# Patient Record
Sex: FEMALE | Race: BLACK OR AFRICAN AMERICAN | NOT HISPANIC OR LATINO | URBAN - METROPOLITAN AREA
[De-identification: names, ages, dates, MRNs, and addresses within clinical notes are randomized per-mention and may not be internally consistent; named-entity substitution may affect disease eponyms.]

---

## 2018-07-01 ENCOUNTER — OUTPATIENT (OUTPATIENT)
Dept: OUTPATIENT SERVICES | Facility: HOSPITAL | Age: 73
LOS: 1 days | End: 2018-07-01
Payer: MEDICARE

## 2018-07-12 DIAGNOSIS — Z71.89 OTHER SPECIFIED COUNSELING: ICD-10-CM

## 2018-12-01 PROCEDURE — G9005: CPT

## 2019-05-29 ENCOUNTER — RESULT REVIEW (OUTPATIENT)
Age: 74
End: 2019-05-29

## 2019-05-29 ENCOUNTER — INPATIENT (INPATIENT)
Facility: HOSPITAL | Age: 74
LOS: 2 days | Discharge: NURSING, REHAB OR LONG-TERM CARE | DRG: 470 | End: 2019-06-01
Attending: INTERNAL MEDICINE | Admitting: INTERNAL MEDICINE
Payer: MEDICARE

## 2019-05-29 ENCOUNTER — TRANSCRIPTION ENCOUNTER (OUTPATIENT)
Age: 74
End: 2019-05-29

## 2019-05-29 VITALS
HEART RATE: 115 BPM | DIASTOLIC BLOOD PRESSURE: 109 MMHG | HEIGHT: 63 IN | WEIGHT: 138.01 LBS | SYSTOLIC BLOOD PRESSURE: 247 MMHG | TEMPERATURE: 99 F | OXYGEN SATURATION: 100 % | RESPIRATION RATE: 18 BRPM

## 2019-05-29 DIAGNOSIS — S72.002A FRACTURE OF UNSPECIFIED PART OF NECK OF LEFT FEMUR, INITIAL ENCOUNTER FOR CLOSED FRACTURE: ICD-10-CM

## 2019-05-29 LAB
ALBUMIN SERPL ELPH-MCNC: 3.5 G/DL — SIGNIFICANT CHANGE UP (ref 3.5–5)
ALP SERPL-CCNC: 74 U/L — SIGNIFICANT CHANGE UP (ref 40–120)
ALT FLD-CCNC: 18 U/L DA — SIGNIFICANT CHANGE UP (ref 10–60)
ANION GAP SERPL CALC-SCNC: 6 MMOL/L — SIGNIFICANT CHANGE UP (ref 5–17)
APTT BLD: 35.8 SEC — SIGNIFICANT CHANGE UP (ref 27.5–36.3)
AST SERPL-CCNC: 34 U/L — SIGNIFICANT CHANGE UP (ref 10–40)
BASOPHILS # BLD AUTO: 0.02 K/UL — SIGNIFICANT CHANGE UP (ref 0–0.2)
BASOPHILS NFR BLD AUTO: 0.2 % — SIGNIFICANT CHANGE UP (ref 0–2)
BILIRUB SERPL-MCNC: 1.1 MG/DL — SIGNIFICANT CHANGE UP (ref 0.2–1.2)
BUN SERPL-MCNC: 10 MG/DL — SIGNIFICANT CHANGE UP (ref 7–18)
CALCIUM SERPL-MCNC: 9 MG/DL — SIGNIFICANT CHANGE UP (ref 8.4–10.5)
CHLORIDE SERPL-SCNC: 104 MMOL/L — SIGNIFICANT CHANGE UP (ref 96–108)
CO2 SERPL-SCNC: 26 MMOL/L — SIGNIFICANT CHANGE UP (ref 22–31)
CREAT SERPL-MCNC: 0.76 MG/DL — SIGNIFICANT CHANGE UP (ref 0.5–1.3)
EOSINOPHIL # BLD AUTO: 0 K/UL — SIGNIFICANT CHANGE UP (ref 0–0.5)
EOSINOPHIL NFR BLD AUTO: 0 % — SIGNIFICANT CHANGE UP (ref 0–6)
GLUCOSE SERPL-MCNC: 263 MG/DL — HIGH (ref 70–99)
HCT VFR BLD CALC: 40.9 % — SIGNIFICANT CHANGE UP (ref 34.5–45)
HGB BLD-MCNC: 14 G/DL — SIGNIFICANT CHANGE UP (ref 11.5–15.5)
IMM GRANULOCYTES NFR BLD AUTO: 0.4 % — SIGNIFICANT CHANGE UP (ref 0–1.5)
INR BLD: 1.08 RATIO — SIGNIFICANT CHANGE UP (ref 0.88–1.16)
LYMPHOCYTES # BLD AUTO: 0.75 K/UL — LOW (ref 1–3.3)
LYMPHOCYTES # BLD AUTO: 5.8 % — LOW (ref 13–44)
MCHC RBC-ENTMCNC: 30.7 PG — SIGNIFICANT CHANGE UP (ref 27–34)
MCHC RBC-ENTMCNC: 34.2 GM/DL — SIGNIFICANT CHANGE UP (ref 32–36)
MCV RBC AUTO: 89.7 FL — SIGNIFICANT CHANGE UP (ref 80–100)
MONOCYTES # BLD AUTO: 0.5 K/UL — SIGNIFICANT CHANGE UP (ref 0–0.9)
MONOCYTES NFR BLD AUTO: 3.8 % — SIGNIFICANT CHANGE UP (ref 2–14)
NEUTROPHILS # BLD AUTO: 11.68 K/UL — HIGH (ref 1.8–7.4)
NEUTROPHILS NFR BLD AUTO: 89.8 % — HIGH (ref 43–77)
NRBC # BLD: 0 /100 WBCS — SIGNIFICANT CHANGE UP (ref 0–0)
PLATELET # BLD AUTO: 232 K/UL — SIGNIFICANT CHANGE UP (ref 150–400)
POTASSIUM SERPL-MCNC: 4.2 MMOL/L — SIGNIFICANT CHANGE UP (ref 3.5–5.3)
POTASSIUM SERPL-MCNC: 5.7 MMOL/L — HIGH (ref 3.5–5.3)
POTASSIUM SERPL-SCNC: 4.2 MMOL/L — SIGNIFICANT CHANGE UP (ref 3.5–5.3)
POTASSIUM SERPL-SCNC: 5.7 MMOL/L — HIGH (ref 3.5–5.3)
PROT SERPL-MCNC: 8 G/DL — SIGNIFICANT CHANGE UP (ref 6–8.3)
PROTHROM AB SERPL-ACNC: 12 SEC — SIGNIFICANT CHANGE UP (ref 10–12.9)
RBC # BLD: 4.56 M/UL — SIGNIFICANT CHANGE UP (ref 3.8–5.2)
RBC # FLD: 11.9 % — SIGNIFICANT CHANGE UP (ref 10.3–14.5)
SODIUM SERPL-SCNC: 136 MMOL/L — SIGNIFICANT CHANGE UP (ref 135–145)
WBC # BLD: 13 K/UL — HIGH (ref 3.8–10.5)
WBC # FLD AUTO: 13 K/UL — HIGH (ref 3.8–10.5)

## 2019-05-29 PROCEDURE — 72192 CT PELVIS W/O DYE: CPT | Mod: 26

## 2019-05-29 PROCEDURE — 71045 X-RAY EXAM CHEST 1 VIEW: CPT | Mod: 26

## 2019-05-29 PROCEDURE — 73502 X-RAY EXAM HIP UNI 2-3 VIEWS: CPT | Mod: 26,LT

## 2019-05-29 PROCEDURE — 99285 EMERGENCY DEPT VISIT HI MDM: CPT | Mod: 25

## 2019-05-29 PROCEDURE — 93010 ELECTROCARDIOGRAM REPORT: CPT

## 2019-05-29 RX ORDER — MORPHINE SULFATE 50 MG/1
2 CAPSULE, EXTENDED RELEASE ORAL ONCE
Refills: 0 | Status: DISCONTINUED | OUTPATIENT
Start: 2019-05-29 | End: 2019-05-29

## 2019-05-29 RX ORDER — LABETALOL HCL 100 MG
10 TABLET ORAL ONCE
Refills: 0 | Status: COMPLETED | OUTPATIENT
Start: 2019-05-29 | End: 2019-05-29

## 2019-05-29 RX ORDER — ACETAMINOPHEN 500 MG
1000 TABLET ORAL ONCE
Refills: 0 | Status: COMPLETED | OUTPATIENT
Start: 2019-05-29 | End: 2019-05-29

## 2019-05-29 RX ADMIN — Medication 400 MILLIGRAM(S): at 21:11

## 2019-05-29 RX ADMIN — MORPHINE SULFATE 2 MILLIGRAM(S): 50 CAPSULE, EXTENDED RELEASE ORAL at 21:16

## 2019-05-29 RX ADMIN — MORPHINE SULFATE 2 MILLIGRAM(S): 50 CAPSULE, EXTENDED RELEASE ORAL at 21:11

## 2019-05-29 RX ADMIN — Medication 1000 MILLIGRAM(S): at 21:16

## 2019-05-29 RX ADMIN — Medication 10 MILLIGRAM(S): at 23:21

## 2019-05-29 NOTE — ED PROVIDER NOTE - CARE PLAN
Principal Discharge DX:	Closed fracture of left hip, initial encounter  Secondary Diagnosis:	Hypertension, unspecified type

## 2019-05-29 NOTE — ED PROVIDER NOTE - CLINICAL SUMMARY MEDICAL DECISION MAKING FREE TEXT BOX
72 y/o presents to the ED s/p fall today. Will order pain control, hip/pelvis X-ray, basic labs and anticipate admission for L hip fracture.

## 2019-05-29 NOTE — ED PROVIDER NOTE - PMH
DM (diabetes mellitus)    HLD (hyperlipidemia)    HTN (hypertension)    PAD (peripheral artery disease)    Paranoid schizophrenia

## 2019-05-29 NOTE — ED ADULT NURSE NOTE - EENT WDL
AUTHORIZATION FOR SURGICAL OPERATION OR OTHER PROCEDURE    1. I hereby authorize Annelise Martinez Dr, and [facility] staff assigned to my case to perform the following operation and/or procedure at the [place] site:    Colposcopy    2.  My physician has explained the nature and purpose of the operation or other procedure, possible alternative methods of treatment, the risks involved, and the possibility of complication to me.  I acknowledge that no guarantee has been made as to the result that may be obtained.  3.  I recognize that, during the course of this operation, or other procedure, unforseen conditions may necessitate additional or different procedure than those listed above.  I, therefore, further authorize and request that the above named physician, his/her physician assistants or designees perform such procedures as are, in his/her professional opinion, necessary and desirable.  4.  Any tissue or organs removed in the operation or other procedure may be disposed of by and at the discretion of the [facility] and [facility].  5.  I understand that in the event of a medical emergency, I will be transported by local paramedics to [facility] or other hospital emergency department.  6.  I certify that I have read and fully understand the above consent to operation and/or other procedure.    7.  I acknowledge that my physician has explained sedation/analgesia administration to me including the risks and benefits.  I consent to the administration of sedation/analgesia as may be necessary or desirable in the judgement of my physician.    Witness signature: ___________________________________________________ Date:  ______/______/_____                    Time:  ________ A.M.  P.M.       Patient Name:  ______________________________________________________  (please print)      Patient signature:   ___________________________________________________             Relationship to Patient:           []  Parent    Responsible person                          []  Spouse  In case of minor or                    [] Other  _____________   Incompetent name:  __________________________________________________                               (please print)      _____________      Responsible person  In case of minor or  Incompetent signature:  _______________________________________________    Statement of Physician  My signature below affirms that prior to the time of the procedure, I have explained to the patient and/or his/her guardian, the risks and benefits involved in the proposed treatment and any reasonable alternative to the proposed treatment.  I have also explained the risks and benefits involved in the refusal of the proposed treatment and have answered the patient's questions.                        Date:  ______/______/_______  Provider                      Signature:  __________________________________________________________       Time:  ___________ A.M    P.M.      Eyes with no visual disturbances.  Ears clean and dry and no hearing difficulties. Nose with pink mucosa and no drainage.  Mouth mucous membranes moist and pink.  No tenderness or swelling to throat or neck.

## 2019-05-29 NOTE — ED PROVIDER NOTE - OBJECTIVE STATEMENT
74 y/o M with a significant PMHx of HTN, HLD, PAD, DM and paranoid schizophrenia presents to the ED s/p fall today at 3:00 PM in bathroom. Patient reports pain to L hip. Patient states she resides at [facility]; notes PMD is Dr. Llanes. Denies head injury, back injury, abdominal pain, injury to right leg or any other complaints. 74 y/o M with a significant PMHx of HTN, HLD, PAD, DM and paranoid schizophrenia presents to the ED s/p fall today at 3:00 PM in bathroom. Patient reports pain to L hip. Patient states she resides at [facility]; notes PMD is Dr. Llanes. Denies head injury, back injury, abdominal pain, injury to right leg or any other complaints. NKDA.

## 2019-05-29 NOTE — ED PROVIDER NOTE - PROGRESS NOTE DETAILS
Will order CT bony pelvis to determine extent of L hip fracture. Informed Dr. Llanes for admission.  He requested orthopedist Dr. Rico to be consulted non-emergently and he was aware that he is not on-call.  Dr. Rico was paged however no response.  Informed REILLY Sanchez.

## 2019-05-30 DIAGNOSIS — Z29.9 ENCOUNTER FOR PROPHYLACTIC MEASURES, UNSPECIFIED: ICD-10-CM

## 2019-05-30 DIAGNOSIS — I16.0 HYPERTENSIVE URGENCY: ICD-10-CM

## 2019-05-30 DIAGNOSIS — E11.9 TYPE 2 DIABETES MELLITUS WITHOUT COMPLICATIONS: ICD-10-CM

## 2019-05-30 DIAGNOSIS — F20.0 PARANOID SCHIZOPHRENIA: ICD-10-CM

## 2019-05-30 DIAGNOSIS — E78.5 HYPERLIPIDEMIA, UNSPECIFIED: ICD-10-CM

## 2019-05-30 DIAGNOSIS — S72.002A FRACTURE OF UNSPECIFIED PART OF NECK OF LEFT FEMUR, INITIAL ENCOUNTER FOR CLOSED FRACTURE: ICD-10-CM

## 2019-05-30 LAB
ABO RH CONFIRMATION: SIGNIFICANT CHANGE UP
ALBUMIN SERPL ELPH-MCNC: 3.5 G/DL — SIGNIFICANT CHANGE UP (ref 3.5–5)
ALLERGY+IMMUNOLOGY DIAG STUDY NOTE: SIGNIFICANT CHANGE UP
ALP SERPL-CCNC: 71 U/L — SIGNIFICANT CHANGE UP (ref 40–120)
ALT FLD-CCNC: 13 U/L DA — SIGNIFICANT CHANGE UP (ref 10–60)
ANION GAP SERPL CALC-SCNC: 6 MMOL/L — SIGNIFICANT CHANGE UP (ref 5–17)
AST SERPL-CCNC: 10 U/L — SIGNIFICANT CHANGE UP (ref 10–40)
BASOPHILS # BLD AUTO: 0.03 K/UL — SIGNIFICANT CHANGE UP (ref 0–0.2)
BASOPHILS NFR BLD AUTO: 0.2 % — SIGNIFICANT CHANGE UP (ref 0–2)
BILIRUB SERPL-MCNC: 1.1 MG/DL — SIGNIFICANT CHANGE UP (ref 0.2–1.2)
BUN SERPL-MCNC: 11 MG/DL — SIGNIFICANT CHANGE UP (ref 7–18)
CALCIUM SERPL-MCNC: 9 MG/DL — SIGNIFICANT CHANGE UP (ref 8.4–10.5)
CHLORIDE SERPL-SCNC: 104 MMOL/L — SIGNIFICANT CHANGE UP (ref 96–108)
CHOLEST SERPL-MCNC: 217 MG/DL — HIGH (ref 10–199)
CO2 SERPL-SCNC: 30 MMOL/L — SIGNIFICANT CHANGE UP (ref 22–31)
CREAT SERPL-MCNC: 0.8 MG/DL — SIGNIFICANT CHANGE UP (ref 0.5–1.3)
EOSINOPHIL # BLD AUTO: 0.03 K/UL — SIGNIFICANT CHANGE UP (ref 0–0.5)
EOSINOPHIL NFR BLD AUTO: 0.2 % — SIGNIFICANT CHANGE UP (ref 0–6)
FOLATE SERPL-MCNC: 16.5 NG/ML — SIGNIFICANT CHANGE UP
GLUCOSE BLDC GLUCOMTR-MCNC: 161 MG/DL — HIGH (ref 70–99)
GLUCOSE BLDC GLUCOMTR-MCNC: 205 MG/DL — HIGH (ref 70–99)
GLUCOSE BLDC GLUCOMTR-MCNC: 235 MG/DL — HIGH (ref 70–99)
GLUCOSE BLDC GLUCOMTR-MCNC: 321 MG/DL — HIGH (ref 70–99)
GLUCOSE SERPL-MCNC: 206 MG/DL — HIGH (ref 70–99)
HBA1C BLD-MCNC: 9.6 % — HIGH (ref 4–5.6)
HCT VFR BLD CALC: 40.3 % — SIGNIFICANT CHANGE UP (ref 34.5–45)
HDLC SERPL-MCNC: 64 MG/DL — SIGNIFICANT CHANGE UP
HGB BLD-MCNC: 13.5 G/DL — SIGNIFICANT CHANGE UP (ref 11.5–15.5)
IMM GRANULOCYTES NFR BLD AUTO: 0.4 % — SIGNIFICANT CHANGE UP (ref 0–1.5)
LIPID PNL WITH DIRECT LDL SERPL: 140 MG/DL — SIGNIFICANT CHANGE UP
LYMPHOCYTES # BLD AUTO: 1.64 K/UL — SIGNIFICANT CHANGE UP (ref 1–3.3)
LYMPHOCYTES # BLD AUTO: 13.5 % — SIGNIFICANT CHANGE UP (ref 13–44)
MAGNESIUM SERPL-MCNC: 2.1 MG/DL — SIGNIFICANT CHANGE UP (ref 1.6–2.6)
MCHC RBC-ENTMCNC: 30.4 PG — SIGNIFICANT CHANGE UP (ref 27–34)
MCHC RBC-ENTMCNC: 33.5 GM/DL — SIGNIFICANT CHANGE UP (ref 32–36)
MCV RBC AUTO: 90.8 FL — SIGNIFICANT CHANGE UP (ref 80–100)
MONOCYTES # BLD AUTO: 0.96 K/UL — HIGH (ref 0–0.9)
MONOCYTES NFR BLD AUTO: 7.9 % — SIGNIFICANT CHANGE UP (ref 2–14)
NEUTROPHILS # BLD AUTO: 9.4 K/UL — HIGH (ref 1.8–7.4)
NEUTROPHILS NFR BLD AUTO: 77.8 % — HIGH (ref 43–77)
NRBC # BLD: 0 /100 WBCS — SIGNIFICANT CHANGE UP (ref 0–0)
PHOSPHATE SERPL-MCNC: 4.1 MG/DL — SIGNIFICANT CHANGE UP (ref 2.5–4.5)
PLATELET # BLD AUTO: 246 K/UL — SIGNIFICANT CHANGE UP (ref 150–400)
POTASSIUM SERPL-MCNC: 4.7 MMOL/L — SIGNIFICANT CHANGE UP (ref 3.5–5.3)
POTASSIUM SERPL-SCNC: 4.7 MMOL/L — SIGNIFICANT CHANGE UP (ref 3.5–5.3)
PROT SERPL-MCNC: 7.6 G/DL — SIGNIFICANT CHANGE UP (ref 6–8.3)
RBC # BLD: 4.44 M/UL — SIGNIFICANT CHANGE UP (ref 3.8–5.2)
RBC # FLD: 11.9 % — SIGNIFICANT CHANGE UP (ref 10.3–14.5)
SODIUM SERPL-SCNC: 140 MMOL/L — SIGNIFICANT CHANGE UP (ref 135–145)
TOTAL CHOLESTEROL/HDL RATIO MEASUREMENT: 3.4 RATIO — SIGNIFICANT CHANGE UP (ref 3.3–7.1)
TRIGL SERPL-MCNC: 65 MG/DL — SIGNIFICANT CHANGE UP (ref 10–149)
TSH SERPL-MCNC: 1.41 UU/ML — SIGNIFICANT CHANGE UP (ref 0.34–4.82)
VIT B12 SERPL-MCNC: 743 PG/ML — SIGNIFICANT CHANGE UP (ref 232–1245)
WBC # BLD: 12.11 K/UL — HIGH (ref 3.8–10.5)
WBC # FLD AUTO: 12.11 K/UL — HIGH (ref 3.8–10.5)

## 2019-05-30 PROCEDURE — 93306 TTE W/DOPPLER COMPLETE: CPT | Mod: 26

## 2019-05-30 PROCEDURE — 27236 TREAT THIGH FRACTURE: CPT | Mod: AS,LT

## 2019-05-30 PROCEDURE — 71045 X-RAY EXAM CHEST 1 VIEW: CPT | Mod: 26

## 2019-05-30 RX ORDER — ACETAMINOPHEN 500 MG
650 TABLET ORAL EVERY 6 HOURS
Refills: 0 | Status: DISCONTINUED | OUTPATIENT
Start: 2019-05-30 | End: 2019-05-30

## 2019-05-30 RX ORDER — INSULIN GLARGINE 100 [IU]/ML
10 INJECTION, SOLUTION SUBCUTANEOUS AT BEDTIME
Refills: 0 | Status: CANCELLED | OUTPATIENT
Start: 2019-05-31 | End: 2019-05-30

## 2019-05-30 RX ORDER — INSULIN GLARGINE 100 [IU]/ML
10 INJECTION, SOLUTION SUBCUTANEOUS AT BEDTIME
Refills: 0 | Status: DISCONTINUED | OUTPATIENT
Start: 2019-05-30 | End: 2019-05-30

## 2019-05-30 RX ORDER — HYDROCHLOROTHIAZIDE 25 MG
25 TABLET ORAL DAILY
Refills: 0 | Status: DISCONTINUED | OUTPATIENT
Start: 2019-05-30 | End: 2019-05-30

## 2019-05-30 RX ORDER — MORPHINE SULFATE 50 MG/1
1 CAPSULE, EXTENDED RELEASE ORAL EVERY 6 HOURS
Refills: 0 | Status: DISCONTINUED | OUTPATIENT
Start: 2019-05-30 | End: 2019-05-30

## 2019-05-30 RX ORDER — SIMVASTATIN 20 MG/1
40 TABLET, FILM COATED ORAL AT BEDTIME
Refills: 0 | Status: DISCONTINUED | OUTPATIENT
Start: 2019-05-30 | End: 2019-05-30

## 2019-05-30 RX ORDER — INSULIN LISPRO 100/ML
VIAL (ML) SUBCUTANEOUS
Refills: 0 | Status: DISCONTINUED | OUTPATIENT
Start: 2019-05-30 | End: 2019-05-30

## 2019-05-30 RX ORDER — POVIDONE-IODINE 5 %
1 AEROSOL (ML) TOPICAL ONCE
Refills: 0 | Status: DISCONTINUED | OUTPATIENT
Start: 2019-05-30 | End: 2019-05-30

## 2019-05-30 RX ORDER — LOSARTAN POTASSIUM 100 MG/1
100 TABLET, FILM COATED ORAL DAILY
Refills: 0 | Status: DISCONTINUED | OUTPATIENT
Start: 2019-05-30 | End: 2019-05-30

## 2019-05-30 RX ORDER — CHLORHEXIDINE GLUCONATE 213 G/1000ML
1 SOLUTION TOPICAL ONCE
Refills: 0 | Status: COMPLETED | OUTPATIENT
Start: 2019-05-30 | End: 2019-05-30

## 2019-05-30 RX ORDER — INSULIN GLARGINE 100 [IU]/ML
5 INJECTION, SOLUTION SUBCUTANEOUS ONCE
Refills: 0 | Status: COMPLETED | OUTPATIENT
Start: 2019-05-30 | End: 2019-05-30

## 2019-05-30 RX ORDER — METOPROLOL TARTRATE 50 MG
100 TABLET ORAL
Refills: 0 | Status: DISCONTINUED | OUTPATIENT
Start: 2019-05-30 | End: 2019-05-30

## 2019-05-30 RX ORDER — DOXAZOSIN MESYLATE 4 MG
4 TABLET ORAL AT BEDTIME
Refills: 0 | Status: DISCONTINUED | OUTPATIENT
Start: 2019-05-30 | End: 2019-05-30

## 2019-05-30 RX ORDER — AMLODIPINE BESYLATE 2.5 MG/1
10 TABLET ORAL DAILY
Refills: 0 | Status: DISCONTINUED | OUTPATIENT
Start: 2019-05-30 | End: 2019-05-30

## 2019-05-30 RX ORDER — PERPHENAZINE 8 MG/1
16 TABLET, FILM COATED ORAL EVERY 12 HOURS
Refills: 0 | Status: DISCONTINUED | OUTPATIENT
Start: 2019-05-30 | End: 2019-05-30

## 2019-05-30 RX ORDER — CHLORHEXIDINE GLUCONATE 213 G/1000ML
1 SOLUTION TOPICAL DAILY
Refills: 0 | Status: DISCONTINUED | OUTPATIENT
Start: 2019-05-30 | End: 2019-05-30

## 2019-05-30 RX ORDER — ASPIRIN/CALCIUM CARB/MAGNESIUM 324 MG
81 TABLET ORAL DAILY
Refills: 0 | Status: DISCONTINUED | OUTPATIENT
Start: 2019-05-30 | End: 2019-05-30

## 2019-05-30 RX ORDER — HEPARIN SODIUM 5000 [USP'U]/ML
5000 INJECTION INTRAVENOUS; SUBCUTANEOUS EVERY 8 HOURS
Refills: 0 | Status: DISCONTINUED | OUTPATIENT
Start: 2019-05-30 | End: 2019-05-30

## 2019-05-30 RX ADMIN — Medication 2: at 22:11

## 2019-05-30 RX ADMIN — MORPHINE SULFATE 1 MILLIGRAM(S): 50 CAPSULE, EXTENDED RELEASE ORAL at 20:20

## 2019-05-30 RX ADMIN — MORPHINE SULFATE 1 MILLIGRAM(S): 50 CAPSULE, EXTENDED RELEASE ORAL at 18:15

## 2019-05-30 RX ADMIN — Medication 2: at 10:14

## 2019-05-30 RX ADMIN — AMLODIPINE BESYLATE 10 MILLIGRAM(S): 2.5 TABLET ORAL at 06:11

## 2019-05-30 RX ADMIN — Medication 1: at 18:09

## 2019-05-30 RX ADMIN — HEPARIN SODIUM 5000 UNIT(S): 5000 INJECTION INTRAVENOUS; SUBCUTANEOUS at 06:12

## 2019-05-30 RX ADMIN — Medication 25 MILLIGRAM(S): at 06:12

## 2019-05-30 RX ADMIN — CHLORHEXIDINE GLUCONATE 1 APPLICATION(S): 213 SOLUTION TOPICAL at 22:11

## 2019-05-30 RX ADMIN — Medication 100 MILLIGRAM(S): at 01:08

## 2019-05-30 RX ADMIN — Medication 5: at 12:39

## 2019-05-30 RX ADMIN — Medication 650 MILLIGRAM(S): at 01:45

## 2019-05-30 RX ADMIN — Medication 650 MILLIGRAM(S): at 01:07

## 2019-05-30 RX ADMIN — LOSARTAN POTASSIUM 100 MILLIGRAM(S): 100 TABLET, FILM COATED ORAL at 06:11

## 2019-05-30 RX ADMIN — Medication 4 MILLIGRAM(S): at 22:11

## 2019-05-30 RX ADMIN — PERPHENAZINE 16 MILLIGRAM(S): 8 TABLET, FILM COATED ORAL at 06:11

## 2019-05-30 RX ADMIN — SIMVASTATIN 40 MILLIGRAM(S): 20 TABLET, FILM COATED ORAL at 22:12

## 2019-05-30 RX ADMIN — CHLORHEXIDINE GLUCONATE 1 APPLICATION(S): 213 SOLUTION TOPICAL at 10:13

## 2019-05-30 NOTE — CONSULT NOTE ADULT - ATTENDING COMMENTS
72 yo female left femoral neck fracture.  Plan for hemiarthroplasty  Pt is alert and oriented, complaining left hip pain.  I discussed the benefits and risks of the surgery  She said she understand it and wants to have surgery. She said she wants to walk again.    Dr. Miner, Orthopaedic surgeon

## 2019-05-30 NOTE — H&P ADULT - ASSESSMENT
73 Female with PMH of HTN, Diabetes, Osteoarthritis, Osteoporosis, Peripheral arterial disease, Diabetic neuropathy, HLD, Schizophrenia came to hospital after an episode of fall. Admitted to tele for Hypertensive urgency and Left hip fracture.

## 2019-05-30 NOTE — CHART NOTE - NSCHARTNOTEFT_GEN_A_CORE
PATIENT WAS SEEN AND EXAMINED  - PATIENT IS MODERATE SURGICAL RISK FOR ORIF OF LEFT HIP FRACTURE  - TTE AND CARDIAC EVAL   - D/W STAFF   - DR. CASILLAS

## 2019-05-30 NOTE — H&P ADULT - NSICDXPASTMEDICALHX_GEN_ALL_CORE_FT
PAST MEDICAL HISTORY:  DM (diabetes mellitus)     HLD (hyperlipidemia)     HTN (hypertension)     PAD (peripheral artery disease)     Paranoid schizophrenia

## 2019-05-30 NOTE — H&P ADULT - HISTORY OF PRESENT ILLNESS
73 Female with PMH of HTN, Diabetes, Osteoarthritis, Osteoporosis, Peripheral arterial disease, Diabetic neuropathy, HLD, Schizophrenia came to hospital after an episode of fall. Pt was in her toliet when she tried to close the door while sitting on the commode leading to fall and hit her left side of hip. She was not able to ambulate afterwards for which nursing home sent her to hospital for evaluation. Pt complains of mild pain upon movement but denies any other focal weakness or problems.     SH: From [facility]. Denies smoking and alcohol.    ED Course: Blood pressure was 247/109. HR was 115 and Labs showed Leukocytosis of 13. CT pelvis showed acute left femoral comminuted neck fracture.  Pt was given labetalol and morphine. EKG showed NSR @ 92 bpm.

## 2019-05-30 NOTE — CONSULT NOTE ADULT - SUBJECTIVE AND OBJECTIVE BOX
TITO ORLANDO  MRN-[id number]    ORTHOPEDIC CONSULT    Diagnosis:  Left Hip Femoral Neck Fracture, closed, initial encounter    73yFemaleHPI:  73 Female with PMH of HTN, Diabetes, Osteoarthritis, Osteoporosis, Peripheral arterial disease, Diabetic neuropathy, HLD, Schizophrenia came to hospital after an episode of fall. Pt was in her toilet when she tried to close the door while sitting on the commode leading to fall and hit her left side of hip. She was not able to ambulate afterwards for which nursing home sent her to hospital for evaluation. Pt complains of mild pain upon movement but denies any other focal weakness or problems.   SH: From [place]. Denies smoking and alcohol.    Ortho called to evaluate left hip fracture s/p fall at assisted living facility who sustained a left hip femoral neck fracture.  Left hip fracture with 2/10 pain at rest. Pain is non-radiating and pain increases with ROM. Community ambulator with no assisted device.  Recalls incident in entirety with no LOC.  Pt denies Chest pain, SOB, dyspnea, paresthesias, N/V/D, abdominal pain, syncope, or pain anywhere else.     Vital Signs Last 24 Hrs  T(C): 36.8 (30 May 2019 07:33), Max: 37 (29 May 2019 19:25)  T(F): 98.3 (30 May 2019 07:33), Max: 98.6 (29 May 2019 19:25)  HR: 70 (30 May 2019 07:33) (67 - 115)  BP: 165/65 (30 May 2019 07:33) (155/57 - 247/109)  BP(mean): --  RR: 17 (30 May 2019 07:33) (17 - 18)  SpO2: 100% (30 May 2019 07:33) (97% - 100%)  I&O's Detail      Physical Exam:    General: AAOx3, NAD, resting comfortably in bed.    Left Hip:   Skin is pink and warm. Tender at the fracture site. Decreased ROM of the affected hip due to pain. SILT. Positive logroll test.  Lower extremity:  No calf tenderness, calves are soft. 2+pulses. NVI. 5/5 Strength of EHL/TA/gastrocnemius B/L.  Good capillary refill. Warm, well-perfused.                           13.5   12.11 )-----------( 246      ( 30 May 2019 06:01 )             40.3     05-30    140  |  104  |  11  ----------------------------<  206<H>  4.7   |  30  |  0.80    Ca    9.0      30 May 2019 06:01  Phos  4.1     05-30  Mg     2.1     05-30    TPro  7.6  /  Alb  3.5  /  TBili  1.1  /  DBili  x   /  AST  10  /  ALT  13  /  AlkPhos  71  05-30    PT/INR - ( 29 May 2019 21:05 )   PT: 12.0 sec;   INR: 1.08 ratio         PTT - ( 29 May 2019 21:05 )  PTT:35.8 sec    < from: CT Pelvis Bony Only No Cont (05.29.19 @ 22:41) >    EXAM:  CT PELVIS BONY ONLY                            PROCEDURE DATE:  05/29/2019          INTERPRETATION:  CT of the pelvis     CLINICAL INFORMATION: Left hip fracture  TECHNIQUE: Axial CT images were obtained of the pelvis with coronal and   sagittal reconstructions. No contrast was administered.     FINDINGS:    There is a left femoral neck fracture with superior displacement and   external rotation of the distal fracture fragment. Mild bilateral hip   joint space narrowing. Mild degenerative changes of the sacroiliac joints   and pubic symphysis. Mild enlargement of the left adductor musculature,   likely representing edema/hemorrhage. Uterine leiomyoma. Vascular   calcifications. Subcutaneous tissues are intact.    IMPRESSION:    Left femoral neck fracture.                        [facility] RADIOLOGIST PRELIMINARY REPORT    EXAM:    CT Pelvis Without Contrast, Skeletal     EXAM DATE/TIME:    5/29/2019 10:34 PM     CLINICAL HISTORY:    73 years old, female; Hip pain; Left hip     TECHNIQUE:    Imaging protocol: Axial computed tomography images of the pelvis without   intravenous contrast. Exam focused on the skeletal structures. Coronal   and   sagittal reformatted images were created and reviewed.     COMPARISON:    CR XR HIP WITH PELVIS 2 OR 3 VIEWS LEFT 5/29/2019 8:21 PM     FINDINGS:    Stomach and bowel: Colonic diverticulosis without evidence of gross   inflammation.    Bladder: Significant distention of the bladder.    Reproductive: Large uterine fibroid.    Bones/joints: Degenerative changes of the visualized lumbosacral spine.   Acute   comminuted left femoral neck fracture. No dislocation.    Soft tissues: Unremarkable.     IMPRESSION:   Acute comminuted left femoral neck fracture. No dislocation.                MARLON ROCA M.D., ATTENDING RADIOLOGIST  This document has been electronically signed. May 30 2019  8:57AM                < end of copied text >      Impression:  73yFemale with Left Hip Femoral Neck Fracture    Plan:  -  Pain management  -  Dvt prophylaxis with Venodynes  -  Keep NPO after midnight tonight  -  Surgeon:  Dr. Miner  -  Procedure:  Left hip hemiarthroplasty  -  For Surgery on 5/30/2019  -  Medical Optimization  -  Consent: Pending  -  Case d/w  (orthosurgeon)  -  Fracture care with bedrest now, NWB of the affected extremity  -  CHG wipes ordered  -  Betadine swabs to be performed by myself TITO ORLANDO  MRN-[id number]    ORTHOPEDIC CONSULT    Diagnosis:  Left Hip Femoral Neck Fracture, closed, initial encounter    73yFemaleHPI:  73 Female with PMH of HTN, Diabetes, Osteoarthritis, Osteoporosis, Peripheral arterial disease, Diabetic neuropathy, HLD, Schizophrenia came to hospital after an episode of fall. Pt was in her toilet when she tried to close the door while sitting on the commode leading to fall and hit her left side of hip. She was not able to ambulate afterwards for which nursing home sent her to hospital for evaluation. Pt complains of mild pain upon movement but denies any other focal weakness or problems.   SH: From [place]. Denies smoking and alcohol.    Ortho called to evaluate left hip fracture s/p fall at assisted living facility who sustained a left hip femoral neck fracture.  Left hip fracture with 2/10 pain at rest. Pain is non-radiating and pain increases with ROM. Community ambulator with no assisted device.  Recalls incident in entirety with no LOC.  Pt denies Chest pain, SOB, dyspnea, paresthesias, N/V/D, abdominal pain, syncope, or pain anywhere else.     Vital Signs Last 24 Hrs  T(C): 36.8 (30 May 2019 07:33), Max: 37 (29 May 2019 19:25)  T(F): 98.3 (30 May 2019 07:33), Max: 98.6 (29 May 2019 19:25)  HR: 70 (30 May 2019 07:33) (67 - 115)  BP: 165/65 (30 May 2019 07:33) (155/57 - 247/109)  BP(mean): --  RR: 17 (30 May 2019 07:33) (17 - 18)  SpO2: 100% (30 May 2019 07:33) (97% - 100%)  I&O's Detail      Physical Exam:    General: AAOx3, NAD, resting comfortably in bed.    Left Hip:   Skin is pink and warm. Tender at the fracture site. Decreased ROM of the affected hip due to pain. SILT. Positive logroll test.  Lower extremity:  No calf tenderness, calves are soft. 2+pulses. NVI. 5/5 Strength of EHL/TA/gastrocnemius B/L.  Good capillary refill. Warm, well-perfused.                           13.5   12.11 )-----------( 246      ( 30 May 2019 06:01 )             40.3     05-30    140  |  104  |  11  ----------------------------<  206<H>  4.7   |  30  |  0.80    Ca    9.0      30 May 2019 06:01  Phos  4.1     05-30  Mg     2.1     05-30    TPro  7.6  /  Alb  3.5  /  TBili  1.1  /  DBili  x   /  AST  10  /  ALT  13  /  AlkPhos  71  05-30    PT/INR - ( 29 May 2019 21:05 )   PT: 12.0 sec;   INR: 1.08 ratio         PTT - ( 29 May 2019 21:05 )  PTT:35.8 sec    < from: CT Pelvis Bony Only No Cont (05.29.19 @ 22:41) >    EXAM:  CT PELVIS BONY ONLY                            PROCEDURE DATE:  05/29/2019          INTERPRETATION:  CT of the pelvis     CLINICAL INFORMATION: Left hip fracture  TECHNIQUE: Axial CT images were obtained of the pelvis with coronal and   sagittal reconstructions. No contrast was administered.     FINDINGS:    There is a left femoral neck fracture with superior displacement and   external rotation of the distal fracture fragment. Mild bilateral hip   joint space narrowing. Mild degenerative changes of the sacroiliac joints   and pubic symphysis. Mild enlargement of the left adductor musculature,   likely representing edema/hemorrhage. Uterine leiomyoma. Vascular   calcifications. Subcutaneous tissues are intact.    IMPRESSION:    Left femoral neck fracture.                        [facility] RADIOLOGIST PRELIMINARY REPORT    EXAM:    CT Pelvis Without Contrast, Skeletal     EXAM DATE/TIME:    5/29/2019 10:34 PM     CLINICAL HISTORY:    73 years old, female; Hip pain; Left hip     TECHNIQUE:    Imaging protocol: Axial computed tomography images of the pelvis without   intravenous contrast. Exam focused on the skeletal structures. Coronal   and   sagittal reformatted images were created and reviewed.     COMPARISON:    CR XR HIP WITH PELVIS 2 OR 3 VIEWS LEFT 5/29/2019 8:21 PM     FINDINGS:    Stomach and bowel: Colonic diverticulosis without evidence of gross   inflammation.    Bladder: Significant distention of the bladder.    Reproductive: Large uterine fibroid.    Bones/joints: Degenerative changes of the visualized lumbosacral spine.   Acute   comminuted left femoral neck fracture. No dislocation.    Soft tissues: Unremarkable.     IMPRESSION:   Acute comminuted left femoral neck fracture. No dislocation.                MARLON ROCA M.D., ATTENDING RADIOLOGIST  This document has been electronically signed. May 30 2019  8:57AM                < end of copied text >      Impression:  73yFemale with Left Hip Femoral Neck Fracture    Plan:  -  Pain management  -  Dvt prophylaxis with Venodynes. hold heparin sq today  -  Keep NPO after midnight tonight  -  Surgeon:  Dr. Miner  -  Procedure:  Left hip hemiarthroplasty  -  For Surgery on 5/30/2019  -  Medical Optimization  -  Consent: Pending  -  Case d/w  (orthosurgeon)  -  Fracture care with bedrest now, NWB of the affected extremity  -  CHG wipes ordered, nurse is aware to perform task  -  Betadine swabs to be performed by myself TITO ORLANDO  MRN-[id number]    ORTHOPEDIC CONSULT    Diagnosis:  Left Hip Femoral Neck Fracture, closed, initial encounter    73yFemaleHPI:  73 Female with PMH of HTN, Diabetes, Osteoarthritis, Osteoporosis, Peripheral arterial disease, Diabetic neuropathy, HLD, Schizophrenia came to hospital after an episode of fall. Pt was in her toilet when she tried to close the door while sitting on the commode leading to fall and hit her left side of hip. She was not able to ambulate afterwards for which nursing home sent her to hospital for evaluation. Pt complains of mild pain upon movement but denies any other focal weakness or problems.   SH: From [place]. Denies smoking and alcohol.    Ortho called to evaluate left hip fracture s/p fall at assisted living facility who sustained a left hip femoral neck fracture.  Left hip fracture with 2/10 pain at rest. Pain is non-radiating and pain increases with ROM. Community ambulator with no assisted device.  Recalls incident in entirety with no LOC.  Pt denies Chest pain, SOB, dyspnea, paresthesias, N/V/D, abdominal pain, syncope, or pain anywhere else.     Vital Signs Last 24 Hrs  T(C): 36.8 (30 May 2019 07:33), Max: 37 (29 May 2019 19:25)  T(F): 98.3 (30 May 2019 07:33), Max: 98.6 (29 May 2019 19:25)  HR: 70 (30 May 2019 07:33) (67 - 115)  BP: 165/65 (30 May 2019 07:33) (155/57 - 247/109)  BP(mean): --  RR: 17 (30 May 2019 07:33) (17 - 18)  SpO2: 100% (30 May 2019 07:33) (97% - 100%)  I&O's Detail      Physical Exam:    General: AAOx3, NAD, resting comfortably in bed.    Left Hip:   Skin is pink and warm. Tender at the fracture site. Decreased ROM of the affected hip due to pain. SILT. Positive logroll test.  Lower extremity:  No calf tenderness, calves are soft. 2+pulses. NVI. 5/5 Strength of EHL/TA/gastrocnemius B/L.  Good capillary refill. Warm, well-perfused.                           13.5   12.11 )-----------( 246      ( 30 May 2019 06:01 )             40.3     05-30    140  |  104  |  11  ----------------------------<  206<H>  4.7   |  30  |  0.80    Ca    9.0      30 May 2019 06:01  Phos  4.1     05-30  Mg     2.1     05-30    TPro  7.6  /  Alb  3.5  /  TBili  1.1  /  DBili  x   /  AST  10  /  ALT  13  /  AlkPhos  71  05-30    PT/INR - ( 29 May 2019 21:05 )   PT: 12.0 sec;   INR: 1.08 ratio         PTT - ( 29 May 2019 21:05 )  PTT:35.8 sec    < from: CT Pelvis Bony Only No Cont (05.29.19 @ 22:41) >    EXAM:  CT PELVIS BONY ONLY                            PROCEDURE DATE:  05/29/2019          INTERPRETATION:  CT of the pelvis     CLINICAL INFORMATION: Left hip fracture  TECHNIQUE: Axial CT images were obtained of the pelvis with coronal and   sagittal reconstructions. No contrast was administered.     FINDINGS:    There is a left femoral neck fracture with superior displacement and   external rotation of the distal fracture fragment. Mild bilateral hip   joint space narrowing. Mild degenerative changes of the sacroiliac joints   and pubic symphysis. Mild enlargement of the left adductor musculature,   likely representing edema/hemorrhage. Uterine leiomyoma. Vascular   calcifications. Subcutaneous tissues are intact.    IMPRESSION:    Left femoral neck fracture.                        [facility] RADIOLOGIST PRELIMINARY REPORT    EXAM:    CT Pelvis Without Contrast, Skeletal     EXAM DATE/TIME:    5/29/2019 10:34 PM     CLINICAL HISTORY:    73 years old, female; Hip pain; Left hip     TECHNIQUE:    Imaging protocol: Axial computed tomography images of the pelvis without   intravenous contrast. Exam focused on the skeletal structures. Coronal   and   sagittal reformatted images were created and reviewed.     COMPARISON:    CR XR HIP WITH PELVIS 2 OR 3 VIEWS LEFT 5/29/2019 8:21 PM     FINDINGS:    Stomach and bowel: Colonic diverticulosis without evidence of gross   inflammation.    Bladder: Significant distention of the bladder.    Reproductive: Large uterine fibroid.    Bones/joints: Degenerative changes of the visualized lumbosacral spine.   Acute   comminuted left femoral neck fracture. No dislocation.    Soft tissues: Unremarkable.     IMPRESSION:   Acute comminuted left femoral neck fracture. No dislocation.                MARLON ROCA M.D., ATTENDING RADIOLOGIST  This document has been electronically signed. May 30 2019  8:57AM                < end of copied text >      Impression:  73yFemale with Left Hip Femoral Neck Fracture    Plan:  -  Pain management  -  Dvt prophylaxis with Venodynes. hold heparin sq today  -  Keep NPO after midnight tonight  -  Surgeon:  Dr. Miner  -  Procedure:  Left hip hemiarthroplasty  -  For Surgery on 5/30/2019  -  Medical Optimization  -  Consent: Pending  -  Case d/w  (orthosurgeon)  -  Fracture care with bedrest now, NWB of the affected extremity  -  CHG wipes ordered, nurse is aware to perform task  -  Betadine swabs to be performed by myself TITO OR[place]O  MRN-[id number]    ORTHOPEDIC CONSULT    Diagnosis:  Left Hip Femoral Neck Fracture, closed, initial encounter    73yFemaleHPI:  73 Female with PMH of HTN, Diabetes, Osteoarthritis, Osteoporosis, Peripheral arterial disease, Diabetic neuropathy, HLD, Schizophrenia came to hospital after an episode of fall. Pt was in her toilet when she tried to close the door while sitting on the commode leading to fall and hit her left side of hip. She was not able to ambulate afterwards for which nursing home sent her to hospital for evaluation. Pt complains of mild pain upon movement but denies any other focal weakness or problems.   SH: From [place]. Denies smoking and alcohol.    Ortho called to evaluate left hip fracture s/p fall at assisted living facility who sustained a left hip femoral neck fracture.  Left hip fracture with 2/10 pain at rest. Pain is non-radiating and pain increases with ROM. Community ambulator with no assisted device.  Recalls incident in entirety with no LOC.  Pt denies Chest pain, SOB, dyspnea, paresthesias, N/V/D, abdominal pain, syncope, or pain anywhere else.     Vital Signs Last 24 Hrs  T(C): 36.8 (30 May 2019 07:33), Max: 37 (29 May 2019 19:25)  T(F): 98.3 (30 May 2019 07:33), Max: 98.6 (29 May 2019 19:25)  HR: 70 (30 May 2019 07:33) (67 - 115)  BP: 165/65 (30 May 2019 07:33) (155/57 - 247/109)  BP(mean): --  RR: 17 (30 May 2019 07:33) (17 - 18)  SpO2: 100% (30 May 2019 07:33) (97% - 100%)  I&O's Detail      Physical Exam:    General: AAOx3, NAD, resting comfortably in bed.    Left Hip:   Skin is pink and warm. Tender at the fracture site. Decreased ROM of the affected hip due to pain. SILT. Positive logroll test.  Lower extremity:  No calf tenderness, calves are soft. 2+pulses. NVI. 5/5 Strength of EHL/TA/gastrocnemius B/L.  Good capillary refill. Warm, well-perfused.                           13.5   12.11 )-----------( 246      ( 30 May 2019 06:01 )             40.3     05-30    140  |  104  |  11  ----------------------------<  206<H>  4.7   |  30  |  0.80    Ca    9.0      30 May 2019 06:01  Phos  4.1     05-30  Mg     2.1     05-30    TPro  7.6  /  Alb  3.5  /  TBili  1.1  /  DBili  x   /  AST  10  /  ALT  13  /  AlkPhos  71  05-30    PT/INR - ( 29 May 2019 21:05 )   PT: 12.0 sec;   INR: 1.08 ratio         PTT - ( 29 May 2019 21:05 )  PTT:35.8 sec    < from: CT Pelvis Bony Only No Cont (05.29.19 @ 22:41) >    EXAM:  CT PELVIS BONY ONLY                            PROCEDURE DATE:  05/29/2019          INTERPRETATION:  CT of the pelvis     CLINICAL INFORMATION: Left hip fracture  TECHNIQUE: Axial CT images were obtained of the pelvis with coronal and   sagittal reconstructions. No contrast was administered.     FINDINGS:    There is a left femoral neck fracture with superior displacement and   external rotation of the distal fracture fragment. Mild bilateral hip   joint space narrowing. Mild degenerative changes of the sacroiliac joints   and pubic symphysis. Mild enlargement of the left adductor musculature,   likely representing edema/hemorrhage. Uterine leiomyoma. Vascular   calcifications. Subcutaneous tissues are intact.    IMPRESSION:    Left femoral neck fracture.                        [facility] RADIOLOGIST PRELIMINARY REPORT    EXAM:    CT Pelvis Without Contrast, Skeletal     EXAM DATE/TIME:    5/29/2019 10:34 PM     CLINICAL HISTORY:    73 years old, female; Hip pain; Left hip     TECHNIQUE:    Imaging protocol: Axial computed tomography images of the pelvis without   intravenous contrast. Exam focused on the skeletal structures. Coronal   and   sagittal reformatted images were created and reviewed.     COMPARISON:    CR XR HIP WITH PELVIS 2 OR 3 VIEWS LEFT 5/29/2019 8:21 PM     FINDINGS:    Stomach and bowel: Colonic diverticulosis without evidence of gross   inflammation.    Bladder: Significant distention of the bladder.    Reproductive: Large uterine fibroid.    Bones/joints: Degenerative changes of the visualized lumbosacral spine.   Acute   comminuted left femoral neck fracture. No dislocation.    Soft tissues: Unremarkable.     IMPRESSION:   Acute comminuted left femoral neck fracture. No dislocation.                MARLON ROCA M.D., ATTENDING RADIOLOGIST  This document has been electronically signed. May 30 2019  8:57AM                < end of copied text >      Impression:  73yFemale with Left Hip Femoral Neck Fracture    Plan:  -  Pain management  -  Dvt prophylaxis with Venodynes. hold heparin sq today  -  Keep NPO after midnight tonight  -  Surgeon:  Dr. Miner  -  Procedure:  Left hip hemiarthroplasty  -  For Surgery on 5/30/2019  -  Medical Optimization  -  Consent: Pending  -  Case d/w  (orthosurgeon)  -  Fracture care with bedrest now, NWB of the affected extremity  -  CHG wipes ordered, nurse is aware to perform task  -  Betadine swabs to be performed by myself--> (update) DONE

## 2019-05-30 NOTE — H&P ADULT - NSHPPHYSICALEXAM_GEN_ALL_CORE
Vital Signs Last 24 Hrs  T(C): 37 (30 May 2019 00:06), Max: 37 (29 May 2019 19:25)  T(F): 98.6 (30 May 2019 00:06), Max: 98.6 (29 May 2019 19:25)  HR: 98 (30 May 2019 00:06) (98 - 115)  BP: 176/70 (30 May 2019 00:06) (176/70 - 247/109)  RR: 18 (30 May 2019 00:06) (18 - 18)  SpO2: 100% (30 May 2019 00:06) (100% - 100%)  .  GENERAL: Well developed, Elderly  female, NAD  HEENT:  Normocephalic/Atraumatic, reactive light reflex, moist mucous membranes  NECK: Supple, no JVD  RESP: Symmetric movement of the chest, clear to auscultation bilaterally  CVS: S1 and S2 audible, no murmur, rubs or gallops noted  GI: Normal active bowel sounds present, abdomen soft, non tender, non distended  EXTREMITIES:  No edema, no clubbing, cyanosis  MSK: Lt hip limited range of motion due to pain   PSYCH: Normal mood, normal affect observed    NEURO: Alert and oriented x 3

## 2019-05-30 NOTE — CONSULT NOTE ADULT - SUBJECTIVE AND OBJECTIVE BOX
HISTORY OF PRESENT ILLNESS: HPI:  73 Female with PMH of HTN, Diabetes, Osteoarthritis, Osteoporosis, Peripheral arterial disease, Diabetic neuropathy, HLD, Schizophrenia came to hospital after an episode of fall. Pt was in her toliet when she tried to close the door while sitting on the commode leading to fall and hit her left side of hip. She was not able to ambulate afterwards for which nursing home sent her to hospital for evaluation. Pt complains of mild pain upon movement but denies any other focal weakness or problems.     SH: From [facility]. Denies smoking and alcohol.    ED Course: Blood pressure was 247/109. HR was 115 and Labs showed Leukocytosis of 13. CT pelvis showed acute left femoral comminuted neck fracture.  Pt was given labetalol and morphine. EKG showed NSR @ 92 bpm. (30 May 2019 00:29)      PAST MEDICAL & SURGICAL HISTORY:  DM (diabetes mellitus)  Paranoid schizophrenia  HLD (hyperlipidemia)  PAD (peripheral artery disease)  HTN (hypertension)  No significant past surgical history          MEDICATIONS:  MEDICATIONS  (STANDING):  amLODIPine   Tablet 10 milliGRAM(s) Oral daily  aspirin enteric coated 81 milliGRAM(s) Oral daily  chlorhexidine 2% Cloths 1 Application(s) Topical daily  doxazosin 4 milliGRAM(s) Oral at bedtime  heparin  Injectable 5000 Unit(s) SubCutaneous every 8 hours  hydrochlorothiazide 25 milliGRAM(s) Oral daily  insulin glargine Injectable (LANTUS) 10 Unit(s) SubCutaneous at bedtime  insulin lispro (HumaLOG) corrective regimen sliding scale   SubCutaneous Before meals and at bedtime  losartan 100 milliGRAM(s) Oral daily  metoprolol tartrate 100 milliGRAM(s) Oral two times a day  perphenazine 16 milliGRAM(s) Oral every 12 hours  povidone iodine 10% Solution 1 Application(s) Topical once  simvastatin 40 milliGRAM(s) Oral at bedtime      Allergies    No Known Allergies    Intolerances        FAMILY HISTORY:    Non-contributary for premature coronary disease or sudden cardiac death    SOCIAL HISTORY:    [ X] Non-smoker  [ ] Smoker  [ ] Alcohol      REVIEW OF SYSTEMS:  [ ]chest pain  [  ]shortness of breath  [  ]palpitations  [  ]syncope  [ ]near syncope [ ]upper extremity weakness   [ ] lower extremity weakness  [  ]diplopia  [  ]altered mental status   [  ]fevers  [ ]chills [ ]nausea  [ ]vomitting  [  ]dysphagia    [ ]abdominal pain  [ ]melena  [ ]BRBPR    [  ]epistaxis  [  ]rash    [ ]lower extremity edema        [x ] All others negative	  [ ] Unable to obtain      LABS:	 	    CARDIAC MARKERS:                              13.5   12.11 )-----------( 246      ( 30 May 2019 06:01 )             40.3     Hb Trend: 13.5<--, 14.0<--    05-30    140  |  104  |  11  ----------------------------<  206<H>  4.7   |  30  |  0.80    Ca    9.0      30 May 2019 06:01  Phos  4.1     05-30  Mg     2.1     05-30    TPro  7.6  /  Alb  3.5  /  TBili  1.1  /  DBili  x   /  AST  10  /  ALT  13  /  AlkPhos  71  05-30    Creatinine Trend: 0.80<--, 0.76<--    Coags:  PT/INR - ( 29 May 2019 21:05 )   PT: 12.0 sec;   INR: 1.08 ratio         PTT - ( 29 May 2019 21:05 )  PTT:35.8 sec      HgA1c: Hemoglobin A1C, Whole Blood: 9.6 % (05-30 @ 09:51)    TSH: Thyroid Stimulating Hormone, Serum: 1.41 uU/mL (05-30 @ 06:01)    PHYSICAL EXAM:  T(C): 36.7 (05-30-19 @ 10:45), Max: 37 (05-29-19 @ 19:25)  HR: 73 (05-30-19 @ 10:45) (67 - 115)  BP: 149/56 (05-30-19 @ 10:45) (149/56 - 247/109)  RR: 18 (05-30-19 @ 10:45) (17 - 18)  SpO2: 100% (05-30-19 @ 10:45) (97% - 100%)  Wt(kg): --  I&O's Summary      Gen: Appears well in NAD  HEENT:  (-)icterus (-)pallor  CV: N S1 S2 1/6 ANGELIC (+)2 Pulses B/l  Resp:  Clear to ausculatation B/L, normal effort  GI: (+) BS Soft, NT, ND  Lymph:  (-)Edema, (-)obvious lymphadenopathy  Skin: Warm to touch, Normal turgor  Psych: Appropriate mood and affect      ECG:  	Sinus 92 BPM, nonspecific T wave abnormality    RADIOLOGY:         CXR:   No infiltrate    ASSESSMENT/PLAN: 	73y Female PMH of HTN, Diabetes, Osteoarthritis, Osteoporosis, Peripheral arterial disease, Diabetic neuropathy, HLD, Schizophrenia came to hospital after an episode of fall and hip fx.    - f/u 2 echo  - Cont BB  - If echo with normal LV function abd no signifcant valve disease =, she can be treated as low cardiac risk.  Can proceed to OR if SBP remains below 180 mmHg    I once again thank you for allowing me to participate in the care of your patient.  If you have any questions or concerns please do not hesitate to contact me.    Jm Cortes MD, [facility]  BEEPER [phone]

## 2019-05-30 NOTE — H&P ADULT - PROBLEM SELECTOR PLAN 1
Mechanical fall leading to acute left femoral comminuted neck fracture  Pain control with morphine as needed   Primary team to call Dr Gabriel in AM Mechanical fall leading to acute left femoral comminuted neck fracture  Pain control with morphine as needed   Consulted Dr Gabriel Mechanical fall leading to acute left femoral comminuted neck fracture  Pain control with morphine as needed   Consulted Dr Cortes for pre-op clearance   Consulted Dr Gabriel (Ortho)

## 2019-05-30 NOTE — H&P ADULT - PROBLEM SELECTOR PLAN 2
Uncontrolled BP of 245/109  No focal weakness or headache  No need of Ct head  Goal BP drop to 180/90 in 24 hours  Pt takes Hyzaar, Metoprolol, Norvasc at home Uncontrolled BP of 245/109  No focal weakness, chest pain or headache  EKG: Normal sinus @ 92 bpm  No need of Ct head  Goal BP drop to 180/90 in 24 hours  Pt takes Hyzaar, Metoprolol, Norvasc at home  Tele monitoring until BP normalizes

## 2019-05-30 NOTE — CHART NOTE - NSCHARTNOTEFT_GEN_A_CORE
PATIENT IS SEEN AND EXAMINED  - PATIENT HAS CAPACITY OF DECISION MAKING AND HAS CAPACITY TO GIVE CONSENT FOR LEFT HIP ORIF  - PATIENT HAS WELL COMPENSATED SCHIZOPHRENIA AND STABLE FROM MANY YEARS. I HAD BEEN SEEING FOR MANY YEARS AND HAD BEEN STABLE ALL ALONG  - NO NEED FOR PSYCHIATRIC CONSULT TO CONFIRM CAPACITY OF DECISION MAKING  - DR. CASILLAS

## 2019-05-31 DIAGNOSIS — M25.552 PAIN IN LEFT HIP: ICD-10-CM

## 2019-05-31 DIAGNOSIS — G89.18 OTHER ACUTE POSTPROCEDURAL PAIN: ICD-10-CM

## 2019-05-31 LAB
ANION GAP SERPL CALC-SCNC: 9 MMOL/L — SIGNIFICANT CHANGE UP (ref 5–17)
BUN SERPL-MCNC: 12 MG/DL — SIGNIFICANT CHANGE UP (ref 7–18)
CALCIUM SERPL-MCNC: 9.2 MG/DL — SIGNIFICANT CHANGE UP (ref 8.4–10.5)
CHLORIDE SERPL-SCNC: 101 MMOL/L — SIGNIFICANT CHANGE UP (ref 96–108)
CO2 SERPL-SCNC: 27 MMOL/L — SIGNIFICANT CHANGE UP (ref 22–31)
CREAT SERPL-MCNC: 0.88 MG/DL — SIGNIFICANT CHANGE UP (ref 0.5–1.3)
GLUCOSE BLDC GLUCOMTR-MCNC: 137 MG/DL — HIGH (ref 70–99)
GLUCOSE BLDC GLUCOMTR-MCNC: 201 MG/DL — HIGH (ref 70–99)
GLUCOSE BLDC GLUCOMTR-MCNC: 235 MG/DL — HIGH (ref 70–99)
GLUCOSE BLDC GLUCOMTR-MCNC: 238 MG/DL — HIGH (ref 70–99)
GLUCOSE BLDC GLUCOMTR-MCNC: 273 MG/DL — HIGH (ref 70–99)
GLUCOSE SERPL-MCNC: 223 MG/DL — HIGH (ref 70–99)
HCT VFR BLD CALC: 41.3 % — SIGNIFICANT CHANGE UP (ref 34.5–45)
HGB BLD-MCNC: 13.6 G/DL — SIGNIFICANT CHANGE UP (ref 11.5–15.5)
MCHC RBC-ENTMCNC: 30.4 PG — SIGNIFICANT CHANGE UP (ref 27–34)
MCHC RBC-ENTMCNC: 32.9 GM/DL — SIGNIFICANT CHANGE UP (ref 32–36)
MCV RBC AUTO: 92.2 FL — SIGNIFICANT CHANGE UP (ref 80–100)
NRBC # BLD: 0 /100 WBCS — SIGNIFICANT CHANGE UP (ref 0–0)
PLATELET # BLD AUTO: 205 K/UL — SIGNIFICANT CHANGE UP (ref 150–400)
POTASSIUM SERPL-MCNC: 3.8 MMOL/L — SIGNIFICANT CHANGE UP (ref 3.5–5.3)
POTASSIUM SERPL-SCNC: 3.8 MMOL/L — SIGNIFICANT CHANGE UP (ref 3.5–5.3)
RBC # BLD: 4.48 M/UL — SIGNIFICANT CHANGE UP (ref 3.8–5.2)
RBC # FLD: 12 % — SIGNIFICANT CHANGE UP (ref 10.3–14.5)
SODIUM SERPL-SCNC: 137 MMOL/L — SIGNIFICANT CHANGE UP (ref 135–145)
WBC # BLD: 13.43 K/UL — HIGH (ref 3.8–10.5)
WBC # FLD AUTO: 13.43 K/UL — HIGH (ref 3.8–10.5)

## 2019-05-31 PROCEDURE — 73501 X-RAY EXAM HIP UNI 1 VIEW: CPT | Mod: 26,LT

## 2019-05-31 RX ORDER — HYDROCHLOROTHIAZIDE 25 MG
25 TABLET ORAL DAILY
Refills: 0 | Status: DISCONTINUED | OUTPATIENT
Start: 2019-05-31 | End: 2019-06-01

## 2019-05-31 RX ORDER — GLUCAGON INJECTION, SOLUTION 0.5 MG/.1ML
1 INJECTION, SOLUTION SUBCUTANEOUS ONCE
Refills: 0 | Status: DISCONTINUED | OUTPATIENT
Start: 2019-05-31 | End: 2019-06-01

## 2019-05-31 RX ORDER — ACETAMINOPHEN 500 MG
325 TABLET ORAL EVERY 4 HOURS
Refills: 0 | Status: DISCONTINUED | OUTPATIENT
Start: 2019-05-31 | End: 2019-06-01

## 2019-05-31 RX ORDER — SENNA PLUS 8.6 MG/1
2 TABLET ORAL AT BEDTIME
Refills: 0 | Status: DISCONTINUED | OUTPATIENT
Start: 2019-05-31 | End: 2019-06-01

## 2019-05-31 RX ORDER — SIMVASTATIN 20 MG/1
40 TABLET, FILM COATED ORAL AT BEDTIME
Refills: 0 | Status: DISCONTINUED | OUTPATIENT
Start: 2019-05-31 | End: 2019-06-01

## 2019-05-31 RX ORDER — INSULIN GLARGINE 100 [IU]/ML
10 INJECTION, SOLUTION SUBCUTANEOUS AT BEDTIME
Refills: 0 | Status: DISCONTINUED | OUTPATIENT
Start: 2019-05-31 | End: 2019-06-01

## 2019-05-31 RX ORDER — INSULIN LISPRO 100/ML
VIAL (ML) SUBCUTANEOUS
Refills: 0 | Status: DISCONTINUED | OUTPATIENT
Start: 2019-05-31 | End: 2019-06-01

## 2019-05-31 RX ORDER — SODIUM CHLORIDE 9 MG/ML
1000 INJECTION INTRAMUSCULAR; INTRAVENOUS; SUBCUTANEOUS
Refills: 0 | Status: DISCONTINUED | OUTPATIENT
Start: 2019-05-31 | End: 2019-06-01

## 2019-05-31 RX ORDER — DEXTROSE 50 % IN WATER 50 %
25 SYRINGE (ML) INTRAVENOUS ONCE
Refills: 0 | Status: DISCONTINUED | OUTPATIENT
Start: 2019-05-31 | End: 2019-06-01

## 2019-05-31 RX ORDER — AMLODIPINE BESYLATE 2.5 MG/1
10 TABLET ORAL DAILY
Refills: 0 | Status: DISCONTINUED | OUTPATIENT
Start: 2019-05-31 | End: 2019-06-01

## 2019-05-31 RX ORDER — FENTANYL CITRATE 50 UG/ML
25 INJECTION INTRAVENOUS
Refills: 0 | Status: DISCONTINUED | OUTPATIENT
Start: 2019-05-31 | End: 2019-05-31

## 2019-05-31 RX ORDER — LOSARTAN POTASSIUM 100 MG/1
100 TABLET, FILM COATED ORAL DAILY
Refills: 0 | Status: DISCONTINUED | OUTPATIENT
Start: 2019-05-31 | End: 2019-06-01

## 2019-05-31 RX ORDER — ONDANSETRON 8 MG/1
4 TABLET, FILM COATED ORAL EVERY 6 HOURS
Refills: 0 | Status: DISCONTINUED | OUTPATIENT
Start: 2019-05-31 | End: 2019-06-01

## 2019-05-31 RX ORDER — FERROUS SULFATE 325(65) MG
325 TABLET ORAL
Refills: 0 | Status: DISCONTINUED | OUTPATIENT
Start: 2019-05-31 | End: 2019-06-01

## 2019-05-31 RX ORDER — FOLIC ACID 0.8 MG
1 TABLET ORAL DAILY
Refills: 0 | Status: DISCONTINUED | OUTPATIENT
Start: 2019-05-31 | End: 2019-06-01

## 2019-05-31 RX ORDER — ASPIRIN/CALCIUM CARB/MAGNESIUM 324 MG
81 TABLET ORAL DAILY
Refills: 0 | Status: DISCONTINUED | OUTPATIENT
Start: 2019-05-31 | End: 2019-06-01

## 2019-05-31 RX ORDER — METOPROLOL TARTRATE 50 MG
100 TABLET ORAL
Refills: 0 | Status: DISCONTINUED | OUTPATIENT
Start: 2019-05-31 | End: 2019-06-01

## 2019-05-31 RX ORDER — ENOXAPARIN SODIUM 100 MG/ML
30 INJECTION SUBCUTANEOUS EVERY 12 HOURS
Refills: 0 | Status: DISCONTINUED | OUTPATIENT
Start: 2019-05-31 | End: 2019-06-01

## 2019-05-31 RX ORDER — OXYCODONE HYDROCHLORIDE 5 MG/1
5 TABLET ORAL EVERY 4 HOURS
Refills: 0 | Status: DISCONTINUED | OUTPATIENT
Start: 2019-05-31 | End: 2019-06-01

## 2019-05-31 RX ORDER — DEXTROSE 50 % IN WATER 50 %
15 SYRINGE (ML) INTRAVENOUS ONCE
Refills: 0 | Status: DISCONTINUED | OUTPATIENT
Start: 2019-05-31 | End: 2019-06-01

## 2019-05-31 RX ORDER — SODIUM CHLORIDE 9 MG/ML
1000 INJECTION, SOLUTION INTRAVENOUS
Refills: 0 | Status: DISCONTINUED | OUTPATIENT
Start: 2019-05-31 | End: 2019-06-01

## 2019-05-31 RX ORDER — PERPHENAZINE 8 MG/1
16 TABLET, FILM COATED ORAL AT BEDTIME
Refills: 0 | Status: DISCONTINUED | OUTPATIENT
Start: 2019-05-31 | End: 2019-06-01

## 2019-05-31 RX ORDER — ACETAMINOPHEN 500 MG
1000 TABLET ORAL ONCE
Refills: 0 | Status: DISCONTINUED | OUTPATIENT
Start: 2019-05-31 | End: 2019-05-31

## 2019-05-31 RX ORDER — DOCUSATE SODIUM 100 MG
100 CAPSULE ORAL THREE TIMES A DAY
Refills: 0 | Status: DISCONTINUED | OUTPATIENT
Start: 2019-05-31 | End: 2019-06-01

## 2019-05-31 RX ORDER — DEXTROSE 50 % IN WATER 50 %
12.5 SYRINGE (ML) INTRAVENOUS ONCE
Refills: 0 | Status: DISCONTINUED | OUTPATIENT
Start: 2019-05-31 | End: 2019-06-01

## 2019-05-31 RX ORDER — DOXAZOSIN MESYLATE 4 MG
4 TABLET ORAL AT BEDTIME
Refills: 0 | Status: DISCONTINUED | OUTPATIENT
Start: 2019-05-31 | End: 2019-06-01

## 2019-05-31 RX ORDER — KETOROLAC TROMETHAMINE 30 MG/ML
15 SYRINGE (ML) INJECTION EVERY 4 HOURS
Refills: 0 | Status: DISCONTINUED | OUTPATIENT
Start: 2019-05-31 | End: 2019-05-31

## 2019-05-31 RX ORDER — HYDROMORPHONE HYDROCHLORIDE 2 MG/ML
0.5 INJECTION INTRAMUSCULAR; INTRAVENOUS; SUBCUTANEOUS EVERY 4 HOURS
Refills: 0 | Status: DISCONTINUED | OUTPATIENT
Start: 2019-05-31 | End: 2019-05-31

## 2019-05-31 RX ORDER — SODIUM CHLORIDE 9 MG/ML
1000 INJECTION, SOLUTION INTRAVENOUS
Refills: 0 | Status: DISCONTINUED | OUTPATIENT
Start: 2019-05-31 | End: 2019-05-31

## 2019-05-31 RX ORDER — ASCORBIC ACID 60 MG
500 TABLET,CHEWABLE ORAL
Refills: 0 | Status: DISCONTINUED | OUTPATIENT
Start: 2019-05-31 | End: 2019-06-01

## 2019-05-31 RX ORDER — TRAMADOL HYDROCHLORIDE 50 MG/1
50 TABLET ORAL EVERY 8 HOURS
Refills: 0 | Status: DISCONTINUED | OUTPATIENT
Start: 2019-05-31 | End: 2019-06-01

## 2019-05-31 RX ORDER — POLYETHYLENE GLYCOL 3350 17 G/17G
17 POWDER, FOR SOLUTION ORAL DAILY
Refills: 0 | Status: DISCONTINUED | OUTPATIENT
Start: 2019-05-31 | End: 2019-06-01

## 2019-05-31 RX ORDER — CEFAZOLIN SODIUM 1 G
2000 VIAL (EA) INJECTION EVERY 8 HOURS
Refills: 0 | Status: COMPLETED | OUTPATIENT
Start: 2019-05-31 | End: 2019-05-31

## 2019-05-31 RX ADMIN — Medication 6: at 08:14

## 2019-05-31 RX ADMIN — Medication 100 MILLIGRAM(S): at 17:06

## 2019-05-31 RX ADMIN — TRAMADOL HYDROCHLORIDE 50 MILLIGRAM(S): 50 TABLET ORAL at 14:19

## 2019-05-31 RX ADMIN — SIMVASTATIN 40 MILLIGRAM(S): 20 TABLET, FILM COATED ORAL at 21:15

## 2019-05-31 RX ADMIN — TRAMADOL HYDROCHLORIDE 50 MILLIGRAM(S): 50 TABLET ORAL at 22:00

## 2019-05-31 RX ADMIN — Medication 100 MILLIGRAM(S): at 14:23

## 2019-05-31 RX ADMIN — Medication 100 MILLIGRAM(S): at 15:51

## 2019-05-31 RX ADMIN — INSULIN GLARGINE 10 UNIT(S): 100 INJECTION, SOLUTION SUBCUTANEOUS at 21:16

## 2019-05-31 RX ADMIN — Medication 4: at 11:32

## 2019-05-31 RX ADMIN — Medication 100 MILLIGRAM(S): at 05:37

## 2019-05-31 RX ADMIN — Medication 4: at 17:06

## 2019-05-31 RX ADMIN — ENOXAPARIN SODIUM 30 MILLIGRAM(S): 100 INJECTION SUBCUTANEOUS at 14:23

## 2019-05-31 RX ADMIN — Medication 15 MILLIGRAM(S): at 08:00

## 2019-05-31 RX ADMIN — Medication 1 MILLIGRAM(S): at 11:32

## 2019-05-31 RX ADMIN — Medication 325 MILLIGRAM(S): at 17:06

## 2019-05-31 RX ADMIN — POLYETHYLENE GLYCOL 3350 17 GRAM(S): 17 POWDER, FOR SOLUTION ORAL at 11:32

## 2019-05-31 RX ADMIN — PERPHENAZINE 16 MILLIGRAM(S): 8 TABLET, FILM COATED ORAL at 21:16

## 2019-05-31 RX ADMIN — ONDANSETRON 4 MILLIGRAM(S): 8 TABLET, FILM COATED ORAL at 07:42

## 2019-05-31 RX ADMIN — Medication 15 MILLIGRAM(S): at 07:43

## 2019-05-31 RX ADMIN — Medication 100 MILLIGRAM(S): at 21:15

## 2019-05-31 RX ADMIN — AMLODIPINE BESYLATE 10 MILLIGRAM(S): 2.5 TABLET ORAL at 05:37

## 2019-05-31 RX ADMIN — Medication 325 MILLIGRAM(S): at 11:32

## 2019-05-31 RX ADMIN — Medication 81 MILLIGRAM(S): at 11:31

## 2019-05-31 RX ADMIN — Medication 325 MILLIGRAM(S): at 14:19

## 2019-05-31 RX ADMIN — Medication 325 MILLIGRAM(S): at 08:13

## 2019-05-31 RX ADMIN — TRAMADOL HYDROCHLORIDE 50 MILLIGRAM(S): 50 TABLET ORAL at 05:37

## 2019-05-31 RX ADMIN — LOSARTAN POTASSIUM 100 MILLIGRAM(S): 100 TABLET, FILM COATED ORAL at 05:37

## 2019-05-31 RX ADMIN — Medication 25 MILLIGRAM(S): at 05:37

## 2019-05-31 RX ADMIN — Medication 500 MILLIGRAM(S): at 17:06

## 2019-05-31 RX ADMIN — Medication 100 MILLIGRAM(S): at 06:48

## 2019-05-31 RX ADMIN — TRAMADOL HYDROCHLORIDE 50 MILLIGRAM(S): 50 TABLET ORAL at 06:09

## 2019-05-31 RX ADMIN — Medication 4 MILLIGRAM(S): at 21:15

## 2019-05-31 RX ADMIN — Medication 500 MILLIGRAM(S): at 05:37

## 2019-05-31 RX ADMIN — TRAMADOL HYDROCHLORIDE 50 MILLIGRAM(S): 50 TABLET ORAL at 21:19

## 2019-05-31 NOTE — PROGRESS NOTE ADULT - PROBLEM SELECTOR PLAN 1
- dc hydromorphone  - will change toradol to 15mg ivp q 6 hours prn  - tramadol is atc - can change to prn  - stool softeners   - oxy po prn

## 2019-05-31 NOTE — PROVIDER CONTACT NOTE (OTHER) - ACTION/TREATMENT ORDERED:
cooling measures provided, given tylenol 650mg, incentive spirometer encouraged and PO fluids encouraged. temp 99.2 at this time, will continue to monitor

## 2019-05-31 NOTE — PHYSICAL THERAPY INITIAL EVALUATION ADULT - IMPAIRMENTS FOUND, PT EVAL
joint integrity and mobility/muscle strength/ROM/gross motor/aerobic capacity/endurance/gait, locomotion, and balance

## 2019-05-31 NOTE — BRIEF OPERATIVE NOTE - VENOUS THROMBOEMBOLISM PROPHYLAXIS THERAPY
Spoke with patient about 30 day program. Pt is paranoid and delusional about facebook videos and media and thinks something is going on to delfate his ego. Pt rambling, pressured speech. Pt is disorganized. He complains of racing thoughts and states that he has taken Depakote in the past. He stated that Depakote made him too sleepy when he took it before, but now he would like to feel sleepy and have his thoughts slowed. He is not interested in pursuing an outpatient program or rehab at this time, but he is paranoid and delusional.     He would like to go home after discharge. He is conflicted about whether or not he wants IOP or any type of program. He expressed paranoia about his  and his parent's intentions.    He stated he would like some tennis shoes.    Returned call from pt's mother, Mandy Burns, who asked how she could mail him shoes. Her number is [phone]. I left message and awaiting call back. I also left number to RN station.    SCD

## 2019-05-31 NOTE — PHYSICAL THERAPY INITIAL EVALUATION ADULT - GENERAL OBSERVATIONS, REHAB EVAL
pt ao pleasant highly motivated with PT mobilization, s/p L hip hemiarthroplasty, THR precautions, tolerating assisted bedmobility rw transfers and bedside amb assessment 1 feet LLE WBAT

## 2019-06-01 ENCOUNTER — TRANSCRIPTION ENCOUNTER (OUTPATIENT)
Age: 74
End: 2019-06-01

## 2019-06-01 VITALS
OXYGEN SATURATION: 100 % | RESPIRATION RATE: 17 BRPM | TEMPERATURE: 99 F | SYSTOLIC BLOOD PRESSURE: 134 MMHG | DIASTOLIC BLOOD PRESSURE: 65 MMHG | HEART RATE: 89 BPM

## 2019-06-01 LAB
ANION GAP SERPL CALC-SCNC: 7 MMOL/L — SIGNIFICANT CHANGE UP (ref 5–17)
BUN SERPL-MCNC: 13 MG/DL — SIGNIFICANT CHANGE UP (ref 7–18)
CALCIUM SERPL-MCNC: 8.4 MG/DL — SIGNIFICANT CHANGE UP (ref 8.4–10.5)
CHLORIDE SERPL-SCNC: 104 MMOL/L — SIGNIFICANT CHANGE UP (ref 96–108)
CO2 SERPL-SCNC: 26 MMOL/L — SIGNIFICANT CHANGE UP (ref 22–31)
CREAT SERPL-MCNC: 0.63 MG/DL — SIGNIFICANT CHANGE UP (ref 0.5–1.3)
GLUCOSE BLDC GLUCOMTR-MCNC: 137 MG/DL — HIGH (ref 70–99)
GLUCOSE BLDC GLUCOMTR-MCNC: 178 MG/DL — HIGH (ref 70–99)
GLUCOSE SERPL-MCNC: 120 MG/DL — HIGH (ref 70–99)
HCT VFR BLD CALC: 32.5 % — LOW (ref 34.5–45)
HGB BLD-MCNC: 10.8 G/DL — LOW (ref 11.5–15.5)
MCHC RBC-ENTMCNC: 30.7 PG — SIGNIFICANT CHANGE UP (ref 27–34)
MCHC RBC-ENTMCNC: 33.2 GM/DL — SIGNIFICANT CHANGE UP (ref 32–36)
MCV RBC AUTO: 92.3 FL — SIGNIFICANT CHANGE UP (ref 80–100)
NRBC # BLD: 0 /100 WBCS — SIGNIFICANT CHANGE UP (ref 0–0)
PLATELET # BLD AUTO: 201 K/UL — SIGNIFICANT CHANGE UP (ref 150–400)
POTASSIUM SERPL-MCNC: 3.9 MMOL/L — SIGNIFICANT CHANGE UP (ref 3.5–5.3)
POTASSIUM SERPL-SCNC: 3.9 MMOL/L — SIGNIFICANT CHANGE UP (ref 3.5–5.3)
RBC # BLD: 3.52 M/UL — LOW (ref 3.8–5.2)
RBC # FLD: 11.9 % — SIGNIFICANT CHANGE UP (ref 10.3–14.5)
SODIUM SERPL-SCNC: 137 MMOL/L — SIGNIFICANT CHANGE UP (ref 135–145)
WBC # BLD: 11.81 K/UL — HIGH (ref 3.8–10.5)
WBC # FLD AUTO: 11.81 K/UL — HIGH (ref 3.8–10.5)

## 2019-06-01 PROCEDURE — 93005 ELECTROCARDIOGRAM TRACING: CPT

## 2019-06-01 PROCEDURE — 84443 ASSAY THYROID STIM HORMONE: CPT

## 2019-06-01 PROCEDURE — 83735 ASSAY OF MAGNESIUM: CPT

## 2019-06-01 PROCEDURE — 96375 TX/PRO/DX INJ NEW DRUG ADDON: CPT

## 2019-06-01 PROCEDURE — 99285 EMERGENCY DEPT VISIT HI MDM: CPT | Mod: 25

## 2019-06-01 PROCEDURE — 83036 HEMOGLOBIN GLYCOSYLATED A1C: CPT

## 2019-06-01 PROCEDURE — 85610 PROTHROMBIN TIME: CPT

## 2019-06-01 PROCEDURE — 71045 X-RAY EXAM CHEST 1 VIEW: CPT

## 2019-06-01 PROCEDURE — 80061 LIPID PANEL: CPT

## 2019-06-01 PROCEDURE — 84100 ASSAY OF PHOSPHORUS: CPT

## 2019-06-01 PROCEDURE — 82746 ASSAY OF FOLIC ACID SERUM: CPT

## 2019-06-01 PROCEDURE — 80053 COMPREHEN METABOLIC PANEL: CPT

## 2019-06-01 PROCEDURE — 80048 BASIC METABOLIC PNL TOTAL CA: CPT

## 2019-06-01 PROCEDURE — 86923 COMPATIBILITY TEST ELECTRIC: CPT

## 2019-06-01 PROCEDURE — 97116 GAIT TRAINING THERAPY: CPT

## 2019-06-01 PROCEDURE — 36415 COLL VENOUS BLD VENIPUNCTURE: CPT

## 2019-06-01 PROCEDURE — 84132 ASSAY OF SERUM POTASSIUM: CPT

## 2019-06-01 PROCEDURE — 97161 PT EVAL LOW COMPLEX 20 MIN: CPT

## 2019-06-01 PROCEDURE — 96374 THER/PROPH/DIAG INJ IV PUSH: CPT

## 2019-06-01 PROCEDURE — 85730 THROMBOPLASTIN TIME PARTIAL: CPT

## 2019-06-01 PROCEDURE — 97530 THERAPEUTIC ACTIVITIES: CPT

## 2019-06-01 PROCEDURE — 85027 COMPLETE CBC AUTOMATED: CPT

## 2019-06-01 PROCEDURE — 72192 CT PELVIS W/O DYE: CPT

## 2019-06-01 PROCEDURE — C1776: CPT

## 2019-06-01 PROCEDURE — 86901 BLOOD TYPING SEROLOGIC RH(D): CPT

## 2019-06-01 PROCEDURE — 86900 BLOOD TYPING SEROLOGIC ABO: CPT

## 2019-06-01 PROCEDURE — 93306 TTE W/DOPPLER COMPLETE: CPT

## 2019-06-01 PROCEDURE — 86850 RBC ANTIBODY SCREEN: CPT

## 2019-06-01 PROCEDURE — 73501 X-RAY EXAM HIP UNI 1 VIEW: CPT

## 2019-06-01 PROCEDURE — 73502 X-RAY EXAM HIP UNI 2-3 VIEWS: CPT

## 2019-06-01 PROCEDURE — 82962 GLUCOSE BLOOD TEST: CPT

## 2019-06-01 PROCEDURE — 82607 VITAMIN B-12: CPT

## 2019-06-01 RX ORDER — FOLIC ACID 0.8 MG
1 TABLET ORAL
Qty: 0 | Refills: 0 | DISCHARGE
Start: 2019-06-01

## 2019-06-01 RX ORDER — LACTULOSE 10 G/15ML
30 SOLUTION ORAL
Refills: 0 | Status: DISCONTINUED | OUTPATIENT
Start: 2019-06-01 | End: 2019-06-01

## 2019-06-01 RX ORDER — OXYCODONE AND ACETAMINOPHEN 5; 325 MG/1; MG/1
2 TABLET ORAL EVERY 6 HOURS
Refills: 0 | Status: DISCONTINUED | OUTPATIENT
Start: 2019-06-01 | End: 2019-06-01

## 2019-06-01 RX ORDER — FERROUS SULFATE 325(65) MG
1 TABLET ORAL
Qty: 90 | Refills: 0
Start: 2019-06-01 | End: 2019-06-30

## 2019-06-01 RX ORDER — MAGNESIUM HYDROXIDE 400 MG/1
30 TABLET, CHEWABLE ORAL DAILY
Refills: 0 | Status: DISCONTINUED | OUTPATIENT
Start: 2019-06-01 | End: 2019-06-01

## 2019-06-01 RX ORDER — AMLODIPINE BESYLATE 2.5 MG/1
1 TABLET ORAL
Qty: 0 | Refills: 0 | DISCHARGE
Start: 2019-06-01

## 2019-06-01 RX ORDER — MAGNESIUM HYDROXIDE 400 MG/1
30 TABLET, CHEWABLE ORAL
Qty: 0 | Refills: 0 | DISCHARGE
Start: 2019-06-01

## 2019-06-01 RX ORDER — AMLODIPINE BESYLATE 2.5 MG/1
1 TABLET ORAL
Qty: 0 | Refills: 0 | DISCHARGE

## 2019-06-01 RX ORDER — LACTULOSE 10 G/15ML
45 SOLUTION ORAL
Qty: 0 | Refills: 0 | DISCHARGE
Start: 2019-06-01

## 2019-06-01 RX ORDER — ASCORBIC ACID 60 MG
1 TABLET,CHEWABLE ORAL
Qty: 0 | Refills: 0 | DISCHARGE
Start: 2019-06-01

## 2019-06-01 RX ORDER — LOSARTAN POTASSIUM 100 MG/1
1 TABLET, FILM COATED ORAL
Qty: 0 | Refills: 0 | DISCHARGE
Start: 2019-06-01

## 2019-06-01 RX ADMIN — ENOXAPARIN SODIUM 30 MILLIGRAM(S): 100 INJECTION SUBCUTANEOUS at 13:07

## 2019-06-01 RX ADMIN — Medication 100 MILLIGRAM(S): at 05:40

## 2019-06-01 RX ADMIN — TRAMADOL HYDROCHLORIDE 50 MILLIGRAM(S): 50 TABLET ORAL at 05:37

## 2019-06-01 RX ADMIN — Medication 500 MILLIGRAM(S): at 05:37

## 2019-06-01 RX ADMIN — MAGNESIUM HYDROXIDE 30 MILLILITER(S): 400 TABLET, CHEWABLE ORAL at 14:44

## 2019-06-01 RX ADMIN — Medication 100 MILLIGRAM(S): at 13:07

## 2019-06-01 RX ADMIN — ENOXAPARIN SODIUM 30 MILLIGRAM(S): 100 INJECTION SUBCUTANEOUS at 01:25

## 2019-06-01 RX ADMIN — POLYETHYLENE GLYCOL 3350 17 GRAM(S): 17 POWDER, FOR SOLUTION ORAL at 11:49

## 2019-06-01 RX ADMIN — AMLODIPINE BESYLATE 10 MILLIGRAM(S): 2.5 TABLET ORAL at 05:37

## 2019-06-01 RX ADMIN — Medication 100 MILLIGRAM(S): at 05:37

## 2019-06-01 RX ADMIN — Medication 2: at 11:50

## 2019-06-01 RX ADMIN — Medication 25 MILLIGRAM(S): at 05:37

## 2019-06-01 RX ADMIN — Medication 325 MILLIGRAM(S): at 07:51

## 2019-06-01 RX ADMIN — Medication 325 MILLIGRAM(S): at 11:49

## 2019-06-01 RX ADMIN — TRAMADOL HYDROCHLORIDE 50 MILLIGRAM(S): 50 TABLET ORAL at 14:41

## 2019-06-01 RX ADMIN — OXYCODONE AND ACETAMINOPHEN 2 TABLET(S): 5; 325 TABLET ORAL at 14:44

## 2019-06-01 RX ADMIN — TRAMADOL HYDROCHLORIDE 50 MILLIGRAM(S): 50 TABLET ORAL at 06:27

## 2019-06-01 RX ADMIN — Medication 1 MILLIGRAM(S): at 11:49

## 2019-06-01 RX ADMIN — Medication 81 MILLIGRAM(S): at 11:49

## 2019-06-01 RX ADMIN — TRAMADOL HYDROCHLORIDE 50 MILLIGRAM(S): 50 TABLET ORAL at 13:07

## 2019-06-01 RX ADMIN — LOSARTAN POTASSIUM 100 MILLIGRAM(S): 100 TABLET, FILM COATED ORAL at 05:37

## 2019-06-01 NOTE — PROGRESS NOTE ADULT - REASON FOR ADMISSION
Fall and Fracture

## 2019-06-01 NOTE — PROGRESS NOTE ADULT - SUBJECTIVE AND OBJECTIVE BOX
Patient is a 73y old  Female who presents with a chief complaint of Fall and Fracture (31 May 2019 11:54)    PATIENT IS SEEN AND EXAMINED IN SURGICAL FLOOR.    ALLERGIES:  No Known Allergies    VITALS:    Vital Signs Last 24 Hrs  T(C): 37.7 (31 May 2019 15:53), Max: 38.9 (31 May 2019 14:18)  T(F): 99.8 (31 May 2019 15:53), Max: 102 (31 May 2019 14:18)  HR: 102 (31 May 2019 15:53) (69 - 111)  BP: 133/47 (31 May 2019 15:53) (114/57 - 152/58)  BP(mean): 76 (31 May 2019 02:38) (68 - 80)  RR: 17 (31 May 2019 15:53) (15 - 21)  SpO2: 97% (31 May 2019 15:53) (95% - 100%)    LABS:    CBC Full  -  ( 31 May 2019 01:39 )  WBC Count : 13.43 K/uL  RBC Count : 4.48 M/uL  Hemoglobin : 13.6 g/dL  Hematocrit : 41.3 %  Platelet Count - Automated : 205 K/uL  Mean Cell Volume : 92.2 fl  Mean Cell Hemoglobin : 30.4 pg  Mean Cell Hemoglobin Concentration : 32.9 gm/dL  Auto Neutrophil # : x  Auto Lymphocyte # : x  Auto Monocyte # : x  Auto Eosinophil # : x  Auto Basophil # : x  Auto Neutrophil % : x  Auto Lymphocyte % : x  Auto Monocyte % : x  Auto Eosinophil % : x  Auto Basophil % : x    PT/INR - ( 29 May 2019 21:05 )   PT: 12.0 sec;   INR: 1.08 ratio         PTT - ( 29 May 2019 21:05 )  PTT:35.8 sec  05-31    137  |  101  |  12  ----------------------------<  223<H>  3.8   |  27  |  0.88    Ca    9.2      31 May 2019 01:39  Phos  4.1     05-30  Mg     2.1     05-30    TPro  7.6  /  Alb  3.5  /  TBili  1.1  /  DBili  x   /  AST  10  /  ALT  13  /  AlkPhos  71  05-30    CAPILLARY BLOOD GLUCOSE    POCT Blood Glucose.: 235 mg/dL (31 May 2019 16:32)  POCT Blood Glucose.: 238 mg/dL (31 May 2019 11:08)  POCT Blood Glucose.: 273 mg/dL (31 May 2019 08:02)  POCT Blood Glucose.: 201 mg/dL (31 May 2019 01:18)  POCT Blood Glucose.: 235 mg/dL (30 May 2019 21:56)    LIVER FUNCTIONS - ( 30 May 2019 06:01 )  Alb: 3.5 g/dL / Pro: 7.6 g/dL / ALK PHOS: 71 U/L / ALT: 13 U/L DA / AST: 10 U/L / GGT: x           Creatinine Trend: 0.88<--, 0.80<--, 0.76<--  I&O's Summary    30 May 2019 07:01  -  31 May 2019 07:00  --------------------------------------------------------  IN: 1000 mL / OUT: 450 mL / NET: 550 mL    MEDICATIONS:    MEDICATIONS  (STANDING):  amLODIPine   Tablet 10 milliGRAM(s) Oral daily  ascorbic acid 500 milliGRAM(s) Oral two times a day  aspirin enteric coated 81 milliGRAM(s) Oral daily  dextrose 5%. 1000 milliLiter(s) (50 mL/Hr) IV Continuous <Continuous>  dextrose 50% Injectable 12.5 Gram(s) IV Push once  dextrose 50% Injectable 25 Gram(s) IV Push once  dextrose 50% Injectable 25 Gram(s) IV Push once  docusate sodium 100 milliGRAM(s) Oral three times a day  doxazosin 4 milliGRAM(s) Oral at bedtime  enoxaparin Injectable 30 milliGRAM(s) SubCutaneous every 12 hours  ferrous    sulfate 325 milliGRAM(s) Oral three times a day with meals  folic acid 1 milliGRAM(s) Oral daily  hydrochlorothiazide 25 milliGRAM(s) Oral daily  insulin glargine Injectable (LANTUS) 10 Unit(s) SubCutaneous at bedtime  insulin lispro (HumaLOG) corrective regimen sliding scale   SubCutaneous three times a day before meals  losartan 100 milliGRAM(s) Oral daily  metoprolol tartrate 100 milliGRAM(s) Oral two times a day  perphenazine 16 milliGRAM(s) Oral at bedtime  polyethylene glycol 3350 17 Gram(s) Oral daily  simvastatin 40 milliGRAM(s) Oral at bedtime  sodium chloride 0.9%. 1000 milliLiter(s) (105 mL/Hr) IV Continuous <Continuous>  traMADol 50 milliGRAM(s) Oral every 8 hours      MEDICATIONS  (PRN):  acetaminophen   Tablet .. 325 milliGRAM(s) Oral every 4 hours PRN Temp greater or equal to 38C (100.4F)  aluminum hydroxide/magnesium hydroxide/simethicone Suspension 30 milliLiter(s) Oral four times a day PRN Indigestion  dextrose 40% Gel 15 Gram(s) Oral once PRN Blood Glucose LESS THAN 70 milliGRAM(s)/deciliter  glucagon  Injectable 1 milliGRAM(s) IntraMuscular once PRN Glucose LESS THAN 70 milligrams/deciliter  ondansetron Injectable 4 milliGRAM(s) IV Push every 6 hours PRN Nausea and/or Vomiting  oxyCODONE    IR 5 milliGRAM(s) Oral every 4 hours PRN Mild Pain (1 - 3)  senna 2 Tablet(s) Oral at bedtime PRN Constipation      REVIEW OF SYSTEMS:                           ALL ROS DONE [ X   ]    CONSTITUTIONAL:  LETHARGIC [   ], FEVER [   ], UNRESPONSIVE [   ]  CVS:  CP  [   ], SOB, [   ], PALPITATIONS [   ], DIZZYNESS [   ]  RS: COUGH [   ], SPUTUM [   ]  GI: ABDOMINAL PAIN [   ], NAUSEA [   ], VOMITINGS [   ], DIARRHEA [   ], CONSTIPATION [   ]  :  DYSURIA [   ], NOCTURIA [   ], INCREASED FREQUENCY [   ], DRIBLING [   ],  SKELETAL: PAINFUL JOINTS [   ], SWOLLEN JOINTS [   ], NECK ACHE [   ], LOW BACK ACHE [   ],  SKIN : ULCERS [   ], RASH [   ], ITCHING [   ]  CNS: HEAD ACHE [   ], DOUBLE VISION [   ], BLURRED VISION [   ], AMS / CONFUSION [   ], SEIZURES [   ], WEAKNESS [   ],TINGLING / NUMBNESS [   ]    PHYSICAL EXAMINATION:  GENERAL APPEARANCE: NO DISTRESS  HEENT:  NO PALLOR, NO  JVD,  NO   NODES, NECK SUPPLE  CVS: S1 +, S2 +,   RS: AEEB,  OCCASIONAL  RALES +,   NO RONCHI  ABD: SOFT, NT, NO, BS +  EXT: MILD PE +, LEFT HIP SURGICAL WOUND, ROM PAINFUL AR LEFT HIP  SKIN: WARM,   SKELETAL:  ROM ACCEPTABLE  CNS:  AAO X 3   ,NO   DEFICITS    RADIOLOGY :    < from: CT Pelvis Bony Only No Cont (05.29.19 @ 22:41) >    IMPRESSION:    Left femoral neck fracture.    < end of copied text >    < from: Xray Hip 1 View Intraoperative, Left (05.31.19 @ 00:58) >  FINDINGS:  Total left HIP replacement noted in grossanatomic alignment with post   operative changes.     IMPRESSION:  Post operative changes.     < end of copied text >    ASSESSMENT :     Closed fracture of neck of left femur  DM (diabetes mellitus)  Paranoid schizophrenia  HLD (hyperlipidemia)  PAD (peripheral artery disease)  HTN (hypertension)      PLAN:  HPI:  73 Female with PMH of HTN, Diabetes, Osteoarthritis, Osteoporosis, Peripheral arterial disease, Diabetic neuropathy, HLD, Schizophrenia came to hospital after an episode of fall. Pt was in her toliet when she tried to close the door while sitting on the commode leading to fall and hit her left side of hip. She was not able to ambulate afterwards for which nursing home sent her to hospital for evaluation. Pt complains of mild pain upon movement but denies any other focal weakness or problems.     SH: From [place]. Denies smoking and alcohol.    ED Course: Blood pressure was 247/109. HR was 115 and Labs showed Leukocytosis of 13. CT pelvis showed acute left femoral comminuted neck fracture.  Pt was given labetalol and morphine. EKG showed NSR @ 92 bpm. (30 May 2019 00:29)    - S/P MECHANICAL FALL, LEFT FEMORAL NECK FRACTURE WITH SUPERIOR DISPLACEMENT - S/P LEFT CARLOS ARTHROPLASTY. CONTINUE PAIN Rx AND DVT PROPHYLAXIS  - DC PLAN TO [facility] IN AM. D/W   - HTN, DM, SCHIZOPHRENIA  - GI PROPHYLAXIS  - DR. CASILLAS
NP Note discussed with  Primary Attending    CC;  left hip pain    Patient is a 73y old  Female who presents with a chief complaint of Fall and Fracture (31 May 2019 11:51).  Pt s/p fall and sustained a hip fracture.  Pt underwent left hip hemiarthroplasty last night. Pt was oob with PT but only did a few steps.  No nausea or vomiting.  + left hip pain.  Pain worsens with movement.        INTERVAL HPI/OVERNIGHT EVENTS: no new complaints    MEDICATIONS  (STANDING):  amLODIPine   Tablet 10 milliGRAM(s) Oral daily  ascorbic acid 500 milliGRAM(s) Oral two times a day  aspirin enteric coated 81 milliGRAM(s) Oral daily  ceFAZolin   IVPB 2000 milliGRAM(s) IV Intermittent every 8 hours  dextrose 5%. 1000 milliLiter(s) (50 mL/Hr) IV Continuous <Continuous>  dextrose 50% Injectable 12.5 Gram(s) IV Push once  dextrose 50% Injectable 25 Gram(s) IV Push once  dextrose 50% Injectable 25 Gram(s) IV Push once  docusate sodium 100 milliGRAM(s) Oral three times a day  doxazosin 4 milliGRAM(s) Oral at bedtime  enoxaparin Injectable 30 milliGRAM(s) SubCutaneous every 12 hours  ferrous    sulfate 325 milliGRAM(s) Oral three times a day with meals  folic acid 1 milliGRAM(s) Oral daily  hydrochlorothiazide 25 milliGRAM(s) Oral daily  insulin glargine Injectable (LANTUS) 10 Unit(s) SubCutaneous at bedtime  insulin lispro (HumaLOG) corrective regimen sliding scale   SubCutaneous three times a day before meals  losartan 100 milliGRAM(s) Oral daily  metoprolol tartrate 100 milliGRAM(s) Oral two times a day  perphenazine 16 milliGRAM(s) Oral at bedtime  polyethylene glycol 3350 17 Gram(s) Oral daily  simvastatin 40 milliGRAM(s) Oral at bedtime  sodium chloride 0.9%. 1000 milliLiter(s) (105 mL/Hr) IV Continuous <Continuous>  traMADol 50 milliGRAM(s) Oral every 8 hours    MEDICATIONS  (PRN):  aluminum hydroxide/magnesium hydroxide/simethicone Suspension 30 milliLiter(s) Oral four times a day PRN Indigestion  dextrose 40% Gel 15 Gram(s) Oral once PRN Blood Glucose LESS THAN 70 milliGRAM(s)/deciliter  glucagon  Injectable 1 milliGRAM(s) IntraMuscular once PRN Glucose LESS THAN 70 milligrams/deciliter  ondansetron Injectable 4 milliGRAM(s) IV Push every 6 hours PRN Nausea and/or Vomiting  oxyCODONE    IR 5 milliGRAM(s) Oral every 4 hours PRN Mild Pain (1 - 3)  senna 2 Tablet(s) Oral at bedtime PRN Constipation      __________________________________________________  REVIEW OF SYSTEMS:    CONSTITUTIONAL: No fever,   RESPIRATORY: No cough; No shortness of breath  CARDIOVASCULAR: No chest pain, no palpitations  GASTROINTESTINAL: No pain. No nausea or vomiting; No diarrhea   NEUROLOGICAL: No headache or numbness, no tremors  MUSCULOSKELETAL: + left hip pain  GENITOURINARY: no dysuria, no frequency, no hesitancy  PSYCHIATRY: no depression , no anxiety  ALL OTHER  ROS negative        Vital Signs Last 24 Hrs  T(C): 36.7 (31 May 2019 10:14), Max: 37.3 (30 May 2019 15:49)  T(F): 98 (31 May 2019 10:14), Max: 99.2 (30 May 2019 15:49)  HR: 69 (31 May 2019 11:27) (69 - 96)  BP: 129/48 (31 May 2019 11:27) (114/57 - 150/57)  BP(mean): 76 (31 May 2019 02:38) (68 - 80)  RR: 16 (31 May 2019 10:14) (15 - 21)  SpO2: 98% (31 May 2019 10:14) (95% - 100%)    ________________________________________________  PHYSICAL EXAM:  GENERAL: NAD  CHEST/LUNG: Clear to auscultation bilaterally with good air entry   HEART: S1 S2  regular; no murmurs, gallops or rubs  ABDOMEN: Soft, Nontender, Nondistended; Bowel sounds present  EXTREMITIES: no cyanosis; no edema; no calf tenderness  SKIN: warm and dry; no rash  NERVOUS SYSTEM:  Awake and alert; Oriented  to place, person and time ; no new deficits  MUSCULOSKELETAL: + left hip tenderness, + decreased rom   _________________________________________________  LABS:                        13.6   13.43 )-----------( 205      ( 31 May 2019 01:39 )             41.3     05-31    137  |  101  |  12  ----------------------------<  223<H>  3.8   |  27  |  0.88    Ca    9.2      31 May 2019 01:39  Phos  4.1     05-30  Mg     2.1     05-30    TPro  7.6  /  Alb  3.5  /  TBili  1.1  /  DBili  x   /  AST  10  /  ALT  13  /  AlkPhos  71  05-30    PT/INR - ( 29 May 2019 21:05 )   PT: 12.0 sec;   INR: 1.08 ratio         PTT - ( 29 May 2019 21:05 )  PTT:35.8 sec    CAPILLARY BLOOD GLUCOSE      POCT Blood Glucose.: 238 mg/dL (31 May 2019 11:08)  POCT Blood Glucose.: 273 mg/dL (31 May 2019 08:02)  POCT Blood Glucose.: 201 mg/dL (31 May 2019 01:18)  POCT Blood Glucose.: 235 mg/dL (30 May 2019 21:56)  POCT Blood Glucose.: 161 mg/dL (30 May 2019 17:02)        RADIOLOGY & ADDITIONAL TESTS:  < from: CT Pelvis Bony Only No Cont (05.29.19 @ 22:41) >  EXAM:  CT PELVIS BONY ONLY                            PROCEDURE DATE:  05/29/2019          INTERPRETATION:  CT of the pelvis     CLINICAL INFORMATION: Left hip fracture  TECHNIQUE: Axial CT images were obtained of the pelvis with coronal and   sagittal reconstructions. No contrast was administered.     FINDINGS:    There is a left femoral neck fracture with superior displacement and   external rotation of the distal fracture fragment. Mild bilateral hip   joint space narrowing. Mild degenerative changes of the sacroiliac joints   and pubic symphysis. Mild enlargement of the left adductor musculature,   likely representing edema/hemorrhage. Uterine leiomyoma. Vascular   calcifications. Subcutaneous tissues are intact.    IMPRESSION:    Left femoral neck fracture.    < end of copied text >      Imaging Personally Reviewed:  YES/NO    Consultant(s) Notes Reviewed:   YES/ No    Care Discussed with Consultants :     Plan of care was discussed with patient and /or primary care giver; all questions and concerns were addressed and care was aligned with patient's wishes.
Ortho Note POD# 1  73yFemale    Diagnosis:  S/p L Hip Arpit POD# 1    Patient is seen and evaluated at bedside; offers no acute complaints. Pain is mild; well controlled.  Awaiting PT for ambulation.    Vital Signs Last 24 Hrs  T(C): 36.7 (31 May 2019 06:09), Max: 37.3 (30 May 2019 15:49)  T(F): 98.1 (31 May 2019 06:09), Max: 99.2 (30 May 2019 15:49)  HR: 88 (31 May 2019 06:09) (73 - 96)  BP: 128/45 (31 May 2019 06:09) (114/57 - 150/57)  BP(mean): 76 (31 May 2019 02:38) (68 - 80)  RR: 16 (31 May 2019 06:09) (15 - 21)  SpO2: 99% (31 May 2019 06:09) (95% - 100%)  I&O's Detail    30 May 2019 07:01  -  31 May 2019 07:00  --------------------------------------------------------  IN:    Lactated Ringers IV Bolus: 1000 mL  Total IN: 1000 mL    OUT:    Estimated Blood Loss: 100 mL    Voided: 350 mL  Total OUT: 450 mL    Total NET: 550 mL          Physical Exam:    General: AAOx3, in NAD, resting comfortably in bed.    L hip:  In nl. alignment. Dressing is C/D/I.  Calves are soft, non-tender. 2+pulses. NVI.                          13.6   13.43 )-----------( 205      ( 31 May 2019 01:39 )             41.3     05-31    137  |  101  |  12  ----------------------------<  223<H>  3.8   |  27  |  0.88    Ca    9.2      31 May 2019 01:39  Phos  4.1     05-30  Mg     2.1     05-30    TPro  7.6  /  Alb  3.5  /  TBili  1.1  /  DBili  x   /  AST  10  /  ALT  13  /  AlkPhos  71  05-30      Impression:  73yFemale S/p L Hip Arpit POD# 1  Plan:  -  Continue pain management  -  DVT prophylaxis with Lovenox  -  Daily Physical Therapy:  WBAT on LLEwith walker  - *Total hip precautions (bedside commode, trapeze bar, high chair, abduction pillow between legs when in bed)  -  Discharge planning: Home Vs. Rehab pending Physical therapy eval.  -  Continue Antibiotics x 24hrs post-op  -  Encouraged use of incentive spirometer  -  Case d/w 
Ortho Note POD# 2  73yFemale    Diagnosis:  S/p Left Hip Arpit POD# 2    Patient is seen and evaluated at bedside; offers no acute complaints. Pain is mild; well controlled.  Has been OOB with PT; ambulation with walker    Vital Signs Last 24 Hrs  T(C): 37 (01 Jun 2019 06:06), Max: 38.9 (31 May 2019 14:18)  T(F): 98.6 (01 Jun 2019 06:06), Max: 102 (31 May 2019 14:18)  HR: 89 (01 Jun 2019 09:20) (89 - 111)  BP: 160/64 (01 Jun 2019 09:20) (130/54 - 160/64)  BP(mean): --  RR: 16 (01 Jun 2019 06:06) (16 - 18)  SpO2: 97% (01 Jun 2019 09:20) (96% - 99%)    Physical Exam:    General: AAOx3, in NAD, resting in bed.    Left hip:  In nl. alignment. Wound C/D/I; healing well.  Staples intact. Calves are soft, non-tender. 2+pulses. NVI.                          10.8   11.81 )-----------( 201      ( 01 Jun 2019 06:23 )             32.5     06-01    137  |  104  |  13  ----------------------------<  120<H>  3.9   |  26  |  0.63    Ca    8.4      01 Jun 2019 06:23        Impression:  73yFemale S/p Left Hip Arpit POD# 2  Plan:  -  Continue pain management  -  DVT prophylaxis with Lovenox  -  Daily Physical Therapy:  WBAT on LLE with walker  - *Total hip precautions (bedside commode, trapeze bar, high chair, abduction pillow between legs when in bed)  -  Discharge planning for tomorrow-rehab  -  Dressing changed  -  Encouraged use of incentive spirometer  -  Case d/w Dr. Miner
Patient denies chest pain or shortness of breath.   Review of systems otherwise (-)  	  MEDICATIONS:  MEDICATIONS  (STANDING):  amLODIPine   Tablet 10 milliGRAM(s) Oral daily  ascorbic acid 500 milliGRAM(s) Oral two times a day  aspirin enteric coated 81 milliGRAM(s) Oral daily  ceFAZolin   IVPB 2000 milliGRAM(s) IV Intermittent every 8 hours  dextrose 5%. 1000 milliLiter(s) (50 mL/Hr) IV Continuous <Continuous>  dextrose 50% Injectable 12.5 Gram(s) IV Push once  dextrose 50% Injectable 25 Gram(s) IV Push once  dextrose 50% Injectable 25 Gram(s) IV Push once  docusate sodium 100 milliGRAM(s) Oral three times a day  doxazosin 4 milliGRAM(s) Oral at bedtime  enoxaparin Injectable 30 milliGRAM(s) SubCutaneous every 12 hours  ferrous    sulfate 325 milliGRAM(s) Oral three times a day with meals  folic acid 1 milliGRAM(s) Oral daily  hydrochlorothiazide 25 milliGRAM(s) Oral daily  insulin glargine Injectable (LANTUS) 10 Unit(s) SubCutaneous at bedtime  insulin lispro (HumaLOG) corrective regimen sliding scale   SubCutaneous three times a day before meals  losartan 100 milliGRAM(s) Oral daily  metoprolol tartrate 100 milliGRAM(s) Oral two times a day  perphenazine 16 milliGRAM(s) Oral at bedtime  polyethylene glycol 3350 17 Gram(s) Oral daily  simvastatin 40 milliGRAM(s) Oral at bedtime  sodium chloride 0.9%. 1000 milliLiter(s) (105 mL/Hr) IV Continuous <Continuous>  traMADol 50 milliGRAM(s) Oral every 8 hours      LABS:	 	    CARDIAC MARKERS:                                13.6   13.43 )-----------( 205      ( 31 May 2019 01:39 )             41.3     Hemoglobin: 13.6 g/dL (05-31 @ 01:39)  Hemoglobin: 13.5 g/dL (05-30 @ 06:01)  Hemoglobin: 14.0 g/dL (05-29 @ 21:05)      05-31    137  |  101  |  12  ----------------------------<  223<H>  3.8   |  27  |  0.88    Ca    9.2      31 May 2019 01:39  Phos  4.1     05-30  Mg     2.1     05-30    TPro  7.6  /  Alb  3.5  /  TBili  1.1  /  DBili  x   /  AST  10  /  ALT  13  /  AlkPhos  71  05-30    Creatinine Trend: 0.88<--, 0.80<--, 0.76<--    PHYSICAL EXAM:  T(C): 36.7 (05-31-19 @ 10:14), Max: 37.3 (05-30-19 @ 15:49)  HR: 69 (05-31-19 @ 11:27) (69 - 96)  BP: 129/48 (05-31-19 @ 11:27) (114/57 - 150/57)  RR: 16 (05-31-19 @ 10:14) (15 - 21)  SpO2: 98% (05-31-19 @ 10:14) (95% - 100%)  Wt(kg): --  I&O's Summary    30 May 2019 07:01  -  31 May 2019 07:00  --------------------------------------------------------  IN: 1000 mL / OUT: 450 mL / NET: 550 mL          Gen: Appears well in NAD  HEENT:  (-)icterus (-)pallor  CV: N S1 S2 1/6 ANGELIC (+)2 Pulses B/l  Resp:  Clear to ausculatation B/L, normal effort  GI: (+) BS Soft, NT, ND  Lymph:  (-)Edema, (-)obvious lymphadenopathy  Skin: Warm to touch, Normal turgor  Psych: Appropriate mood and affect    ASSESSMENT/PLAN: 	73y Female PMH of HTN, Diabetes, Osteoarthritis, Osteoporosis, Peripheral arterial disease, Diabetic neuropathy, HLD, Schizophrenia came to hospital after an episode of fall and hip fx s/p Repair echo with hyperdynamic LV fx    - tolerated procedure  - no clinical CHF  - HD stable   - Supportive care per primary team    Jm Cortes MD, [facility]  [facility] [phone]
pt seen and examined, no complaints, ROS - .   	  acetaminophen   Tablet .. 325 milliGRAM(s) Oral every 4 hours PRN  aluminum hydroxide/magnesium hydroxide/simethicone Suspension 30 milliLiter(s) Oral four times a day PRN  amLODIPine   Tablet 10 milliGRAM(s) Oral daily  ascorbic acid 500 milliGRAM(s) Oral two times a day  aspirin enteric coated 81 milliGRAM(s) Oral daily  dextrose 40% Gel 15 Gram(s) Oral once PRN  dextrose 5%. 1000 milliLiter(s) IV Continuous <Continuous>  dextrose 50% Injectable 12.5 Gram(s) IV Push once  dextrose 50% Injectable 25 Gram(s) IV Push once  dextrose 50% Injectable 25 Gram(s) IV Push once  docusate sodium 100 milliGRAM(s) Oral three times a day  doxazosin 4 milliGRAM(s) Oral at bedtime  enoxaparin Injectable 30 milliGRAM(s) SubCutaneous every 12 hours  ferrous    sulfate 325 milliGRAM(s) Oral three times a day with meals  folic acid 1 milliGRAM(s) Oral daily  glucagon  Injectable 1 milliGRAM(s) IntraMuscular once PRN  hydrochlorothiazide 25 milliGRAM(s) Oral daily  insulin glargine Injectable (LANTUS) 10 Unit(s) SubCutaneous at bedtime  insulin lispro (HumaLOG) corrective regimen sliding scale   SubCutaneous three times a day before meals  losartan 100 milliGRAM(s) Oral daily  metoprolol tartrate 100 milliGRAM(s) Oral two times a day  ondansetron Injectable 4 milliGRAM(s) IV Push every 6 hours PRN  oxyCODONE    IR 5 milliGRAM(s) Oral every 4 hours PRN  perphenazine 16 milliGRAM(s) Oral at bedtime  polyethylene glycol 3350 17 Gram(s) Oral daily  senna 2 Tablet(s) Oral at bedtime PRN  simvastatin 40 milliGRAM(s) Oral at bedtime  sodium chloride 0.9%. 1000 milliLiter(s) IV Continuous <Continuous>  traMADol 50 milliGRAM(s) Oral every 8 hours                            10.8   11.81 )-----------( 201      ( 01 Jun 2019 06:23 )             32.5       Hemoglobin: 10.8 g/dL (06-01 @ 06:23)  Hemoglobin: 13.6 g/dL (05-31 @ 01:39)  Hemoglobin: 13.5 g/dL (05-30 @ 06:01)  Hemoglobin: 14.0 g/dL (05-29 @ 21:05)      06-01    137  |  104  |  13  ----------------------------<  120<H>  3.9   |  26  |  0.63    Ca    8.4      01 Jun 2019 06:23      Creatinine Trend: 0.63<--, 0.88<--, 0.80<--, 0.76<--    COAGS:           T(C): 37 (06-01-19 @ 06:06), Max: 38.9 (05-31-19 @ 14:18)  HR: 98 (06-01-19 @ 06:06) (69 - 111)  BP: 130/54 (06-01-19 @ 06:06) (129/48 - 152/58)  RR: 16 (06-01-19 @ 06:06) (16 - 18)  SpO2: 99% (06-01-19 @ 06:06) (96% - 99%)  Wt(kg): --    I&O's Summary    Gen: Appears well in NAD  HEENT:  (-)icterus (-)pallor  CV: N S1 S2 1/6 ANGELIC (+)2 Pulses B/l  Resp:  Clear to ausculatation B/L, normal effort  GI: (+) BS Soft, NT, ND  Lymph:  (-)Edema, (-)obvious lymphadenopathy  Skin: Warm to touch, Normal turgor  Psych: Appropriate mood and affect    ECHO :< from: Transthoracic Echocardiogram (05.30.19 @ 08:33) >  CONCLUSIONS:  1. Mild posterior mitral annular calcification. Trace  mitral regurgitation.  2. Normal trileaflet aortic valve. No aortic stenosis. No  aortic valve regurgitation seen.  3. Normal aortic root.  4. Normal left atrium.  5. Normal left ventricular internal dimensions and wall  thicknesses.  6. Hyperdynamic left ventricular systolic function (EF  >70%).  7. Grade I diastolic dysfunction (Impaired relaxation).  8. Normal right atrium.  9. Right ventricle not well visualized.Normal RV systolic  function (TAPSE 2.0 cm).  10. Normal tricuspid valve. Trace tricuspid regurgitation.  11. No pericardial effusion.    *** No previous Echo exam.  ------------------------------------------------------------------------  Confirmed on  5/30/2019 - 16:46:27 by Ryan Novoa MD  ------------------------------------------------------------------------    < end of copied text >    ASSESSMENT/PLAN: 	73y Female PMH of HTN, Diabetes, Osteoarthritis, Osteoporosis, Peripheral arterial disease, Diabetic neuropathy, HLD, Schizophrenia came to hospital after an episode of fall and hip fx s/p Repair echo with hyperdynamic LV fx    - physical therapy follow up   -  GI / DVT prophylaxis,  keep K>4, mag >2.0   - tolerated procedure  - no clinical CHF  - bp stable on norvasc ,ACE   - cont BB, hr stable ,   - echo w/ hyperdynamic LV / DD I  - Supportive care per primary team  D/W Dr Condon

## 2019-06-01 NOTE — DISCHARGE NOTE PROVIDER - NSDCCPCAREPLAN_GEN_ALL_CORE_FT
PRINCIPAL DISCHARGE DIAGNOSIS  Diagnosis: Closed fracture of left hip, initial encounter  Assessment and Plan of Treatment: You presented with fall with left femoral neck fracture, you had left lc arthroplasty. You are recommended to take medication as advised, participate in physical therapy and followup with PCP and Ortho in OP setting.      SECONDARY DISCHARGE DIAGNOSES  Diagnosis: DM (diabetes mellitus)  Assessment and Plan of Treatment: You are recommended to take medications as advised and follow up with PCP    Diagnosis: Hypertension, unspecified type  Assessment and Plan of Treatment: You are recommended to take medications as advised and follow up with PCP

## 2019-06-01 NOTE — DISCHARGE NOTE PROVIDER - HOSPITAL COURSE
73 Female with PMH of HTN, Diabetes, Osteoarthritis, Osteoporosis, Peripheral arterial disease, Diabetic neuropathy, HLD, Schizophrenia came to hospital after an episode of fall. Pt was in her toliet when she tried to close the door while sitting on the commode leading to fall and hit her left side of hip. She was not able to ambulate afterwards for which nursing home sent her to hospital for evaluation. Pt complains of mild pain upon movement but denies any other focal weakness or problems.         SH: From [place]. Denies smoking and alcohol.        ED Course: Blood pressure was 247/109. HR was 115 and Labs showed Leukocytosis of 13. CT pelvis showed acute left femoral comminuted neck fracture.  Pt was given labetalol and morphine. EKG showed NSR @ 92 bpm. (30 May 2019 00:29)        - S/P MECHANICAL FALL, LEFT FEMORAL NECK FRACTURE WITH SUPERIOR DISPLACEMENT - S/P LEFT CARLOS ARTHROPLASTY. CONTINUE PAIN Rx . being discharged to [facility].         Case is discussed with the attending and Discharged is perpared by the intern on call on request of the primary team

## 2019-06-01 NOTE — PROGRESS NOTE ADULT - ATTENDING COMMENTS
Note reviewed and agree with the plan.  I did see pt yesterday afternoon around 2:00 pm. She was doing well.  May d/c to rehab.  f/u in office 2-3 weeks.

## 2019-06-01 NOTE — DISCHARGE NOTE NURSING/CASE MANAGEMENT/SOCIAL WORK - NSDCDPATPORTLINK_GEN_ALL_CORE
You can access the [facility] Patient Portal, offered by [facility], by registering with the following website: [email or web address]

## 2019-09-10 ENCOUNTER — INPATIENT (INPATIENT)
Facility: HOSPITAL | Age: 74
LOS: 5 days | Discharge: NURSING, REHAB OR LONG-TERM CARE | DRG: 623 | End: 2019-09-16
Attending: INTERNAL MEDICINE | Admitting: INTERNAL MEDICINE
Payer: MEDICARE

## 2019-09-10 VITALS
OXYGEN SATURATION: 100 % | DIASTOLIC BLOOD PRESSURE: 68 MMHG | HEART RATE: 70 BPM | WEIGHT: 134.04 LBS | SYSTOLIC BLOOD PRESSURE: 110 MMHG | TEMPERATURE: 99 F | RESPIRATION RATE: 16 BRPM

## 2019-09-10 DIAGNOSIS — F20.0 PARANOID SCHIZOPHRENIA: ICD-10-CM

## 2019-09-10 DIAGNOSIS — E11.9 TYPE 2 DIABETES MELLITUS WITHOUT COMPLICATIONS: ICD-10-CM

## 2019-09-10 DIAGNOSIS — I10 ESSENTIAL (PRIMARY) HYPERTENSION: ICD-10-CM

## 2019-09-10 DIAGNOSIS — M86.9 OSTEOMYELITIS, UNSPECIFIED: ICD-10-CM

## 2019-09-10 DIAGNOSIS — E11.621 TYPE 2 DIABETES MELLITUS WITH FOOT ULCER: ICD-10-CM

## 2019-09-10 DIAGNOSIS — Z29.9 ENCOUNTER FOR PROPHYLACTIC MEASURES, UNSPECIFIED: ICD-10-CM

## 2019-09-10 DIAGNOSIS — I73.9 PERIPHERAL VASCULAR DISEASE, UNSPECIFIED: ICD-10-CM

## 2019-09-10 PROBLEM — E78.5 HYPERLIPIDEMIA, UNSPECIFIED: Chronic | Status: ACTIVE | Noted: 2019-05-29

## 2019-09-10 LAB
ANION GAP SERPL CALC-SCNC: 6 MMOL/L — SIGNIFICANT CHANGE UP (ref 5–17)
APPEARANCE UR: CLEAR — SIGNIFICANT CHANGE UP
APTT BLD: 39.7 SEC — HIGH (ref 27.5–36.3)
BASOPHILS # BLD AUTO: 0.04 K/UL — SIGNIFICANT CHANGE UP (ref 0–0.2)
BASOPHILS NFR BLD AUTO: 0.4 % — SIGNIFICANT CHANGE UP (ref 0–2)
BILIRUB UR-MCNC: NEGATIVE — SIGNIFICANT CHANGE UP
BLD GP AB SCN SERPL QL: SIGNIFICANT CHANGE UP
BUN SERPL-MCNC: 9 MG/DL — SIGNIFICANT CHANGE UP (ref 7–18)
CALCIUM SERPL-MCNC: 9.9 MG/DL — SIGNIFICANT CHANGE UP (ref 8.4–10.5)
CHLORIDE SERPL-SCNC: 104 MMOL/L — SIGNIFICANT CHANGE UP (ref 96–108)
CO2 SERPL-SCNC: 28 MMOL/L — SIGNIFICANT CHANGE UP (ref 22–31)
COLOR SPEC: YELLOW — SIGNIFICANT CHANGE UP
CREAT SERPL-MCNC: 0.63 MG/DL — SIGNIFICANT CHANGE UP (ref 0.5–1.3)
CRP SERPL-MCNC: 0.96 MG/DL — HIGH (ref 0–0.4)
DIFF PNL FLD: NEGATIVE — SIGNIFICANT CHANGE UP
EOSINOPHIL # BLD AUTO: 0.07 K/UL — SIGNIFICANT CHANGE UP (ref 0–0.5)
EOSINOPHIL NFR BLD AUTO: 0.7 % — SIGNIFICANT CHANGE UP (ref 0–6)
ERYTHROCYTE [SEDIMENTATION RATE] IN BLOOD: 74 MM/HR — HIGH (ref 0–20)
GLUCOSE BLDC GLUCOMTR-MCNC: 129 MG/DL — HIGH (ref 70–99)
GLUCOSE BLDC GLUCOMTR-MCNC: 136 MG/DL — HIGH (ref 70–99)
GLUCOSE SERPL-MCNC: 144 MG/DL — HIGH (ref 70–99)
GLUCOSE UR QL: 250
HCT VFR BLD CALC: 35.2 % — SIGNIFICANT CHANGE UP (ref 34.5–45)
HGB BLD-MCNC: 11.4 G/DL — LOW (ref 11.5–15.5)
IMM GRANULOCYTES NFR BLD AUTO: 0.7 % — SIGNIFICANT CHANGE UP (ref 0–1.5)
INR BLD: 1.11 RATIO — SIGNIFICANT CHANGE UP (ref 0.88–1.16)
KETONES UR-MCNC: NEGATIVE — SIGNIFICANT CHANGE UP
LACTATE SERPL-SCNC: 1.1 MMOL/L — SIGNIFICANT CHANGE UP (ref 0.7–2)
LACTATE SERPL-SCNC: 2.5 MMOL/L — HIGH (ref 0.7–2)
LEUKOCYTE ESTERASE UR-ACNC: NEGATIVE — SIGNIFICANT CHANGE UP
LYMPHOCYTES # BLD AUTO: 2.09 K/UL — SIGNIFICANT CHANGE UP (ref 1–3.3)
LYMPHOCYTES # BLD AUTO: 21.4 % — SIGNIFICANT CHANGE UP (ref 13–44)
MCHC RBC-ENTMCNC: 30.1 PG — SIGNIFICANT CHANGE UP (ref 27–34)
MCHC RBC-ENTMCNC: 32.4 GM/DL — SIGNIFICANT CHANGE UP (ref 32–36)
MCV RBC AUTO: 92.9 FL — SIGNIFICANT CHANGE UP (ref 80–100)
MONOCYTES # BLD AUTO: 0.72 K/UL — SIGNIFICANT CHANGE UP (ref 0–0.9)
MONOCYTES NFR BLD AUTO: 7.4 % — SIGNIFICANT CHANGE UP (ref 2–14)
NEUTROPHILS # BLD AUTO: 6.76 K/UL — SIGNIFICANT CHANGE UP (ref 1.8–7.4)
NEUTROPHILS NFR BLD AUTO: 69.4 % — SIGNIFICANT CHANGE UP (ref 43–77)
NITRITE UR-MCNC: NEGATIVE — SIGNIFICANT CHANGE UP
NRBC # BLD: 0 /100 WBCS — SIGNIFICANT CHANGE UP (ref 0–0)
PH UR: 7 — SIGNIFICANT CHANGE UP (ref 5–8)
PLATELET # BLD AUTO: 353 K/UL — SIGNIFICANT CHANGE UP (ref 150–400)
POTASSIUM SERPL-MCNC: 4.3 MMOL/L — SIGNIFICANT CHANGE UP (ref 3.5–5.3)
POTASSIUM SERPL-SCNC: 4.3 MMOL/L — SIGNIFICANT CHANGE UP (ref 3.5–5.3)
PROT UR-MCNC: NEGATIVE — SIGNIFICANT CHANGE UP
PROTHROM AB SERPL-ACNC: 12.4 SEC — SIGNIFICANT CHANGE UP (ref 10–12.9)
RBC # BLD: 3.79 M/UL — LOW (ref 3.8–5.2)
RBC # FLD: 13.1 % — SIGNIFICANT CHANGE UP (ref 10.3–14.5)
SODIUM SERPL-SCNC: 138 MMOL/L — SIGNIFICANT CHANGE UP (ref 135–145)
SP GR SPEC: 1.01 — SIGNIFICANT CHANGE UP (ref 1.01–1.02)
UROBILINOGEN FLD QL: NEGATIVE — SIGNIFICANT CHANGE UP
WBC # BLD: 9.75 K/UL — SIGNIFICANT CHANGE UP (ref 3.8–10.5)
WBC # FLD AUTO: 9.75 K/UL — SIGNIFICANT CHANGE UP (ref 3.8–10.5)

## 2019-09-10 PROCEDURE — 73630 X-RAY EXAM OF FOOT: CPT | Mod: 26,RT,76

## 2019-09-10 PROCEDURE — 93923 UPR/LXTR ART STDY 3+ LVLS: CPT | Mod: 26

## 2019-09-10 PROCEDURE — 99285 EMERGENCY DEPT VISIT HI MDM: CPT

## 2019-09-10 RX ORDER — FERROUS SULFATE 325(65) MG
325 TABLET ORAL DAILY
Refills: 0 | Status: DISCONTINUED | OUTPATIENT
Start: 2019-09-10 | End: 2019-09-16

## 2019-09-10 RX ORDER — AMPICILLIN SODIUM AND SULBACTAM SODIUM 250; 125 MG/ML; MG/ML
3 INJECTION, POWDER, FOR SUSPENSION INTRAMUSCULAR; INTRAVENOUS ONCE
Refills: 0 | Status: COMPLETED | OUTPATIENT
Start: 2019-09-10 | End: 2019-09-10

## 2019-09-10 RX ORDER — INSULIN GLARGINE 100 [IU]/ML
10 INJECTION, SOLUTION SUBCUTANEOUS AT BEDTIME
Refills: 0 | Status: DISCONTINUED | OUTPATIENT
Start: 2019-09-10 | End: 2019-09-16

## 2019-09-10 RX ORDER — INSULIN LISPRO 100/ML
VIAL (ML) SUBCUTANEOUS
Refills: 0 | Status: DISCONTINUED | OUTPATIENT
Start: 2019-09-10 | End: 2019-09-16

## 2019-09-10 RX ORDER — AMPICILLIN SODIUM AND SULBACTAM SODIUM 250; 125 MG/ML; MG/ML
3 INJECTION, POWDER, FOR SUSPENSION INTRAMUSCULAR; INTRAVENOUS EVERY 6 HOURS
Refills: 0 | Status: DISCONTINUED | OUTPATIENT
Start: 2019-09-10 | End: 2019-09-12

## 2019-09-10 RX ORDER — DOXAZOSIN MESYLATE 4 MG
4 TABLET ORAL AT BEDTIME
Refills: 0 | Status: DISCONTINUED | OUTPATIENT
Start: 2019-09-10 | End: 2019-09-16

## 2019-09-10 RX ORDER — PERPHENAZINE 8 MG/1
16 TABLET, FILM COATED ORAL EVERY 12 HOURS
Refills: 0 | Status: DISCONTINUED | OUTPATIENT
Start: 2019-09-10 | End: 2019-09-16

## 2019-09-10 RX ORDER — SIMVASTATIN 20 MG/1
40 TABLET, FILM COATED ORAL AT BEDTIME
Refills: 0 | Status: DISCONTINUED | OUTPATIENT
Start: 2019-09-10 | End: 2019-09-16

## 2019-09-10 RX ORDER — EZETIMIBE 10 MG/1
1 TABLET ORAL
Qty: 0 | Refills: 0 | DISCHARGE

## 2019-09-10 RX ORDER — ASPIRIN/CALCIUM CARB/MAGNESIUM 324 MG
81 TABLET ORAL DAILY
Refills: 0 | Status: DISCONTINUED | OUTPATIENT
Start: 2019-09-10 | End: 2019-09-16

## 2019-09-10 RX ORDER — SODIUM CHLORIDE 9 MG/ML
1000 INJECTION INTRAMUSCULAR; INTRAVENOUS; SUBCUTANEOUS ONCE
Refills: 0 | Status: COMPLETED | OUTPATIENT
Start: 2019-09-10 | End: 2019-09-10

## 2019-09-10 RX ORDER — ASCORBIC ACID 60 MG
500 TABLET,CHEWABLE ORAL DAILY
Refills: 0 | Status: DISCONTINUED | OUTPATIENT
Start: 2019-09-10 | End: 2019-09-16

## 2019-09-10 RX ORDER — ENOXAPARIN SODIUM 100 MG/ML
40 INJECTION SUBCUTANEOUS DAILY
Refills: 0 | Status: DISCONTINUED | OUTPATIENT
Start: 2019-09-10 | End: 2019-09-16

## 2019-09-10 RX ORDER — METOPROLOL TARTRATE 50 MG
100 TABLET ORAL
Refills: 0 | Status: DISCONTINUED | OUTPATIENT
Start: 2019-09-10 | End: 2019-09-16

## 2019-09-10 RX ORDER — ACETAMINOPHEN 500 MG
650 TABLET ORAL EVERY 6 HOURS
Refills: 0 | Status: DISCONTINUED | OUTPATIENT
Start: 2019-09-10 | End: 2019-09-16

## 2019-09-10 RX ORDER — FOLIC ACID 0.8 MG
1 TABLET ORAL DAILY
Refills: 0 | Status: DISCONTINUED | OUTPATIENT
Start: 2019-09-10 | End: 2019-09-16

## 2019-09-10 RX ORDER — DEXTROSE 50 % IN WATER 50 %
25 SYRINGE (ML) INTRAVENOUS ONCE
Refills: 0 | Status: DISCONTINUED | OUTPATIENT
Start: 2019-09-10 | End: 2019-09-16

## 2019-09-10 RX ADMIN — Medication 4 MILLIGRAM(S): at 22:35

## 2019-09-10 RX ADMIN — INSULIN GLARGINE 10 UNIT(S): 100 INJECTION, SOLUTION SUBCUTANEOUS at 22:35

## 2019-09-10 RX ADMIN — PERPHENAZINE 16 MILLIGRAM(S): 8 TABLET, FILM COATED ORAL at 22:35

## 2019-09-10 RX ADMIN — AMPICILLIN SODIUM AND SULBACTAM SODIUM 200 GRAM(S): 250; 125 INJECTION, POWDER, FOR SUSPENSION INTRAMUSCULAR; INTRAVENOUS at 14:31

## 2019-09-10 RX ADMIN — SODIUM CHLORIDE 1000 MILLILITER(S): 9 INJECTION INTRAMUSCULAR; INTRAVENOUS; SUBCUTANEOUS at 11:49

## 2019-09-10 RX ADMIN — SIMVASTATIN 40 MILLIGRAM(S): 20 TABLET, FILM COATED ORAL at 22:35

## 2019-09-10 RX ADMIN — Medication 100 MILLIGRAM(S): at 20:39

## 2019-09-10 RX ADMIN — AMPICILLIN SODIUM AND SULBACTAM SODIUM 200 GRAM(S): 250; 125 INJECTION, POWDER, FOR SUSPENSION INTRAMUSCULAR; INTRAVENOUS at 20:41

## 2019-09-10 NOTE — ED PROVIDER NOTE - NS ED ROS FT
ROS: GENERAL: no fevers, no chills EYE: no visual changes ENT: no epistaxis,  no throat pain CHEST: no pain with breathing,  no hemoptysis CARDIAC: no chest pain, no upper back pain GI: no abdominal pain, no hematemesis, no bright red blood per rectum : no dysuria, no hematuria MSK: no arm pain, no leg pain, no back pain SKIN: no rash NEURO: no headache, no neck pain HEME: no easy bruising, no easy bleeding ROS: GENERAL: no fevers, no chills EYE: no visual changes ENT: no epistaxis,  no throat pain CHEST: no pain with breathing,  no hemoptysis CARDIAC: no chest pain, no upper back pain GI: no abdominal pain, no hematemesis, no bright red blood per rectum : no dysuria, no hematuria MSK: no arm pain, no leg pain, no back pain SKIN: + b/l heel ulcers NEURO: no headache, no neck pain HEME: no easy bruising, no easy bleeding

## 2019-09-10 NOTE — H&P ADULT - NSHPPHYSICALEXAM_GEN_ALL_CORE
GENERAL: NAD  EYES: EOMI, PERRLA,   NECK: Supple, No JVD  CHEST/LUNG: Clear to auscultation b/l  No rales, rhonchi, wheezing   HEART: Regular rate and rhythm; No murmurs, +ve S1 S2   ABDOMEN: Soft, Nontender, Nondistended; Bowel sounds present  NERVOUS SYSTEM:  Alert & Oriented X3, normal sensations and normal strength     EXTREMITIES:   No clubbing, cyanosis, or edema GENERAL: NAD  EYES: EOMI, PERRLA,   NECK: Supple, No JVD  CHEST/LUNG: Clear to auscultation b/l  No rales, rhonchi, wheezing   HEART: Regular rate and rhythm; No murmurs, +ve S1 S2   ABDOMEN: Soft, Nontender, Nondistended; Bowel sounds present  NERVOUS SYSTEM:  Alert & Oriented X3, normal sensations and normal strength     EXTREMITIES:   No clubbing, cyanosis, or edema. 2x 2 cm necrotic ulcer at the R heel base with pus and foul smell , and 1.5 x 1.5 cm ulcer on the lateral aspect of the L heel , dry, non foul smelling

## 2019-09-10 NOTE — ED ADULT NURSE NOTE - ED STAT RN HANDOFF DETAILS
Patient admitted to medicine no bed assigned as yet report given to JUAREZ calvo. Patient transported via stretcher by PCA stable in no acute distress safety maintained.

## 2019-09-10 NOTE — H&P ADULT - PROBLEM SELECTOR PLAN 3
Pt takes metformin , glipizide, canagliflozin ,linagliptin  at home.  - will hold off the oral medications while pt is in the hospital   - c/w lantus 10 U and HSS   - Monitor BS and titrate insulin   - f/u HbA1c

## 2019-09-10 NOTE — ED PROVIDER NOTE - OBJECTIVE STATEMENT
75 yo F a PMH of HTN, HLD, schizoprenia, PVD, insulin dependent diabetes presents for evaluation of b/l diabetic foot ulcer. Was sent in by PMD, Dr. Rosalio Llanes for evaluation, IV abx, and PICC line placement. Patient with a negative duplex scan of the LE arteries. XR of left calcaneous remarkable for possible osteomyelitis. XR of right calcaneous remarkable for osteomyelitis in the dorsal calcaneous. Patient stent 75 yo F a PMH of HTN, HLD, schizoprenia, PVD, insulin dependent diabetes presents for evaluation of b/l diabetic foot ulcer. Was sent in by PMD, Dr. Rosalio Llanes for evaluation, IV abx, and PICC line placement. Patient with recent negative duplex scan of the LE arteries. XR of left calcaneous remarkable for possible osteomyelitis. XR of right calcaneous remarkable for osteomyelitis in the dorsal calcaneous. Patient reports that she has had sores on her feet for a last few months. Has been ambulatory. Denies fevers, n/v/d, CP, SOB, abd pain, dysuria, hematuria, diarrhea.

## 2019-09-10 NOTE — H&P ADULT - NSHPREVIEWOFSYSTEMS_GEN_ALL_CORE
REVIEW OF SYSTEMS:  CONSTITUTIONAL: No weakness, fevers or chills  EYES/ENT: No visual changes;  No vertigo or throat pain   RESPIRATORY: No cough, wheezing, hemoptysis; No shortness of breath  CARDIOVASCULAR: No chest pain or palpitations  GASTROINTESTINAL: No abdominal  pain. No nausea, vomiting. No diarrhea or constipation. No melena or hematochezia.  GENITOURINARY: No dysuria, frequency or hematuria  NEUROLOGICAL: No numbness or weakness  SKIN: No itching, rashes

## 2019-09-10 NOTE — H&P ADULT - PROBLEM SELECTOR PLAN 6
IMPROVE VTE Individual Risk Assessment  RISK                                                                Points  [  ] Previous VTE                                                  3  [  ] Thrombophilia                                               2  [  ] Lower limb paralysis                                      2  [  ] Current Cancer                                              2         (within 6 months)  [  ] Immobilization > 24 hrs                                1  [  ] ICU/CCU stay > 24 hours                              1  [  ] Age > 60                                                      1  IMPROVE VTE Score ___2______  DVT ppx : lovenox   GI ppx : no need

## 2019-09-10 NOTE — ED ADULT NURSE NOTE - CHPI ED NUR SYMPTOMS NEG
no tingling/no decreased eating/drinking/no chills/no vomiting/no weakness/no dizziness/no fever/no nausea

## 2019-09-10 NOTE — H&P ADULT - PROBLEM SELECTOR PLAN 1
p/w non healing b/l heel ulcers   - afebrile , no white count , lactate 2.5   - s/p 1 L NS  n unasyn IV x1 in ED   - xray : no evidence of OM   - will start on unasyn 3 g q6   - pain control with tylenol   - Pod consult f/u   - Will do ABIs   - Vascular consult Dr Camacho   - ID Dr Wellington p/w non healing b/l heel ulcers   - afebrile , no white count , lactate 2.5   - s/p 1 L NS  n unasyn IV x1 in ED   - xray inpt  : no evidence of OM   - will start on unasyn 3 g q6   - pain control with tylenol   - Pod consult : necrotic tissue removed   - Will do ABIs   - need MRI to r/o OM   - Vascular consult Dr Camacho   - ID Dr Wellington p/w non healing b/l heel ulcers   - afebrile , no white count , lactate 2.5   - s/p 1 L NS  n unasyn IV x1 in ED   - xray inpt  : no evidence of OM   - will start on unasyn 3 g q6   - pain control with tylenol   - Pod consult : necrotic tissue removed   - Will do ABIs   - need MRI to r/o OM   - consult vascular Dr Camacho after ABIs are done  - ID Dr Wellington

## 2019-09-10 NOTE — ED ADULT NURSE NOTE - OBJECTIVE STATEMENT
Patient BIBA from facility for PICC line place for osteomyelitis . Patient has B/L heel pressure ulcers

## 2019-09-10 NOTE — CONSULT NOTE ADULT - SUBJECTIVE AND OBJECTIVE BOX
Chief Complaint : Patient is a 74y old  Female who presents with a chief complaint of diabetic foot ulcer (10 Sep 2019 14:32)    HPI : HPI:  73 Female from [facility] ,walks with walker with PMH of HTN, Diabetes, Osteoarthritis, Osteoporosis, Peripheral arterial disease, Diabetic neuropathy, HLD, Schizophrenia presented to Ed with worsening non healing heel ulcers x 1 month. Pt was sent in by PMD Dr Llanes for suspected Osteomyelitis . Pt states she doesnt remember when the ulcers originate but they are getting worse from last few months. Pt denies any fever,chills , pain , cough , SOB,CP ,abd pain or any other symptoms. Pt states she is compliant with her diabetic medications and nurse at the facility give her insulin when her sugars go beyond 200.   Outpatient records showed  negative duplex scan of the LE arteries. XRay of left calcaneous remarkable for possible osteomyelitis. XRay  of right calcaneous remarkable for osteomyelitis in the dorsal calcaneous.  ED course : afebrile, no WBC count , lactate 2.5 s/p 1 L NS bolus , Xray feet : didnt showed any evidence of OM . ESR elevated 74     S: Pt seen bedside in ED. Pt states Rukhsana romo sent her to ED because her heels look worse. Pt states she walks with a walker but is often in bed. Pt denies pain to her heels. Pt denies F/N/V/C/SOB.       SH : Denies Smoking, alcohol or drug use (10 Sep 2019 14:32)      Patient admits to  (-) Fevers, (-) Chills, (-) Nausea, (-) Vomiting, (-) Shortness of Breath      PMH: DM (diabetes mellitus)  Paranoid schizophrenia  HLD (hyperlipidemia)  PAD (peripheral artery disease)  HTN (hypertension)    PSH:No significant past surgical history      Allergies:No Known Allergies      Labs:                          11.4   9.75  )-----------( 353      ( 10 Sep 2019 09:50 )             35.2     WBC Trend  9.75 Date (09-10 @ 09:50)      Chem  09-10    138  |  104  |  9   ----------------------------<  144<H>  4.3   |  28  |  0.63    Ca    9.9      10 Sep 2019 09:50            T(F): 99 (09-10-19 @ 15:57), Max: 99 (09-10-19 @ 15:57)  HR: 77 (09-10-19 @ 15:57) (70 - 77)  BP: 119/71 (09-10-19 @ 15:57) (110/68 - 119/71)  RR: 18 (09-10-19 @ 15:57) (16 - 18)  SpO2: 100% (09-10-19 @ 15:57) (100% - 100%)  Wt(kg): --    O:   General: Pleasant  female NAD & AOX3.    Integument:  Skin warm, dry and supple bilateral.    Ulceration Right plantar heel, + hyperkeratotic border, wound base fibrotic with necrotic cap, boggy and probes to bone wound size (4.8 cm X 5.6cm X 2.4cm) - edema, - mac-wound erythema, - purulence, +tunneling, + probe to bone. Left lateral heel ulceration has fibrogranular base with nectrotic eschar, 1.3cmx1.0cmx0.2cm, -purulence, -tunneling/tracking, -flatulence, -mac-wound erythema   Vascular: Dorsalis Pedis and Posterior Tibial pulses 2/4.  Capillary re-fill time less then 3 seconds digits 1-5 bilateral.    Neuro: Protective sensation diminished to the level of the digits bilateral.    A: Right plantar heel ulceration   left lateral heel ulceration       P:   Chart reviewed and Patient evaluated  Discussed diagnosis and treatment with patient  Wound cleaned with chlorhexadine   Excisional debridement with 15 blade of necrotic tissue base down to subcutaneous tissue Right and Left foot ulceration  Wound flushed with normal saline   Obtained wound culture to be sent to Pathology  Applied betadine with dry sterile dressing  X-rays reviewed however xrays from [facility] were read positive OM changes   Recommend MRI to evaluate for OM  Continue with IV antibiotics As Per ID  NWB to right foot, Full WB to left foot   Offloading to bilateral Heels, please dispense offloading boots when moved to floor   Podiatry will follow while in house.  Discussed with Attending Cirlincione

## 2019-09-10 NOTE — H&P ADULT - PROBLEM SELECTOR PLAN 2
Pt takes amlodipine 10 mg , HCTZ- Losartan 100-25 mg, lopressor 100 BID at home   - BP wnl . will restart Lopressor but will hold HCTZ- losartan and amlodipine for now   - Monitor BP and titrate meds accordingly

## 2019-09-10 NOTE — H&P ADULT - HISTORY OF PRESENT ILLNESS
73 Female with PMH of HTN, Diabetes, Osteoarthritis, Osteoporosis, Peripheral arterial disease, Diabetic neuropathy, HLD, Schizophrenia presented to Ed with worsening non healing heel ulcers x 1 month. Pt was sent in by PMD Dr Llanes for suspected Osteomyelitis . Pt states she doesnt remember when the ulcers originate but they are getting worse from last few months. Pt denies any fever,chills , pain , cough , SOB,CP ,abd pain or any other symptoms. Pt states she is compliant with her diabetic medications and nurse at the facility give her insulin when her sugars go beyond 200.     ED course : afebrile, no WBC count , lactate 2.5 s/p 1 L NS bolus , Xray feet : didnt showed any evidence of OM . ESR elevated 74     SH : Denies Smoking, alcohol or drug use 73 Female from [facility] ,walks with walker with PMH of HTN, Diabetes, Osteoarthritis, Osteoporosis, Peripheral arterial disease, Diabetic neuropathy, HLD, Schizophrenia presented to Ed with worsening non healing heel ulcers x 1 month. Pt was sent in by PMD Dr Llanes for suspected Osteomyelitis . Pt states she doesnt remember when the ulcers originate but they are getting worse from last few months. Pt denies any fever,chills , pain , cough , SOB,CP ,abd pain or any other symptoms. Pt states she is compliant with her diabetic medications and nurse at the facility give her insulin when her sugars go beyond 200.   Outpatient records showed  negative duplex scan of the LE arteries. XRay of left calcaneous remarkable for possible osteomyelitis. XRay  of right calcaneous remarkable for osteomyelitis in the dorsal calcaneous.  ED course : afebrile, no WBC count , lactate 2.5 s/p 1 L NS bolus , Xray feet : didnt showed any evidence of OM . ESR elevated 74     SH : Denies Smoking, alcohol or drug use

## 2019-09-10 NOTE — H&P ADULT - ASSESSMENT
73 Female with PMH of HTN, Diabetes, Osteoarthritis, Osteoporosis, Peripheral arterial disease, Diabetic neuropathy, HLD, Schizophrenia presented to Ed with worsening non healing heel ulcers x 1 month.    Vitals stable     Labs : lactate 2.5     Pt will be admitted to med for diabetic feet ulcer

## 2019-09-10 NOTE — ED PROVIDER NOTE - CLINICAL SUMMARY MEDICAL DECISION MAKING FREE TEXT BOX
73 yo F sent in by PMD, Dr. Llanes for b/l diabetic foot ulcer and tx for possible osteo. Outpatient XR remarkable for possible osteo. Inflammatory markers elevated here. XR here did not note osteo. Discussed case with podiatry who will evaluate patient. Dr. Llanes will accept pt for admission.

## 2019-09-11 DIAGNOSIS — M86.9 OSTEOMYELITIS, UNSPECIFIED: ICD-10-CM

## 2019-09-11 LAB
24R-OH-CALCIDIOL SERPL-MCNC: 10.2 NG/ML — LOW (ref 30–80)
ANION GAP SERPL CALC-SCNC: 5 MMOL/L — SIGNIFICANT CHANGE UP (ref 5–17)
BASOPHILS # BLD AUTO: 0.04 K/UL — SIGNIFICANT CHANGE UP (ref 0–0.2)
BASOPHILS NFR BLD AUTO: 0.4 % — SIGNIFICANT CHANGE UP (ref 0–2)
BUN SERPL-MCNC: 9 MG/DL — SIGNIFICANT CHANGE UP (ref 7–18)
CALCIUM SERPL-MCNC: 8.9 MG/DL — SIGNIFICANT CHANGE UP (ref 8.4–10.5)
CHLORIDE SERPL-SCNC: 105 MMOL/L — SIGNIFICANT CHANGE UP (ref 96–108)
CHOLEST SERPL-MCNC: 157 MG/DL — SIGNIFICANT CHANGE UP (ref 10–199)
CO2 SERPL-SCNC: 27 MMOL/L — SIGNIFICANT CHANGE UP (ref 22–31)
CREAT SERPL-MCNC: 0.43 MG/DL — LOW (ref 0.5–1.3)
EOSINOPHIL # BLD AUTO: 0.19 K/UL — SIGNIFICANT CHANGE UP (ref 0–0.5)
EOSINOPHIL NFR BLD AUTO: 2.1 % — SIGNIFICANT CHANGE UP (ref 0–6)
GLUCOSE BLDC GLUCOMTR-MCNC: 160 MG/DL — HIGH (ref 70–99)
GLUCOSE BLDC GLUCOMTR-MCNC: 182 MG/DL — HIGH (ref 70–99)
GLUCOSE BLDC GLUCOMTR-MCNC: 196 MG/DL — HIGH (ref 70–99)
GLUCOSE BLDC GLUCOMTR-MCNC: 220 MG/DL — HIGH (ref 70–99)
GLUCOSE SERPL-MCNC: 141 MG/DL — HIGH (ref 70–99)
HBA1C BLD-MCNC: 7.6 % — HIGH (ref 4–5.6)
HCT VFR BLD CALC: 28.6 % — LOW (ref 34.5–45)
HDLC SERPL-MCNC: 37 MG/DL — LOW
HGB BLD-MCNC: 9.3 G/DL — LOW (ref 11.5–15.5)
IMM GRANULOCYTES NFR BLD AUTO: 0.6 % — SIGNIFICANT CHANGE UP (ref 0–1.5)
LIPID PNL WITH DIRECT LDL SERPL: 104 MG/DL — SIGNIFICANT CHANGE UP
LYMPHOCYTES # BLD AUTO: 1.51 K/UL — SIGNIFICANT CHANGE UP (ref 1–3.3)
LYMPHOCYTES # BLD AUTO: 16.8 % — SIGNIFICANT CHANGE UP (ref 13–44)
MAGNESIUM SERPL-MCNC: 2.3 MG/DL — SIGNIFICANT CHANGE UP (ref 1.6–2.6)
MCHC RBC-ENTMCNC: 29.9 PG — SIGNIFICANT CHANGE UP (ref 27–34)
MCHC RBC-ENTMCNC: 32.5 GM/DL — SIGNIFICANT CHANGE UP (ref 32–36)
MCV RBC AUTO: 92 FL — SIGNIFICANT CHANGE UP (ref 80–100)
MONOCYTES # BLD AUTO: 0.67 K/UL — SIGNIFICANT CHANGE UP (ref 0–0.9)
MONOCYTES NFR BLD AUTO: 7.4 % — SIGNIFICANT CHANGE UP (ref 2–14)
NEUTROPHILS # BLD AUTO: 6.54 K/UL — SIGNIFICANT CHANGE UP (ref 1.8–7.4)
NEUTROPHILS NFR BLD AUTO: 72.7 % — SIGNIFICANT CHANGE UP (ref 43–77)
NRBC # BLD: 0 /100 WBCS — SIGNIFICANT CHANGE UP (ref 0–0)
PHOSPHATE SERPL-MCNC: 3.6 MG/DL — SIGNIFICANT CHANGE UP (ref 2.5–4.5)
PLATELET # BLD AUTO: 308 K/UL — SIGNIFICANT CHANGE UP (ref 150–400)
POTASSIUM SERPL-MCNC: 3.8 MMOL/L — SIGNIFICANT CHANGE UP (ref 3.5–5.3)
POTASSIUM SERPL-SCNC: 3.8 MMOL/L — SIGNIFICANT CHANGE UP (ref 3.5–5.3)
RBC # BLD: 3.11 M/UL — LOW (ref 3.8–5.2)
RBC # FLD: 13 % — SIGNIFICANT CHANGE UP (ref 10.3–14.5)
SODIUM SERPL-SCNC: 137 MMOL/L — SIGNIFICANT CHANGE UP (ref 135–145)
TOTAL CHOLESTEROL/HDL RATIO MEASUREMENT: 4.2 RATIO — SIGNIFICANT CHANGE UP (ref 3.3–7.1)
TRIGL SERPL-MCNC: 82 MG/DL — SIGNIFICANT CHANGE UP (ref 10–149)
TSH SERPL-MCNC: 1.18 UU/ML — SIGNIFICANT CHANGE UP (ref 0.34–4.82)
VIT B12 SERPL-MCNC: 476 PG/ML — SIGNIFICANT CHANGE UP (ref 232–1245)
WBC # BLD: 9 K/UL — SIGNIFICANT CHANGE UP (ref 3.8–10.5)
WBC # FLD AUTO: 9 K/UL — SIGNIFICANT CHANGE UP (ref 3.8–10.5)

## 2019-09-11 PROCEDURE — 73718 MRI LOWER EXTREMITY W/O DYE: CPT | Mod: 26,RT,76

## 2019-09-11 RX ADMIN — AMPICILLIN SODIUM AND SULBACTAM SODIUM 200 GRAM(S): 250; 125 INJECTION, POWDER, FOR SUSPENSION INTRAMUSCULAR; INTRAVENOUS at 12:04

## 2019-09-11 RX ADMIN — PERPHENAZINE 16 MILLIGRAM(S): 8 TABLET, FILM COATED ORAL at 17:34

## 2019-09-11 RX ADMIN — ENOXAPARIN SODIUM 40 MILLIGRAM(S): 100 INJECTION SUBCUTANEOUS at 11:59

## 2019-09-11 RX ADMIN — Medication 1 MILLIGRAM(S): at 11:59

## 2019-09-11 RX ADMIN — Medication 1: at 08:43

## 2019-09-11 RX ADMIN — SIMVASTATIN 40 MILLIGRAM(S): 20 TABLET, FILM COATED ORAL at 22:10

## 2019-09-11 RX ADMIN — Medication 1: at 17:33

## 2019-09-11 RX ADMIN — AMPICILLIN SODIUM AND SULBACTAM SODIUM 200 GRAM(S): 250; 125 INJECTION, POWDER, FOR SUSPENSION INTRAMUSCULAR; INTRAVENOUS at 00:00

## 2019-09-11 RX ADMIN — AMPICILLIN SODIUM AND SULBACTAM SODIUM 200 GRAM(S): 250; 125 INJECTION, POWDER, FOR SUSPENSION INTRAMUSCULAR; INTRAVENOUS at 06:17

## 2019-09-11 RX ADMIN — AMPICILLIN SODIUM AND SULBACTAM SODIUM 200 GRAM(S): 250; 125 INJECTION, POWDER, FOR SUSPENSION INTRAMUSCULAR; INTRAVENOUS at 18:08

## 2019-09-11 RX ADMIN — Medication 1: at 11:55

## 2019-09-11 RX ADMIN — Medication 2: at 22:11

## 2019-09-11 RX ADMIN — INSULIN GLARGINE 10 UNIT(S): 100 INJECTION, SOLUTION SUBCUTANEOUS at 22:10

## 2019-09-11 RX ADMIN — Medication 500 MILLIGRAM(S): at 11:59

## 2019-09-11 RX ADMIN — Medication 325 MILLIGRAM(S): at 11:59

## 2019-09-11 RX ADMIN — PERPHENAZINE 16 MILLIGRAM(S): 8 TABLET, FILM COATED ORAL at 06:17

## 2019-09-11 RX ADMIN — Medication 81 MILLIGRAM(S): at 11:59

## 2019-09-11 RX ADMIN — Medication 100 MILLIGRAM(S): at 17:34

## 2019-09-11 RX ADMIN — Medication 4 MILLIGRAM(S): at 22:10

## 2019-09-11 NOTE — PHYSICAL THERAPY INITIAL EVALUATION ADULT - ACTIVE RANGE OF MOTION EXAMINATION, REHAB EVAL
bilateral upper extremity Active ROM was WFL (within functional limits)/Both L.E.-WFL, except Both Ankle-which are reduced due to bandage

## 2019-09-11 NOTE — PHYSICAL THERAPY INITIAL EVALUATION ADULT - CRITERIA FOR SKILLED THERAPEUTIC INTERVENTIONS
impairments found/rehab potential/anticipated discharge recommendation/predicted duration of therapy intervention/therapy frequency

## 2019-09-11 NOTE — PROGRESS NOTE ADULT - SUBJECTIVE AND OBJECTIVE BOX
NP Note discussed with Primary Attending    72 y/o female from [facility], walks with a walker with PMH of HTN, Diabetes, Osteoarthritis, Osteoporosis, Peripheral arterial disease, Diabetic neuropathy, HLD, Schizophrenia presented to Ed with worsening non healing bilateral heel ulcers x 1 month. Pt was sent in by PMD Dr Llanes for suspected Osteomyelitis. Outpatient records showed negative duplex scan of the LE arteries. XRay of left calcaneous remarkable for possible osteomyelitis. XRay of right calcaneous remarkable for osteomyelitis in the dorsal calcaneous. Patient is s/p b/l feet MRI consistent with OM b/l calcaneous. Patient is on Unasyn, ID, Dr. Wellington on board.        INTERVAL HPI/OVERNIGHT EVENTS: Seen and examined at the bedside, oob to chair, awake, alert, calm, cooperative, in no acute distress.    MEDICATIONS  (STANDING):  ampicillin/sulbactam  IVPB 3 Gram(s) IV Intermittent every 6 hours  ascorbic acid 500 milliGRAM(s) Oral daily  aspirin enteric coated 81 milliGRAM(s) Oral daily  dextrose 50% Injectable 25 Gram(s) IV Push once  doxazosin 4 milliGRAM(s) Oral at bedtime  enoxaparin Injectable 40 milliGRAM(s) SubCutaneous daily  ferrous    sulfate 325 milliGRAM(s) Oral daily  folic acid 1 milliGRAM(s) Oral daily  insulin glargine Injectable (LANTUS) 10 Unit(s) SubCutaneous at bedtime  insulin lispro (HumaLOG) corrective regimen sliding scale   SubCutaneous Before meals and at bedtime  metoprolol tartrate 100 milliGRAM(s) Oral two times a day  perphenazine 16 milliGRAM(s) Oral every 12 hours  simvastatin 40 milliGRAM(s) Oral at bedtime    MEDICATIONS  (PRN):  acetaminophen   Tablet .. 650 milliGRAM(s) Oral every 6 hours PRN Mild Pain (1 - 3)      __________________________________________________  REVIEW OF SYSTEMS:    CONSTITUTIONAL: No fever, no chills   EYES: no acute visual disturbances  NECK: No pain or stiffness  RESPIRATORY: No cough; No shortness of breath  CARDIOVASCULAR: No chest pain, no palpitations  GASTROINTESTINAL: No pain. No nausea or vomiting; No diarrhea   NEUROLOGICAL: No headache or numbness, no tremors  MUSCULOSKELETAL: No joint pain, no muscle pain  GENITOURINARY: no dysuria, no frequency, no hesitancy  PSYCHIATRY: paranoid schizophrenia        Vital Signs Last 24 Hrs  T(C): 37.1 (11 Sep 2019 16:12), Max: 37.1 (11 Sep 2019 16:12)  T(F): 98.8 (11 Sep 2019 16:12), Max: 98.8 (11 Sep 2019 16:12)  HR: 66 (11 Sep 2019 16:12) (63 - 79)  BP: 134/42 (11 Sep 2019 16:12) (101/48 - 134/42)  BP(mean): --  RR: 17 (11 Sep 2019 16:12) (17 - 18)  SpO2: 98% (11 Sep 2019 16:12) (98% - 100%)    ________________________________________________  PHYSICAL EXAM:  GENERAL: NAD  HEENT: Normocephalic;  conjunctivae and sclerae clear; moist mucous membranes  NECK: supple, no jvd  CHEST/LUNG: Clear to auscultation bilaterally with good air entry   HEART: S1 S2  regular; no murmurs, gallops or rubs  ABDOMEN: Soft, Nontender, Nondistended; Bowel sounds present  EXTREMITIES: no cyanosis; no edema; no calf tenderness, right plantar heel ulcer, left lateral heel ulcer  SKIN: warm and dry; no rash  NERVOUS SYSTEM: Awake and alert; oriented to place, person and time; no new deficits    _________________________________________________  LABS:                        9.3    9.00  )-----------( 308      ( 11 Sep 2019 06:28 )             28.6     -11    137  |  105  |  9   ----------------------------<  141<H>  3.8   |  27  |  0.43<L>    Ca    8.9      11 Sep 2019 06:28  Phos  3.6     09-11  Mg     2.3     09-11      PT/INR - ( 10 Sep 2019 09:50 )   PT: 12.4 sec;   INR: 1.11 ratio         PTT - ( 10 Sep 2019 09:50 )  PTT:39.7 sec  Urinalysis Basic - ( 10 Sep 2019 22:05 )    Color: Yellow / Appearance: Clear / S.010 / pH: x  Gluc: x / Ketone: Negative  / Bili: Negative / Urobili: Negative   Blood: x / Protein: Negative / Nitrite: Negative   Leuk Esterase: Negative / RBC: x / WBC x   Sq Epi: x / Non Sq Epi: x / Bacteria: x      CAPILLARY BLOOD GLUCOSE      POCT Blood Glucose.: 182 mg/dL (11 Sep 2019 17:07)  POCT Blood Glucose.: 196 mg/dL (11 Sep 2019 11:30)  POCT Blood Glucose.: 160 mg/dL (11 Sep 2019 08:19)  POCT Blood Glucose.: 129 mg/dL (10 Sep 2019 22:28)  POCT Blood Glucose.: 136 mg/dL (10 Sep 2019 18:18)        RADIOLOGY & ADDITIONAL TESTS:    EXAM:  MR FOOT LT                          PROCEDURE DATE:  2019      INTERPRETATION:    LEFT FOOT MRI    CLINICAL INFORMATION: Foot wound. Diabetes. Eval for osteomyelitis.  TECHNIQUE: Multiplanar, multisequence MRI was obtained of the left foot.    FINDINGS:    Soft tissue ulcerative defect is visualized within the plantar lateral   aspect of the heel. There is an ulcerative tract extending to the   posterior lateral aspect of the plantar calcaneus. There is associated   decreasedT1 marrow signal with associated edema and osseous regions of   the calcaneus, consistent with osteomyelitis. 0.1 is visualized within   the subcutaneous tissues along the tract without abscess. Origin of the   plantar fascia is maintained. Mild thickening of the peroneus tendons,   which are otherwise intact. Remaining medial flexor, extensor and   Achilles tendons are unremarkable. Joint spaces are maintained. There is   diffuse increased muscle signal, likely related to denervation.     IMPRESSION:    Ulcer defect within the posterior lateral plantar aspect of the foot with   osteomyelitis of the adjacent calcaneus.    < from: MR Foot No Cont, Right (19 @ 15:56) >  EXAM:  MR FOOT RT                            PROCEDURE DATE:  2019      INTERPRETATION:    RIGHT FOOT MRI    CLINICAL INFORMATION: Wound. Diabetic neuropathy. Hypertension. For   osteomyelitis.  TECHNIQUE: Multiplanar, multisequence MRI was obtained of the right foot.    FINDINGS:    Soft tissue ulcer is visualized at the posterior calcaneus measuring 2.7   x 2.0 cm. Within the adjacent calcaneal bone, there is decreased marrow   signal with associated edema and osseous erosion of the posterior   calcaneal cortex, consistent with osteomyelitis. Linear edema propagates   into the body of the calcaneus, likely representing stress reaction.   There is no fluid collection. Remaining visualized marrow signal is   maintained. Joint spaces are maintained. Flexor and extensor tendons are   intact. Achilles tendon is unremarkable with adjacent insertional   Achilles enthesopathy. There is diffuse increased muscle signal, likely   related to denervation.    IMPRESSION:  Posterior foot ulcer with osteomyelitis of the adjacent calcaneus.      Consultant(s) Notes Reviewed:   YES    Care Discussed with Consultants : Podiatry    Plan of care was discussed with patient and /or primary care giver; all questions and concerns were addressed and care was aligned with patient's wishes.

## 2019-09-12 LAB
GLUCOSE BLDC GLUCOMTR-MCNC: 114 MG/DL — HIGH (ref 70–99)
GLUCOSE BLDC GLUCOMTR-MCNC: 133 MG/DL — HIGH (ref 70–99)
GLUCOSE BLDC GLUCOMTR-MCNC: 167 MG/DL — HIGH (ref 70–99)
GLUCOSE BLDC GLUCOMTR-MCNC: 168 MG/DL — HIGH (ref 70–99)

## 2019-09-12 RX ORDER — PIPERACILLIN AND TAZOBACTAM 4; .5 G/20ML; G/20ML
3.38 INJECTION, POWDER, LYOPHILIZED, FOR SOLUTION INTRAVENOUS ONCE
Refills: 0 | Status: COMPLETED | OUTPATIENT
Start: 2019-09-12 | End: 2019-09-12

## 2019-09-12 RX ORDER — SODIUM HYPOCHLORITE 0.125 %
1 SOLUTION, NON-ORAL MISCELLANEOUS DAILY
Refills: 0 | Status: DISCONTINUED | OUTPATIENT
Start: 2019-09-12 | End: 2019-09-16

## 2019-09-12 RX ORDER — SODIUM CHLORIDE 9 MG/ML
10 INJECTION INTRAMUSCULAR; INTRAVENOUS; SUBCUTANEOUS
Refills: 0 | Status: DISCONTINUED | OUTPATIENT
Start: 2019-09-12 | End: 2019-09-16

## 2019-09-12 RX ORDER — PIPERACILLIN AND TAZOBACTAM 4; .5 G/20ML; G/20ML
3.38 INJECTION, POWDER, LYOPHILIZED, FOR SOLUTION INTRAVENOUS EVERY 8 HOURS
Refills: 0 | Status: DISCONTINUED | OUTPATIENT
Start: 2019-09-12 | End: 2019-09-16

## 2019-09-12 RX ORDER — CHLORHEXIDINE GLUCONATE 213 G/1000ML
1 SOLUTION TOPICAL DAILY
Refills: 0 | Status: DISCONTINUED | OUTPATIENT
Start: 2019-09-12 | End: 2019-09-16

## 2019-09-12 RX ADMIN — Medication 325 MILLIGRAM(S): at 12:14

## 2019-09-12 RX ADMIN — Medication 500 MILLIGRAM(S): at 12:15

## 2019-09-12 RX ADMIN — PIPERACILLIN AND TAZOBACTAM 25 GRAM(S): 4; .5 INJECTION, POWDER, LYOPHILIZED, FOR SOLUTION INTRAVENOUS at 21:34

## 2019-09-12 RX ADMIN — Medication 100 MILLIGRAM(S): at 06:27

## 2019-09-12 RX ADMIN — AMPICILLIN SODIUM AND SULBACTAM SODIUM 200 GRAM(S): 250; 125 INJECTION, POWDER, FOR SUSPENSION INTRAMUSCULAR; INTRAVENOUS at 12:15

## 2019-09-12 RX ADMIN — Medication 1: at 12:14

## 2019-09-12 RX ADMIN — PIPERACILLIN AND TAZOBACTAM 25 GRAM(S): 4; .5 INJECTION, POWDER, LYOPHILIZED, FOR SOLUTION INTRAVENOUS at 15:55

## 2019-09-12 RX ADMIN — PERPHENAZINE 16 MILLIGRAM(S): 8 TABLET, FILM COATED ORAL at 17:19

## 2019-09-12 RX ADMIN — Medication 1 MILLIGRAM(S): at 12:14

## 2019-09-12 RX ADMIN — SIMVASTATIN 40 MILLIGRAM(S): 20 TABLET, FILM COATED ORAL at 21:34

## 2019-09-12 RX ADMIN — AMPICILLIN SODIUM AND SULBACTAM SODIUM 200 GRAM(S): 250; 125 INJECTION, POWDER, FOR SUSPENSION INTRAMUSCULAR; INTRAVENOUS at 00:39

## 2019-09-12 RX ADMIN — Medication 4 MILLIGRAM(S): at 21:34

## 2019-09-12 RX ADMIN — Medication 100 MILLIGRAM(S): at 17:19

## 2019-09-12 RX ADMIN — PERPHENAZINE 16 MILLIGRAM(S): 8 TABLET, FILM COATED ORAL at 06:27

## 2019-09-12 RX ADMIN — INSULIN GLARGINE 10 UNIT(S): 100 INJECTION, SOLUTION SUBCUTANEOUS at 21:34

## 2019-09-12 RX ADMIN — CHLORHEXIDINE GLUCONATE 1 APPLICATION(S): 213 SOLUTION TOPICAL at 12:15

## 2019-09-12 RX ADMIN — Medication 81 MILLIGRAM(S): at 12:14

## 2019-09-12 RX ADMIN — AMPICILLIN SODIUM AND SULBACTAM SODIUM 200 GRAM(S): 250; 125 INJECTION, POWDER, FOR SUSPENSION INTRAMUSCULAR; INTRAVENOUS at 06:27

## 2019-09-12 RX ADMIN — Medication 1: at 21:35

## 2019-09-12 RX ADMIN — ENOXAPARIN SODIUM 40 MILLIGRAM(S): 100 INJECTION SUBCUTANEOUS at 12:14

## 2019-09-12 RX ADMIN — PIPERACILLIN AND TAZOBACTAM 200 GRAM(S): 4; .5 INJECTION, POWDER, LYOPHILIZED, FOR SOLUTION INTRAVENOUS at 14:38

## 2019-09-12 NOTE — CONSULT NOTE ADULT - ASSESSMENT
Acute osteo of bilateral calcaneous 2/2 diabetic heel ulcers  Uncontrolled DM 1.	Acute osteo of bilateral calcaneous 2/2 diabetic heel ulcers  2.	Uncontrolled DM  ·	dc unasyn  ·	started on zosyn 3.375gm IV q8h  ·	awaiting heel culture results

## 2019-09-12 NOTE — PROGRESS NOTE ADULT - SUBJECTIVE AND OBJECTIVE BOX
NP Note discussed with Primary Attending    72 y/o female from [facility], walks with a walker with PMH of HTN, Diabetes, Osteoarthritis, Osteoporosis, Peripheral arterial disease, Diabetic neuropathy, HLD, Schizophrenia presented to Ed with worsening non healing bilateral heel ulcers x 1 month. Pt was sent in by PMD Dr Llanes for suspected Osteomyelitis. Outpatient records showed negative duplex scan of the LE arteries. XRay of left calcaneous remarkable for possible osteomyelitis. XRay of right calcaneous remarkable for osteomyelitis in the dorsal calcaneous. Patient is s/p b/l feet MRI consistent with OM b/l calcaneous. On Zosyn, ID, Dr. Wellington on board. Awaiting wound culture result.      INTERVAL HPI/OVERNIGHT EVENTS: Seen and examined at the bedside, oob to chair, awake, alert, calm, cooperative, in no acute distress.      MEDICATIONS  (STANDING):  ascorbic acid 500 milliGRAM(s) Oral daily  aspirin enteric coated 81 milliGRAM(s) Oral daily  chlorhexidine 2% Cloths 1 Application(s) Topical daily  Dakins Solution - Full Strength 1 Application(s) Topical daily  dextrose 50% Injectable 25 Gram(s) IV Push once  doxazosin 4 milliGRAM(s) Oral at bedtime  enoxaparin Injectable 40 milliGRAM(s) SubCutaneous daily  ferrous    sulfate 325 milliGRAM(s) Oral daily  folic acid 1 milliGRAM(s) Oral daily  insulin glargine Injectable (LANTUS) 10 Unit(s) SubCutaneous at bedtime  insulin lispro (HumaLOG) corrective regimen sliding scale   SubCutaneous Before meals and at bedtime  metoprolol tartrate 100 milliGRAM(s) Oral two times a day  perphenazine 16 milliGRAM(s) Oral every 12 hours  piperacillin/tazobactam IVPB.. 3.375 Gram(s) IV Intermittent every 8 hours  simvastatin 40 milliGRAM(s) Oral at bedtime    MEDICATIONS  (PRN):  acetaminophen   Tablet .. 650 milliGRAM(s) Oral every 6 hours PRN Mild Pain (1 - 3)  sodium chloride 0.9% lock flush 10 milliLiter(s) IV Push every 1 hour PRN Pre/post blood products, medications, blood draw, and to maintain line patency      __________________________________________________  REVIEW OF SYSTEMS:  CONSTITUTIONAL: No fever, no chills   EYES: no acute visual disturbances  NECK: No pain or stiffness  RESPIRATORY: No cough; No shortness of breath  CARDIOVASCULAR: No chest pain, no palpitations  GASTROINTESTINAL: No pain. No nausea or vomiting; No diarrhea   NEUROLOGICAL: No headache or numbness, no tremors  MUSCULOSKELETAL: No joint pain, no muscle pain  GENITOURINARY: no dysuria, no frequency, no hesitancy  PSYCHIATRY: paranoid schizophrenia    Vital Signs Last 24 Hrs  T(C): 37.4 (12 Sep 2019 15:46), Max: 37.6 (12 Sep 2019 00:54)  T(F): 99.3 (12 Sep 2019 15:46), Max: 99.7 (12 Sep 2019 00:54)  HR: 72 (12 Sep 2019 15:46) (72 - 80)  BP: 124/53 (12 Sep 2019 15:46) (124/53 - 134/47)  BP(mean): --  RR: 17 (12 Sep 2019 15:46) (16 - 17)  SpO2: 100% (12 Sep 2019 15:46) (98% - 100%)    ________________________________________________  PHYSICAL EXAM:  GENERAL: NAD  HEENT: Normocephalic;  conjunctivae and sclerae clear; moist mucous membranes  NECK: supple, no jvd  CHEST/LUNG: Clear to auscultation bilaterally with good air entry   HEART: S1 S2  regular; no murmurs, gallops or rubs  ABDOMEN: Soft, Nontender, Nondistended; Bowel sounds present  EXTREMITIES: no cyanosis; no edema; no calf tenderness, right plantar heel ulcer, left lateral heel ulcer  SKIN: warm and dry; no rash  NERVOUS SYSTEM: Awake and alert; oriented to place, person and time; no new deficits    _________________________________________________  LABS:                        9.3    9.00  )-----------( 308      ( 11 Sep 2019 06:28 )             28.6     09-11    137  |  105  |  9   ----------------------------<  141<H>  3.8   |  27  |  0.43<L>    Ca    8.9      11 Sep 2019 06:28  Phos  3.6       Mg     2.3             Urinalysis Basic - ( 10 Sep 2019 22:05 )    Color: Yellow / Appearance: Clear / S.010 / pH: x  Gluc: x / Ketone: Negative  / Bili: Negative / Urobili: Negative   Blood: x / Protein: Negative / Nitrite: Negative   Leuk Esterase: Negative / RBC: x / WBC x   Sq Epi: x / Non Sq Epi: x / Bacteria: x      CAPILLARY BLOOD GLUCOSE      POCT Blood Glucose.: 167 mg/dL (12 Sep 2019 11:40)  POCT Blood Glucose.: 114 mg/dL (12 Sep 2019 08:17)  POCT Blood Glucose.: 220 mg/dL (11 Sep 2019 21:48)  POCT Blood Glucose.: 182 mg/dL (11 Sep 2019 17:07)        RADIOLOGY & ADDITIONAL TESTS:        Consultant(s) Notes Reviewed:   YES    Care Discussed with Consultants : Podiatry, Infectious disease    Plan of care was discussed with patient and /or primary care giver; all questions and concerns were addressed and care was aligned with patient's wishes.

## 2019-09-12 NOTE — CONSULT NOTE ADULT - SUBJECTIVE AND OBJECTIVE BOX
HPI:  ID consult was called to evaluate this patient for bilateral calcaneal osteo from non-healing ulcers. Patient has pmhx significant for uncontrolled DM. MRIs of both feet were done yesterday which confirm bilateral heel osteo. Will need PICC line.     As per H&P:  73 Female from [facility] ,walks with walker with PMH of HTN, Diabetes, Osteoarthritis, Osteoporosis, Peripheral arterial disease, Diabetic neuropathy, HLD, Schizophrenia presented to Ed with worsening non healing heel ulcers x 1 month. Pt was sent in by PMD Dr Llanes for suspected Osteomyelitis . Pt states she doesnt remember when the ulcers originate but they are getting worse from last few months. Pt denies any fever,chills , pain , cough , SOB,CP ,abd pain or any other symptoms. Pt states she is compliant with her diabetic medications and nurse at the facility give her insulin when her sugars go beyond 200.   Outpatient records showed  negative duplex scan of the LE arteries. XRay of left calcaneous remarkable for possible osteomyelitis. XRay  of right calcaneous remarkable for osteomyelitis in the dorsal calcaneous. ED course : afebrile, no WBC count , lactate 2.5 s/p 1 L NS bolus , Xray feet : didnt showed any evidence of OM . ESR elevated 74 SH : Denies Smoking, alcohol or drug use (10 Sep 2019 14:32)    REVIEW OF SYSTEMS:  [  ] Not able to illicit  General:	  Chest:	  GI:	  :  Skin:	  Musculoskeletal:	  Neuro:    PAST MEDICAL & SURGICAL HISTORY:  DM (diabetes mellitus)  Paranoid schizophrenia  HLD (hyperlipidemia)  PAD (peripheral artery disease)  HTN (hypertension)  No significant past surgical history    ALLERGIES: No Known Allergies    MEDS:  acetaminophen   Tablet .. 650 milliGRAM(s) Oral every 6 hours PRN  ampicillin/sulbactam  IVPB 3 Gram(s) IV Intermittent every 6 hours  ascorbic acid 500 milliGRAM(s) Oral daily  aspirin enteric coated 81 milliGRAM(s) Oral daily  dextrose 50% Injectable 25 Gram(s) IV Push once  doxazosin 4 milliGRAM(s) Oral at bedtime  enoxaparin Injectable 40 milliGRAM(s) SubCutaneous daily  ferrous    sulfate 325 milliGRAM(s) Oral daily  folic acid 1 milliGRAM(s) Oral daily  insulin glargine Injectable (LANTUS) 10 Unit(s) SubCutaneous at bedtime  insulin lispro (HumaLOG) corrective regimen sliding scale   SubCutaneous Before meals and at bedtime  metoprolol tartrate 100 milliGRAM(s) Oral two times a day  perphenazine 16 milliGRAM(s) Oral every 12 hours  simvastatin 40 milliGRAM(s) Oral at bedtime    SOCIAL HISTORY:  Smoker:  none    FAMILY HISTORY:  No pertinent family history in first degree relatives    VITALS:  Vital Signs Last 24 Hrs  T(C): 36.9 (12 Sep 2019 07:48), Max: 37.6 (12 Sep 2019 00:54)  T(F): 98.5 (12 Sep 2019 07:48), Max: 99.7 (12 Sep 2019 00:54)  HR: 80 (12 Sep 2019 07:48) (66 - 80)  BP: 133/53 (12 Sep 2019 07:48) (133/53 - 134/47)  BP(mean): --  RR: 16 (12 Sep 2019 07:48) (16 - 17)  SpO2: 100% (12 Sep 2019 07:48) (98% - 100%)      PHYSICAL EXAM:  Constitutional:  HEENT:  Neck:  Respiratory:  Cardiovascular:  Gastrointestinal:  Extremities:  Skin:  Ortho:  Neuro:      LABS/DIAGNOSTIC TESTS:  New labs ordered                        9.3    9.00  )-----------( 308      ( 11 Sep 2019 06:28 )             28.6     WBC Count: 9.00 K/uL ( @ 06:28)  WBC Count: 9.75 K/uL (09-10 @ 09:50)        137  |  105  |  9   ----------------------------<  141<H>  3.8   |  27  |  0.43<L>    Ca    8.9      11 Sep 2019 06:28  Phos  3.6       Mg     2.3     11      Urinalysis Basic - ( 10 Sep 2019 22:05 )    Color: Yellow / Appearance: Clear / S.010 / pH: x  Gluc: x / Ketone: Negative  / Bili: Negative / Urobili: Negative   Blood: x / Protein: Negative / Nitrite: Negative   Leuk Esterase: Negative / RBC: x / WBC x   Sq Epi: x / Non Sq Epi: x / Bacteria: x    PT/INR - ( 10 Sep 2019 09:50 )   PT: 12.4 sec;   INR: 1.11 ratio    PTT - ( 10 Sep 2019 09:50 )  PTT:39.7 sec    Lactate, Blood: 1.1 mmol/L (09.10.19 @ 18:34)  Lactate, Blood: 2.5 mmol/L (09.10.19 @ 09:50)    Sedimentation Rate, Erythrocyte: 74 mm/Hr (09.10.19 @ 09:50)        CULTURES:   9/10 left heel culture - pending   9/10 left heel culture #2 - pending       .Blood  09-10 @ 19:17   No growth to date.  --  --      .Blood  09-10 @ 19:16   No growth to date.  --  --        RADIOLOGY: HPI:  ID consult was called to evaluate this patient for bilateral calcaneal osteo from non-healing ulcers. Patient has pmhx significant for uncontrolled DM. MRIs of both feet were done yesterday which confirm bilateral heel osteo. Will need PICC line.     As per H&P:  73 Female from [facility] ,walks with walker with PMH of HTN, Diabetes, Osteoarthritis, Osteoporosis, Peripheral arterial disease, Diabetic neuropathy, HLD, Schizophrenia presented to Ed with worsening non healing heel ulcers x 1 month. Pt was sent in by PMD Dr Llanes for suspected Osteomyelitis . Pt states she doesnt remember when the ulcers originate but they are getting worse from last few months. Pt denies any fever,chills , pain , cough , SOB,CP ,abd pain or any other symptoms. Pt states she is compliant with her diabetic medications and nurse at the facility give her insulin when her sugars go beyond 200.   Outpatient records showed  negative duplex scan of the LE arteries. XRay of left calcaneous remarkable for possible osteomyelitis. XRay  of right calcaneous remarkable for osteomyelitis in the dorsal calcaneous. ED course : afebrile, no WBC count , lactate 2.5 s/p 1 L NS bolus , Xray feet : didnt showed any evidence of OM . ESR elevated 74 SH : Denies Smoking, alcohol or drug use (10 Sep 2019 14:32)    REVIEW OF SYSTEMS:  [  ] Not able to illicit  General:	  Chest:	  GI:	  :  Skin:	  Musculoskeletal:	  Neuro:    PAST MEDICAL & SURGICAL HISTORY:  DM (diabetes mellitus)  Paranoid schizophrenia  HLD (hyperlipidemia)  PAD (peripheral artery disease)  HTN (hypertension)  No significant past surgical history    ALLERGIES: No Known Allergies    MEDS:  acetaminophen   Tablet .. 650 milliGRAM(s) Oral every 6 hours PRN  ampicillin/sulbactam  IVPB 3 Gram(s) IV Intermittent every 6 hours  ascorbic acid 500 milliGRAM(s) Oral daily  aspirin enteric coated 81 milliGRAM(s) Oral daily  dextrose 50% Injectable 25 Gram(s) IV Push once  doxazosin 4 milliGRAM(s) Oral at bedtime  enoxaparin Injectable 40 milliGRAM(s) SubCutaneous daily  ferrous    sulfate 325 milliGRAM(s) Oral daily  folic acid 1 milliGRAM(s) Oral daily  insulin glargine Injectable (LANTUS) 10 Unit(s) SubCutaneous at bedtime  insulin lispro (HumaLOG) corrective regimen sliding scale   SubCutaneous Before meals and at bedtime  metoprolol tartrate 100 milliGRAM(s) Oral two times a day  perphenazine 16 milliGRAM(s) Oral every 12 hours  simvastatin 40 milliGRAM(s) Oral at bedtime    SOCIAL HISTORY:  Smoker:  none    FAMILY HISTORY:  No pertinent family history in first degree relatives    VITALS:  Vital Signs Last 24 Hrs  T(C): 36.9 (12 Sep 2019 07:48), Max: 37.6 (12 Sep 2019 00:54)  T(F): 98.5 (12 Sep 2019 07:48), Max: 99.7 (12 Sep 2019 00:54)  HR: 80 (12 Sep 2019 07:48) (66 - 80)  BP: 133/53 (12 Sep 2019 07:48) (133/53 - 134/47)  BP(mean): --  RR: 16 (12 Sep 2019 07:48) (16 - 17)  SpO2: 100% (12 Sep 2019 07:48) (98% - 100%)      PHYSICAL EXAM:  Constitutional:  HEENT:  Neck:  Respiratory:  Cardiovascular:  Gastrointestinal:  Extremities:  Skin:  Ortho:  Neuro:      LABS/DIAGNOSTIC TESTS:  New labs ordered                        9.3    9.00  )-----------( 308      ( 11 Sep 2019 06:28 )             28.6     WBC Count: 9.00 K/uL ( @ 06:28)  WBC Count: 9.75 K/uL (09-10 @ 09:50)        137  |  105  |  9   ----------------------------<  141<H>  3.8   |  27  |  0.43<L>    Ca    8.9      11 Sep 2019 06:28  Phos  3.6       Mg     2.3     11      Urinalysis Basic - ( 10 Sep 2019 22:05 )    Color: Yellow / Appearance: Clear / S.010 / pH: x  Gluc: x / Ketone: Negative  / Bili: Negative / Urobili: Negative   Blood: x / Protein: Negative / Nitrite: Negative   Leuk Esterase: Negative / RBC: x / WBC x   Sq Epi: x / Non Sq Epi: x / Bacteria: x    PT/INR - ( 10 Sep 2019 09:50 )   PT: 12.4 sec;   INR: 1.11 ratio    PTT - ( 10 Sep 2019 09:50 )  PTT:39.7 sec    Lactate, Blood: 1.1 mmol/L (09.10.19 @ 18:34)  Lactate, Blood: 2.5 mmol/L (09.10.19 @ 09:50)    Sedimentation Rate, Erythrocyte: 74 mm/Hr (09.10.19 @ 09:50)        CULTURES:   9/10 left heel culture - pending   9/10 left heel culture #2 - pending       .Blood  09-10 @ 19:17   No growth to date.  --  --      .Blood  09-10 @ 19:16   No growth to date.  --  --        RADIOLOGY:  < from: MR Foot No Cont, Left (19 @ 15:57) >  EXAM:  MR FOOT LT                            PROCEDURE DATE:  2019          INTERPRETATION:    LEFT FOOT MRI    CLINICAL INFORMATION: Foot wound. Diabetes. Eval for osteomyelitis.  TECHNIQUE: Multiplanar, multisequence MRI was obtained of theleft foot.    FINDINGS:    Soft tissue ulcerative defect is visualized within the plantar lateral   aspect of the heel. There is an ulcerative tract extending to the   posterior lateral aspect of the plantar calcaneus. There is associated   decreasedT1 marrow signal with associated edema and osseous regions of   the calcaneus, consistent with osteomyelitis. 0.1 is visualized within   the subcutaneous tissues along the tract without abscess. Origin of the   plantar fascia is maintained. Mild thickening of the peroneus tendons,   which are otherwise intact. Remaining medial flexor, extensor and   Achilles tendons are unremarkable. Joint spaces are maintained. There is   diffuse increased muscle signal, likely related to denervation.     IMPRESSION:    Ulcer defect within the posterior lateral plantar aspect of the foot with   osteomyelitis of the adjacent calcaneus.    < end of copied text >        < from: MR Foot No Cont, Right (19 @ 15:56) >  EXAM:  MR FOOT RT                            PROCEDURE DATE:  2019          INTERPRETATION:    RIGHT FOOT MRI    CLINICAL INFORMATION: Wound. Diabetic neuropathy. Hypertension. For   osteomyelitis.  TECHNIQUE: Multiplanar, multisequence MRI was obtained of the right foot.    FINDINGS:    Soft tissue ulcer is visualized at the posterior calcaneus measuring 2.7   x 2.0 cm. Within the adjacent calcaneal bone, there is decreased marrow   signal with associated edema and osseous erosion of theposterior   calcaneal cortex, consistent with osteomyelitis. Linear edema propagates   into the body of the calcaneus, likely representing stress reaction.   There is no fluid collection. Remaining visualized marrow signal is   maintained. Joint spaces are maintained. Flexor and extensor tendons are   intact. Achilles tendon is unremarkable with adjacent insertional   Achilles enthesopathy. There is diffuse increased muscle signal, likely   related to denervation.    IMPRESSION:  Posterior foot ulcer with osteomyelitis of the adjacent calcaneus.      < end of copied text > HPI:  ID consult was called to evaluate this patient for bilateral calcaneal osteo from non-healing ulcers. Patient has pmhx significant for uncontrolled DM. MRIs of both feet were done yesterday which confirm bilateral heel osteo. Patient just returned from PICC line insertion. Doing well and has no complaints at this time.     As per H&P:  73 Female from [facility] ,walks with walker with PMH of HTN, Diabetes, Osteoarthritis, Osteoporosis, Peripheral arterial disease, Diabetic neuropathy, HLD, Schizophrenia presented to Ed with worsening non healing heel ulcers x 1 month. Pt was sent in by PMD Dr Llanes for suspected Osteomyelitis . Pt states she doesnt remember when the ulcers originate but they are getting worse from last few months. Pt denies any fever,chills , pain , cough , SOB,CP ,abd pain or any other symptoms. Pt states she is compliant with her diabetic medications and nurse at the facility give her insulin when her sugars go beyond 200.   Outpatient records showed  negative duplex scan of the LE arteries. XRay of left calcaneous remarkable for possible osteomyelitis. XRay  of right calcaneous remarkable for osteomyelitis in the dorsal calcaneous. ED course : afebrile, no WBC count , lactate 2.5 s/p 1 L NS bolus , Xray feet : didnt showed any evidence of OM . ESR elevated 74 SH : Denies Smoking, alcohol or drug use (10 Sep 2019 14:32)    REVIEW OF SYSTEMS:  [  ] Not able to illicit  General: no fevers no malaise   Chest: no cough no sob no CP  GI: no nvd no abdominal pain  : no urinary symptoms   Skin: no rashes no cyanosis  Musculoskeletal: no trauma no LBP  Neuro: no ha's no dizziness     PAST MEDICAL & SURGICAL HISTORY:  DM (diabetes mellitus)  Paranoid schizophrenia  HLD (hyperlipidemia)  PAD (peripheral artery disease)  HTN (hypertension)  No significant past surgical history    ALLERGIES: No Known Allergies    MEDS:  acetaminophen   Tablet .. 650 milliGRAM(s) Oral every 6 hours PRN  ampicillin/sulbactam  IVPB 3 Gram(s) IV Intermittent every 6 hours  ascorbic acid 500 milliGRAM(s) Oral daily  aspirin enteric coated 81 milliGRAM(s) Oral daily  dextrose 50% Injectable 25 Gram(s) IV Push once  doxazosin 4 milliGRAM(s) Oral at bedtime  enoxaparin Injectable 40 milliGRAM(s) SubCutaneous daily  ferrous    sulfate 325 milliGRAM(s) Oral daily  folic acid 1 milliGRAM(s) Oral daily  insulin glargine Injectable (LANTUS) 10 Unit(s) SubCutaneous at bedtime  insulin lispro (HumaLOG) corrective regimen sliding scale   SubCutaneous Before meals and at bedtime  metoprolol tartrate 100 milliGRAM(s) Oral two times a day  perphenazine 16 milliGRAM(s) Oral every 12 hours  simvastatin 40 milliGRAM(s) Oral at bedtime    SOCIAL HISTORY:  Smoker:  none    FAMILY HISTORY:  No pertinent family history in first degree relatives    VITALS:  Vital Signs Last 24 Hrs  T(C): 36.9 (12 Sep 2019 07:48), Max: 37.6 (12 Sep 2019 00:54)  T(F): 98.5 (12 Sep 2019 07:48), Max: 99.7 (12 Sep 2019 00:54)  HR: 80 (12 Sep 2019 07:48) (66 - 80)  BP: 133/53 (12 Sep 2019 07:48) (133/53 - 134/47)  BP(mean): --  RR: 16 (12 Sep 2019 07:48) (16 - 17)  SpO2: 100% (12 Sep 2019 07:48) (98% - 100%)      PHYSICAL EXAM:  Constitutional: elderly female in NAD  HEENT: facial hair with moist oral mucosa  Neck: supple no LN's no JVD  Respiratory: lungs clear no rales no rhonchi  Cardiovascular: S1 S2 reg no murmurs  Gastrointestinal: +BS with soft, nondistended abdomen; nontender  Extremities: no edema no cyanosis  Skin: noted to have bilateral heel ulceration worse of right with +malodor no tendernes  Ortho: no jt swelling  Neuro: AAO x 3      LABS/DIAGNOSTIC TESTS:  New labs ordered                        9.3    9.00  )-----------( 308      ( 11 Sep 2019 06:28 )             28.6     WBC Count: 9.00 K/uL ( @ 06:28)  WBC Count: 9.75 K/uL (09-10 @ 09:50)        137  |  105  |  9   ----------------------------<  141<H>  3.8   |  27  |  0.43<L>    Ca    8.9      11 Sep 2019 06:28  Phos  3.6       Mg     2.3           Urinalysis Basic - ( 10 Sep 2019 22:05 )    Color: Yellow / Appearance: Clear / S.010 / pH: x  Gluc: x / Ketone: Negative  / Bili: Negative / Urobili: Negative   Blood: x / Protein: Negative / Nitrite: Negative   Leuk Esterase: Negative / RBC: x / WBC x   Sq Epi: x / Non Sq Epi: x / Bacteria: x    PT/INR - ( 10 Sep 2019 09:50 )   PT: 12.4 sec;   INR: 1.11 ratio    PTT - ( 10 Sep 2019 09:50 )  PTT:39.7 sec    Lactate, Blood: 1.1 mmol/L (09.10.19 @ 18:34)  Lactate, Blood: 2.5 mmol/L (09.10.19 @ 09:50)    Sedimentation Rate, Erythrocyte: 74 mm/Hr (09.10.19 @ 09:50)        CULTURES:   9/10 left heel culture - pending   9/10 left heel culture #2 - pending       .Blood  09-10 @ 19:17   No growth to date.  --  --      .Blood  09-10 @ 19:16   No growth to date.  --  --        RADIOLOGY:  < from: MR Foot No Cont, Left (19 @ 15:57) >  EXAM:  MR FOOT LT                            PROCEDURE DATE:  2019          INTERPRETATION:    LEFT FOOT MRI    CLINICAL INFORMATION: Foot wound. Diabetes. Eval for osteomyelitis.  TECHNIQUE: Multiplanar, multisequence MRI was obtained of theleft foot.    FINDINGS:    Soft tissue ulcerative defect is visualized within the plantar lateral   aspect of the heel. There is an ulcerative tract extending to the   posterior lateral aspect of the plantar calcaneus. There is associated   decreasedT1 marrow signal with associated edema and osseous regions of   the calcaneus, consistent with osteomyelitis. 0.1 is visualized within   the subcutaneous tissues along the tract without abscess. Origin of the   plantar fascia is maintained. Mild thickening of the peroneus tendons,   which are otherwise intact. Remaining medial flexor, extensor and   Achilles tendons are unremarkable. Joint spaces are maintained. There is   diffuse increased muscle signal, likely related to denervation.     IMPRESSION:    Ulcer defect within the posterior lateral plantar aspect of the foot with   osteomyelitis of the adjacent calcaneus.    < end of copied text >        < from: MR Foot No Cont, Right (19 @ 15:56) >  EXAM:  MR FOOT RT                            PROCEDURE DATE:  2019          INTERPRETATION:    RIGHT FOOT MRI    CLINICAL INFORMATION: Wound. Diabetic neuropathy. Hypertension. For   osteomyelitis.  TECHNIQUE: Multiplanar, multisequence MRI was obtained of the right foot.    FINDINGS:    Soft tissue ulcer is visualized at the posterior calcaneus measuring 2.7   x 2.0 cm. Within the adjacent calcaneal bone, there is decreased marrow   signal with associated edema and osseous erosion of theposterior   calcaneal cortex, consistent with osteomyelitis. Linear edema propagates   into the body of the calcaneus, likely representing stress reaction.   There is no fluid collection. Remaining visualized marrow signal is   maintained. Joint spaces are maintained. Flexor and extensor tendons are   intact. Achilles tendon is unremarkable with adjacent insertional   Achilles enthesopathy. There is diffuse increased muscle signal, likely   related to denervation.    IMPRESSION:  Posterior foot ulcer with osteomyelitis of the adjacent calcaneus.      < end of copied text >

## 2019-09-12 NOTE — PROGRESS NOTE ADULT - SUBJECTIVE AND OBJECTIVE BOX
Chief Complaint : Patient is a 74y old  Female who presents with a chief complaint of diabetic foot ulcer (10 Sep 2019 14:32)    HPI :  73 Female from [facility] ,walks with walker with PMH of HTN, Diabetes, Osteoarthritis, Osteoporosis, Peripheral arterial disease, Diabetic neuropathy, HLD, Schizophrenia presented to Ed with worsening non healing heel ulcers x 1 month. Pt was sent in by PMD Dr Llanes for suspected Osteomyelitis . Pt states she doesnt remember when the ulcers originate but they are getting worse from last few months. Pt denies any fever,chills , pain , cough , SOB,CP ,abd pain or any other symptoms. Pt states she is compliant with her diabetic medications and nurse at the facility give her insulin when her sugars go beyond 200.   Outpatient records showed  negative duplex scan of the LE arteries. XRay of left calcaneous remarkable for possible osteomyelitis. XRay  of right calcaneous remarkable for osteomyelitis in the dorsal calcaneous.  ED course : afebrile, no WBC count , lactate 2.5 s/p 1 L NS bolus , Xray feet : didnt showed any evidence of OM . ESR elevated 74     S: Pt seen bedside resting. Pt states Rukhsana romo sent her to ED because her heels look worse. Pt states she walks with a walker but is often in bed. Pt denies pain to her heels. Pt denies F/N/V/C/SOB.       SH : Denies Smoking, alcohol or drug use (10 Sep 2019 14:32)      Patient admits to  (-) Fevers, (-) Chills, (-) Nausea, (-) Vomiting, (-) Shortness of Breath      PMH: DM (diabetes mellitus)  Paranoid schizophrenia  HLD (hyperlipidemia)  PAD (peripheral artery disease)  HTN (hypertension)    PSH:No significant past surgical history      Allergies:No Known Allergies      Labs:  ICU Vital Signs Last 24 Hrs  T(C): 36.9 (12 Sep 2019 07:48), Max: 37.6 (12 Sep 2019 00:54)  T(F): 98.5 (12 Sep 2019 07:48), Max: 99.7 (12 Sep 2019 00:54)  HR: 80 (12 Sep 2019 07:48) (66 - 80)  BP: 133/53 (12 Sep 2019 07:48) (133/53 - 134/47)  BP(mean): --  ABP: --  ABP(mean): --  RR: 16 (12 Sep 2019 07:48) (16 - 17)  SpO2: 100% (12 Sep 2019 07:48) (98% - 100%)                        9.3    9.00  )-----------( 308      ( 11 Sep 2019 06:28 )             28.6   WBC Count: 9.00 K/uL (09-11 @ 06:28)  WBC Count: 9.75 K/uL (09-10 @ 09:50)  09-11    137  |  105  |  9   ----------------------------<  141<H>  3.8   |  27  |  0.43<L>    Ca    8.9      11 Sep 2019 06:28  Phos  3.6     09-11  Mg     2.3     09-11    Sedimentation Rate, Erythrocyte: 74 mm/Hr (09-10-19 @ 09:50)  C-Reactive Protein, Serum: 0.96 mg/dL (09-10-19 @ 15:00)    O:   General: Pleasant  female NAD & AOX3.    Integument:  Skin warm, dry and supple bilateral.    Ulceration Right plantar heel, + hyperkeratotic border, wound base fibrotic with necrotic cap, boggy and probes to bone wound size (4.8 cm X 5.6cm X 2.4cm) - edema, - mac-wound erythema, - purulence, +tunneling, + probe to bone. Left lateral heel ulceration has fibrogranular base with necrotic eschar, 1.3cmx1.0cmx0.2cm, -purulence, -tunneling/tracking, -flatulence, -mac-wound erythema   Vascular: Dorsalis Pedis and Posterior Tibial pulses 2/4.  Capillary re-fill time less then 3 seconds digits 1-5 bilateral.    Neuro: Protective sensation diminished to the level of the digits bilateral.  -------------------------------------  < from: MR Foot No Cont, Left (09.11.19 @ 15:57) >  EXAM:  MR FOOT LT                            PROCEDURE DATE:  09/11/2019        INTERPRETATION:    LEFT FOOT MRI    CLINICAL INFORMATION: Foot wound. Diabetes. Eval for osteomyelitis.  TECHNIQUE: Multiplanar, multisequence MRI was obtained of the left foot.    FINDINGS:    Soft tissue ulcerative defect is visualized within the plantar lateral   aspect of the heel. There is an ulcerative tract extending to the   posterior lateral aspect of the plantar calcaneus. There is associated   decreasedT1 marrow signal with associated edema and osseous regions of   the calcaneus, consistent with osteomyelitis. 0.1 is visualized within   the subcutaneous tissues along the tract without abscess. Origin of the   plantar fascia is maintained. Mild thickening of the peroneus tendons,   which are otherwise intact. Remaining medial flexor, extensor and   Achilles tendons are unremarkable. Joint spaces are maintained. There is   diffuse increased muscle signal, likely related to denervation.     IMPRESSION:    Ulcer defect within the posterior lateral plantar aspect of the foot with   osteomyelitis of the adjacent calcaneus.    ---------------------------------------------------------    A: Right plantar heel ulceration   left lateral heel ulceration       P:   Chart reviewed and Patient evaluated  Discussed diagnosis and treatment with patient  Culture pending  Applied betadine with dry sterile dressing  X-rays reviewed however xrays from [facility] were read positive OM changes   MRI reviewed as above with OM of bilateral heels  Continue with IV antibiotics As Per ID  NWB to right foot, Full WB to left foot   Offloading to bilateral Heels, please dispense offloading boots  Podiatry will follow while in house.

## 2019-09-13 ENCOUNTER — TRANSCRIPTION ENCOUNTER (OUTPATIENT)
Age: 74
End: 2019-09-13

## 2019-09-13 DIAGNOSIS — E78.5 HYPERLIPIDEMIA, UNSPECIFIED: ICD-10-CM

## 2019-09-13 DIAGNOSIS — F06.8 OTHER SPECIFIED MENTAL DISORDERS DUE TO KNOWN PHYSIOLOGICAL CONDITION: ICD-10-CM

## 2019-09-13 DIAGNOSIS — F20.5 RESIDUAL SCHIZOPHRENIA: ICD-10-CM

## 2019-09-13 DIAGNOSIS — Z02.9 ENCOUNTER FOR ADMINISTRATIVE EXAMINATIONS, UNSPECIFIED: ICD-10-CM

## 2019-09-13 LAB
-  AMIKACIN: SIGNIFICANT CHANGE UP
-  AMIKACIN: SIGNIFICANT CHANGE UP
-  AMOXICILLIN/CLAVULANIC ACID: SIGNIFICANT CHANGE UP
-  AMOXICILLIN/CLAVULANIC ACID: SIGNIFICANT CHANGE UP
-  AMPICILLIN/SULBACTAM: SIGNIFICANT CHANGE UP
-  AMPICILLIN/SULBACTAM: SIGNIFICANT CHANGE UP
-  AMPICILLIN: SIGNIFICANT CHANGE UP
-  AMPICILLIN: SIGNIFICANT CHANGE UP
-  AZTREONAM: SIGNIFICANT CHANGE UP
-  AZTREONAM: SIGNIFICANT CHANGE UP
-  CEFAZOLIN: SIGNIFICANT CHANGE UP
-  CEFAZOLIN: SIGNIFICANT CHANGE UP
-  CEFEPIME: SIGNIFICANT CHANGE UP
-  CEFEPIME: SIGNIFICANT CHANGE UP
-  CEFOXITIN: SIGNIFICANT CHANGE UP
-  CEFOXITIN: SIGNIFICANT CHANGE UP
-  CEFTAZIDIME/AVIBACTAM: SIGNIFICANT CHANGE UP
-  CEFTOLOZANE/TAZOBACTAM: SIGNIFICANT CHANGE UP
-  CEFTRIAXONE: SIGNIFICANT CHANGE UP
-  CEFTRIAXONE: SIGNIFICANT CHANGE UP
-  CIPROFLOXACIN: SIGNIFICANT CHANGE UP
-  CIPROFLOXACIN: SIGNIFICANT CHANGE UP
-  ERTAPENEM: SIGNIFICANT CHANGE UP
-  ERTAPENEM: SIGNIFICANT CHANGE UP
-  GENTAMICIN: SIGNIFICANT CHANGE UP
-  GENTAMICIN: SIGNIFICANT CHANGE UP
-  IMIPENEM: SIGNIFICANT CHANGE UP
-  LEVOFLOXACIN: SIGNIFICANT CHANGE UP
-  LEVOFLOXACIN: SIGNIFICANT CHANGE UP
-  MEROPENEM: SIGNIFICANT CHANGE UP
-  MEROPENEM: SIGNIFICANT CHANGE UP
-  PIPERACILLIN/TAZOBACTAM: SIGNIFICANT CHANGE UP
-  PIPERACILLIN/TAZOBACTAM: SIGNIFICANT CHANGE UP
-  TOBRAMYCIN: SIGNIFICANT CHANGE UP
-  TOBRAMYCIN: SIGNIFICANT CHANGE UP
-  TRIMETHOPRIM/SULFAMETHOXAZOLE: SIGNIFICANT CHANGE UP
-  TRIMETHOPRIM/SULFAMETHOXAZOLE: SIGNIFICANT CHANGE UP
ANION GAP SERPL CALC-SCNC: 6 MMOL/L — SIGNIFICANT CHANGE UP (ref 5–17)
BUN SERPL-MCNC: 8 MG/DL — SIGNIFICANT CHANGE UP (ref 7–18)
CALCIUM SERPL-MCNC: 8.8 MG/DL — SIGNIFICANT CHANGE UP (ref 8.4–10.5)
CHLORIDE SERPL-SCNC: 106 MMOL/L — SIGNIFICANT CHANGE UP (ref 96–108)
CO2 SERPL-SCNC: 26 MMOL/L — SIGNIFICANT CHANGE UP (ref 22–31)
CREAT SERPL-MCNC: 0.65 MG/DL — SIGNIFICANT CHANGE UP (ref 0.5–1.3)
CULTURE RESULTS: SIGNIFICANT CHANGE UP
GLUCOSE BLDC GLUCOMTR-MCNC: 141 MG/DL — HIGH (ref 70–99)
GLUCOSE BLDC GLUCOMTR-MCNC: 182 MG/DL — HIGH (ref 70–99)
GLUCOSE BLDC GLUCOMTR-MCNC: 189 MG/DL — HIGH (ref 70–99)
GLUCOSE BLDC GLUCOMTR-MCNC: 259 MG/DL — HIGH (ref 70–99)
GLUCOSE SERPL-MCNC: 134 MG/DL — HIGH (ref 70–99)
HCT VFR BLD CALC: 29.4 % — LOW (ref 34.5–45)
HGB BLD-MCNC: 9.6 G/DL — LOW (ref 11.5–15.5)
MCHC RBC-ENTMCNC: 29.6 PG — SIGNIFICANT CHANGE UP (ref 27–34)
MCHC RBC-ENTMCNC: 32.7 GM/DL — SIGNIFICANT CHANGE UP (ref 32–36)
MCV RBC AUTO: 90.7 FL — SIGNIFICANT CHANGE UP (ref 80–100)
METHOD TYPE: SIGNIFICANT CHANGE UP
METHOD TYPE: SIGNIFICANT CHANGE UP
MRSA PCR RESULT.: SIGNIFICANT CHANGE UP
NRBC # BLD: 0 /100 WBCS — SIGNIFICANT CHANGE UP (ref 0–0)
ORGANISM # SPEC MICROSCOPIC CNT: SIGNIFICANT CHANGE UP
PLATELET # BLD AUTO: 294 K/UL — SIGNIFICANT CHANGE UP (ref 150–400)
POTASSIUM SERPL-MCNC: 3.7 MMOL/L — SIGNIFICANT CHANGE UP (ref 3.5–5.3)
POTASSIUM SERPL-SCNC: 3.7 MMOL/L — SIGNIFICANT CHANGE UP (ref 3.5–5.3)
RBC # BLD: 3.24 M/UL — LOW (ref 3.8–5.2)
RBC # FLD: 12.7 % — SIGNIFICANT CHANGE UP (ref 10.3–14.5)
S AUREUS DNA NOSE QL NAA+PROBE: SIGNIFICANT CHANGE UP
SODIUM SERPL-SCNC: 138 MMOL/L — SIGNIFICANT CHANGE UP (ref 135–145)
SPECIMEN SOURCE: SIGNIFICANT CHANGE UP
WBC # BLD: 9.27 K/UL — SIGNIFICANT CHANGE UP (ref 3.8–10.5)
WBC # FLD AUTO: 9.27 K/UL — SIGNIFICANT CHANGE UP (ref 3.8–10.5)

## 2019-09-13 PROCEDURE — 36573 INSJ PICC RS&I 5 YR+: CPT

## 2019-09-13 PROCEDURE — 90792 PSYCH DIAG EVAL W/MED SRVCS: CPT

## 2019-09-13 RX ORDER — ENOXAPARIN SODIUM 100 MG/ML
40 INJECTION SUBCUTANEOUS
Qty: 0 | Refills: 0 | DISCHARGE
Start: 2019-09-13

## 2019-09-13 RX ORDER — SODIUM HYPOCHLORITE 0.125 %
1 SOLUTION, NON-ORAL MISCELLANEOUS
Qty: 0 | Refills: 0 | DISCHARGE
Start: 2019-09-13

## 2019-09-13 RX ORDER — PIPERACILLIN AND TAZOBACTAM 4; .5 G/20ML; G/20ML
3.38 INJECTION, POWDER, LYOPHILIZED, FOR SOLUTION INTRAVENOUS
Qty: 0 | Refills: 0 | DISCHARGE
Start: 2019-09-13

## 2019-09-13 RX ORDER — ACETAMINOPHEN 500 MG
2 TABLET ORAL
Qty: 0 | Refills: 0 | DISCHARGE
Start: 2019-09-13

## 2019-09-13 RX ADMIN — Medication 100 MILLIGRAM(S): at 05:47

## 2019-09-13 RX ADMIN — Medication 81 MILLIGRAM(S): at 11:15

## 2019-09-13 RX ADMIN — Medication 1 APPLICATION(S): at 11:15

## 2019-09-13 RX ADMIN — PIPERACILLIN AND TAZOBACTAM 25 GRAM(S): 4; .5 INJECTION, POWDER, LYOPHILIZED, FOR SOLUTION INTRAVENOUS at 21:43

## 2019-09-13 RX ADMIN — Medication 100 MILLIGRAM(S): at 17:18

## 2019-09-13 RX ADMIN — Medication 4 MILLIGRAM(S): at 21:40

## 2019-09-13 RX ADMIN — PIPERACILLIN AND TAZOBACTAM 25 GRAM(S): 4; .5 INJECTION, POWDER, LYOPHILIZED, FOR SOLUTION INTRAVENOUS at 05:44

## 2019-09-13 RX ADMIN — Medication 1: at 21:42

## 2019-09-13 RX ADMIN — INSULIN GLARGINE 10 UNIT(S): 100 INJECTION, SOLUTION SUBCUTANEOUS at 21:40

## 2019-09-13 RX ADMIN — Medication 1: at 17:17

## 2019-09-13 RX ADMIN — PERPHENAZINE 16 MILLIGRAM(S): 8 TABLET, FILM COATED ORAL at 05:47

## 2019-09-13 RX ADMIN — Medication 3: at 12:17

## 2019-09-13 RX ADMIN — Medication 1 MILLIGRAM(S): at 11:15

## 2019-09-13 RX ADMIN — Medication 325 MILLIGRAM(S): at 11:15

## 2019-09-13 RX ADMIN — PERPHENAZINE 16 MILLIGRAM(S): 8 TABLET, FILM COATED ORAL at 17:18

## 2019-09-13 RX ADMIN — PIPERACILLIN AND TAZOBACTAM 25 GRAM(S): 4; .5 INJECTION, POWDER, LYOPHILIZED, FOR SOLUTION INTRAVENOUS at 13:50

## 2019-09-13 RX ADMIN — ENOXAPARIN SODIUM 40 MILLIGRAM(S): 100 INJECTION SUBCUTANEOUS at 11:15

## 2019-09-13 RX ADMIN — Medication 500 MILLIGRAM(S): at 11:15

## 2019-09-13 RX ADMIN — CHLORHEXIDINE GLUCONATE 1 APPLICATION(S): 213 SOLUTION TOPICAL at 11:15

## 2019-09-13 RX ADMIN — SIMVASTATIN 40 MILLIGRAM(S): 20 TABLET, FILM COATED ORAL at 21:40

## 2019-09-13 NOTE — CONSULT NOTE ADULT - SUBJECTIVE AND OBJECTIVE BOX
This is a 74y Female with a PMHx of         PMHx:  SurgHx:   Social Hx:  Allergies: No Known Allergies        Vitals: T(F): 97.6 (09-13-19 @ 08:11), Max: 99.4 (09-13-19 @ 00:27)  HR: 70 (09-13-19 @ 08:11) (70 - 74)  BP: 127/51 (09-13-19 @ 08:11) (114/50 - 135/50)  RR: 17 (09-13-19 @ 08:11) (16 - 17)  SpO2: 100% (09-13-19 @ 08:11) (100% - 100%)      Labs:       Physical Exam:  General: AAO x 3, No acute distress  Skin:   Abdomen:   Extremities: HPI:  73 Female from [facility] ,walks with walker with PMH of HTN, Diabetes, Osteoarthritis, Osteoporosis, Peripheral arterial disease, Diabetic neuropathy, HLD, Schizophrenia presented to Ed with worsening non healing heel ulcers x 1 month. Pt was sent in by PMD Dr Llanes for suspected Osteomyelitis . Pt states she doesnt remember when the ulcers originate but they are getting worse from last few months. Pt denies any fever,chills , pain , cough , SOB,CP ,abd pain or any other symptoms. Pt states she is compliant 09-13    138  |  106  |  8   ----------------------------<  134<H>  3.7   |  26  |  0.65    Ca    8.8      13 Sep 2019 06:41    with her diabetic medications and nurse at the facility give her insulin when her sugars go beyond 200. Outpatient records showed  negative duplex scan of the LE arteries. XRay of left calcaneous remarkable for possible osteomyelitis. XRay  of right calcaneous remarkable for osteomyelitis in the dorsal calcaneous.    Vascular consulted for abnormal TIMUR/PVR. Pt states shes never been seen with a vascular surgeon and never had vascular procedures in the past. States shes never had LE wounds until about a month ago developed bilateral heel ulcers. She ambulates with a walker. Denies LE claudication. Denies LE weakness or numbness. No f/c, n/c, cp, sob.    Social Hx: Denies past or present use of alcohol or tobacco.   Allergies: No Known Allergies      Vitals: T(F): 97.6 (09-13-19 @ 08:11), Max: 99.4 (09-13-19 @ 00:27)  HR: 70 (09-13-19 @ 08:11) (70 - 74)  BP: 127/51 (09-13-19 @ 08:11) (114/50 - 135/50)  RR: 17 (09-13-19 @ 08:11) (16 - 17)  SpO2: 100% (09-13-19 @ 08:11) (100% - 100%)      Labs:                         9.6    9.27  )-----------( 294      ( 13 Sep 2019 06:41 )             29.4     09-13    138  |  106  |  8   ----------------------------<  134<H>  3.7   |  26  |  0.65    Ca    8.8            Physical Exam:  General: AAO x 3, No acute distress  Resp: Nonlabored respirations, good inspiratory effort on room air  Extremities: RLE with posterior calcaneal ulceration w/ small amt of necrotic skin s/p debridement w/ + malodor and purulent drainage on dressings, palpable DP, dopp PT. LLE with lateral calcaneal ulceration s/p debridement w/ + malodor, no drainage noted, palpable DP, dopp PT.     Imaging:  < from: [facility] Physiol Extremity Lower 3+ Level, BI (09.10.19 @ 18:03) >  EXAM:  US PHYSIOL LWR EXT 3+ LEV BI                            PROCEDURE DATE:  09/10/2019          INTERPRETATION:  Clinical indication: Diabetic right foot ulcer    Comparison: None    Findings: There are biphasic waveforms bilaterally however, waveforms are   dampened at the level of the metatarsals. There is no abnormal segmental   pressure gradient.    Right TIMUR = 1.34  Left TIMUR = 1.26    Impression: Elevated right TIMUR compatible with calcified vessels.    Abnormally dampened waveforms at the level of the metatarsals.      STEPHAN WALL M.D., ATTENDING RADIOLOGIST  This document has been electronically signed. Sep 13 2019  8:38AM    < end of copied text >    < from: MR Foot No Cont, Left (09.11.19 @ 15:57) >  IMPRESSION:    Ulcer defect within the posterior lateral plantar aspect of the foot with   osteomyelitis of the adjacent calcaneus.      MARLON ROCA M.D., ATTENDING RADIOLOGIST  This document has been electronically signed. Sep 11 2019  4:53PM    < end of copied text >    < from: MR Foot No Cont, Right (09.11.19 @ 15:56) >  EXAM:  MR FOOT RT                            PROCEDURE DATE:  09/11/2019          INTERPRETATION:    RIGHT FOOT MRI    CLINICAL INFORMATION: Wound. Diabetic neuropathy. Hypertension. For   osteomyelitis.  TECHNIQUE: Multiplanar, multisequence MRI was obtained of the right foot.    FINDINGS:    Soft tissue ulcer is visualized at the posterior calcaneus measuring 2.7   x 2.0 cm. Within the adjacent calcaneal bone, there is decreased marrow   signal with associated edema and osseous erosion of theposterior   calcaneal cortex, consistent with osteomyelitis. Linear edema propagates   into the body of the calcaneus, likely representing stress reaction.   There is no fluid collection. Remaining visualized marrow signal is   maintained. Joint spaces are maintained. Flexor and extensor tendons are   intact. Achilles tendon is unremarkable with adjacent insertional   Achilles enthesopathy. There is diffuse increased muscle signal, likely   related to denervation.    IMPRESSION:  Posterior foot ulcer with osteomyelitis of the adjacent calcaneus.      MARLON ROCA M.D., ATTENDING RADIOLOGIST  This document has been electronically signed. Sep 11 2019  4:46PM        < end of copied text > HPI:  73 Female from [facility] ,walks with walker with PMH of HTN, Diabetes, Osteoarthritis, Osteoporosis, Peripheral arterial disease, Diabetic neuropathy, HLD, Schizophrenia presented to Ed with worsening non healing heel ulcers x 1 month. Pt was sent in by PMD Dr Llanes for suspected Osteomyelitis . Pt states she doesnt remember when the ulcers originate but they are getting worse from last few months. Pt denies any fever,chills , pain , cough , SOB,CP ,abd pain or any other symptoms. Pt states she is compliant with her diabetic medications and nurse at the facility give her insulin when her sugars go beyond 200. Outpatient records showed  negative duplex scan of the LE arteries. XRay of left calcaneous remarkable for possible osteomyelitis. XRay  of right calcaneous remarkable for osteomyelitis in the dorsal calcaneous.    Vascular consulted for abnormal TIMUR/PVR. Pt states shes never been seen with a vascular surgeon and never had vascular procedures in the past. States shes never had LE wounds until about a month ago developed bilateral heel ulcers. She ambulates with a walker. Denies LE claudication. Denies LE weakness or numbness. No f/c, n/c, cp, sob.    Social Hx: Denies past or present use of alcohol or tobacco.   Allergies: No Known Allergies      Vitals: T(F): 97.6 (09-13-19 @ 08:11), Max: 99.4 (09-13-19 @ 00:27)  HR: 70 (09-13-19 @ 08:11) (70 - 74)  BP: 127/51 (09-13-19 @ 08:11) (114/50 - 135/50)  RR: 17 (09-13-19 @ 08:11) (16 - 17)  SpO2: 100% (09-13-19 @ 08:11) (100% - 100%)      Labs:                         9.6    9.27  )-----------( 294      ( 13 Sep 2019 06:41 )             29.4     09-13    138  |  106  |  8   ----------------------------<  134<H>  3.7   |  26  |  0.65    Ca    8.8            Physical Exam:  General: AAO x 3, No acute distress  Resp: Nonlabored respirations, good inspiratory effort on room air  Extremities: RLE with posterior calcaneal ulceration w/ small amt of necrotic skin s/p debridement w/ + malodor and purulent drainage on dressings, palpable DP, dopp PT. LLE with lateral calcaneal ulceration s/p debridement w/ + malodor, no drainage noted, palpable DP, dopp PT.     Imaging:  < from: VA Physiol Extremity Lower 3+ Level, BI (09.10.19 @ 18:03) >  EXAM:  US PHYSIOL LWR EXT 3+ LEV BI                            PROCEDURE DATE:  09/10/2019          INTERPRETATION:  Clinical indication: Diabetic right foot ulcer    Comparison: None    Findings: There are biphasic waveforms bilaterally however, waveforms are   dampened at the level of the metatarsals. There is no abnormal segmental   pressure gradient.    Right TIMUR = 1.34  Left TIMUR = 1.26    Impression: Elevated right TIMUR compatible with calcified vessels.    Abnormally dampened waveforms at the level of the metatarsals.      STEPHAN WALL M.D., ATTENDING RADIOLOGIST  This document has been electronically signed. Sep 13 2019  8:38AM    < end of copied text >    < from: MR Foot No Cont, Left (09.11.19 @ 15:57) >  IMPRESSION:    Ulcer defect within the posterior lateral plantar aspect of the foot with   osteomyelitis of the adjacent calcaneus.      MARLON ROCA M.D., ATTENDING RADIOLOGIST  This document has been electronically signed. Sep 11 2019  4:53PM    < end of copied text >    < from: MR Foot No Cont, Right (09.11.19 @ 15:56) >  EXAM:  MR FOOT RT                            PROCEDURE DATE:  09/11/2019          INTERPRETATION:    RIGHT FOOT MRI    CLINICAL INFORMATION: Wound. Diabetic neuropathy. Hypertension. For   osteomyelitis.  TECHNIQUE: Multiplanar, multisequence MRI was obtained of the right foot.    FINDINGS:    Soft tissue ulcer is visualized at the posterior calcaneus measuring 2.7   x 2.0 cm. Within the adjacent calcaneal bone, there is decreased marrow   signal with associated edema and osseous erosion of theposterior   calcaneal cortex, consistent with osteomyelitis. Linear edema propagates   into the body of the calcaneus, likely representing stress reaction.   There is no fluid collection. Remaining visualized marrow signal is   maintained. Joint spaces are maintained. Flexor and extensor tendons are   intact. Achilles tendon is unremarkable with adjacent insertional   Achilles enthesopathy. There is diffuse increased muscle signal, likely   related to denervation.    IMPRESSION:  Posterior foot ulcer with osteomyelitis of the adjacent calcaneus.      MARLON ROCA M.D., ATTENDING RADIOLOGIST  This document has been electronically signed. Sep 11 2019  4:46PM        < end of copied text > HPI:  73 Female from [facility] ,walks with walker with PMH of HTN, Diabetes, Osteoarthritis, Osteoporosis, Peripheral arterial disease, Diabetic neuropathy, HLD, Schizophrenia presented to Ed with worsening non healing heel ulcers x 1 month. Pt was sent in by PMD Dr Llanes for suspected Osteomyelitis . Pt states she doesnt remember when the ulcers originate but they are getting worse from last few months. Pt denies any fever,chills , pain , cough , SOB,CP ,abd pain or any other symptoms. Pt states she is compliant with her diabetic medications and nurse at the facility give her insulin when her sugars go beyond 200. Outpatient records showed  negative duplex scan of the LE arteries. XRay of left calcaneous remarkable for possible osteomyelitis. XRay  of right calcaneous remarkable for osteomyelitis in the dorsal calcaneous.    Vascular consulted for abnormal TIMUR/PVR. Pt states shes never been seen with a vascular surgeon and never had vascular procedures in the past. States shes never had LE wounds until about a month ago developed bilateral heel ulcers. She ambulates with a walker. Denies LE claudication. Denies LE weakness or numbness. No f/c, n/c, cp, sob.    Social Hx: Denies past or present use of alcohol or tobacco.   Allergies: No Known Allergies      Vitals: T(F): 97.6 (09-13-19 @ 08:11), Max: 99.4 (09-13-19 @ 00:27)  HR: 70 (09-13-19 @ 08:11) (70 - 74)  BP: 127/51 (09-13-19 @ 08:11) (114/50 - 135/50)  RR: 17 (09-13-19 @ 08:11) (16 - 17)  SpO2: 100% (09-13-19 @ 08:11) (100% - 100%)      Labs:                         9.6    9.27  )-----------( 294      ( 13 Sep 2019 06:41 )             29.4     09-13    138  |  106  |  8   ----------------------------<  134<H>  3.7   |  26  |  0.65    Ca    8.8            Physical Exam:  General: AAO x 3, No acute distress  Resp: Nonlabored respirations, good inspiratory effort on room air  Extremities: RLE with posterior calcaneal ulceration w/ small amt of necrotic skin s/p debridement w/ + malodor and purulent drainage on dressings, palpable DP, dopp PT, gross motor/sensation intact. LLE with lateral calcaneal ulceration s/p debridement w/ + malodor, no drainage noted, palpable DP, dopp PT, gross motor/sensation intact.     Imaging:  < from: VA Physiol Extremity Lower 3+ Level, BI (09.10.19 @ 18:03) >  EXAM:  US PHYSIOL LWR EXT 3+ LEV BI                            PROCEDURE DATE:  09/10/2019          INTERPRETATION:  Clinical indication: Diabetic right foot ulcer    Comparison: None    Findings: There are biphasic waveforms bilaterally however, waveforms are   dampened at the level of the metatarsals. There is no abnormal segmental   pressure gradient.    Right TIMUR = 1.34  Left TIMUR = 1.26    Impression: Elevated right TIMUR compatible with calcified vessels.    Abnormally dampened waveforms at the level of the metatarsals.      STEPHAN WALL M.D., ATTENDING RADIOLOGIST  This document has been electronically signed. Sep 13 2019  8:38AM    < end of copied text >    < from: MR Foot No Cont, Left (09.11.19 @ 15:57) >  IMPRESSION:    Ulcer defect within the posterior lateral plantar aspect of the foot with   osteomyelitis of the adjacent calcaneus.      MARLON ROCA M.D., ATTENDING RADIOLOGIST  This document has been electronically signed. Sep 11 2019  4:53PM    < end of copied text >    < from: MR Foot No Cont, Right (09.11.19 @ 15:56) >  EXAM:  MR FOOT RT                            PROCEDURE DATE:  09/11/2019          INTERPRETATION:    RIGHT FOOT MRI    CLINICAL INFORMATION: Wound. Diabetic neuropathy. Hypertension. For   osteomyelitis.  TECHNIQUE: Multiplanar, multisequence MRI was obtained of the right foot.    FINDINGS:    Soft tissue ulcer is visualized at the posterior calcaneus measuring 2.7   x 2.0 cm. Within the adjacent calcaneal bone, there is decreased marrow   signal with associated edema and osseous erosion of theposterior   calcaneal cortex, consistent with osteomyelitis. Linear edema propagates   into the body of the calcaneus, likely representing stress reaction.   There is no fluid collection. Remaining visualized marrow signal is   maintained. Joint spaces are maintained. Flexor and extensor tendons are   intact. Achilles tendon is unremarkable with adjacent insertional   Achilles enthesopathy. There is diffuse increased muscle signal, likely   related to denervation.    IMPRESSION:  Posterior foot ulcer with osteomyelitis of the adjacent calcaneus.      MARLON ROCA M.D., ATTENDING RADIOLOGIST  This document has been electronically signed. Sep 11 2019  4:46PM        < end of copied text >

## 2019-09-13 NOTE — DISCHARGE NOTE PROVIDER - PROVIDER TOKENS
PROVIDER:[TOKEN:[[id number]],FOLLOWUP:[1 week]],PROVIDER:[TOKEN:[[id number]],FOLLOWUP:[1 week]] 9

## 2019-09-13 NOTE — BEHAVIORAL HEALTH ASSESSMENT NOTE - HPI (INCLUDE ILLNESS QUALITY, SEVERITY, DURATION, TIMING, CONTEXT, MODIFYING FACTORS, ASSOCIATED SIGNS AND SYMPTOMS)
73 Female from [facility] PPH of Schizophrenia PMH of HTN, Diabetes, Osteoarthritis, Osteoporosis, Peripheral arterial disease, Diabetic neuropathy, HLD, a/w worsening foot ulcer, osteomyelitis, consulted for capacity to consent to surgical debridement 73 [facility] PPH of Schizophrenia PMH of HTN, Diabetes, Osteoarthritis, Osteoporosis, Peripheral arterial disease, Diabetic neuropathy, HLD, a/w worsening foot ulcer, osteomyelitis, consulted for capacity to consent to surgical debridement.   Patient seen in bed, laying comfortably, enjoying an afternoon snack. Well related, somewhat childish affect, cooperative.   She reports she is here because her foot got infected "they did a little scraping...they did good" Seems unaware that further surgical intervention is planned. She is somewhat hesitant when this information is conveyed "I wanted to go home..they told me I would only stay for a couple days" on further questioning it turns out she is afraid her stuff is going to get stolen in the [place] while she is here. After education she is able to repeat that she needs further surgical cleaning out of her wound, otherwise the infection can spread and cause her to lose her leg. "I guess I have to go with it then..I want my foot to be well..I don't want to come back here".   On psych ROS pt denied acute sxs including mood changes, insomnia, psychotic sxs including hallucinations or delusions, anxiety SI/HI. She endorsed medication compliance and no side effects associated with the medication.

## 2019-09-13 NOTE — PROGRESS NOTE ADULT - SUBJECTIVE AND OBJECTIVE BOX
NP Note discussed with  Primary Attending    Patient is a 74y old  Female who presents with a chief complaint of diabetic foot ulcer (13 Sep 2019 11:59)      INTERVAL HPI/OVERNIGHT EVENTS: no new complaints    MEDICATIONS  (STANDING):  ascorbic acid 500 milliGRAM(s) Oral daily  aspirin enteric coated 81 milliGRAM(s) Oral daily  chlorhexidine 2% Cloths 1 Application(s) Topical daily  Dakins Solution - Full Strength 1 Application(s) Topical daily  dextrose 50% Injectable 25 Gram(s) IV Push once  doxazosin 4 milliGRAM(s) Oral at bedtime  enoxaparin Injectable 40 milliGRAM(s) SubCutaneous daily  ferrous    sulfate 325 milliGRAM(s) Oral daily  folic acid 1 milliGRAM(s) Oral daily  insulin glargine Injectable (LANTUS) 10 Unit(s) SubCutaneous at bedtime  insulin lispro (HumaLOG) corrective regimen sliding scale   SubCutaneous Before meals and at bedtime  metoprolol tartrate 100 milliGRAM(s) Oral two times a day  perphenazine 16 milliGRAM(s) Oral every 12 hours  piperacillin/tazobactam IVPB.. 3.375 Gram(s) IV Intermittent every 8 hours  simvastatin 40 milliGRAM(s) Oral at bedtime    MEDICATIONS  (PRN):  acetaminophen   Tablet .. 650 milliGRAM(s) Oral every 6 hours PRN Mild Pain (1 - 3)  sodium chloride 0.9% lock flush 10 milliLiter(s) IV Push every 1 hour PRN Pre/post blood products, medications, blood draw, and to maintain line patency      __________________________________________________  REVIEW OF SYSTEMS:    CONSTITUTIONAL: No fever,   EYES: no acute visual disturbances  NECK: No pain or stiffness  RESPIRATORY: No cough; No shortness of breath  CARDIOVASCULAR: No chest pain, no palpitations  GASTROINTESTINAL: No pain. No nausea or vomiting; No diarrhea   NEUROLOGICAL: No headache or numbness, no tremors  MUSCULOSKELETAL: No joint pain, no muscle pain  GENITOURINARY: no dysuria, no frequency, no hesitancy  PSYCHIATRY: no depression , no anxiety  ALL OTHER  ROS negative        Vital Signs Last 24 Hrs  T(C): 36.4 (13 Sep 2019 08:11), Max: 37.4 (12 Sep 2019 15:46)  T(F): 97.6 (13 Sep 2019 08:11), Max: 99.4 (13 Sep 2019 00:27)  HR: 70 (13 Sep 2019 08:11) (70 - 74)  BP: 127/51 (13 Sep 2019 08:11) (114/50 - 135/50)  BP(mean): --  RR: 17 (13 Sep 2019 08:11) (16 - 17)  SpO2: 100% (13 Sep 2019 08:11) (100% - 100%)    ________________________________________________  PHYSICAL EXAM:  GENERAL: NAD  HEENT: Normocephalic;  conjunctivae and sclerae clear; moist mucous membranes;   NECK : supple  CHEST/LUNG: Clear to auscultation bilaterally with good air entry   HEART: S1 S2  regular; no murmurs, gallops or rubs  ABDOMEN: Soft, Nontender, Nondistended; Bowel sounds present  EXTREMITIES: no cyanosis; no edema; no calf tenderness  SKIN: warm and dry; no rash  NERVOUS SYSTEM:  Awake and alert; Oriented  to place, person and time ; no new deficits    _________________________________________________  LABS:                        9.6    9.27  )-----------( 294      ( 13 Sep 2019 06:41 )             29.4     09-13    138  |  106  |  8   ----------------------------<  134<H>  3.7   |  26  |  0.65    Ca    8.8      13 Sep 2019 06:41          CAPILLARY BLOOD GLUCOSE      POCT Blood Glucose.: 259 mg/dL (13 Sep 2019 12:10)  POCT Blood Glucose.: 141 mg/dL (13 Sep 2019 08:41)  POCT Blood Glucose.: 168 mg/dL (12 Sep 2019 20:59)  POCT Blood Glucose.: 133 mg/dL (12 Sep 2019 16:35)        RADIOLOGY & ADDITIONAL TESTS:    Imaging Personally Reviewed:  YES/NO    Consultant(s) Notes Reviewed:   YES/ No    Care Discussed with Consultants :     Plan of care was discussed with patient and /or primary care giver; all questions and concerns were addressed and care was aligned with patient's wishes. NP Note discussed with  Primary Attending    Patient is a 74y old  Female who presents with a chief complaint of diabetic foot ulcer (13 Sep 2019 11:59)  HPI    74 y/o female from [facility], walks with a walker with PMH of HTN, Diabetes, Osteoarthritis, Osteoporosis, Peripheral arterial disease, Diabetic neuropathy, HLD, Schizophrenia presented to Ed with worsening non healing bilateral heel ulcers x 1 month. Pt was sent in by PMD Dr Llanes for suspected Osteomyelitis. Outpatient records showed negative duplex scan of the LE arteries. XRay of left calcaneous remarkable for possible osteomyelitis. XRay of right calcaneous remarkable for osteomyelitis in the dorsal calcaneous. Patient is s/p b/l feet MRI consistent with OM b/l calcaneous. On Zosyn, ID, Dr. Wellington on board. Wound Culture growing Moderate Proteus mirabilis and Moderate Morganella morganii (09.11.19 @ 03:16) sensitive to Zosyn . Pt will continue on Zosyn for total of 6 weeks via LUE PICC which as placed 9/12/2019. Podiatry now planning surgical debridement on Monday.    INTERVAL HPI/OVERNIGHT EVENTS: Pt seen this morning, reports feeling better, denies SOB, chest discomfort, N/V/D, abdominal discomfort.    MEDICATIONS  (STANDING):  ascorbic acid 500 milliGRAM(s) Oral daily  aspirin enteric coated 81 milliGRAM(s) Oral daily  chlorhexidine 2% Cloths 1 Application(s) Topical daily  Dakins Solution - Full Strength 1 Application(s) Topical daily  dextrose 50% Injectable 25 Gram(s) IV Push once  doxazosin 4 milliGRAM(s) Oral at bedtime  enoxaparin Injectable 40 milliGRAM(s) SubCutaneous daily  ferrous    sulfate 325 milliGRAM(s) Oral daily  folic acid 1 milliGRAM(s) Oral daily  insulin glargine Injectable (LANTUS) 10 Unit(s) SubCutaneous at bedtime  insulin lispro (HumaLOG) corrective regimen sliding scale   SubCutaneous Before meals and at bedtime  metoprolol tartrate 100 milliGRAM(s) Oral two times a day  perphenazine 16 milliGRAM(s) Oral every 12 hours  piperacillin/tazobactam IVPB.. 3.375 Gram(s) IV Intermittent every 8 hours  simvastatin 40 milliGRAM(s) Oral at bedtime    MEDICATIONS  (PRN):  acetaminophen   Tablet .. 650 milliGRAM(s) Oral every 6 hours PRN Mild Pain (1 - 3)  sodium chloride 0.9% lock flush 10 milliLiter(s) IV Push every 1 hour PRN Pre/post blood products, medications, blood draw, and to maintain line patency      __________________________________________________  REVIEW OF SYSTEMS:    CONSTITUTIONAL: No fever,   EYES: no acute visual disturbances  NECK: No pain or stiffness  RESPIRATORY: No cough; No shortness of breath  CARDIOVASCULAR: No chest pain, no palpitations  GASTROINTESTINAL: No pain. No nausea or vomiting; No diarrhea   NEUROLOGICAL: No headache or numbness, no tremors  MUSCULOSKELETAL: No joint pain, no muscle pain  GENITOURINARY: no dysuria, no frequency, no hesitancy  PSYCHIATRY: no depression , no anxiety  ALL OTHER  ROS negative        Vital Signs Last 24 Hrs  T(C): 36.4 (13 Sep 2019 08:11), Max: 37.4 (12 Sep 2019 15:46)  T(F): 97.6 (13 Sep 2019 08:11), Max: 99.4 (13 Sep 2019 00:27)  HR: 70 (13 Sep 2019 08:11) (70 - 74)  BP: 127/51 (13 Sep 2019 08:11) (114/50 - 135/50)  BP(mean): --  RR: 17 (13 Sep 2019 08:11) (16 - 17)  SpO2: 100% (13 Sep 2019 08:11) (100% - 100%)    ________________________________________________  PHYSICAL EXAM:  GENERAL: NAD  HEENT: Normocephalic;  conjunctivae and sclerae clear; moist mucous membranes;   NECK : supple  CHEST/LUNG: Effort normal .Clear to auscultation bilaterally with good air entry   HEART: S1 S2  regular; no murmurs, gallops or rubs  ABDOMEN: Soft, Nontender, Nondistended; Bowel sounds present  EXTREMITIES: Dsg to bilateral heels C/D/I;no cyanosis; no edema; no calf tenderness  SKIN: warm and dry; no rash  NERVOUS SYSTEM:  Awake and alert; Oriented  to place, person and time ; no new deficits    _________________________________________________  LABS:                        9.6    9.27  )-----------( 294      ( 13 Sep 2019 06:41 )             29.4     09-13    138  |  106  |  8   ----------------------------<  134<H>  3.7   |  26  |  0.65    Ca    8.8      13 Sep 2019 06:41          CAPILLARY BLOOD GLUCOSE      POCT Blood Glucose.: 259 mg/dL (13 Sep 2019 12:10)  POCT Blood Glucose.: 141 mg/dL (13 Sep 2019 08:41)  POCT Blood Glucose.: 168 mg/dL (12 Sep 2019 20:59)  POCT Blood Glucose.: 133 mg/dL (12 Sep 2019 16:35)        RADIOLOGY & ADDITIONAL TESTS:    Imaging Personally Reviewed:  YES/NO    Consultant(s) Notes Reviewed:   YES/ No    Care Discussed with Consultants :     Plan of care was discussed with patient and /or primary care giver; all questions and concerns were addressed and care was aligned with patient's wishes.

## 2019-09-13 NOTE — PROGRESS NOTE ADULT - SUBJECTIVE AND OBJECTIVE BOX
74y Female is under our care for bilateral calcaneal osteomyelitis 2/2 diabetic foot wounds. Podiatry now wants to perform surgical debridement of right heel. They have requested psych and vascular sx evaluations. As per Psych, patient is currently psychiatrically stable. Patient wants to go back to [place] and not content about the debridement decision. Heel culture is growing proteus mirabilis, morganella morganii and bacteroides.    REVIEW OF SYSTEMS:  [  ] Not able to illicit  General: no fevers no malaise  Chest: no cough no sob  GI: no nvd  : no urinary sxs   Skin: no rashes  Musculoskeletal: no trauma no LBP  Neuro: no ha's no dizziness     MEDS:  piperacillin/tazobactam IVPB.. 3.375 Gram(s) IV Intermittent every 8 hours    ALLERGIES: Allergies    No Known Allergies    Intolerances      VITALS:  Vital Signs Last 24 Hrs  T(C): 36.4 (13 Sep 2019 08:11), Max: 37.4 (12 Sep 2019 15:46)  T(F): 97.6 (13 Sep 2019 08:11), Max: 99.4 (13 Sep 2019 00:27)  HR: 70 (13 Sep 2019 08:11) (70 - 74)  BP: 127/51 (13 Sep 2019 08:11) (114/50 - 135/50)  BP(mean): --  RR: 17 (13 Sep 2019 08:11) (16 - 17)  SpO2: 100% (13 Sep 2019 08:11) (100% - 100%)      PHYSICAL EXAM:  HEENT: n/sa  Neck: supple no LN's   Respiratory: lungs clear no rales no rhonchi  Cardiovascular: S1 S2 reg no murmurs  Gastrointestinal: +BS with soft, nondistended abdomen; nontender  Extremities: no edema   Skin: noted to have bilateral heel ulceration worse on right +malodorous no tenderness  Ortho: n/a  Neuro: AAO x 3      LABS/DIAGNOSTIC TESTS:                        9.6    9.27  )-----------( 294      ( 13 Sep 2019 06:41 )             29.4     WBC Count: 9.27 K/uL (09-13 @ 06:41)  WBC Count: 9.00 K/uL (09-11 @ 06:28)  WBC Count: 9.75 K/uL (09-10 @ 09:50)    09-13    138  |  106  |  8   ----------------------------<  134<H>  3.7   |  26  |  0.65    Ca    8.8      13 Sep 2019 06:41        CULTURES:   .Surgical Swab  09-11 @ 03:16   Moderate Proteus mirabilis  Moderate Morganella morganii  Moderate Bacteroides fragilis "Susceptibilities not performed"  --  Proteus mirabilis  Morganella morganii      .Blood  09-10 @ 19:17   No growth to date.  --  --      .Blood  09-10 @ 19:16   No growth to date.  --  --        RADIOLOGY:  no new studies

## 2019-09-13 NOTE — CONSULT NOTE ADULT - ASSESSMENT
74F w/ bilateral heel ulcers and underlying OM likely 2/2 uncontrolled DM, vascular consulted for abnormal TIMUR/PVR    - Continue IV abx as per ID  - Podiatry for local wound care/debridement  - Pt to be seen over the weekend by Dr. Calixto 74F w/ bilateral heel ulcers and underlying OM likely 2/2 uncontrolled DM, vascular consulted for abnormal TIMUR/PVR- minimal underlying PAD    - No vascular surgical intervention needed at this time   - Continue IV abx as per ID  - Podiatry for local wound care/debridement      Discussed with Dr. Calixto

## 2019-09-13 NOTE — BEHAVIORAL HEALTH ASSESSMENT NOTE - RISK ASSESSMENT
elevated given h/o schizophrenia and acute medical issues  her risk is mitigated by no SI/HI no high risk history known, no active substance use, pt is future oriented currently psychiatrically stable.   Overall her risk for suicide or violence is low

## 2019-09-13 NOTE — BEHAVIORAL HEALTH ASSESSMENT NOTE - SUMMARY
73 Female from [facility] PPH of Schizophrenia PMH of HTN, Diabetes, Osteoarthritis, Osteoporosis, Peripheral arterial disease, Diabetic neuropathy, HLD, a/w worsening foot ulcer, osteomyelitis, consulted for capacity to consent to surgical debridement.   Pt did demonstrate capacity to make her own medical decisions based on having a basic understanding of her medical condition, ability to apply facts to it, reason and make a consistent choice of agreeing to the procedure. Please note pt may need a lot of reassurance, encouragement, needs her concerns addressed in order to continue consenting to treatment. If she changes her mind or can't remember this conversation she may lose her capacity. 73 Female from [facility] PPH of Schizophrenia PMH of HTN, Diabetes, Osteoarthritis, Osteoporosis, Peripheral arterial disease, Diabetic neuropathy, HLD, a/w worsening foot ulcer, osteomyelitis, consulted for capacity to consent to surgical debridement.   Pt did demonstrate capacity to make her own medical decisions based on having a basic understanding of her medical condition, ability to apply facts to it, reason and make a consistent choice of agreeing to the procedure. Please note pt may seem like she is not consenting due to her underlying concerns not being addressed; pt needs need a lot of reassurance and encouragement to share her concerns with staff in order to continue cooperating with treatment. her judgment is chronically impaired. If she changes her mind a lot or is noted to be forgetful to prior conversations with staff about her condition she may lose her capacity. No acute psychiatric sxs to address, continue current medication regimen. 73 Female from [facility] PPH of Schizophrenia PMH of HTN, Diabetes, Osteoarthritis, Osteoporosis, Peripheral arterial disease, Diabetic neuropathy, HLD, a/w worsening foot ulcer, osteomyelitis, consulted for capacity to consent to surgical debridement.   Pt somewhat suggestible and superficial on eval. She did demonstrate capacity to make her own medical decisions based on having a basic understanding of her medical condition, ability to apply facts to it, reason and make a consistent choice of agreeing to the procedure. Please note pt's underlying and poorly verbalized concerns need to be addressed for her continued consent; she may need a lot of reassurance and encouragement. her judgment is chronically impaired which originates in chronic mental illness and its cognitive sequelae. If she changes her mind a lot or is noted to be forgetful to prior conversations with staff about her condition she may lose her capacity. No acute psychiatric sxs to address, continue current medication regimen.

## 2019-09-13 NOTE — PROGRESS NOTE ADULT - PROBLEM SELECTOR PLAN 1
of b/l calcaneus 2/2 diabetic heel ulcers  afebrile, no leukocytosis  unasyn discontinued, started on Zosyn    s/p left upper arm picc line placement in IR today, will need 6 weeks of abx  awaiting wound culture result   pain control with tylenol   ID Dr Wellington is following
p/w non healing b/l heel ulcers   mri result above  afebrile, no leukocytosis  continue unasyn    pain control with tylenol   ID Dr Wellington is following  Patient will need picc line, will contact IR tomorrow
Wound culture grew moderate Proteus mirabilis and Moderate Morganella morganii.   Sensitivities reviewed with ID Dr. Wellington  C/w Zosyn for  weeks via PIC  Podiatry planning Surgical debridement on Monday.   NPO after midnight Sunday

## 2019-09-13 NOTE — PROGRESS NOTE ADULT - PROBLEM SELECTOR PLAN 2
necrotic tissue removed by the bedside  wound care done  Podiatry is following patient
necrotic tissue removed by the bedside  wound care done  Podiatry is following patient.
A1C 7.6  C/w monitoring BG ac/HS, Lantus, HISS

## 2019-09-13 NOTE — PROGRESS NOTE ADULT - PROBLEM SELECTOR PLAN 3
BP stable  continue Lopressor  hold HCTZ- losartan and amlodipine for now   Monitor BP and titrate meds accordingly.
Pt takes amlodipine 10 mg , HCTZ- Losartan 100-25 mg, lopressor 100 BID at home   BP stable  continue Lopressor  hold HCTZ- losartan and amlodipine for now   Monitor BP and titrate meds accordingly.
Abnormal TIMUR/PVR   Appreciate Vascular   No vascular surgical intervention  c/w ASA, statin

## 2019-09-13 NOTE — PROGRESS NOTE ADULT - SUBJECTIVE AND OBJECTIVE BOX
Chief Complaint : Patient is a 74y old  Female who presents with a chief complaint of diabetic foot ulcer (10 Sep 2019 14:32)    HPI :  73 Female from [facility] ,walks with walker with PMH of HTN, Diabetes, Osteoarthritis, Osteoporosis, Peripheral arterial disease, Diabetic neuropathy, HLD, Schizophrenia presented to Ed with worsening non healing heel ulcers x 1 month. Pt was sent in by PMD Dr Llanes for suspected Osteomyelitis . Pt states she doesnt remember when the ulcers originate but they are getting worse from last few months. Pt denies any fever,chills , pain , cough , SOB,CP ,abd pain or any other symptoms. Pt states she is compliant with her diabetic medications and nurse at the facility give her insulin when her sugars go beyond 200.   Outpatient records showed  negative duplex scan of the LE arteries. XRay of left calcaneous remarkable for possible osteomyelitis. XRay  of right calcaneous remarkable for osteomyelitis in the dorsal calcaneous.  ED course : afebrile, no WBC count , lactate 2.5 s/p 1 L NS bolus , Xray feet : didnt showed any evidence of OM . ESR elevated 74     S: Pt seen bedside resting. Pt states Rukhsana romo sent her to ED because her heels look worse. Pt states she walks with a walker but is often in bed. Pt denies pain to her heels. Pt denies F/N/V/C/SOB. Pt states she got a PICC yesterday and she was told she is going home today.       SH : Denies Smoking, alcohol or drug use (10 Sep 2019 14:32)      Patient admits to  (-) Fevers, (-) Chills, (-) Nausea, (-) Vomiting, (-) Shortness of Breath      PMH: DM (diabetes mellitus)  Paranoid schizophrenia  HLD (hyperlipidemia)  PAD (peripheral artery disease)  HTN (hypertension)    PSH:No significant past surgical history      Allergies:No Known Allergies                          9.6    9.27  )-----------( 294      ( 13 Sep 2019 06:41 )             29.4       WBC Count: 9.27 K/uL (09.13.19 @ 06:41)  WBC Count: 9.00 K/uL (09-11 @ 06:28)  WBC Count: 9.75 K/uL (09-10 @ 09:50)  09-11 09-13    138  |  106  |  8   ----------------------------<  134<H>  3.7   |  26  |  0.65    Ca    8.8      13 Sep 2019 06:41        Sedimentation Rate, Erythrocyte: 74 mm/Hr (09-10-19 @ 09:50)  C-Reactive Protein, Serum: 0.96 mg/dL (09-10-19 @ 15:00)    O:   General: Pleasant  female NAD & AOX3.    Integument:  Skin warm, dry and supple bilateral.    Ulceration Right plantar heel, + hyperkeratotic border, wound base fibrotic with necrotic cap, boggy and probes to bone wound size (4.8 cm X 5.6cm X 2.4cm) - edema, - mac-wound erythema, - purulence, +tunneling, + probe to bone, +malodor  Left lateral heel ulceration has fibrogranular base with necrotic eschar, 1.3cmx1.0cmx0.2cm, -purulence, -tunneling/tracking, -flatulence, -mac-wound erythema   Vascular: Dorsalis Pedis and Posterior Tibial pulses 2/4.  Capillary re-fill time less then 3 seconds digits 1-5 bilateral.    Neuro: Protective sensation diminished to the level of the digits bilateral.  -------------------------------------  < from: MR Foot No Cont, Left (09.11.19 @ 15:57) >  EXAM:  MR FOOT LT                            PROCEDURE DATE:  09/11/2019        INTERPRETATION:    LEFT FOOT MRI    CLINICAL INFORMATION: Foot wound. Diabetes. Eval for osteomyelitis.  TECHNIQUE: Multiplanar, multisequence MRI was obtained of the left foot.    FINDINGS:    Soft tissue ulcerative defect is visualized within the plantar lateral   aspect of the heel. There is an ulcerative tract extending to the   posterior lateral aspect of the plantar calcaneus. There is associated   decreasedT1 marrow signal with associated edema and osseous regions of   the calcaneus, consistent with osteomyelitis. 0.1 is visualized within   the subcutaneous tissues along the tract without abscess. Origin of the   plantar fascia is maintained. Mild thickening of the peroneus tendons,   which are otherwise intact. Remaining medial flexor, extensor and   Achilles tendons are unremarkable. Joint spaces are maintained. There is   diffuse increased muscle signal, likely related to denervation.     IMPRESSION:    Ulcer defect within the posterior lateral plantar aspect of the foot with   osteomyelitis of the adjacent calcaneus.    ---------------------------------------------------------    Culture - Surgical Swab (09.11.19 @ 03:16)    Specimen Source: .Surgical Swab    Culture Results:   Moderate Proteus mirabilis  Moderate Morganella morganii        A: Right plantar heel ulceration   left lateral heel ulceration       P:   Chart reviewed and Patient evaluated  Discussed diagnosis and treatment with patient  Culture reviewed  Applied dakins soaked gauze with dry sterile dressing  X-rays reviewed however xrays from [facility] were read positive OM changes   MRI reviewed as above with OM of bilateral heels  Continue with IV antibiotics As Per ID  NWB to right foot, Full WB to left foot   b/l offloading heel boots dispensed   Pt would benefit from surgical debridement pending psych eval for proper consent   Podiatry will follow while in house.  Discussed and seen with attending  and discussed with  Chief Complaint : Patient is a 74y old  Female who presents with a chief complaint of diabetic foot ulcer (10 Sep 2019 14:32)    HPI :  73 Female from [facility] ,walks with walker with PMH of HTN, Diabetes, Osteoarthritis, Osteoporosis, Peripheral arterial disease, Diabetic neuropathy, HLD, Schizophrenia presented to Ed with worsening non healing heel ulcers x 1 month. Pt was sent in by PMD Dr Llanes for suspected Osteomyelitis . Pt states she doesnt remember when the ulcers originate but they are getting worse from last few months. Pt denies any fever,chills , pain , cough , SOB,CP ,abd pain or any other symptoms. Pt states she is compliant with her diabetic medications and nurse at the facility give her insulin when her sugars go beyond 200.   Outpatient records showed  negative duplex scan of the LE arteries. XRay of left calcaneous remarkable for possible osteomyelitis. XRay  of right calcaneous remarkable for osteomyelitis in the dorsal calcaneous.  ED course : afebrile, no WBC count , lactate 2.5 s/p 1 L NS bolus , Xray feet : didnt showed any evidence of OM . ESR elevated 74     S: Pt seen bedside resting. Pt states Rukhsana romo sent her to ED because her heels look worse. Pt states she walks with a walker but is often in bed. Pt denies pain to her heels. Pt denies F/N/V/C/SOB. Pt states she got a PICC yesterday and she was told she is going home today.       SH : Denies Smoking, alcohol or drug use (10 Sep 2019 14:32)      Patient admits to  (-) Fevers, (-) Chills, (-) Nausea, (-) Vomiting, (-) Shortness of Breath      PMH: DM (diabetes mellitus)  Paranoid schizophrenia  HLD (hyperlipidemia)  PAD (peripheral artery disease)  HTN (hypertension)    PSH:No significant past surgical history      Allergies:No Known Allergies                          9.6    9.27  )-----------( 294      ( 13 Sep 2019 06:41 )             29.4       WBC Count: 9.27 K/uL (09.13.19 @ 06:41)  WBC Count: 9.00 K/uL (09-11 @ 06:28)  WBC Count: 9.75 K/uL (09-10 @ 09:50)  09-11 09-13    138  |  106  |  8   ----------------------------<  134<H>  3.7   |  26  |  0.65    Ca    8.8      13 Sep 2019 06:41        Sedimentation Rate, Erythrocyte: 74 mm/Hr (09-10-19 @ 09:50)  C-Reactive Protein, Serum: 0.96 mg/dL (09-10-19 @ 15:00)    O:   General: Pleasant  female NAD & AOX3.    Integument:  Skin warm, dry and supple bilateral.    Ulceration Right plantar heel, + hyperkeratotic border, wound base fibrotic with necrotic cap, boggy and probes to bone wound size (4.8 cm X 5.6cm X 2.4cm) - edema, - mac-wound erythema, - purulence, +tunneling, + probe to bone, +malodor  Left lateral heel ulceration has fibrogranular base with necrotic eschar, 1.3cmx1.0cmx0.2cm, -purulence, -tunneling/tracking, -flatulence, -mac-wound erythema   Vascular: Dorsalis Pedis and Posterior Tibial pulses 2/4.  Capillary re-fill time less then 3 seconds digits 1-5 bilateral.    Neuro: Protective sensation diminished to the level of the digits bilateral.  -------------------------------------  < from: MR Foot No Cont, Left (09.11.19 @ 15:57) >  EXAM:  MR FOOT LT                            PROCEDURE DATE:  09/11/2019        INTERPRETATION:    LEFT FOOT MRI    CLINICAL INFORMATION: Foot wound. Diabetes. Eval for osteomyelitis.  TECHNIQUE: Multiplanar, multisequence MRI was obtained of the left foot.    FINDINGS:    Soft tissue ulcerative defect is visualized within the plantar lateral   aspect of the heel. There is an ulcerative tract extending to the   posterior lateral aspect of the plantar calcaneus. There is associated   decreasedT1 marrow signal with associated edema and osseous regions of   the calcaneus, consistent with osteomyelitis. 0.1 is visualized within   the subcutaneous tissues along the tract without abscess. Origin of the   plantar fascia is maintained. Mild thickening of the peroneus tendons,   which are otherwise intact. Remaining medial flexor, extensor and   Achilles tendons are unremarkable. Joint spaces are maintained. There is   diffuse increased muscle signal, likely related to denervation.     IMPRESSION:    Ulcer defect within the posterior lateral plantar aspect of the foot with   osteomyelitis of the adjacent calcaneus.    ---------------------------------------------------------    Culture - Surgical Swab (09.11.19 @ 03:16)    Specimen Source: .Surgical Swab    Culture Results:   Moderate Proteus mirabilis  Moderate Morganella morganii    < from: [facility] Physiol Extremity Lower 3+ Level, BI (09.10.19 @ 18:03) >    EXAM:  US PHYSIOL LWR EXT 3+ LEV BI                            PROCEDURE DATE:  09/10/2019          INTERPRETATION:  Clinical indication: Diabetic right foot ulcer    Comparison: None    Findings: There are biphasic waveforms bilaterally however, waveforms are   dampened at the level of the metatarsals. There is no abnormal segmental   pressure gradient.    Right TIMUR = 1.34  Left TIMUR = 1.26    Impression: Elevated right TIMUR compatible with calcified vessels.    Abnormally dampened waveforms at the level of the metatarsals.                STEPHAN WALL M.D., ATTENDING RADIOLOGIST  This document has been electronically signed. Sep 13 2019  8:38AM    < end of copied text >      A: Right plantar heel ulceration   left lateral heel ulceration       P:   Chart reviewed and Patient evaluated  Discussed diagnosis and treatment with patient  Culture reviewed  TIMUR reviewed  Applied dakins soaked gauze with dry sterile dressing  X-rays reviewed however xrays from [facility] were read positive OM changes   MRI reviewed as above with OM of bilateral heels  Continue with IV antibiotics As Per ID  NWB to right foot, Full WB to left foot   b/l offloading heel boots dispensed   Pt would benefit from surgical debridement pending psych and vascular evaluation   Podiatry will follow while in house.  Discussed and seen with attending  and discussed with

## 2019-09-13 NOTE — DISCHARGE NOTE PROVIDER - HOSPITAL COURSE
74 y/o female from [facility], walks with a walker with PMH of HTN, Diabetes, Osteoarthritis, Osteoporosis, Peripheral arterial disease, Diabetic neuropathy, HLD, Schizophrenia presented to Ed with worsening non healing bilateral heel ulcers x 1 month. Pt was sent in by PMD Dr Llanes for suspected Osteomyelitis. Outpatient records showed negative duplex scan of the LE arteries. XRay of left calcaneous remarkable for possible osteomyelitis. XRay of right calcaneous remarkable for osteomyelitis in the dorsal calcaneous. Patient is s/p b/l feet MRI consistent with OM b/l calcaneous. On Zosyn, ID, Dr. Wellington on board. Wound Culture growing Moderate Proteus mirabilis and Moderate Morganella morganii (09.11.19 @ 03:16) sensitive to Zosyn . Pt will continue on Zosyn for total of 6 weeks via LUE PICC which as placed 9/12/2019.    Pt is cleared by attending for discharge to NH. 73 y/o female from [facility], walks with a walker with PMH of HTN, Diabetes, Osteoarthritis, Osteoporosis, Peripheral arterial disease, Diabetic neuropathy, HLD, Schizophrenia presented to Ed with worsening non healing bilateral heel ulcers x 1 month. Pt was sent in by PMD Dr Llanes for suspected Osteomyelitis. Outpatient records showed negative duplex scan of the LE arteries. XRay of left calcaneous remarkable for possible osteomyelitis. XRay of right calcaneous remarkable for osteomyelitis in the dorsal calcaneous. Patient is s/p b/l feet MRI consistent with OM b/l calcaneous.  Started Zosyn in [facility], and grew Moderate Proteus mirabilis and Moderate Morganella morganii (09.11.19 @ 03:16) sensitive to Zosyn . Pt will continue on Zosyn for total of 6 weeks via LUE PICC which as placed 9/12/2019.  pt needs 37days of zosyn from 9.16.2019.     Pt is cleared by attending for discharge to NH. 73 y/o female from [facility], walks with a walker with PMH of HTN, Diabetes, Osteoarthritis, Osteoporosis, Peripheral arterial disease, Diabetic neuropathy, HLD, Schizophrenia presented to Ed with worsening non healing bilateral heel ulcers x 1 month. Pt was sent in by PMD Dr Llanes for suspected Osteomyelitis. Outpatient records showed negative duplex scan of the LE arteries. XRay of left calcaneous remarkable for possible osteomyelitis. XRay of right calcaneous remarkable for osteomyelitis in the dorsal calcaneous. Patient is s/p b/l feet MRI consistent with OM b/l calcaneous.  Started Zosyn in [facility], and grew Moderate Proteus mirabilis and Moderate Morganella morganii (09.11.19 @ 03:16) sensitive to Zosyn . Pt will continue on Zosyn for total of 6 weeks via LUE PICC which as placed 9/12/2019.  pt needs 37days of zosyn from 9.16.2019.  Podiatry recommended pt to follow up with wound clinic at [address] [place] [address], I made appointment with Dr. Pineda  on 9.24.2019 at 12:45pm.     Pt is cleared by attending for discharge to NH.

## 2019-09-13 NOTE — BEHAVIORAL HEALTH ASSESSMENT NOTE - NSBHCHARTREVIEWVS_PSY_A_CORE FT
ICU Vital Signs Last 24 Hrs  T(C): 36.4 (13 Sep 2019 08:11), Max: 37.4 (12 Sep 2019 15:46)  T(F): 97.6 (13 Sep 2019 08:11), Max: 99.4 (13 Sep 2019 00:27)  HR: 70 (13 Sep 2019 08:11) (70 - 74)  BP: 127/51 (13 Sep 2019 08:11) (114/50 - 135/50)  BP(mean): --  ABP: --  ABP(mean): --  RR: 17 (13 Sep 2019 08:11) (16 - 17)  SpO2: 100% (13 Sep 2019 08:11) (100% - 100%)

## 2019-09-13 NOTE — PROGRESS NOTE ADULT - PROBLEM SELECTOR PLAN 5
pt on aspirin, statin, ezetimibe   Outpt arterial Duplex LE wnl   TIMUR result pending  consult vascular Dr Camacho as per result.
pt on aspirin, statin, zetimibe   Outpt arterial Duplex LE wnl   TIMUR result pending  consult vascular Dr Camacho as per result
without behavioral disturbance  c/w Perphenazine

## 2019-09-13 NOTE — DISCHARGE NOTE PROVIDER - CARE PROVIDER_API CALL
Rosalio Llanes)  Medicine  [address] [address] [place] [address]  Phone: [phone]  Fax: [phone]  Follow Up Time: 1 week    Ciaran Wellington)  Infectious Disease  [address] [address] [place] [address]  Phone: [phone]  Fax: [phone]  Follow Up Time: 1 week

## 2019-09-13 NOTE — PROGRESS NOTE ADULT - PROBLEM SELECTOR PLAN 9
Dispo: back to [facility] when cleared for discharge.   c/w Zosyn x 6 weeks total per ID

## 2019-09-13 NOTE — PROGRESS NOTE ADULT - PROBLEM SELECTOR PLAN 4
Hgba1c 7.6  Pt takes metformin, glipizide, canagliflozin, linagliptin at home.  will hold off the oral medications while pt is in the hospital   c/w lantus 10 U and HSS   Monitor BS and titrate insulin
Hgba1c 7.6  Pt takes metformin, glipizide, canagliflozin, linagliptin at home.  will hold off the oral medications while pt is in the hospital   c/w lantus 10 U and HSS   Monitor BS and titrate insulin.
c/w wound care

## 2019-09-13 NOTE — DISCHARGE NOTE PROVIDER - NSDCCPCAREPLAN_GEN_ALL_CORE_FT
PRINCIPAL DISCHARGE DIAGNOSIS  Diagnosis: Osteomyelitis  Assessment and Plan of Treatment: Continue with antibiotic Zosyn for total of 6 weeks (thru Oct 22nd).  Maintain adequate hydration, nutrition, rest, and activity as tolerated.  Weekly CBC and BMP while on Zosyn  Follow-up with medical staff at NH.  Follow-up your primary care physician within 1 week. Call for appointment.        SECONDARY DISCHARGE DIAGNOSES  Diagnosis: Diabetic foot ulcer  Assessment and Plan of Treatment: Continue with antibiotic as above.  Continue wound care with Dakins soaked gauze with dry sterile dression.  NWB to right foot, Full WB to left foot   Follow-up with medical staff at NH.  Follow-up your primary care physician within 1 week. Call for appointment.      Diagnosis: PAD (peripheral artery disease)  Assessment and Plan of Treatment: Outpatient arterial duplex of lower extremitites within normal limits.  Continue with medications as prescribed by your doctor.  Follow-up with medical staff at NH.  Follow-up your primary care physician within 1 week. Call for appointment.    Diagnosis: HTN (hypertension)  Assessment and Plan of Treatment: Low salt diet  Activity as tolerated.  Take all medication as prescribed.  Follow up with your medical doctor for routine blood pressure monitoring at your next visit.  Notify your doctor if you have any of the following symptoms:   Dizziness, Lightheadedness, Blurry vision, Headache, Chest pain, Shortness of breath      Diagnosis: DM (diabetes mellitus)  Assessment and Plan of Treatment: HgA1C this admission 7.6  Make sure you get your HgA1c checked every three months.  If you take oral diabetes medications, check your blood glucose two times a day.  If you take insulin, check your blood glucose before meals and at bedtime.  It's important not to skip any meals.  Keep a log of your blood glucose results and always take it with you to your doctor appointments.  Keep a list of your current medications including injectables and over the counter medications and bring this medication list with you to all your doctor appointments.  If you have not seen your ophthalmologist this year call for appointment.  Check your feet daily for redness, sores, or openings. Do not self treat. If no improvement in two days call your primary care physician for an appointment.  Low blood sugar (hypoglycemia) is a blood sugar below 70mg/dl. Check your blood sugar if you feel signs/symptoms of hypoglycemia. If your blood sugar is below 70 take 15 grams of carbohydrates (ex 4 oz of apple juice, 3-4 glucose tablets, or 4-6 oz of regular soda) wait 15 minutes and repeat blood sugar to make sure it comes up above 70.  If your blood sugar is above 70 and you are due for a meal, have a meal.  If you are not due for a meal have a snack.  This snack helps keeps your blood sugar at a safe range.

## 2019-09-14 LAB
GLUCOSE BLDC GLUCOMTR-MCNC: 117 MG/DL — HIGH (ref 70–99)
GLUCOSE BLDC GLUCOMTR-MCNC: 125 MG/DL — HIGH (ref 70–99)
GLUCOSE BLDC GLUCOMTR-MCNC: 149 MG/DL — HIGH (ref 70–99)
GLUCOSE BLDC GLUCOMTR-MCNC: 231 MG/DL — HIGH (ref 70–99)

## 2019-09-14 RX ADMIN — Medication 325 MILLIGRAM(S): at 13:05

## 2019-09-14 RX ADMIN — Medication 81 MILLIGRAM(S): at 13:05

## 2019-09-14 RX ADMIN — Medication 1 MILLIGRAM(S): at 13:05

## 2019-09-14 RX ADMIN — Medication 2: at 13:11

## 2019-09-14 RX ADMIN — SIMVASTATIN 40 MILLIGRAM(S): 20 TABLET, FILM COATED ORAL at 22:05

## 2019-09-14 RX ADMIN — ENOXAPARIN SODIUM 40 MILLIGRAM(S): 100 INJECTION SUBCUTANEOUS at 13:05

## 2019-09-14 RX ADMIN — INSULIN GLARGINE 10 UNIT(S): 100 INJECTION, SOLUTION SUBCUTANEOUS at 22:05

## 2019-09-14 RX ADMIN — Medication 500 MILLIGRAM(S): at 13:05

## 2019-09-14 RX ADMIN — PIPERACILLIN AND TAZOBACTAM 25 GRAM(S): 4; .5 INJECTION, POWDER, LYOPHILIZED, FOR SOLUTION INTRAVENOUS at 22:05

## 2019-09-14 RX ADMIN — PERPHENAZINE 16 MILLIGRAM(S): 8 TABLET, FILM COATED ORAL at 05:36

## 2019-09-14 RX ADMIN — Medication 100 MILLIGRAM(S): at 17:41

## 2019-09-14 RX ADMIN — Medication 100 MILLIGRAM(S): at 05:36

## 2019-09-14 RX ADMIN — PIPERACILLIN AND TAZOBACTAM 25 GRAM(S): 4; .5 INJECTION, POWDER, LYOPHILIZED, FOR SOLUTION INTRAVENOUS at 05:36

## 2019-09-14 RX ADMIN — CHLORHEXIDINE GLUCONATE 1 APPLICATION(S): 213 SOLUTION TOPICAL at 17:41

## 2019-09-14 RX ADMIN — PIPERACILLIN AND TAZOBACTAM 25 GRAM(S): 4; .5 INJECTION, POWDER, LYOPHILIZED, FOR SOLUTION INTRAVENOUS at 13:05

## 2019-09-14 RX ADMIN — PERPHENAZINE 16 MILLIGRAM(S): 8 TABLET, FILM COATED ORAL at 17:41

## 2019-09-14 RX ADMIN — Medication 4 MILLIGRAM(S): at 22:05

## 2019-09-14 NOTE — PROGRESS NOTE ADULT - SUBJECTIVE AND OBJECTIVE BOX
Patient is a 74y old  Female who presents with a chief complaint of diabetic foot ulcer (14 Sep 2019 10:24)    PATIENT IS SEEN AND EXAMINED IN MEDICAL FLOOR.    ALLERGIES:  No Known Allergies      VITALS:    Vital Signs Last 24 Hrs  T(C): 36.7 (14 Sep 2019 15:32), Max: 36.9 (14 Sep 2019 00:04)  T(F): 98.1 (14 Sep 2019 15:32), Max: 98.4 (14 Sep 2019 00:04)  HR: 70 (14 Sep 2019 15:32) (62 - 74)  BP: 123/88 (14 Sep 2019 15:32) (117/48 - 133/55)  BP(mean): --  RR: 16 (14 Sep 2019 15:32) (15 - 16)  SpO2: 100% (14 Sep 2019 15:32) (100% - 100%)    LABS:    CBC Full  -  ( 13 Sep 2019 06:41 )  WBC Count : 9.27 K/uL  RBC Count : 3.24 M/uL  Hemoglobin : 9.6 g/dL  Hematocrit : 29.4 %  Platelet Count - Automated : 294 K/uL  Mean Cell Volume : 90.7 fl  Mean Cell Hemoglobin : 29.6 pg  Mean Cell Hemoglobin Concentration : 32.7 gm/dL  Auto Neutrophil # : x  Auto Lymphocyte # : x  Auto Monocyte # : x  Auto Eosinophil # : x  Auto Basophil # : x  Auto Neutrophil % : x  Auto Lymphocyte % : x  Auto Monocyte % : x  Auto Eosinophil % : x  Auto Basophil % : x      09-13    138  |  106  |  8   ----------------------------<  134<H>  3.7   |  26  |  0.65    Ca    8.8      13 Sep 2019 06:41      CAPILLARY BLOOD GLUCOSE    POCT Blood Glucose.: 117 mg/dL (14 Sep 2019 16:46)  POCT Blood Glucose.: 231 mg/dL (14 Sep 2019 11:32)  POCT Blood Glucose.: 125 mg/dL (14 Sep 2019 08:24)  POCT Blood Glucose.: 182 mg/dL (13 Sep 2019 21:01)    Creatinine Trend: 0.65<--, 0.43<--, 0.63<--  I&O's Summary    .Surgical Swab  09-11 @ 03:16   Moderate Proteus mirabilis  Moderate Morganella morganii  Moderate Bacteroides fragilis "Susceptibilities not performed"  --  Proteus mirabilis  Morganella morganii    .Blood  09-10 @ 19:17   No growth to date.  --  --      .Blood  09-10 @ 19:16   No growth to date.  --  --      MEDICATIONS:    MEDICATIONS  (STANDING):  ascorbic acid 500 milliGRAM(s) Oral daily  aspirin enteric coated 81 milliGRAM(s) Oral daily  chlorhexidine 2% Cloths 1 Application(s) Topical daily  Dakins Solution - Full Strength 1 Application(s) Topical daily  dextrose 50% Injectable 25 Gram(s) IV Push once  doxazosin 4 milliGRAM(s) Oral at bedtime  enoxaparin Injectable 40 milliGRAM(s) SubCutaneous daily  ferrous    sulfate 325 milliGRAM(s) Oral daily  folic acid 1 milliGRAM(s) Oral daily  insulin glargine Injectable (LANTUS) 10 Unit(s) SubCutaneous at bedtime  insulin lispro (HumaLOG) corrective regimen sliding scale   SubCutaneous Before meals and at bedtime  metoprolol tartrate 100 milliGRAM(s) Oral two times a day  perphenazine 16 milliGRAM(s) Oral every 12 hours  piperacillin/tazobactam IVPB.. 3.375 Gram(s) IV Intermittent every 8 hours  simvastatin 40 milliGRAM(s) Oral at bedtime      MEDICATIONS  (PRN):  acetaminophen   Tablet .. 650 milliGRAM(s) Oral every 6 hours PRN Mild Pain (1 - 3)  sodium chloride 0.9% lock flush 10 milliLiter(s) IV Push every 1 hour PRN Pre/post blood products, medications, blood draw, and to maintain line patency      REVIEW OF SYSTEMS:                           ALL ROS DONE [ X   ]    CONSTITUTIONAL:  LETHARGIC [   ], FEVER [   ], UNRESPONSIVE [   ]  CVS:  CP  [   ], SOB, [   ], PALPITATIONS [   ], DIZZYNESS [   ]  RS: COUGH [   ], SPUTUM [   ]  GI: ABDOMINAL PAIN [   ], NAUSEA [   ], VOMITINGS [   ], DIARRHEA [   ], CONSTIPATION [   ]  :  DYSURIA [   ], NOCTURIA [   ], INCREASED FREQUENCY [   ], DRIBLING [   ],  SKELETAL: PAINFUL JOINTS [   ], SWOLLEN JOINTS [   ], NECK ACHE [   ], LOW BACK ACHE [   ],  SKIN : ULCERS [   ], RASH [   ], ITCHING [   ]  CNS: HEAD ACHE [   ], DOUBLE VISION [   ], BLURRED VISION [   ], AMS / CONFUSION [   ], SEIZURES [   ], WEAKNESS [   ],TINGLING / NUMBNESS [   ]    PHYSICAL EXAMINATION:  GENERAL APPEARANCE: NO DISTRESS  HEENT:  NO PALLOR, NO  JVD,  NO   NODES, NECK SUPPLE  CVS: S1 +, S2 +,   RS: AEEB,  OCCASIONAL  RALES +,   NO RONCHI  ABD: SOFT, NT, NO, BS +  EXT: NO PE  SKIN: WARM, B/L HEEL ULCERS   SKELETAL:  ROM ACCEPTABLE  CNS:  AAO X 3, NO DEFICITS    RADIOLOGY :    < from: MR Foot No Cont, Left (09.11.19 @ 15:57) >  IMPRESSION:    Ulcer defect within the posterior lateral plantar aspect of the foot with   osteomyelitis of the adjacent calcaneus.    < end of copied text >    < from: MR Foot No Cont, Right (09.11.19 @ 15:56) >  IMPRESSION:  Posterior foot ulcer with osteomyelitis of the adjacent calcaneus.    < end of copied text >      ASSESSMENT :     Osteomyelitis  DM (diabetes mellitus)  Paranoid schizophrenia  HLD (hyperlipidemia)  PAD (peripheral artery disease)  HTN (hypertension)      PLAN:  HPI:  73 Female from [facility] ,walks with walker with PMH of HTN, Diabetes, Osteoarthritis, Osteoporosis, Peripheral arterial disease, Diabetic neuropathy, HLD, Schizophrenia presented to Ed with worsening non healing heel ulcers x 1 month. Pt was sent in by PMD Dr Llanes for suspected Osteomyelitis . Pt states she doesnt remember when the ulcers originate but they are getting worse from last few months. Pt denies any fever,chills , pain , cough , SOB,CP ,abd pain or any other symptoms. Pt states she is compliant with her diabetic medications and nurse at the facility give her insulin when her sugars go beyond 200.   Outpatient records showed  negative duplex scan of the LE arteries. XRay of left calcaneous remarkable for possible osteomyelitis. XRay  of right calcaneous remarkable for osteomyelitis in the dorsal calcaneous.  ED course : afebrile, no WBC count , lactate 2.5 s/p 1 L NS bolus , Xray feet : didnt showed any evidence of OM . ESR elevated 74     SH : Denies Smoking, alcohol or drug use (10 Sep 2019 14:32)    - PATIENT IS SCHEDULED FOR B/L HEEL DEBRIDEMENT ON MONDAY. D/W PODIATRY TEAM. PATIENT IS MODERATE SURGICAL RISK  - PATIENT HAD EVALUATION BY PSYCHIATRIST & PATIENT HAS CAPACITY OF DECISION MAKING   - B/L CALCANEAL OSTEOMYELITIS , S/P PICC LINE INSERTION IN LEFT UPPER EXTREMITY. ON IV ZOSYN. SURGICAL CXS REVEAL PROTEUS AND MORGANELLA. FINAL CXS / SENSITIVITY PENDING  - PAD AND B/L LOWER EXTREMITY ARTERIAL INSUFFICIENCY, DIABETIC PERIPHERAL NEUROPATHY, B/L HEEL DECUBITII AND ARTERIAL ULCERS. WOUND HEALING WILL BE POOR. POOR PROGNOSIS  - DC PLAN BACK TO [facility] AFTER B/L HEEL DEBRIDEMENT  - DR. LLANES .

## 2019-09-14 NOTE — PROGRESS NOTE ADULT - SUBJECTIVE AND OBJECTIVE BOX
74y Female is under our care for bilateral calcaneal osteomyelitis 2/2 diabetic foot wounds. No overnight events, pt is in bed, in cheerful mood. In regards to the surgical debridement of right heel, pt is convinced that her wound is "not that bad" and that there is no need for debridement. Pt was seen by Vascular service, no intervention. Pt is s/p PICC line placement yesterday. Pt is afebrile, no leukocytosis, heel culture is growing proteus mirabilis, morganella morganii and bacteroides, blood cultures with no growth, surgical swab culture is pending result. Pt denies being short of breath, no chest pain, no abdominal pain, no nausea, vomiting or diarrhea.     MEDS:  piperacillin/tazobactam IVPB.. 3.375 Gram(s) IV Intermittent every 8 hours    No Known Allergies    Intolerances        VITALS:  Vital Signs Last 24 Hrs  T(C): 36.8 (14 Sep 2019 07:48), Max: 36.9 (14 Sep 2019 00:04)  T(F): 98.2 (14 Sep 2019 07:48), Max: 98.4 (14 Sep 2019 00:04)  HR: 74 (14 Sep 2019 07:48) (62 - 74)  BP: 133/55 (14 Sep 2019 07:48) (117/48 - 133/55)  BP(mean): --  RR: 16 (14 Sep 2019 07:48) (15 - 18)  SpO2: 100% (14 Sep 2019 07:48) (100% - 100%)    LABS/DIAGNOSTIC TESTS:                          9.6    9.27  )-----------( 294      ( 13 Sep 2019 06:41 )             29.4         09-13    138  |  106  |  8   ----------------------------<  134<H>  3.7   |  26  |  0.65    Ca    8.8      13 Sep 2019 06:41        CULTURES:   .Surgical Swab  09-11 @ 03:16   Moderate Proteus mirabilis  Moderate Morganella morganii  Moderate Bacteroides fragilis "Susceptibilities not performed"  --  Proteus mirabilis  Morganella morganii      .Blood  09-10 @ 19:17   No growth to date.  --  --      .Blood  09-10 @ 19:16   No growth to date.  --  --

## 2019-09-15 LAB
ANION GAP SERPL CALC-SCNC: 8 MMOL/L — SIGNIFICANT CHANGE UP (ref 5–17)
BUN SERPL-MCNC: 7 MG/DL — SIGNIFICANT CHANGE UP (ref 7–18)
CALCIUM SERPL-MCNC: 9.1 MG/DL — SIGNIFICANT CHANGE UP (ref 8.4–10.5)
CHLORIDE SERPL-SCNC: 103 MMOL/L — SIGNIFICANT CHANGE UP (ref 96–108)
CO2 SERPL-SCNC: 26 MMOL/L — SIGNIFICANT CHANGE UP (ref 22–31)
CREAT SERPL-MCNC: 0.58 MG/DL — SIGNIFICANT CHANGE UP (ref 0.5–1.3)
CULTURE RESULTS: SIGNIFICANT CHANGE UP
CULTURE RESULTS: SIGNIFICANT CHANGE UP
GLUCOSE BLDC GLUCOMTR-MCNC: 138 MG/DL — HIGH (ref 70–99)
GLUCOSE BLDC GLUCOMTR-MCNC: 153 MG/DL — HIGH (ref 70–99)
GLUCOSE BLDC GLUCOMTR-MCNC: 167 MG/DL — HIGH (ref 70–99)
GLUCOSE BLDC GLUCOMTR-MCNC: 204 MG/DL — HIGH (ref 70–99)
GLUCOSE SERPL-MCNC: 134 MG/DL — HIGH (ref 70–99)
HCT VFR BLD CALC: 30.4 % — LOW (ref 34.5–45)
HGB BLD-MCNC: 9.9 G/DL — LOW (ref 11.5–15.5)
MCHC RBC-ENTMCNC: 30 PG — SIGNIFICANT CHANGE UP (ref 27–34)
MCHC RBC-ENTMCNC: 32.6 GM/DL — SIGNIFICANT CHANGE UP (ref 32–36)
MCV RBC AUTO: 92.1 FL — SIGNIFICANT CHANGE UP (ref 80–100)
NRBC # BLD: 0 /100 WBCS — SIGNIFICANT CHANGE UP (ref 0–0)
PLATELET # BLD AUTO: 319 K/UL — SIGNIFICANT CHANGE UP (ref 150–400)
POTASSIUM SERPL-MCNC: 3.9 MMOL/L — SIGNIFICANT CHANGE UP (ref 3.5–5.3)
POTASSIUM SERPL-SCNC: 3.9 MMOL/L — SIGNIFICANT CHANGE UP (ref 3.5–5.3)
RBC # BLD: 3.3 M/UL — LOW (ref 3.8–5.2)
RBC # FLD: 12.7 % — SIGNIFICANT CHANGE UP (ref 10.3–14.5)
SODIUM SERPL-SCNC: 137 MMOL/L — SIGNIFICANT CHANGE UP (ref 135–145)
SPECIMEN SOURCE: SIGNIFICANT CHANGE UP
SPECIMEN SOURCE: SIGNIFICANT CHANGE UP
WBC # BLD: 9.11 K/UL — SIGNIFICANT CHANGE UP (ref 3.8–10.5)
WBC # FLD AUTO: 9.11 K/UL — SIGNIFICANT CHANGE UP (ref 3.8–10.5)

## 2019-09-15 RX ORDER — INSULIN GLARGINE 100 [IU]/ML
5 INJECTION, SOLUTION SUBCUTANEOUS ONCE
Refills: 0 | Status: COMPLETED | OUTPATIENT
Start: 2019-09-15 | End: 2019-09-15

## 2019-09-15 RX ADMIN — Medication 100 MILLIGRAM(S): at 17:21

## 2019-09-15 RX ADMIN — Medication 81 MILLIGRAM(S): at 11:12

## 2019-09-15 RX ADMIN — CHLORHEXIDINE GLUCONATE 1 APPLICATION(S): 213 SOLUTION TOPICAL at 11:11

## 2019-09-15 RX ADMIN — PERPHENAZINE 16 MILLIGRAM(S): 8 TABLET, FILM COATED ORAL at 05:52

## 2019-09-15 RX ADMIN — PIPERACILLIN AND TAZOBACTAM 25 GRAM(S): 4; .5 INJECTION, POWDER, LYOPHILIZED, FOR SOLUTION INTRAVENOUS at 05:52

## 2019-09-15 RX ADMIN — Medication 325 MILLIGRAM(S): at 11:12

## 2019-09-15 RX ADMIN — Medication 1: at 08:39

## 2019-09-15 RX ADMIN — Medication 1: at 17:20

## 2019-09-15 RX ADMIN — PERPHENAZINE 16 MILLIGRAM(S): 8 TABLET, FILM COATED ORAL at 17:21

## 2019-09-15 RX ADMIN — SIMVASTATIN 40 MILLIGRAM(S): 20 TABLET, FILM COATED ORAL at 21:55

## 2019-09-15 RX ADMIN — Medication 4 MILLIGRAM(S): at 21:55

## 2019-09-15 RX ADMIN — Medication 2: at 11:59

## 2019-09-15 RX ADMIN — Medication 0: at 21:54

## 2019-09-15 RX ADMIN — PIPERACILLIN AND TAZOBACTAM 25 GRAM(S): 4; .5 INJECTION, POWDER, LYOPHILIZED, FOR SOLUTION INTRAVENOUS at 21:54

## 2019-09-15 RX ADMIN — Medication 1 MILLIGRAM(S): at 11:12

## 2019-09-15 RX ADMIN — Medication 500 MILLIGRAM(S): at 11:12

## 2019-09-15 RX ADMIN — PIPERACILLIN AND TAZOBACTAM 25 GRAM(S): 4; .5 INJECTION, POWDER, LYOPHILIZED, FOR SOLUTION INTRAVENOUS at 13:52

## 2019-09-15 RX ADMIN — ENOXAPARIN SODIUM 40 MILLIGRAM(S): 100 INJECTION SUBCUTANEOUS at 11:11

## 2019-09-15 RX ADMIN — Medication 1 APPLICATION(S): at 11:11

## 2019-09-15 RX ADMIN — Medication 100 MILLIGRAM(S): at 05:52

## 2019-09-15 NOTE — PROGRESS NOTE ADULT - NEGATIVE GASTROINTESTINAL SYMPTOMS
no vomiting/no nausea/no diarrhea/no abdominal pain
no nausea/no vomiting/no diarrhea/no abdominal pain

## 2019-09-15 NOTE — PROGRESS NOTE ADULT - SUBJECTIVE AND OBJECTIVE BOX
74y Female is under our care for bilateral calcaneal osteomyelitis 2/2 diabetic foot wounds. No overnight events, pt is sitting in bed, having breakfast, no new complains. Pt was seen by podiatry earlier today, pt was scheduled for OR debridement tomorrow. Pt remains afebrile, no leukocytosis, heel culture is growing proteus mirabilis, morganella morganii and bacteroides, blood cultures with no growth, surgical swab culture is pending result. Pt denies being short of breath, no chest pain, no abdominal pain, no nausea, vomiting or diarrhea.       MEDS:  piperacillin/tazobactam IVPB.. 3.375 Gram(s) IV Intermittent every 8 hours    No Known Allergies    Intolerances        VITALS:  Vital Signs Last 24 Hrs  T(C): 37 (15 Sep 2019 08:09), Max: 37 (15 Sep 2019 08:09)  T(F): 98.6 (15 Sep 2019 08:09), Max: 98.6 (15 Sep 2019 08:09)  HR: 62 (15 Sep 2019 08:09) (62 - 80)  BP: 121/54 (15 Sep 2019 08:09) (115/66 - 123/88)  BP(mean): --  RR: 16 (15 Sep 2019 08:09) (16 - 17)  SpO2: 100% (15 Sep 2019 08:09) (100% - 100%)    LABS/DIAGNOSTIC TESTS:                          9.9    9.11  )-----------( 319      ( 15 Sep 2019 06:13 )             30.4         09-15    137  |  103  |  7   ----------------------------<  134<H>  3.9   |  26  |  0.58    Ca    9.1      15 Sep 2019 06:13        CULTURES:   .Surgical Swab  09-11 @ 03:16   Moderate Proteus mirabilis  Moderate Morganella morganii  Moderate Bacteroides fragilis "Susceptibilities not performed"  --  Proteus mirabilis  Morganella morganii      .Blood  09-10 @ 19:17   No growth to date.  --  --      .Blood  09-10 @ 19:16   No growth to date.  --  --

## 2019-09-15 NOTE — CHART NOTE - NSCHARTNOTEFT_GEN_A_CORE
EVENT: On insulin Lantus & NPO after MN  - Pt is Lantus 10 Units at bedtime but will be kept NPO after midnight as she is scheduled for debridement of  R planter heel ulceration & L lateral heel ulceration. Current blood glucose level is 138.    OBJECTIVE:  Vital Signs Last 24 Hrs  T(C): 36.7 (15 Sep 2019 15:49), Max: 37 (15 Sep 2019 08:09)  T(F): 98 (15 Sep 2019 15:49), Max: 98.6 (15 Sep 2019 08:09)  HR: 70 (15 Sep 2019 15:49) (62 - 80)  BP: 143/60 (15 Sep 2019 15:49) (115/66 - 143/60)  BP(mean): --  RR: 18 (15 Sep 2019 15:49) (16 - 18)  SpO2: 100% (15 Sep 2019 15:49) (100% - 100%)    FOCUSED PHYSICAL EXAM:  Neuro: awake, alert, oriented x 3. No neuro deficit  Cardiovascular: Pulses +2 B/L in lower and upper extremities, HR regular, BP stable, No edema.  Respiratory: Respirations regular, unlabored, breath sounds clear B/L.   GI: Abdomen soft, non-tender, positive bowel sounds.  : no bladder distention noted. No complaints at this time.  Skin: Dry, intact, no bruising, no diaphoresis.    LABS:                        9.9    9.11  )-----------( 319      ( 15 Sep 2019 06:13 )             30.4     09-15    137  |  103  |  7   ----------------------------<  134<H>  3.9   |  26  |  0.58    Ca    9.1      15 Sep 2019 06:13        EKG:   IMGAGING:    ASSESSMENT:  HPI:  73 Female from [facility] ,walks with walker with PMH of HTN, Diabetes, Osteoarthritis, Osteoporosis, Peripheral arterial disease, Diabetic neuropathy, HLD, Schizophrenia presented to Ed with worsening non healing heel ulcers x 1 month. Pt was sent in by PMD Dr Llanes for suspected Osteomyelitis . Pt states she doesnt remember when the ulcers originate but they are getting worse from last few months. Pt denies any fever,chills , pain , cough , SOB,CP ,abd pain or any other symptoms. Pt states she is compliant with her diabetic medications and nurse at the facility give her insulin when her sugars go beyond 200.   Outpatient records showed  negative duplex scan of the LE arteries. XRay of left calcaneous remarkable for possible osteomyelitis. XRay  of right calcaneous remarkable for osteomyelitis in the dorsal calcaneous.  ED course : afebrile, no WBC count , lactate 2.5 s/p 1 L NS bolus , Xray feet : didnt showed any evidence of OM . ESR elevated 74     SH : Denies Smoking, alcohol or drug use (10 Sep 2019 14:32)      PLAN: Give Lantus 5 units, SQ tonight instead of standing Lantus 10 Units

## 2019-09-15 NOTE — PROGRESS NOTE ADULT - NEGATIVE CARDIOVASCULAR SYMPTOMS
no palpitations/no chest pain/no dyspnea on exertion
no palpitations/no dyspnea on exertion/no chest pain

## 2019-09-15 NOTE — PROGRESS NOTE ADULT - SUBJECTIVE AND OBJECTIVE BOX
Chief Complaint : Patient is a 74y old  Female who presents with a chief complaint of diabetic foot ulcer (10 Sep 2019 14:32)    HPI :  73 Female from [facility] ,walks with walker with PMH of HTN, Diabetes, Osteoarthritis, Osteoporosis, Peripheral arterial disease, Diabetic neuropathy, HLD, Schizophrenia presented to Ed with worsening non healing heel ulcers x 1 month. Pt was sent in by PMD Dr Llanes for suspected Osteomyelitis . Pt states she doesnt remember when the ulcers originate but they are getting worse from last few months. Pt denies any fever,chills , pain , cough , SOB,CP ,abd pain or any other symptoms. Pt states she is compliant with her diabetic medications and nurse at the facility give her insulin when her sugars go beyond 200.   Outpatient records showed  negative duplex scan of the LE arteries. XRay of left calcaneous remarkable for possible osteomyelitis. XRay  of right calcaneous remarkable for osteomyelitis in the dorsal calcaneous.  ED course : afebrile, no WBC count , lactate 2.5 s/p 1 L NS bolus , Xray feet : didnt showed any evidence of OM . ESR elevated 74     S: Pt seen bedside resting. Pt states she understand she will be going to surgery monday AM and verbally consents.  Written consent will be obtained on monday. Pt states she walks with a walker but is often in bed. Pt denies pain to her heels. Pt denies F/N/V/C/SOB. Pt states she is ready to go whenever her heels are cleaned up enough.      SH : Denies Smoking, alcohol or drug use (10 Sep 2019 14:32)      Patient admits to  (-) Fevers, (-) Chills, (-) Nausea, (-) Vomiting, (-) Shortness of Breath      PMH: DM (diabetes mellitus)  Paranoid schizophrenia  HLD (hyperlipidemia)  PAD (peripheral artery disease)  HTN (hypertension)    PSH:No significant past surgical history      Allergies:No Known Allergies                Vital Signs Last 24 Hrs  T(C): 37 (15 Sep 2019 08:09), Max: 37 (15 Sep 2019 08:09)  T(F): 98.6 (15 Sep 2019 08:09), Max: 98.6 (15 Sep 2019 08:09)  HR: 62 (15 Sep 2019 08:09) (62 - 80)  BP: 121/54 (15 Sep 2019 08:09) (115/66 - 123/88)  BP(mean): --  RR: 16 (15 Sep 2019 08:09) (16 - 17)  SpO2: 100% (15 Sep 2019 08:09) (100% - 100%)    WBC Count: 9.11 K/uL (09-15 @ 06:13)  WBC Count: 9.27 K/uL (09-13 @ 06:41)  WBC Count: 9.00 K/uL (09-11 @ 06:28)  WBC Count: 9.75 K/uL (09-10 @ 09:50)                          9.9    9.11  )-----------( 319      ( 15 Sep 2019 06:13 )             30.4   09-15    137  |  103  |  7   ----------------------------<  134<H>  3.9   |  26  |  0.58    Ca    9.1      15 Sep 2019 06:13            Sedimentation Rate, Erythrocyte: 74 mm/Hr (09-10-19 @ 09:50)  C-Reactive Protein, Serum: 0.96 mg/dL (09-10-19 @ 15:00)    O:   General: Pleasant  female NAD & AOX3.    Integument:  Skin warm, dry and supple bilateral.    Ulceration Right plantar heel, + hyperkeratotic border, wound base fibrotic with necrotic cap, boggy and probes to bone wound size (4.8 cm X 5.6cm X 2.4cm) - edema, - mac-wound erythema, - purulence, +tunneling, + probe to bone, +malodor  Left lateral heel ulceration has fibrogranular base with necrotic eschar, 1.3cmx1.0cmx0.2cm, -purulence, -tunneling/tracking, -flatulence, -mac-wound erythema   Vascular: Dorsalis Pedis and Posterior Tibial pulses 2/4.  Capillary re-fill time less then 3 seconds digits 1-5 bilateral.    Neuro: Protective sensation diminished to the level of the digits bilateral.  -------------------------------------  < from: MR Foot No Cont, Left (09.11.19 @ 15:57) >  EXAM:  MR FOOT LT                            PROCEDURE DATE:  09/11/2019        INTERPRETATION:    LEFT FOOT MRI    CLINICAL INFORMATION: Foot wound. Diabetes. Eval for osteomyelitis.  TECHNIQUE: Multiplanar, multisequence MRI was obtained of the left foot.    FINDINGS:    Soft tissue ulcerative defect is visualized within the plantar lateral   aspect of the heel. There is an ulcerative tract extending to the   posterior lateral aspect of the plantar calcaneus. There is associated   decreasedT1 marrow signal with associated edema and osseous regions of   the calcaneus, consistent with osteomyelitis. 0.1 is visualized within   the subcutaneous tissues along the tract without abscess. Origin of the   plantar fascia is maintained. Mild thickening of the peroneus tendons,   which are otherwise intact. Remaining medial flexor, extensor and   Achilles tendons are unremarkable. Joint spaces are maintained. There is   diffuse increased muscle signal, likely related to denervation.     IMPRESSION:    Ulcer defect within the posterior lateral plantar aspect of the foot with   osteomyelitis of the adjacent calcaneus.    ---------------------------------------------------------    Culture - Surgical Swab (09.11.19 @ 03:16)    Specimen Source: .Surgical Swab    Culture Results:   Moderate Proteus mirabilis  Moderate Morganella morganii    < from: VA Physiol Extremity Lower 3+ Level, BI (09.10.19 @ 18:03) >    EXAM:  US PHYSIOL LWR EXT 3+ LEV BI                            PROCEDURE DATE:  09/10/2019          INTERPRETATION:  Clinical indication: Diabetic right foot ulcer    Comparison: None    Findings: There are biphasic waveforms bilaterally however, waveforms are   dampened at the level of the metatarsals. There is no abnormal segmental   pressure gradient.    Right TIMUR = 1.34  Left TIMUR = 1.26    Impression: Elevated right TIMUR compatible with calcified vessels.    Abnormally dampened waveforms at the level of the metatarsals.                STEPHAN WALL M.D., ATTENDING RADIOLOGIST  This document has been electronically signed. Sep 13 2019  8:38AM    < end of copied text >      A: Right plantar heel ulceration   left lateral heel ulceration       P:   Chart reviewed and Patient evaluated  Discussed diagnosis and treatment with patient  Culture reviewed  TIMUR reviewed  Applied dakins soaked gauze with dry sterile dressing  X-rays reviewed however [facility] were read positive OM changes   MRI reviewed as above with OM of bilateral heels  Continue with IV antibiotics As Per ID  NWB to right foot, Full WB to left foot   b/l offloading heel boots dispensed   OR debridement scheduled 9/16   NPO after midnight  Type and screen ordered  Please discuss Medical clearance if not already cleared  Podiatry will follow while in house.  Seen with Attending Dr. Morgan

## 2019-09-15 NOTE — PROGRESS NOTE ADULT - RS GEN PE MLT RESP DETAILS PC
breath sounds equal/good air movement/respirations non-labored/airway patent/clear to auscultation bilaterally
breath sounds equal/good air movement/airway patent/clear to auscultation bilaterally/respirations non-labored

## 2019-09-15 NOTE — PROGRESS NOTE ADULT - SUBJECTIVE AND OBJECTIVE BOX
Patient is a 74y old  Female who presents with a chief complaint of diabetic foot ulcer (15 Sep 2019 09:24)    PATIENT IS SEEN AND EXAMINED IN MEDICAL FLOOR.    ALLERGIES:  No Known Allergies      VITALS:    Vital Signs Last 24 Hrs  T(C): 36.7 (15 Sep 2019 15:49), Max: 37 (15 Sep 2019 08:09)  T(F): 98 (15 Sep 2019 15:49), Max: 98.6 (15 Sep 2019 08:09)  HR: 70 (15 Sep 2019 15:49) (62 - 80)  BP: 143/60 (15 Sep 2019 15:49) (115/66 - 143/60)  BP(mean): --  RR: 18 (15 Sep 2019 15:49) (16 - 18)  SpO2: 100% (15 Sep 2019 15:49) (100% - 100%)    LABS:    CBC Full  -  ( 15 Sep 2019 06:13 )  WBC Count : 9.11 K/uL  RBC Count : 3.30 M/uL  Hemoglobin : 9.9 g/dL  Hematocrit : 30.4 %  Platelet Count - Automated : 319 K/uL  Mean Cell Volume : 92.1 fl  Mean Cell Hemoglobin : 30.0 pg  Mean Cell Hemoglobin Concentration : 32.6 gm/dL  Auto Neutrophil # : x  Auto Lymphocyte # : x  Auto Monocyte # : x  Auto Eosinophil # : x  Auto Basophil # : x  Auto Neutrophil % : x  Auto Lymphocyte % : x  Auto Monocyte % : x  Auto Eosinophil % : x  Auto Basophil % : x      09-15    137  |  103  |  7   ----------------------------<  134<H>  3.9   |  26  |  0.58    Ca    9.1      15 Sep 2019 06:13      CAPILLARY BLOOD GLUCOSE    POCT Blood Glucose.: 138 mg/dL (15 Sep 2019 21:15)  POCT Blood Glucose.: 167 mg/dL (15 Sep 2019 16:53)  POCT Blood Glucose.: 204 mg/dL (15 Sep 2019 11:35)  POCT Blood Glucose.: 153 mg/dL (15 Sep 2019 08:30)    Creatinine Trend: 0.58<--, 0.65<--, 0.43<--, 0.63<--  I&O's Summary      .Surgical Swab  09-11 @ 03:16   Moderate Proteus mirabilis  Moderate Morganella morganii  Moderate Bacteroides fragilis "Susceptibilities not performed"  --  Proteus mirabilis  Morganella morganii      .Blood  09-10 @ 19:17   No growth at 5 days.  --  --      .Blood  09-10 @ 19:16   No growth at 5 days.  --  --          MEDICATIONS:    MEDICATIONS  (STANDING):  ascorbic acid 500 milliGRAM(s) Oral daily  aspirin enteric coated 81 milliGRAM(s) Oral daily  chlorhexidine 2% Cloths 1 Application(s) Topical daily  Dakins Solution - Full Strength 1 Application(s) Topical daily  dextrose 50% Injectable 25 Gram(s) IV Push once  doxazosin 4 milliGRAM(s) Oral at bedtime  enoxaparin Injectable 40 milliGRAM(s) SubCutaneous daily  ferrous    sulfate 325 milliGRAM(s) Oral daily  folic acid 1 milliGRAM(s) Oral daily  insulin glargine Injectable (LANTUS) 10 Unit(s) SubCutaneous at bedtime  insulin glargine Injectable (LANTUS) 5 Unit(s) SubCutaneous once  insulin lispro (HumaLOG) corrective regimen sliding scale   SubCutaneous Before meals and at bedtime  metoprolol tartrate 100 milliGRAM(s) Oral two times a day  perphenazine 16 milliGRAM(s) Oral every 12 hours  piperacillin/tazobactam IVPB.. 3.375 Gram(s) IV Intermittent every 8 hours  simvastatin 40 milliGRAM(s) Oral at bedtime      MEDICATIONS  (PRN):  acetaminophen   Tablet .. 650 milliGRAM(s) Oral every 6 hours PRN Mild Pain (1 - 3)  sodium chloride 0.9% lock flush 10 milliLiter(s) IV Push every 1 hour PRN Pre/post blood products, medications, blood draw, and to maintain line patency      REVIEW OF SYSTEMS:                           ALL ROS DONE [ X   ]    CONSTITUTIONAL:  LETHARGIC [   ], FEVER [   ], UNRESPONSIVE [   ]  CVS:  CP  [   ], SOB, [   ], PALPITATIONS [   ], DIZZYNESS [   ]  RS: COUGH [   ], SPUTUM [   ]  GI: ABDOMINAL PAIN [   ], NAUSEA [   ], VOMITINGS [   ], DIARRHEA [   ], CONSTIPATION [   ]  :  DYSURIA [   ], NOCTURIA [   ], INCREASED FREQUENCY [   ], DRIBLING [   ],  SKELETAL: PAINFUL JOINTS [   ], SWOLLEN JOINTS [   ], NECK ACHE [   ], LOW BACK ACHE [   ],  SKIN : ULCERS [   ], RASH [   ], ITCHING [   ]  CNS: HEAD ACHE [   ], DOUBLE VISION [   ], BLURRED VISION [   ], AMS / CONFUSION [   ], SEIZURES [   ], WEAKNESS [   ],TINGLING / NUMBNESS [   ]    PHYSICAL EXAMINATION:  GENERAL APPEARANCE: NO DISTRESS  HEENT:  NO PALLOR, NO  JVD,  NO   NODES, NECK SUPPLE  CVS: S1 +, S2 +,   RS: AEEB,  OCCASIONAL  RALES +,   NO RONCHI  ABD: SOFT, NT, NO, BS +  EXT: NO PE  SKIN: WARM, B/L HEEL ULCERS   SKELETAL:  ROM ACCEPTABLE  CNS:  AAO X 3, NO DEFICITS    RADIOLOGY :    < from: MR Foot No Cont, Left (09.11.19 @ 15:57) >  IMPRESSION:    Ulcer defect within the posterior lateral plantar aspect of the foot with   osteomyelitis of the adjacent calcaneus.    < end of copied text >    < from: MR Foot No Cont, Right (09.11.19 @ 15:56) >  IMPRESSION:  Posterior foot ulcer with osteomyelitis of the adjacent calcaneus.    < end of copied text >      ASSESSMENT :     Osteomyelitis  DM (diabetes mellitus)  Paranoid schizophrenia  HLD (hyperlipidemia)  PAD (peripheral artery disease)  HTN (hypertension)      PLAN:  HPI:  73 Female from [facility] ,walks with walker with PMH of HTN, Diabetes, Osteoarthritis, Osteoporosis, Peripheral arterial disease, Diabetic neuropathy, HLD, Schizophrenia presented to Ed with worsening non healing heel ulcers x 1 month. Pt was sent in by PMD Dr Llanes for suspected Osteomyelitis . Pt states she doesnt remember when the ulcers originate but they are getting worse from last few months. Pt denies any fever,chills , pain , cough , SOB,CP ,abd pain or any other symptoms. Pt states she is compliant with her diabetic medications and nurse at the facility give her insulin when her sugars go beyond 200.   Outpatient records showed  negative duplex scan of the LE arteries. XRay of left calcaneous remarkable for possible osteomyelitis. XRay  of right calcaneous remarkable for osteomyelitis in the dorsal calcaneous.  ED course : afebrile, no WBC count , lactate 2.5 s/p 1 L NS bolus , Xray feet : didnt showed any evidence of OM . ESR elevated 74     SH : Denies Smoking, alcohol or drug use (10 Sep 2019 14:32)    - PATIENT IS SCHEDULED FOR B/L HEEL DEBRIDEMENT IN AM . D/W PODIATRY TEAM. PATIENT IS MODERATE SURGICAL RISK  - PATIENT HAD EVALUATION BY PSYCHIATRIST & PATIENT HAS CAPACITY OF DECISION MAKING   - B/L CALCANEAL OSTEOMYELITIS , S/P PICC LINE INSERTION IN LEFT UPPER EXTREMITY. ON IV ZOSYN. SURGICAL CXS REVEAL PROTEUS AND MORGANELLA. FINAL CXS / SENSITIVITY PENDING  - PAD AND B/L LOWER EXTREMITY ARTERIAL INSUFFICIENCY, ARTERIOSCLEROSIS, DIABETIC PERIPHERAL NEUROPATHY, B/L HEEL DECUBITII AND ARTERIAL ULCERS. WOUND HEALING WILL BE POOR. POOR PROGNOSIS  - DC PLAN BACK TO [facility] AFTER B/L HEEL DEBRIDEMENT  - DR. LLANES .

## 2019-09-15 NOTE — PROGRESS NOTE ADULT - NEGATIVE GENERAL GENITOURINARY SYMPTOMS
no flank pain R/no hematuria/no flank pain L/no dysuria
no dysuria/no hematuria/no flank pain R/no flank pain L

## 2019-09-16 ENCOUNTER — TRANSCRIPTION ENCOUNTER (OUTPATIENT)
Age: 74
End: 2019-09-16

## 2019-09-16 VITALS — WEIGHT: 144.84 LBS

## 2019-09-16 LAB
APTT BLD: 52.4 SEC — HIGH (ref 27.5–36.3)
BLD GP AB SCN SERPL QL: SIGNIFICANT CHANGE UP
GLUCOSE BLDC GLUCOMTR-MCNC: 141 MG/DL — HIGH (ref 70–99)
GLUCOSE BLDC GLUCOMTR-MCNC: 157 MG/DL — HIGH (ref 70–99)
INR BLD: 1.2 RATIO — HIGH (ref 0.88–1.16)
PROTHROM AB SERPL-ACNC: 13.4 SEC — HIGH (ref 10–12.9)

## 2019-09-16 RX ADMIN — Medication 1: at 12:03

## 2019-09-16 RX ADMIN — Medication 100 MILLIGRAM(S): at 06:00

## 2019-09-16 RX ADMIN — PIPERACILLIN AND TAZOBACTAM 25 GRAM(S): 4; .5 INJECTION, POWDER, LYOPHILIZED, FOR SOLUTION INTRAVENOUS at 06:00

## 2019-09-16 RX ADMIN — Medication 1 MILLIGRAM(S): at 11:33

## 2019-09-16 RX ADMIN — Medication 81 MILLIGRAM(S): at 11:33

## 2019-09-16 RX ADMIN — Medication 500 MILLIGRAM(S): at 11:33

## 2019-09-16 RX ADMIN — PERPHENAZINE 16 MILLIGRAM(S): 8 TABLET, FILM COATED ORAL at 06:00

## 2019-09-16 RX ADMIN — Medication 325 MILLIGRAM(S): at 11:33

## 2019-09-16 RX ADMIN — Medication 1 APPLICATION(S): at 11:32

## 2019-09-16 RX ADMIN — INSULIN GLARGINE 5 UNIT(S): 100 INJECTION, SOLUTION SUBCUTANEOUS at 00:07

## 2019-09-16 RX ADMIN — PIPERACILLIN AND TAZOBACTAM 25 GRAM(S): 4; .5 INJECTION, POWDER, LYOPHILIZED, FOR SOLUTION INTRAVENOUS at 14:21

## 2019-09-16 RX ADMIN — ENOXAPARIN SODIUM 40 MILLIGRAM(S): 100 INJECTION SUBCUTANEOUS at 11:32

## 2019-09-16 RX ADMIN — CHLORHEXIDINE GLUCONATE 1 APPLICATION(S): 213 SOLUTION TOPICAL at 11:32

## 2019-09-16 NOTE — DIETITIAN INITIAL EVALUATION ADULT. - PHYSICAL APPEARANCE
well nourished/BMI = 24.5/other (specify) B/L diabetic foot ulcers. B/L diabetic foot ulcers with care & followed by Podiatry

## 2019-09-16 NOTE — DIETITIAN INITIAL EVALUATION ADULT. - NS FNS WEIGHT USED FOR CALC
ideal/Ht = 5'2"   Wt = 134 lb   IBW = 110 lb + 10% = 121 lb Ht = 5'2"   current wt. 134 lb,  IBW = 110 lb + 10% = 121 lb/current

## 2019-09-16 NOTE — PROGRESS NOTE ADULT - SUBJECTIVE AND OBJECTIVE BOX
Chief Complaint : Patient is a 74y old  Female who presents with a chief complaint of diabetic foot ulcer (10 Sep 2019 14:32)    HPI :  73 Female from [facility] ,walks with walker with PMH of HTN, Diabetes, Osteoarthritis, Osteoporosis, Peripheral arterial disease, Diabetic neuropathy, HLD, Schizophrenia presented to Ed with worsening non healing heel ulcers x 1 month. Pt was sent in by PMD Dr Llanes for suspected Osteomyelitis . Pt states she doesnt remember when the ulcers originate but they are getting worse from last few months. Pt denies any fever,chills , pain , cough , SOB,CP ,abd pain or any other symptoms. Pt states she is compliant with her diabetic medications and nurse at the facility give her insulin when her sugars go beyond 200.   Outpatient records showed  negative duplex scan of the LE arteries. XRay of left calcaneous remarkable for possible osteomyelitis. XRay  of right calcaneous remarkable for osteomyelitis in the dorsal calcaneous.  ED course : afebrile, no WBC count , lactate 2.5 s/p 1 L NS bolus , Xray feet : didnt showed any evidence of OM . ESR elevated 74     S: Pt seen bedside this morning. Pt refused to sign consent for surgical debridement this AM. Pt continually states "I want to go home." Pt states she understands she has an infected wound but still repeats she will take her chances with it if it means she can go home today.       SH : Denies Smoking, alcohol or drug use (10 Sep 2019 14:32)      Patient admits to  (-) Fevers, (-) Chills, (-) Nausea, (-) Vomiting, (-) Shortness of Breath      PMH: DM (diabetes mellitus)  Paranoid schizophrenia  HLD (hyperlipidemia)  PAD (peripheral artery disease)  HTN (hypertension)    PSH:No significant past surgical history      Allergies:No Known Allergies                Vital Signs Last 24 Hrs  T(C): 37 (15 Sep 2019 08:09), Max: 37 (15 Sep 2019 08:09)  T(F): 98.6 (15 Sep 2019 08:09), Max: 98.6 (15 Sep 2019 08:09)  HR: 62 (15 Sep 2019 08:09) (62 - 80)  BP: 121/54 (15 Sep 2019 08:09) (115/66 - 123/88)  BP(mean): --  RR: 16 (15 Sep 2019 08:09) (16 - 17)  SpO2: 100% (15 Sep 2019 08:09) (100% - 100%)    WBC Count: 9.11 K/uL (09-15 @ 06:13)  WBC Count: 9.27 K/uL (09-13 @ 06:41)  WBC Count: 9.00 K/uL (09-11 @ 06:28)  WBC Count: 9.75 K/uL (09-10 @ 09:50)                          9.9    9.11  )-----------( 319      ( 15 Sep 2019 06:13 )             30.4   09-15    137  |  103  |  7   ----------------------------<  134<H>  3.9   |  26  |  0.58    Ca    9.1      15 Sep 2019 06:13            Sedimentation Rate, Erythrocyte: 74 mm/Hr (09-10-19 @ 09:50)  C-Reactive Protein, Serum: 0.96 mg/dL (09-10-19 @ 15:00)    O:   General: Pleasant  female NAD & AOX3.    Integument:  Skin warm, dry and supple bilateral.    Ulceration Right plantar heel, + hyperkeratotic border, wound base fibrotic with necrotic cap, boggy and probes to bone wound size (4.8 cm X 5.6cm X 2.4cm) - edema, - mac-wound erythema, - purulence, +tunneling, + probe to bone, +malodor  Left lateral heel ulceration has fibrogranular base with necrotic eschar, 1.3cmx1.0cmx0.2cm, -purulence, -tunneling/tracking, -flatulence, -mac-wound erythema   Vascular: Dorsalis Pedis and Posterior Tibial pulses 2/4.  Capillary re-fill time less then 3 seconds digits 1-5 bilateral.    Neuro: Protective sensation diminished to the level of the digits bilateral.  -------------------------------------  < from: MR Foot No Cont, Left (09.11.19 @ 15:57) >  EXAM:  MR FOOT LT                            PROCEDURE DATE:  09/11/2019        INTERPRETATION:    LEFT FOOT MRI    CLINICAL INFORMATION: Foot wound. Diabetes. Eval for osteomyelitis.  TECHNIQUE: Multiplanar, multisequence MRI was obtained of the left foot.    FINDINGS:    Soft tissue ulcerative defect is visualized within the plantar lateral   aspect of the heel. There is an ulcerative tract extending to the   posterior lateral aspect of the plantar calcaneus. There is associated   decreasedT1 marrow signal with associated edema and osseous regions of   the calcaneus, consistent with osteomyelitis. 0.1 is visualized within   the subcutaneous tissues along the tract without abscess. Origin of the   plantar fascia is maintained. Mild thickening of the peroneus tendons,   which are otherwise intact. Remaining medial flexor, extensor and   Achilles tendons are unremarkable. Joint spaces are maintained. There is   diffuse increased muscle signal, likely related to denervation.     IMPRESSION:    Ulcer defect within the posterior lateral plantar aspect of the foot with   osteomyelitis of the adjacent calcaneus.    ---------------------------------------------------------    Culture - Surgical Swab (09.11.19 @ 03:16)    Specimen Source: .Surgical Swab    Culture Results:   Moderate Proteus mirabilis  Moderate Morganella morganii    < from: VA Physiol Extremity Lower 3+ Level, BI (09.10.19 @ 18:03) >    EXAM:  US PHYSIOL LWR EXT 3+ LEV BI                            PROCEDURE DATE:  09/10/2019          INTERPRETATION:  Clinical indication: Diabetic right foot ulcer    Comparison: None    Findings: There are biphasic waveforms bilaterally however, waveforms are   dampened at the level of the metatarsals. There is no abnormal segmental   pressure gradient.    Right TIMUR = 1.34  Left TIMUR = 1.26    Impression: Elevated right TIMUR compatible with calcified vessels.    Abnormally dampened waveforms at the level of the metatarsals.                STEPHAN WALL M.D., ATTENDING RADIOLOGIST  This document has been electronically signed. Sep 13 2019  8:38AM    < end of copied text >      A: Right plantar heel ulceration   left lateral heel ulceration       P:   Chart reviewed and Patient evaluated  Discussed diagnosis and treatment with patient  Culture reviewed  TIMUR reviewed  Applied dakins soaked gauze with dry sterile dressing  MRI reviewed as above with OM of bilateral heels  Continue with IV antibiotics As Per ID  NWB to right foot, Full WB to left foot   b/l offloading heel boots dispensed   Pt refused to consent to surgery for wound debridement.   Refusal signed and placed in chart   Pt should continue with IV antibiotics per ID   Wound care orders: betadine DSD daily to b/l heel wounds. Keep dressing clean dry and intact. Place feet in offloading boots when in bed.   Discussed with

## 2019-09-16 NOTE — PROGRESS NOTE ADULT - ATTENDING COMMENTS
Strict off-loading to heels.  followup bilateral MRI to assess for any underlying OM.  local wound care for now.
I agree with above
I agree with above
PATIENT WAS SEEN AND EXAMINED  - B/L CALCANEAL OSTEOMYELITIS , S/P PICC LINE INSERTION IN LEFT UPPER EXTREMITY. ON IV ZOSYN. SURGICAL CXS REVEAL PROTEUS AND MORGANELLA. FINAL CXS / SENSITIVITY PENDING  - PAD AND B/L LOWER EXTREMITY ARTERIAL INSUFFICIENCY, DIABETIC PERIPHERAL NEUROPATHY, B/L HEEL DECUBITII AND ARTERIAL ULCERS. WOUND HEALING WILL BE POOR. POOR PROGNOSIS  - DC PLAN BACK TO [facility] IN AM   - DR. CASILLAS
PATIENT WAS SEEN AND EXAMINED  - MRI OF RIGHT FOOT SEEN  - OSTEOMYELITIS OF RIGHT CALCANEUM. PL OBTAIN ID CONSULT DR. VUONG  - NV PLAN BACK TO [facility] WITH ANTIBIOTICS FOR TOTAL 6 WEEKS  - VASCULAR F/UP  - PAD, WITH ARTERIAL INSUFFICIENCY OF B//L LOWER EXTREMITIES  - ARTERIAL ULCERS OF B/L FEET  - ANEMIA OF CHRONIC DISEASE   - DR. CASILLAS
PATIENT WAS SEEN AND EXAMINED  - D/W PODIATRY AND PATIENT . S/P PSYCH EVALUATION. PATIENT HAS CAPACITY OF DECISION MAKING. PATIENT IS MODERATE SURGICAL RISK FOR DEBRIDEMENT OF HEELS  - B/L CALCANEAL OSTEOMYELITIS , S/P PICC LINE INSERTION IN LEFT UPPER EXTREMITY. ON IV ZOSYN. SURGICAL CXS REVEAL PROTEUS AND MORGANELLA. FINAL CXS / SENSITIVITY PENDING  - PAD AND B/L LOWER EXTREMITY ARTERIAL INSUFFICIENCY, DIABETIC PERIPHERAL NEUROPATHY, B/L HEEL DECUBITII AND ARTERIAL ULCERS. WOUND HEALING WILL BE POOR. POOR PROGNOSIS  - DC PLAN BACK TO [facility] [place]    - DR. CASILLAS .
I agree with above
I agree with above  DC planning for Monday and can get debridement at the NH if she agrees in the near future.

## 2019-09-16 NOTE — DISCHARGE NOTE NURSING/CASE MANAGEMENT/SOCIAL WORK - PATIENT PORTAL LINK FT
You can access the FollowMyHealth Patient Portal offered by [facility] by registering at the following website: [email or web address]. By joining [employer]’s FollowMyHealth portal, you will also be able to view your health information using other applications (apps) compatible with our system.

## 2019-09-16 NOTE — PROGRESS NOTE ADULT - PROVIDER SPECIALTY LIST ADULT
Infectious Disease
Infectious Disease
Internal Medicine
Podiatry
Infectious Disease
Infectious Disease

## 2019-09-16 NOTE — PROGRESS NOTE ADULT - SUBJECTIVE AND OBJECTIVE BOX
74y Female is under our care for bilateral calcaneal osteomyelitis 2/2 diabetic foot wounds. Patient was suppose to be going for surgical debridement today, but has refused. Due for dc to AL today on IV antibiotics via PICC line. Heel culture is growing proteus mirabilis, morganella morganii and bacteroides.    REVIEW OF SYSTEMS:  [  ] Not able to illicit  General: no fevers no malaise  Chest: no cough no sob  GI: no nvd  Skin: no rashes  Musculoskeletal: no trauma no LBP  Neuro: no ha's no dizziness     MEDS:  piperacillin/tazobactam IVPB.. 3.375 Gram(s) IV Intermittent every 8 hours    ALLERGIES: Allergies    No Known Allergies    Intolerances    VITALS:  Vital Signs Last 24 Hrs  T(C): 36.8 (16 Sep 2019 07:58), Max: 37.3 (16 Sep 2019 00:03)  T(F): 98.2 (16 Sep 2019 07:58), Max: 99.2 (16 Sep 2019 00:03)  HR: 62 (16 Sep 2019 07:58) (62 - 70)  BP: 120/48 (16 Sep 2019 07:58) (110/50 - 143/60)  BP(mean): --  RR: 17 (16 Sep 2019 07:58) (17 - 18)  SpO2: 100% (16 Sep 2019 07:58) (100% - 100%)      PHYSICAL EXAM:  HEENT: n/sa  Neck: supple no LN's   Respiratory: lungs clear no rales no rhonchi  Cardiovascular: S1 S2 reg no murmurs  Gastrointestinal: +BS with soft, nondistended abdomen; nontender  Extremities: no edema   Skin: noted to have bilateral heel ulceration worse on right +malodorous no tenderness  Ortho: n/a  Neuro: AAO x 3      LABS/DIAGNOSTIC TESTS:   No new labs         CULTURES:   .Surgical Swab  09-11 @ 03:16   Moderate Proteus mirabilis  Moderate Morganella morganii  Moderate Bacteroides fragilis "Susceptibilities not performed"  --  Proteus mirabilis  Morganella morganii      .Blood  09-10 @ 19:17   No growth to date.  --  --      .Blood  09-10 @ 19:16   No growth to date.  --  --        RADIOLOGY:  no new studies 74y Female is under our care for bilateral calcaneal osteomyelitis 2/2 diabetic foot wounds. Patient was suppose to be going for surgical debridement today, but has refused. Due for dc to AL today on IV antibiotics via PICC line. Heel culture is growing proteus mirabilis, morganella morganii and bacteroides.    REVIEW OF SYSTEMS:  [  ] Not able to illicit  General: no fevers no malaise  Chest: no cough no sob  GI: no nvd  Skin: no rashes  Musculoskeletal: no trauma no LBP  Neuro: no ha's no dizziness     MEDS:  piperacillin/tazobactam IVPB.. 3.375 Gram(s) IV Intermittent every 8 hours    ALLERGIES: Allergies    No Known Allergies    Intolerances    VITALS:  Vital Signs Last 24 Hrs  T(C): 36.8 (16 Sep 2019 07:58), Max: 37.3 (16 Sep 2019 00:03)  T(F): 98.2 (16 Sep 2019 07:58), Max: 99.2 (16 Sep 2019 00:03)  HR: 62 (16 Sep 2019 07:58) (62 - 70)  BP: 120/48 (16 Sep 2019 07:58) (110/50 - 143/60)  BP(mean): --  RR: 17 (16 Sep 2019 07:58) (17 - 18)  SpO2: 100% (16 Sep 2019 07:58) (100% - 100%)      PHYSICAL EXAM:  HEENT: n/sa  Neck: supple no LN's   Respiratory: lungs clear no rales no rhonchi  Cardiovascular: S1 S2 reg no murmurs  Gastrointestinal: +BS with soft, nondistended abdomen; nontender  Extremities: no edema,  +left arm PICC  Skin: bilateral feet are dressed and secured with boots  Ortho: n/a  Neuro: AAO x 3      LABS/DIAGNOSTIC TESTS:   No new labs         CULTURES:   .Surgical Swab  09-11 @ 03:16   Moderate Proteus mirabilis  Moderate Morganella morganii  Moderate Bacteroides fragilis "Susceptibilities not performed"  --  Proteus mirabilis  Morganella morganii      .Blood  09-10 @ 19:17   No growth to date.  --  --      .Blood  09-10 @ 19:16   No growth to date.  --  --        RADIOLOGY:  no new studies

## 2019-09-16 NOTE — DIETITIAN INITIAL EVALUATION ADULT. - OTHER INFO
Pt alert, oriented, well communicated. Lives at NH alone PTA with assistance from nonrelative caregiver; reports good intake in NH, no chewing/swallowing difficulties; no GI distress, nausea, vomiting, diarrhea; no herbal/vitamin/mineral supplements; food preferences obtained and sent to dietary Pt reports UBW to be 145 lb, now 134 lb after moving to NH 3 mo ago; wanted further education on consistent CHO diet, very receptive to basic written and verbal instruction given. Pending debridement 9/16/19. Pt alert, oriented, well communicated. Lives at NH alone PTA with assistance from nonrelative caregiver; reports good intake in NH, no chewing/swallowing difficulties; no GI distress, nausea, vomiting, diarrhea; no herbal/vitamin/mineral supplements; food preferences obtained and sent to dietary. Pt reports UBW to be 145 lb, now 134 lb after moving to NH 3 mo ago; wanted further education on consistent CHO diet, basic written and verbal instruction given, very receptive. Pending debridement 9/16/19 noted. Pt alert, oriented, well communicated. Lives at NH alone PTA with assistance from non relative caregiver; reports good intake in NH, no chewing/swallowing difficulties; no GI distress, nausea, vomiting, diarrhea; no herbal/vitamin/mineral supplements; food preferences obtained and sent to dietary. Pt reports UBW to be 145 lbs & may have loss wt. but unclear? Pt.  wanted further education on consistent CHO diet, basic written and verbal instruction given, very receptive. Pending debridement 9/16/19 & pt. refusing per surgery note, Psych consult noted.

## 2019-09-16 NOTE — DIETITIAN INITIAL EVALUATION ADULT. - PERTINENT LABORATORY DATA
Glucose, serum = 134  HbA1c = 7.6  BP = 143/60 Glucose, serum = 134  HbA1c = 7.6  HDL Cholesterol, serum = 37  BP = 143/60

## 2019-09-16 NOTE — PROGRESS NOTE ADULT - REASON FOR ADMISSION
diabetic foot ulcer

## 2019-09-16 NOTE — DIETITIAN INITIAL EVALUATION ADULT. - PROBLEM SELECTOR PLAN 1
p/w non healing b/l heel ulcers   - afebrile , no white count , lactate 2.5   - s/p 1 L NS  n unasyn IV x1 in ED   - xray inpt  : no evidence of OM   - will start on unasyn 3 g q6   - pain control with tylenol   - Pod consult : necrotic tissue removed   - Will do ABIs   - need MRI to r/o OM   - consult vascular Dr Camacho after ABIs are done  - ID Dr Wellington

## 2019-09-18 ENCOUNTER — APPOINTMENT (OUTPATIENT)
Dept: WOUND CARE | Facility: CLINIC | Age: 74
End: 2019-09-18

## 2019-09-24 ENCOUNTER — OUTPATIENT (OUTPATIENT)
Dept: OUTPATIENT SERVICES | Facility: HOSPITAL | Age: 74
LOS: 1 days | End: 2019-09-24
Payer: MEDICARE

## 2019-09-24 ENCOUNTER — APPOINTMENT (OUTPATIENT)
Dept: WOUND CARE | Facility: CLINIC | Age: 74
End: 2019-09-24

## 2019-09-24 VITALS
DIASTOLIC BLOOD PRESSURE: 70 MMHG | OXYGEN SATURATION: 99 % | TEMPERATURE: 98.6 F | HEART RATE: 68 BPM | SYSTOLIC BLOOD PRESSURE: 165 MMHG | RESPIRATION RATE: 18 BRPM

## 2019-09-24 DIAGNOSIS — E11.49 TYPE 2 DIABETES MELLITUS WITH OTHER DIABETIC NEUROLOGICAL COMPLICATION: ICD-10-CM

## 2019-09-24 DIAGNOSIS — Z00.00 ENCOUNTER FOR GENERAL ADULT MEDICAL EXAMINATION WITHOUT ABNORMAL FINDINGS: ICD-10-CM

## 2019-09-24 DIAGNOSIS — Z86.39 PERSONAL HISTORY OF OTHER ENDOCRINE, NUTRITIONAL AND METABOLIC DISEASE: ICD-10-CM

## 2019-09-24 DIAGNOSIS — Z87.39 PERSONAL HISTORY OF OTHER DISEASES OF THE MUSCULOSKELETAL SYSTEM AND CONNECTIVE TISSUE: ICD-10-CM

## 2019-09-24 DIAGNOSIS — Z86.79 PERSONAL HISTORY OF OTHER DISEASES OF THE CIRCULATORY SYSTEM: ICD-10-CM

## 2019-09-24 DIAGNOSIS — Z86.59 PERSONAL HISTORY OF OTHER MENTAL AND BEHAVIORAL DISORDERS: ICD-10-CM

## 2019-09-24 DIAGNOSIS — Z79.4 TYPE 2 DIABETES MELLITUS WITH OTHER DIABETIC NEUROLOGICAL COMPLICATION: ICD-10-CM

## 2019-09-24 DIAGNOSIS — K30 FUNCTIONAL DYSPEPSIA: ICD-10-CM

## 2019-09-24 DIAGNOSIS — R77.0 ABNORMALITY OF ALBUMIN: ICD-10-CM

## 2019-09-24 PROCEDURE — 11042 DBRDMT SUBQ TIS 1ST 20SQCM/<: CPT

## 2019-09-24 RX ORDER — METOPROLOL TARTRATE 100 MG/1
100 TABLET, FILM COATED ORAL
Refills: 0 | Status: ACTIVE | COMMUNITY

## 2019-09-24 RX ORDER — SODIUM CHLORIDE 0.9 % (FLUSH) 0.9 %
0.9 SYRINGE (ML) INJECTION
Refills: 0 | Status: ACTIVE | COMMUNITY

## 2019-09-24 RX ORDER — PIPERACILLIN SODIUM AND TAZOBACTAM SODIUM 3; .375 G/15ML; G/15ML
3.375 (3-0.375) INJECTION, POWDER, FOR SOLUTION INTRAVENOUS
Refills: 0 | Status: ACTIVE | COMMUNITY

## 2019-09-24 RX ORDER — PNV NO.95/FERROUS FUM/FOLIC AC 28MG-0.8MG
100 TABLET ORAL
Refills: 0 | Status: ACTIVE | COMMUNITY

## 2019-09-24 RX ORDER — ACETAMINOPHEN 325 MG/1
325 TABLET, FILM COATED ORAL
Refills: 0 | Status: ACTIVE | COMMUNITY

## 2019-09-24 RX ORDER — MELATONIN 3 MG
25 MCG TABLET ORAL
Refills: 0 | Status: ACTIVE | COMMUNITY

## 2019-09-24 RX ORDER — SIMVASTATIN 40 MG/1
40 TABLET, FILM COATED ORAL
Refills: 0 | Status: ACTIVE | COMMUNITY

## 2019-09-24 RX ORDER — LOSARTAN POTASSIUM AND HYDROCHLOROTHIAZIDE 100; 25 MG/1; MG/1
100-25 TABLET, FILM COATED ORAL
Refills: 0 | Status: ACTIVE | COMMUNITY

## 2019-09-24 RX ORDER — GLIPIZIDE 10 MG/1
10 TABLET, EXTENDED RELEASE ORAL
Refills: 0 | Status: ACTIVE | COMMUNITY

## 2019-09-24 RX ORDER — DOXAZOSIN 4 MG/1
4 TABLET ORAL
Refills: 0 | Status: ACTIVE | COMMUNITY

## 2019-09-24 RX ORDER — MAG HYDROX/ALUMINUM HYD/SIMETH 200-200-20
200-200-20 SUSPENSION, ORAL (FINAL DOSE FORM) ORAL
Refills: 0 | Status: ACTIVE | COMMUNITY

## 2019-09-24 RX ORDER — CANAGLIFLOZIN 100 MG/1
100 TABLET, FILM COATED ORAL
Refills: 0 | Status: ACTIVE | COMMUNITY

## 2019-09-24 RX ORDER — FOLIC ACID 1 MG/1
1 TABLET ORAL
Refills: 0 | Status: ACTIVE | COMMUNITY

## 2019-09-24 RX ORDER — ASPIRIN 81 MG
81 TABLET, DELAYED RELEASE (ENTERIC COATED) ORAL
Refills: 0 | Status: ACTIVE | COMMUNITY

## 2019-09-24 RX ORDER — LINAGLIPTIN 5 MG/1
5 TABLET, FILM COATED ORAL
Refills: 0 | Status: ACTIVE | COMMUNITY

## 2019-09-24 RX ORDER — METFORMIN HYDROCHLORIDE 1000 MG/1
1000 TABLET, COATED ORAL
Refills: 0 | Status: ACTIVE | COMMUNITY

## 2019-09-24 RX ORDER — LACTULOSE 10 G/15ML
10 SOLUTION ORAL; RECTAL
Refills: 0 | Status: ACTIVE | COMMUNITY

## 2019-09-24 RX ORDER — FERROUS SULFATE TAB EC 325 MG (65 MG FE EQUIVALENT) 325 (65 FE) MG
325 (65 FE) TABLET DELAYED RESPONSE ORAL
Refills: 0 | Status: ACTIVE | COMMUNITY

## 2019-09-24 RX ORDER — INSULIN LISPRO 100 [IU]/ML
100 INJECTION, SOLUTION INTRAVENOUS; SUBCUTANEOUS
Refills: 0 | Status: ACTIVE | COMMUNITY

## 2019-09-24 RX ORDER — ENOXAPARIN SODIUM 100 MG/ML
40 INJECTION SUBCUTANEOUS
Refills: 0 | Status: ACTIVE | COMMUNITY

## 2019-09-24 RX ORDER — PERPHENAZINE 16 MG/1
16 TABLET ORAL
Refills: 0 | Status: ACTIVE | COMMUNITY

## 2019-09-24 NOTE — HISTORY OF PRESENT ILLNESS
[FreeTextEntry1] : CC: 73 yo female diabetic presents to clinic with a complaint of painful elongated nails. \par \par HPI:\par Patient states she was in the hospital for the heel ulcers Patient states she has had the ulcers for about a couple of months. Patient states she was given a PICC line and discharged because patient did not want any surgical procedure in the OR. Patient states she now lives in a nursing home where she gets daily dressing changes with a nurse. Patient denies any fever, nausea, vomiting, SOB.

## 2019-09-24 NOTE — PHYSICAL EXAM
[FreeTextEntry1] : posterior heel [FreeTextEntry2] : 2.5 [FreeTextEntry3] : 2.0 [de-identified] : betadine [FreeTextEntry7] : posterior heel [FreeTextEntry8] : 2.5 [FreeTextEntry9] : 1.5 [de-identified] : betadine [TWNoteComboBox1] : Right [TWNoteComboBox2] : 3 [TWNoteComboBox4] : Small [TWNoteComboBox6] : Pressure [de-identified] : Mild [de-identified] : 50% [TWNoteComboBox7] : Grant [de-identified] : Debridement performed of all devitalized tissue to bleeding viable tissue [TWNoteComboBox9] : Left [de-identified] : 3 [de-identified] : Small [de-identified] : Pressure [de-identified] : Mild [TWNoteComboBox8] : Grant

## 2019-09-24 NOTE — PLAN
[FreeTextEntry1] : Vasc: DP/PT is \par Derm: dystrophic elongated nails x10; posterior heel ulcers present as described in the wound care PE section\par Neuro: protective sensation is diminished to the level of the digits b/l;\par MSK: muscle strength is 5/5 in all compartments of the foot b/l;\par \par A:\par gomes 3 posterior heel ulcers b/l\par \par P:\par Patient evaluated and chart reviewed\par Excision debridement on necrotic and non-viable tissue with a sterile #10 blade to the ulcers on both heels\par Debridement of elongated nails x10 with a sterile nipper\par Patient tolerated procedure well.\par Advised patient to continue with daily dressing changes with betadine DSD.\par Patient was informed of possible OR debridement of ulcers, patient refuses OR debridement.\par Patient to RTC in 1 week

## 2019-10-01 ENCOUNTER — APPOINTMENT (OUTPATIENT)
Dept: WOUND CARE | Facility: CLINIC | Age: 74
End: 2019-10-01

## 2019-10-01 DIAGNOSIS — L97.322 NON-PRESSURE CHRONIC ULCER OF LEFT ANKLE WITH FAT LAYER EXPOSED: ICD-10-CM

## 2019-10-01 DIAGNOSIS — E11.621 TYPE 2 DIABETES MELLITUS WITH FOOT ULCER: ICD-10-CM

## 2019-10-01 DIAGNOSIS — L97.312 NON-PRESSURE CHRONIC ULCER OF RIGHT ANKLE WITH FAT LAYER EXPOSED: ICD-10-CM

## 2019-10-01 PROCEDURE — 36573 INSJ PICC RS&I 5 YR+: CPT

## 2019-10-01 PROCEDURE — 87186 SC STD MICRODIL/AGAR DIL: CPT

## 2019-10-01 PROCEDURE — 85652 RBC SED RATE AUTOMATED: CPT

## 2019-10-01 PROCEDURE — 87040 BLOOD CULTURE FOR BACTERIA: CPT

## 2019-10-01 PROCEDURE — 82607 VITAMIN B-12: CPT

## 2019-10-01 PROCEDURE — 87070 CULTURE OTHR SPECIMN AEROBIC: CPT

## 2019-10-01 PROCEDURE — 85610 PROTHROMBIN TIME: CPT

## 2019-10-01 PROCEDURE — 81003 URINALYSIS AUTO W/O SCOPE: CPT

## 2019-10-01 PROCEDURE — 86901 BLOOD TYPING SEROLOGIC RH(D): CPT

## 2019-10-01 PROCEDURE — 87641 MR-STAPH DNA AMP PROBE: CPT

## 2019-10-01 PROCEDURE — 36415 COLL VENOUS BLD VENIPUNCTURE: CPT

## 2019-10-01 PROCEDURE — 73630 X-RAY EXAM OF FOOT: CPT

## 2019-10-01 PROCEDURE — 84100 ASSAY OF PHOSPHORUS: CPT

## 2019-10-01 PROCEDURE — 80061 LIPID PANEL: CPT

## 2019-10-01 PROCEDURE — 84443 ASSAY THYROID STIM HORMONE: CPT

## 2019-10-01 PROCEDURE — 86140 C-REACTIVE PROTEIN: CPT

## 2019-10-01 PROCEDURE — 85027 COMPLETE CBC AUTOMATED: CPT

## 2019-10-01 PROCEDURE — 73718 MRI LOWER EXTREMITY W/O DYE: CPT

## 2019-10-01 PROCEDURE — 93923 UPR/LXTR ART STDY 3+ LVLS: CPT

## 2019-10-01 PROCEDURE — 85730 THROMBOPLASTIN TIME PARTIAL: CPT

## 2019-10-01 PROCEDURE — 82962 GLUCOSE BLOOD TEST: CPT

## 2019-10-01 PROCEDURE — 82306 VITAMIN D 25 HYDROXY: CPT

## 2019-10-01 PROCEDURE — 83036 HEMOGLOBIN GLYCOSYLATED A1C: CPT

## 2019-10-01 PROCEDURE — 86850 RBC ANTIBODY SCREEN: CPT

## 2019-10-01 PROCEDURE — 99285 EMERGENCY DEPT VISIT HI MDM: CPT | Mod: 25

## 2019-10-01 PROCEDURE — 87640 STAPH A DNA AMP PROBE: CPT

## 2019-10-01 PROCEDURE — 80048 BASIC METABOLIC PNL TOTAL CA: CPT

## 2019-10-01 PROCEDURE — 83735 ASSAY OF MAGNESIUM: CPT

## 2019-10-01 PROCEDURE — 86900 BLOOD TYPING SEROLOGIC ABO: CPT

## 2019-10-01 PROCEDURE — 83605 ASSAY OF LACTIC ACID: CPT

## 2020-02-14 NOTE — DISCHARGE NOTE PROVIDER - NSFOLLOWUPCLINICS_GEN_ALL_ED_FT
Routine post op care  - Pain control: attempting to minimize narcotics. Will attempt to transition to PO analgesics/antiemetics   - sched ibuprofen PO   - tramadol 50/100mg PRN   - morphine IV PRN breakthrough  - Diet: full liquid  - Colace BID  - PPx: lovenox ppx, SCDs, early ambulation, IS to bedside   [facility]  Podiatry/Wound Care  [address] [place] [address]  Phone: [phone]  Fax: [phone]  Follow Up Time:

## 2020-05-11 ENCOUNTER — INPATIENT (INPATIENT)
Facility: HOSPITAL | Age: 75
LOS: 11 days | Discharge: NURSING, REHAB OR LONG-TERM CARE | DRG: 637 | End: 2020-05-23
Attending: INTERNAL MEDICINE | Admitting: INTERNAL MEDICINE
Payer: MEDICARE

## 2020-05-11 VITALS
WEIGHT: 130.07 LBS | OXYGEN SATURATION: 99 % | RESPIRATION RATE: 18 BRPM | HEART RATE: 86 BPM | DIASTOLIC BLOOD PRESSURE: 70 MMHG | TEMPERATURE: 98 F | HEIGHT: 62 IN | SYSTOLIC BLOOD PRESSURE: 124 MMHG

## 2020-05-11 DIAGNOSIS — I73.9 PERIPHERAL VASCULAR DISEASE, UNSPECIFIED: ICD-10-CM

## 2020-05-11 DIAGNOSIS — L98.499 NON-PRESSURE CHRONIC ULCER OF SKIN OF OTHER SITES WITH UNSPECIFIED SEVERITY: ICD-10-CM

## 2020-05-11 DIAGNOSIS — Z29.9 ENCOUNTER FOR PROPHYLACTIC MEASURES, UNSPECIFIED: ICD-10-CM

## 2020-05-11 DIAGNOSIS — F20.0 PARANOID SCHIZOPHRENIA: ICD-10-CM

## 2020-05-11 DIAGNOSIS — E78.5 HYPERLIPIDEMIA, UNSPECIFIED: ICD-10-CM

## 2020-05-11 DIAGNOSIS — E11.9 TYPE 2 DIABETES MELLITUS WITHOUT COMPLICATIONS: ICD-10-CM

## 2020-05-11 DIAGNOSIS — Z71.89 OTHER SPECIFIED COUNSELING: ICD-10-CM

## 2020-05-11 DIAGNOSIS — I10 ESSENTIAL (PRIMARY) HYPERTENSION: ICD-10-CM

## 2020-05-11 LAB
ALBUMIN SERPL ELPH-MCNC: 3.4 G/DL — LOW (ref 3.5–5)
ALP SERPL-CCNC: 80 U/L — SIGNIFICANT CHANGE UP (ref 40–120)
ALT FLD-CCNC: 13 U/L DA — SIGNIFICANT CHANGE UP (ref 10–60)
ANION GAP SERPL CALC-SCNC: 7 MMOL/L — SIGNIFICANT CHANGE UP (ref 5–17)
APTT BLD: 65.5 SEC — HIGH (ref 27.5–36.3)
AST SERPL-CCNC: 4 U/L — LOW (ref 10–40)
BASOPHILS # BLD AUTO: 0 K/UL — SIGNIFICANT CHANGE UP (ref 0–0.2)
BASOPHILS NFR BLD AUTO: 0 % — SIGNIFICANT CHANGE UP (ref 0–2)
BILIRUB SERPL-MCNC: 0.7 MG/DL — SIGNIFICANT CHANGE UP (ref 0.2–1.2)
BUN SERPL-MCNC: 10 MG/DL — SIGNIFICANT CHANGE UP (ref 7–18)
CALCIUM SERPL-MCNC: 9.9 MG/DL — SIGNIFICANT CHANGE UP (ref 8.4–10.5)
CHLORIDE SERPL-SCNC: 102 MMOL/L — SIGNIFICANT CHANGE UP (ref 96–108)
CO2 SERPL-SCNC: 28 MMOL/L — SIGNIFICANT CHANGE UP (ref 22–31)
CREAT SERPL-MCNC: 0.76 MG/DL — SIGNIFICANT CHANGE UP (ref 0.5–1.3)
CRP SERPL-MCNC: 2.74 MG/DL — HIGH (ref 0–0.4)
EOSINOPHIL # BLD AUTO: 0 K/UL — SIGNIFICANT CHANGE UP (ref 0–0.5)
EOSINOPHIL NFR BLD AUTO: 0 % — SIGNIFICANT CHANGE UP (ref 0–6)
ERYTHROCYTE [SEDIMENTATION RATE] IN BLOOD: 88 MM/HR — HIGH (ref 0–20)
GLUCOSE BLDC GLUCOMTR-MCNC: 215 MG/DL — HIGH (ref 70–99)
GLUCOSE SERPL-MCNC: 167 MG/DL — HIGH (ref 70–99)
HCT VFR BLD CALC: 35.7 % — SIGNIFICANT CHANGE UP (ref 34.5–45)
HGB BLD-MCNC: 11.9 G/DL — SIGNIFICANT CHANGE UP (ref 11.5–15.5)
INR BLD: 1.21 RATIO — HIGH (ref 0.88–1.16)
LACTATE SERPL-SCNC: 1.7 MMOL/L — SIGNIFICANT CHANGE UP (ref 0.7–2)
LYMPHOCYTES # BLD AUTO: 1.53 K/UL — SIGNIFICANT CHANGE UP (ref 1–3.3)
LYMPHOCYTES # BLD AUTO: 13 % — SIGNIFICANT CHANGE UP (ref 13–44)
MCHC RBC-ENTMCNC: 30.2 PG — SIGNIFICANT CHANGE UP (ref 27–34)
MCHC RBC-ENTMCNC: 33.3 GM/DL — SIGNIFICANT CHANGE UP (ref 32–36)
MCV RBC AUTO: 90.6 FL — SIGNIFICANT CHANGE UP (ref 80–100)
MONOCYTES # BLD AUTO: 0.7 K/UL — SIGNIFICANT CHANGE UP (ref 0–0.9)
MONOCYTES NFR BLD AUTO: 6 % — SIGNIFICANT CHANGE UP (ref 2–14)
NEUTROPHILS # BLD AUTO: 9.51 K/UL — HIGH (ref 1.8–7.4)
NEUTROPHILS NFR BLD AUTO: 80 % — HIGH (ref 43–77)
PLATELET # BLD AUTO: 477 K/UL — HIGH (ref 150–400)
POTASSIUM SERPL-MCNC: 4.6 MMOL/L — SIGNIFICANT CHANGE UP (ref 3.5–5.3)
POTASSIUM SERPL-SCNC: 4.6 MMOL/L — SIGNIFICANT CHANGE UP (ref 3.5–5.3)
PROT SERPL-MCNC: 8.4 G/DL — HIGH (ref 6–8.3)
PROTHROM AB SERPL-ACNC: 13.7 SEC — HIGH (ref 10–12.9)
RBC # BLD: 3.94 M/UL — SIGNIFICANT CHANGE UP (ref 3.8–5.2)
RBC # FLD: 12.4 % — SIGNIFICANT CHANGE UP (ref 10.3–14.5)
SARS-COV-2 RNA SPEC QL NAA+PROBE: DETECTED
SODIUM SERPL-SCNC: 137 MMOL/L — SIGNIFICANT CHANGE UP (ref 135–145)
WBC # BLD: 11.74 K/UL — HIGH (ref 3.8–10.5)
WBC # FLD AUTO: 11.74 K/UL — HIGH (ref 3.8–10.5)

## 2020-05-11 PROCEDURE — 73610 X-RAY EXAM OF ANKLE: CPT | Mod: 26,LT

## 2020-05-11 PROCEDURE — 71045 X-RAY EXAM CHEST 1 VIEW: CPT | Mod: 26

## 2020-05-11 PROCEDURE — 73630 X-RAY EXAM OF FOOT: CPT | Mod: 26,LT

## 2020-05-11 PROCEDURE — 99285 EMERGENCY DEPT VISIT HI MDM: CPT | Mod: CS

## 2020-05-11 RX ORDER — ASPIRIN/CALCIUM CARB/MAGNESIUM 324 MG
81 TABLET ORAL DAILY
Refills: 0 | Status: DISCONTINUED | OUTPATIENT
Start: 2020-05-11 | End: 2020-05-23

## 2020-05-11 RX ORDER — METFORMIN HYDROCHLORIDE 850 MG/1
1 TABLET ORAL
Qty: 0 | Refills: 0 | DISCHARGE

## 2020-05-11 RX ORDER — LOSARTAN/HYDROCHLOROTHIAZIDE 100MG-25MG
1 TABLET ORAL
Qty: 0 | Refills: 0 | DISCHARGE

## 2020-05-11 RX ORDER — PERPHENAZINE 8 MG/1
3 TABLET, FILM COATED ORAL
Qty: 0 | Refills: 0 | DISCHARGE

## 2020-05-11 RX ORDER — DOXAZOSIN MESYLATE 4 MG
2 TABLET ORAL AT BEDTIME
Refills: 0 | Status: DISCONTINUED | OUTPATIENT
Start: 2020-05-11 | End: 2020-05-23

## 2020-05-11 RX ORDER — ASCORBIC ACID 60 MG
500 TABLET,CHEWABLE ORAL DAILY
Refills: 0 | Status: DISCONTINUED | OUTPATIENT
Start: 2020-05-11 | End: 2020-05-23

## 2020-05-11 RX ORDER — DOXAZOSIN MESYLATE 4 MG
4 TABLET ORAL AT BEDTIME
Refills: 0 | Status: DISCONTINUED | OUTPATIENT
Start: 2020-05-11 | End: 2020-05-11

## 2020-05-11 RX ORDER — GUAIFENESIN/DEXTROMETHORPHAN 600MG-30MG
10 TABLET, EXTENDED RELEASE 12 HR ORAL EVERY 4 HOURS
Refills: 0 | Status: DISCONTINUED | OUTPATIENT
Start: 2020-05-11 | End: 2020-05-23

## 2020-05-11 RX ORDER — METOPROLOL TARTRATE 50 MG
12.5 TABLET ORAL
Refills: 0 | Status: DISCONTINUED | OUTPATIENT
Start: 2020-05-11 | End: 2020-05-11

## 2020-05-11 RX ORDER — SIMVASTATIN 20 MG/1
40 TABLET, FILM COATED ORAL AT BEDTIME
Refills: 0 | Status: DISCONTINUED | OUTPATIENT
Start: 2020-05-11 | End: 2020-05-23

## 2020-05-11 RX ORDER — ALBUTEROL 90 UG/1
2 AEROSOL, METERED ORAL EVERY 4 HOURS
Refills: 0 | Status: DISCONTINUED | OUTPATIENT
Start: 2020-05-11 | End: 2020-05-23

## 2020-05-11 RX ORDER — ENOXAPARIN SODIUM 100 MG/ML
40 INJECTION SUBCUTANEOUS DAILY
Refills: 0 | Status: DISCONTINUED | OUTPATIENT
Start: 2020-05-11 | End: 2020-05-23

## 2020-05-11 RX ORDER — ONDANSETRON 8 MG/1
4 TABLET, FILM COATED ORAL EVERY 6 HOURS
Refills: 0 | Status: DISCONTINUED | OUTPATIENT
Start: 2020-05-11 | End: 2020-05-23

## 2020-05-11 RX ORDER — SODIUM CHLORIDE 9 MG/ML
1000 INJECTION INTRAMUSCULAR; INTRAVENOUS; SUBCUTANEOUS
Refills: 0 | Status: DISCONTINUED | OUTPATIENT
Start: 2020-05-11 | End: 2020-05-12

## 2020-05-11 RX ORDER — PIPERACILLIN AND TAZOBACTAM 4; .5 G/20ML; G/20ML
3.38 INJECTION, POWDER, LYOPHILIZED, FOR SOLUTION INTRAVENOUS ONCE
Refills: 0 | Status: COMPLETED | OUTPATIENT
Start: 2020-05-11 | End: 2020-05-11

## 2020-05-11 RX ORDER — PREGABALIN 225 MG/1
1000 CAPSULE ORAL DAILY
Refills: 0 | Status: DISCONTINUED | OUTPATIENT
Start: 2020-05-11 | End: 2020-05-23

## 2020-05-11 RX ORDER — PIPERACILLIN AND TAZOBACTAM 4; .5 G/20ML; G/20ML
3.38 INJECTION, POWDER, LYOPHILIZED, FOR SOLUTION INTRAVENOUS EVERY 8 HOURS
Refills: 0 | Status: COMPLETED | OUTPATIENT
Start: 2020-05-11 | End: 2020-05-18

## 2020-05-11 RX ORDER — PREGABALIN 225 MG/1
1 CAPSULE ORAL
Qty: 0 | Refills: 0 | DISCHARGE

## 2020-05-11 RX ORDER — SIMVASTATIN 20 MG/1
1 TABLET, FILM COATED ORAL
Qty: 0 | Refills: 0 | DISCHARGE

## 2020-05-11 RX ORDER — ONDANSETRON 8 MG/1
4 TABLET, FILM COATED ORAL ONCE
Refills: 0 | Status: COMPLETED | OUTPATIENT
Start: 2020-05-11 | End: 2020-05-11

## 2020-05-11 RX ORDER — CHOLECALCIFEROL (VITAMIN D3) 125 MCG
1 CAPSULE ORAL
Qty: 0 | Refills: 0 | DISCHARGE

## 2020-05-11 RX ORDER — FOLIC ACID 0.8 MG
1 TABLET ORAL DAILY
Refills: 0 | Status: DISCONTINUED | OUTPATIENT
Start: 2020-05-11 | End: 2020-05-23

## 2020-05-11 RX ORDER — CHOLECALCIFEROL (VITAMIN D3) 125 MCG
1000 CAPSULE ORAL DAILY
Refills: 0 | Status: DISCONTINUED | OUTPATIENT
Start: 2020-05-11 | End: 2020-05-23

## 2020-05-11 RX ORDER — INSULIN LISPRO 100/ML
VIAL (ML) SUBCUTANEOUS
Refills: 0 | Status: DISCONTINUED | OUTPATIENT
Start: 2020-05-11 | End: 2020-05-23

## 2020-05-11 RX ORDER — LINAGLIPTIN 5 MG/1
1 TABLET, FILM COATED ORAL
Qty: 0 | Refills: 0 | DISCHARGE

## 2020-05-11 RX ORDER — DOXAZOSIN MESYLATE 4 MG
1 TABLET ORAL
Qty: 0 | Refills: 0 | DISCHARGE

## 2020-05-11 RX ORDER — METOPROLOL TARTRATE 50 MG
1 TABLET ORAL
Qty: 0 | Refills: 0 | DISCHARGE

## 2020-05-11 RX ORDER — FERROUS SULFATE 325(65) MG
325 TABLET ORAL DAILY
Refills: 0 | Status: DISCONTINUED | OUTPATIENT
Start: 2020-05-11 | End: 2020-05-23

## 2020-05-11 RX ORDER — ACETAMINOPHEN 500 MG
650 TABLET ORAL ONCE
Refills: 0 | Status: COMPLETED | OUTPATIENT
Start: 2020-05-11 | End: 2020-05-11

## 2020-05-11 RX ORDER — ACETAMINOPHEN 500 MG
650 TABLET ORAL EVERY 6 HOURS
Refills: 0 | Status: DISCONTINUED | OUTPATIENT
Start: 2020-05-11 | End: 2020-05-23

## 2020-05-11 RX ADMIN — ONDANSETRON 4 MILLIGRAM(S): 8 TABLET, FILM COATED ORAL at 18:43

## 2020-05-11 RX ADMIN — Medication 2 MILLIGRAM(S): at 22:00

## 2020-05-11 RX ADMIN — PIPERACILLIN AND TAZOBACTAM 25 GRAM(S): 4; .5 INJECTION, POWDER, LYOPHILIZED, FOR SOLUTION INTRAVENOUS at 21:56

## 2020-05-11 RX ADMIN — Medication 2: at 21:55

## 2020-05-11 RX ADMIN — SODIUM CHLORIDE 50 MILLILITER(S): 9 INJECTION INTRAMUSCULAR; INTRAVENOUS; SUBCUTANEOUS at 21:31

## 2020-05-11 RX ADMIN — PIPERACILLIN AND TAZOBACTAM 3.38 GRAM(S): 4; .5 INJECTION, POWDER, LYOPHILIZED, FOR SOLUTION INTRAVENOUS at 10:15

## 2020-05-11 RX ADMIN — SIMVASTATIN 40 MILLIGRAM(S): 20 TABLET, FILM COATED ORAL at 22:00

## 2020-05-11 RX ADMIN — Medication 12.5 MILLIGRAM(S): at 18:43

## 2020-05-11 RX ADMIN — Medication 650 MILLIGRAM(S): at 09:45

## 2020-05-11 RX ADMIN — PIPERACILLIN AND TAZOBACTAM 200 GRAM(S): 4; .5 INJECTION, POWDER, LYOPHILIZED, FOR SOLUTION INTRAVENOUS at 09:45

## 2020-05-11 NOTE — H&P ADULT - PROBLEM SELECTOR PLAN 2
: Pt takes losartan at home  Will hold losartan as the BP is normal.  - Monitor BP and titrate meds accordingly.

## 2020-05-11 NOTE — H&P ADULT - HISTORY OF PRESENT ILLNESS
Patient is a 74 y.o F from [facility], non-ambulatory wheel chair bound with PMH of HTN, Diabetes, Osteoarthritis, Osteoporosis, Peripheral arterial disease, Diabetic neuropathy, HLD, Schizophrenia presented to Ed with worsening non healing heel ulcers. She reports having had the ulcer for about a year, recent got worse after she had bumped her foot on her wheelchair. 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 Patient refused surgery last admission and was discharged with local wound care. She reports daily dressing with betadine from her care provider. Pt denies any fever, chills , pain , cough , SOB,CP ,abd pain or any other symptoms.

## 2020-05-11 NOTE — H&P ADULT - PROBLEM SELECTOR PLAN 1
Plan: p/w non healing left ankle ulcer  - afebrile , Leucocytosis of 11.74 , lactate 1.7   -s/p Zosyn x1 in ED   - xray  showed degenerative part at  no evidence of OM   - will start on zosyn for now  - pain control with tylenol   - Pod consult : necrotic tissue removed   - Will do ABIs   - f/u  MRI to r/o OM   - consult vascular Dr Camacho after ABIs are done in am   - ID Dr Wellington is consulted

## 2020-05-11 NOTE — H&P ADULT - PROBLEM SELECTOR PLAN 3
Pt takes Invokana, Glucophage  - will hold off the oral medications while pt is in the hospital   - c/w HSS   - Monitor BS and titrate insulin   - f/u HbA1c.

## 2020-05-11 NOTE — CONSULT NOTE ADULT - SUBJECTIVE AND OBJECTIVE BOX
Patient is a 74y old  Female who presents with a chief complaint of worsening Left ankle wound      Podiatry HPI: Patient is a 74 y.o F consulted by ED attending for left ankle wound. Patient is from [facility], reports non-ambulatory wheel chair bound with PMH of HTN, Diabetes, Osteoarthritis, Osteoporosis, Peripheral arterial disease, Diabetic neuropathy, HLD, Schizophrenia presented to Ed with worsening non healing heel ulcers. She reports having had the ulcer for about a year, recent got worse after she had bumped her foot on her wheelchair. Patient was 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 Patient refused surgery last admission and was discharged with local wound care. She reports daily dressing with betadine from her care provider. Reports some pain to the left ankle, denies fever, chills, nausea or vomiting. wbc 11.74, afebrile.     PMH:DM (diabetes mellitus)  Paranoid schizophrenia  HLD (hyperlipidemia)  PAD (peripheral artery disease)  HTN (hypertension)    Allergies: No Known Allergies    Medications:   FH: No pertinent family history in first degree relatives    PSX: No significant past surgical history    SH:     Vital Signs Last 24 Hrs  T(C): 36.7 (11 May 2020 11:26), Max: 36.9 (11 May 2020 08:23)  T(F): 98 (11 May 2020 11:26), Max: 98.4 (11 May 2020 08:23)  HR: 78 (11 May 2020 11:26) (78 - 86)  BP: 111/70 (11 May 2020 11:26) (111/70 - 124/70)  BP(mean): --  RR: 18 (11 May 2020 11:26) (18 - 18)  SpO2: 99% (11 May 2020 11:26) (99% - 99%)    LABS                        11.9   11.74 )-----------( 477      ( 11 May 2020 09:31 )             35.7               05-11    137  |  102  |  10  ----------------------------<  167<H>  4.6   |  28  |  0.76    Ca    9.9      11 May 2020 09:31    TPro  8.4<H>  /  Alb  3.4<L>  /  TBili  0.7  /  DBili  x   /  AST  4<L>  /  ALT  13  /  AlkPhos  80  05-11      ROS  REVIEW OF SYSTEMS:    CONSTITUTIONAL: No fever  RESPIRATORY: No cough, wheezing, chills No shortness of breath  CARDIOVASCULAR: No chest pain, palpitations, dizziness, or leg swelling  GASTROINTESTINAL: No abdominal or epigastric pain. No nausea, vomiting, or hematemesis; No diarrhea or constipation. No melena or hematochezia.  NEUROLOGICAL: No headaches, memory loss, loss of strength, numbness, or tremors  SKIN: No itching, burning, rashes, left ankle wound  MUSCULOSKELETAL: No joint pain or swelling; No muscle, back, Pain around left ankle wound  PSYCHIATRIC: No depression, anxiety, mood swings, or difficulty sleeping          PHYSICAL EXAM  GEN: TITO ORLANDO is a pleasant well-nourished, well developed 74y Female in no acute distress, alert awake, and oriented to person, place and time.   LE Focused:    Vasc: DP/PT b/l faintly palpable, moderate edema noted to the left foot compared to the right, increased warmth to left foot. Pedal hair absent.   Derm: open lesion noted to lateral posterior heel, 1.2cm x 1cm x 1.5cm deep, surrounding maceration, with peeling skin. Wound probes to bone, tracks anteriorly and proximally about 5cm. Large amount of malodorous purulence exppressed. Necrotic eschar noted proximal to the open wound. Fluctuance to the lateral ankle noted however resolved after drainage of purulence. No open lesions noted to the right lower extremity.   Neuro: Protective sensation diminished.   MSK: Passive ROM pain free at the ankle, pain to palpation at wound site and with manual expression of purulence.     Imaging: ?xray  EXAM:  FOOT LEFT (MINIMUM 3 VIEWS)                          EXAM:  ANKLE LEFT (MINIMUM 3 V)                            PROCEDURE DATE:  05/11/2020      INTERPRETATION:  Left ankle and left foot. Patient has a lateral drain wound on the ankle.    Left ankle. 3 views.    There is rather mild degeneration of the malleolar tips.    There are posterior and inferior calcaneal spurs.    No bone destruction or fracture is evident.    Arterial calcifications are noted.    Left foot. 3 views. There is a mild hallux valgus with mild local degeneration.    Arterial calcifications are noted.    No bone destruction or fracture is evident.    IMPRESSION: No acute bony finding. Degeneration particularly at the first MP joint and vascular calcification noted.      ADITI SHELL M.D., ATTENDING RADIOLOGIST  This document has been electronically signed. May 11 2020  9:34AM      ---------------------------------------------------------------------------------------------------------------------------------------------------------------------------------------------------------------------------------------------------  EXAM:  US PHYSIOL LWR EXT 3+ LEV BI                          PROCEDURE DATE:  09/10/2019        INTERPRETATION:  Clinical indication: Diabetic right foot ulcer    Comparison: None    Findings: There are biphasic waveforms bilaterally however, waveforms are   dampened at the level of the metatarsals. There is no abnormal segmental   pressure gradient.    Right TIMUR = 1.34  Left TIMUR = 1.26    Impression: Elevated right TIMUR compatible with calcified vessels.    Abnormally dampened waveforms at the level of the metatarsals.      STEPHAN WALL M.D., ATTENDING RADIOLOGIST  This document has been electronically signed. 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    P:   Chart reviewed and Patient evaluated  Discussed diagnosis and treatment with patient  bedside I&D with 15 blade down to subcutaneous tissue just proximal to the open wound to allow expression of purulence  about 10cc of malodorous milky purulence expressed  Wound flush with copious amount normal saline  Obtained deep wound culture to be sent to Lab  Applied iodoform packing with dry sterile dressing  X-rays reviewed: no obvious findings  Continue with IV antibiotics  Ordered TIMUR, old TIMUR reviewed from before  Non-weight bearing  to left foot  Offloading to bilateral Heels while in bed  Discussed importance of daily foot examinations and proper shoe gear and to importance of lower Fasting Blood Glucose levels.   Rec. MRI of left foot including the left ankle  Patient may require OR debridement +/- partial calcanectomy pending MRI  Podiatry will follow while in house.  Discussed with Attending Dr. Lane

## 2020-05-11 NOTE — H&P ADULT - NSHPPHYSICALEXAM_GEN_ALL_CORE
Vital Signs Last 24 Hrs  T(C): 36.3 (11 May 2020 15:29), Max: 36.9 (11 May 2020 08:23)  T(F): 97.4 (11 May 2020 15:29), Max: 98.4 (11 May 2020 08:23)  HR: 90 (11 May 2020 15:29) (78 - 90)  BP: 118/51 (11 May 2020 15:29) (111/70 - 124/70)  BP(mean): --  RR: 18 (11 May 2020 15:29) (18 - 18)  SpO2: 100% (11 May 2020 15:29) (99% - 100%)  PHYSICAL EXAM:  GENERAL: NAD,  HEAD:  Atraumatic, Normocephalic  EYES:  conjunctiva and sclera clear  NECK: Supple, No JVD, Normal thyroid  CHEST/LUNG: Clear to auscultation. Clear to percussion bilaterally; No rales, rhonchi, wheezing, or rubs  HEART: Regular rate and rhythm; No murmurs, rubs, or gallops  ABDOMEN: Soft, Nontender, Nondistended; Bowel sounds present  NERVOUS SYSTEM:  Alert & Oriented X3,    EXTREMITIES:  2+ Peripheral Pulses. 1.2cm x 1cm x 1.5cm deep, surrounding maceration, with peeling skin. Large amount of malodorous purulence expressed. Necrotic eschar noted proximal to the open wound. Fluctuance to the lateral ankle noted  SKIN: warm dry

## 2020-05-11 NOTE — H&P ADULT - ATTENDING COMMENTS
Patient is a 74 y.o F from [facility], non-ambulatory wheel chair bound with PMH of HTN, Diabetes, Osteoarthritis, Osteoporosis, Peripheral arterial disease, Diabetic neuropathy, HLD, Schizophrenia presented to Ed with worsening non healing heel ulcers. Admitted to medicine for Diabetic foot ulcer.     # NONHEALING LEFT ANKLE ULCER WITH PURULENT CELLULITIS - S/P I&D WITH PODIATRY. ON ZOSYN, ID CONSULTED. VASCULAR CONSULTED. MRI ORDERED  # COVID19 INFECTION  # PAD - TIMUR ORDERED  # HTN  # DIABETES  # HLD  # GI AND DVT PPX    ELEN CASILLAS M.D. COVERING FOR FARHAN CASILLAS M.D.

## 2020-05-11 NOTE — H&P ADULT - NSHPREVIEWOFSYSTEMS_GEN_ALL_CORE
REVIEW OF SYSTEMS:    CONSTITUTIONAL: No weakness, fevers or chills  EYES/ENT: No visual changes;  No vertigo or throat pain   NECK: No pain or stiffness  RESPIRATORY: No cough, wheezing, hemoptysis; No shortness of breath  CARDIOVASCULAR: No chest pain or palpitations  GASTROINTESTINAL: No abdominal or epigastric pain. No nausea, vomiting, or hematemesis; No diarrhea or constipation. No melena or hematochezia.  GENITOURINARY: No dysuria, frequency or hematuria  NEUROLOGICAL: No numbness or weakness  EXTREMITIES Left ankle wound  SKIN: No itching, burning, rashes, or lesions   All other review of systems is negative unless indicated above.

## 2020-05-11 NOTE — H&P ADULT - NSHPLABSRESULTS_GEN_ALL_CORE
LABS:                        11.9   11.74 )-----------( 477      ( 11 May 2020 09:31 )             35.7     05-11    137  |  102  |  10  ----------------------------<  167<H>  4.6   |  28  |  0.76    Ca    9.9      11 May 2020 09:31    TPro  8.4<H>  /  Alb  3.4<L>  /  TBili  0.7  /  DBili  x   /  AST  4<L>  /  ALT  13  /  AlkPhos  80  05-11    PT/INR - ( 11 May 2020 09:31 )   PT: 13.7 sec;   INR: 1.21 ratio         PTT - ( 11 May 2020 09:31 )  PTT:65.5 sec    LIVER FUNCTIONS - ( 11 May 2020 09:31 )  Alb: 3.4 g/dL / Pro: 8.4 g/dL / ALK PHOS: 80 U/L / ALT: 13 U/L DA / AST: 4 U/L / GGT: x           Lactate, Blood: 1.7 mmol/L (05-11-20 @ 09:31)

## 2020-05-11 NOTE — H&P ADULT - PROBLEM SELECTOR PLAN 6
Elderly patient who is alert and awake s Patient wishes not to be subjected to aggressive intervention. Discussed with her in detail  at bedside patient's clinical status. Due to patient advanced age, she want pain control and comfort measures. Request Do not Resuscitate and no intubation  in the future. They would be okay with fluids and antibiotics judiciously used if given to treat an infection and to improve quality of life and just not prolong suffering if at the end of life

## 2020-05-11 NOTE — H&P ADULT - ASSESSMENT
Patient is a 74 y.o F from [facility], non-ambulatory wheel chair bound with PMH of HTN, Diabetes, Osteoarthritis, Osteoporosis, Peripheral arterial disease, Diabetic neuropathy, HLD, Schizophrenia presented to Ed with worsening non healing heel ulcers. Admitted to medicine for Diabetic foot ulcer.

## 2020-05-11 NOTE — ED PROVIDER NOTE - CLINICAL SUMMARY MEDICAL DECISION MAKING FREE TEXT BOX
Patient presenting with ankle swelling and discharge. appears to be infected. will obtain lab, xray, abx, reassess

## 2020-05-11 NOTE — ED PROVIDER NOTE - OBJECTIVE STATEMENT
74 y.o presenting for pain and discharge from left ankle wound. endorses wound present x months-years but injured it recently and noting worsening discharge. denies fever, n, v, abd pain, cough, cp, sob.

## 2020-05-11 NOTE — ED ADULT NURSE NOTE - OBJECTIVE STATEMENT
Pt was sent from [facility] for Lt heel wound check. Wound is draining purulent drainage. Pt denies feeling pain at this time. As per pt "I usually take Tylenol for pain."

## 2020-05-12 LAB
24R-OH-CALCIDIOL SERPL-MCNC: 16.2 NG/ML — LOW (ref 30–80)
A1C WITH ESTIMATED AVERAGE GLUCOSE RESULT: 8.2 % — HIGH (ref 4–5.6)
ALBUMIN SERPL ELPH-MCNC: 2.6 G/DL — LOW (ref 3.5–5)
ALP SERPL-CCNC: 61 U/L — SIGNIFICANT CHANGE UP (ref 40–120)
ALT FLD-CCNC: 9 U/L DA — LOW (ref 10–60)
ANION GAP SERPL CALC-SCNC: 8 MMOL/L — SIGNIFICANT CHANGE UP (ref 5–17)
APTT BLD: 54.7 SEC — HIGH (ref 27.5–36.3)
AST SERPL-CCNC: 6 U/L — LOW (ref 10–40)
BASOPHILS # BLD AUTO: 0.04 K/UL — SIGNIFICANT CHANGE UP (ref 0–0.2)
BASOPHILS NFR BLD AUTO: 0.3 % — SIGNIFICANT CHANGE UP (ref 0–2)
BILIRUB SERPL-MCNC: 0.9 MG/DL — SIGNIFICANT CHANGE UP (ref 0.2–1.2)
BUN SERPL-MCNC: 9 MG/DL — SIGNIFICANT CHANGE UP (ref 7–18)
CALCIUM SERPL-MCNC: 8.7 MG/DL — SIGNIFICANT CHANGE UP (ref 8.4–10.5)
CHLORIDE SERPL-SCNC: 102 MMOL/L — SIGNIFICANT CHANGE UP (ref 96–108)
CHOLEST SERPL-MCNC: 176 MG/DL — SIGNIFICANT CHANGE UP (ref 10–199)
CK SERPL-CCNC: <7 U/L — LOW (ref 21–215)
CO2 SERPL-SCNC: 26 MMOL/L — SIGNIFICANT CHANGE UP (ref 22–31)
CREAT SERPL-MCNC: 0.73 MG/DL — SIGNIFICANT CHANGE UP (ref 0.5–1.3)
CRP SERPL-MCNC: 4.38 MG/DL — HIGH (ref 0–0.4)
D DIMER BLD IA.RAPID-MCNC: 1064 NG/ML DDU — HIGH
EOSINOPHIL # BLD AUTO: 0.18 K/UL — SIGNIFICANT CHANGE UP (ref 0–0.5)
EOSINOPHIL NFR BLD AUTO: 1.5 % — SIGNIFICANT CHANGE UP (ref 0–6)
ERYTHROCYTE [SEDIMENTATION RATE] IN BLOOD: 51 MM/HR — HIGH (ref 0–20)
ESTIMATED AVERAGE GLUCOSE: 189 MG/DL — HIGH (ref 68–114)
FOLATE SERPL-MCNC: 18.3 NG/ML — SIGNIFICANT CHANGE UP
GLUCOSE BLDC GLUCOMTR-MCNC: 168 MG/DL — HIGH (ref 70–99)
GLUCOSE BLDC GLUCOMTR-MCNC: 178 MG/DL — HIGH (ref 70–99)
GLUCOSE BLDC GLUCOMTR-MCNC: 195 MG/DL — HIGH (ref 70–99)
GLUCOSE SERPL-MCNC: 172 MG/DL — HIGH (ref 70–99)
HCT VFR BLD CALC: 29.9 % — LOW (ref 34.5–45)
HDLC SERPL-MCNC: 30 MG/DL — LOW
HGB BLD-MCNC: 9.9 G/DL — LOW (ref 11.5–15.5)
IMM GRANULOCYTES NFR BLD AUTO: 1.5 % — SIGNIFICANT CHANGE UP (ref 0–1.5)
INR BLD: 1.34 RATIO — HIGH (ref 0.88–1.16)
LDH SERPL L TO P-CCNC: 113 U/L — LOW (ref 120–225)
LIPID PNL WITH DIRECT LDL SERPL: 126 MG/DL — SIGNIFICANT CHANGE UP
LYMPHOCYTES # BLD AUTO: 0.25 K/UL — LOW (ref 1–3.3)
LYMPHOCYTES # BLD AUTO: 2.1 % — LOW (ref 13–44)
MAGNESIUM SERPL-MCNC: 2.1 MG/DL — SIGNIFICANT CHANGE UP (ref 1.6–2.6)
MCHC RBC-ENTMCNC: 29.6 PG — SIGNIFICANT CHANGE UP (ref 27–34)
MCHC RBC-ENTMCNC: 33.1 GM/DL — SIGNIFICANT CHANGE UP (ref 32–36)
MCV RBC AUTO: 89.5 FL — SIGNIFICANT CHANGE UP (ref 80–100)
MONOCYTES # BLD AUTO: 0.56 K/UL — SIGNIFICANT CHANGE UP (ref 0–0.9)
MONOCYTES NFR BLD AUTO: 4.6 % — SIGNIFICANT CHANGE UP (ref 2–14)
MRSA PCR RESULT.: SIGNIFICANT CHANGE UP
NEUTROPHILS # BLD AUTO: 10.98 K/UL — HIGH (ref 1.8–7.4)
NEUTROPHILS NFR BLD AUTO: 90 % — HIGH (ref 43–77)
NRBC # BLD: 0 /100 WBCS — SIGNIFICANT CHANGE UP (ref 0–0)
PHOSPHATE SERPL-MCNC: 2.9 MG/DL — SIGNIFICANT CHANGE UP (ref 2.5–4.5)
PLATELET # BLD AUTO: 349 K/UL — SIGNIFICANT CHANGE UP (ref 150–400)
POTASSIUM SERPL-MCNC: 3.9 MMOL/L — SIGNIFICANT CHANGE UP (ref 3.5–5.3)
POTASSIUM SERPL-SCNC: 3.9 MMOL/L — SIGNIFICANT CHANGE UP (ref 3.5–5.3)
PROCALCITONIN SERPL-MCNC: 0.22 NG/ML — HIGH (ref 0.02–0.1)
PROT SERPL-MCNC: 6.8 G/DL — SIGNIFICANT CHANGE UP (ref 6–8.3)
PROTHROM AB SERPL-ACNC: 15.3 SEC — HIGH (ref 10–12.9)
RBC # BLD: 3.34 M/UL — LOW (ref 3.8–5.2)
RBC # FLD: 12.5 % — SIGNIFICANT CHANGE UP (ref 10.3–14.5)
S AUREUS DNA NOSE QL NAA+PROBE: SIGNIFICANT CHANGE UP
SODIUM SERPL-SCNC: 136 MMOL/L — SIGNIFICANT CHANGE UP (ref 135–145)
TOTAL CHOLESTEROL/HDL RATIO MEASUREMENT: 5.9 RATIO — SIGNIFICANT CHANGE UP (ref 3.3–7.1)
TRIGL SERPL-MCNC: 100 MG/DL — SIGNIFICANT CHANGE UP (ref 10–149)
TSH SERPL-MCNC: 0.96 UU/ML — SIGNIFICANT CHANGE UP (ref 0.34–4.82)
VIT B12 SERPL-MCNC: 863 PG/ML — SIGNIFICANT CHANGE UP (ref 232–1245)
WBC # BLD: 12.19 K/UL — HIGH (ref 3.8–10.5)
WBC # FLD AUTO: 12.19 K/UL — HIGH (ref 3.8–10.5)

## 2020-05-12 PROCEDURE — 73721 MRI JNT OF LWR EXTRE W/O DYE: CPT | Mod: 26,LT

## 2020-05-12 RX ORDER — NYSTATIN CREAM 100000 [USP'U]/G
1 CREAM TOPICAL
Refills: 0 | Status: DISCONTINUED | OUTPATIENT
Start: 2020-05-12 | End: 2020-05-23

## 2020-05-12 RX ORDER — SODIUM CHLORIDE 9 MG/ML
1000 INJECTION INTRAMUSCULAR; INTRAVENOUS; SUBCUTANEOUS
Refills: 0 | Status: DISCONTINUED | OUTPATIENT
Start: 2020-05-12 | End: 2020-05-12

## 2020-05-12 RX ADMIN — PIPERACILLIN AND TAZOBACTAM 25 GRAM(S): 4; .5 INJECTION, POWDER, LYOPHILIZED, FOR SOLUTION INTRAVENOUS at 21:59

## 2020-05-12 RX ADMIN — SIMVASTATIN 40 MILLIGRAM(S): 20 TABLET, FILM COATED ORAL at 23:48

## 2020-05-12 RX ADMIN — Medication 1 MILLIGRAM(S): at 12:09

## 2020-05-12 RX ADMIN — Medication 1: at 12:07

## 2020-05-12 RX ADMIN — Medication 1000 UNIT(S): at 14:30

## 2020-05-12 RX ADMIN — Medication 81 MILLIGRAM(S): at 12:08

## 2020-05-12 RX ADMIN — PIPERACILLIN AND TAZOBACTAM 25 GRAM(S): 4; .5 INJECTION, POWDER, LYOPHILIZED, FOR SOLUTION INTRAVENOUS at 04:59

## 2020-05-12 RX ADMIN — Medication 2 MILLIGRAM(S): at 21:59

## 2020-05-12 RX ADMIN — Medication 1: at 22:00

## 2020-05-12 RX ADMIN — Medication 500 MILLIGRAM(S): at 12:08

## 2020-05-12 RX ADMIN — ENOXAPARIN SODIUM 40 MILLIGRAM(S): 100 INJECTION SUBCUTANEOUS at 12:10

## 2020-05-12 RX ADMIN — NYSTATIN CREAM 1 APPLICATION(S): 100000 CREAM TOPICAL at 21:59

## 2020-05-12 RX ADMIN — Medication 325 MILLIGRAM(S): at 14:29

## 2020-05-12 RX ADMIN — NYSTATIN CREAM 1 APPLICATION(S): 100000 CREAM TOPICAL at 04:59

## 2020-05-12 RX ADMIN — PREGABALIN 1000 MICROGRAM(S): 225 CAPSULE ORAL at 12:09

## 2020-05-12 RX ADMIN — PIPERACILLIN AND TAZOBACTAM 25 GRAM(S): 4; .5 INJECTION, POWDER, LYOPHILIZED, FOR SOLUTION INTRAVENOUS at 14:30

## 2020-05-12 NOTE — PROGRESS NOTE ADULT - SUBJECTIVE AND OBJECTIVE BOX
Patient is a 74y old  Female who presents with a chief complaint of worsening Left ankle wound    Podiatry Interval HPI: Patient seen this AM with attending. Patient was sitting in chair at bedside with dressing intact to the left foot. She offers no new complaint. Once again explained to patient she likely has underlying bone infection based on clinical findings and previous MRI results. Explained to patient she may be in need to Surgical debridement and removal of infected heel bone. Patient is apprehensive about surgery. Risk and benefit explained to patient, she has serious infection and at high risk for losing her foot and part of her leg. Infection maybe life threatening if left untreated. Patient states she needs more time to think about surgery. She denies f/c/n/v or SOB. She remains afebrile, leukocytosis persists.     HPI:  Patient is a 74 y.o F from [facility], non-ambulatory wheel chair bound with PMH of HTN, Diabetes, Osteoarthritis, Osteoporosis, Peripheral arterial disease, Diabetic neuropathy, HLD, Schizophrenia presented to Ed with worsening non healing heel ulcers. She reports having had the ulcer for about a year, recent got worse after she had bumped her foot on her wheelchair. 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 Patient refused surgery last admission and was discharged with local wound care. She reports daily dressing with betadine from her care provider. Pt denies any fever, chills , pain , cough , SOB,CP ,abd pain or any other symptoms. (11 May 2020 13:16)      Podiatry HPI: Patient is a 74 y.o F consulted by ED attending for left ankle wound. Patient is from [facility], reports non-ambulatory wheel chair bound with PMH of HTN, Diabetes, Osteoarthritis, Osteoporosis, Peripheral arterial disease, Diabetic neuropathy, HLD, Schizophrenia presented to Ed with worsening non healing heel ulcers. She reports having had the ulcer for about a year, recent got worse after she had bumped her foot on her wheelchair. 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 Patient refused surgery last admission and was discharged with local wound care. She reports daily dressing with betadine from her care provider. Reports some pain to the left ankle, denies fever, chills, nausea or vomiting. wbc 11.74, afebrile.     PMH:DM (diabetes mellitus)  Paranoid schizophrenia  HLD (hyperlipidemia)  PAD (peripheral artery disease)  HTN (hypertension)    Allergies: No Known Allergies      MEDICATIONS  (STANDING):  ascorbic acid 500 milliGRAM(s) Oral daily  aspirin enteric coated 81 milliGRAM(s) Oral daily  cholecalciferol 1000 Unit(s) Oral daily  cyanocobalamin 1000 MICROGram(s) Oral daily  doxazosin 2 milliGRAM(s) Oral at bedtime  enoxaparin Injectable 40 milliGRAM(s) SubCutaneous daily  ferrous    sulfate 325 milliGRAM(s) Oral daily  folic acid 1 milliGRAM(s) Oral daily  insulin lispro (HumaLOG) corrective regimen sliding scale   SubCutaneous Before meals and at bedtime  nystatin Powder 1 Application(s) Topical two times a day  piperacillin/tazobactam IVPB.. 3.375 Gram(s) IV Intermittent every 8 hours  simvastatin 40 milliGRAM(s) Oral at bedtime    MEDICATIONS  (PRN):  acetaminophen   Tablet .. 650 milliGRAM(s) Oral every 6 hours PRN Temp greater or equal to 38C (100.4F), Mild Pain (1 - 3)  ALBUTerol    90 MICROgram(s) HFA Inhaler 2 Puff(s) Inhalation every 4 hours PRN Shortness of Breath and/or Wheezing  guaifenesin/dextromethorphan  Syrup 10 milliLiter(s) Oral every 4 hours PRN Cough  ondansetron Injectable 4 milliGRAM(s) IV Push every 6 hours PRN Nausea and/or Vomiting    FH: No pertinent family history in first degree relatives    PSX: No significant past surgical history    SH:                           9.9    12.19 )-----------( 349      ( 12 May 2020 09:46 )             29.9       05-12    136  |  102  |  9   ----------------------------<  172<H>  3.9   |  26  |  0.73    Ca    8.7      12 May 2020 09:46  Phos  2.9     05-12  Mg     2.1     05-12    TPro  6.8  /  Alb  2.6<L>  /  TBili  0.9  /  DBili  x   /  AST  6<L>  /  ALT  9<L>  /  AlkPhos  61  05-12      Vital Signs Last 24 Hrs  T(C): 37.1 (12 May 2020 05:04), Max: 37.2 (11 May 2020 21:37)  T(F): 98.7 (12 May 2020 05:04), Max: 98.9 (11 May 2020 21:37)  HR: 96 (12 May 2020 10:02) (90 - 96)  BP: 120/48 (12 May 2020 05:04) (118/51 - 132/59)  BP(mean): --  RR: 16 (12 May 2020 05:04) (16 - 18)  SpO2: 98% (12 May 2020 10:02) (98% - 100%)    Sedimentation Rate, Erythrocyte: 51 mm/Hr (05-12-20 @ 09:46)  Sedimentation Rate, Erythrocyte: 88 mm/Hr (05-11-20 @ 09:31)      C-Reactive Protein, Serum: 2.74 mg/dL (05-11-20 @ 18:31)      ROS  REVIEW OF SYSTEMS: Per HPI          PHYSICAL EXAM  GEN: TITO ORLANDO is a pleasant well-nourished, well developed 74y Female in no acute distress, alert awake, and oriented to person, place and time.   LE Focused:    Vasc: DP/PT b/l faintly palpable, moderate edema noted to the left foot compared to the right, increased warmth to left foot. Pedal hair absent.   Derm: open lesion noted to lateral posterior heel, 1.2cm x 1cm x 1.5cm deep, surrounding maceration, with peeling skin. Wound probes to bone, tracks anteriorly and proximally about 5cm. Large amount of malodorous purulence expressed again today. Necrotic eschar noted proximal to the open wound. Fluctuance to the lateral ankle noted however improved after drainage of purulence. No open lesions noted to the right lower extremity.   Neuro: Protective sensation diminished.   MSK: Passive ROM pain free at the ankle, pain to palpation at wound site and with manual expression of purulence.     Imaging:   EXAM:  FOOT LEFT (MINIMUM 3 VIEWS)                          EXAM:  ANKLE LEFT (MINIMUM 3 V)                            PROCEDURE DATE:  05/11/2020      INTERPRETATION:  Left ankle and left foot. Patient has a lateral drain wound on the ankle.    Left ankle. 3 views.    There is rather mild degeneration of the malleolar tips.    There are posterior and inferior calcaneal spurs.    No bone destruction or fracture is evident.    Arterial calcifications are noted.    Left foot. 3 views. There is a mild hallux valgus with mild local degeneration.    Arterial calcifications are noted.    No bone destruction or fracture is evident.    IMPRESSION: No acute bony finding. Degeneration particularly at the first MP joint and vascular calcification noted.      ADITI SHELL M.D., ATTENDING RADIOLOGIST  This document has been electronically signed. May 11 2020  9:34AM      ---------------------------------------------------------------------------------------------------------------------------------------------------------------------------------------------------------------------------------------------------  EXAM:  US PHYSIOL LWR EXT 3+ LEV BI                          PROCEDURE DATE:  09/10/2019        INTERPRETATION:  Clinical indication: Diabetic right foot ulcer    Comparison: None    Findings: There are biphasic waveforms bilaterally however, waveforms are   dampened at the level of the metatarsals. There is no abnormal segmental   pressure gradient.    Right TIMUR = 1.34  Left TIMUR = 1.26    Impression: Elevated right TIMUR compatible with calcified vessels.    Abnormally dampened waveforms at the level of the metatarsals.      STEPHAN WALL M.D., ATTENDING RADIOLOGIST  This document has been electronically signed. 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    P:   Chart reviewed and Patient evaluated  Discussed diagnosis and treatment with patient  about 5cc of malodorous milky purulence expressed again today  Wound flush with copious amount normal saline  Obtained deep wound culture, f/u result  Applied iodoform packing with dry sterile dressing  X-rays reviewed: no obvious findings  Continue with IV antibiotics  Previous TIMUR reviewed, unable to obtain new vascular status due to COVID + statis  Non-weight bearing  to left foot  Offloading to bilateral Heels while in bed  Discussed importance of daily foot examinations and proper shoe gear and to importance of lower Fasting Blood Glucose levels.   Rec. MRI of left foot including the left ankle  Patient may require OR debridement +/- partial calcanectomy pending MRI  Patient is at high risk for more proximal amputation  Rec vascular consult  Rec ID consult  Podiatry will follow while in house.  Discussed and seen with Attending Dr. Lane

## 2020-05-12 NOTE — CONSULT NOTE ADULT - GASTROINTESTINAL DETAILS
soft/no organomegaly/no guarding nontender/soft/no distention/no masses palpable/bowel sounds normal/no rigidity/no organomegaly no distention/no masses palpable/no guarding/no rigidity/no organomegaly/nontender/soft/bowel sounds normal

## 2020-05-12 NOTE — CONSULT NOTE ADULT - SUBJECTIVE AND OBJECTIVE BOX
HPI:  Patient is a 74 y.o F from [facility], non-ambulatory wheel chair bound with PMH of HTN, Diabetes, Osteoarthritis, Osteoporosis, Peripheral arterial disease, Diabetic neuropathy, HLD, Schizophrenia presented to Ed with worsening non healing heel ulcers. She reports having had the ulcer for about a year, recent got worse after she had bumped her foot on her wheelchair. 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 Patient refused surgery last admission and was discharged with local wound care. She reports daily dressing with betadine from her care provider. Pt denies any fever, chills , pain , cough , SOB,CP ,abd pain or any other symptoms. (11 May 2020 13:16)      PAST MEDICAL & SURGICAL HISTORY:  DM (diabetes mellitus)  Paranoid schizophrenia  HLD (hyperlipidemia)  PAD (peripheral artery disease)  HTN (hypertension)  No significant past surgical history      No Known Allergies      Meds:  acetaminophen   Tablet .. 650 milliGRAM(s) Oral every 6 hours PRN  ALBUTerol    90 MICROgram(s) HFA Inhaler 2 Puff(s) Inhalation every 4 hours PRN  ascorbic acid 500 milliGRAM(s) Oral daily  aspirin enteric coated 81 milliGRAM(s) Oral daily  cholecalciferol 1000 Unit(s) Oral daily  cyanocobalamin 1000 MICROGram(s) Oral daily  doxazosin 2 milliGRAM(s) Oral at bedtime  enoxaparin Injectable 40 milliGRAM(s) SubCutaneous daily  ferrous    sulfate 325 milliGRAM(s) Oral daily  folic acid 1 milliGRAM(s) Oral daily  guaifenesin/dextromethorphan  Syrup 10 milliLiter(s) Oral every 4 hours PRN  insulin lispro (HumaLOG) corrective regimen sliding scale   SubCutaneous Before meals and at bedtime  nystatin Powder 1 Application(s) Topical two times a day  ondansetron Injectable 4 milliGRAM(s) IV Push every 6 hours PRN  piperacillin/tazobactam IVPB.. 3.375 Gram(s) IV Intermittent every 8 hours  simvastatin 40 milliGRAM(s) Oral at bedtime      SOCIAL HISTORY:  Smoker:  YES / NO        PACK YEARS:                         WHEN QUIT?  ETOH use:  YES / NO               FREQUENCY / QUANTITY:  Ilicit Drug use:  YES / NO  Occupation:  Assisted device use (Cane / Walker):  Live with:    FAMILY HISTORY:  No pertinent family history in first degree relatives      VITALS:  Vital Signs Last 24 Hrs  T(C): 37.1 (12 May 2020 05:04), Max: 37.2 (11 May 2020 21:37)  T(F): 98.7 (12 May 2020 05:04), Max: 98.9 (11 May 2020 21:37)  HR: 96 (12 May 2020 10:02) (90 - 96)  BP: 120/48 (12 May 2020 05:04) (118/51 - 132/59)  BP(mean): --  RR: 16 (12 May 2020 05:04) (16 - 18)  SpO2: 98% (12 May 2020 10:02) (98% - 100%)    LABS/DIAGNOSTIC TESTS:                          9.9    12.19 )-----------( 349      ( 12 May 2020 09:46 )             29.9     WBC Count: 12.19 K/uL (05-12 @ 09:46)  WBC Count: 11.74 K/uL (05-11 @ 09:31)      05-12    136  |  102  |  9   ----------------------------<  172<H>  3.9   |  26  |  0.73    Ca    8.7      12 May 2020 09:46  Phos  2.9     05-12  Mg     2.1     05-12    TPro  6.8  /  Alb  2.6<L>  /  TBili  0.9  /  DBili  x   /  AST  6<L>  /  ALT  9<L>  /  AlkPhos  61  05-12          LIVER FUNCTIONS - ( 12 May 2020 09:46 )  Alb: 2.6 g/dL / Pro: 6.8 g/dL / ALK PHOS: 61 U/L / ALT: 9 U/L DA / AST: 6 U/L / GGT: x             PT/INR - ( 12 May 2020 09:46 )   PT: 15.3 sec;   INR: 1.34 ratio         PTT - ( 12 May 2020 09:46 )  PTT:54.7 sec    LACTATE:    ABG -     CULTURES:       RADIOLOGY:      ROS  [  ] UNABLE TO ELICIT HPI:  Patient is a 74 y.o F from [facility], non-ambulatory wheel chair bound with PMH of HTN, Diabetes, Osteoarthritis, Osteoporosis, Peripheral arterial disease, Diabetic neuropathy, HLD, Schizophrenia presented to Ed with worsening non healing heel ulcers. She reports having had the ulcer for about a year, recent got worse after she had bumped her foot on her wheelchair. 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 Patient refused surgery last admission and was discharged with local wound care. She reports daily dressing with betadine from her care provider. Pt denies any fever, chills , pain , cough , SOB,CP ,abd pain or any other symptoms. (11 May 2020 13:16)    ID HPI: Pt was seen by ID this afternoon, pt states that she has this ulcer for last one year and she does not have wound care doctor but at facility they change her dressing. Pt states that she has mild discomfort in her left foot and her left ankle ulcer started looking bad recently. Pt denies any fever, nausea, vomiting, chills, SOB or chest pain.      PAST MEDICAL & SURGICAL HISTORY:  DM (diabetes mellitus)  Paranoid schizophrenia  HLD (hyperlipidemia)  PAD (peripheral artery disease)  HTN (hypertension)  No significant past surgical history      No Known Allergies      Meds:  acetaminophen   Tablet .. 650 milliGRAM(s) Oral every 6 hours PRN  ALBUTerol    90 MICROgram(s) HFA Inhaler 2 Puff(s) Inhalation every 4 hours PRN  ascorbic acid 500 milliGRAM(s) Oral daily  aspirin enteric coated 81 milliGRAM(s) Oral daily  cholecalciferol 1000 Unit(s) Oral daily  cyanocobalamin 1000 MICROGram(s) Oral daily  doxazosin 2 milliGRAM(s) Oral at bedtime  enoxaparin Injectable 40 milliGRAM(s) SubCutaneous daily  ferrous    sulfate 325 milliGRAM(s) Oral daily  folic acid 1 milliGRAM(s) Oral daily  guaifenesin/dextromethorphan  Syrup 10 milliLiter(s) Oral every 4 hours PRN  insulin lispro (HumaLOG) corrective regimen sliding scale   SubCutaneous Before meals and at bedtime  nystatin Powder 1 Application(s) Topical two times a day  ondansetron Injectable 4 milliGRAM(s) IV Push every 6 hours PRN  piperacillin/tazobactam IVPB.. 3.375 Gram(s) IV Intermittent every 8 hours  simvastatin 40 milliGRAM(s) Oral at bedtime      SOCIAL HISTORY:  Smoker:  YES / NO        PACK YEARS:                         WHEN QUIT?  ETOH use:  YES / NO               FREQUENCY / QUANTITY:  Ilicit Drug use:  YES / NO  Occupation:  Assisted device use (Cane / Walker):  Live with:    FAMILY HISTORY:  No pertinent family history in first degree relatives      VITALS:  Vital Signs Last 24 Hrs  T(C): 37.1 (12 May 2020 05:04), Max: 37.2 (11 May 2020 21:37)  T(F): 98.7 (12 May 2020 05:04), Max: 98.9 (11 May 2020 21:37)  HR: 96 (12 May 2020 10:02) (90 - 96)  BP: 120/48 (12 May 2020 05:04) (118/51 - 132/59)  BP(mean): --  RR: 16 (12 May 2020 05:04) (16 - 18)  SpO2: 98% (12 May 2020 10:02) (98% - 100%)    LABS/DIAGNOSTIC TESTS:                          9.9    12.19 )-----------( 349      ( 12 May 2020 09:46 )             29.9     WBC Count: 12.19 K/uL (05-12 @ 09:46)  WBC Count: 11.74 K/uL (05-11 @ 09:31)      05-12    136  |  102  |  9   ----------------------------<  172<H>  3.9   |  26  |  0.73    Ca    8.7      12 May 2020 09:46  Phos  2.9     05-12  Mg     2.1     05-12    TPro  6.8  /  Alb  2.6<L>  /  TBili  0.9  /  DBili  x   /  AST  6<L>  /  ALT  9<L>  /  AlkPhos  61  05-12          LIVER FUNCTIONS - ( 12 May 2020 09:46 )  Alb: 2.6 g/dL / Pro: 6.8 g/dL / ALK PHOS: 61 U/L / ALT: 9 U/L DA / AST: 6 U/L / GGT: x             PT/INR - ( 12 May 2020 09:46 )   PT: 15.3 sec;   INR: 1.34 ratio         PTT - ( 12 May 2020 09:46 )  PTT:54.7 sec    LACTATE:    ABG -     CULTURES:       RADIOLOGY:      < from: Xray Foot AP + Lateral + Oblique, Left (05.11.20 @ 09:32) >  EXAM:  FOOT LEFT (MINIMUM 3 VIEWS)                          EXAM:  ANKLE LEFT (MINIMUM 3 V)                            PROCEDURE DATE:  05/11/2020          INTERPRETATION:  Left ankle and left foot. Patient has a lateral drain wound on the ankle.    Left ankle. 3 views.    There is rather mild degeneration of the malleolar tips.    There are posterior and inferior calcaneal spurs.    No bone destruction or fracture is evident.    Arterial calcifications are noted.    Left foot. 3 views. There is a mild hallux valgus with mild local degeneration.    Arterial calcifications are noted.    No bone destruction or fracture is evident.    IMPRESSION: No acute bony finding. Degeneration particularly at the first MP joint and vascular calcification noted.                ADITI SHELL M.D., ATTENDING RADIOLOGIST  This document has been electronically signed. May 11 2020  9:34AM        < end of copied text >  < from: Xray Foot AP + Lateral + Oblique, Left (05.11.20 @ 09:32) >  EXAM:  FOOT LEFT (MINIMUM 3 VIEWS)                          EXAM:  ANKLE LEFT (MINIMUM 3 V)                            PROCEDURE DATE:  05/11/2020        Specimen Source: .Surgical Swab left lateral ankle wound deep (05.11.20 @ 15:04)      INTERPRETATION:  Left ankle and left foot. Patient has a lateral drain wound on the ankle.    Left ankle. 3 views.    There is rather mild degeneration of the malleolar tips.    There are posterior and inferior calcaneal spurs.    No bone destruction or fracture is evident.    Arterial calcifications are noted.    Left foot. 3 views. There is a mild hallux valgus with mild local degeneration.    Arterial calcifications are noted.    No bone destruction or fracture is evident.    IMPRESSION: No acute bony finding. Degeneration particularly at the first MP joint and vascular calcification noted.                ADITI SHELL M.D., ATTENDING RADIOLOGIST  This document has been electronically signed. May 11 2020  9:34AM        < end of copied text >      ROS  [  ] UNABLE TO ELICIT HPI:  Patient is a 74 y.o F from [facility], non-ambulatory wheel chair bound with PMH of HTN, Diabetes, Osteoarthritis, Osteoporosis, Peripheral arterial disease, Diabetic neuropathy, HLD, Schizophrenia presented to Ed with worsening non healing heel ulcers. She reports having had the ulcer for about a year, recent got worse after she had bumped her foot on her wheelchair. 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 Patient refused surgery last admission and was discharged with local wound care. She reports daily dressing with betadine from her care provider. Pt denies any fever, chills , pain , cough , SOB,CP ,abd pain or any other symptoms. (11 May 2020 13:16)    ID HPI: Pt was seen by ID this afternoon, pt states that she has this ulcer for last one year and she does not have wound care doctor but at facility they change her dressing. Pt states that she has mild discomfort in her left foot and her left ankle ulcer started looking bad recently. Pt denies any fever, nausea, vomiting, chills, SOB or chest pain.      PAST MEDICAL & SURGICAL HISTORY:  DM (diabetes mellitus)  Paranoid schizophrenia  HLD (hyperlipidemia)  PAD (peripheral artery disease)  HTN (hypertension)  No significant past surgical history      No Known Allergies      Meds:  acetaminophen   Tablet .. 650 milliGRAM(s) Oral every 6 hours PRN  ALBUTerol    90 MICROgram(s) HFA Inhaler 2 Puff(s) Inhalation every 4 hours PRN  ascorbic acid 500 milliGRAM(s) Oral daily  aspirin enteric coated 81 milliGRAM(s) Oral daily  cholecalciferol 1000 Unit(s) Oral daily  cyanocobalamin 1000 MICROGram(s) Oral daily  doxazosin 2 milliGRAM(s) Oral at bedtime  enoxaparin Injectable 40 milliGRAM(s) SubCutaneous daily  ferrous    sulfate 325 milliGRAM(s) Oral daily  folic acid 1 milliGRAM(s) Oral daily  guaifenesin/dextromethorphan  Syrup 10 milliLiter(s) Oral every 4 hours PRN  insulin lispro (HumaLOG) corrective regimen sliding scale   SubCutaneous Before meals and at bedtime  nystatin Powder 1 Application(s) Topical two times a day  ondansetron Injectable 4 milliGRAM(s) IV Push every 6 hours PRN  piperacillin/tazobactam IVPB.. 3.375 Gram(s) IV Intermittent every 8 hours  simvastatin 40 milliGRAM(s) Oral at bedtime      SOCIAL HISTORY:  Smoker:  no  ETOH use:  no    FAMILY HISTORY:  No pertinent family history in first degree relatives      VITALS:  Vital Signs Last 24 Hrs  T(C): 37.1 (12 May 2020 05:04), Max: 37.2 (11 May 2020 21:37)  T(F): 98.7 (12 May 2020 05:04), Max: 98.9 (11 May 2020 21:37)  HR: 96 (12 May 2020 10:02) (90 - 96)  BP: 120/48 (12 May 2020 05:04) (118/51 - 132/59)  BP(mean): --  RR: 16 (12 May 2020 05:04) (16 - 18)  SpO2: 98% (12 May 2020 10:02) (98% - 100%)    LABS/DIAGNOSTIC TESTS:                          9.9    12.19 )-----------( 349      ( 12 May 2020 09:46 )             29.9     WBC Count: 12.19 K/uL (05-12 @ 09:46)  WBC Count: 11.74 K/uL (05-11 @ 09:31)      05-12    136  |  102  |  9   ----------------------------<  172<H>  3.9   |  26  |  0.73    Ca    8.7      12 May 2020 09:46  Phos  2.9     05-12  Mg     2.1     05-12    TPro  6.8  /  Alb  2.6<L>  /  TBili  0.9  /  DBili  x   /  AST  6<L>  /  ALT  9<L>  /  AlkPhos  61  05-12          LIVER FUNCTIONS - ( 12 May 2020 09:46 )  Alb: 2.6 g/dL / Pro: 6.8 g/dL / ALK PHOS: 61 U/L / ALT: 9 U/L DA / AST: 6 U/L / GGT: x             PT/INR - ( 12 May 2020 09:46 )   PT: 15.3 sec;   INR: 1.34 ratio         PTT - ( 12 May 2020 09:46 )  PTT:54.7 sec    LACTATE:    ABG -     CULTURES:       RADIOLOGY:      < from: Xray Foot AP + Lateral + Oblique, Left (05.11.20 @ 09:32) >  EXAM:  FOOT LEFT (MINIMUM 3 VIEWS)                          EXAM:  ANKLE LEFT (MINIMUM 3 V)                            PROCEDURE DATE:  05/11/2020          INTERPRETATION:  Left ankle and left foot. Patient has a lateral drain wound on the ankle.    Left ankle. 3 views.    There is rather mild degeneration of the malleolar tips.    There are posterior and inferior calcaneal spurs.    No bone destruction or fracture is evident.    Arterial calcifications are noted.    Left foot. 3 views. There is a mild hallux valgus with mild local degeneration.    Arterial calcifications are noted.    No bone destruction or fracture is evident.    IMPRESSION: No acute bony finding. Degeneration particularly at the first MP joint and vascular calcification noted.                ADITI SHELL M.D., ATTENDING RADIOLOGIST  This document has been electronically signed. May 11 2020  9:34AM        < end of copied text >  < from: Xray Foot AP + Lateral + Oblique, Left (05.11.20 @ 09:32) >  EXAM:  FOOT LEFT (MINIMUM 3 VIEWS)                          EXAM:  ANKLE LEFT (MINIMUM 3 V)                            PROCEDURE DATE:  05/11/2020        Specimen Source: .Surgical Swab left lateral ankle wound deep (05.11.20 @ 15:04)      INTERPRETATION:  Left ankle and left foot. Patient has a lateral drain wound on the ankle.    Left ankle. 3 views.    There is rather mild degeneration of the malleolar tips.    There are posterior and inferior calcaneal spurs.    No bone destruction or fracture is evident.    Arterial calcifications are noted.    Left foot. 3 views. There is a mild hallux valgus with mild local degeneration.    Arterial calcifications are noted.    No bone destruction or fracture is evident.    IMPRESSION: No acute bony finding. Degeneration particularly at the first MP joint and vascular calcification noted.                ADITI SHELL M.D., ATTENDING RADIOLOGIST  This document has been electronically signed. May 11 2020  9:34AM        < end of copied text >      ROS  [  ] UNABLE TO ELICIT

## 2020-05-12 NOTE — CONSULT NOTE ADULT - RS GEN PE MLT RESP DETAILS PC
good air movement/breath sounds equal/airway patent no wheezes/no rales/good air movement/clear to auscultation bilaterally/no rhonchi/airway patent/breath sounds equal no wheezes/breath sounds equal/no rales/no rhonchi/clear to auscultation bilaterally/good air movement

## 2020-05-12 NOTE — PROGRESS NOTE ADULT - PROBLEM SELECTOR PLAN 1
xray  showed degenerative part at  no evidence of OM   sp bedside debridement by podiatry   continue zosyn   TIMUR pending   MRI recommended to ro OM, pt refused   consult vascular Dr Camacho after ABIs are done   ID Dr Wellington is consulted  f/u blood culture and wound culture xray  showed degenerative part at  no evidence of OM   sp bedside debridement by podiatry   continue zosyn   TIMUR pending   MRI foot and ankle performed, fu results   consult vascular Dr Camacho after ABIs are done   ID Dr Wellington is consulted  f/u blood culture and wound culture

## 2020-05-12 NOTE — CONSULT NOTE ADULT - ASSESSMENT
Diabetic foot ulcers of left foot  R/O Osteomyelitis of left foot  Leukocytosis    Plan - cont Zosyn 3.375gms iv q8 hrs  await MRI  will need surgical debridement.

## 2020-05-12 NOTE — PROGRESS NOTE ADULT - ATTENDING COMMENTS
Patient is a 74 y.o F from [facility], non-ambulatory wheel chair bound with PMH of HTN, Diabetes, Osteoarthritis, Osteoporosis, Peripheral arterial disease, Diabetic neuropathy, HLD, Schizophrenia presented to Ed with worsening non healing heel ulcers. Admitted to medicine for Diabetic foot ulcer.     # NONHEALING LEFT ANKLE ULCER WITH PURULENT CELLULITIS - INITIAL WOUND CX SHOWING MORGANELLA, P. MIRABILIS - S/P I&D WITH PODIATRY. ON ZOSYN, ID CONSULTED. VASCULAR CONSULTED. MRI ORDERED - IN A.M. PATIENT REFUSED ALL MANAGEMENT BUT AFTER COUNSELLING WAS AGREEABLE  # COVID19 INFECTION  # PAD - TIMUR ORDERED  # HTN  # DIABETES  # HLD  # GI AND DVT PPX    ELEN CASILLAS M.D. COVERING FOR FARHAN CASILLAS M.D.

## 2020-05-12 NOTE — PHYSICAL THERAPY INITIAL EVALUATION ADULT - GENERAL OBSERVATIONS, REHAB EVAL
pt emr review, covid isolation, rehab transfers, L heel ace wrap, tolerating oob rw transfers bedside amb to chair with assistance

## 2020-05-12 NOTE — CONSULT NOTE ADULT - NEGATIVE GASTROINTESTINAL SYMPTOMS
no change in bowel habits/no nausea/no vomiting no change in bowel habits/no nausea/no vomiting/no diarrhea

## 2020-05-12 NOTE — PROGRESS NOTE ADULT - SUBJECTIVE AND OBJECTIVE BOX
Medicine Progress Note     74 y.o female from [facility], non-ambulatory wheel chair bound with PMH of HTN, Diabetes, Osteoarthritis, Osteoporosis, Peripheral arterial disease, Diabetic neuropathy, HLD, Schizophrenia presented with worsening non healing left heel ulcers, getting worse  after recent injury. 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 Patient refused surgery last admission and was discharged with local wound care. Xray was performed and showed no acute bony finding. + Degeneration particularly at the first MP joint and vascular calcification noted. Pt was seen by podiatry service. Bedside I&D was performed with  expression of purulence discharge. Wound care performed by podiatry team. Deep wound culture pending result  IV antibiotics: Zosyn continued. TIMUR recommended by podiatry and pending   MRI of left foot including the left ankle also recommended however patient refused MRI  despite detailed explanations of risks and consequences of incomplete work up for osteomyelitis.    Patient may require OR debridement with pos partial calcanectomy as per podiatry   Pt is COVID +   Pt was seen at bedside, verbalizes feeling well. Reports some pressure like pain in left foot. Importance of further diagnostic testing including MRI to r/o bone infection reinforced. Pt still refusing MRI and prefers to be discharged home   No fever, chest pain, dyspnea, difficulties breathing       SUBJECTIVE / OVERNIGHT EVENTS:    ADDITIONAL REVIEW OF SYSTEMS:    MEDICATIONS  (STANDING):  ascorbic acid 500 milliGRAM(s) Oral daily  aspirin enteric coated 81 milliGRAM(s) Oral daily  cholecalciferol 1000 Unit(s) Oral daily  cyanocobalamin 1000 MICROGram(s) Oral daily  doxazosin 2 milliGRAM(s) Oral at bedtime  enoxaparin Injectable 40 milliGRAM(s) SubCutaneous daily  ferrous    sulfate 325 milliGRAM(s) Oral daily  folic acid 1 milliGRAM(s) Oral daily  insulin lispro (HumaLOG) corrective regimen sliding scale   SubCutaneous Before meals and at bedtime  nystatin Powder 1 Application(s) Topical two times a day  piperacillin/tazobactam IVPB.. 3.375 Gram(s) IV Intermittent every 8 hours  simvastatin 40 milliGRAM(s) Oral at bedtime    MEDICATIONS  (PRN):  acetaminophen   Tablet .. 650 milliGRAM(s) Oral every 6 hours PRN Temp greater or equal to 38C (100.4F), Mild Pain (1 - 3)  ALBUTerol    90 MICROgram(s) HFA Inhaler 2 Puff(s) Inhalation every 4 hours PRN Shortness of Breath and/or Wheezing  guaifenesin/dextromethorphan  Syrup 10 milliLiter(s) Oral every 4 hours PRN Cough  ondansetron Injectable 4 milliGRAM(s) IV Push every 6 hours PRN Nausea and/or Vomiting    CAPILLARY BLOOD GLUCOSE      POCT Blood Glucose.: 215 mg/dL (11 May 2020 21:18)    I&O's Summary      PHYSICAL EXAM:  Vital Signs Last 24 Hrs  T(C): 37.1 (12 May 2020 05:04), Max: 37.2 (11 May 2020 21:37)  T(F): 98.7 (12 May 2020 05:04), Max: 98.9 (11 May 2020 21:37)  HR: 91 (12 May 2020 05:04) (78 - 91)  BP: 120/48 (12 May 2020 05:04) (111/70 - 132/59)  BP(mean): --  RR: 16 (12 May 2020 05:04) (16 - 18)  SpO2: 100% (12 May 2020 05:04) (99% - 100%)  CONSTITUTIONAL: NAD, well-developed, well-groomed  RESPIRATORY: Normal respiratory effort; lungs are clear to auscultation bilaterally  CARDIOVASCULAR: Regular rate and rhythm,   ABDOMEN: Nontender to palpation, normoactive bowel sounds, no rebound/guarding;   PSYCH: A+O to person, place, and time; affect appropriate  NEUROLOGY: CN 2-12 are intact and symmetric; no gross sensory deficits   SKIN: No rashes; no palpable lesions  Left food wound dressing dry and intact     LABS:                        11.9   11.74 )-----------( 477      ( 11 May 2020 09:31 )             35.7     05-11    137  |  102  |  10  ----------------------------<  167<H>  4.6   |  28  |  0.76    Ca    9.9      11 May 2020 09:31    TPro  8.4<H>  /  Alb  3.4<L>  /  TBili  0.7  /  DBili  x   /  AST  4<L>  /  ALT  13  /  AlkPhos  80  05-11    PT/INR - ( 11 May 2020 09:31 )   PT: 13.7 sec;   INR: 1.21 ratio         PTT - ( 11 May 2020 09:31 )  PTT:65.5 sec          COVID-19 PCR: Detected (11 May 2020 09:56)      RADIOLOGY & ADDITIONAL TESTS:  Imaging from Last 24 Hours:    Electrocardiogram/QTc Interval:    COORDINATION OF CARE:  Care Discussed with Consultants/Other Providers: Medicine Progress Note     74 y.o female from [facility], non-ambulatory wheel chair bound with PMH of HTN, Diabetes, Osteoarthritis, Osteoporosis, Peripheral arterial disease, Diabetic neuropathy, HLD, Schizophrenia presented with worsening non healing left heel ulcers, getting worse  after recent injury. 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 Patient refused surgery last admission and was discharged with local wound care. Xray was performed and showed no acute bony finding. + Degeneration particularly at the first MP joint and vascular calcification noted. Pt was seen by podiatry service. Bedside I&D was performed with  expression of purulence discharge. Wound care performed by podiatry team. Deep wound culture pending result  IV antibiotics: Zosyn continued. TIMUR recommended by podiatry and pending   MRI of left foot including the left ankle also recommended to ro OM performed, pending results. Patient may require OR debridement with pos partial calcanectomy as per podiatry   Pt is COVID +   Pt was seen at bedside, verbalizes feeling well. Reports some pressure like pain in left foot. Pending MRI and TIMUR results. No fever, chest pain, dyspnea, difficulties breathing       SUBJECTIVE / OVERNIGHT EVENTS:    ADDITIONAL REVIEW OF SYSTEMS:    MEDICATIONS  (STANDING):  ascorbic acid 500 milliGRAM(s) Oral daily  aspirin enteric coated 81 milliGRAM(s) Oral daily  cholecalciferol 1000 Unit(s) Oral daily  cyanocobalamin 1000 MICROGram(s) Oral daily  doxazosin 2 milliGRAM(s) Oral at bedtime  enoxaparin Injectable 40 milliGRAM(s) SubCutaneous daily  ferrous    sulfate 325 milliGRAM(s) Oral daily  folic acid 1 milliGRAM(s) Oral daily  insulin lispro (HumaLOG) corrective regimen sliding scale   SubCutaneous Before meals and at bedtime  nystatin Powder 1 Application(s) Topical two times a day  piperacillin/tazobactam IVPB.. 3.375 Gram(s) IV Intermittent every 8 hours  simvastatin 40 milliGRAM(s) Oral at bedtime    MEDICATIONS  (PRN):  acetaminophen   Tablet .. 650 milliGRAM(s) Oral every 6 hours PRN Temp greater or equal to 38C (100.4F), Mild Pain (1 - 3)  ALBUTerol    90 MICROgram(s) HFA Inhaler 2 Puff(s) Inhalation every 4 hours PRN Shortness of Breath and/or Wheezing  guaifenesin/dextromethorphan  Syrup 10 milliLiter(s) Oral every 4 hours PRN Cough  ondansetron Injectable 4 milliGRAM(s) IV Push every 6 hours PRN Nausea and/or Vomiting    CAPILLARY BLOOD GLUCOSE      POCT Blood Glucose.: 215 mg/dL (11 May 2020 21:18)    I&O's Summary      PHYSICAL EXAM:  Vital Signs Last 24 Hrs  T(C): 37.1 (12 May 2020 05:04), Max: 37.2 (11 May 2020 21:37)  T(F): 98.7 (12 May 2020 05:04), Max: 98.9 (11 May 2020 21:37)  HR: 91 (12 May 2020 05:04) (78 - 91)  BP: 120/48 (12 May 2020 05:04) (111/70 - 132/59)  BP(mean): --  RR: 16 (12 May 2020 05:04) (16 - 18)  SpO2: 100% (12 May 2020 05:04) (99% - 100%)  CONSTITUTIONAL: NAD, well-developed, well-groomed  RESPIRATORY: Normal respiratory effort; lungs are clear to auscultation bilaterally  CARDIOVASCULAR: Regular rate and rhythm,   ABDOMEN: Nontender to palpation, normoactive bowel sounds, no rebound/guarding;   PSYCH: A+O to person, place, and time; affect appropriate  NEUROLOGY: CN 2-12 are intact and symmetric; no gross sensory deficits   SKIN: No rashes; no palpable lesions  Left food wound dressing dry and intact     LABS:                        11.9   11.74 )-----------( 477      ( 11 May 2020 09:31 )             35.7     05-11    137  |  102  |  10  ----------------------------<  167<H>  4.6   |  28  |  0.76    Ca    9.9      11 May 2020 09:31    TPro  8.4<H>  /  Alb  3.4<L>  /  TBili  0.7  /  DBili  x   /  AST  4<L>  /  ALT  13  /  AlkPhos  80  05-11    PT/INR - ( 11 May 2020 09:31 )   PT: 13.7 sec;   INR: 1.21 ratio         PTT - ( 11 May 2020 09:31 )  PTT:65.5 sec          COVID-19 PCR: Detected (11 May 2020 09:56)      RADIOLOGY & ADDITIONAL TESTS:  Imaging from Last 24 Hours:    Electrocardiogram/QTc Interval:    COORDINATION OF CARE:  Care Discussed with Consultants/Other Providers:

## 2020-05-13 ENCOUNTER — TRANSCRIPTION ENCOUNTER (OUTPATIENT)
Age: 75
End: 2020-05-13

## 2020-05-13 LAB
-  AMIKACIN: SIGNIFICANT CHANGE UP
-  AMIKACIN: SIGNIFICANT CHANGE UP
-  AMOXICILLIN/CLAVULANIC ACID: SIGNIFICANT CHANGE UP
-  AMOXICILLIN/CLAVULANIC ACID: SIGNIFICANT CHANGE UP
-  AMPICILLIN/SULBACTAM: SIGNIFICANT CHANGE UP
-  AMPICILLIN/SULBACTAM: SIGNIFICANT CHANGE UP
-  AMPICILLIN: SIGNIFICANT CHANGE UP
-  AMPICILLIN: SIGNIFICANT CHANGE UP
-  AZTREONAM: SIGNIFICANT CHANGE UP
-  AZTREONAM: SIGNIFICANT CHANGE UP
-  CEFAZOLIN: SIGNIFICANT CHANGE UP
-  CEFAZOLIN: SIGNIFICANT CHANGE UP
-  CEFEPIME: SIGNIFICANT CHANGE UP
-  CEFEPIME: SIGNIFICANT CHANGE UP
-  CEFOXITIN: SIGNIFICANT CHANGE UP
-  CEFOXITIN: SIGNIFICANT CHANGE UP
-  CEFTRIAXONE: SIGNIFICANT CHANGE UP
-  CEFTRIAXONE: SIGNIFICANT CHANGE UP
-  CIPROFLOXACIN: SIGNIFICANT CHANGE UP
-  CIPROFLOXACIN: SIGNIFICANT CHANGE UP
-  ERTAPENEM: SIGNIFICANT CHANGE UP
-  ERTAPENEM: SIGNIFICANT CHANGE UP
-  GENTAMICIN: SIGNIFICANT CHANGE UP
-  GENTAMICIN: SIGNIFICANT CHANGE UP
-  IMIPENEM: SIGNIFICANT CHANGE UP
-  LEVOFLOXACIN: SIGNIFICANT CHANGE UP
-  LEVOFLOXACIN: SIGNIFICANT CHANGE UP
-  MEROPENEM: SIGNIFICANT CHANGE UP
-  MEROPENEM: SIGNIFICANT CHANGE UP
-  PIPERACILLIN/TAZOBACTAM: SIGNIFICANT CHANGE UP
-  PIPERACILLIN/TAZOBACTAM: SIGNIFICANT CHANGE UP
-  TOBRAMYCIN: SIGNIFICANT CHANGE UP
-  TOBRAMYCIN: SIGNIFICANT CHANGE UP
-  TRIMETHOPRIM/SULFAMETHOXAZOLE: SIGNIFICANT CHANGE UP
-  TRIMETHOPRIM/SULFAMETHOXAZOLE: SIGNIFICANT CHANGE UP
ANION GAP SERPL CALC-SCNC: 4 MMOL/L — LOW (ref 5–17)
BUN SERPL-MCNC: 7 MG/DL — SIGNIFICANT CHANGE UP (ref 7–18)
CALCIUM SERPL-MCNC: 8.5 MG/DL — SIGNIFICANT CHANGE UP (ref 8.4–10.5)
CHLORIDE SERPL-SCNC: 106 MMOL/L — SIGNIFICANT CHANGE UP (ref 96–108)
CO2 SERPL-SCNC: 26 MMOL/L — SIGNIFICANT CHANGE UP (ref 22–31)
CREAT SERPL-MCNC: 0.55 MG/DL — SIGNIFICANT CHANGE UP (ref 0.5–1.3)
CULTURE RESULTS: SIGNIFICANT CHANGE UP
GLUCOSE BLDC GLUCOMTR-MCNC: 116 MG/DL — HIGH (ref 70–99)
GLUCOSE BLDC GLUCOMTR-MCNC: 141 MG/DL — HIGH (ref 70–99)
GLUCOSE BLDC GLUCOMTR-MCNC: 177 MG/DL — HIGH (ref 70–99)
GLUCOSE BLDC GLUCOMTR-MCNC: 184 MG/DL — HIGH (ref 70–99)
GLUCOSE SERPL-MCNC: 102 MG/DL — HIGH (ref 70–99)
HCT VFR BLD CALC: 28.1 % — LOW (ref 34.5–45)
HGB BLD-MCNC: 9.3 G/DL — LOW (ref 11.5–15.5)
MCHC RBC-ENTMCNC: 30.1 PG — SIGNIFICANT CHANGE UP (ref 27–34)
MCHC RBC-ENTMCNC: 33.1 GM/DL — SIGNIFICANT CHANGE UP (ref 32–36)
MCV RBC AUTO: 90.9 FL — SIGNIFICANT CHANGE UP (ref 80–100)
METHOD TYPE: SIGNIFICANT CHANGE UP
NRBC # BLD: 0 /100 WBCS — SIGNIFICANT CHANGE UP (ref 0–0)
ORGANISM # SPEC MICROSCOPIC CNT: SIGNIFICANT CHANGE UP
PLATELET # BLD AUTO: 340 K/UL — SIGNIFICANT CHANGE UP (ref 150–400)
POTASSIUM SERPL-MCNC: 3.6 MMOL/L — SIGNIFICANT CHANGE UP (ref 3.5–5.3)
POTASSIUM SERPL-SCNC: 3.6 MMOL/L — SIGNIFICANT CHANGE UP (ref 3.5–5.3)
RBC # BLD: 3.09 M/UL — LOW (ref 3.8–5.2)
RBC # FLD: 12.6 % — SIGNIFICANT CHANGE UP (ref 10.3–14.5)
SODIUM SERPL-SCNC: 136 MMOL/L — SIGNIFICANT CHANGE UP (ref 135–145)
SPECIMEN SOURCE: SIGNIFICANT CHANGE UP
WBC # BLD: 10.25 K/UL — SIGNIFICANT CHANGE UP (ref 3.8–10.5)
WBC # FLD AUTO: 10.25 K/UL — SIGNIFICANT CHANGE UP (ref 3.8–10.5)

## 2020-05-13 PROCEDURE — 99221 1ST HOSP IP/OBS SF/LOW 40: CPT

## 2020-05-13 PROCEDURE — 75635 CT ANGIO ABDOMINAL ARTERIES: CPT | Mod: 26

## 2020-05-13 PROCEDURE — 93923 UPR/LXTR ART STDY 3+ LVLS: CPT | Mod: 26

## 2020-05-13 RX ADMIN — Medication 1000 UNIT(S): at 12:01

## 2020-05-13 RX ADMIN — Medication 500 MILLIGRAM(S): at 12:01

## 2020-05-13 RX ADMIN — NYSTATIN CREAM 1 APPLICATION(S): 100000 CREAM TOPICAL at 05:07

## 2020-05-13 RX ADMIN — Medication 1: at 21:32

## 2020-05-13 RX ADMIN — Medication 81 MILLIGRAM(S): at 12:01

## 2020-05-13 RX ADMIN — SIMVASTATIN 40 MILLIGRAM(S): 20 TABLET, FILM COATED ORAL at 21:24

## 2020-05-13 RX ADMIN — Medication 325 MILLIGRAM(S): at 12:02

## 2020-05-13 RX ADMIN — Medication 650 MILLIGRAM(S): at 15:47

## 2020-05-13 RX ADMIN — Medication 1 MILLIGRAM(S): at 12:02

## 2020-05-13 RX ADMIN — PREGABALIN 1000 MICROGRAM(S): 225 CAPSULE ORAL at 12:02

## 2020-05-13 RX ADMIN — Medication 1: at 12:01

## 2020-05-13 RX ADMIN — PIPERACILLIN AND TAZOBACTAM 25 GRAM(S): 4; .5 INJECTION, POWDER, LYOPHILIZED, FOR SOLUTION INTRAVENOUS at 13:23

## 2020-05-13 RX ADMIN — NYSTATIN CREAM 1 APPLICATION(S): 100000 CREAM TOPICAL at 17:10

## 2020-05-13 RX ADMIN — PIPERACILLIN AND TAZOBACTAM 25 GRAM(S): 4; .5 INJECTION, POWDER, LYOPHILIZED, FOR SOLUTION INTRAVENOUS at 05:07

## 2020-05-13 RX ADMIN — PIPERACILLIN AND TAZOBACTAM 25 GRAM(S): 4; .5 INJECTION, POWDER, LYOPHILIZED, FOR SOLUTION INTRAVENOUS at 21:24

## 2020-05-13 RX ADMIN — Medication 2 MILLIGRAM(S): at 21:24

## 2020-05-13 RX ADMIN — ENOXAPARIN SODIUM 40 MILLIGRAM(S): 100 INJECTION SUBCUTANEOUS at 12:01

## 2020-05-13 NOTE — PROGRESS NOTE ADULT - ATTENDING COMMENTS
Patient is a 74 y.o F from [facility], non-ambulatory wheel chair bound with PMH of HTN, Diabetes, Osteoarthritis, Osteoporosis, Peripheral arterial disease, Diabetic neuropathy, HLD, Schizophrenia presented to Ed with worsening non healing heel ulcers. Admitted to medicine for Diabetic foot ulcer.     # NONHEALING LEFT ANKLE ULCER WITH PURULENT CELLULITIS, CALCANEAL OSTEOMYELITIS, PHLEGMON VS. ABSCESS [NOTED ON MRI] - WOUND CX SHOWING MORGANELLA, P. MIRABILIS - S/P I&D WITH PODIATRY. ON ZOSYN, ID CONSULTED. VASCULAR CONSULTED - AWAITING TIMUR, RECOMMENDED CTA W/ RUNOFF. I&D + CALCEANEOTOMY SCHEDULED FOR 5/16 VS. AMPUTATION.   # COVID19 INFECTION  # PAD - TIMUR ORDERED  # HTN  # DIABETES, UNCONTROLLED - IMPROVED GLYCEMIC CONTROL IN HOSPITAL  # HLD  # GI AND DVT PPX    ELEN CASILLAS M.D. COVERING FOR FARHAN CASILLAS M.D.

## 2020-05-13 NOTE — PROGRESS NOTE ADULT - RS GEN PE MLT RESP DETAILS PC
airway patent/breath sounds equal good air movement/no rhonchi/breath sounds equal/clear to auscultation bilaterally/no rales/no wheezes

## 2020-05-13 NOTE — PROGRESS NOTE ADULT - PROBLEM SELECTOR PLAN 1
MRI of left foot including the left ankle on 05/12/2020  showed  cutaneous ulceration along the lateral aspect of the posterior calcaneus with associated calcaneal osteomyelitis.  sp bedside debridement by podiatry   continue zosyn   TIMUR pending    consult vascular Dr Camacho after ABIs are done   ID Dr Wellington is consulted - recommends PICC line placement for long term abx after surgery.   f/u blood culture and wound culture

## 2020-05-13 NOTE — PROGRESS NOTE ADULT - SUBJECTIVE AND OBJECTIVE BOX
74y Female    Meds:  piperacillin/tazobactam IVPB.. 3.375 Gram(s) IV Intermittent every 8 hours    Allergies    No Known Allergies    Intolerances        VITALS:  Vital Signs Last 24 Hrs  T(C): 37.2 (13 May 2020 06:29), Max: 37.3 (12 May 2020 13:30)  T(F): 98.9 (13 May 2020 06:29), Max: 99.1 (12 May 2020 13:30)  HR: 75 (13 May 2020 06:29) (75 - 94)  BP: 114/34 (13 May 2020 06:29) (110/44 - 121/52)  BP(mean): --  RR: 18 (13 May 2020 06:29) (17 - 18)  SpO2: 100% (13 May 2020 06:29) (98% - 100%)    LABS/DIAGNOSTIC TESTS:                          9.3    10.25 )-----------( 340      ( 13 May 2020 06:35 )             28.1         05-13    136  |  106  |  7   ----------------------------<  102<H>  3.6   |  26  |  0.55    Ca    8.5      13 May 2020 06:35  Phos  2.9     05-12  Mg     2.1     05-12    TPro  6.8  /  Alb  2.6<L>  /  TBili  0.9  /  DBili  x   /  AST  6<L>  /  ALT  9<L>  /  AlkPhos  61  05-12      LIVER FUNCTIONS - ( 12 May 2020 09:46 )  Alb: 2.6 g/dL / Pro: 6.8 g/dL / ALK PHOS: 61 U/L / ALT: 9 U/L DA / AST: 6 U/L / GGT: x             CULTURES: .Surgical Swab left lateral ankle wound deep  05-11 @ 15:04   Moderate Morganella morganii  Moderate Proteus mirabilis  --  Morganella morganii  Proteus mirabilis      .Blood Blood  05-11 @ 14:58   No growth to date.  --  --            RADIOLOGY:      ROS:  [  ] UNABLE TO ELICIT 74y Female was seen for follow up of left heel and ankle ulcer. detailed discussion was done with patient on MRI findings. Pt is aware of severity of infection and possiblity of loosing her foot. Pt agreeable to surgery.  Pt denies chest pain, SOB, F/C/N/V.    Meds:  piperacillin/tazobactam IVPB.. 3.375 Gram(s) IV Intermittent every 8 hours    Allergies    No Known Allergies    Intolerances        VITALS:  Vital Signs Last 24 Hrs  T(C): 37.2 (13 May 2020 06:29), Max: 37.3 (12 May 2020 13:30)  T(F): 98.9 (13 May 2020 06:29), Max: 99.1 (12 May 2020 13:30)  HR: 75 (13 May 2020 06:29) (75 - 94)  BP: 114/34 (13 May 2020 06:29) (110/44 - 121/52)  BP(mean): --  RR: 18 (13 May 2020 06:29) (17 - 18)  SpO2: 100% (13 May 2020 06:29) (98% - 100%)    LABS/DIAGNOSTIC TESTS:                          9.3    10.25 )-----------( 340      ( 13 May 2020 06:35 )             28.1         05-13    136  |  106  |  7   ----------------------------<  102<H>  3.6   |  26  |  0.55    Ca    8.5      13 May 2020 06:35  Phos  2.9     05-12  Mg     2.1     05-12    TPro  6.8  /  Alb  2.6<L>  /  TBili  0.9  /  DBili  x   /  AST  6<L>  /  ALT  9<L>  /  AlkPhos  61  05-12      LIVER FUNCTIONS - ( 12 May 2020 09:46 )  Alb: 2.6 g/dL / Pro: 6.8 g/dL / ALK PHOS: 61 U/L / ALT: 9 U/L DA / AST: 6 U/L / GGT: x             CULTURES: .Surgical Swab left lateral ankle wound deep  05-11 @ 15:04   Moderate Morganella morganii  Moderate Proteus mirabilis  --  Morganella morganii  Proteus mirabilis      .Blood Blood  05-11 @ 14:58   No growth to date.  --  --            RADIOLOGY:  < from: MR Ankle No Cont, Left (05.12.20 @ 18:24) >  EXAM:  MR ANKLE LT                            PROCEDURE DATE:  05/12/2020          INTERPRETATION:  EXAMINATION: MRI of the left ankle without contrast    CLINICAL INFORMATION: Skin ulcers. Evaluate for osteomyelitis.    TECHNIQUE: Multiplanar, multisequential MR imaging was performed.    FINDINGS: There is a cutaneous ulceration along the lateral aspect of the posterior calcaneus that extends down to the level of bone. There is high T2 and low T1 marrow signal throughout the majority of the calcaneus, sparing only the far anterior aspect of the calcaneus, consistent with osteomyelitis. There is also a confluent region of high T2 signal in the posterolateral subcutaneous tissues at the level of the ankle and hindfoot that may represent confluent phlegmon and/or abscess (evaluation is limited without contrast). There are also small foci of air in the soft tissues at this level. There are no other areas of marrow signal alteration that are suspicious for osteomyelitis. There is mild arthrosis at several tarsometatarsal articulations. There is fatty atrophy and high T2 signal of the foot musculature, suggestive of denervation.    IMPRESSION: Cutaneous ulceration along the lateral aspect of the posterior calcaneus with associated calcaneal osteomyelitis.   Confluent associated signal alteration in the posterolateral subcutaneous tissues at the level of the ankle and hindfoot may represent confluent phlegmonous and/or abscess (evaluation is limited without contrast).                BECKA GRADY M.D., ATTENDING RADIOLOGIST  This document has been electronically signed. May 12 2020  7:28PM    < end of copied text >      ROS:  [  ] UNABLE TO ELICIT

## 2020-05-13 NOTE — PROGRESS NOTE ADULT - SUBJECTIVE AND OBJECTIVE BOX
Patient is a 74y old  Female who presents with a chief complaint of Left ankle wound (13 May 2020 12:52)      HPI  Called see and merari 74y.o. Female from [facility] w/PMH significant for DM, HTN, OA, PAD, HLD, Schizophrenia for vascular clearance for L calcanectomy .     Per previous notes: Presented to Ed with worsening non healing heel ulcers. She reports having had the ulcer for about a year, recent got worse after she had bumped her foot on her wheelchair. Patient was [facility] podiatry service care last September for similar b/l foot ulcers for which MRI revealed possible Osteomyelitis of the Calcaneous. Patient refused surgery last admission and was discharged with local wound care. She reports daily dressing with betadine from her care provider. Reports some pain to the left ankle, denies fever, chills, nausea or vomiting. wbc 11.74, afebrile.     Pt ambulates independently. Podiatry planning for possible partial calcanectomy 5/16/2020.    PAST MEDICAL & SURGICAL HISTORY:  DM (diabetes mellitus)  Paranoid schizophrenia  HLD (hyperlipidemia)  PAD (peripheral artery disease)  HTN (hypertension)  No significant past surgical history      MEDICATIONS  (STANDING):  ascorbic acid 500 milliGRAM(s) Oral daily  aspirin enteric coated 81 milliGRAM(s) Oral daily  cholecalciferol 1000 Unit(s) Oral daily  cyanocobalamin 1000 MICROGram(s) Oral daily  doxazosin 2 milliGRAM(s) Oral at bedtime  enoxaparin Injectable 40 milliGRAM(s) SubCutaneous daily  ferrous    sulfate 325 milliGRAM(s) Oral daily  folic acid 1 milliGRAM(s) Oral daily  insulin lispro (HumaLOG) corrective regimen sliding scale   SubCutaneous Before meals and at bedtime  nystatin Powder 1 Application(s) Topical two times a day  piperacillin/tazobactam IVPB.. 3.375 Gram(s) IV Intermittent every 8 hours  simvastatin 40 milliGRAM(s) Oral at bedtime    MEDICATIONS  (PRN):  acetaminophen   Tablet .. 650 milliGRAM(s) Oral every 6 hours PRN Temp greater or equal to 38C (100.4F), Mild Pain (1 - 3)  ALBUTerol    90 MICROgram(s) HFA Inhaler 2 Puff(s) Inhalation every 4 hours PRN Shortness of Breath and/or Wheezing  guaifenesin/dextromethorphan  Syrup 10 milliLiter(s) Oral every 4 hours PRN Cough  ondansetron Injectable 4 milliGRAM(s) IV Push every 6 hours PRN Nausea and/or Vomiting      Allergies    No Known Allergies    Vital Signs Last 24 Hrs  T(C): 37.2 (13 May 2020 06:29), Max: 37.2 (13 May 2020 06:29)  T(F): 98.9 (13 May 2020 06:29), Max: 98.9 (13 May 2020 06:29)  HR: 75 (13 May 2020 06:29) (75 - 79)  BP: 114/34 (13 May 2020 06:29) (110/44 - 114/34)  BP(mean): --  RR: 18 (13 May 2020 06:29) (17 - 18)  SpO2: 100% (13 May 2020 06:29) (98% - 100%)    Physical:  Gen: A&Ox3. NAD  LLE: Warm, motor function of toes intact throughout. Erythema of heel with superficial skin loss. Ulcer of posterior heel with calcaneus exposed, draining blood and pus. Medial aspect of heel with eschar. Dopplerable PT signal, anterior tibial dopplerable but DP without signal.    LABS:                        9.3    10.25 )-----------( 340      ( 13 May 2020 06:35 )             28.1              05-13    136  |  106  |  7   ----------------------------<  102<H>  3.6   |  26  |  0.55    Ca    8.5      13 May 2020 06:35  Phos  2.9     05-12  Mg     2.1     05-12    TPro  6.8  /  Alb  2.6<L>  /  TBili  0.9  /  DBili  x   /  AST  6<L>  /  ALT  9<L>  /  AlkPhos  61  05-12            PT/INR - ( 12 May 2020 09:46 )   PT: 15.3 sec;   INR: 1.34 ratio         PTT - ( 12 May 2020 09:46 )  PTT:54.7 sec    RADIOLOGY & ADDITIONAL STUDIES:  < from: MR Ankle No Cont, Left (05.12.20 @ 18:24) >  IMPRESSION: Cutaneous ulceration along the lateral aspect of the posterior calcaneus with associated calcaneal osteomyelitis.   Confluent associated signal alteration in the posterolateral subcutaneous tissues at the level of the ankle and hindfoot may represent confluent phlegmonous and/or abscess (evaluation is limited without contrast).    < end of copied text >

## 2020-05-13 NOTE — PROGRESS NOTE ADULT - SUBJECTIVE AND OBJECTIVE BOX
Patient is a 74y old  Female who presents with a chief complaint of worsening Left ankle wound    Podiatry Interval HPI: Patient seen this AM at bedside with dressing intact to the left foot. She offers no new complaint. Reviewed MRI result with patient, explained the the extent of bone infection involves almost the entire heel bone. Once again explained to patient she is in need of Surgical debridement and removal of infected heel bone. Explained to patient removal of heel bone likely will change her gait, she may be unable to bear weight to the left lower extremity. Patient says she would like to remain functional to be able to walk with walker. Spoke to patient that bka may be an option as well. Patient says she will consider if better functional outcome. Risk and benefit explained to patient, she has serious infection and at high risk for losing her foot and part of her leg. Infection maybe life threatening if left untreated. Patient is reluctant but agreeable to surgery. Tentatively plan for Saturday AM. She denies f/c/n/v or SOB. She remains afebrile, wbc normal limit.    HPI:  Patient is a 74 y.o F from [facility], non-ambulatory wheel chair bound with PMH of HTN, Diabetes, Osteoarthritis, Osteoporosis, Peripheral arterial disease, Diabetic neuropathy, HLD, Schizophrenia presented to Ed with worsening non healing heel ulcers. She reports having had the ulcer for about a year, recent got worse after she had bumped her foot on her wheelchair. 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 Patient refused surgery last admission and was discharged with local wound care. She reports daily dressing with betadine from her care provider. Pt denies any fever, chills , pain , cough , SOB,CP ,abd pain or any other symptoms. (11 May 2020 13:16)      Podiatry HPI: Patient is a 74 y.o F consulted by ED attending for left ankle wound. Patient is from [facility], reports non-ambulatory wheel chair bound with PMH of HTN, Diabetes, Osteoarthritis, Osteoporosis, Peripheral arterial disease, Diabetic neuropathy, HLD, Schizophrenia presented to Ed with worsening non healing heel ulcers. She reports having had the ulcer for about a year, recent got worse after she had bumped her foot on her wheelchair. 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 Patient refused surgery last admission and was discharged with local wound care. She reports daily dressing with betadine from her care provider. Reports some pain to the left ankle, denies fever, chills, nausea or vomiting. wbc 11.74, afebrile.     PMH:DM (diabetes mellitus)  Paranoid schizophrenia  HLD (hyperlipidemia)  PAD (peripheral artery disease)  HTN (hypertension)    Allergies: No Known Allergies        MEDICATIONS  (STANDING):  ascorbic acid 500 milliGRAM(s) Oral daily  aspirin enteric coated 81 milliGRAM(s) Oral daily  cholecalciferol 1000 Unit(s) Oral daily  cyanocobalamin 1000 MICROGram(s) Oral daily  doxazosin 2 milliGRAM(s) Oral at bedtime  enoxaparin Injectable 40 milliGRAM(s) SubCutaneous daily  ferrous    sulfate 325 milliGRAM(s) Oral daily  folic acid 1 milliGRAM(s) Oral daily  insulin lispro (HumaLOG) corrective regimen sliding scale   SubCutaneous Before meals and at bedtime  nystatin Powder 1 Application(s) Topical two times a day  piperacillin/tazobactam IVPB.. 3.375 Gram(s) IV Intermittent every 8 hours  simvastatin 40 milliGRAM(s) Oral at bedtime    MEDICATIONS  (PRN):  acetaminophen   Tablet .. 650 milliGRAM(s) Oral every 6 hours PRN Temp greater or equal to 38C (100.4F), Mild Pain (1 - 3)  ALBUTerol    90 MICROgram(s) HFA Inhaler 2 Puff(s) Inhalation every 4 hours PRN Shortness of Breath and/or Wheezing  guaifenesin/dextromethorphan  Syrup 10 milliLiter(s) Oral every 4 hours PRN Cough  ondansetron Injectable 4 milliGRAM(s) IV Push every 6 hours PRN Nausea and/or Vomiting    FH: No pertinent family history in first degree relatives    PSX: No significant past surgical history    SH:     Labs:                        9.3    10.25 )-----------( 340      ( 13 May 2020 06:35 )             28.1       05-13    136  |  106  |  7   ----------------------------<  102<H>  3.6   |  26  |  0.55    Ca    8.5      13 May 2020 06:35  Phos  2.9     05-12  Mg     2.1     05-12    TPro  6.8  /  Alb  2.6<L>  /  TBili  0.9  /  DBili  x   /  AST  6<L>  /  ALT  9<L>  /  AlkPhos  61  05-12      Vital Signs Last 24 Hrs  T(C): 37.2 (13 May 2020 06:29), Max: 37.3 (12 May 2020 13:30)  T(F): 98.9 (13 May 2020 06:29), Max: 99.1 (12 May 2020 13:30)  HR: 75 (13 May 2020 06:29) (75 - 94)  BP: 114/34 (13 May 2020 06:29) (110/44 - 121/52)  BP(mean): --  RR: 18 (13 May 2020 06:29) (17 - 18)  SpO2: 100% (13 May 2020 06:29) (98% - 100%)    Sedimentation Rate, Erythrocyte: 51 mm/Hr (05-12-20 @ 09:46)  Sedimentation Rate, Erythrocyte: 88 mm/Hr (05-11-20 @ 09:31)      C-Reactive Protein, Serum: 4.38 mg/dL (05-12-20 @ 14:13)  C-Reactive Protein, Serum: 2.74 mg/dL (05-11-20 @ 18:31)      ROS  REVIEW OF SYSTEMS: Per HPI          PHYSICAL EXAM  GEN: TITO ORLANDO is a pleasant well-nourished, well developed 74y Female in no acute distress, alert awake, and oriented to person, place and time.   LE Focused:    Vasc: DP/PT b/l faintly palpable, moderate edema noted to the left foot compared to the right, increased warmth to left foot. Pedal hair absent.   Derm: open lesion noted to lateral posterior heel, 1.2cm x 1cm x 1.5cm deep, surrounding maceration, with peeling skin. Wound probes to bone, tracks anteriorly and proximally about 5cm. 3cc of malodorous purulence expressed again today. Necrotic eschar noted proximal to the open wound. Fluctuance to the lateral ankle noted however improved after drainage of purulence. No open lesions noted to the right lower extremity.   Neuro: Protective sensation diminished.   MSK: Passive ROM pain free at the ankle, pain to palpation at wound site and with manual expression of purulence.     Imaging:   EXAM:  FOOT LEFT (MINIMUM 3 VIEWS)                          EXAM:  ANKLE LEFT (MINIMUM 3 V)                            PROCEDURE DATE:  05/11/2020      INTERPRETATION:  Left ankle and left foot. Patient has a lateral drain wound on the ankle.    Left ankle. 3 views.    There is rather mild degeneration of the malleolar tips.    There are posterior and inferior calcaneal spurs.    No bone destruction or fracture is evident.    Arterial calcifications are noted.    Left foot. 3 views. There is a mild hallux valgus with mild local degeneration.    Arterial calcifications are noted.    No bone destruction or fracture is evident.    IMPRESSION: No acute bony finding. Degeneration particularly at the first MP joint and vascular calcification noted.      ADITI SHELL M.D., ATTENDING RADIOLOGIST  This document has been electronically signed. May 11 2020  9:34AM      ---------------------------------------------------------------------------------------------------------------------------------------------------------------------------------------------------------------------------------------------------  EXAM:  US PHYSIOL LWR EXT 3+ LEV BI                          PROCEDURE DATE:  09/10/2019        INTERPRETATION:  Clinical indication: Diabetic right foot ulcer    Comparison: None    Findings: There are biphasic waveforms bilaterally however, waveforms are   dampened at the level of the metatarsals. There is no abnormal segmental   pressure gradient.    Right TIMUR = 1.34  Left TIMUR = 1.26    Impression: Elevated right TIMUR compatible with calcified vessels.    Abnormally dampened waveforms at the level of the metatarsals.      STEPHAN WALL M.D., ATTENDING RADIOLOGIST  This document has been electronically signed. Sep 13 2019  8:38AM      ------------------------------------------------------------------------------------------------------------------------------------------------------------------------------------------------------------------------------------------------  MRI    EXAM:  MR ANKLE LT                          PROCEDURE DATE:  05/12/2020      INTERPRETATION:  EXAMINATION: MRI of the left ankle without contrast    CLINICAL INFORMATION: Skin ulcers. Evaluate for osteomyelitis.    TECHNIQUE: Multiplanar, multisequential MR imaging was performed.    FINDINGS: There is a cutaneous ulceration along the lateral aspect of the posterior calcaneus that extends down to the level of bone. There is high T2 and low T1 marrow signal throughout the majority of the calcaneus, sparing only the far anterior aspect of the calcaneus, consistent with osteomyelitis. There is also a confluent region of high T2 signal in the posterolateral subcutaneous tissues at the level of the ankle and hindfoot that may represent confluent phlegmon and/or abscess (evaluation is limited without contrast). There are also small foci of air in the soft tissues at this level. There are no other areas of marrow signal alteration that are suspicious for osteomyelitis. There is mild arthrosis at several tarsometatarsal articulations. There is fatty atrophy and high T2 signal of the foot musculature, suggestive of denervation.    IMPRESSION: Cutaneous ulceration along the lateral aspect of the posterior calcaneus with associated calcaneal osteomyelitis.   Confluent associated signal alteration in the posterolateral subcutaneous tissues at the level of the ankle and hindfoot may represent confluent phlegmonous and/or abscess (evaluation is limited without contrast).      BECKA GRADY M.D., ATTENDING RADIOLOGIST  This document has been electronically signed. May 12 2020  7:28PM    -------------------------------------------------------------------------------------------------------------------------------------------------------------------------------------    Cultures:   Culture - Surgical Swab (05.11.20 @ 15:04)    -  Amikacin: S <=16    -  Amikacin: S <=16    -  Amoxicillin/Clavulanic Acid: R >16/8    -  Amoxicillin/Clavulanic Acid: S <=8/4    -  Ampicillin: R >16 These ampicillin results predict results for amoxicillin    -  Ampicillin: S <=8 These ampicillin results predict results for amoxicillin    -  Ampicillin/Sulbactam: S <=4/2 Enterobacter, Citrobacter, and Serratia may develop resistance during prolonged therapy (3-4 days)    -  Aztreonam: S <=4    -  Aztreonam: S <=4    -  Cefazolin: R >16 Enterobacter, Citrobacter, and Serratia may develop resistance during prolonged therapy (3-4 days)    -  Cefazolin: S <=2 Enterobacter, Citrobacter, and Serratia may develop resistance during prolonged therapy (3-4 days)    -  Cefepime: S <=2    -  Cefepime: S <=2    -  Cefoxitin: S <=8    -  Cefoxitin: S <=8    -  Ceftriaxone: R 8 Enterobacter, Citrobacter, and Serratia may develop resistance during prolonged therapy    -  Ceftriaxone: S <=1 Enterobacter, Citrobacter, and Serratia may develop resistance during prolonged therapy    -  Ciprofloxacin: I 0.5    -  Ciprofloxacin: S <=0.25    -  Ertapenem: S <=0.5    -  Ertapenem: S <=0.5    -  Ampicillin/Sulbactam: R >16/8 Enterobacter, Citrobacter, and Serratia may develop resistance during prolonged therapy (3-4 days)    -  Gentamicin: S <=2    -  Gentamicin: S <=2    -  Levofloxacin: R 2    -  Levofloxacin: S <=0.5    -  Meropenem: S <=1    -  Meropenem: S <=1    -  Piperacillin/Tazobactam: S <=8    -  Piperacillin/Tazobactam: S <=8    -  Tobramycin: S <=2    -  Tobramycin: S <=2    -  Trimethoprim/Sulfamethoxazole: R >2/38    -  Trimethoprim/Sulfamethoxazole: S <=0.5/9.5    -  Imipenem: S <=1    Specimen Source: .Surgical Swab left lateral ankle wound deep    Culture Results:   Moderate Proteus mirabilis  Moderate Morganella morganii #2  Moderate Bacteroides fragilis Susceptibilites not performed.    Organism Identification: Morganella morganii  Proteus mirabilis  Bacteroides fragilis  Bacteroides fragilis    Organism: Morganella morganii    Organism: Proteus mirabilis    Organism: Bacteroides fragilis    Organism: Bacteroides fragilis    Method Type: RICARDO    Method Type: RICARDO    Method Type: RICARDO    Method Type: RICARDO      A: Left heel wound     Cellulitis    OM of the left calcaneus    P:   Chart reviewed and Patient evaluated  Discussed diagnosis and treatment with patient  about 3cc of malodorous milky purulence expressed again today  Wound flush with copious amount normal saline  Obtained deep wound culture, reviewed above  Applied iodoform packing with dry sterile dressing  X-rays reviewed: no obvious findings  Continue with IV antibiotics  Previous TIMUR reviewed, unable to obtain new vascular status due to COVID + statis  Non-weight bearing  to left foot  Offloading to bilateral Heels while in bed  Discussed importance of daily foot examinations and proper shoe gear and to importance of lower Fasting Blood Glucose levels.   MRI reviewed, OM of calcaneous  Plan for OR debridement with calcanectomy Sat. 5/16  Please provide medical clearance  Patient is at high risk for more proximal amputation, f/u vascular rec  ID input appreciated, continue abx  Podiatry will follow while in house.  Discussed with Attending Dr. Lane

## 2020-05-13 NOTE — PROGRESS NOTE ADULT - SUBJECTIVE AND OBJECTIVE BOX
NP Note discussed with  Primary Attending     74 y.o female from [facility], non-ambulatory wheel chair bound with PMH of HTN, Diabetes, Osteoarthritis, Osteoporosis, Peripheral arterial disease, Diabetic neuropathy, HLD, Schizophrenia presented with worsening non healing left heel ulcers, getting worse  after recent injury. 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 Patient refused surgery last admission and was discharged with local wound care. bedside I&D was performed with  expression of purulence discharge. Wound care performed by podiatry team. Deep wound culture pending result IV antibiotics: Zosyn continued.  patient was seen today by  bedside. Patient had  MRI of left foot including the left ankle on 05/12/2020 that showed  cutaneous ulceration along the lateral aspect of the posterior calcaneus with associated calcaneal osteomyelitis.  results of the MRI were discussed with the patient in details.  all questions answered.   results of the MRI were also discussed with ID specialist.  Patient may require OR debridement with possible  partial calcanectomy as per podiatry  Pt is COVID +.  she reports  No fever, chest pain, dyspnea, difficulties breathing , verbalizes feeling well.  TIMUR and vascular evaluation is  recommended by podiatry and pending.          INTERVAL HPI/OVERNIGHT EVENTS: no new complaints    MEDICATIONS  (STANDING):  ascorbic acid 500 milliGRAM(s) Oral daily  aspirin enteric coated 81 milliGRAM(s) Oral daily  cholecalciferol 1000 Unit(s) Oral daily  cyanocobalamin 1000 MICROGram(s) Oral daily  doxazosin 2 milliGRAM(s) Oral at bedtime  enoxaparin Injectable 40 milliGRAM(s) SubCutaneous daily  ferrous    sulfate 325 milliGRAM(s) Oral daily  folic acid 1 milliGRAM(s) Oral daily  insulin lispro (HumaLOG) corrective regimen sliding scale   SubCutaneous Before meals and at bedtime  nystatin Powder 1 Application(s) Topical two times a day  piperacillin/tazobactam IVPB.. 3.375 Gram(s) IV Intermittent every 8 hours  simvastatin 40 milliGRAM(s) Oral at bedtime    MEDICATIONS  (PRN):  acetaminophen   Tablet .. 650 milliGRAM(s) Oral every 6 hours PRN Temp greater or equal to 38C (100.4F), Mild Pain (1 - 3)  ALBUTerol    90 MICROgram(s) HFA Inhaler 2 Puff(s) Inhalation every 4 hours PRN Shortness of Breath and/or Wheezing  guaifenesin/dextromethorphan  Syrup 10 milliLiter(s) Oral every 4 hours PRN Cough  ondansetron Injectable 4 milliGRAM(s) IV Push every 6 hours PRN Nausea and/or Vomiting      __________________________________________________  REVIEW OF SYSTEMS:    CONSTITUTIONAL: No fever,   EYES: no acute visual disturbances  NECK: No pain or stiffness  RESPIRATORY: No cough; No shortness of breath  CARDIOVASCULAR: No chest pain, no palpitations  GASTROINTESTINAL: No pain. No nausea or vomiting; No diarrhea   NEUROLOGICAL: No headache or numbness, no tremors  MUSCULOSKELETAL: No joint pain, no muscle pain  GENITOURINARY: no dysuria, no frequency, no hesitancy  PSYCHIATRY: no depression , no anxiety  ALL OTHER  ROS negative        Vital Signs Last 24 Hrs  T(C): 37.2 (13 May 2020 06:29), Max: 37.3 (12 May 2020 13:30)  T(F): 98.9 (13 May 2020 06:29), Max: 99.1 (12 May 2020 13:30)  HR: 75 (13 May 2020 06:29) (75 - 94)  BP: 114/34 (13 May 2020 06:29) (110/44 - 121/52)  BP(mean): --  RR: 18 (13 May 2020 06:29) (17 - 18)  SpO2: 100% (13 May 2020 06:29) (98% - 100%)    ________________________________________________  PHYSICAL EXAM:  Vital Signs Last 24 Hrs  T(C): 37.1 (12 May 2020 05:04), Max: 37.2 (11 May 2020 21:37)  T(F): 98.7 (12 May 2020 05:04), Max: 98.9 (11 May 2020 21:37)  HR: 91 (12 May 2020 05:04) (78 - 91)  BP: 120/48 (12 May 2020 05:04) (111/70 - 132/59)  BP(mean): --  RR: 16 (12 May 2020 05:04) (16 - 18)  SpO2: 100% (12 May 2020 05:04) (99% - 100%)  CONSTITUTIONAL: NAD, well-developed, well-groomed  RESPIRATORY: Normal respiratory effort; lungs are clear to auscultation bilaterally  CARDIOVASCULAR: Regular rate and rhythm,   ABDOMEN: Nontender to palpation, normoactive bowel sounds, no rebound/guarding;   PSYCH: A+O to person, place, and time; affect appropriate  NEUROLOGY: CN 2-12 are intact and symmetric; no gross sensory deficits   SKIN: No rashes; no palpable lesions  Left food wound dressing dry and intact     _________________________________________________  LABS:                        9.3    10.25 )-----------( 340      ( 13 May 2020 06:35 )             28.1     05-13    136  |  106  |  7   ----------------------------<  102<H>  3.6   |  26  |  0.55    Ca    8.5      13 May 2020 06:35  Phos  2.9     05-12  Mg     2.1     05-12    TPro  6.8  /  Alb  2.6<L>  /  TBili  0.9  /  DBili  x   /  AST  6<L>  /  ALT  9<L>  /  AlkPhos  61  05-12    PT/INR - ( 12 May 2020 09:46 )   PT: 15.3 sec;   INR: 1.34 ratio         PTT - ( 12 May 2020 09:46 )  PTT:54.7 sec    CAPILLARY BLOOD GLUCOSE      POCT Blood Glucose.: 177 mg/dL (13 May 2020 11:42)  POCT Blood Glucose.: 116 mg/dL (13 May 2020 08:04)  POCT Blood Glucose.: 168 mg/dL (12 May 2020 21:23)  POCT Blood Glucose.: 178 mg/dL (12 May 2020 18:16)        RADIOLOGY & ADDITIONAL TESTS:    Imaging  Reviewed:  YES     Consultant(s) Notes Reviewed:   YES       Plan of care was discussed with patient and /or primary care giver; all questions and concerns were addressed

## 2020-05-13 NOTE — PROGRESS NOTE ADULT - ATTENDING COMMENTS
Seen ex'ed dw'ed PA  L heel infected ulcers.  ?similar presentation in Sept 2019  Lives in facility  Uses walker  No h/o c;laud, rest pain, swelling  DM. Non smoker  Rest of PMH per EMR    No report of CP/SOB/N/V/D/F/C  FH: unknown    AAO, NAD, No JVD/icterus  Abd: S/NT, no pulsatilty    LEs:   S/NT,   L heel ulcers, ?I&D site- packed.  Cellulitis  +rad, fem pulses.  Dist pulses NP    TIMUR/PVRs 9/2019: Ca, Excellent waveforms    A/P:   Athero, L heel infected ulcers  Limb potentially threatened  Decent art perfusion in 9/2019    Rec:   Local care/infection source control  Further vasc simons will depend on repeat TIMUR/PVR

## 2020-05-14 LAB
ANION GAP SERPL CALC-SCNC: 9 MMOL/L — SIGNIFICANT CHANGE UP (ref 5–17)
BUN SERPL-MCNC: 7 MG/DL — SIGNIFICANT CHANGE UP (ref 7–18)
CALCIUM SERPL-MCNC: 9 MG/DL — SIGNIFICANT CHANGE UP (ref 8.4–10.5)
CHLORIDE SERPL-SCNC: 104 MMOL/L — SIGNIFICANT CHANGE UP (ref 96–108)
CO2 SERPL-SCNC: 24 MMOL/L — SIGNIFICANT CHANGE UP (ref 22–31)
CREAT SERPL-MCNC: 0.67 MG/DL — SIGNIFICANT CHANGE UP (ref 0.5–1.3)
GLUCOSE BLDC GLUCOMTR-MCNC: 149 MG/DL — HIGH (ref 70–99)
GLUCOSE BLDC GLUCOMTR-MCNC: 196 MG/DL — HIGH (ref 70–99)
GLUCOSE BLDC GLUCOMTR-MCNC: 214 MG/DL — HIGH (ref 70–99)
GLUCOSE BLDC GLUCOMTR-MCNC: 216 MG/DL — HIGH (ref 70–99)
GLUCOSE SERPL-MCNC: 166 MG/DL — HIGH (ref 70–99)
HCT VFR BLD CALC: 29.4 % — LOW (ref 34.5–45)
HGB BLD-MCNC: 9.8 G/DL — LOW (ref 11.5–15.5)
MCHC RBC-ENTMCNC: 30.3 PG — SIGNIFICANT CHANGE UP (ref 27–34)
MCHC RBC-ENTMCNC: 33.3 GM/DL — SIGNIFICANT CHANGE UP (ref 32–36)
MCV RBC AUTO: 91 FL — SIGNIFICANT CHANGE UP (ref 80–100)
NRBC # BLD: 0 /100 WBCS — SIGNIFICANT CHANGE UP (ref 0–0)
PLATELET # BLD AUTO: 371 K/UL — SIGNIFICANT CHANGE UP (ref 150–400)
POTASSIUM SERPL-MCNC: 3.7 MMOL/L — SIGNIFICANT CHANGE UP (ref 3.5–5.3)
POTASSIUM SERPL-SCNC: 3.7 MMOL/L — SIGNIFICANT CHANGE UP (ref 3.5–5.3)
RBC # BLD: 3.23 M/UL — LOW (ref 3.8–5.2)
RBC # FLD: 12.8 % — SIGNIFICANT CHANGE UP (ref 10.3–14.5)
SODIUM SERPL-SCNC: 137 MMOL/L — SIGNIFICANT CHANGE UP (ref 135–145)
WBC # BLD: 7.43 K/UL — SIGNIFICANT CHANGE UP (ref 3.8–10.5)
WBC # FLD AUTO: 7.43 K/UL — SIGNIFICANT CHANGE UP (ref 3.8–10.5)

## 2020-05-14 RX ADMIN — Medication 325 MILLIGRAM(S): at 12:01

## 2020-05-14 RX ADMIN — Medication 1: at 21:51

## 2020-05-14 RX ADMIN — PIPERACILLIN AND TAZOBACTAM 25 GRAM(S): 4; .5 INJECTION, POWDER, LYOPHILIZED, FOR SOLUTION INTRAVENOUS at 13:34

## 2020-05-14 RX ADMIN — Medication 650 MILLIGRAM(S): at 17:44

## 2020-05-14 RX ADMIN — Medication 1000 UNIT(S): at 12:01

## 2020-05-14 RX ADMIN — Medication 2: at 08:18

## 2020-05-14 RX ADMIN — PREGABALIN 1000 MICROGRAM(S): 225 CAPSULE ORAL at 12:01

## 2020-05-14 RX ADMIN — NYSTATIN CREAM 1 APPLICATION(S): 100000 CREAM TOPICAL at 05:47

## 2020-05-14 RX ADMIN — PIPERACILLIN AND TAZOBACTAM 25 GRAM(S): 4; .5 INJECTION, POWDER, LYOPHILIZED, FOR SOLUTION INTRAVENOUS at 21:51

## 2020-05-14 RX ADMIN — Medication 1 MILLIGRAM(S): at 12:01

## 2020-05-14 RX ADMIN — SIMVASTATIN 40 MILLIGRAM(S): 20 TABLET, FILM COATED ORAL at 21:51

## 2020-05-14 RX ADMIN — NYSTATIN CREAM 1 APPLICATION(S): 100000 CREAM TOPICAL at 17:41

## 2020-05-14 RX ADMIN — Medication 500 MILLIGRAM(S): at 12:00

## 2020-05-14 RX ADMIN — Medication 2: at 12:00

## 2020-05-14 RX ADMIN — Medication 650 MILLIGRAM(S): at 08:53

## 2020-05-14 RX ADMIN — PIPERACILLIN AND TAZOBACTAM 25 GRAM(S): 4; .5 INJECTION, POWDER, LYOPHILIZED, FOR SOLUTION INTRAVENOUS at 05:47

## 2020-05-14 RX ADMIN — Medication 81 MILLIGRAM(S): at 12:01

## 2020-05-14 NOTE — DISCHARGE NOTE PROVIDER - NSDCMRMEDTOKEN_GEN_ALL_CORE_FT
acetaminophen 325 mg oral tablet: 2 tab(s) orally every 6 hours, As needed, Mild Pain (1 - 3)  ascorbic acid 500 mg oral tablet: 1 tab(s) orally 2 times a day  aspirin 81 mg oral delayed release tablet: 1 tab(s) orally once a day  Cardura 2 mg oral tablet: 1 tab(s) orally once a day  ferrous sulfate 325 mg (65 mg elemental iron) oral tablet: 1 tab(s) orally 3 times a day  folic acid 1 mg oral tablet: 1 tab(s) orally once a day  Glucotrol XL 10 mg oral tablet, extended release: 1 tab(s) orally once a day  Invokana 100 mg oral tablet: 1 tab(s) orally once a day  losartan 100 mg oral tablet: 1 tab(s) orally once a day acetaminophen 325 mg oral tablet: 2 tab(s) orally every 6 hours, As needed, Mild Pain (1 - 3)  albuterol 90 mcg/inh inhalation aerosol: 2 puff(s) inhaled every 4 hours, As needed, Shortness of Breath and/or Wheezing  ascorbic acid 500 mg oral tablet: 1 tab(s) orally 2 times a day  aspirin 81 mg oral delayed release tablet: 1 tab(s) orally once a day  Cardura 2 mg oral tablet: 1 tab(s) orally once a day  cholecalciferol oral tablet: 1000 unit(s) orally once a day  cyanocobalamin 1000 mcg oral tablet: 1 tab(s) orally once a day  ferrous sulfate 325 mg (65 mg elemental iron) oral tablet: 1 tab(s) orally 3 times a day  folic acid 1 mg oral tablet: 1 tab(s) orally once a day  Glucotrol XL 10 mg oral tablet, extended release: 1 tab(s) orally once a day  guaifenesin-dextromethorphan 100 mg-10 mg/5 mL oral liquid: 10 milliliter(s) orally every 4 hours, As needed, Cough  Invokana 100 mg oral tablet: 1 tab(s) orally once a day  nystatin 100,000 units/g topical powder: 1 application topically 2 times a day  piperacillin-tazobactam: 3.375 gram(s) intravenous every 8 hours  simvastatin 40 mg oral tablet: 1 tab(s) orally once a day (at bedtime)

## 2020-05-14 NOTE — PROGRESS NOTE ADULT - ATTENDING COMMENTS
- Pt seen and examined bedside, pt is in pleasant mood, NAD.  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  - Lengthy bedside discussion with pt regarding poor prognosis of conservative therapy with long term IV abx via PICC line.  - Discussed long term IV abx via picc will likely to fail again given prior history (Sept'19) and the extent of infection.  - Discussed pt is already at very high risk for BKA, chances of limb salvage is very low.  - Discussed with current condition 5-year morbidity and morality rate is very high  - Discussed forgoing surgical intervention will result in increased risk of limb loss and loss of life due to complication as well as systemic complication of long term IV abx.  - Discussed BKA is the more favorable choice considering recovery time and functionality vs. resection of OM and wound care which will be extremely difficult to heal.  - Pt is aware of all of the risks of forgoing surgical management and states she will take her chances with conservative therapy with IV abx.  - No pod surgical plan at this time.  - Local wound care with daily flush and packing with iodoform.  - IV abx therapy per ID.

## 2020-05-14 NOTE — CHART NOTE - NSCHARTNOTEFT_GEN_A_CORE
#20 guaze IV access obtained to left forearm to facilitate CTA, excellent blood backflow, GFR pending, monitoring continue.  Vital Signs Last 24 Hrs  T(C): 37 (14 May 2020 05:33), Max: 37.4 (13 May 2020 20:24)  T(F): 98.6 (14 May 2020 05:33), Max: 99.4 (13 May 2020 20:24)  HR: 76 (14 May 2020 05:33) (66 - 76)  BP: 121/52 (14 May 2020 05:33) (117/50 - 124/48)  BP(mean): --  RR: 16 (14 May 2020 05:33) (16 - 18)  SpO2: 100% (14 May 2020 05:33) (100% - 100%)

## 2020-05-14 NOTE — PROGRESS NOTE ADULT - ATTENDING COMMENTS
As above  Foot stable  TIMUR/PVR: grossly macroarterial perf ok.  Abhinav has small v art dz.  Adequate for podiatry intervention for infection control purposes.  CTA AOrta/runoff when feasible

## 2020-05-14 NOTE — PROGRESS NOTE ADULT - PROBLEM SELECTOR PLAN 7
IMPROVE VTE Individual Risk Assessment    RISK                                                                Points  [  ] Previous VTE                                                  3  [  ] Thrombophilia                                               2  [  ] Lower limb paralysis                                      2        (unable to hold up >15 seconds)    [  ] Current Cancer                                              2         (within 6 months)  [x ] Immobilization > 24 hrs                                1  [  ] ICU/CCU stay > 24 hours                              1  [x  ] Age > 60                                                      1  IMPROVE VTE Score __2_DVT ppx Lovenox 40 sub q______  ) Cont. Lovenox sq

## 2020-05-14 NOTE — DISCHARGE NOTE PROVIDER - CARE PROVIDER_API CALL
Rosalio Llanes [facility]  [id number] [address] [place] [address]  Phone: [phone]  Fax: [phone]  Follow Up Time:

## 2020-05-14 NOTE — DISCHARGE NOTE PROVIDER - HOSPITAL COURSE
74 y.o F from [facility], non-ambulatory wheel chair bound with PMH of HTN, Diabetes, Osteoarthritis, Osteoporosis, Peripheral arterial disease, Diabetic neuropathy, HLD, Schizophrenia presented to Ed with worsening left heel non healing ulcers X 1 year. Recent got worse after she had bumped her foot on her wheelchair. 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 Patient refused surgery last admission and was discharged with local wound care. She reports daily dressing with betadine from her care provider.     CTA abd with runoff to eval vasculature done this admission? MRI reviewed, OM of calcaneous. Plan for OR debridement with calcanectomy Sat. 5/16?        INCOMPLETE        If you have COVID-19: Healthcare providers will give you specific instructions to follow. The following are general guidelines to remind you of how to keep others safe until you are well:         Limit close contact with others. Your healthcare provider will tell you when it is okay to be around others. This may be when you do not have a fever, do not take fever medicine, and have no symptoms. Fluid from your respiratory tract will need to test negative for the virus 2 times at least 24 hours apart. Until then, do the following along with any instructions from your provider:     Only go out of the house for medical appointments. Always call the provider’s office first so he or she can prepare to keep others safe.        Stay at least 6 feet (2 meters) away from others.        Sleep in a different room from others in the house.        Do not shake hands with other people.        Wear a medical mask when others are near you. This can help prevent droplets from spreading the virus when you talk, sneeze, or cough.        Do not share items. Do not share dishes, towels, or other items with anyone. Items need to be washed after you use them.        Do not handle live animals. The virus may be spread to animals, including pets. Until more is known, it is best not to touch, play with, or handle live animals. 74 y.o F from [facility], wheel chair bound with PMH of HTN, Diabetes, Osteoarthritis, Osteoporosis, Peripheral arterial disease, Diabetic neuropathy, HLD, Schizophrenia presented to Ed with worsening left heel non healing ulcers X 1 year. Recent got worse after she had bumped her foot on her wheelchair. 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 Patient refused surgery last admission and was discharged with local wound care. She reports daily dressing with betadine from her care provider. Admitted for infected left heel wound and possible osteomyelitis. MRI L ankle with cutaneous ulceration along the lateral aspect of the posterior calcaneus with associated calcaneal osteomyelitis. Treated with IV antibiotics. CTA with runoff attempted, however non diagnostic as contrast infiltrated. Podiatry following, recommending  OR debridement with calcanectomy. Pt refusing all surgical intervention. Pt advised on poor prognosis of conservative therapy with long term IV abx via PICC line and that pt is already at very high risk for BKA, chances of limb salvage is very low. Vascular following. PICC placed on 5/15 to complete 6 week course of zosyn.         INCOMPLETE    updated 5/15         If you have COVID-19: Healthcare providers will give you specific instructions to follow. The following are general guidelines to remind you of how to keep others safe until you are well:         Limit close contact with others. Your healthcare provider will tell you when it is okay to be around others. This may be when you do not have a fever, do not take fever medicine, and have no symptoms. Fluid from your respiratory tract will need to test negative for the virus 2 times at least 24 hours apart. Until then, do the following along with any instructions from your provider:     Only go out of the house for medical appointments. Always call the provider’s office first so he or she can prepare to keep others safe.        Stay at least 6 feet (2 meters) away from others.        Sleep in a different room from others in the house.        Do not shake hands with other people.        Wear a medical mask when others are near you. This can help prevent droplets from spreading the virus when you talk, sneeze, or cough.        Do not share items. Do not share dishes, towels, or other items with anyone. Items need to be washed after you use them.        Do not handle live animals. The virus may be spread to animals, including pets. Until more is known, it is best not to touch, play with, or handle live animals. 74 y.o F from [facility], wheel chair bound with PMH of HTN, Diabetes, Osteoarthritis, Osteoporosis, Peripheral arterial disease, Diabetic neuropathy, HLD, Schizophrenia presented to Ed with worsening left heel non healing ulcers X 1 year. Recent got worse after she had bumped her foot on her wheelchair. 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 Patient refused surgery last admission and was discharged with local wound care. She reports daily dressing with betadine from her care provider. Admitted for infected left heel wound and possible osteomyelitis. MRI L ankle with cutaneous ulceration along the lateral aspect of the posterior calcaneus with associated calcaneal osteomyelitis. Treated with IV antibiotics. CTA with runoff attempted, however non diagnostic as contrast infiltrated. Podiatry following, recommending  OR debridement with calcanectomy. Pt refusing all surgical intervention. Pt advised on poor prognosis of conservative therapy with long term IV abx via PICC line and that pt is already at very high risk for BKA, chances of limb salvage is very low. Vascular following. PICC placed on 5/15 to complete 6 week course of zosyn.         No surgical intervention.  PICC placed.  COVID negative x2.        Discussed with attending.        Patient medically stable for discharge.        For full hospital course, please see medical record.        If  you have COVID-19: Healthcare providers will give you specific instructions to follow. The following are general guidelines to remind you of how to keep others safe until you are well:         Limit close contact with others. Your healthcare provider will tell you when it is okay to be around others. This may be when you do not have a fever, do not take fever medicine, and have no symptoms. Fluid from your respiratory tract will need to test negative for the virus 2 times at least 24 hours apart. Until then, do the following along with any instructions from your provider:     Only go out of the house for medical appointments. Always call the provider’s office first so he or she can prepare to keep others safe.        Stay at least 6 feet (2 meters) away from others.        Sleep in a different room from others in the house.        Do not shake hands with other people.        Wear a medical mask when others are near you. This can help prevent droplets from spreading the virus when you talk, sneeze, or cough.        Do not share items. Do not share dishes, towels, or other items with anyone. Items need to be washed after you use them.        Do not handle live animals. The virus may be spread to animals, including pets. Until more is known, it is best not to touch, play with, or handle live animals.

## 2020-05-14 NOTE — DISCHARGE NOTE PROVIDER - NSDCCAREPROVSEEN_GEN_ALL_CORE_FT
Hien Llanes # COVID19 PCR NEGATIVE X 2 PRIOR TO DISCHARGE  # PICC PLACED FOR IV ZOSYN FOR TOTAL 6 WEEKS AT [facility] [place] FOR LEFT CALCANEAL OSTEOMYELITIS.   # NONHEALING LEFT ANKLE ULCER , DIABETIC LEFT FOOT ULCER WITH PURULENT WOUND &  CELLULITIS, LEFT CALCANEAL OSTEOMYELITIS, LEFT FOOT ABSCESS  - WOUND CX SHOWING MORGANELLA, P. MIRABILIS - S/P I&D BY  PODIATRY. ON ZOSYN, ID CONSULTED. VASCULAR CONSULTED - TIMUR AND CT ANGIO SHOWED WORSENING MICROVASCULAR DISEASE OF LLE. PATIENT IS REFUSING SURGERY AND SHE IS NOT A CANDIDATE FOR SURGERY AT THIS TIME DUE TO COVID INFECTION. PATIENT IS COUNSELLED REGARDING AMPUTATION ( LEFT BKA )   #  PATIENT IS MODERATE SURGICAL RISK , BUT SURGERY WILL BE CANCELLED AT THIS TIME.   # COVID19 INFECTION, RPT NASAL SWAB PRIOR TO DC BACK TO [facility] [place] FOR REHAB.   # PAD , MICROVASCULAR DISEASE OF B/L LOWER EXTREMITIES- TIMUR WITH WORSENED MICROVASCULAR DX -  POOR PROGNOSIS , WOUND MAY NOT HEEL DUE TO MULTIPLE RISK FACTORS.   #  DIABETIC PERIPHERAL NEUROPATHY  # CHRONIC NORMOCYTIC ANEMIA LIKELY S/T CHRONIC DISEASE  # HTN - WELL CONTROLLED, HLD, SCHIZOPHRENIA - STABLE   # DIABETES, UNCONTROLLED - IMPROVED GLYCEMIC CONTROL IN HOSPITAL    ELEN CASILLAS M.D. COVERING FOR FARHAN CASILLAS M.D.

## 2020-05-14 NOTE — PROGRESS NOTE ADULT - PROBLEM SELECTOR PLAN 6
Elderly patient who is alert and awake s Patient wishes not to be subjected to aggressive intervention. Discussed with her in detail  at bedside patient's clinical status. Due to patient advanced age, she want pain control and comfort measures. Request Do not Resuscitate and no intubation  in the future. They would be okay with fluids and antibiotics judiciously used if given to treat an infection and to improve quality of life and just not prolong suffering if at the end of life Elderly patient who is alert and awake. Patient wishes not to be subjected to aggressive intervention. Per notations-- GOC were discussed with her in detail. Due to patient advanced age, she want pain control and comfort measures. Request Do not Resuscitate and no intubation in the future. They would be okay with fluids and antibiotics judiciously used if given to treat an infection and to improve quality of life and just not prolong suffering if at the end of life

## 2020-05-14 NOTE — PROGRESS NOTE ADULT - SUBJECTIVE AND OBJECTIVE BOX
INTERVAL HPI/OVERNIGHT EVENTS:  Pt resting comfortably. No acute complaints.   TIMUR/PVR performed, showing microvascular disease of LLE.   CTA with runoff unable to be completed due to infiltration of contrast.    MEDICATIONS  (STANDING):  ascorbic acid 500 milliGRAM(s) Oral daily  aspirin enteric coated 81 milliGRAM(s) Oral daily  cholecalciferol 1000 Unit(s) Oral daily  cyanocobalamin 1000 MICROGram(s) Oral daily  doxazosin 2 milliGRAM(s) Oral at bedtime  enoxaparin Injectable 40 milliGRAM(s) SubCutaneous daily  ferrous    sulfate 325 milliGRAM(s) Oral daily  folic acid 1 milliGRAM(s) Oral daily  insulin lispro (HumaLOG) corrective regimen sliding scale   SubCutaneous Before meals and at bedtime  nystatin Powder 1 Application(s) Topical two times a day  piperacillin/tazobactam IVPB.. 3.375 Gram(s) IV Intermittent every 8 hours  simvastatin 40 milliGRAM(s) Oral at bedtime    MEDICATIONS  (PRN):  acetaminophen   Tablet .. 650 milliGRAM(s) Oral every 6 hours PRN Temp greater or equal to 38C (100.4F), Mild Pain (1 - 3)  ALBUTerol    90 MICROgram(s) HFA Inhaler 2 Puff(s) Inhalation every 4 hours PRN Shortness of Breath and/or Wheezing  guaifenesin/dextromethorphan  Syrup 10 milliLiter(s) Oral every 4 hours PRN Cough  ondansetron Injectable 4 milliGRAM(s) IV Push every 6 hours PRN Nausea and/or Vomiting      Vital Signs Last 24 Hrs  T(C): 37 (14 May 2020 05:33), Max: 37.4 (13 May 2020 20:24)  T(F): 98.6 (14 May 2020 05:33), Max: 99.4 (13 May 2020 20:24)  HR: 76 (14 May 2020 05:33) (66 - 76)  BP: 121/52 (14 May 2020 05:33) (117/50 - 124/48)  BP(mean): --  RR: 16 (14 May 2020 05:33) (16 - 18)  SpO2: 100% (14 May 2020 05:33) (100% - 100%)    Physical:  General: A&Ox3. NAD.  LLE: Warm, palpable popliteal and femoral pulses. Dressing C/D/I.     LABS:                        9.8    7.43  )-----------( 371      ( 14 May 2020 08:26 )             29.4             05-14    137  |  104  |  7   ----------------------------<  166<H>  3.7   |  24  |  0.67    Ca    9.0      14 May 2020 08:26

## 2020-05-14 NOTE — CHART NOTE - NSCHARTNOTEFT_GEN_A_CORE
EVENT: EVENT: Pt refusing CT scan despite benefit explained and course of prior hospitalization, she has serious infection and at high risk for losing her foot and part of her leg. Infection maybe life threatening if left untreated, states she does not want any surgery,  thinks she will get better.    BRIEF HPI: 74 y.o female from [facility], non-ambulatory wheel chair bound with PMH of HTN, Diabetes, Osteoarthritis, Osteoporosis, Peripheral arterial disease, Diabetic neuropathy, HLD, Schizophrenia presented with worsening non healing left heel ulcers, getting worse  after recent injury. 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 Patient refused surgery last admission and was discharged with local wound care. bedside I&D was performed with  expression of purulence discharge. Wound care performed by podiatry team. Patient had  MRI of left foot including the left ankle on 05/12/2020 that showed  cutaneous ulceration along the lateral aspect of the posterior calcaneus with associated calcaneal osteomyelitis. Patient may require OR debridement with possible  partial calcanectomy as per podiatry. Pt is COVID +.     Vital Signs Last 24 Hrs  T(C): 36.8 (14 May 2020 20:16), Max: 37 (14 May 2020 05:33)  T(F): 98.3 (14 May 2020 20:16), Max: 98.6 (14 May 2020 05:33)  HR: 59 (14 May 2020 20:16) (59 - 80)  BP: 104/45 (14 May 2020 20:16) (101/51 - 121/52)  BP(mean): --  RR: 14 (14 May 2020 20:16) (14 - 18)  SpO2: 99% (14 May 2020 20:16) (99% - 100%)    ASSESSMENT:  NEURO: Alert, oriented X 4  EXT: Elastic dsg to left foot, AROM    PLAN:   1. Continue to monitor (podiatry aware)

## 2020-05-14 NOTE — PROGRESS NOTE ADULT - PROBLEM SELECTOR PLAN 5
pt on aspirin ,statin  f/u TIMUR- reordered   Vascular Consult Dr Camacho  after TIMUR done pt on aspirin and statin  Vascular consulted

## 2020-05-14 NOTE — PROGRESS NOTE ADULT - SUBJECTIVE AND OBJECTIVE BOX
74 y.o female from [facility], non-ambulatory wheel chair bound with PMH of HTN, Diabetes, Osteoarthritis, Osteoporosis, Peripheral arterial disease, Diabetic neuropathy, HLD, Schizophrenia presented with worsening non healing left heel ulcers, getting worse  after recent injury. 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 Patient refused surgery last admission and was discharged with local wound care. bedside I&D was performed with  expression of purulence discharge. Wound care performed by podiatry team. Deep wound culture pending result IV antibiotics: Zosyn continued.  patient was seen today by  bedside. Patient had  MRI of left foot including the left ankle on 05/12/2020 that showed  cutaneous ulceration along the lateral aspect of the posterior calcaneus with associated calcaneal osteomyelitis.  results of the MRI were discussed with the patient in details.  all questions answered.   results of the MRI were also discussed with ID specialist.  Patient may require OR debridement with possible  partial calcanectomy as per podiatry  Pt is COVID +.  she reports  No fever, chest pain, dyspnea, difficulties breathing , verbalizes feeling well.  TIMUR and vascular evaluation is  recommended by podiatry and pending.          INTERVAL HPI/OVERNIGHT EVENTS: no new complaints    MEDICATIONS  (STANDING):  ascorbic acid 500 milliGRAM(s) Oral daily  aspirin enteric coated 81 milliGRAM(s) Oral daily  cholecalciferol 1000 Unit(s) Oral daily  cyanocobalamin 1000 MICROGram(s) Oral daily  doxazosin 2 milliGRAM(s) Oral at bedtime  enoxaparin Injectable 40 milliGRAM(s) SubCutaneous daily  ferrous    sulfate 325 milliGRAM(s) Oral daily  folic acid 1 milliGRAM(s) Oral daily  insulin lispro (HumaLOG) corrective regimen sliding scale   SubCutaneous Before meals and at bedtime  nystatin Powder 1 Application(s) Topical two times a day  piperacillin/tazobactam IVPB.. 3.375 Gram(s) IV Intermittent every 8 hours  simvastatin 40 milliGRAM(s) Oral at bedtime    MEDICATIONS  (PRN):  acetaminophen   Tablet .. 650 milliGRAM(s) Oral every 6 hours PRN Temp greater or equal to 38C (100.4F), Mild Pain (1 - 3)  ALBUTerol    90 MICROgram(s) HFA Inhaler 2 Puff(s) Inhalation every 4 hours PRN Shortness of Breath and/or Wheezing  guaifenesin/dextromethorphan  Syrup 10 milliLiter(s) Oral every 4 hours PRN Cough  ondansetron Injectable 4 milliGRAM(s) IV Push every 6 hours PRN Nausea and/or Vomiting      __________________________________________________  REVIEW OF SYSTEMS:    CONSTITUTIONAL: No fever,   EYES: no acute visual disturbances  NECK: No pain or stiffness  RESPIRATORY: No cough; No shortness of breath  CARDIOVASCULAR: No chest pain, no palpitations  GASTROINTESTINAL: No pain. No nausea or vomiting; No diarrhea   NEUROLOGICAL: No headache or numbness, no tremors  MUSCULOSKELETAL: No joint pain, no muscle pain  GENITOURINARY: no dysuria, no frequency, no hesitancy  PSYCHIATRY: no depression , no anxiety  ALL OTHER  ROS negative        Vital Signs Last 24 Hrs  T(C): 37.2 (13 May 2020 06:29), Max: 37.3 (12 May 2020 13:30)  T(F): 98.9 (13 May 2020 06:29), Max: 99.1 (12 May 2020 13:30)  HR: 75 (13 May 2020 06:29) (75 - 94)  BP: 114/34 (13 May 2020 06:29) (110/44 - 121/52)  BP(mean): --  RR: 18 (13 May 2020 06:29) (17 - 18)  SpO2: 100% (13 May 2020 06:29) (98% - 100%)    ________________________________________________  PHYSICAL EXAM:  Vital Signs Last 24 Hrs  T(C): 37.1 (12 May 2020 05:04), Max: 37.2 (11 May 2020 21:37)  T(F): 98.7 (12 May 2020 05:04), Max: 98.9 (11 May 2020 21:37)  HR: 91 (12 May 2020 05:04) (78 - 91)  BP: 120/48 (12 May 2020 05:04) (111/70 - 132/59)  BP(mean): --  RR: 16 (12 May 2020 05:04) (16 - 18)  SpO2: 100% (12 May 2020 05:04) (99% - 100%)  CONSTITUTIONAL: NAD, well-developed, well-groomed  RESPIRATORY: Normal respiratory effort; lungs are clear to auscultation bilaterally  CARDIOVASCULAR: Regular rate and rhythm,   ABDOMEN: Nontender to palpation, normoactive bowel sounds, no rebound/guarding;   PSYCH: A+O to person, place, and time; affect appropriate  NEUROLOGY: CN 2-12 are intact and symmetric; no gross sensory deficits   SKIN: No rashes; no palpable lesions  Left food wound dressing dry and intact     _________________________________________________  LABS:                        9.3    10.25 )-----------( 340      ( 13 May 2020 06:35 )             28.1     05-13    136  |  106  |  7   ----------------------------<  102<H>  3.6   |  26  |  0.55    Ca    8.5      13 May 2020 06:35  Phos  2.9     05-12  Mg     2.1     05-12    TPro  6.8  /  Alb  2.6<L>  /  TBili  0.9  /  DBili  x   /  AST  6<L>  /  ALT  9<L>  /  AlkPhos  61  05-12    PT/INR - ( 12 May 2020 09:46 )   PT: 15.3 sec;   INR: 1.34 ratio         PTT - ( 12 May 2020 09:46 )  PTT:54.7 sec    CAPILLARY BLOOD GLUCOSE      POCT Blood Glucose.: 177 mg/dL (13 May 2020 11:42)  POCT Blood Glucose.: 116 mg/dL (13 May 2020 08:04)  POCT Blood Glucose.: 168 mg/dL (12 May 2020 21:23)  POCT Blood Glucose.: 178 mg/dL (12 May 2020 18:16)        RADIOLOGY & ADDITIONAL TESTS:    Imaging  Reviewed:  YES     Consultant(s) Notes Reviewed:   YES       Plan of care was discussed with patient and /or primary care giver; all questions and concerns were addressed 74 y.o female from [facility], non-ambulatory wheel chair bound with PMH of HTN, Diabetes, Osteoarthritis, Osteoporosis, Peripheral arterial disease, Diabetic neuropathy, HLD, Schizophrenia presented with worsening non healing left heel ulcers, getting worse  after recent injury. 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 Patient refused surgery last admission and was discharged with local wound care. bedside I&D was performed with  expression of purulence discharge. Wound care performed by podiatry team. Deep wound culture pending result IV antibiotics: Zosyn continued.  patient was seen today by  bedside. Patient had  MRI of left foot including the left ankle on 05/12/2020 that showed  cutaneous ulceration along the lateral aspect of the posterior calcaneus with associated calcaneal osteomyelitis.  results of the MRI were discussed with the patient in details.  all questions answered.   results of the MRI were also discussed with ID specialist.  Patient may require OR debridement with possible  partial calcanectomy as per podiatry. Pt is COVID +.         INTERVAL HPI/OVERNIGHT EVENTS: Alert in bed, admits to mild (3/10) pain in LLE after dressing changed this morning.     MEDICATIONS  (STANDING):  ascorbic acid 500 milliGRAM(s) Oral daily  aspirin enteric coated 81 milliGRAM(s) Oral daily  cholecalciferol 1000 Unit(s) Oral daily  cyanocobalamin 1000 MICROGram(s) Oral daily  doxazosin 2 milliGRAM(s) Oral at bedtime  enoxaparin Injectable 40 milliGRAM(s) SubCutaneous daily  ferrous    sulfate 325 milliGRAM(s) Oral daily  folic acid 1 milliGRAM(s) Oral daily  insulin lispro (HumaLOG) corrective regimen sliding scale   SubCutaneous Before meals and at bedtime  nystatin Powder 1 Application(s) Topical two times a day  piperacillin/tazobactam IVPB.. 3.375 Gram(s) IV Intermittent every 8 hours  simvastatin 40 milliGRAM(s) Oral at bedtime    MEDICATIONS  (PRN):  acetaminophen   Tablet .. 650 milliGRAM(s) Oral every 6 hours PRN Temp greater or equal to 38C (100.4F), Mild Pain (1 - 3)  ALBUTerol    90 MICROgram(s) HFA Inhaler 2 Puff(s) Inhalation every 4 hours PRN Shortness of Breath and/or Wheezing  guaifenesin/dextromethorphan  Syrup 10 milliLiter(s) Oral every 4 hours PRN Cough  ondansetron Injectable 4 milliGRAM(s) IV Push every 6 hours PRN Nausea and/or Vomiting      __________________________________________________  REVIEW OF SYSTEMS:    CONSTITUTIONAL: No fever   EYES: no acute visual disturbances  NECK: No pain or stiffness  RESPIRATORY: No cough; No shortness of breath  CARDIOVASCULAR: No chest pain, no palpitations  GASTROINTESTINAL: No pain. No nausea or vomiting; No diarrhea   NEUROLOGICAL: No headache or numbness, no tremors  MUSCULOSKELETAL: No joint pain, no muscle pain  GENITOURINARY: no dysuria, no frequency, no hesitancy  PSYCHIATRY: no depression , no anxiety  ALL OTHER  ROS negative        Vital Signs Last 24 Hrs  T(C): 37.2 (13 May 2020 06:29), Max: 37.3 (12 May 2020 13:30)  T(F): 98.9 (13 May 2020 06:29), Max: 99.1 (12 May 2020 13:30)  HR: 75 (13 May 2020 06:29) (75 - 94)  BP: 114/34 (13 May 2020 06:29) (110/44 - 121/52)  BP(mean): --  RR: 18 (13 May 2020 06:29) (17 - 18)  SpO2: 100% (13 May 2020 06:29) (98% - 100%)    ________________________________________________  PHYSICAL EXAM:  Vital Signs Last 24 Hrs  T(C): 37.1 (12 May 2020 05:04), Max: 37.2 (11 May 2020 21:37)  T(F): 98.7 (12 May 2020 05:04), Max: 98.9 (11 May 2020 21:37)  HR: 91 (12 May 2020 05:04) (78 - 91)  BP: 120/48 (12 May 2020 05:04) (111/70 - 132/59)  BP(mean): --  RR: 16 (12 May 2020 05:04) (16 - 18)  SpO2: 100% (12 May 2020 05:04) (99% - 100%)    CONSTITUTIONAL: NAD, well-developed, well-groomed  RESPIRATORY: Normal respiratory effort; lungs are clear to auscultation bilaterally  CARDIOVASCULAR: Regular rate and rhythm,   ABDOMEN: Nontender to palpation, normoactive bowel sounds, no rebound/guarding;   PSYCH: A+O to person, place, and time; affect appropriate  NEUROLOGY:  A&O x3, no gross sensory deficits   SKIN: No rashes; no palpable lesions  Left food wound dressing dry and intact     _________________________________________________  LABS:                        9.3    10.25 )-----------( 340      ( 13 May 2020 06:35 )             28.1     05-13    136  |  106  |  7   ----------------------------<  102<H>  3.6   |  26  |  0.55    Ca    8.5      13 May 2020 06:35  Phos  2.9     05-12  Mg     2.1     05-12    TPro  6.8  /  Alb  2.6<L>  /  TBili  0.9  /  DBili  x   /  AST  6<L>  /  ALT  9<L>  /  AlkPhos  61  05-12    PT/INR - ( 12 May 2020 09:46 )   PT: 15.3 sec;   INR: 1.34 ratio         PTT - ( 12 May 2020 09:46 )  PTT:54.7 sec    CAPILLARY BLOOD GLUCOSE      POCT Blood Glucose.: 177 mg/dL (13 May 2020 11:42)  POCT Blood Glucose.: 116 mg/dL (13 May 2020 08:04)  POCT Blood Glucose.: 168 mg/dL (12 May 2020 21:23)  POCT Blood Glucose.: 178 mg/dL (12 May 2020 18:16)        RADIOLOGY & ADDITIONAL TESTS:    Imaging  Reviewed:  YES     Consultant(s) Notes Reviewed:   YES       < from: Xray Chest 1 View- PORTABLE-Urgent (05.11.20 @ 09:31) >    EXAM:  XR CHEST PORTABLE URGENT 1V                            PROCEDURE DATE:  05/11/2020          INTERPRETATION:    AP semierect chest on May 11, 2020 at 9:18 AM. Patient has left ankle wound. Patient has a COVID virus test pending.    Patient has left hip pain and anemia.    Heart size is within normal limits.    The lung fields and pleural surfaces are unremarkable.    The chest is similar to May 30, 2019.    IMPRESSION: Negative unchanged chest.      DISCRETE X-RAY DATA:  Percent of LEFT lung opacification: Clear  Percent of RIGHT lung opacification: Clear  Change in lung opacification from most recent x-ray (<=3 days): No Prior  Change from prior dated 3 or more days (same admission): No Prior    < end of copied text >      < from: MR Ankle No Cont, Left (05.12.20 @ 18:24) >  EXAM:  MR ANKLE LT                            PROCEDURE DATE:  05/12/2020          INTERPRETATION:  EXAMINATION: MRI of the left ankle without contrast    CLINICAL INFORMATION: Skin ulcers. Evaluate for osteomyelitis.    TECHNIQUE: Multiplanar, multisequential MR imaging was performed.    FINDINGS: There is a cutaneous ulceration along the lateral aspect of the posterior calcaneus that extends down to the level of bone. There is high T2 and low T1 marrow signal throughout the majority of the calcaneus, sparing only the far anterior aspect of the calcaneus, consistent with osteomyelitis. There is also a confluent region of high T2 signal in the posterolateral subcutaneous tissues at the level of the ankle and hindfoot that may represent confluent phlegmon and/or abscess (evaluation is limited without contrast). There are also small foci of air in the soft tissues at this level. There are no other areas of marrow signal alteration that are suspicious for osteomyelitis. There is mild arthrosis at several tarsometatarsal articulations. There is fatty atrophy and high T2 signal of the foot musculature, suggestive of denervation.    IMPRESSION: Cutaneous ulceration along the lateral aspect of the posterior calcaneus with associated calcaneal osteomyelitis.   Confluent associated signal alteration in the posterolateral subcutaneous tissues at the level of the ankle and hindfoot may represent confluent phlegmonous and/or abscess (evaluation is limited without contrast).

## 2020-05-14 NOTE — PROGRESS NOTE ADULT - SUBJECTIVE AND OBJECTIVE BOX
Patient is a 74y old  Female who presents with a chief complaint of worsening Left ankle wound    Podiatry Interval HPI: Patient seen this AM at bedside with dressing intact to the left foot. She offers no new complaint. Reported pain after dressing change yesterday, and requested little manipulation of the foot. Previously Reviewed MRI result with patient, explained the extent of bone infection involves almost the entire heel bone. Spoke to patient that bka may be an option as well. Patient says she will consider if better functional outcome. Risk and benefit explained to patient, she has serious infection and at high risk for losing her foot and part of her leg. Infection maybe life threatening if left untreated. Tentatively plan for Saturday AM. She denies f/c/n/v or SOB. She remains afebrile, wbc normal limit.    HPI:  Patient is a 74 y.o F from [facility], non-ambulatory wheel chair bound with PMH of HTN, Diabetes, Osteoarthritis, Osteoporosis, Peripheral arterial disease, Diabetic neuropathy, HLD, Schizophrenia presented to Ed with worsening non healing heel ulcers. She reports having had the ulcer for about a year, recent got worse after she had bumped her foot on her wheelchair. 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 Patient refused surgery last admission and was discharged with local wound care. She reports daily dressing with betadine from her care provider. Pt denies any fever, chills , pain , cough , SOB,CP ,abd pain or any other symptoms. (11 May 2020 13:16)      Podiatry HPI: Patient is a 74 y.o F consulted by ED attending for left ankle wound. Patient is from [facility], reports non-ambulatory wheel chair bound with PMH of HTN, Diabetes, Osteoarthritis, Osteoporosis, Peripheral arterial disease, Diabetic neuropathy, HLD, Schizophrenia presented to Ed with worsening non healing heel ulcers. She reports having had the ulcer for about a year, recent got worse after she had bumped her foot on her wheelchair. 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 Patient refused surgery last admission and was discharged with local wound care. She reports daily dressing with betadine from her care provider. Reports some pain to the left ankle, denies fever, chills, nausea or vomiting. wbc 11.74, afebrile.     PMH:DM (diabetes mellitus)  Paranoid schizophrenia  HLD (hyperlipidemia)  PAD (peripheral artery disease)  HTN (hypertension)    Allergies: No Known Allergies      MEDICATIONS  (STANDING):  ascorbic acid 500 milliGRAM(s) Oral daily  aspirin enteric coated 81 milliGRAM(s) Oral daily  cholecalciferol 1000 Unit(s) Oral daily  cyanocobalamin 1000 MICROGram(s) Oral daily  doxazosin 2 milliGRAM(s) Oral at bedtime  enoxaparin Injectable 40 milliGRAM(s) SubCutaneous daily  ferrous    sulfate 325 milliGRAM(s) Oral daily  folic acid 1 milliGRAM(s) Oral daily  insulin lispro (HumaLOG) corrective regimen sliding scale   SubCutaneous Before meals and at bedtime  nystatin Powder 1 Application(s) Topical two times a day  piperacillin/tazobactam IVPB.. 3.375 Gram(s) IV Intermittent every 8 hours  simvastatin 40 milliGRAM(s) Oral at bedtime    MEDICATIONS  (PRN):  acetaminophen   Tablet .. 650 milliGRAM(s) Oral every 6 hours PRN Temp greater or equal to 38C (100.4F), Mild Pain (1 - 3)  ALBUTerol    90 MICROgram(s) HFA Inhaler 2 Puff(s) Inhalation every 4 hours PRN Shortness of Breath and/or Wheezing  guaifenesin/dextromethorphan  Syrup 10 milliLiter(s) Oral every 4 hours PRN Cough  ondansetron Injectable 4 milliGRAM(s) IV Push every 6 hours PRN Nausea and/or Vomiting        FH: No pertinent family history in first degree relatives    PSX: No significant past surgical history    SH:     Labs:                        9.8    7.43  )-----------( 371      ( 14 May 2020 08:26 )             29.4       05-14    137  |  104  |  7   ----------------------------<  166<H>  3.7   |  24  |  0.67    Ca    9.0      14 May 2020 08:26        Vital Signs Last 24 Hrs  T(C): 37 (14 May 2020 05:33), Max: 37.4 (13 May 2020 20:24)  T(F): 98.6 (14 May 2020 05:33), Max: 99.4 (13 May 2020 20:24)  HR: 76 (14 May 2020 05:33) (66 - 76)  BP: 121/52 (14 May 2020 05:33) (117/50 - 124/48)  BP(mean): --  RR: 16 (14 May 2020 05:33) (16 - 18)  SpO2: 100% (14 May 2020 05:33) (100% - 100%)    Sedimentation Rate, Erythrocyte: 51 mm/Hr (05-12-20 @ 09:46)  Sedimentation Rate, Erythrocyte: 88 mm/Hr (05-11-20 @ 09:31)      C-Reactive Protein, Serum: 4.38 mg/dL (05-12-20 @ 14:13)  C-Reactive Protein, Serum: 2.74 mg/dL (05-11-20 @ 18:31)                   ROS  REVIEW OF SYSTEMS: Per HPI          PHYSICAL EXAM  GEN: TITO ORLANDO is a pleasant well-nourished, well developed 74y Female in no acute distress, alert awake, and oriented to person, place and time.   LE Focused:    Vasc: DP/PT b/l faintly palpable, moderate edema noted to the left foot compared to the right, increased warmth to left foot. Pedal hair absent.   Derm: open lesion noted to lateral posterior heel, 1.2cm x 1cm x 1.5cm deep, surrounding maceration, with peeling skin. Wound probes to bone, tracks anteriorly and proximally about 5cm. small amount of malodorous purulence expressed again today. Necrotic eschar noted proximal to the open wound. Fluctuance to the lateral ankle noted. No open lesions noted to the right lower extremity.   Neuro: Protective sensation diminished.   MSK: Passive ROM pain free at the ankle, pain to palpation at wound site and with manual expression of purulence.     Imaging:   EXAM:  FOOT LEFT (MINIMUM 3 VIEWS)                          EXAM:  ANKLE LEFT (MINIMUM 3 V)                            PROCEDURE DATE:  05/11/2020      INTERPRETATION:  Left ankle and left foot. Patient has a lateral drain wound on the ankle.    Left ankle. 3 views.    There is rather mild degeneration of the malleolar tips.    There are posterior and inferior calcaneal spurs.    No bone destruction or fracture is evident.    Arterial calcifications are noted.    Left foot. 3 views. There is a mild hallux valgus with mild local degeneration.    Arterial calcifications are noted.    No bone destruction or fracture is evident.    IMPRESSION: No acute bony finding. Degeneration particularly at the first MP joint and vascular calcification noted.    ADITI SHELL M.D., ATTENDING RADIOLOGIST  This document has been electronically signed. May 11 2020  9:34AM      -------------------------------------------------------------------------------------------------------------------------------  PROCEDURE DATE:  05/13/2020        INTERPRETATION:  Clinical information: Diabetes, hypertension, hyperlipidemia, peripheral arterial disease. Left lower extremity osteomyelitis. Chronic heel ulcers bilaterally.    Comparison: Lower extremity arterial Doppler study dated 9/10/2019.    Technique: Lower extremity arterial Doppler study. Segmental pressures were not obtained at the thigh level.    Findings: Ankle brachial index measures 1.41 on the right and 1.36 on the left, compared with prior values of 1.34 and 1.26, respectively. No segmental arterial pressure gradient is present.    PVR waveforms are normal in amplitude and mildly diminished in pulsatility at the thigh through the ankle levels bilaterally. Waveforms are diminished in amplitude at the metatarsal and digital levels bilaterally, to a greater degree than on the prior study.    Impression: No Doppler evidence of hemodynamically significant arterial inflow limitation in either lower extremity.    Findings suggestive of small vessel disease in the feet.        SOHAN PA M.D., ATTENDING RADIOLOGIST  This document has been electronically signed. May 14 2020 10:43AM       ------------------------------------------------------------------------------------------------------------------------------------------------------------------------------------------------------------------------------------------------  MRI    EXAM:  MR ANKLE LT                          PROCEDURE DATE:  05/12/2020      INTERPRETATION:  EXAMINATION: MRI of the left ankle without contrast    CLINICAL INFORMATION: Skin ulcers. Evaluate for osteomyelitis.    TECHNIQUE: Multiplanar, multisequential MR imaging was performed.    FINDINGS: There is a cutaneous ulceration along the lateral aspect of the posterior calcaneus that extends down to the level of bone. There is high T2 and low T1 marrow signal throughout the majority of the calcaneus, sparing only the far anterior aspect of the calcaneus, consistent with osteomyelitis. There is also a confluent region of high T2 signal in the posterolateral subcutaneous tissues at the level of the ankle and hindfoot that may represent confluent phlegmon and/or abscess (evaluation is limited without contrast). There are also small foci of air in the soft tissues at this level. There are no other areas of marrow signal alteration that are suspicious for osteomyelitis. There is mild arthrosis at several tarsometatarsal articulations. There is fatty atrophy and high T2 signal of the foot musculature, suggestive of denervation.    IMPRESSION: Cutaneous ulceration along the lateral aspect of the posterior calcaneus with associated calcaneal osteomyelitis.   Confluent associated signal alteration in the posterolateral subcutaneous tissues at the level of the ankle and hindfoot may represent confluent phlegmonous and/or abscess (evaluation is limited without contrast).      BECKA GRADY M.D., ATTENDING RADIOLOGIST  This document has been electronically signed. May 12 2020  7:28PM    -------------------------------------------------------------------------------------------------------------------------------------------------------------------------------------    Cultures:   Culture - Surgical Swab (05.11.20 @ 15:04)    -  Amikacin: S <=16    -  Amikacin: S <=16    -  Amoxicillin/Clavulanic Acid: R >16/8    -  Amoxicillin/Clavulanic Acid: S <=8/4    -  Ampicillin: R >16 These ampicillin results predict results for amoxicillin    -  Ampicillin: S <=8 These ampicillin results predict results for amoxicillin    -  Ampicillin/Sulbactam: S <=4/2 Enterobacter, Citrobacter, and Serratia may develop resistance during prolonged therapy (3-4 days)    -  Aztreonam: S <=4    -  Aztreonam: S <=4    -  Cefazolin: R >16 Enterobacter, Citrobacter, and Serratia may develop resistance during prolonged therapy (3-4 days)    -  Cefazolin: S <=2 Enterobacter, Citrobacter, and Serratia may develop resistance during prolonged therapy (3-4 days)    -  Cefepime: S <=2    -  Cefepime: S <=2    -  Cefoxitin: S <=8    -  Cefoxitin: S <=8    -  Ceftriaxone: R 8 Enterobacter, Citrobacter, and Serratia may develop resistance during prolonged therapy    -  Ceftriaxone: S <=1 Enterobacter, Citrobacter, and Serratia may develop resistance during prolonged therapy    -  Ciprofloxacin: I 0.5    -  Ciprofloxacin: S <=0.25    -  Ertapenem: S <=0.5    -  Ertapenem: S <=0.5    -  Ampicillin/Sulbactam: R >16/8 Enterobacter, Citrobacter, and Serratia may develop resistance during prolonged therapy (3-4 days)    -  Gentamicin: S <=2    -  Gentamicin: S <=2    -  Levofloxacin: R 2    -  Levofloxacin: S <=0.5    -  Meropenem: S <=1    -  Meropenem: S <=1    -  Piperacillin/Tazobactam: S <=8    -  Piperacillin/Tazobactam: S <=8    -  Tobramycin: S <=2    -  Tobramycin: S <=2    -  Trimethoprim/Sulfamethoxazole: R >2/38    -  Trimethoprim/Sulfamethoxazole: S <=0.5/9.5    -  Imipenem: S <=1    Specimen Source: .Surgical Swab left lateral ankle wound deep    Culture Results:   Moderate Proteus mirabilis  Moderate Morganella morganii #2  Moderate Bacteroides fragilis Susceptibilites not performed.    Organism Identification: Morganella morganii  Proteus mirabilis  Bacteroides fragilis  Bacteroides fragilis    Organism: Morganella morganii    Organism: Proteus mirabilis    Organism: Bacteroides fragilis    Organism: Bacteroides fragilis    Method Type: RICARDO    Method Type: RICARDO    Method Type: RICARDO    Method Type: RICARDO      A: Left heel wound     Cellulitis    OM of the left calcaneus    P:   Chart reviewed and Patient evaluated  Discussed diagnosis and treatment with patient  about 3cc of malodorous milky purulence expressed again today  Wound flush with copious amount normal saline  Obtained deep wound culture, reviewed above  Applied iodoform packing with dry sterile dressing  X-rays reviewed: no obvious findings  Continue with IV antibiotics  TIMUR reviewed as above  Non-weight bearing  to left foot  Offloading to bilateral Heels while in bed  Discussed importance of daily foot examinations and proper shoe gear and to importance of lower Fasting Blood Glucose levels.   MRI reviewed, OM of calcaneous  Plan for OR debridement with calcanectomy Sat. 5/16  Please provide medical clearance  Patient is at high risk for more proximal amputation  Vascular input appreciated, pending CTA   ID input appreciated, continue abx  Podiatry will follow while in house.  Discussed with Attending Dr. Lane

## 2020-05-14 NOTE — DISCHARGE NOTE PROVIDER - NSDCCPCAREPLAN_GEN_ALL_CORE_FT
PRINCIPAL DISCHARGE DIAGNOSIS  Diagnosis: Osteomyelitis  Assessment and Plan of Treatment: You have a bone infection. You refused surgery. You had a PICC placed on 5/15 to complete 6 weeks of antibiotics. You were advised on the risks of not having any surgical intervention. Follow up with podiatry as an outpatient.

## 2020-05-14 NOTE — PROGRESS NOTE ADULT - PROBLEM SELECTOR PLAN 1
MRI of left foot including the left ankle on 05/12/2020  showed  cutaneous ulceration along the lateral aspect of the posterior calcaneus with associated calcaneal osteomyelitis.  sp bedside debridement by podiatry   continue zosyn   TIMUR pending    consult vascular Dr Camacho after ABIs are done   ID Dr Wellington is consulted - recommends PICC line placement for long term abx after surgery.   f/u blood culture and wound culture MRI of left foot including the left ankle on 05/12/2020  showed  cutaneous ulceration along the lateral aspect of the posterior calcaneus with associated calcaneal osteomyelitis.  s/p bedside debridement by podiatry   continue zosyn   Vascular consult appreciated. follow-up CTA abd aorta   ID Dr Wellington is consulted - recommends PICC line placement for long term abx after surgery.   f/u blood culture and wound culture

## 2020-05-14 NOTE — PROGRESS NOTE ADULT - ATTENDING COMMENTS
Patient is a 74 y.o F from [facility], non-ambulatory wheel chair bound with PMH of HTN, Diabetes, Osteoarthritis, Osteoporosis, Peripheral arterial disease, Diabetic neuropathy, HLD, Schizophrenia presented to Ed with worsening non healing heel ulcers. Admitted to medicine for Diabetic foot ulcer.     # NONHEALING LEFT ANKLE ULCER WITH PURULENT CELLULITIS, CALCANEAL OSTEOMYELITIS, PHLEGMON VS. ABSCESS [NOTED ON MRI] - WOUND CX SHOWING MORGANELLA, P. MIRABILIS - S/P I&D WITH PODIATRY. ON ZOSYN, ID CONSULTED. VASCULAR CONSULTED - TIMUR SHOWED WORSENING MICROVASCULAR DISEASE OF LLE, F/U CTA W/ RUNOFF. I&D + CALCEANEOTOMY SCHEDULED FOR 5/16 VS. AMPUTATION.   # COVID19 INFECTION  # PAD - TIMUR ORDERED  # HTN  # DIABETES, UNCONTROLLED - IMPROVED GLYCEMIC CONTROL IN HOSPITAL  # HLD  # GI AND DVT PPX    ELEN CASILLAS M.D. COVERING FOR FARHAN CASILLAS M.D. Patient is a 74 y.o F from [facility], non-ambulatory wheel chair bound with PMH of HTN, Diabetes, Osteoarthritis, Osteoporosis, Peripheral arterial disease, Diabetic neuropathy, HLD, Schizophrenia presented to Ed with worsening non healing heel ulcers. Admitted to medicine for Diabetic foot ulcer.     # NONHEALING LEFT ANKLE ULCER WITH PURULENT CELLULITIS, CALCANEAL OSTEOMYELITIS, PHLEGMON VS. ABSCESS [NOTED ON MRI] - WOUND CX SHOWING MORGANELLA, P. MIRABILIS - S/P I&D WITH PODIATRY. ON ZOSYN, ID CONSULTED. VASCULAR CONSULTED - TIMUR SHOWED WORSENING MICROVASCULAR DISEASE OF LLE, F/U CTA W/ RUNOFF. I&D + CALCEANEOTOMY SCHEDULED FOR 5/16 VS. AMPUTATION.   # COVID19 INFECTION  # PAD - TIMUR WITH WORSENED MICROVASCULAR DX  # CHRONIC NORMOCYTIC ANEMIA LIKELY S/T CHRONIC DISEASE  # HTN  # DIABETES, UNCONTROLLED - IMPROVED GLYCEMIC CONTROL IN HOSPITAL  # HLD  # GI AND DVT PPX    ELEN CASILLAS M.D. COVERING FOR FARHAN CASILLAS M.D.

## 2020-05-15 DIAGNOSIS — Z02.9 ENCOUNTER FOR ADMINISTRATIVE EXAMINATIONS, UNSPECIFIED: ICD-10-CM

## 2020-05-15 DIAGNOSIS — E87.6 HYPOKALEMIA: ICD-10-CM

## 2020-05-15 LAB
ALBUMIN SERPL ELPH-MCNC: 2.6 G/DL — LOW (ref 3.5–5)
ALP SERPL-CCNC: 54 U/L — SIGNIFICANT CHANGE UP (ref 40–120)
ALT FLD-CCNC: 8 U/L DA — LOW (ref 10–60)
ANION GAP SERPL CALC-SCNC: 8 MMOL/L — SIGNIFICANT CHANGE UP (ref 5–17)
AST SERPL-CCNC: 5 U/L — LOW (ref 10–40)
BASOPHILS # BLD AUTO: 0.03 K/UL — SIGNIFICANT CHANGE UP (ref 0–0.2)
BASOPHILS NFR BLD AUTO: 0.4 % — SIGNIFICANT CHANGE UP (ref 0–2)
BILIRUB SERPL-MCNC: 0.5 MG/DL — SIGNIFICANT CHANGE UP (ref 0.2–1.2)
BUN SERPL-MCNC: 6 MG/DL — LOW (ref 7–18)
CALCIUM SERPL-MCNC: 8.5 MG/DL — SIGNIFICANT CHANGE UP (ref 8.4–10.5)
CHLORIDE SERPL-SCNC: 106 MMOL/L — SIGNIFICANT CHANGE UP (ref 96–108)
CO2 SERPL-SCNC: 25 MMOL/L — SIGNIFICANT CHANGE UP (ref 22–31)
CREAT SERPL-MCNC: 0.56 MG/DL — SIGNIFICANT CHANGE UP (ref 0.5–1.3)
EOSINOPHIL # BLD AUTO: 0.39 K/UL — SIGNIFICANT CHANGE UP (ref 0–0.5)
EOSINOPHIL NFR BLD AUTO: 5.3 % — SIGNIFICANT CHANGE UP (ref 0–6)
GLUCOSE BLDC GLUCOMTR-MCNC: 145 MG/DL — HIGH (ref 70–99)
GLUCOSE BLDC GLUCOMTR-MCNC: 153 MG/DL — HIGH (ref 70–99)
GLUCOSE BLDC GLUCOMTR-MCNC: 160 MG/DL — HIGH (ref 70–99)
GLUCOSE BLDC GLUCOMTR-MCNC: 222 MG/DL — HIGH (ref 70–99)
GLUCOSE SERPL-MCNC: 132 MG/DL — HIGH (ref 70–99)
HCT VFR BLD CALC: 30.9 % — LOW (ref 34.5–45)
HGB BLD-MCNC: 10.4 G/DL — LOW (ref 11.5–15.5)
IMM GRANULOCYTES NFR BLD AUTO: 0.9 % — SIGNIFICANT CHANGE UP (ref 0–1.5)
LYMPHOCYTES # BLD AUTO: 0.53 K/UL — LOW (ref 1–3.3)
LYMPHOCYTES # BLD AUTO: 7.2 % — LOW (ref 13–44)
MAGNESIUM SERPL-MCNC: 2.3 MG/DL — SIGNIFICANT CHANGE UP (ref 1.6–2.6)
MCHC RBC-ENTMCNC: 30 PG — SIGNIFICANT CHANGE UP (ref 27–34)
MCHC RBC-ENTMCNC: 33.7 GM/DL — SIGNIFICANT CHANGE UP (ref 32–36)
MCV RBC AUTO: 89 FL — SIGNIFICANT CHANGE UP (ref 80–100)
MONOCYTES # BLD AUTO: 0.73 K/UL — SIGNIFICANT CHANGE UP (ref 0–0.9)
MONOCYTES NFR BLD AUTO: 9.9 % — SIGNIFICANT CHANGE UP (ref 2–14)
NEUTROPHILS # BLD AUTO: 5.65 K/UL — SIGNIFICANT CHANGE UP (ref 1.8–7.4)
NEUTROPHILS NFR BLD AUTO: 76.3 % — SIGNIFICANT CHANGE UP (ref 43–77)
NRBC # BLD: 0 /100 WBCS — SIGNIFICANT CHANGE UP (ref 0–0)
PHOSPHATE SERPL-MCNC: 2.7 MG/DL — SIGNIFICANT CHANGE UP (ref 2.5–4.5)
PLATELET # BLD AUTO: 378 K/UL — SIGNIFICANT CHANGE UP (ref 150–400)
POTASSIUM SERPL-MCNC: 3.3 MMOL/L — LOW (ref 3.5–5.3)
POTASSIUM SERPL-SCNC: 3.3 MMOL/L — LOW (ref 3.5–5.3)
PROT SERPL-MCNC: 6.2 G/DL — SIGNIFICANT CHANGE UP (ref 6–8.3)
RBC # BLD: 3.47 M/UL — LOW (ref 3.8–5.2)
RBC # FLD: 12.8 % — SIGNIFICANT CHANGE UP (ref 10.3–14.5)
SODIUM SERPL-SCNC: 139 MMOL/L — SIGNIFICANT CHANGE UP (ref 135–145)
WBC # BLD: 7.4 K/UL — SIGNIFICANT CHANGE UP (ref 3.8–10.5)
WBC # FLD AUTO: 7.4 K/UL — SIGNIFICANT CHANGE UP (ref 3.8–10.5)

## 2020-05-15 PROCEDURE — 36573 INSJ PICC RS&I 5 YR+: CPT

## 2020-05-15 PROCEDURE — 99231 SBSQ HOSP IP/OBS SF/LOW 25: CPT

## 2020-05-15 RX ORDER — POTASSIUM CHLORIDE 20 MEQ
40 PACKET (EA) ORAL ONCE
Refills: 0 | Status: COMPLETED | OUTPATIENT
Start: 2020-05-15 | End: 2020-05-15

## 2020-05-15 RX ORDER — POTASSIUM CHLORIDE 20 MEQ
20 PACKET (EA) ORAL EVERY 4 HOURS
Refills: 0 | Status: DISCONTINUED | OUTPATIENT
Start: 2020-05-15 | End: 2020-05-15

## 2020-05-15 RX ORDER — POVIDONE-IODINE 5 %
1 AEROSOL (ML) TOPICAL DAILY
Refills: 0 | Status: DISCONTINUED | OUTPATIENT
Start: 2020-05-15 | End: 2020-05-19

## 2020-05-15 RX ADMIN — Medication 500 MILLIGRAM(S): at 12:48

## 2020-05-15 RX ADMIN — Medication 1 MILLIGRAM(S): at 12:48

## 2020-05-15 RX ADMIN — PREGABALIN 1000 MICROGRAM(S): 225 CAPSULE ORAL at 12:48

## 2020-05-15 RX ADMIN — Medication 1000 UNIT(S): at 12:48

## 2020-05-15 RX ADMIN — NYSTATIN CREAM 1 APPLICATION(S): 100000 CREAM TOPICAL at 17:08

## 2020-05-15 RX ADMIN — PIPERACILLIN AND TAZOBACTAM 25 GRAM(S): 4; .5 INJECTION, POWDER, LYOPHILIZED, FOR SOLUTION INTRAVENOUS at 14:11

## 2020-05-15 RX ADMIN — Medication 2 MILLIGRAM(S): at 21:31

## 2020-05-15 RX ADMIN — Medication 40 MILLIEQUIVALENT(S): at 12:48

## 2020-05-15 RX ADMIN — Medication 325 MILLIGRAM(S): at 12:48

## 2020-05-15 RX ADMIN — Medication 81 MILLIGRAM(S): at 12:48

## 2020-05-15 RX ADMIN — PIPERACILLIN AND TAZOBACTAM 25 GRAM(S): 4; .5 INJECTION, POWDER, LYOPHILIZED, FOR SOLUTION INTRAVENOUS at 05:43

## 2020-05-15 RX ADMIN — NYSTATIN CREAM 1 APPLICATION(S): 100000 CREAM TOPICAL at 05:44

## 2020-05-15 RX ADMIN — Medication 2: at 11:58

## 2020-05-15 RX ADMIN — PIPERACILLIN AND TAZOBACTAM 25 GRAM(S): 4; .5 INJECTION, POWDER, LYOPHILIZED, FOR SOLUTION INTRAVENOUS at 21:31

## 2020-05-15 RX ADMIN — SIMVASTATIN 40 MILLIGRAM(S): 20 TABLET, FILM COATED ORAL at 21:31

## 2020-05-15 RX ADMIN — ENOXAPARIN SODIUM 40 MILLIGRAM(S): 100 INJECTION SUBCUTANEOUS at 12:48

## 2020-05-15 RX ADMIN — Medication 1: at 21:32

## 2020-05-15 RX ADMIN — Medication 1: at 17:08

## 2020-05-15 NOTE — PROGRESS NOTE ADULT - PROBLEM SELECTOR PLAN 6
DNR/DNI (MOLST in chart)  Pt declining any surgical intervention for left calcaneal OM) pt on aspirin and statin  Vascular consulted  -Patient refused CTA abd aorta on 5/14

## 2020-05-15 NOTE — PROGRESS NOTE ADULT - PROBLEM SELECTOR PLAN 1
MRI of left foot including the left ankle on 05/12/2020  showed  cutaneous ulceration along the lateral aspect of the posterior calcaneus with associated calcaneal osteomyelitis.  -s/p bedside debridement by podiatry. Dressing changes per podiatry.   -Continue zosyn IV   -ID Dr Wellington is consulted - recommended PICC line placement for long term abx after surgery.   Will ask ID Dr. Wellington for follow-up recommendations for antibiotics, now that patient declining surgery.   Blood culture 5/11: no growth.    Wound culture 5/11:  organisms found morganella morganii, proteus mirabilis, bacteroides fragilis

## 2020-05-15 NOTE — DIETITIAN INITIAL EVALUATION ADULT. - OTHER INFO
Patient from [facility] & wheel chair bound. Pt. on airborne contact in isolation - COVID-19+, unable to see, d/w nursing, pt. with varied po intake - fair to good, consumed 55% of breakfast meal today, also plans to have pt. out of bed in chair for lunch meal, encourage po intake with meal set up, left heel ulcer with wound care noted, plans for OR debridement with calcanectomy on 5/16/20 (high risk for amputation), followed by Podiatry/Vascular team. Patient from [facility] & wheel chair bound. Pt. on airborne contact in isolation - COVID-19+, unable to see, d/w nursing, pt. with varied po intake - fair to good, consumed 55% of breakfast meal today, also plans to have pt. out of bed in chair for lunch meal, encourage po intake with meal set up, left heel ulcer with wound care, plans for OR debridement with calcanectomy on 5/16/20 (high risk for amputation) noted, followed by Podiatry/Vascular team.

## 2020-05-15 NOTE — PROGRESS NOTE ADULT - SUBJECTIVE AND OBJECTIVE BOX
Patient is a 74y old  Female who presents with a chief complaint of worsening Left ankle wound    Podiatry Interval HPI: Patient seen this AM at bedside with dressing intact to the left foot. She offers no new complaint. She is AAOx3 in NAD. Revisited her discussion with podiatry attending Dr. Lane, patient again understands the risk she is taking, acknoledges she understands the infection can spread, she may lose her limb or even life. She again expresses that she wants to take her changes with PICC line. Patient demonstrated understanding of the benefits and risk of surgery vs. conservative care, able to verbalize understanding of consequences and is able to clearly communicate her health care decision.  Obtained written refusal to surgery. Patient also refused CTA yesterday. Today she is also refused any manual expression of purulence today. She denies f/c/n/v or SOB. She remains afebrile, wbc normal limit.    HPI:  Patient is a 74 y.o F from [facility], non-ambulatory wheel chair bound with PMH of HTN, Diabetes, Osteoarthritis, Osteoporosis, Peripheral arterial disease, Diabetic neuropathy, HLD, Schizophrenia presented to Ed with worsening non healing heel ulcers. She reports having had the ulcer for about a year, recent got worse after she had bumped her foot on her wheelchair. 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 Patient refused surgery last admission and was discharged with local wound care. She reports daily dressing with betadine from her care provider. Pt denies any fever, chills , pain , cough , SOB,CP ,abd pain or any other symptoms. (11 May 2020 13:16)      Podiatry HPI: Patient is a 74 y.o F consulted by ED attending for left ankle wound. Patient is from [facility], reports non-ambulatory wheel chair bound with PMH of HTN, Diabetes, Osteoarthritis, Osteoporosis, Peripheral arterial disease, Diabetic neuropathy, HLD, Schizophrenia presented to Ed with worsening non healing heel ulcers. She reports having had the ulcer for about a year, recent got worse after she had bumped her foot on her wheelchair. 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 Patient refused surgery last admission and was discharged with local wound care. She reports daily dressing with betadine from her care provider. Reports some pain to the left ankle, denies fever, chills, nausea or vomiting. wbc 11.74, afebrile.     PMH:DM (diabetes mellitus)  Paranoid schizophrenia  HLD (hyperlipidemia)  PAD (peripheral artery disease)  HTN (hypertension)    Allergies: No Known Allergies      MEDICATIONS  (STANDING):  ascorbic acid 500 milliGRAM(s) Oral daily  aspirin enteric coated 81 milliGRAM(s) Oral daily  cholecalciferol 1000 Unit(s) Oral daily  cyanocobalamin 1000 MICROGram(s) Oral daily  doxazosin 2 milliGRAM(s) Oral at bedtime  enoxaparin Injectable 40 milliGRAM(s) SubCutaneous daily  ferrous    sulfate 325 milliGRAM(s) Oral daily  folic acid 1 milliGRAM(s) Oral daily  insulin lispro (HumaLOG) corrective regimen sliding scale   SubCutaneous Before meals and at bedtime  nystatin Powder 1 Application(s) Topical two times a day  piperacillin/tazobactam IVPB.. 3.375 Gram(s) IV Intermittent every 8 hours  potassium chloride    Tablet ER 40 milliEquivalent(s) Oral once  simvastatin 40 milliGRAM(s) Oral at bedtime    MEDICATIONS  (PRN):  acetaminophen   Tablet .. 650 milliGRAM(s) Oral every 6 hours PRN Temp greater or equal to 38C (100.4F), Mild Pain (1 - 3)  ALBUTerol    90 MICROgram(s) HFA Inhaler 2 Puff(s) Inhalation every 4 hours PRN Shortness of Breath and/or Wheezing  guaifenesin/dextromethorphan  Syrup 10 milliLiter(s) Oral every 4 hours PRN Cough  ondansetron Injectable 4 milliGRAM(s) IV Push every 6 hours PRN Nausea and/or Vomiting        FH: No pertinent family history in first degree relatives    PSX: No significant past surgical history    SH:     Labs:                                        10.4   7.40  )-----------( 378      ( 15 May 2020 07:01 )             30.9       05-15    139  |  106  |  6<L>  ----------------------------<  132<H>  3.3<L>   |  25  |  0.56    Ca    8.5      15 May 2020 07:01  Phos  2.7     05-15  Mg     2.3     05-15    TPro  6.2  /  Alb  2.6<L>  /  TBili  0.5  /  DBili  x   /  AST  5<L>  /  ALT  8<L>  /  AlkPhos  54  05-15      Vital Signs Last 24 Hrs  T(C): 37.1 (15 May 2020 05:40), Max: 37.1 (15 May 2020 05:40)  T(F): 98.7 (15 May 2020 05:40), Max: 98.7 (15 May 2020 05:40)  HR: 56 (15 May 2020 05:40) (56 - 80)  BP: 118/49 (15 May 2020 05:40) (101/51 - 118/49)  BP(mean): --  RR: 16 (15 May 2020 05:40) (14 - 18)  SpO2: 97% (15 May 2020 05:40) (97% - 100%)    Sedimentation Rate, Erythrocyte: 51 mm/Hr (05-12-20 @ 09:46)  Sedimentation Rate, Erythrocyte: 88 mm/Hr (05-11-20 @ 09:31)      C-Reactive Protein, Serum: 4.38 mg/dL (05-12-20 @ 14:13)  C-Reactive Protein, Serum: 2.74 mg/dL (05-11-20 @ 18:31)                   ROS  REVIEW OF SYSTEMS: Per HPI          PHYSICAL EXAM  GEN: TITO ORLANDO is a pleasant well-nourished, well developed 74y Female in no acute distress, alert awake, and oriented to person, place and time.   LE Focused:    Vasc: DP/PT b/l faintly palpable, moderate edema noted to the left foot compared to the right, increased warmth to left foot. Pedal hair absent.   Derm: open lesion noted to lateral posterior heel, 1.2cm x 1cm x 1.5cm deep, surrounding maceration, with peeling skin.  Wound probes to bone, tracks anteriorly and proximally about 5cm. patient refused manual expression of purulence. Necrotic eschar noted proximal to the open wound. Fluctuance to the lateral ankle noted. No open lesions noted to the right lower extremity.   Neuro: Protective sensation diminished.   MSK: Passive ROM pain free at the ankle, pain to palpation at wound site and with manual expression of purulence.     Imaging:   EXAM:  FOOT LEFT (MINIMUM 3 VIEWS)                          EXAM:  ANKLE LEFT (MINIMUM 3 V)                            PROCEDURE DATE:  05/11/2020      INTERPRETATION:  Left ankle and left foot. Patient has a lateral drain wound on the ankle.    Left ankle. 3 views.    There is rather mild degeneration of the malleolar tips.    There are posterior and inferior calcaneal spurs.    No bone destruction or fracture is evident.    Arterial calcifications are noted.    Left foot. 3 views. There is a mild hallux valgus with mild local degeneration.    Arterial calcifications are noted.    No bone destruction or fracture is evident.    IMPRESSION: No acute bony finding. Degeneration particularly at the first MP joint and vascular calcification noted.    ADITI SHELL M.D., ATTENDING RADIOLOGIST  This document has been electronically signed. May 11 2020  9:34AM      -------------------------------------------------------------------------------------------------------------------------------  PROCEDURE DATE:  05/13/2020        INTERPRETATION:  Clinical information: Diabetes, hypertension, hyperlipidemia, peripheral arterial disease. Left lower extremity osteomyelitis. Chronic heel ulcers bilaterally.    Comparison: Lower extremity arterial Doppler study dated 9/10/2019.    Technique: Lower extremity arterial Doppler study. Segmental pressures were not obtained at the thigh level.    Findings: Ankle brachial index measures 1.41 on the right and 1.36 on the left, compared with prior values of 1.34 and 1.26, respectively. No segmental arterial pressure gradient is present.    PVR waveforms are normal in amplitude and mildly diminished in pulsatility at the thigh through the ankle levels bilaterally. Waveforms are diminished in amplitude at the metatarsal and digital levels bilaterally, to a greater degree than on the prior study.    Impression: No Doppler evidence of hemodynamically significant arterial inflow limitation in either lower extremity.    Findings suggestive of small vessel disease in the feet.        SOHAN PA M.D., ATTENDING RADIOLOGIST  This document has been electronically signed. May 14 2020 10:43AM       ------------------------------------------------------------------------------------------------------------------------------------------------------------------------------------------------------------------------------------------------  MRI    EXAM:  MR ANKLE LT                          PROCEDURE DATE:  05/12/2020      INTERPRETATION:  EXAMINATION: MRI of the left ankle without contrast    CLINICAL INFORMATION: Skin ulcers. Evaluate for osteomyelitis.    TECHNIQUE: Multiplanar, multisequential MR imaging was performed.    FINDINGS: There is a cutaneous ulceration along the lateral aspect of the posterior calcaneus that extends down to the level of bone. There is high T2 and low T1 marrow signal throughout the majority of the calcaneus, sparing only the far anterior aspect of the calcaneus, consistent with osteomyelitis. There is also a confluent region of high T2 signal in the posterolateral subcutaneous tissues at the level of the ankle and hindfoot that may represent confluent phlegmon and/or abscess (evaluation is limited without contrast). There are also small foci of air in the soft tissues at this level. There are no other areas of marrow signal alteration that are suspicious for osteomyelitis. There is mild arthrosis at several tarsometatarsal articulations. There is fatty atrophy and high T2 signal of the foot musculature, suggestive of denervation.    IMPRESSION: Cutaneous ulceration along the lateral aspect of the posterior calcaneus with associated calcaneal osteomyelitis.   Confluent associated signal alteration in the posterolateral subcutaneous tissues at the level of the ankle and hindfoot may represent confluent phlegmonous and/or abscess (evaluation is limited without contrast).      BECKA GRADY M.D., ATTENDING RADIOLOGIST  This document has been electronically signed. May 12 2020  7:28PM    -------------------------------------------------------------------------------------------------------------------------------------------------------------------------------------    Cultures:   Culture - Surgical Swab (05.11.20 @ 15:04)    -  Amikacin: S <=16    -  Amikacin: S <=16    -  Amoxicillin/Clavulanic Acid: R >16/8    -  Amoxicillin/Clavulanic Acid: S <=8/4    -  Ampicillin: R >16 These ampicillin results predict results for amoxicillin    -  Ampicillin: S <=8 These ampicillin results predict results for amoxicillin    -  Ampicillin/Sulbactam: S <=4/2 Enterobacter, Citrobacter, and Serratia may develop resistance during prolonged therapy (3-4 days)    -  Aztreonam: S <=4    -  Aztreonam: S <=4    -  Cefazolin: R >16 Enterobacter, Citrobacter, and Serratia may develop resistance during prolonged therapy (3-4 days)    -  Cefazolin: S <=2 Enterobacter, Citrobacter, and Serratia may develop resistance during prolonged therapy (3-4 days)    -  Cefepime: S <=2    -  Cefepime: S <=2    -  Cefoxitin: S <=8    -  Cefoxitin: S <=8    -  Ceftriaxone: R 8 Enterobacter, Citrobacter, and Serratia may develop resistance during prolonged therapy    -  Ceftriaxone: S <=1 Enterobacter, Citrobacter, and Serratia may develop resistance during prolonged therapy    -  Ciprofloxacin: I 0.5    -  Ciprofloxacin: S <=0.25    -  Ertapenem: S <=0.5    -  Ertapenem: S <=0.5    -  Ampicillin/Sulbactam: R >16/8 Enterobacter, Citrobacter, and Serratia may develop resistance during prolonged therapy (3-4 days)    -  Gentamicin: S <=2    -  Gentamicin: S <=2    -  Levofloxacin: R 2    -  Levofloxacin: S <=0.5    -  Meropenem: S <=1    -  Meropenem: S <=1    -  Piperacillin/Tazobactam: S <=8    -  Piperacillin/Tazobactam: S <=8    -  Tobramycin: S <=2    -  Tobramycin: S <=2    -  Trimethoprim/Sulfamethoxazole: R >2/38    -  Trimethoprim/Sulfamethoxazole: S <=0.5/9.5    -  Imipenem: S <=1    Specimen Source: .Surgical Swab left lateral ankle wound deep    Culture Results:   Moderate Proteus mirabilis  Moderate Morganella morganii #2  Moderate Bacteroides fragilis Susceptibilites not performed.    Organism Identification: Morganella morganii  Proteus mirabilis  Bacteroides fragilis  Bacteroides fragilis    Organism: Morganella morganii    Organism: Proteus mirabilis    Organism: Bacteroides fragilis    Organism: Bacteroides fragilis    Method Type: RICARDO    Method Type: RICARDO    Method Type: RICARDO    Method Type: RICARDO      A: Left heel wound     Cellulitis    OM of the left calcaneus    P:   Chart reviewed and Patient evaluated  Discussed diagnosis and treatment with patient  Wound flush with copious amount normal saline/betadine mix  Obtained deep wound culture, reviewed above  Applied iodoform packing with dry sterile dressing  Continue with IV antibiotics, f/u ID rec for outpt. management  TIMUR reviewed as above, vascular input appreciated, Pt. refused CTA  Non-weight bearing  to left foot  Offloading to bilateral Heels while in bed  MRI reviewed, OM of calcaneous  written refusal to surgery obtained  No surgical intervention at this point, continue local wound care  ID input appreciated, continue abx  Daily wound flush and packing as patient allows  Discussed with Attending Dr. Lane      Wound care: flush wound with betadine/saline mix, packing and DSD.

## 2020-05-15 NOTE — DIETITIAN INITIAL EVALUATION ADULT. - PERTINENT LABORATORY DATA
05-15 Na139 mmol/L Glu 132 mg/dL<H> K+ 3.3 mmol/L<L> Cr  0.56 mg/dL BUN 6 mg/dL<L> 05-15 Phos 2.7 mg/dL 05-15 Alb 2.6 g/dL<L> 05-12 Chol 176 mg/dL  mg/dL HDL 30 mg/dL<L> Trig 100 mg/dL

## 2020-05-15 NOTE — PROGRESS NOTE ADULT - PROBLEM SELECTOR PLAN 8
Resides in [facility]  -Likely need PICC line for antibiotics prior to d/c.  ID follow-up Cont. Lovenox sq for DVT prophylaxis

## 2020-05-15 NOTE — DIETITIAN INITIAL EVALUATION ADULT. - DIET TYPE
PTA - NH diet order - No added salt, no conc. sweets, regular consistency. Dietary snacks @BID - 2 pm/8pm.

## 2020-05-15 NOTE — PROGRESS NOTE ADULT - PROBLEM SELECTOR PLAN 7
Cont. Lovenox sq for DVT prophylaxis DNR/DNI (MOLST in chart)  Pt declining any surgical intervention for left calcaneal OM)

## 2020-05-15 NOTE — PROGRESS NOTE ADULT - SUBJECTIVE AND OBJECTIVE BOX
74 y.o female from [facility], non-ambulatory wheel chair bound with PMH of HTN, Diabetes, Osteoarthritis, Osteoporosis, Peripheral arterial disease, Diabetic neuropathy, HLD, Schizophrenia presented with worsening non healing left heel ulcers, getting worse after recent injury. Pt had similar b/l foot ulcers few months ago, for which MRI revealed possible osteomyelitis of the calcaneous. Patient refused surgery last admission and was discharged with local wound care. [facility] I&D was performed with  expression of purulence discharge. Wound care performed by podiatry team. Deep wound culture pending result IV antibiotics: Zosyn continued. Patient had  MRI of left foot including the left ankle on 05/12/2020 that showed cutaneous ulceration along the lateral aspect of the posterior calcaneus with associated calcaneal osteomyelitis.  results of the MRI were discussed with the patient in details.  all questions answered.   results of the MRI were also discussed with ID specialist.  Patient may require OR debridement with possible  partial calcanectomy as per podiatry. Pt is COVID +.       INTERVAL HPI/OVERNIGHT EVENTS:  No overnight events.  Podiatry attending note reviewed, pt refusing surgery for osteomyelitis.  I inquired with patient today- patient states she doesn't want any procedure done and is also refusing the CTA.   She states she would be agreeable to PICC line for course of antibiotics.       MEDICATIONS  (STANDING):  ascorbic acid 500 milliGRAM(s) Oral daily  aspirin enteric coated 81 milliGRAM(s) Oral daily  cholecalciferol 1000 Unit(s) Oral daily  cyanocobalamin 1000 MICROGram(s) Oral daily  doxazosin 2 milliGRAM(s) Oral at bedtime  enoxaparin Injectable 40 milliGRAM(s) SubCutaneous daily  ferrous    sulfate 325 milliGRAM(s) Oral daily  folic acid 1 milliGRAM(s) Oral daily  insulin lispro (HumaLOG) corrective regimen sliding scale   SubCutaneous Before meals and at bedtime  nystatin Powder 1 Application(s) Topical two times a day  piperacillin/tazobactam IVPB.. 3.375 Gram(s) IV Intermittent every 8 hours  simvastatin 40 milliGRAM(s) Oral at bedtime    MEDICATIONS  (PRN):  acetaminophen   Tablet .. 650 milliGRAM(s) Oral every 6 hours PRN Temp greater or equal to 38C (100.4F), Mild Pain (1 - 3)  ALBUTerol    90 MICROgram(s) HFA Inhaler 2 Puff(s) Inhalation every 4 hours PRN Shortness of Breath and/or Wheezing  guaifenesin/dextromethorphan  Syrup 10 milliLiter(s) Oral every 4 hours PRN Cough  ondansetron Injectable 4 milliGRAM(s) IV Push every 6 hours PRN Nausea and/or Vomiting      __________________________________________________  REVIEW OF SYSTEMS:    CONSTITUTIONAL: No fever   EYES: no acute visual disturbances  NECK: No pain or stiffness  RESPIRATORY: No cough; No shortness of breath  CARDIOVASCULAR: No chest pain, no palpitations  GASTROINTESTINAL: No pain. No nausea or vomiting; No diarrhea   NEUROLOGICAL: No headache or numbness, no tremors  MUSCULOSKELETAL: Left ankle pain with dressing changes, relieved with tylenol. No other joint or MS pain.   GENITOURINARY: no dysuria, no frequency, no hesitancy  PSYCHIATRY: no depression , no anxiety  ALL OTHER  ROS negative        Vital Signs Last 24 Hrs  T(C): 37.2 (13 May 2020 06:29), Max: 37.3 (12 May 2020 13:30)  T(F): 98.9 (13 May 2020 06:29), Max: 99.1 (12 May 2020 13:30)  HR: 75 (13 May 2020 06:29) (75 - 94)  BP: 114/34 (13 May 2020 06:29) (110/44 - 121/52)  BP(mean): --  RR: 18 (13 May 2020 06:29) (17 - 18)  SpO2: 100% (13 May 2020 06:29) (98% - 100%)    ________________________________________________  PHYSICAL EXAM:  Vital Signs Last 24 Hrs  T(C): 37.1 (12 May 2020 05:04), Max: 37.2 (11 May 2020 21:37)  T(F): 98.7 (12 May 2020 05:04), Max: 98.9 (11 May 2020 21:37)  HR: 91 (12 May 2020 05:04) (78 - 91)  BP: 120/48 (12 May 2020 05:04) (111/70 - 132/59)  BP(mean): --  RR: 16 (12 May 2020 05:04) (16 - 18)  SpO2: 100% (12 May 2020 05:04) (99% - 100%)    CONSTITUTIONAL: NAD, well-developed, well-groomed  RESPIRATORY: Normal respiratory effort; lungs are clear to auscultation bilaterally  CARDIOVASCULAR: Regular rate and rhythm,   ABDOMEN: Nontender to palpation, normoactive bowel sounds, no rebound/guarding;   PSYCH: A+O to person, place, and time; affect appropriate  NEUROLOGY:  A&O x3, no gross sensory deficits   SKIN: No rashes; no palpable lesions  Left food wound dressing dry and intact     _________________________________________________  LABS:                        9.3    10.25 )-----------( 340      ( 13 May 2020 06:35 )             28.1     05-13    136  |  106  |  7   ----------------------------<  102<H>  3.6   |  26  |  0.55    Ca    8.5      13 May 2020 06:35  Phos  2.9     05-12  Mg     2.1     05-12    TPro  6.8  /  Alb  2.6<L>  /  TBili  0.9  /  DBili  x   /  AST  6<L>  /  ALT  9<L>  /  AlkPhos  61  05-12    PT/INR - ( 12 May 2020 09:46 )   PT: 15.3 sec;   INR: 1.34 ratio         PTT - ( 12 May 2020 09:46 )  PTT:54.7 sec    CAPILLARY BLOOD GLUCOSE      POCT Blood Glucose.: 177 mg/dL (13 May 2020 11:42)  POCT Blood Glucose.: 116 mg/dL (13 May 2020 08:04)  POCT Blood Glucose.: 168 mg/dL (12 May 2020 21:23)  POCT Blood Glucose.: 178 mg/dL (12 May 2020 18:16)        RADIOLOGY & ADDITIONAL TESTS:    Imaging  Reviewed:  YES     Consultant(s) Notes Reviewed:   YES       < from: Xray Chest 1 View- PORTABLE-Urgent (05.11.20 @ 09:31) >    EXAM:  XR CHEST PORTABLE URGENT 1V                            PROCEDURE DATE:  05/11/2020          INTERPRETATION:    AP semierect chest on May 11, 2020 at 9:18 AM. Patient has left ankle wound. Patient has a COVID virus test pending.    Patient has left hip pain and anemia.    Heart size is within normal limits.    The lung fields and pleural surfaces are unremarkable.    The chest is similar to May 30, 2019.    IMPRESSION: Negative unchanged chest.      DISCRETE X-RAY DATA:  Percent of LEFT lung opacification: Clear  Percent of RIGHT lung opacification: Clear  Change in lung opacification from most recent x-ray (<=3 days): No Prior  Change from prior dated 3 or more days (same admission): No Prior    < end of copied text >      < from: MR Ankle No Cont, Left (05.12.20 @ 18:24) >  EXAM:  MR ANKLE LT                            PROCEDURE DATE:  05/12/2020          INTERPRETATION:  EXAMINATION: MRI of the left ankle without contrast    CLINICAL INFORMATION: Skin ulcers. Evaluate for osteomyelitis.    TECHNIQUE: Multiplanar, multisequential MR imaging was performed.    FINDINGS: There is a cutaneous ulceration along the lateral aspect of the posterior calcaneus that extends down to the level of bone. There is high T2 and low T1 marrow signal throughout the majority of the calcaneus, sparing only the far anterior aspect of the calcaneus, consistent with osteomyelitis. There is also a confluent region of high T2 signal in the posterolateral subcutaneous tissues at the level of the ankle and hindfoot that may represent confluent phlegmon and/or abscess (evaluation is limited without contrast). There are also small foci of air in the soft tissues at this level. There are no other areas of marrow signal alteration that are suspicious for osteomyelitis. There is mild arthrosis at several tarsometatarsal articulations. There is fatty atrophy and high T2 signal of the foot musculature, suggestive of denervation.    IMPRESSION: Cutaneous ulceration along the lateral aspect of the posterior calcaneus with associated calcaneal osteomyelitis.   Confluent associated signal alteration in the posterolateral subcutaneous tissues at the level of the ankle and hindfoot may represent confluent phlegmonous and/or abscess (evaluation is limited without contrast). 74 y.o female from [facility], non-ambulatory wheel chair bound with PMH of HTN, Diabetes, Osteoarthritis, Osteoporosis, Peripheral arterial disease, Diabetic neuropathy, HLD, Schizophrenia presented with worsening non healing left heel ulcers, getting worse after recent injury. Pt had similar b/l foot ulcers few months ago, for which MRI revealed possible osteomyelitis of the calcaneous. Patient refused surgery last admission and was discharged with local wound care. [facility] I&D was performed with  expression of purulence discharge. Wound care performed by podiatry team. Patient started on IV Zosyn. Patient had  MRI of left foot including the left ankle on 05/12/2020 that showed cutaneous ulceration along the lateral aspect of the posterior calcaneus with associated calcaneal osteomyelitis. Patient recommended for OR debridement with possible partial calcanectomy as per podiatry.  Patient does not want procedure done per her d/w podiatry attending on 5/14.   Pt is COVID +. 02 sat 97% on roomair.         INTERVAL HPI/OVERNIGHT EVENTS:  No overnight events.  Podiatry attending note reviewed, pt refusing surgery for osteomyelitis.  I inquired with patient today- patient states she doesn't want any procedure done and is also refusing the CTA.   She states she would be agreeable to PICC line for course of antibiotics.       MEDICATIONS  (STANDING):  ascorbic acid 500 milliGRAM(s) Oral daily  aspirin enteric coated 81 milliGRAM(s) Oral daily  cholecalciferol 1000 Unit(s) Oral daily  cyanocobalamin 1000 MICROGram(s) Oral daily  doxazosin 2 milliGRAM(s) Oral at bedtime  enoxaparin Injectable 40 milliGRAM(s) SubCutaneous daily  ferrous    sulfate 325 milliGRAM(s) Oral daily  folic acid 1 milliGRAM(s) Oral daily  insulin lispro (HumaLOG) corrective regimen sliding scale   SubCutaneous Before meals and at bedtime  nystatin Powder 1 Application(s) Topical two times a day  piperacillin/tazobactam IVPB.. 3.375 Gram(s) IV Intermittent every 8 hours  simvastatin 40 milliGRAM(s) Oral at bedtime    MEDICATIONS  (PRN):  acetaminophen   Tablet .. 650 milliGRAM(s) Oral every 6 hours PRN Temp greater or equal to 38C (100.4F), Mild Pain (1 - 3)  ALBUTerol    90 MICROgram(s) HFA Inhaler 2 Puff(s) Inhalation every 4 hours PRN Shortness of Breath and/or Wheezing  guaifenesin/dextromethorphan  Syrup 10 milliLiter(s) Oral every 4 hours PRN Cough  ondansetron Injectable 4 milliGRAM(s) IV Push every 6 hours PRN Nausea and/or Vomiting      __________________________________________________  REVIEW OF SYSTEMS:    CONSTITUTIONAL: No fever   EYES: no acute visual disturbances  NECK: No pain or stiffness  RESPIRATORY: No cough; No shortness of breath  CARDIOVASCULAR: No chest pain, no palpitations  GASTROINTESTINAL: No pain. No nausea or vomiting; No diarrhea   NEUROLOGICAL: No headache or numbness, no tremors  MUSCULOSKELETAL: Left ankle pain with dressing changes, relieved with tylenol. No other joint or MS pain.   GENITOURINARY: no dysuria, no frequency, no hesitancy  PSYCHIATRY: no depression , no anxiety  ALL OTHER  ROS negative        Vital Signs Last 24 Hrs  T(C): 37.2 (13 May 2020 06:29), Max: 37.3 (12 May 2020 13:30)  T(F): 98.9 (13 May 2020 06:29), Max: 99.1 (12 May 2020 13:30)  HR: 75 (13 May 2020 06:29) (75 - 94)  BP: 114/34 (13 May 2020 06:29) (110/44 - 121/52)  BP(mean): --  RR: 18 (13 May 2020 06:29) (17 - 18)  SpO2: 100% (13 May 2020 06:29) (98% - 100%)    ________________________________________________  PHYSICAL EXAM:  Vital Signs Last 24 Hrs  T(C): 37.1 (12 May 2020 05:04), Max: 37.2 (11 May 2020 21:37)  T(F): 98.7 (12 May 2020 05:04), Max: 98.9 (11 May 2020 21:37)  HR: 91 (12 May 2020 05:04) (78 - 91)  BP: 120/48 (12 May 2020 05:04) (111/70 - 132/59)  BP(mean): --  RR: 16 (12 May 2020 05:04) (16 - 18)  SpO2: 100% (12 May 2020 05:04) (99% - 100%)    CONSTITUTIONAL: NAD, well-developed, well-groomed  RESPIRATORY: Normal respiratory effort; lungs are clear to auscultation bilaterally  CARDIOVASCULAR: Regular rate and rhythm,   ABDOMEN: Nontender to palpation, normoactive bowel sounds, no rebound/guarding;   PSYCH: A+O to person, place, and time; affect appropriate  NEUROLOGY:  A&O x3, no gross sensory deficits   SKIN: No rashes; no palpable lesions  Left food wound dressing dry and intact     _________________________________________________  LABS:                        9.3    10.25 )-----------( 340      ( 13 May 2020 06:35 )             28.1     05-13    136  |  106  |  7   ----------------------------<  102<H>  3.6   |  26  |  0.55    Ca    8.5      13 May 2020 06:35  Phos  2.9     05-12  Mg     2.1     05-12    TPro  6.8  /  Alb  2.6<L>  /  TBili  0.9  /  DBili  x   /  AST  6<L>  /  ALT  9<L>  /  AlkPhos  61  05-12    PT/INR - ( 12 May 2020 09:46 )   PT: 15.3 sec;   INR: 1.34 ratio         PTT - ( 12 May 2020 09:46 )  PTT:54.7 sec    CAPILLARY BLOOD GLUCOSE      POCT Blood Glucose.: 177 mg/dL (13 May 2020 11:42)  POCT Blood Glucose.: 116 mg/dL (13 May 2020 08:04)  POCT Blood Glucose.: 168 mg/dL (12 May 2020 21:23)  POCT Blood Glucose.: 178 mg/dL (12 May 2020 18:16)        RADIOLOGY & ADDITIONAL TESTS:    Imaging  Reviewed:  YES     Consultant(s) Notes Reviewed:   YES       < from: Xray Chest 1 View- PORTABLE-Urgent (05.11.20 @ 09:31) >    EXAM:  XR CHEST PORTABLE URGENT 1V                            PROCEDURE DATE:  05/11/2020          INTERPRETATION:    AP semierect chest on May 11, 2020 at 9:18 AM. Patient has left ankle wound. Patient has a COVID virus test pending.    Patient has left hip pain and anemia.    Heart size is within normal limits.    The lung fields and pleural surfaces are unremarkable.    The chest is similar to May 30, 2019.    IMPRESSION: Negative unchanged chest.      DISCRETE X-RAY DATA:  Percent of LEFT lung opacification: Clear  Percent of RIGHT lung opacification: Clear  Change in lung opacification from most recent x-ray (<=3 days): No Prior  Change from prior dated 3 or more days (same admission): No Prior    < end of copied text >      < from: MR Ankle No Cont, Left (05.12.20 @ 18:24) >  EXAM:  MR ANKLE LT                            PROCEDURE DATE:  05/12/2020          INTERPRETATION:  EXAMINATION: MRI of the left ankle without contrast    CLINICAL INFORMATION: Skin ulcers. Evaluate for osteomyelitis.    TECHNIQUE: Multiplanar, multisequential MR imaging was performed.    FINDINGS: There is a cutaneous ulceration along the lateral aspect of the posterior calcaneus that extends down to the level of bone. There is high T2 and low T1 marrow signal throughout the majority of the calcaneus, sparing only the far anterior aspect of the calcaneus, consistent with osteomyelitis. There is also a confluent region of high T2 signal in the posterolateral subcutaneous tissues at the level of the ankle and hindfoot that may represent confluent phlegmon and/or abscess (evaluation is limited without contrast). There are also small foci of air in the soft tissues at this level. There are no other areas of marrow signal alteration that are suspicious for osteomyelitis. There is mild arthrosis at several tarsometatarsal articulations. There is fatty atrophy and high T2 signal of the foot musculature, suggestive of denervation.    IMPRESSION: Cutaneous ulceration along the lateral aspect of the posterior calcaneus with associated calcaneal osteomyelitis.   Confluent associated signal alteration in the posterolateral subcutaneous tissues at the level of the ankle and hindfoot may represent confluent phlegmonous and/or abscess (evaluation is limited without contrast). 74 y.o female from [facility], non-ambulatory wheel chair bound with PMH of HTN, Diabetes, Osteoarthritis, Osteoporosis, Peripheral arterial disease, Diabetic neuropathy, HLD, Schizophrenia presented with worsening non healing left heel ulcers, getting worse after recent injury. Pt had similar b/l foot ulcers few months ago, for which MRI revealed possible osteomyelitis of the calcaneous. Patient refused surgery last admission and was discharged with local wound care. [facility] I&D was performed with  expression of purulence discharge. Wound care performed by podiatry team. Patient started on IV Zosyn. Patient had  MRI of left foot including the left ankle on 05/12/2020 that showed cutaneous ulceration along the lateral aspect of the posterior calcaneus with associated calcaneal osteomyelitis. Patient recommended for OR debridement with possible partial calcanectomy as per podiatry.  Patient does not want procedure done per her d/w podiatry attending on 5/14.   Pt is COVID +. 02 sat 97% on roomair.         INTERVAL HPI/OVERNIGHT EVENTS:  No overnight events.  Podiatry attending note reviewed, pt refusing surgery for osteomyelitis.  I inquired with patient today- patient states she doesn't want any procedure done and is also refusing the CTA.   She states she would be agreeable to PICC line for course of antibiotics.       MEDICATIONS  (STANDING):  ascorbic acid 500 milliGRAM(s) Oral daily  aspirin enteric coated 81 milliGRAM(s) Oral daily  cholecalciferol 1000 Unit(s) Oral daily  cyanocobalamin 1000 MICROGram(s) Oral daily  doxazosin 2 milliGRAM(s) Oral at bedtime  enoxaparin Injectable 40 milliGRAM(s) SubCutaneous daily  ferrous    sulfate 325 milliGRAM(s) Oral daily  folic acid 1 milliGRAM(s) Oral daily  insulin lispro (HumaLOG) corrective regimen sliding scale   SubCutaneous Before meals and at bedtime  nystatin Powder 1 Application(s) Topical two times a day  piperacillin/tazobactam IVPB.. 3.375 Gram(s) IV Intermittent every 8 hours  simvastatin 40 milliGRAM(s) Oral at bedtime    MEDICATIONS  (PRN):  acetaminophen   Tablet .. 650 milliGRAM(s) Oral every 6 hours PRN Temp greater or equal to 38C (100.4F), Mild Pain (1 - 3)  ALBUTerol    90 MICROgram(s) HFA Inhaler 2 Puff(s) Inhalation every 4 hours PRN Shortness of Breath and/or Wheezing  guaifenesin/dextromethorphan  Syrup 10 milliLiter(s) Oral every 4 hours PRN Cough  ondansetron Injectable 4 milliGRAM(s) IV Push every 6 hours PRN Nausea and/or Vomiting      __________________________________________________  REVIEW OF SYSTEMS:    CONSTITUTIONAL: No fever   EYES: no acute visual disturbances  NECK: No pain or stiffness  RESPIRATORY: No cough; No shortness of breath  CARDIOVASCULAR: No chest pain, no palpitations  GASTROINTESTINAL: No pain. No nausea or vomiting; No diarrhea   NEUROLOGICAL: No headache or numbness, no tremors  MUSCULOSKELETAL: Left ankle pain with dressing changes, relieved with tylenol. No other joint or MS pain.   GENITOURINARY: no dysuria, no frequency, no hesitancy  PSYCHIATRY: no depression , no anxiety  ALL OTHER  ROS negative        Vital Signs Last 24 Hrs  T(C): 37.2 (13 May 2020 06:29), Max: 37.3 (12 May 2020 13:30)  T(F): 98.9 (13 May 2020 06:29), Max: 99.1 (12 May 2020 13:30)  HR: 75 (13 May 2020 06:29) (75 - 94)  BP: 114/34 (13 May 2020 06:29) (110/44 - 121/52)  BP(mean): --  RR: 18 (13 May 2020 06:29) (17 - 18)  SpO2: 100% (13 May 2020 06:29) (98% - 100%)    ________________________________________________  PHYSICAL EXAM:  Vital Signs Last 24 Hrs  T(C): 37.1 (12 May 2020 05:04), Max: 37.2 (11 May 2020 21:37)  T(F): 98.7 (12 May 2020 05:04), Max: 98.9 (11 May 2020 21:37)  HR: 91 (12 May 2020 05:04) (78 - 91)  BP: 120/48 (12 May 2020 05:04) (111/70 - 132/59)  BP(mean): --  RR: 16 (12 May 2020 05:04) (16 - 18)  SpO2: 100% (12 May 2020 05:04) (99% - 100%)     CONSTITUTIONAL: NAD, well-developed, well-groomed  RESPIRATORY: Normal respiratory effort; lungs are clear to auscultation bilaterally  CARDIOVASCULAR: Regular rate and rhythm,   ABDOMEN: Nontender to palpation, normoactive bowel sounds, no rebound/guarding;   PSYCH: A+O to person, place, and time; affect appropriate  NEUROLOGY:  A&O x3, no gross sensory deficits   SKIN: No rashes; no palpable lesions  Left food wound dressing dry and intact     _________________________________________________  LABS:                        9.3    10.25 )-----------( 340      ( 13 May 2020 06:35 )             28.1     05-13    136  |  106  |  7   ----------------------------<  102<H>  3.6   |  26  |  0.55    Ca    8.5      13 May 2020 06:35  Phos  2.9     05-12  Mg     2.1     05-12    TPro  6.8  /  Alb  2.6<L>  /  TBili  0.9  /  DBili  x   /  AST  6<L>  /  ALT  9<L>  /  AlkPhos  61  05-12    PT/INR - ( 12 May 2020 09:46 )   PT: 15.3 sec;   INR: 1.34 ratio         PTT - ( 12 May 2020 09:46 )  PTT:54.7 sec    CAPILLARY BLOOD GLUCOSE      POCT Blood Glucose.: 177 mg/dL (13 May 2020 11:42)  POCT Blood Glucose.: 116 mg/dL (13 May 2020 08:04)  POCT Blood Glucose.: 168 mg/dL (12 May 2020 21:23)  POCT Blood Glucose.: 178 mg/dL (12 May 2020 18:16)        RADIOLOGY & ADDITIONAL TESTS:    Imaging  Reviewed:  YES     Consultant(s) Notes Reviewed:   YES       < from: Xray Chest 1 View- PORTABLE-Urgent (05.11.20 @ 09:31) >    EXAM:  XR CHEST PORTABLE URGENT 1V                            PROCEDURE DATE:  05/11/2020          INTERPRETATION:    AP semierect chest on May 11, 2020 at 9:18 AM. Patient has left ankle wound. Patient has a COVID virus test pending.    Patient has left hip pain and anemia.    Heart size is within normal limits.    The lung fields and pleural surfaces are unremarkable.    The chest is similar to May 30, 2019.    IMPRESSION: Negative unchanged chest.      DISCRETE X-RAY DATA:  Percent of LEFT lung opacification: Clear  Percent of RIGHT lung opacification: Clear  Change in lung opacification from most recent x-ray (<=3 days): No Prior  Change from prior dated 3 or more days (same admission): No Prior    < end of copied text >      < from: MR Ankle No Cont, Left (05.12.20 @ 18:24) >  EXAM:  MR ANKLE LT                            PROCEDURE DATE:  05/12/2020          INTERPRETATION:  EXAMINATION: MRI of the left ankle without contrast    CLINICAL INFORMATION: Skin ulcers. Evaluate for osteomyelitis.    TECHNIQUE: Multiplanar, multisequential MR imaging was performed.    FINDINGS: There is a cutaneous ulceration along the lateral aspect of the posterior calcaneus that extends down to the level of bone. There is high T2 and low T1 marrow signal throughout the majority of the calcaneus, sparing only the far anterior aspect of the calcaneus, consistent with osteomyelitis. There is also a confluent region of high T2 signal in the posterolateral subcutaneous tissues at the level of the ankle and hindfoot that may represent confluent phlegmon and/or abscess (evaluation is limited without contrast). There are also small foci of air in the soft tissues at this level. There are no other areas of marrow signal alteration that are suspicious for osteomyelitis. There is mild arthrosis at several tarsometatarsal articulations. There is fatty atrophy and high T2 signal of the foot musculature, suggestive of denervation.    IMPRESSION: Cutaneous ulceration along the lateral aspect of the posterior calcaneus with associated calcaneal osteomyelitis.   Confluent associated signal alteration in the posterolateral subcutaneous tissues at the level of the ankle and hindfoot may represent confluent phlegmonous and/or abscess (evaluation is limited without contrast).

## 2020-05-15 NOTE — DIETITIAN INITIAL EVALUATION ADULT. - NS FNS WEIGHT USED FOR CALC
other (specify)/Ht. 5' 2" IBW (-/+10%) 110 Lbs, dosing wt. on 5/11/20 Ht. 5' 2" IBW (-/+10%) 110 Lbs, dosing wt. 130 Lbs on 5/11/20/other (specify)

## 2020-05-15 NOTE — DIETITIAN INITIAL EVALUATION ADULT. - PERTINENT MEDS FT
MEDICATIONS  (STANDING):  ascorbic acid 500 milliGRAM(s) Oral daily  aspirin enteric coated 81 milliGRAM(s) Oral daily  cholecalciferol 1000 Unit(s) Oral daily  cyanocobalamin 1000 MICROGram(s) Oral daily  doxazosin 2 milliGRAM(s) Oral at bedtime  enoxaparin Injectable 40 milliGRAM(s) SubCutaneous daily  ferrous    sulfate 325 milliGRAM(s) Oral daily  folic acid 1 milliGRAM(s) Oral daily  insulin lispro (HumaLOG) corrective regimen sliding scale   SubCutaneous Before meals and at bedtime  nystatin Powder 1 Application(s) Topical two times a day  piperacillin/tazobactam IVPB.. 3.375 Gram(s) IV Intermittent every 8 hours  potassium chloride    Tablet ER 40 milliEquivalent(s) Oral once  simvastatin 40 milliGRAM(s) Oral at bedtime    MEDICATIONS  (PRN):  acetaminophen   Tablet .. 650 milliGRAM(s) Oral every 6 hours PRN Temp greater or equal to 38C (100.4F), Mild Pain (1 - 3)  ALBUTerol    90 MICROgram(s) HFA Inhaler 2 Puff(s) Inhalation every 4 hours PRN Shortness of Breath and/or Wheezing  guaifenesin/dextromethorphan  Syrup 10 milliLiter(s) Oral every 4 hours PRN Cough  ondansetron Injectable 4 milliGRAM(s) IV Push every 6 hours PRN Nausea and/or Vomiting

## 2020-05-15 NOTE — PROGRESS NOTE ADULT - PROBLEM SELECTOR PLAN 9
Resides in [facility]  -Likely need PICC line for antibiotics prior to d/c.  ID follow-up Resides in [facility]  -Likely need PICC line for antibiotics prior to d/c.  ID follow-up    Addendum:  d/w Dr. Wellington (ID). Will need 5 more weeks IV zosyn.  PICC line placement by IR ordered.

## 2020-05-15 NOTE — PROGRESS NOTE ADULT - PROBLEM SELECTOR PLAN 5
pt on aspirin and statin  Vascular consulted  -Patient refused CTA abd aorta on 5/14 Potassium level today 3.3  Supplement with PO KCL 40 meq   Monitor BMP

## 2020-05-15 NOTE — DIETITIAN INITIAL EVALUATION ADULT. - FACTORS AFF FOOD INTAKE
other (specify)/non-pressure chronic ulcer of skin of other sites, diabetes, PAD, paranoid schizophrenia, HTN, HLD, discharge planning issues

## 2020-05-15 NOTE — DIETITIAN INITIAL EVALUATION ADULT. - ADD RECOMMEND
Add Glucerna Shake 1can bid as medically feasible (440kcal, 20g protein) & MVI/minerals/Vit. C 500mg BID for wound healing & Zinc Sulfate 220mg once daily as needed.

## 2020-05-16 LAB
ALBUMIN SERPL ELPH-MCNC: 2.5 G/DL — LOW (ref 3.5–5)
ALP SERPL-CCNC: 55 U/L — SIGNIFICANT CHANGE UP (ref 40–120)
ALT FLD-CCNC: 8 U/L DA — LOW (ref 10–60)
ANION GAP SERPL CALC-SCNC: 8 MMOL/L — SIGNIFICANT CHANGE UP (ref 5–17)
AST SERPL-CCNC: 7 U/L — LOW (ref 10–40)
BASOPHILS # BLD AUTO: 0.03 K/UL — SIGNIFICANT CHANGE UP (ref 0–0.2)
BASOPHILS NFR BLD AUTO: 0.6 % — SIGNIFICANT CHANGE UP (ref 0–2)
BILIRUB SERPL-MCNC: 0.6 MG/DL — SIGNIFICANT CHANGE UP (ref 0.2–1.2)
BUN SERPL-MCNC: 5 MG/DL — LOW (ref 7–18)
CALCIUM SERPL-MCNC: 8.9 MG/DL — SIGNIFICANT CHANGE UP (ref 8.4–10.5)
CHLORIDE SERPL-SCNC: 108 MMOL/L — SIGNIFICANT CHANGE UP (ref 96–108)
CO2 SERPL-SCNC: 23 MMOL/L — SIGNIFICANT CHANGE UP (ref 22–31)
CREAT SERPL-MCNC: 0.6 MG/DL — SIGNIFICANT CHANGE UP (ref 0.5–1.3)
CULTURE RESULTS: SIGNIFICANT CHANGE UP
CULTURE RESULTS: SIGNIFICANT CHANGE UP
D DIMER BLD IA.RAPID-MCNC: 621 NG/ML DDU — HIGH
EOSINOPHIL # BLD AUTO: 0.33 K/UL — SIGNIFICANT CHANGE UP (ref 0–0.5)
EOSINOPHIL NFR BLD AUTO: 6.3 % — HIGH (ref 0–6)
GLUCOSE BLDC GLUCOMTR-MCNC: 129 MG/DL — HIGH (ref 70–99)
GLUCOSE BLDC GLUCOMTR-MCNC: 131 MG/DL — HIGH (ref 70–99)
GLUCOSE BLDC GLUCOMTR-MCNC: 159 MG/DL — HIGH (ref 70–99)
GLUCOSE BLDC GLUCOMTR-MCNC: 192 MG/DL — HIGH (ref 70–99)
GLUCOSE SERPL-MCNC: 126 MG/DL — HIGH (ref 70–99)
HCT VFR BLD CALC: 29.6 % — LOW (ref 34.5–45)
HGB BLD-MCNC: 9.9 G/DL — LOW (ref 11.5–15.5)
IMM GRANULOCYTES NFR BLD AUTO: 0.8 % — SIGNIFICANT CHANGE UP (ref 0–1.5)
LYMPHOCYTES # BLD AUTO: 0.76 K/UL — LOW (ref 1–3.3)
LYMPHOCYTES # BLD AUTO: 14.6 % — SIGNIFICANT CHANGE UP (ref 13–44)
MCHC RBC-ENTMCNC: 29.8 PG — SIGNIFICANT CHANGE UP (ref 27–34)
MCHC RBC-ENTMCNC: 33.4 GM/DL — SIGNIFICANT CHANGE UP (ref 32–36)
MCV RBC AUTO: 89.2 FL — SIGNIFICANT CHANGE UP (ref 80–100)
MONOCYTES # BLD AUTO: 0.63 K/UL — SIGNIFICANT CHANGE UP (ref 0–0.9)
MONOCYTES NFR BLD AUTO: 12.1 % — SIGNIFICANT CHANGE UP (ref 2–14)
NEUTROPHILS # BLD AUTO: 3.43 K/UL — SIGNIFICANT CHANGE UP (ref 1.8–7.4)
NEUTROPHILS NFR BLD AUTO: 65.6 % — SIGNIFICANT CHANGE UP (ref 43–77)
NRBC # BLD: 0 /100 WBCS — SIGNIFICANT CHANGE UP (ref 0–0)
PLATELET # BLD AUTO: 343 K/UL — SIGNIFICANT CHANGE UP (ref 150–400)
POTASSIUM SERPL-MCNC: 3.6 MMOL/L — SIGNIFICANT CHANGE UP (ref 3.5–5.3)
POTASSIUM SERPL-SCNC: 3.6 MMOL/L — SIGNIFICANT CHANGE UP (ref 3.5–5.3)
PROT SERPL-MCNC: 6.1 G/DL — SIGNIFICANT CHANGE UP (ref 6–8.3)
RBC # BLD: 3.32 M/UL — LOW (ref 3.8–5.2)
RBC # FLD: 12.9 % — SIGNIFICANT CHANGE UP (ref 10.3–14.5)
SODIUM SERPL-SCNC: 139 MMOL/L — SIGNIFICANT CHANGE UP (ref 135–145)
SPECIMEN SOURCE: SIGNIFICANT CHANGE UP
SPECIMEN SOURCE: SIGNIFICANT CHANGE UP
WBC # BLD: 5.22 K/UL — SIGNIFICANT CHANGE UP (ref 3.8–10.5)
WBC # FLD AUTO: 5.22 K/UL — SIGNIFICANT CHANGE UP (ref 3.8–10.5)

## 2020-05-16 RX ADMIN — Medication 81 MILLIGRAM(S): at 11:59

## 2020-05-16 RX ADMIN — NYSTATIN CREAM 1 APPLICATION(S): 100000 CREAM TOPICAL at 05:08

## 2020-05-16 RX ADMIN — Medication 1000 UNIT(S): at 11:59

## 2020-05-16 RX ADMIN — SIMVASTATIN 40 MILLIGRAM(S): 20 TABLET, FILM COATED ORAL at 21:23

## 2020-05-16 RX ADMIN — Medication 2 MILLIGRAM(S): at 21:24

## 2020-05-16 RX ADMIN — PIPERACILLIN AND TAZOBACTAM 25 GRAM(S): 4; .5 INJECTION, POWDER, LYOPHILIZED, FOR SOLUTION INTRAVENOUS at 05:09

## 2020-05-16 RX ADMIN — PREGABALIN 1000 MICROGRAM(S): 225 CAPSULE ORAL at 12:15

## 2020-05-16 RX ADMIN — Medication 1 MILLIGRAM(S): at 12:00

## 2020-05-16 RX ADMIN — Medication 1: at 11:56

## 2020-05-16 RX ADMIN — Medication 500 MILLIGRAM(S): at 11:59

## 2020-05-16 RX ADMIN — Medication 1: at 08:25

## 2020-05-16 RX ADMIN — PIPERACILLIN AND TAZOBACTAM 25 GRAM(S): 4; .5 INJECTION, POWDER, LYOPHILIZED, FOR SOLUTION INTRAVENOUS at 21:23

## 2020-05-16 RX ADMIN — PIPERACILLIN AND TAZOBACTAM 25 GRAM(S): 4; .5 INJECTION, POWDER, LYOPHILIZED, FOR SOLUTION INTRAVENOUS at 14:33

## 2020-05-16 RX ADMIN — Medication 325 MILLIGRAM(S): at 12:00

## 2020-05-16 NOTE — PROGRESS NOTE ADULT - SUBJECTIVE AND OBJECTIVE BOX
Patient is a 74y old  Female who presents with a chief complaint of Left ankle wound (15 May 2020 12:26)    PATIENT IS SEEN AND EXAMINED IN MEDICAL FLOOR.    ALLERGIES:  No Known Allergies        VITALS:    Vital Signs Last 24 Hrs  T(C): 37 (16 May 2020 13:21), Max: 37.1 (15 May 2020 20:20)  T(F): 98.6 (16 May 2020 13:21), Max: 98.7 (15 May 2020 20:20)  HR: 73 (16 May 2020 13:21) (68 - 73)  BP: 99/48 (16 May 2020 13:21) (99/48 - 132/52)  BP(mean): --  RR: 18 (16 May 2020 13:21) (16 - 18)  SpO2: 100% (16 May 2020 13:21) (100% - 100%)    LABS:    CBC Full  -  ( 16 May 2020 07:00 )  WBC Count : 5.22 K/uL  RBC Count : 3.32 M/uL  Hemoglobin : 9.9 g/dL  Hematocrit : 29.6 %  Platelet Count - Automated : 343 K/uL  Mean Cell Volume : 89.2 fl  Mean Cell Hemoglobin : 29.8 pg  Mean Cell Hemoglobin Concentration : 33.4 gm/dL  Auto Neutrophil # : 3.43 K/uL  Auto Lymphocyte # : 0.76 K/uL  Auto Monocyte # : 0.63 K/uL  Auto Eosinophil # : 0.33 K/uL  Auto Basophil # : 0.03 K/uL  Auto Neutrophil % : 65.6 %  Auto Lymphocyte % : 14.6 %  Auto Monocyte % : 12.1 %  Auto Eosinophil % : 6.3 %  Auto Basophil % : 0.6 %      05-16    139  |  108  |  5<L>  ----------------------------<  126<H>  3.6   |  23  |  0.60    Ca    8.9      16 May 2020 07:00  Phos  2.7     05-15  Mg     2.3     05-15    TPro  6.1  /  Alb  2.5<L>  /  TBili  0.6  /  DBili  x   /  AST  7<L>  /  ALT  8<L>  /  AlkPhos  55  05-16    CAPILLARY BLOOD GLUCOSE      POCT Blood Glucose.: 131 mg/dL (16 May 2020 17:04)  POCT Blood Glucose.: 192 mg/dL (16 May 2020 11:14)  POCT Blood Glucose.: 159 mg/dL (16 May 2020 07:56)  POCT Blood Glucose.: 153 mg/dL (15 May 2020 20:56)        LIVER FUNCTIONS - ( 16 May 2020 07:00 )  Alb: 2.5 g/dL / Pro: 6.1 g/dL / ALK PHOS: 55 U/L / ALT: 8 U/L DA / AST: 7 U/L / GGT: x           Creatinine Trend: 0.60<--, 0.56<--, 0.67<--, 0.55<--, 0.73<--, 0.76<--  I&O's Summary          .Surgical Swab left lateral ankle wound deep  05-11 @ 15:04   Moderate Proteus mirabilis  Moderate Morganella morganii #2  Moderate Bacteroides fragilis Susceptibilites not performed.  --  Morganella morganii  Proteus mirabilis  Bacteroides fragilis  Bacteroides fragilis      .Blood Blood  05-11 @ 14:58   No Growth Final  --  --          MEDICATIONS:    MEDICATIONS  (STANDING):  ascorbic acid 500 milliGRAM(s) Oral daily  aspirin enteric coated 81 milliGRAM(s) Oral daily  cholecalciferol 1000 Unit(s) Oral daily  cyanocobalamin 1000 MICROGram(s) Oral daily  doxazosin 2 milliGRAM(s) Oral at bedtime  enoxaparin Injectable 40 milliGRAM(s) SubCutaneous daily  ferrous    sulfate 325 milliGRAM(s) Oral daily  folic acid 1 milliGRAM(s) Oral daily  insulin lispro (HumaLOG) corrective regimen sliding scale   SubCutaneous Before meals and at bedtime  nystatin Powder 1 Application(s) Topical two times a day  piperacillin/tazobactam IVPB.. 3.375 Gram(s) IV Intermittent every 8 hours  povidone iodine 10% Solution 1 Application(s) Topical daily  simvastatin 40 milliGRAM(s) Oral at bedtime      MEDICATIONS  (PRN):  acetaminophen   Tablet .. 650 milliGRAM(s) Oral every 6 hours PRN Temp greater or equal to 38C (100.4F), Mild Pain (1 - 3)  ALBUTerol    90 MICROgram(s) HFA Inhaler 2 Puff(s) Inhalation every 4 hours PRN Shortness of Breath and/or Wheezing  guaifenesin/dextromethorphan  Syrup 10 milliLiter(s) Oral every 4 hours PRN Cough  ondansetron Injectable 4 milliGRAM(s) IV Push every 6 hours PRN Nausea and/or Vomiting      REVIEW OF SYSTEMS:                           ALL ROS DONE [ X   ]    CONSTITUTIONAL:  LETHARGIC [   ], FEVER [   ], UNRESPONSIVE [   ]  CVS:  CP  [   ], SOB, [   ], PALPITATIONS [   ], DIZZYNESS [   ]  RS: COUGH [   ], SPUTUM [   ]  GI: ABDOMINAL PAIN [   ], NAUSEA [   ], VOMITINGS [   ], DIARRHEA [   ], CONSTIPATION [   ]  :  DYSURIA [   ], NOCTURIA [   ], INCREASED FREQUENCY [   ], DRIBLING [   ],  SKELETAL: PAINFUL JOINTS [   ], SWOLLEN JOINTS [   ], NECK ACHE [   ], LOW BACK ACHE [   ],  SKIN : ULCERS [   ], RASH [   ], ITCHING [   ]  CNS: HEAD ACHE [   ], DOUBLE VISION [   ], BLURRED VISION [   ], AMS / CONFUSION [   ], SEIZURES [   ], WEAKNESS [   ],TINGLING / NUMBNESS [   ]    PHYSICAL EXAMINATION:  GENERAL APPEARANCE: NO DISTRESS  HEENT:  NO PALLOR, NO  JVD,  NO   NODES, NECK SUPPLE  CVS: S1 +, S2 +,   RS: AEEB,  OCCASIONAL  RALES +,   NO RONCHI  ABD: SOFT, NT, NO, BS +  EXT: NO PE    ,  LEFT FOOT DIABETIC FOOT ULCER   SKIN: WARM,   SKELETAL:  ROM ACCEPTABLE  CNS:  AAO X 2 , NO  DEFICITS        COVID-19 PCR: Detected:   This test has been validated by [employer] to be accurate;  though it has not been FDA cleared/approved by the usual pathway.  As with all laboratory tests, results should be correlated with clinical  findings.  https://www.fda.gov/media/321129/download  https://www.fda.gov/media/840518/download (05.11.20 @ 09:56)      RADIOLOGY :    < from: MR Ankle No Cont, Left (05.12.20 @ 18:24) >  IMPRESSION: Cutaneous ulceration along the lateral aspect of the posterior calcaneus with associated calcaneal osteomyelitis.   Confluent associated signal alteration in the posterolateral subcutaneous tissues at the level of the ankle and hindfoot may represent confluent phlegmonous and/or abscess (evaluation is limited without contrast).    < end of copied text >      < from: CT Angio Abd Aorta w/run-off w/ IV Cont (05.13.20 @ 19:59) >  FINDINGS: This is a nondiagnostic evaluation for assessment of the bilateral lower extremity arterial anatomy and abdominal aorta. The CT technologist reported the infiltration event to the nurse on the medical floor. Examination can be reattempted; it is recommended that reevaluation of renal function be performed prior to repeat examination.    No osseous fractures are demonstrated. No regions of osteolysis or osteosclerosis identified. Extensive vascular calcification identified. Foci of air attenuation identified within the soft tissues lateral to the left calcaneus with associated soft tissue attenuation and skin thickening. No well-formed abscess is identified. No intravascular contrast material identified.    IMPRESSION:  Nondiagnostic evaluation, as described above due to infiltration of the administered intravascular contrast. See above discussion.    < end of copied text >    < from: [facility] Physiol Extremity Lower 3+ Level, BI (05.13.20 @ 20:07) >    Impression: No Doppler evidence of hemodynamically significant arterial inflow limitation in either lower extremity.    Findings suggestive of small vessel disease in the feet.    < end of copied text >      ASSESSMENT :     Non-pressure chronic ulcer of skin of other sites  Yes  DM (diabetes mellitus)  Paranoid schizophrenia  HLD (hyperlipidemia)  PAD (peripheral artery disease)  HTN (hypertension)      PLAN:  HPI:  Patient is a 74 y.o F from [facility], non-ambulatory wheel chair bound with PMH of HTN, Diabetes, Osteoarthritis, Osteoporosis, Peripheral arterial disease, Diabetic neuropathy, HLD, Schizophrenia presented to Ed with worsening non healing heel ulcers. She reports having had the ulcer for about a year, recent got worse after she had bumped her foot on her wheelchair. 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 Patient refused surgery last admission and was discharged with local wound care. She reports daily dressing with betadine from her care provider. Pt denies any fever, chills , pain , cough , SOB,CP ,abd pain or any other symptoms. (11 May 2020 13:16)    # NONHEALING LEFT ANKLE ULCER , DIABETIC LEFT FOOT ULCER WITH PURULENT CELLULITIS, LEFT CALCANEAL OSTEOMYELITIS, LEFT FOOT ABSCESS  - WOUND CX SHOWING MORGANELLA, P. MIRABILIS - S/P I&D WITH PODIATRY. ON ZOSYN, ID CONSULTED. VASCULAR CONSULTED - TIMUR SHOWED WORSENING MICROVASCULAR DISEASE OF LLE, F/U CTA W/ RUNOFF. I&D + CALCEANEOTOMY SCHEDULED FOR 5/16 VS. AMPUTATION. PATIENT IS COUNSELLED REGARDING AMPUTATION ( LEFT BKA )   #  PATIENT IS MODERATE SURGICAL RISK   # COVID19 INFECTION  # PAD , MICROVASCULAR DISEASE OF B/L LOWER EXTREMITIES- TIMUR WITH WORSENED MICROVASCULAR DX  #  DIABETIC PERIPHERAL NEUROPATHY  # CHRONIC NORMOCYTIC ANEMIA LIKELY S/T CHRONIC DISEASE  # HTN  # DIABETES, UNCONTROLLED - IMPROVED GLYCEMIC CONTROL IN HOSPITAL  # HLD  # GI AND DVT PPX    DR. CARITO CASILLAS

## 2020-05-17 LAB
ALBUMIN SERPL ELPH-MCNC: 2.5 G/DL — LOW (ref 3.5–5)
ALP SERPL-CCNC: 57 U/L — SIGNIFICANT CHANGE UP (ref 40–120)
ALT FLD-CCNC: 11 U/L DA — SIGNIFICANT CHANGE UP (ref 10–60)
ANION GAP SERPL CALC-SCNC: 7 MMOL/L — SIGNIFICANT CHANGE UP (ref 5–17)
AST SERPL-CCNC: 10 U/L — SIGNIFICANT CHANGE UP (ref 10–40)
BASOPHILS # BLD AUTO: 0.03 K/UL — SIGNIFICANT CHANGE UP (ref 0–0.2)
BASOPHILS NFR BLD AUTO: 0.6 % — SIGNIFICANT CHANGE UP (ref 0–2)
BILIRUB SERPL-MCNC: 0.5 MG/DL — SIGNIFICANT CHANGE UP (ref 0.2–1.2)
BUN SERPL-MCNC: 5 MG/DL — LOW (ref 7–18)
CALCIUM SERPL-MCNC: 9.1 MG/DL — SIGNIFICANT CHANGE UP (ref 8.4–10.5)
CHLORIDE SERPL-SCNC: 108 MMOL/L — SIGNIFICANT CHANGE UP (ref 96–108)
CO2 SERPL-SCNC: 26 MMOL/L — SIGNIFICANT CHANGE UP (ref 22–31)
CREAT SERPL-MCNC: 0.55 MG/DL — SIGNIFICANT CHANGE UP (ref 0.5–1.3)
CRP SERPL-MCNC: 0.8 MG/DL — HIGH (ref 0–0.4)
EOSINOPHIL # BLD AUTO: 0.3 K/UL — SIGNIFICANT CHANGE UP (ref 0–0.5)
EOSINOPHIL NFR BLD AUTO: 5.9 % — SIGNIFICANT CHANGE UP (ref 0–6)
GLUCOSE BLDC GLUCOMTR-MCNC: 155 MG/DL — HIGH (ref 70–99)
GLUCOSE BLDC GLUCOMTR-MCNC: 175 MG/DL — HIGH (ref 70–99)
GLUCOSE BLDC GLUCOMTR-MCNC: 231 MG/DL — HIGH (ref 70–99)
GLUCOSE BLDC GLUCOMTR-MCNC: 249 MG/DL — HIGH (ref 70–99)
GLUCOSE SERPL-MCNC: 137 MG/DL — HIGH (ref 70–99)
HCT VFR BLD CALC: 30.3 % — LOW (ref 34.5–45)
HGB BLD-MCNC: 10.1 G/DL — LOW (ref 11.5–15.5)
IMM GRANULOCYTES NFR BLD AUTO: 0.4 % — SIGNIFICANT CHANGE UP (ref 0–1.5)
LYMPHOCYTES # BLD AUTO: 0.76 K/UL — LOW (ref 1–3.3)
LYMPHOCYTES # BLD AUTO: 14.8 % — SIGNIFICANT CHANGE UP (ref 13–44)
MAGNESIUM SERPL-MCNC: 2.3 MG/DL — SIGNIFICANT CHANGE UP (ref 1.6–2.6)
MCHC RBC-ENTMCNC: 30.1 PG — SIGNIFICANT CHANGE UP (ref 27–34)
MCHC RBC-ENTMCNC: 33.3 GM/DL — SIGNIFICANT CHANGE UP (ref 32–36)
MCV RBC AUTO: 90.2 FL — SIGNIFICANT CHANGE UP (ref 80–100)
MONOCYTES # BLD AUTO: 0.73 K/UL — SIGNIFICANT CHANGE UP (ref 0–0.9)
MONOCYTES NFR BLD AUTO: 14.3 % — HIGH (ref 2–14)
MRSA PCR RESULT.: SIGNIFICANT CHANGE UP
NEUTROPHILS # BLD AUTO: 3.28 K/UL — SIGNIFICANT CHANGE UP (ref 1.8–7.4)
NEUTROPHILS NFR BLD AUTO: 64 % — SIGNIFICANT CHANGE UP (ref 43–77)
NRBC # BLD: 0 /100 WBCS — SIGNIFICANT CHANGE UP (ref 0–0)
PHOSPHATE SERPL-MCNC: 2.4 MG/DL — LOW (ref 2.5–4.5)
PLATELET # BLD AUTO: 301 K/UL — SIGNIFICANT CHANGE UP (ref 150–400)
POTASSIUM SERPL-MCNC: 3.7 MMOL/L — SIGNIFICANT CHANGE UP (ref 3.5–5.3)
POTASSIUM SERPL-SCNC: 3.7 MMOL/L — SIGNIFICANT CHANGE UP (ref 3.5–5.3)
PROT SERPL-MCNC: 6.2 G/DL — SIGNIFICANT CHANGE UP (ref 6–8.3)
RBC # BLD: 3.36 M/UL — LOW (ref 3.8–5.2)
RBC # FLD: 13 % — SIGNIFICANT CHANGE UP (ref 10.3–14.5)
S AUREUS DNA NOSE QL NAA+PROBE: SIGNIFICANT CHANGE UP
SARS-COV-2 RNA SPEC QL NAA+PROBE: SIGNIFICANT CHANGE UP
SODIUM SERPL-SCNC: 141 MMOL/L — SIGNIFICANT CHANGE UP (ref 135–145)
WBC # BLD: 5.12 K/UL — SIGNIFICANT CHANGE UP (ref 3.8–10.5)
WBC # FLD AUTO: 5.12 K/UL — SIGNIFICANT CHANGE UP (ref 3.8–10.5)

## 2020-05-17 RX ORDER — SODIUM,POTASSIUM PHOSPHATES 278-250MG
1 POWDER IN PACKET (EA) ORAL
Refills: 0 | Status: COMPLETED | OUTPATIENT
Start: 2020-05-17 | End: 2020-05-17

## 2020-05-17 RX ADMIN — Medication 2: at 21:54

## 2020-05-17 RX ADMIN — Medication 1000 UNIT(S): at 12:16

## 2020-05-17 RX ADMIN — Medication 325 MILLIGRAM(S): at 12:16

## 2020-05-17 RX ADMIN — Medication 2 MILLIGRAM(S): at 21:54

## 2020-05-17 RX ADMIN — PIPERACILLIN AND TAZOBACTAM 25 GRAM(S): 4; .5 INJECTION, POWDER, LYOPHILIZED, FOR SOLUTION INTRAVENOUS at 04:28

## 2020-05-17 RX ADMIN — NYSTATIN CREAM 1 APPLICATION(S): 100000 CREAM TOPICAL at 04:28

## 2020-05-17 RX ADMIN — PIPERACILLIN AND TAZOBACTAM 25 GRAM(S): 4; .5 INJECTION, POWDER, LYOPHILIZED, FOR SOLUTION INTRAVENOUS at 16:25

## 2020-05-17 RX ADMIN — Medication 1 MILLIGRAM(S): at 12:16

## 2020-05-17 RX ADMIN — Medication 1: at 16:54

## 2020-05-17 RX ADMIN — Medication 81 MILLIGRAM(S): at 12:16

## 2020-05-17 RX ADMIN — Medication 2: at 12:14

## 2020-05-17 RX ADMIN — PIPERACILLIN AND TAZOBACTAM 25 GRAM(S): 4; .5 INJECTION, POWDER, LYOPHILIZED, FOR SOLUTION INTRAVENOUS at 21:54

## 2020-05-17 RX ADMIN — Medication 500 MILLIGRAM(S): at 12:15

## 2020-05-17 RX ADMIN — PREGABALIN 1000 MICROGRAM(S): 225 CAPSULE ORAL at 12:16

## 2020-05-17 RX ADMIN — Medication 1 TABLET(S): at 12:17

## 2020-05-17 RX ADMIN — SIMVASTATIN 40 MILLIGRAM(S): 20 TABLET, FILM COATED ORAL at 21:54

## 2020-05-17 RX ADMIN — Medication 1: at 08:13

## 2020-05-17 RX ADMIN — Medication 1 TABLET(S): at 16:55

## 2020-05-17 NOTE — PROGRESS NOTE ADULT - SUBJECTIVE AND OBJECTIVE BOX
Patient is a 74y old  Female who presents with a chief complaint of Left ankle wound (16 May 2020 18:41)    PATIENT IS SEEN AND EXAMINED IN MEDICAL FLOOR.    ALLERGIES:  No Known Allergies      VITALS:    Vital Signs Last 24 Hrs  T(C): 36.8 (17 May 2020 04:45), Max: 37 (16 May 2020 13:21)  T(F): 98.3 (17 May 2020 04:45), Max: 98.6 (16 May 2020 13:21)  HR: 69 (17 May 2020 04:45) (60 - 73)  BP: 123/46 (17 May 2020 04:45) (99/48 - 123/46)  BP(mean): --  RR: 17 (17 May 2020 04:45) (17 - 18)  SpO2: 100% (17 May 2020 04:45) (100% - 100%)    LABS:    CBC Full  -  ( 17 May 2020 08:42 )  WBC Count : 5.12 K/uL  RBC Count : 3.36 M/uL  Hemoglobin : 10.1 g/dL  Hematocrit : 30.3 %  Platelet Count - Automated : 301 K/uL  Mean Cell Volume : 90.2 fl  Mean Cell Hemoglobin : 30.1 pg  Mean Cell Hemoglobin Concentration : 33.3 gm/dL  Auto Neutrophil # : 3.28 K/uL  Auto Lymphocyte # : 0.76 K/uL  Auto Monocyte # : 0.73 K/uL  Auto Eosinophil # : 0.30 K/uL  Auto Basophil # : 0.03 K/uL  Auto Neutrophil % : 64.0 %  Auto Lymphocyte % : 14.8 %  Auto Monocyte % : 14.3 %  Auto Eosinophil % : 5.9 %  Auto Basophil % : 0.6 %      05-17    141  |  108  |  5<L>  ----------------------------<  137<H>  3.7   |  26  |  0.55    Ca    9.1      17 May 2020 08:42  Phos  2.4     05-17  Mg     2.3     05-17    TPro  6.2  /  Alb  2.5<L>  /  TBili  0.5  /  DBili  x   /  AST  10  /  ALT  11  /  AlkPhos  57  05-17      CAPILLARY BLOOD GLUCOSE    POCT Blood Glucose.: 249 mg/dL (17 May 2020 11:51)  POCT Blood Glucose.: 155 mg/dL (17 May 2020 07:41)  POCT Blood Glucose.: 129 mg/dL (16 May 2020 22:02)  POCT Blood Glucose.: 131 mg/dL (16 May 2020 17:04)        LIVER FUNCTIONS - ( 17 May 2020 08:42 )  Alb: 2.5 g/dL / Pro: 6.2 g/dL / ALK PHOS: 57 U/L / ALT: 11 U/L DA / AST: 10 U/L / GGT: x           Creatinine Trend: 0.55<--, 0.60<--, 0.56<--, 0.67<--, 0.55<--, 0.73<--  I&O's Summary          .Surgical Swab left lateral ankle wound deep  05-11 @ 15:04   Moderate Proteus mirabilis  Moderate Morganella morganii #2  Moderate Bacteroides fragilis Susceptibilites not performed.  --  Morganella morganii  Proteus mirabilis  Bacteroides fragilis  Bacteroides fragilis      .Blood Blood  05-11 @ 14:58   No Growth Final  --  --          MEDICATIONS:    MEDICATIONS  (STANDING):  ascorbic acid 500 milliGRAM(s) Oral daily  aspirin enteric coated 81 milliGRAM(s) Oral daily  cholecalciferol 1000 Unit(s) Oral daily  cyanocobalamin 1000 MICROGram(s) Oral daily  doxazosin 2 milliGRAM(s) Oral at bedtime  enoxaparin Injectable 40 milliGRAM(s) SubCutaneous daily  ferrous    sulfate 325 milliGRAM(s) Oral daily  folic acid 1 milliGRAM(s) Oral daily  insulin lispro (HumaLOG) corrective regimen sliding scale   SubCutaneous Before meals and at bedtime  nystatin Powder 1 Application(s) Topical two times a day  piperacillin/tazobactam IVPB.. 3.375 Gram(s) IV Intermittent every 8 hours  potassium acid phosphate/sodium acid phosphate tablet (K-PHOS No. 2) 1 Tablet(s) Oral four times a day with meals  povidone iodine 10% Solution 1 Application(s) Topical daily  simvastatin 40 milliGRAM(s) Oral at bedtime      MEDICATIONS  (PRN):  acetaminophen   Tablet .. 650 milliGRAM(s) Oral every 6 hours PRN Temp greater or equal to 38C (100.4F), Mild Pain (1 - 3)  ALBUTerol    90 MICROgram(s) HFA Inhaler 2 Puff(s) Inhalation every 4 hours PRN Shortness of Breath and/or Wheezing  guaifenesin/dextromethorphan  Syrup 10 milliLiter(s) Oral every 4 hours PRN Cough  ondansetron Injectable 4 milliGRAM(s) IV Push every 6 hours PRN Nausea and/or Vomiting      REVIEW OF SYSTEMS:                           ALL ROS DONE [ X   ]    CONSTITUTIONAL:  LETHARGIC [   ], FEVER [   ], UNRESPONSIVE [   ]  CVS:  CP  [   ], SOB, [   ], PALPITATIONS [   ], DIZZYNESS [   ]  RS: COUGH [   ], SPUTUM [   ]  GI: ABDOMINAL PAIN [   ], NAUSEA [   ], VOMITINGS [   ], DIARRHEA [   ], CONSTIPATION [   ]  :  DYSURIA [   ], NOCTURIA [   ], INCREASED FREQUENCY [   ], DRIBLING [   ],  SKELETAL: PAINFUL JOINTS [   ], SWOLLEN JOINTS [   ], NECK ACHE [   ], LOW BACK ACHE [   ],  SKIN : ULCERS [   ], RASH [   ], ITCHING [   ]  CNS: HEAD ACHE [   ], DOUBLE VISION [   ], BLURRED VISION [   ], AMS / CONFUSION [   ], SEIZURES [   ], WEAKNESS [   ],TINGLING / NUMBNESS [   ]    PHYSICAL EXAMINATION:  GENERAL APPEARANCE: NO DISTRESS  HEENT:  NO PALLOR, NO  JVD,  NO   NODES, NECK SUPPLE  CVS: S1 +, S2 +,   RS: AEEB,  OCCASIONAL  RALES +,   NO RONCHI  ABD: SOFT, NT, NO, BS +  EXT: NO PE    ,  LEFT FOOT DIABETIC FOOT ULCER   SKIN: WARM,   SKELETAL:  ROM ACCEPTABLE  CNS:  AAO X 2 , NO  DEFICITS        COVID-19 PCR: Detected:   This test has been validated by [employer] to be accurate;  though it has not been FDA cleared/approved by the usual pathway.  As with all laboratory tests, results should be correlated with clinical  findings.  https://www.fda.gov/media/924803/download  https://www.fda.gov/media/399760/download (05.11.20 @ 09:56)      RADIOLOGY :    < from: MR Ankle No Cont, Left (05.12.20 @ 18:24) >  IMPRESSION: Cutaneous ulceration along the lateral aspect of the posterior calcaneus with associated calcaneal osteomyelitis.   Confluent associated signal alteration in the posterolateral subcutaneous tissues at the level of the ankle and hindfoot may represent confluent phlegmonous and/or abscess (evaluation is limited without contrast).    < end of copied text >      < from: CT Angio Abd Aorta w/run-off w/ IV Cont (05.13.20 @ 19:59) >  FINDINGS: This is a nondiagnostic evaluation for assessment of the bilateral lower extremity arterial anatomy and abdominal aorta. The CT technologist reported the infiltration event to the nurse on the medical floor. Examination can be reattempted; it is recommended that reevaluation of renal function be performed prior to repeat examination.    No osseous fractures are demonstrated. No regions of osteolysis or osteosclerosis identified. Extensive vascular calcification identified. Foci of air attenuation identified within the soft tissues lateral to the left calcaneus with associated soft tissue attenuation and skin thickening. No well-formed abscess is identified. No intravascular contrast material identified.    IMPRESSION:  Nondiagnostic evaluation, as described above due to infiltration of the administered intravascular contrast. See above discussion.    < end of copied text >    < from: VA Physiol Extremity Lower 3+ Level, BI (05.13.20 @ 20:07) >    Impression: No Doppler evidence of hemodynamically significant arterial inflow limitation in either lower extremity.    Findings suggestive of small vessel disease in the feet.    < end of copied text >      ASSESSMENT :     Non-pressure chronic ulcer of skin of other sites  Yes  DM (diabetes mellitus)  Paranoid schizophrenia  HLD (hyperlipidemia)  PAD (peripheral artery disease)  HTN (hypertension)      PLAN:  HPI:  Patient is a 74 y.o F from [facility], non-ambulatory wheel chair bound with PMH of HTN, Diabetes, Osteoarthritis, Osteoporosis, Peripheral arterial disease, Diabetic neuropathy, HLD, Schizophrenia presented to Ed with worsening non healing heel ulcers. She reports having had the ulcer for about a year, recent got worse after she had bumped her foot on her wheelchair. 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 Patient refused surgery last admission and was discharged with local wound care. She reports daily dressing with betadine from her care provider. Pt denies any fever, chills , pain , cough , SOB,CP ,abd pain or any other symptoms. (11 May 2020 13:16)    # NONHEALING LEFT ANKLE ULCER , DIABETIC LEFT FOOT ULCER WITH PURULENT CELLULITIS, LEFT CALCANEAL OSTEOMYELITIS, LEFT FOOT ABSCESS  - WOUND CX SHOWING MORGANELLA, P. MIRABILIS - S/P I&D WITH PODIATRY. ON ZOSYN, ID CONSULTED. VASCULAR CONSULTED - TIMUR SHOWED WORSENING MICROVASCULAR DISEASE OF LLE, F/U CTA W/ RUNOFF. I&D + CALCEANEOTOMY SCHEDULED FOR 5/16 VS. AMPUTATION. PATIENT IS COUNSELLED REGARDING AMPUTATION ( LEFT BKA )   #  PATIENT IS MODERATE SURGICAL RISK   # COVID19 INFECTION  # PAD , MICROVASCULAR DISEASE OF B/L LOWER EXTREMITIES- TIMUR WITH WORSENED MICROVASCULAR DX  #  DIABETIC PERIPHERAL NEUROPATHY  # CHRONIC NORMOCYTIC ANEMIA LIKELY S/T CHRONIC DISEASE  # HTN  # DIABETES, UNCONTROLLED - IMPROVED GLYCEMIC CONTROL IN HOSPITAL  # HLD  # GI AND DVT PPX    DR. CARITO CASILLAS Patient is a 74y old  Female who presents with a chief complaint of Left ankle wound (16 May 2020 18:41)    PATIENT IS SEEN AND EXAMINED IN MEDICAL FLOOR.    ALLERGIES:  No Known Allergies      VITALS:    Vital Signs Last 24 Hrs  T(C): 36.8 (17 May 2020 04:45), Max: 37 (16 May 2020 13:21)  T(F): 98.3 (17 May 2020 04:45), Max: 98.6 (16 May 2020 13:21)  HR: 69 (17 May 2020 04:45) (60 - 73)  BP: 123/46 (17 May 2020 04:45) (99/48 - 123/46)  BP(mean): --  RR: 17 (17 May 2020 04:45) (17 - 18)  SpO2: 100% (17 May 2020 04:45) (100% - 100%)    LABS:    CBC Full  -  ( 17 May 2020 08:42 )  WBC Count : 5.12 K/uL  RBC Count : 3.36 M/uL  Hemoglobin : 10.1 g/dL  Hematocrit : 30.3 %  Platelet Count - Automated : 301 K/uL  Mean Cell Volume : 90.2 fl  Mean Cell Hemoglobin : 30.1 pg  Mean Cell Hemoglobin Concentration : 33.3 gm/dL  Auto Neutrophil # : 3.28 K/uL  Auto Lymphocyte # : 0.76 K/uL  Auto Monocyte # : 0.73 K/uL  Auto Eosinophil # : 0.30 K/uL  Auto Basophil # : 0.03 K/uL  Auto Neutrophil % : 64.0 %  Auto Lymphocyte % : 14.8 %  Auto Monocyte % : 14.3 %  Auto Eosinophil % : 5.9 %  Auto Basophil % : 0.6 %      05-17    141  |  108  |  5<L>  ----------------------------<  137<H>  3.7   |  26  |  0.55    Ca    9.1      17 May 2020 08:42  Phos  2.4     05-17  Mg     2.3     05-17    TPro  6.2  /  Alb  2.5<L>  /  TBili  0.5  /  DBili  x   /  AST  10  /  ALT  11  /  AlkPhos  57  05-17      CAPILLARY BLOOD GLUCOSE    POCT Blood Glucose.: 249 mg/dL (17 May 2020 11:51)  POCT Blood Glucose.: 155 mg/dL (17 May 2020 07:41)  POCT Blood Glucose.: 129 mg/dL (16 May 2020 22:02)  POCT Blood Glucose.: 131 mg/dL (16 May 2020 17:04)        LIVER FUNCTIONS - ( 17 May 2020 08:42 )  Alb: 2.5 g/dL / Pro: 6.2 g/dL / ALK PHOS: 57 U/L / ALT: 11 U/L DA / AST: 10 U/L / GGT: x           Creatinine Trend: 0.55<--, 0.60<--, 0.56<--, 0.67<--, 0.55<--, 0.73<--  I&O's Summary          .Surgical Swab left lateral ankle wound deep  05-11 @ 15:04   Moderate Proteus mirabilis  Moderate Morganella morganii #2  Moderate Bacteroides fragilis Susceptibilites not performed.  --  Morganella morganii  Proteus mirabilis  Bacteroides fragilis  Bacteroides fragilis      .Blood Blood  05-11 @ 14:58   No Growth Final  --  --          MEDICATIONS:    MEDICATIONS  (STANDING):  ascorbic acid 500 milliGRAM(s) Oral daily  aspirin enteric coated 81 milliGRAM(s) Oral daily  cholecalciferol 1000 Unit(s) Oral daily  cyanocobalamin 1000 MICROGram(s) Oral daily  doxazosin 2 milliGRAM(s) Oral at bedtime  enoxaparin Injectable 40 milliGRAM(s) SubCutaneous daily  ferrous    sulfate 325 milliGRAM(s) Oral daily  folic acid 1 milliGRAM(s) Oral daily  insulin lispro (HumaLOG) corrective regimen sliding scale   SubCutaneous Before meals and at bedtime  nystatin Powder 1 Application(s) Topical two times a day  piperacillin/tazobactam IVPB.. 3.375 Gram(s) IV Intermittent every 8 hours  potassium acid phosphate/sodium acid phosphate tablet (K-PHOS No. 2) 1 Tablet(s) Oral four times a day with meals  povidone iodine 10% Solution 1 Application(s) Topical daily  simvastatin 40 milliGRAM(s) Oral at bedtime      MEDICATIONS  (PRN):  acetaminophen   Tablet .. 650 milliGRAM(s) Oral every 6 hours PRN Temp greater or equal to 38C (100.4F), Mild Pain (1 - 3)  ALBUTerol    90 MICROgram(s) HFA Inhaler 2 Puff(s) Inhalation every 4 hours PRN Shortness of Breath and/or Wheezing  guaifenesin/dextromethorphan  Syrup 10 milliLiter(s) Oral every 4 hours PRN Cough  ondansetron Injectable 4 milliGRAM(s) IV Push every 6 hours PRN Nausea and/or Vomiting      REVIEW OF SYSTEMS:                           ALL ROS DONE [ X   ]    CONSTITUTIONAL:  LETHARGIC [   ], FEVER [   ], UNRESPONSIVE [   ]  CVS:  CP  [   ], SOB, [   ], PALPITATIONS [   ], DIZZYNESS [   ]  RS: COUGH [   ], SPUTUM [   ]  GI: ABDOMINAL PAIN [   ], NAUSEA [   ], VOMITINGS [   ], DIARRHEA [   ], CONSTIPATION [   ]  :  DYSURIA [   ], NOCTURIA [   ], INCREASED FREQUENCY [   ], DRIBLING [   ],  SKELETAL: PAINFUL JOINTS [   ], SWOLLEN JOINTS [   ], NECK ACHE [   ], LOW BACK ACHE [   ],  SKIN : ULCERS [   ], RASH [   ], ITCHING [   ]  CNS: HEAD ACHE [   ], DOUBLE VISION [   ], BLURRED VISION [   ], AMS / CONFUSION [   ], SEIZURES [   ], WEAKNESS [   ],TINGLING / NUMBNESS [   ]    PHYSICAL EXAMINATION:  GENERAL APPEARANCE: NO DISTRESS  HEENT:  NO PALLOR, NO  JVD,  NO   NODES, NECK SUPPLE  CVS: S1 +, S2 +,   RS: AEEB,  OCCASIONAL  RALES +,   NO RONCHI  ABD: SOFT, NT, NO, BS +  EXT: NO PE    ,  LEFT FOOT DIABETIC FOOT ULCER   SKIN: WARM,   SKELETAL:  ROM ACCEPTABLE  CNS:  AAO X 2 , NO  DEFICITS        COVID-19 PCR: Detected:   This test has been validated by [employer] to be accurate;  though it has not been FDA cleared/approved by the usual pathway.  As with all laboratory tests, results should be correlated with clinical  findings.  https://www.fda.gov/media/448699/download  https://www.fda.gov/media/911277/download (05.11.20 @ 09:56)      RADIOLOGY :    < from: MR Ankle No Cont, Left (05.12.20 @ 18:24) >  IMPRESSION: Cutaneous ulceration along the lateral aspect of the posterior calcaneus with associated calcaneal osteomyelitis.   Confluent associated signal alteration in the posterolateral subcutaneous tissues at the level of the ankle and hindfoot may represent confluent phlegmonous and/or abscess (evaluation is limited without contrast).    < end of copied text >      < from: CT Angio Abd Aorta w/run-off w/ IV Cont (05.13.20 @ 19:59) >  FINDINGS: This is a nondiagnostic evaluation for assessment of the bilateral lower extremity arterial anatomy and abdominal aorta. The CT technologist reported the infiltration event to the nurse on the medical floor. Examination can be reattempted; it is recommended that reevaluation of renal function be performed prior to repeat examination.    No osseous fractures are demonstrated. No regions of osteolysis or osteosclerosis identified. Extensive vascular calcification identified. Foci of air attenuation identified within the soft tissues lateral to the left calcaneus with associated soft tissue attenuation and skin thickening. No well-formed abscess is identified. No intravascular contrast material identified.    IMPRESSION:  Nondiagnostic evaluation, as described above due to infiltration of the administered intravascular contrast. See above discussion.    < end of copied text >    < from: VA Physiol Extremity Lower 3+ Level, BI (05.13.20 @ 20:07) >    Impression: No Doppler evidence of hemodynamically significant arterial inflow limitation in either lower extremity.    Findings suggestive of small vessel disease in the feet.    < end of copied text >      ASSESSMENT :     Non-pressure chronic ulcer of skin of other sites  Yes  DM (diabetes mellitus)  Paranoid schizophrenia  HLD (hyperlipidemia)  PAD (peripheral artery disease)  HTN (hypertension)      PLAN:  HPI:  Patient is a 74 y.o F from [facility], non-ambulatory wheel chair bound with PMH of HTN, Diabetes, Osteoarthritis, Osteoporosis, Peripheral arterial disease, Diabetic neuropathy, HLD, Schizophrenia presented to Ed with worsening non healing heel ulcers. She reports having had the ulcer for about a year, recent got worse after she had bumped her foot on her wheelchair. 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 Patient refused surgery last admission and was discharged with local wound care. She reports daily dressing with betadine from her care provider. Pt denies any fever, chills , pain , cough , SOB,CP ,abd pain or any other symptoms. (11 May 2020 13:16)      #  OBTAIN IV ACCESS BY IR IN AM FOR IV . ZOSYN FOR TOTAL 6 WEEKS AT [facility] FOR LEFT CALCANEAL OSTEOMYELITIS.   # NONHEALING LEFT ANKLE ULCER , DIABETIC LEFT FOOT ULCER WITH PURULENT WOUND &  CELLULITIS, LEFT CALCANEAL OSTEOMYELITIS, LEFT FOOT ABSCESS  - WOUND CX SHOWING MORGANELLA, P. MIRABILIS - S/P I&D BY  PODIATRY. ON ZOSYN, ID CONSULTED. VASCULAR CONSULTED - TIMUR AND CT ANGIO SHOWED WORSENING MICROVASCULAR DISEASE OF LLE. PATIENT IS REFUSING SURGERY AND SHE IS NOT A CANDIDATE FOR SURGERY AT THIS TIME DUE TO COVID INFECTION. PATIENT IS COUNSELLED REGARDING AMPUTATION ( LEFT BKA )   #  PATIENT IS MODERATE SURGICAL RISK , BUT SURGERY WILL BE CANCELLED AT THIS TIME.   # COVID19 INFECTION, RPT NASAL SWAB PRIOR TO DC BACK TO [facility] FOR REHAB.   # PAD , MICROVASCULAR DISEASE OF B/L LOWER EXTREMITIES- TIMUR WITH WORSENED MICROVASCULAR DX -  POOR PROGNOSIS , WOUND MAY NOT HEEL DUE TO MULTIPLE RISK FACTORS.   #  DIABETIC PERIPHERAL NEUROPATHY  # CHRONIC NORMOCYTIC ANEMIA LIKELY S/T CHRONIC DISEASE  # HTN - WELL CONTROLLED , HLD , SCHIZOPHRENIA - STABLE   # DIABETES, UNCONTROLLED - IMPROVED GLYCEMIC CONTROL IN HOSPITAL  # GI AND DVT PPX    DR. CARITO CASILLAS

## 2020-05-18 DIAGNOSIS — E83.39 OTHER DISORDERS OF PHOSPHORUS METABOLISM: ICD-10-CM

## 2020-05-18 LAB
ALBUMIN SERPL ELPH-MCNC: 2.5 G/DL — LOW (ref 3.5–5)
ALP SERPL-CCNC: 56 U/L — SIGNIFICANT CHANGE UP (ref 40–120)
ALT FLD-CCNC: 10 U/L DA — SIGNIFICANT CHANGE UP (ref 10–60)
ANION GAP SERPL CALC-SCNC: 6 MMOL/L — SIGNIFICANT CHANGE UP (ref 5–17)
AST SERPL-CCNC: 12 U/L — SIGNIFICANT CHANGE UP (ref 10–40)
BILIRUB SERPL-MCNC: 0.4 MG/DL — SIGNIFICANT CHANGE UP (ref 0.2–1.2)
BUN SERPL-MCNC: 6 MG/DL — LOW (ref 7–18)
CALCIUM SERPL-MCNC: 8.5 MG/DL — SIGNIFICANT CHANGE UP (ref 8.4–10.5)
CHLORIDE SERPL-SCNC: 108 MMOL/L — SIGNIFICANT CHANGE UP (ref 96–108)
CO2 SERPL-SCNC: 26 MMOL/L — SIGNIFICANT CHANGE UP (ref 22–31)
CREAT SERPL-MCNC: 0.64 MG/DL — SIGNIFICANT CHANGE UP (ref 0.5–1.3)
GLUCOSE BLDC GLUCOMTR-MCNC: 168 MG/DL — HIGH (ref 70–99)
GLUCOSE BLDC GLUCOMTR-MCNC: 188 MG/DL — HIGH (ref 70–99)
GLUCOSE BLDC GLUCOMTR-MCNC: 240 MG/DL — HIGH (ref 70–99)
GLUCOSE BLDC GLUCOMTR-MCNC: 257 MG/DL — HIGH (ref 70–99)
GLUCOSE SERPL-MCNC: 182 MG/DL — HIGH (ref 70–99)
HCT VFR BLD CALC: 30 % — LOW (ref 34.5–45)
HGB BLD-MCNC: 9.8 G/DL — LOW (ref 11.5–15.5)
MAGNESIUM SERPL-MCNC: 2.2 MG/DL — SIGNIFICANT CHANGE UP (ref 1.6–2.6)
MCHC RBC-ENTMCNC: 29.3 PG — SIGNIFICANT CHANGE UP (ref 27–34)
MCHC RBC-ENTMCNC: 32.7 GM/DL — SIGNIFICANT CHANGE UP (ref 32–36)
MCV RBC AUTO: 89.8 FL — SIGNIFICANT CHANGE UP (ref 80–100)
MRSA PCR RESULT.: SIGNIFICANT CHANGE UP
NRBC # BLD: 0 /100 WBCS — SIGNIFICANT CHANGE UP (ref 0–0)
PHOSPHATE SERPL-MCNC: 1.8 MG/DL — LOW (ref 2.5–4.5)
PLATELET # BLD AUTO: 288 K/UL — SIGNIFICANT CHANGE UP (ref 150–400)
POTASSIUM SERPL-MCNC: 3.8 MMOL/L — SIGNIFICANT CHANGE UP (ref 3.5–5.3)
POTASSIUM SERPL-SCNC: 3.8 MMOL/L — SIGNIFICANT CHANGE UP (ref 3.5–5.3)
PROT SERPL-MCNC: 6.1 G/DL — SIGNIFICANT CHANGE UP (ref 6–8.3)
RBC # BLD: 3.34 M/UL — LOW (ref 3.8–5.2)
RBC # FLD: 13 % — SIGNIFICANT CHANGE UP (ref 10.3–14.5)
S AUREUS DNA NOSE QL NAA+PROBE: SIGNIFICANT CHANGE UP
SARS-COV-2 RNA SPEC QL NAA+PROBE: DETECTED
SODIUM SERPL-SCNC: 140 MMOL/L — SIGNIFICANT CHANGE UP (ref 135–145)
WBC # BLD: 5.54 K/UL — SIGNIFICANT CHANGE UP (ref 3.8–10.5)
WBC # FLD AUTO: 5.54 K/UL — SIGNIFICANT CHANGE UP (ref 3.8–10.5)

## 2020-05-18 RX ORDER — POTASSIUM PHOSPHATE, MONOBASIC POTASSIUM PHOSPHATE, DIBASIC 236; 224 MG/ML; MG/ML
15 INJECTION, SOLUTION INTRAVENOUS ONCE
Refills: 0 | Status: COMPLETED | OUTPATIENT
Start: 2020-05-18 | End: 2020-05-18

## 2020-05-18 RX ORDER — CHLORHEXIDINE GLUCONATE 213 G/1000ML
1 SOLUTION TOPICAL DAILY
Refills: 0 | Status: DISCONTINUED | OUTPATIENT
Start: 2020-05-19 | End: 2020-05-23

## 2020-05-18 RX ORDER — PIPERACILLIN AND TAZOBACTAM 4; .5 G/20ML; G/20ML
3.38 INJECTION, POWDER, LYOPHILIZED, FOR SOLUTION INTRAVENOUS EVERY 8 HOURS
Refills: 0 | Status: DISCONTINUED | OUTPATIENT
Start: 2020-05-18 | End: 2020-05-23

## 2020-05-18 RX ORDER — SODIUM CHLORIDE 9 MG/ML
10 INJECTION INTRAMUSCULAR; INTRAVENOUS; SUBCUTANEOUS
Refills: 0 | Status: DISCONTINUED | OUTPATIENT
Start: 2020-05-18 | End: 2020-05-23

## 2020-05-18 RX ADMIN — PREGABALIN 1000 MICROGRAM(S): 225 CAPSULE ORAL at 12:05

## 2020-05-18 RX ADMIN — PIPERACILLIN AND TAZOBACTAM 25 GRAM(S): 4; .5 INJECTION, POWDER, LYOPHILIZED, FOR SOLUTION INTRAVENOUS at 13:10

## 2020-05-18 RX ADMIN — Medication 3: at 16:52

## 2020-05-18 RX ADMIN — ENOXAPARIN SODIUM 40 MILLIGRAM(S): 100 INJECTION SUBCUTANEOUS at 12:06

## 2020-05-18 RX ADMIN — PIPERACILLIN AND TAZOBACTAM 25 GRAM(S): 4; .5 INJECTION, POWDER, LYOPHILIZED, FOR SOLUTION INTRAVENOUS at 05:33

## 2020-05-18 RX ADMIN — Medication 500 MILLIGRAM(S): at 12:05

## 2020-05-18 RX ADMIN — Medication 1: at 21:25

## 2020-05-18 RX ADMIN — Medication 2: at 12:05

## 2020-05-18 RX ADMIN — Medication 1 APPLICATION(S): at 12:06

## 2020-05-18 RX ADMIN — Medication 1000 UNIT(S): at 12:05

## 2020-05-18 RX ADMIN — Medication 1 MILLIGRAM(S): at 12:05

## 2020-05-18 RX ADMIN — POTASSIUM PHOSPHATE, MONOBASIC POTASSIUM PHOSPHATE, DIBASIC 62.5 MILLIMOLE(S): 236; 224 INJECTION, SOLUTION INTRAVENOUS at 16:52

## 2020-05-18 RX ADMIN — NYSTATIN CREAM 1 APPLICATION(S): 100000 CREAM TOPICAL at 16:52

## 2020-05-18 RX ADMIN — NYSTATIN CREAM 1 APPLICATION(S): 100000 CREAM TOPICAL at 05:33

## 2020-05-18 RX ADMIN — Medication 2 MILLIGRAM(S): at 21:25

## 2020-05-18 RX ADMIN — Medication 1: at 08:42

## 2020-05-18 RX ADMIN — SIMVASTATIN 40 MILLIGRAM(S): 20 TABLET, FILM COATED ORAL at 21:25

## 2020-05-18 RX ADMIN — PIPERACILLIN AND TAZOBACTAM 25 GRAM(S): 4; .5 INJECTION, POWDER, LYOPHILIZED, FOR SOLUTION INTRAVENOUS at 22:44

## 2020-05-18 RX ADMIN — Medication 325 MILLIGRAM(S): at 12:05

## 2020-05-18 RX ADMIN — Medication 81 MILLIGRAM(S): at 12:05

## 2020-05-18 NOTE — PROGRESS NOTE ADULT - PROBLEM SELECTOR PLAN 5
pt on aspirin ,statin   Vascular Consult Dr Camacho -  reviewed  PAtient is to be discharged to [facility] pending COVID results.

## 2020-05-18 NOTE — PROGRESS NOTE ADULT - PROBLEM SELECTOR PLAN 7
DVT  GI Elderly patient who is alert and awake s Patient wishes not to be subjected to aggressive intervention. Discussed with her in detail  at bedside patient's clinical status. Due to patient advanced age, she want pain control and comfort measures. Request Do not Resuscitate and no intubation  in the future. They would be okay with fluids and antibiotics judiciously used if given to treat an infection and to improve quality of life and just not prolong suffering if at the end of life

## 2020-05-18 NOTE — PROGRESS NOTE ADULT - PROBLEM SELECTOR PLAN 6
Elderly patient who is alert and awake s Patient wishes not to be subjected to aggressive intervention. Discussed with her in detail  at bedside patient's clinical status. Due to patient advanced age, she want pain control and comfort measures. Request Do not Resuscitate and no intubation  in the future. They would be okay with fluids and antibiotics judiciously used if given to treat an infection and to improve quality of life and just not prolong suffering if at the end of life Phosphorus Level, Serum in AM (05.18.20 @ 06:44) : 1.8 mg/dL   Potasium Phosphate  15 millimole IV ordered   Recheck phos in Am

## 2020-05-18 NOTE — PROGRESS NOTE ADULT - NEGATIVE GASTROINTESTINAL SYMPTOMS
no vomiting/no diarrhea/no constipation/no nausea
no vomiting/no diarrhea/no abdominal pain/no nausea

## 2020-05-18 NOTE — PROGRESS NOTE ADULT - SUBJECTIVE AND OBJECTIVE BOX
74y Female    Meds:    Allergies    No Known Allergies    Intolerances        VITALS:  Vital Signs Last 24 Hrs  T(C): 36.9 (18 May 2020 14:00), Max: 36.9 (18 May 2020 05:18)  T(F): 98.5 (18 May 2020 14:00), Max: 98.5 (18 May 2020 05:18)  HR: 70 (18 May 2020 14:00) (61 - 70)  BP: 111/65 (18 May 2020 14:00) (111/65 - 153/41)  BP(mean): --  RR: 17 (18 May 2020 14:00) (17 - 18)  SpO2: 100% (18 May 2020 14:00) (100% - 100%)    LABS/DIAGNOSTIC TESTS:                          9.8    5.54  )-----------( 288      ( 18 May 2020 06:44 )             30.0         05-18    140  |  108  |  6<L>  ----------------------------<  182<H>  3.8   |  26  |  0.64    Ca    8.5      18 May 2020 06:44  Phos  1.8     05-18  Mg     2.2     05-18    TPro  6.1  /  Alb  2.5<L>  /  TBili  0.4  /  DBili  x   /  AST  12  /  ALT  10  /  AlkPhos  56  05-18      LIVER FUNCTIONS - ( 18 May 2020 06:44 )  Alb: 2.5 g/dL / Pro: 6.1 g/dL / ALK PHOS: 56 U/L / ALT: 10 U/L DA / AST: 12 U/L / GGT: x             CULTURES: .Surgical Swab left lateral ankle wound deep  05-11 @ 15:04   Moderate Proteus mirabilis  Moderate Morganella morganii #2  Moderate Bacteroides fragilis Susceptibilites not performed.  --  Morganella morganii  Proteus mirabilis  Bacteroides fragilis  Bacteroides fragilis      .Blood Blood  05-11 @ 14:58   No Growth Final  --  --            RADIOLOGY:      ROS:  [  ] UNABLE TO ELICIT

## 2020-05-18 NOTE — PROGRESS NOTE ADULT - SUBJECTIVE AND OBJECTIVE BOX
74y Female    Meds:  piperacillin/tazobactam IVPB.. 3.375 Gram(s) IV Intermittent every 8 hours    Allergies    No Known Allergies    Intolerances        VITALS:  Vital Signs Last 24 Hrs  T(C): 36.9 (18 May 2020 14:00), Max: 36.9 (18 May 2020 05:18)  T(F): 98.5 (18 May 2020 14:00), Max: 98.5 (18 May 2020 05:18)  HR: 70 (18 May 2020 14:00) (61 - 70)  BP: 111/65 (18 May 2020 14:00) (111/65 - 153/41)  BP(mean): --  RR: 17 (18 May 2020 14:00) (17 - 18)  SpO2: 100% (18 May 2020 14:00) (100% - 100%)    LABS/DIAGNOSTIC TESTS:                          9.8    5.54  )-----------( 288      ( 18 May 2020 06:44 )             30.0         05-18    140  |  108  |  6<L>  ----------------------------<  182<H>  3.8   |  26  |  0.64    Ca    8.5      18 May 2020 06:44  Phos  1.8     05-18  Mg     2.2     05-18    TPro  6.1  /  Alb  2.5<L>  /  TBili  0.4  /  DBili  x   /  AST  12  /  ALT  10  /  AlkPhos  56  05-18      LIVER FUNCTIONS - ( 18 May 2020 06:44 )  Alb: 2.5 g/dL / Pro: 6.1 g/dL / ALK PHOS: 56 U/L / ALT: 10 U/L DA / AST: 12 U/L / GGT: x             CULTURES: .Surgical Swab left lateral ankle wound deep  05-11 @ 15:04   Moderate Proteus mirabilis  Moderate Morganella morganii #2  Moderate Bacteroides fragilis Susceptibilites not performed.  --  Morganella morganii  Proteus mirabilis  Bacteroides fragilis  Bacteroides fragilis      .Blood Blood  05-11 @ 14:58   No Growth Final  --  --            RADIOLOGY:      ROS:  [  ] UNABLE TO ELICIT 74y Female who is clinically well, she refused any type of surgical intervention again on this admission, she did agree for a PICC line though. She has no fevers or chills, no diarrhea, no nausea or vomiting or other complaints, her left foot pain is much less. She is going to be discharged back to a NH for an additional 5 weeks of IV abxs to treat for osteomyelitis.    Meds:  piperacillin/tazobactam IVPB.. 3.375 Gram(s) IV Intermittent every 8 hours    Allergies    No Known Allergies    Intolerances        VITALS:  Vital Signs Last 24 Hrs  T(C): 36.9 (18 May 2020 14:00), Max: 36.9 (18 May 2020 05:18)  T(F): 98.5 (18 May 2020 14:00), Max: 98.5 (18 May 2020 05:18)  HR: 70 (18 May 2020 14:00) (61 - 70)  BP: 111/65 (18 May 2020 14:00) (111/65 - 153/41)  BP(mean): --  RR: 17 (18 May 2020 14:00) (17 - 18)  SpO2: 100% (18 May 2020 14:00) (100% - 100%)    LABS/DIAGNOSTIC TESTS:                          9.8    5.54  )-----------( 288      ( 18 May 2020 06:44 )             30.0         05-18    140  |  108  |  6<L>  ----------------------------<  182<H>  3.8   |  26  |  0.64    Ca    8.5      18 May 2020 06:44  Phos  1.8     05-18  Mg     2.2     05-18    TPro  6.1  /  Alb  2.5<L>  /  TBili  0.4  /  DBili  x   /  AST  12  /  ALT  10  /  AlkPhos  56  05-18      LIVER FUNCTIONS - ( 18 May 2020 06:44 )  Alb: 2.5 g/dL / Pro: 6.1 g/dL / ALK PHOS: 56 U/L / ALT: 10 U/L DA / AST: 12 U/L / GGT: x             CULTURES: .Surgical Swab left lateral ankle wound deep  05-11 @ 15:04   Moderate Proteus mirabilis  Moderate Morganella morganii #2  Moderate Bacteroides fragilis Susceptibilites not performed.  --  Morganella morganii  Proteus mirabilis  Bacteroides fragilis  Bacteroides fragilis      .Blood Blood  05-11 @ 14:58   No Growth Final  --  --            RADIOLOGY:      ROS:  [  ] UNABLE TO ELICIT

## 2020-05-18 NOTE — PROGRESS NOTE ADULT - SUBJECTIVE AND OBJECTIVE BOX
Patient is a 74y old  Female who presents with a chief complaint of worsening Left ankle wound    Podiatry Interval HPI: Patient seen this AM at bedside with dressing intact to the left foot. She offers no new complaint. She is AAOx3 in NAD. Patient received PICC line and is pending repeat COVID testing prior to discharge. Patient verbalize understanding of consequences and is able to clearly communicate her health care decision. Previous obtained written refusal to surgery. Patient denies pain, and refused to having packing inserted in the wound. She denies f/c/n/v or SOB. She remains afebrile, wbc normal limit.    HPI:  Patient is a 74 y.o F from [facility], non-ambulatory wheel chair bound with PMH of HTN, Diabetes, Osteoarthritis, Osteoporosis, Peripheral arterial disease, Diabetic neuropathy, HLD, Schizophrenia presented to Ed with worsening non healing heel ulcers. She reports having had the ulcer for about a year, recent got worse after she had bumped her foot on her wheelchair. 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 Patient refused surgery last admission and was discharged with local wound care. She reports daily dressing with betadine from her care provider. Pt denies any fever, chills , pain , cough , SOB,CP ,abd pain or any other symptoms. (11 May 2020 13:16)      Podiatry HPI: Patient is a 74 y.o F consulted by ED attending for left ankle wound. Patient is from [facility], reports non-ambulatory wheel chair bound with PMH of HTN, Diabetes, Osteoarthritis, Osteoporosis, Peripheral arterial disease, Diabetic neuropathy, HLD, Schizophrenia presented to Ed with worsening non healing heel ulcers. She reports having had the ulcer for about a year, recent got worse after she had bumped her foot on her wheelchair. 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 Patient refused surgery last admission and was discharged with local wound care. She reports daily dressing with betadine from her care provider. Reports some pain to the left ankle, denies fever, chills, nausea or vomiting. wbc 11.74, afebrile.     PMH:DM (diabetes mellitus)  Paranoid schizophrenia  HLD (hyperlipidemia)  PAD (peripheral artery disease)  HTN (hypertension)    Allergies: No Known Allergies      MEDICATIONS  (STANDING):  ascorbic acid 500 milliGRAM(s) Oral daily  aspirin enteric coated 81 milliGRAM(s) Oral daily  cholecalciferol 1000 Unit(s) Oral daily  cyanocobalamin 1000 MICROGram(s) Oral daily  doxazosin 2 milliGRAM(s) Oral at bedtime  enoxaparin Injectable 40 milliGRAM(s) SubCutaneous daily  ferrous    sulfate 325 milliGRAM(s) Oral daily  folic acid 1 milliGRAM(s) Oral daily  insulin lispro (HumaLOG) corrective regimen sliding scale   SubCutaneous Before meals and at bedtime  nystatin Powder 1 Application(s) Topical two times a day  potassium phosphate IVPB 15 milliMole(s) IV Intermittent once  povidone iodine 10% Solution 1 Application(s) Topical daily  simvastatin 40 milliGRAM(s) Oral at bedtime    MEDICATIONS  (PRN):  acetaminophen   Tablet .. 650 milliGRAM(s) Oral every 6 hours PRN Temp greater or equal to 38C (100.4F), Mild Pain (1 - 3)  ALBUTerol    90 MICROgram(s) HFA Inhaler 2 Puff(s) Inhalation every 4 hours PRN Shortness of Breath and/or Wheezing  guaifenesin/dextromethorphan  Syrup 10 milliLiter(s) Oral every 4 hours PRN Cough  ondansetron Injectable 4 milliGRAM(s) IV Push every 6 hours PRN Nausea and/or Vomiting  sodium chloride 0.9% lock flush 10 milliLiter(s) IV Push every 1 hour PRN Pre/post blood products, medications, blood draw, and to maintain line patency        FH: No pertinent family history in first degree relatives    PSX: No significant past surgical history    SH:     Labs:                          9.8    5.54  )-----------( 288      ( 18 May 2020 06:44 )             30.0       05-18    140  |  108  |  6<L>  ----------------------------<  182<H>  3.8   |  26  |  0.64    Ca    8.5      18 May 2020 06:44  Phos  1.8     05-18  Mg     2.2     05-18    TPro  6.1  /  Alb  2.5<L>  /  TBili  0.4  /  DBili  x   /  AST  12  /  ALT  10  /  AlkPhos  56  05-18      Vital Signs Last 24 Hrs  T(C): 36.9 (18 May 2020 05:18), Max: 36.9 (18 May 2020 05:18)  T(F): 98.5 (18 May 2020 05:18), Max: 98.5 (18 May 2020 05:18)  HR: 67 (18 May 2020 05:18) (61 - 67)  BP: 124/34 (18 May 2020 05:18) (108/40 - 153/41)  BP(mean): --  RR: 18 (17 May 2020 21:49) (18 - 18)  SpO2: 100% (18 May 2020 05:18) (100% - 100%)    Sedimentation Rate, Erythrocyte: 51 mm/Hr (05-12-20 @ 09:46)  Sedimentation Rate, Erythrocyte: 88 mm/Hr (05-11-20 @ 09:31)      C-Reactive Protein, Serum: 0.80 mg/dL (05-17-20 @ 14:49)  C-Reactive Protein, Serum: 4.38 mg/dL (05-12-20 @ 14:13)  C-Reactive Protein, Serum: 2.74 mg/dL (05-11-20 @ 18:31)                     ROS  REVIEW OF SYSTEMS: Per HPI          PHYSICAL EXAM  GEN: TTIO ORLANDO is a pleasant well-nourished, well developed 74y Female in no acute distress, alert awake, and oriented to person, place and time.   LE Focused:    Vasc: DP/PT b/l faintly palpable, moderate edema noted to the left foot compared to the right, increased warmth to left foot. Pedal hair absent.   Derm: open lesion noted to lateral posterior heel, 1.2cm x 1cm x 1.5cm deep, surrounding maceration, with peeling skin.  Wound probes to bone, tracks anteriorly and proximally about 5cm. patient refused manual expression of purulence. Necrotic eschar noted proximal to the open wound. small fluctuance to the lateral ankle noted. No open lesions noted to the right lower extremity.   Neuro: Protective sensation diminished.   MSK: Passive ROM pain free at the ankle, pain to palpation at wound site and with manual expression of purulence.     Imaging:   EXAM:  FOOT LEFT (MINIMUM 3 VIEWS)                          EXAM:  ANKLE LEFT (MINIMUM 3 V)                            PROCEDURE DATE:  05/11/2020      INTERPRETATION:  Left ankle and left foot. Patient has a lateral drain wound on the ankle.    Left ankle. 3 views.    There is rather mild degeneration of the malleolar tips.    There are posterior and inferior calcaneal spurs.    No bone destruction or fracture is evident.    Arterial calcifications are noted.    Left foot. 3 views. There is a mild hallux valgus with mild local degeneration.    Arterial calcifications are noted.    No bone destruction or fracture is evident.    IMPRESSION: No acute bony finding. Degeneration particularly at the first MP joint and vascular calcification noted.    ADITI SHELL M.D., ATTENDING RADIOLOGIST  This document has been electronically signed. May 11 2020  9:34AM      -------------------------------------------------------------------------------------------------------------------------------  PROCEDURE DATE:  05/13/2020        INTERPRETATION:  Clinical information: Diabetes, hypertension, hyperlipidemia, peripheral arterial disease. Left lower extremity osteomyelitis. Chronic heel ulcers bilaterally.    Comparison: Lower extremity arterial Doppler study dated 9/10/2019.    Technique: Lower extremity arterial Doppler study. Segmental pressures were not obtained at the thigh level.    Findings: Ankle brachial index measures 1.41 on the right and 1.36 on the left, compared with prior values of 1.34 and 1.26, respectively. No segmental arterial pressure gradient is present.    PVR waveforms are normal in amplitude and mildly diminished in pulsatility at the thigh through the ankle levels bilaterally. Waveforms are diminished in amplitude at the metatarsal and digital levels bilaterally, to a greater degree than on the prior study.    Impression: No Doppler evidence of hemodynamically significant arterial inflow limitation in either lower extremity.    Findings suggestive of small vessel disease in the feet.        SOHAN PA M.D., ATTENDING RADIOLOGIST  This document has been electronically signed. May 14 2020 10:43AM       ------------------------------------------------------------------------------------------------------------------------------------------------------------------------------------------------------------------------------------------------  MRI    EXAM:  MR ANKLE LT                          PROCEDURE DATE:  05/12/2020      INTERPRETATION:  EXAMINATION: MRI of the left ankle without contrast    CLINICAL INFORMATION: Skin ulcers. Evaluate for osteomyelitis.    TECHNIQUE: Multiplanar, multisequential MR imaging was performed.    FINDINGS: There is a cutaneous ulceration along the lateral aspect of the posterior calcaneus that extends down to the level of bone. There is high T2 and low T1 marrow signal throughout the majority of the calcaneus, sparing only the far anterior aspect of the calcaneus, consistent with osteomyelitis. There is also a confluent region of high T2 signal in the posterolateral subcutaneous tissues at the level of the ankle and hindfoot that may represent confluent phlegmon and/or abscess (evaluation is limited without contrast). There are also small foci of air in the soft tissues at this level. There are no other areas of marrow signal alteration that are suspicious for osteomyelitis. There is mild arthrosis at several tarsometatarsal articulations. There is fatty atrophy and high T2 signal of the foot musculature, suggestive of denervation.    IMPRESSION: Cutaneous ulceration along the lateral aspect of the posterior calcaneus with associated calcaneal osteomyelitis.   Confluent associated signal alteration in the posterolateral subcutaneous tissues at the level of the ankle and hindfoot may represent confluent phlegmonous and/or abscess (evaluation is limited without contrast).      BECKA GRADY M.D., ATTENDING RADIOLOGIST  This document has been electronically signed. May 12 2020  7:28PM    -------------------------------------------------------------------------------------------------------------------------------------------------------------------------------------    Cultures:   Culture - Surgical Swab (05.11.20 @ 15:04)    -  Amikacin: S <=16    -  Amikacin: S <=16    -  Amoxicillin/Clavulanic Acid: R >16/8    -  Amoxicillin/Clavulanic Acid: S <=8/4    -  Ampicillin: R >16 These ampicillin results predict results for amoxicillin    -  Ampicillin: S <=8 These ampicillin results predict results for amoxicillin    -  Ampicillin/Sulbactam: S <=4/2 Enterobacter, Citrobacter, and Serratia may develop resistance during prolonged therapy (3-4 days)    -  Aztreonam: S <=4    -  Aztreonam: S <=4    -  Cefazolin: R >16 Enterobacter, Citrobacter, and Serratia may develop resistance during prolonged therapy (3-4 days)    -  Cefazolin: S <=2 Enterobacter, Citrobacter, and Serratia may develop resistance during prolonged therapy (3-4 days)    -  Cefepime: S <=2    -  Cefepime: S <=2    -  Cefoxitin: S <=8    -  Cefoxitin: S <=8    -  Ceftriaxone: R 8 Enterobacter, Citrobacter, and Serratia may develop resistance during prolonged therapy    -  Ceftriaxone: S <=1 Enterobacter, Citrobacter, and Serratia may develop resistance during prolonged therapy    -  Ciprofloxacin: I 0.5    -  Ciprofloxacin: S <=0.25    -  Ertapenem: S <=0.5    -  Ertapenem: S <=0.5    -  Ampicillin/Sulbactam: R >16/8 Enterobacter, Citrobacter, and Serratia may develop resistance during prolonged therapy (3-4 days)    -  Gentamicin: S <=2    -  Gentamicin: S <=2    -  Levofloxacin: R 2    -  Levofloxacin: S <=0.5    -  Meropenem: S <=1    -  Meropenem: S <=1    -  Piperacillin/Tazobactam: S <=8    -  Piperacillin/Tazobactam: S <=8    -  Tobramycin: S <=2    -  Tobramycin: S <=2    -  Trimethoprim/Sulfamethoxazole: R >2/38    -  Trimethoprim/Sulfamethoxazole: S <=0.5/9.5    -  Imipenem: S <=1    Specimen Source: .Surgical Swab left lateral ankle wound deep    Culture Results:   Moderate Proteus mirabilis  Moderate Morganella morganii #2  Moderate Bacteroides fragilis Susceptibilites not performed.    Organism Identification: Morganella morganii  Proteus mirabilis  Bacteroides fragilis  Bacteroides fragilis    Organism: Morganella morganii    Organism: Proteus mirabilis    Organism: Bacteroides fragilis    Organism: Bacteroides fragilis    Method Type: RICARDO    Method Type: RICARDO    Method Type: RICARDO    Method Type: RICARDO      A: Left heel wound     Cellulitis    OM of the left calcaneus    P:   Chart reviewed and Patient evaluated  Discussed diagnosis and treatment with patient  Wound flush with copious amount normal saline/betadine mix  Obtained deep wound culture, reviewed above  Wound dressed with DSD, as patient refused packing strips to be inserted.   Continue with IV antibiotics, f/u ID rec PICC line with Zosyn  TIMUR reviewed as above, vascular input appreciated, Pt. refused CTA  Non-weight bearing  to left foot  Offloading to bilateral Heels while in bed  MRI reviewed, OM of calcaneous  written refusal to surgery documented  continue local wound care.   Daily wound flush and packing as patient allows  Discussed with Attending Dr. Lane      Wound care: flush wound with betadine/saline mix, packing and DSD.

## 2020-05-18 NOTE — PROGRESS NOTE ADULT - PROBLEM SELECTOR PLAN 1
MRI of left foot including the left ankle on 05/12/2020  showed  cutaneous ulceration along the lateral aspect of the posterior calcaneus with associated calcaneal osteomyelitis.  sp bedside debridement by podiatry   patient refuses surgery . PICC line placed on 05/15/2020 . continue zosyn  for 5 additional weeks as per ID    ID Dr Wellington

## 2020-05-18 NOTE — PROGRESS NOTE ADULT - SUBJECTIVE AND OBJECTIVE BOX
NP Note discussed with  Primary Attending    74 y.o female from [facility], non-ambulatory wheel chair bound with PMH of HTN, Diabetes, Osteoarthritis, Osteoporosis, Peripheral arterial disease, Diabetic neuropathy, HLD, Schizophrenia presented with worsening non healing left heel ulcers, getting worse  after recent injury. 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 Patient refused surgery last admission and was discharged with local wound care. bedside I&D was performed with  expression of purulence discharge. Wound care performed by podiatry team. Deep wound culture pending result IV antibiotics: Zosyn continued.  patient was seen today by  bedside. Patient had  MRI of left foot including the left ankle on 05/12/2020 that showed  cutaneous ulceration along the lateral aspect of the posterior calcaneus with associated calcaneal osteomyelitis.  results of the MRI were discussed with the patient in details.  all questions answered.   results of the MRI were also discussed with ID specialist.  Patient may require OR debridement with possible  partial calcanectomy as per podiatry  Pt is COVID +. patient is refusing surgery. she had PICC line placement on 05/15/2020. spoke to dr Wellington (ID) she will need 5  additional weeks of Zosyn 3.375 gram.         INTERVAL HPI/OVERNIGHT EVENTS: no new complaints    MEDICATIONS  (STANDING):  ascorbic acid 500 milliGRAM(s) Oral daily  aspirin enteric coated 81 milliGRAM(s) Oral daily  cholecalciferol 1000 Unit(s) Oral daily  cyanocobalamin 1000 MICROGram(s) Oral daily  doxazosin 2 milliGRAM(s) Oral at bedtime  enoxaparin Injectable 40 milliGRAM(s) SubCutaneous daily  ferrous    sulfate 325 milliGRAM(s) Oral daily  folic acid 1 milliGRAM(s) Oral daily  insulin lispro (HumaLOG) corrective regimen sliding scale   SubCutaneous Before meals and at bedtime  nystatin Powder 1 Application(s) Topical two times a day  piperacillin/tazobactam IVPB.. 3.375 Gram(s) IV Intermittent every 8 hours  potassium phosphate IVPB 15 milliMole(s) IV Intermittent once  povidone iodine 10% Solution 1 Application(s) Topical daily  simvastatin 40 milliGRAM(s) Oral at bedtime    MEDICATIONS  (PRN):  acetaminophen   Tablet .. 650 milliGRAM(s) Oral every 6 hours PRN Temp greater or equal to 38C (100.4F), Mild Pain (1 - 3)  ALBUTerol    90 MICROgram(s) HFA Inhaler 2 Puff(s) Inhalation every 4 hours PRN Shortness of Breath and/or Wheezing  guaifenesin/dextromethorphan  Syrup 10 milliLiter(s) Oral every 4 hours PRN Cough  ondansetron Injectable 4 milliGRAM(s) IV Push every 6 hours PRN Nausea and/or Vomiting  sodium chloride 0.9% lock flush 10 milliLiter(s) IV Push every 1 hour PRN Pre/post blood products, medications, blood draw, and to maintain line patency      __________________________________________________  REVIEW OF SYSTEMS:    CONSTITUTIONAL: No fever,   EYES: no acute visual disturbances  NECK: No pain or stiffness  RESPIRATORY: No cough; No shortness of breath  CARDIOVASCULAR: No chest pain, no palpitations  GASTROINTESTINAL: No pain. No nausea or vomiting; No diarrhea   NEUROLOGICAL: No headache or numbness, no tremors  MUSCULOSKELETAL: No joint pain, no muscle pain  GENITOURINARY: no dysuria, no frequency, no hesitancy  PSYCHIATRY: no depression , no anxiety  ALL OTHER  ROS negative        Vital Signs Last 24 Hrs  T(C): 36.9 (18 May 2020 05:18), Max: 36.9 (18 May 2020 05:18)  T(F): 98.5 (18 May 2020 05:18), Max: 98.5 (18 May 2020 05:18)  HR: 67 (18 May 2020 05:18) (61 - 67)  BP: 124/34 (18 May 2020 05:18) (108/40 - 153/41)  BP(mean): --  RR: 18 (17 May 2020 21:49) (18 - 18)  SpO2: 100% (18 May 2020 05:18) (100% - 100%)    ________________________________________________  PHYSICAL EXAM:  GENERAL: NAD  HEENT: Normocephalic;  conjunctivae and sclerae clear; moist mucous membranes;   NECK : supple  CHEST/LUNG: Clear to auscultation bilaterally with good air entry   HEART: S1 S2  regular; no murmurs, gallops or rubs  ABDOMEN: Soft, Nontender, Nondistended; Bowel sounds present  EXTREMITIES: no cyanosis; no edema; no calf tenderness  SKIN: warm and dry; no rash  NERVOUS SYSTEM:  Awake and alert; Oriented  to place, person and time ; no new deficits    _________________________________________________  LABS:                        9.8    5.54  )-----------( 288      ( 18 May 2020 06:44 )             30.0     05-18    140  |  108  |  6<L>  ----------------------------<  182<H>  3.8   |  26  |  0.64    Ca    8.5      18 May 2020 06:44  Phos  1.8     05-18  Mg     2.2     05-18    TPro  6.1  /  Alb  2.5<L>  /  TBili  0.4  /  DBili  x   /  AST  12  /  ALT  10  /  AlkPhos  56  05-18        CAPILLARY BLOOD GLUCOSE      POCT Blood Glucose.: 240 mg/dL (18 May 2020 12:02)  POCT Blood Glucose.: 168 mg/dL (18 May 2020 08:29)  POCT Blood Glucose.: 231 mg/dL (17 May 2020 21:01)  POCT Blood Glucose.: 175 mg/dL (17 May 2020 16:24)        RADIOLOGY & ADDITIONAL TESTS:    Imaging  Reviewed:  YES/NO    Consultant(s) Notes Reviewed:   YES/ No      Plan of care was discussed with patient and /or primary care giver; all questions and concerns were addressed NP Note discussed with  Primary Attending    74 y.o female from [facility], non-ambulatory wheel chair bound with PMH of HTN, Diabetes, Osteoarthritis, Osteoporosis, Peripheral arterial disease, Diabetic neuropathy, HLD, Schizophrenia presented with worsening non healing left heel ulcers, getting worse  after recent injury. 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 Patient refused surgery last admission and was discharged with local wound care. bedside I&D was performed with  expression of purulence discharge. Wound care performed by podiatry team. Deep wound culture pending result IV antibiotics: Zosyn continued.  patient was seen today by  bedside. Patient had  MRI of left foot including the left ankle on 05/12/2020 that showed  cutaneous ulceration along the lateral aspect of the posterior calcaneus with associated calcaneal osteomyelitis.  results of the MRI were discussed with the patient in details.  all questions answered.   results of the MRI were also discussed with ID specialist.  Patient may require OR debridement with possible  partial calcanectomy as per podiatry  Pt is COVID +. patient is refusing surgery. she had PICC line placement on 05/15/2020. spoke to dr Wellington (ID) she will need 5  additional weeks of Zosyn 3.375 gram.     TIMUR results Extremity Lower 3+ Level, BI (05.13.20 @ 20:07) >  No Doppler evidence of hemodynamically significant arterial inflow limitation in either lower extremity. Findings suggestive of small vessel disease in the feet.       INTERVAL HPI/OVERNIGHT EVENTS: no new complaints    MEDICATIONS  (STANDING):  ascorbic acid 500 milliGRAM(s) Oral daily  aspirin enteric coated 81 milliGRAM(s) Oral daily  cholecalciferol 1000 Unit(s) Oral daily  cyanocobalamin 1000 MICROGram(s) Oral daily  doxazosin 2 milliGRAM(s) Oral at bedtime  enoxaparin Injectable 40 milliGRAM(s) SubCutaneous daily  ferrous    sulfate 325 milliGRAM(s) Oral daily  folic acid 1 milliGRAM(s) Oral daily  insulin lispro (HumaLOG) corrective regimen sliding scale   SubCutaneous Before meals and at bedtime  nystatin Powder 1 Application(s) Topical two times a day  piperacillin/tazobactam IVPB.. 3.375 Gram(s) IV Intermittent every 8 hours  potassium phosphate IVPB 15 milliMole(s) IV Intermittent once  povidone iodine 10% Solution 1 Application(s) Topical daily  simvastatin 40 milliGRAM(s) Oral at bedtime    MEDICATIONS  (PRN):  acetaminophen   Tablet .. 650 milliGRAM(s) Oral every 6 hours PRN Temp greater or equal to 38C (100.4F), Mild Pain (1 - 3)  ALBUTerol    90 MICROgram(s) HFA Inhaler 2 Puff(s) Inhalation every 4 hours PRN Shortness of Breath and/or Wheezing  guaifenesin/dextromethorphan  Syrup 10 milliLiter(s) Oral every 4 hours PRN Cough  ondansetron Injectable 4 milliGRAM(s) IV Push every 6 hours PRN Nausea and/or Vomiting  sodium chloride 0.9% lock flush 10 milliLiter(s) IV Push every 1 hour PRN Pre/post blood products, medications, blood draw, and to maintain line patency      __________________________________________________  REVIEW OF SYSTEMS:    CONSTITUTIONAL: No fever,   EYES: no acute visual disturbances  NECK: No pain or stiffness  RESPIRATORY: No cough; No shortness of breath  CARDIOVASCULAR: No chest pain, no palpitations  GASTROINTESTINAL: No pain. No nausea or vomiting; No diarrhea   NEUROLOGICAL: No headache or numbness, no tremors  MUSCULOSKELETAL: No joint pain, no muscle pain  GENITOURINARY: no dysuria, no frequency, no hesitancy  PSYCHIATRY: no depression , no anxiety  ALL OTHER  ROS negative        Vital Signs Last 24 Hrs  T(C): 36.9 (18 May 2020 05:18), Max: 36.9 (18 May 2020 05:18)  T(F): 98.5 (18 May 2020 05:18), Max: 98.5 (18 May 2020 05:18)  HR: 67 (18 May 2020 05:18) (61 - 67)  BP: 124/34 (18 May 2020 05:18) (108/40 - 153/41)  BP(mean): --  RR: 18 (17 May 2020 21:49) (18 - 18)  SpO2: 100% (18 May 2020 05:18) (100% - 100%)    ________________________________________________  PHYSICAL EXAM:  CONSTITUTIONAL: NAD, well-developed, well-groomed  RESPIRATORY: Normal respiratory effort; lungs are clear to auscultation bilaterally  CARDIOVASCULAR: Regular rate and rhythm,   ABDOMEN: Nontender to palpation, normoactive bowel sounds, no rebound/guarding;   PSYCH: A+O to person, place, and time; affect appropriate  NEUROLOGY: CN 2-12 are intact and symmetric; no gross sensory deficits   SKIN: No rashes; no palpable lesions  Left food wound dressing dry and intact       _________________________________________________  LABS:                        9.8    5.54  )-----------( 288      ( 18 May 2020 06:44 )             30.0     05-18    140  |  108  |  6<L>  ----------------------------<  182<H>  3.8   |  26  |  0.64    Ca    8.5      18 May 2020 06:44  Phos  1.8     05-18  Mg     2.2     05-18    TPro  6.1  /  Alb  2.5<L>  /  TBili  0.4  /  DBili  x   /  AST  12  /  ALT  10  /  AlkPhos  56  05-18        CAPILLARY BLOOD GLUCOSE      POCT Blood Glucose.: 240 mg/dL (18 May 2020 12:02)  POCT Blood Glucose.: 168 mg/dL (18 May 2020 08:29)  POCT Blood Glucose.: 231 mg/dL (17 May 2020 21:01)  POCT Blood Glucose.: 175 mg/dL (17 May 2020 16:24)        RADIOLOGY & ADDITIONAL TESTS:    Imaging  Reviewed:  YES/NO    Consultant(s) Notes Reviewed:   YES/ No      Plan of care was discussed with patient and /or primary care giver; all questions and concerns were addressed

## 2020-05-18 NOTE — PROGRESS NOTE ADULT - ATTENDING COMMENTS
HPI:  Patient is a 74 y.o F from [facility], non-ambulatory wheel chair bound with PMH of HTN, Diabetes, Osteoarthritis, Osteoporosis, Peripheral arterial disease, Diabetic neuropathy, HLD, Schizophrenia presented to Ed with worsening non healing heel ulcers. She reports having had the ulcer for about a year, recent got worse after she had bumped her foot on her wheelchair. 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 Patient refused surgery last admission and was discharged with local wound care. She reports daily dressing with betadine from her care provider. Pt denies any fever, chills , pain , cough , SOB,CP ,abd pain or any other symptoms. (11 May 2020 13:16)      #  OBTAIN IV ACCESS BY IR IN AM FOR IV . ZOSYN FOR TOTAL 6 WEEKS AT [facility] FOR LEFT CALCANEAL OSTEOMYELITIS.   # NONHEALING LEFT ANKLE ULCER , DIABETIC LEFT FOOT ULCER WITH PURULENT WOUND &  CELLULITIS, LEFT CALCANEAL OSTEOMYELITIS, LEFT FOOT ABSCESS  - WOUND CX SHOWING MORGANELLA, P. MIRABILIS - S/P I&D BY  PODIATRY. ON ZOSYN, ID CONSULTED. VASCULAR CONSULTED - TIMUR AND CT ANGIO SHOWED WORSENING MICROVASCULAR DISEASE OF LLE. PATIENT IS REFUSING SURGERY AND SHE IS NOT A CANDIDATE FOR SURGERY AT THIS TIME DUE TO COVID INFECTION. PATIENT IS COUNSELLED REGARDING AMPUTATION ( LEFT BKA )   #  PATIENT IS MODERATE SURGICAL RISK , BUT SURGERY WILL BE CANCELLED AT THIS TIME.   # COVID19 INFECTION, RPT NASAL SWAB PRIOR TO DC BACK TO [facility] FOR REHAB.   # PAD , MICROVASCULAR DISEASE OF B/L LOWER EXTREMITIES- TIMUR WITH WORSENED MICROVASCULAR DX -  POOR PROGNOSIS , WOUND MAY NOT HEEL DUE TO MULTIPLE RISK FACTORS.   #  DIABETIC PERIPHERAL NEUROPATHY  # CHRONIC NORMOCYTIC ANEMIA LIKELY S/T CHRONIC DISEASE  # HTN - WELL CONTROLLED , HLD , SCHIZOPHRENIA - STABLE   # DIABETES, UNCONTROLLED - IMPROVED GLYCEMIC CONTROL IN HOSPITAL  # GI AND DVT PPX HPI:  Patient is a 74 y.o F from [facility], non-ambulatory wheel chair bound with PMH of HTN, Diabetes, Osteoarthritis, Osteoporosis, Peripheral arterial disease, Diabetic neuropathy, HLD, Schizophrenia presented to Ed with worsening non healing heel ulcers. She reports having had the ulcer for about a year, recent got worse after she had bumped her foot on her wheelchair. 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 Patient refused surgery last admission and was discharged with local wound care. She reports daily dressing with betadine from her care provider. Pt denies any fever, chills , pain , cough , SOB,CP ,abd pain or any other symptoms. (11 May 2020 13:16)      #  OBTAIN IV ACCESS BY IR IN AM FOR IV . ZOSYN FOR TOTAL 6 WEEKS AT [facility] FOR LEFT CALCANEAL OSTEOMYELITIS.   # NONHEALING LEFT ANKLE ULCER , DIABETIC LEFT FOOT ULCER WITH PURULENT WOUND &  CELLULITIS, LEFT CALCANEAL OSTEOMYELITIS, LEFT FOOT ABSCESS  - WOUND CX SHOWING MORGANELLA, P. MIRABILIS - S/P I&D BY  PODIATRY. ON ZOSYN, ID CONSULTED. VASCULAR CONSULTED - TIMUR AND CT ANGIO SHOWED WORSENING MICROVASCULAR DISEASE OF LLE. PATIENT IS REFUSING SURGERY AND SHE IS NOT A CANDIDATE FOR SURGERY AT THIS TIME DUE TO COVID INFECTION. PATIENT IS COUNSELLED REGARDING AMPUTATION ( LEFT BKA )   #  PATIENT IS MODERATE SURGICAL RISK , BUT SURGERY WILL BE CANCELLED AT THIS TIME.   # COVID19 INFECTION, RPT NASAL SWAB PRIOR TO DC BACK TO [facility] FOR REHAB.   # PAD , MICROVASCULAR DISEASE OF B/L LOWER EXTREMITIES- TIMUR WITH WORSENED MICROVASCULAR DX -  POOR PROGNOSIS , WOUND MAY NOT HEEL DUE TO MULTIPLE RISK FACTORS.   #  DIABETIC PERIPHERAL NEUROPATHY  # CHRONIC NORMOCYTIC ANEMIA LIKELY S/T CHRONIC DISEASE  # HTN - WELL CONTROLLED , HLD , SCHIZOPHRENIA - STABLE   # DIABETES, UNCONTROLLED - IMPROVED GLYCEMIC CONTROL IN HOSPITAL  # GI AND DVT PPX    ELEN CASILLAS M.D. COVERING FOR FARHAN CASILLAS M.D.

## 2020-05-19 DIAGNOSIS — U07.1 COVID-19: ICD-10-CM

## 2020-05-19 LAB
ALBUMIN SERPL ELPH-MCNC: 2.5 G/DL — LOW (ref 3.5–5)
ALP SERPL-CCNC: 54 U/L — SIGNIFICANT CHANGE UP (ref 40–120)
ALT FLD-CCNC: 11 U/L DA — SIGNIFICANT CHANGE UP (ref 10–60)
ANION GAP SERPL CALC-SCNC: 8 MMOL/L — SIGNIFICANT CHANGE UP (ref 5–17)
AST SERPL-CCNC: 10 U/L — SIGNIFICANT CHANGE UP (ref 10–40)
BILIRUB SERPL-MCNC: 0.5 MG/DL — SIGNIFICANT CHANGE UP (ref 0.2–1.2)
BUN SERPL-MCNC: 5 MG/DL — LOW (ref 7–18)
CALCIUM SERPL-MCNC: 9.2 MG/DL — SIGNIFICANT CHANGE UP (ref 8.4–10.5)
CHLORIDE SERPL-SCNC: 107 MMOL/L — SIGNIFICANT CHANGE UP (ref 96–108)
CO2 SERPL-SCNC: 26 MMOL/L — SIGNIFICANT CHANGE UP (ref 22–31)
CREAT SERPL-MCNC: 0.58 MG/DL — SIGNIFICANT CHANGE UP (ref 0.5–1.3)
GLUCOSE BLDC GLUCOMTR-MCNC: 155 MG/DL — HIGH (ref 70–99)
GLUCOSE BLDC GLUCOMTR-MCNC: 174 MG/DL — HIGH (ref 70–99)
GLUCOSE BLDC GLUCOMTR-MCNC: 192 MG/DL — HIGH (ref 70–99)
GLUCOSE BLDC GLUCOMTR-MCNC: 268 MG/DL — HIGH (ref 70–99)
GLUCOSE SERPL-MCNC: 139 MG/DL — HIGH (ref 70–99)
HCT VFR BLD CALC: 30.9 % — LOW (ref 34.5–45)
HGB BLD-MCNC: 10.2 G/DL — LOW (ref 11.5–15.5)
MAGNESIUM SERPL-MCNC: 2.2 MG/DL — SIGNIFICANT CHANGE UP (ref 1.6–2.6)
MCHC RBC-ENTMCNC: 29.7 PG — SIGNIFICANT CHANGE UP (ref 27–34)
MCHC RBC-ENTMCNC: 33 GM/DL — SIGNIFICANT CHANGE UP (ref 32–36)
MCV RBC AUTO: 90.1 FL — SIGNIFICANT CHANGE UP (ref 80–100)
NRBC # BLD: 0 /100 WBCS — SIGNIFICANT CHANGE UP (ref 0–0)
PHOSPHATE SERPL-MCNC: 2.9 MG/DL — SIGNIFICANT CHANGE UP (ref 2.5–4.5)
PLATELET # BLD AUTO: 285 K/UL — SIGNIFICANT CHANGE UP (ref 150–400)
POTASSIUM SERPL-MCNC: 3.9 MMOL/L — SIGNIFICANT CHANGE UP (ref 3.5–5.3)
POTASSIUM SERPL-SCNC: 3.9 MMOL/L — SIGNIFICANT CHANGE UP (ref 3.5–5.3)
PROT SERPL-MCNC: 6.1 G/DL — SIGNIFICANT CHANGE UP (ref 6–8.3)
RBC # BLD: 3.43 M/UL — LOW (ref 3.8–5.2)
RBC # FLD: 13.1 % — SIGNIFICANT CHANGE UP (ref 10.3–14.5)
SARS-COV-2 RNA SPEC QL NAA+PROBE: DETECTED
SODIUM SERPL-SCNC: 141 MMOL/L — SIGNIFICANT CHANGE UP (ref 135–145)
WBC # BLD: 4.92 K/UL — SIGNIFICANT CHANGE UP (ref 3.8–10.5)
WBC # FLD AUTO: 4.92 K/UL — SIGNIFICANT CHANGE UP (ref 3.8–10.5)

## 2020-05-19 RX ORDER — POVIDONE-IODINE 5 %
1 AEROSOL (ML) TOPICAL DAILY
Refills: 0 | Status: DISCONTINUED | OUTPATIENT
Start: 2020-05-19 | End: 2020-05-23

## 2020-05-19 RX ADMIN — Medication 1 APPLICATION(S): at 11:44

## 2020-05-19 RX ADMIN — PIPERACILLIN AND TAZOBACTAM 25 GRAM(S): 4; .5 INJECTION, POWDER, LYOPHILIZED, FOR SOLUTION INTRAVENOUS at 13:23

## 2020-05-19 RX ADMIN — CHLORHEXIDINE GLUCONATE 1 APPLICATION(S): 213 SOLUTION TOPICAL at 11:44

## 2020-05-19 RX ADMIN — Medication 81 MILLIGRAM(S): at 11:40

## 2020-05-19 RX ADMIN — Medication 1: at 08:23

## 2020-05-19 RX ADMIN — Medication 3: at 12:14

## 2020-05-19 RX ADMIN — Medication 1: at 21:37

## 2020-05-19 RX ADMIN — PIPERACILLIN AND TAZOBACTAM 25 GRAM(S): 4; .5 INJECTION, POWDER, LYOPHILIZED, FOR SOLUTION INTRAVENOUS at 21:37

## 2020-05-19 RX ADMIN — Medication 1000 UNIT(S): at 11:40

## 2020-05-19 RX ADMIN — Medication 2 MILLIGRAM(S): at 21:38

## 2020-05-19 RX ADMIN — Medication 1 MILLIGRAM(S): at 11:41

## 2020-05-19 RX ADMIN — PIPERACILLIN AND TAZOBACTAM 25 GRAM(S): 4; .5 INJECTION, POWDER, LYOPHILIZED, FOR SOLUTION INTRAVENOUS at 05:04

## 2020-05-19 RX ADMIN — Medication 500 MILLIGRAM(S): at 11:43

## 2020-05-19 RX ADMIN — Medication 1: at 17:28

## 2020-05-19 RX ADMIN — SODIUM CHLORIDE 10 MILLILITER(S): 9 INJECTION INTRAMUSCULAR; INTRAVENOUS; SUBCUTANEOUS at 13:25

## 2020-05-19 RX ADMIN — PREGABALIN 1000 MICROGRAM(S): 225 CAPSULE ORAL at 11:40

## 2020-05-19 RX ADMIN — NYSTATIN CREAM 1 APPLICATION(S): 100000 CREAM TOPICAL at 17:28

## 2020-05-19 RX ADMIN — ENOXAPARIN SODIUM 40 MILLIGRAM(S): 100 INJECTION SUBCUTANEOUS at 11:45

## 2020-05-19 RX ADMIN — Medication 325 MILLIGRAM(S): at 11:40

## 2020-05-19 RX ADMIN — NYSTATIN CREAM 1 APPLICATION(S): 100000 CREAM TOPICAL at 05:15

## 2020-05-19 RX ADMIN — SIMVASTATIN 40 MILLIGRAM(S): 20 TABLET, FILM COATED ORAL at 21:38

## 2020-05-19 NOTE — PROGRESS NOTE ADULT - ATTENDING COMMENTS
HPI:  Patient is a 74 y.o F from [facility], non-ambulatory wheel chair bound with PMH of HTN, Diabetes, Osteoarthritis, Osteoporosis, Peripheral arterial disease, Diabetic neuropathy, HLD, Schizophrenia presented to Ed with worsening non healing heel ulcers. She reports having had the ulcer for about a year, recent got worse after she had bumped her foot on her wheelchair. 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 Patient refused surgery last admission and was discharged with local wound care. She reports daily dressing with betadine from her care provider. Pt denies any fever, chills , pain , cough , SOB,CP ,abd pain or any other symptoms. (11 May 2020 13:16)      #  OBTAIN IV ACCESS BY IR IN AM FOR IV . ZOSYN FOR TOTAL 6 WEEKS AT [facility] FOR LEFT CALCANEAL OSTEOMYELITIS.   # NONHEALING LEFT ANKLE ULCER , DIABETIC LEFT FOOT ULCER WITH PURULENT WOUND &  CELLULITIS, LEFT CALCANEAL OSTEOMYELITIS, LEFT FOOT ABSCESS  - WOUND CX SHOWING MORGANELLA, P. MIRABILIS - S/P I&D BY  PODIATRY. ON ZOSYN, ID CONSULTED. VASCULAR CONSULTED - TIMUR AND CT ANGIO SHOWED WORSENING MICROVASCULAR DISEASE OF LLE. PATIENT IS REFUSING SURGERY AND SHE IS NOT A CANDIDATE FOR SURGERY AT THIS TIME DUE TO COVID INFECTION. PATIENT IS COUNSELLED REGARDING AMPUTATION ( LEFT BKA )   #  PATIENT IS MODERATE SURGICAL RISK , BUT SURGERY WILL BE CANCELLED AT THIS TIME.   # COVID19 INFECTION, RPT NASAL SWAB PRIOR TO DC BACK TO [facility] FOR REHAB.   # PAD , MICROVASCULAR DISEASE OF B/L LOWER EXTREMITIES- TIMUR WITH WORSENED MICROVASCULAR DX -  POOR PROGNOSIS , WOUND MAY NOT HEEL DUE TO MULTIPLE RISK FACTORS.   #  DIABETIC PERIPHERAL NEUROPATHY  # CHRONIC NORMOCYTIC ANEMIA LIKELY S/T CHRONIC DISEASE  # HTN - WELL CONTROLLED, HLD, SCHIZOPHRENIA - STABLE   # DIABETES, UNCONTROLLED - IMPROVED GLYCEMIC CONTROL IN HOSPITAL  # GI AND DVT PPX    ELEN CASILLAS M.D. COVERING FOR FARHAN CASILLAS M.D.

## 2020-05-19 NOTE — PROGRESS NOTE ADULT - PROBLEM SELECTOR PLAN 6
pt on aspirin ,statin   Vascular Consult Dr Camacho -  reviewed  Patient is to be discharged to [facility] pending COVID results.

## 2020-05-19 NOTE — PROGRESS NOTE ADULT - SUBJECTIVE AND OBJECTIVE BOX
NP Note discussed with  Primary Attending      74 y.o female from [facility], non-ambulatory wheel chair bound with PMH of HTN, Diabetes, Osteoarthritis, Osteoporosis, Peripheral arterial disease, Diabetic neuropathy, HLD, Schizophrenia presented with worsening non healing left heel ulcers, getting worse  after recent injury. 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 Patient refused surgery last admission and was discharged with local wound care. bedside I&D was performed with  expression of purulence discharge. Wound care performed by podiatry team. Deep wound culture pending result IV antibiotics: Zosyn continued.  patient was seen today by  bedside. Patient had  MRI of left foot including the left ankle on 05/12/2020 that showed  cutaneous ulceration along the lateral aspect of the posterior calcaneus with associated calcaneal osteomyelitis.  results of the MRI were discussed with the patient in details.  all questions answered.   results of the MRI were also discussed with ID specialist.  Patient may require OR debridement with possible  partial calcanectomy as per podiatry  Pt is COVID +. patient is refusing surgery. she had PICC line placement on 05/15/2020. spoke to dr Wellington (ID) she will need 5  additional weeks of Zosyn 3.375 gram.       INTERVAL HPI/OVERNIGHT EVENTS: no new complaints.   05/18/2020  COVID-19 PCR: Detected. Patient cannot be discharged to the rehab.     MEDICATIONS  (STANDING):  ascorbic acid 500 milliGRAM(s) Oral daily  aspirin enteric coated 81 milliGRAM(s) Oral daily  chlorhexidine 2% Cloths 1 Application(s) Topical daily  cholecalciferol 1000 Unit(s) Oral daily  cyanocobalamin 1000 MICROGram(s) Oral daily  doxazosin 2 milliGRAM(s) Oral at bedtime  enoxaparin Injectable 40 milliGRAM(s) SubCutaneous daily  ferrous    sulfate 325 milliGRAM(s) Oral daily  folic acid 1 milliGRAM(s) Oral daily  insulin lispro (HumaLOG) corrective regimen sliding scale   SubCutaneous Before meals and at bedtime  nystatin Powder 1 Application(s) Topical two times a day  piperacillin/tazobactam IVPB.. 3.375 Gram(s) IV Intermittent every 8 hours  povidone iodine 10% Solution 1 Application(s) Topical daily  povidone iodine 10% Solution 1 Application(s) Topical daily  simvastatin 40 milliGRAM(s) Oral at bedtime    MEDICATIONS  (PRN):  acetaminophen   Tablet .. 650 milliGRAM(s) Oral every 6 hours PRN Temp greater or equal to 38C (100.4F), Mild Pain (1 - 3)  ALBUTerol    90 MICROgram(s) HFA Inhaler 2 Puff(s) Inhalation every 4 hours PRN Shortness of Breath and/or Wheezing  guaifenesin/dextromethorphan  Syrup 10 milliLiter(s) Oral every 4 hours PRN Cough  ondansetron Injectable 4 milliGRAM(s) IV Push every 6 hours PRN Nausea and/or Vomiting  sodium chloride 0.9% lock flush 10 milliLiter(s) IV Push every 1 hour PRN Pre/post blood products, medications, blood draw, and to maintain line patency      __________________________________________________  REVIEW OF SYSTEMS:    CONSTITUTIONAL: No fever,   EYES: no acute visual disturbances  NECK: No pain or stiffness  RESPIRATORY: No cough; No shortness of breath  CARDIOVASCULAR: No chest pain, no palpitations  GASTROINTESTINAL: No pain. No nausea or vomiting; No diarrhea   NEUROLOGICAL: No headache or numbness, no tremors  MUSCULOSKELETAL: No joint pain, no muscle pain  GENITOURINARY: no dysuria, no frequency, no hesitancy  PSYCHIATRY: no depression , no anxiety  ALL OTHER  ROS negative        Vital Signs Last 24 Hrs  T(C): 36.5 (19 May 2020 04:55), Max: 37 (18 May 2020 20:27)  T(F): 97.7 (19 May 2020 04:55), Max: 98.6 (18 May 2020 20:27)  HR: 74 (19 May 2020 04:55) (70 - 78)  BP: 134/40 (19 May 2020 04:55) (111/65 - 147/55)  BP(mean): --  RR: 16 (19 May 2020 04:55) (16 - 17)  SpO2: 100% (19 May 2020 04:55) (100% - 100%)    ________________________________________________  PHYSICAL EXAM:  CONSTITUTIONAL: NAD, well-developed, well-groomed  RESPIRATORY: Normal respiratory effort; lungs are clear to auscultation bilaterally  CARDIOVASCULAR: Regular rate and rhythm,   ABDOMEN: Nontender to palpation, normoactive bowel sounds, no rebound/guarding;   PSYCH: A+O to person, place, and time; affect appropriate  NEUROLOGY: CN 2-12 are intact and symmetric; no gross sensory deficits   SKIN: No rashes; no palpable lesions  Left food wound dressing dry and intact     _________________________________________________  LABS:                        10.2   4.92  )-----------( 285      ( 19 May 2020 06:39 )             30.9     05-19    141  |  107  |  5<L>  ----------------------------<  139<H>  3.9   |  26  |  0.58    Ca    9.2      19 May 2020 06:39  Phos  2.9     05-19  Mg     2.2     05-19    TPro  6.1  /  Alb  2.5<L>  /  TBili  0.5  /  DBili  x   /  AST  10  /  ALT  11  /  AlkPhos  54  05-19        CAPILLARY BLOOD GLUCOSE      POCT Blood Glucose.: 155 mg/dL (19 May 2020 08:11)  POCT Blood Glucose.: 188 mg/dL (18 May 2020 21:08)  POCT Blood Glucose.: 257 mg/dL (18 May 2020 16:36)  POCT Blood Glucose.: 240 mg/dL (18 May 2020 12:02)        RADIOLOGY & ADDITIONAL TESTS:    Imaging  Reviewed:  YES/NO    Consultant(s) Notes Reviewed:   YES/ No      Plan of care was discussed with patient and /or primary care giver; all questions and concerns were addressed NP Note discussed with  Primary Attending    74 y.o female from [facility], non-ambulatory wheel chair bound with PMH of HTN, Diabetes, Osteoarthritis, Osteoporosis, Peripheral arterial disease, Diabetic neuropathy, HLD, Schizophrenia presented with worsening non healing left heel ulcers, getting worse  after recent injury. 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 Patient refused surgery last admission and was discharged with local wound care. bedside I&D was performed with  expression of purulence discharge. Wound care performed by podiatry team. Deep wound culture pending result IV antibiotics: Zosyn continued.  patient was seen today by  bedside. Patient had  MRI of left foot including the left ankle on 05/12/2020 that showed  cutaneous ulceration along the lateral aspect of the posterior calcaneus with associated calcaneal osteomyelitis.  results of the MRI were discussed with the patient in details.  all questions answered.   results of the MRI were also discussed with ID specialist.  Patient may require OR debridement with possible  partial calcanectomy as per podiatry  Pt is COVID +. patient is refusing surgery. she had PICC line placement on 05/15/2020. spoke to dr Wellington (ID) she will need 5  additional weeks of Zosyn 3.375 gram.     TIMUR results Extremity Lower 3+ Level, BI (05.13.20 @ 20:07) >  No Doppler evidence of hemodynamically significant arterial inflow limitation in either lower extremity. Findings suggestive of small vessel disease in the feet.       INTERVAL HPI/OVERNIGHT EVENTS: no new complaints

## 2020-05-20 LAB
ALBUMIN SERPL ELPH-MCNC: 2.5 G/DL — LOW (ref 3.5–5)
ALP SERPL-CCNC: 52 U/L — SIGNIFICANT CHANGE UP (ref 40–120)
ALT FLD-CCNC: 10 U/L DA — SIGNIFICANT CHANGE UP (ref 10–60)
ANION GAP SERPL CALC-SCNC: 7 MMOL/L — SIGNIFICANT CHANGE UP (ref 5–17)
AST SERPL-CCNC: 10 U/L — SIGNIFICANT CHANGE UP (ref 10–40)
BILIRUB SERPL-MCNC: 0.6 MG/DL — SIGNIFICANT CHANGE UP (ref 0.2–1.2)
BUN SERPL-MCNC: 5 MG/DL — LOW (ref 7–18)
CALCIUM SERPL-MCNC: 9.1 MG/DL — SIGNIFICANT CHANGE UP (ref 8.4–10.5)
CHLORIDE SERPL-SCNC: 107 MMOL/L — SIGNIFICANT CHANGE UP (ref 96–108)
CO2 SERPL-SCNC: 25 MMOL/L — SIGNIFICANT CHANGE UP (ref 22–31)
CREAT SERPL-MCNC: 0.59 MG/DL — SIGNIFICANT CHANGE UP (ref 0.5–1.3)
GLUCOSE BLDC GLUCOMTR-MCNC: 124 MG/DL — HIGH (ref 70–99)
GLUCOSE BLDC GLUCOMTR-MCNC: 136 MG/DL — HIGH (ref 70–99)
GLUCOSE BLDC GLUCOMTR-MCNC: 164 MG/DL — HIGH (ref 70–99)
GLUCOSE BLDC GLUCOMTR-MCNC: 191 MG/DL — HIGH (ref 70–99)
GLUCOSE SERPL-MCNC: 133 MG/DL — HIGH (ref 70–99)
HCT VFR BLD CALC: 29.2 % — LOW (ref 34.5–45)
HGB BLD-MCNC: 9.7 G/DL — LOW (ref 11.5–15.5)
MAGNESIUM SERPL-MCNC: 2.2 MG/DL — SIGNIFICANT CHANGE UP (ref 1.6–2.6)
MCHC RBC-ENTMCNC: 30 PG — SIGNIFICANT CHANGE UP (ref 27–34)
MCHC RBC-ENTMCNC: 33.2 GM/DL — SIGNIFICANT CHANGE UP (ref 32–36)
MCV RBC AUTO: 90.4 FL — SIGNIFICANT CHANGE UP (ref 80–100)
NRBC # BLD: 0 /100 WBCS — SIGNIFICANT CHANGE UP (ref 0–0)
PHOSPHATE SERPL-MCNC: 2.6 MG/DL — SIGNIFICANT CHANGE UP (ref 2.5–4.5)
PLATELET # BLD AUTO: 255 K/UL — SIGNIFICANT CHANGE UP (ref 150–400)
POTASSIUM SERPL-MCNC: 3.8 MMOL/L — SIGNIFICANT CHANGE UP (ref 3.5–5.3)
POTASSIUM SERPL-SCNC: 3.8 MMOL/L — SIGNIFICANT CHANGE UP (ref 3.5–5.3)
PROT SERPL-MCNC: 5.8 G/DL — LOW (ref 6–8.3)
RBC # BLD: 3.23 M/UL — LOW (ref 3.8–5.2)
RBC # FLD: 13.2 % — SIGNIFICANT CHANGE UP (ref 10.3–14.5)
SARS-COV-2 RNA SPEC QL NAA+PROBE: DETECTED
SODIUM SERPL-SCNC: 139 MMOL/L — SIGNIFICANT CHANGE UP (ref 135–145)
WBC # BLD: 4.91 K/UL — SIGNIFICANT CHANGE UP (ref 3.8–10.5)
WBC # FLD AUTO: 4.91 K/UL — SIGNIFICANT CHANGE UP (ref 3.8–10.5)

## 2020-05-20 RX ADMIN — PIPERACILLIN AND TAZOBACTAM 25 GRAM(S): 4; .5 INJECTION, POWDER, LYOPHILIZED, FOR SOLUTION INTRAVENOUS at 05:52

## 2020-05-20 RX ADMIN — Medication 2 MILLIGRAM(S): at 21:15

## 2020-05-20 RX ADMIN — PIPERACILLIN AND TAZOBACTAM 25 GRAM(S): 4; .5 INJECTION, POWDER, LYOPHILIZED, FOR SOLUTION INTRAVENOUS at 13:22

## 2020-05-20 RX ADMIN — Medication 1: at 17:05

## 2020-05-20 RX ADMIN — NYSTATIN CREAM 1 APPLICATION(S): 100000 CREAM TOPICAL at 17:02

## 2020-05-20 RX ADMIN — Medication 1 MILLIGRAM(S): at 11:33

## 2020-05-20 RX ADMIN — Medication 1: at 12:02

## 2020-05-20 RX ADMIN — CHLORHEXIDINE GLUCONATE 1 APPLICATION(S): 213 SOLUTION TOPICAL at 12:02

## 2020-05-20 RX ADMIN — Medication 500 MILLIGRAM(S): at 12:10

## 2020-05-20 RX ADMIN — PIPERACILLIN AND TAZOBACTAM 25 GRAM(S): 4; .5 INJECTION, POWDER, LYOPHILIZED, FOR SOLUTION INTRAVENOUS at 21:15

## 2020-05-20 RX ADMIN — Medication 1000 UNIT(S): at 11:33

## 2020-05-20 RX ADMIN — Medication 1 APPLICATION(S): at 11:34

## 2020-05-20 RX ADMIN — Medication 81 MILLIGRAM(S): at 11:33

## 2020-05-20 RX ADMIN — SIMVASTATIN 40 MILLIGRAM(S): 20 TABLET, FILM COATED ORAL at 21:22

## 2020-05-20 RX ADMIN — ENOXAPARIN SODIUM 40 MILLIGRAM(S): 100 INJECTION SUBCUTANEOUS at 11:33

## 2020-05-20 RX ADMIN — NYSTATIN CREAM 1 APPLICATION(S): 100000 CREAM TOPICAL at 05:52

## 2020-05-20 RX ADMIN — Medication 325 MILLIGRAM(S): at 11:33

## 2020-05-20 RX ADMIN — PREGABALIN 1000 MICROGRAM(S): 225 CAPSULE ORAL at 11:33

## 2020-05-20 NOTE — PROGRESS NOTE ADULT - SUBJECTIVE AND OBJECTIVE BOX
Patient is a 74y old  Female who presents with a chief complaint of worsening Left ankle wound    Podiatry Interval HPI: Patient seen this AM at bedside with dressing intact to the left foot. She offers no new complaint. She is AAOx3 in NAD. Patient received PICC line and has tested positive for COVID-19. Pending discharge. Previous obtained written refusal to surgery, local wound care only. Patient denies pain, and refused again today to having packing inserted in the wound. She denies f/c/n/v or SOB. She remains afebrile, wbc normal limit.    HPI:  Patient is a 74 y.o F from [facility], non-ambulatory wheel chair bound with PMH of HTN, Diabetes, Osteoarthritis, Osteoporosis, Peripheral arterial disease, Diabetic neuropathy, HLD, Schizophrenia presented to Ed with worsening non healing heel ulcers. She reports having had the ulcer for about a year, recent got worse after she had bumped her foot on her wheelchair. 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 Patient refused surgery last admission and was discharged with local wound care. She reports daily dressing with betadine from her care provider. Pt denies any fever, chills , pain , cough , SOB,CP ,abd pain or any other symptoms. (11 May 2020 13:16)      Podiatry HPI: Patient is a 74 y.o F consulted by ED attending for left ankle wound. Patient is from [facility], reports non-ambulatory wheel chair bound with PMH of HTN, Diabetes, Osteoarthritis, Osteoporosis, Peripheral arterial disease, Diabetic neuropathy, HLD, Schizophrenia presented to Ed with worsening non healing heel ulcers. She reports having had the ulcer for about a year, recent got worse after she had bumped her foot on her wheelchair. 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 Patient refused surgery last admission and was discharged with local wound care. She reports daily dressing with betadine from her care provider. Reports some pain to the left ankle, denies fever, chills, nausea or vomiting. wbc 11.74, afebrile.     PMH:DM (diabetes mellitus)  Paranoid schizophrenia  HLD (hyperlipidemia)  PAD (peripheral artery disease)  HTN (hypertension)    Allergies: No Known Allergies      MEDICATIONS  (STANDING):  ascorbic acid 500 milliGRAM(s) Oral daily  aspirin enteric coated 81 milliGRAM(s) Oral daily  chlorhexidine 2% Cloths 1 Application(s) Topical daily  cholecalciferol 1000 Unit(s) Oral daily  cyanocobalamin 1000 MICROGram(s) Oral daily  doxazosin 2 milliGRAM(s) Oral at bedtime  enoxaparin Injectable 40 milliGRAM(s) SubCutaneous daily  ferrous    sulfate 325 milliGRAM(s) Oral daily  folic acid 1 milliGRAM(s) Oral daily  insulin lispro (HumaLOG) corrective regimen sliding scale   SubCutaneous Before meals and at bedtime  nystatin Powder 1 Application(s) Topical two times a day  piperacillin/tazobactam IVPB.. 3.375 Gram(s) IV Intermittent every 8 hours  povidone iodine 10% Solution 1 Application(s) Topical daily  simvastatin 40 milliGRAM(s) Oral at bedtime    MEDICATIONS  (PRN):  acetaminophen   Tablet .. 650 milliGRAM(s) Oral every 6 hours PRN Temp greater or equal to 38C (100.4F), Mild Pain (1 - 3)  ALBUTerol    90 MICROgram(s) HFA Inhaler 2 Puff(s) Inhalation every 4 hours PRN Shortness of Breath and/or Wheezing  guaifenesin/dextromethorphan  Syrup 10 milliLiter(s) Oral every 4 hours PRN Cough  ondansetron Injectable 4 milliGRAM(s) IV Push every 6 hours PRN Nausea and/or Vomiting  sodium chloride 0.9% lock flush 10 milliLiter(s) IV Push every 1 hour PRN Pre/post blood products, medications, blood draw, and to maintain line patency      FH: No pertinent family history in first degree relatives    PSX: No significant past surgical history    SH:     Labs:                          9.8                          9.7    4.91  )-----------( 255      ( 20 May 2020 07:22 )             29.2       05-20    139  |  107  |  5<L>  ----------------------------<  133<H>  3.8   |  25  |  0.59    Ca    9.1      20 May 2020 07:22  Phos  2.6     05-20  Mg     2.2     05-20    TPro  5.8<L>  /  Alb  2.5<L>  /  TBili  0.6  /  DBili  x   /  AST  10  /  ALT  10  /  AlkPhos  52  05-20      Vital Signs Last 24 Hrs  T(C): 36.4 (20 May 2020 05:34), Max: 37.7 (19 May 2020 20:08)  T(F): 97.5 (20 May 2020 05:34), Max: 99.8 (19 May 2020 20:08)  HR: 79 (20 May 2020 05:34) (70 - 87)  BP: 113/47 (20 May 2020 05:34) (113/47 - 141/55)  BP(mean): --  RR: 16 (20 May 2020 05:34) (16 - 17)  SpO2: 99% (20 May 2020 05:34) (99% - 100%)    Sedimentation Rate, Erythrocyte: 51 mm/Hr (05-12-20 @ 09:46)  Sedimentation Rate, Erythrocyte: 88 mm/Hr (05-11-20 @ 09:31)      C-Reactive Protein, Serum: 0.80 mg/dL (05-17-20 @ 14:49)  C-Reactive Protein, Serum: 4.38 mg/dL (05-12-20 @ 14:13)  C-Reactive Protein, Serum: 2.74 mg/dL (05-11-20 @ 18:31)                     ROS  REVIEW OF SYSTEMS: Per HPI          PHYSICAL EXAM  GEN: TITO ORLANDO is a pleasant well-nourished, well developed 74y Female in no acute distress, alert awake, and oriented to person, place and time.   LE Focused:    Vasc: DP/PT b/l faintly palpable, moderate edema noted to the left foot compared to the right, increased warmth to left foot. Pedal hair absent.   Derm: open lesion noted to lateral posterior heel, 1.2cm x 1cm x 1.5cm deep, surrounding maceration, with peeling skin.  Wound probes to bone, tracks anteriorly and proximally about 5cm. Patient refused manual expression of purulence. Necrotic eschar noted proximal to the open wound now open, measures 1.5cm x 0.8cm x 1.0cm, fibrotic tissue removed with dressing removal. small fluctuance to the lateral ankle noted. No open lesions noted to the right lower extremity.   Neuro: Protective sensation diminished.   MSK: Passive ROM pain free at the ankle, pain to palpation at wound site and with manual expression of purulence.     Imaging:   EXAM:  FOOT LEFT (MINIMUM 3 VIEWS)                          EXAM:  ANKLE LEFT (MINIMUM 3 V)                            PROCEDURE DATE:  05/11/2020      INTERPRETATION:  Left ankle and left foot. Patient has a lateral drain wound on the ankle.    Left ankle. 3 views.    There is rather mild degeneration of the malleolar tips.    There are posterior and inferior calcaneal spurs.    No bone destruction or fracture is evident.    Arterial calcifications are noted.    Left foot. 3 views. There is a mild hallux valgus with mild local degeneration.    Arterial calcifications are noted.    No bone destruction or fracture is evident.    IMPRESSION: No acute bony finding. Degeneration particularly at the first MP joint and vascular calcification noted.    ADITI SHELL M.D., ATTENDING RADIOLOGIST  This document has been electronically signed. May 11 2020  9:34AM      -------------------------------------------------------------------------------------------------------------------------------  PROCEDURE DATE:  05/13/2020        INTERPRETATION:  Clinical information: Diabetes, hypertension, hyperlipidemia, peripheral arterial disease. Left lower extremity osteomyelitis. Chronic heel ulcers bilaterally.    Comparison: Lower extremity arterial Doppler study dated 9/10/2019.    Technique: Lower extremity arterial Doppler study. Segmental pressures were not obtained at the thigh level.    Findings: Ankle brachial index measures 1.41 on the right and 1.36 on the left, compared with prior values of 1.34 and 1.26, respectively. No segmental arterial pressure gradient is present.    PVR waveforms are normal in amplitude and mildly diminished in pulsatility at the thigh through the ankle levels bilaterally. Waveforms are diminished in amplitude at the metatarsal and digital levels bilaterally, to a greater degree than on the prior study.    Impression: No Doppler evidence of hemodynamically significant arterial inflow limitation in either lower extremity.    Findings suggestive of small vessel disease in the feet.        SOHAN PA M.D., ATTENDING RADIOLOGIST  This document has been electronically signed. May 14 2020 10:43AM       ------------------------------------------------------------------------------------------------------------------------------------------------------------------------------------------------------------------------------------------------  MRI    EXAM:  MR ANKLE LT                          PROCEDURE DATE:  05/12/2020      INTERPRETATION:  EXAMINATION: MRI of the left ankle without contrast    CLINICAL INFORMATION: Skin ulcers. Evaluate for osteomyelitis.    TECHNIQUE: Multiplanar, multisequential MR imaging was performed.    FINDINGS: There is a cutaneous ulceration along the lateral aspect of the posterior calcaneus that extends down to the level of bone. There is high T2 and low T1 marrow signal throughout the majority of the calcaneus, sparing only the far anterior aspect of the calcaneus, consistent with osteomyelitis. There is also a confluent region of high T2 signal in the posterolateral subcutaneous tissues at the level of the ankle and hindfoot that may represent confluent phlegmon and/or abscess (evaluation is limited without contrast). There are also small foci of air in the soft tissues at this level. There are no other areas of marrow signal alteration that are suspicious for osteomyelitis. There is mild arthrosis at several tarsometatarsal articulations. There is fatty atrophy and high T2 signal of the foot musculature, suggestive of denervation.    IMPRESSION: Cutaneous ulceration along the lateral aspect of the posterior calcaneus with associated calcaneal osteomyelitis.   Confluent associated signal alteration in the posterolateral subcutaneous tissues at the level of the ankle and hindfoot may represent confluent phlegmonous and/or abscess (evaluation is limited without contrast).      BECKA GRADY M.D., ATTENDING RADIOLOGIST  This document has been electronically signed. May 12 2020  7:28PM    -------------------------------------------------------------------------------------------------------------------------------------------------------------------------------------    Cultures:   Culture - Surgical Swab (05.11.20 @ 15:04)    -  Amikacin: S <=16    -  Amikacin: S <=16    -  Amoxicillin/Clavulanic Acid: R >16/8    -  Amoxicillin/Clavulanic Acid: S <=8/4    -  Ampicillin: R >16 These ampicillin results predict results for amoxicillin    -  Ampicillin: S <=8 These ampicillin results predict results for amoxicillin    -  Ampicillin/Sulbactam: S <=4/2 Enterobacter, Citrobacter, and Serratia may develop resistance during prolonged therapy (3-4 days)    -  Aztreonam: S <=4    -  Aztreonam: S <=4    -  Cefazolin: R >16 Enterobacter, Citrobacter, and Serratia may develop resistance during prolonged therapy (3-4 days)    -  Cefazolin: S <=2 Enterobacter, Citrobacter, and Serratia may develop resistance during prolonged therapy (3-4 days)    -  Cefepime: S <=2    -  Cefepime: S <=2    -  Cefoxitin: S <=8    -  Cefoxitin: S <=8    -  Ceftriaxone: R 8 Enterobacter, Citrobacter, and Serratia may develop resistance during prolonged therapy    -  Ceftriaxone: S <=1 Enterobacter, Citrobacter, and Serratia may develop resistance during prolonged therapy    -  Ciprofloxacin: I 0.5    -  Ciprofloxacin: S <=0.25    -  Ertapenem: S <=0.5    -  Ertapenem: S <=0.5    -  Ampicillin/Sulbactam: R >16/8 Enterobacter, Citrobacter, and Serratia may develop resistance during prolonged therapy (3-4 days)    -  Gentamicin: S <=2    -  Gentamicin: S <=2    -  Levofloxacin: R 2    -  Levofloxacin: S <=0.5    -  Meropenem: S <=1    -  Meropenem: S <=1    -  Piperacillin/Tazobactam: S <=8    -  Piperacillin/Tazobactam: S <=8    -  Tobramycin: S <=2    -  Tobramycin: S <=2    -  Trimethoprim/Sulfamethoxazole: R >2/38    -  Trimethoprim/Sulfamethoxazole: S <=0.5/9.5    -  Imipenem: S <=1    Specimen Source: .Surgical Swab left lateral ankle wound deep    Culture Results:   Moderate Proteus mirabilis  Moderate Morganella morganii #2  Moderate Bacteroides fragilis Susceptibilites not performed.    Organism Identification: Morganella morganii  Proteus mirabilis  Bacteroides fragilis  Bacteroides fragilis    Organism: Morganella morganii    Organism: Proteus mirabilis    Organism: Bacteroides fragilis    Organism: Bacteroides fragilis    Method Type: RICARDO    Method Type: RICARDO    Method Type: RICARDO    Method Type: RICARDO      A: Left heel wound     Cellulitis    OM of the left calcaneus    P:   Chart reviewed and Patient evaluated  Discussed diagnosis and treatment with patient  Wound flush with normal saline.   Wound dressed with betadine DSD, as patient refused packing strips to be inserted.   Continue with IV antibiotics, f/u ID rec PICC line with Zosyn  TIMUR reviewed as above, vascular input appreciated, Pt. refused CTA  Non-weight bearing  to left foot  Offloading to bilateral Heels while in bed  MRI reviewed, OM of calcaneous  written refusal to surgery documented  No packing as patient does not allow it.   No podiatry intervention.   continue local wound care.   Patient may follow up outpatient for wound care.   Discussed with Attending Dr. Lane      Wound care: apply betadine soaked gauze cover with ABD, wrap with eleno and secure with ACE bandage. Daily dressing change.

## 2020-05-20 NOTE — PROGRESS NOTE ADULT - SUBJECTIVE AND OBJECTIVE BOX
NP Note discussed with  Primary Attending      74 y.o female from [facility], non-ambulatory wheel chair bound with PMH of HTN, Diabetes, Osteoarthritis, Osteoporosis, Peripheral arterial disease, Diabetic neuropathy, HLD, Schizophrenia presented with worsening non healing left heel ulcers, getting worse  after recent injury. 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 Patient refused surgery last admission and was discharged with local wound care. bedside I&D was performed with  expression of purulence discharge. Wound care performed by podiatry team. Deep wound culture pending result IV antibiotics: Zosyn continued.  patient was seen today by  bedside. Patient had  MRI of left foot including the left ankle on 05/12/2020 that showed  cutaneous ulceration along the lateral aspect of the posterior calcaneus with associated calcaneal osteomyelitis.  results of the MRI were discussed with the patient in details.  all questions answered.   results of the MRI were also discussed with ID specialist.  Patient may require OR debridement with possible  partial calcanectomy as per podiatry  Pt is COVID +. patient is refusing surgery. she had PICC line placement on 05/15/2020. spoke to dr Wellington (ID) she will need 5  additional weeks of Zosyn 3.375 gram.       INTERVAL HPI/OVERNIGHT EVENTS: no new complaints.   05/18/2020  COVID-19 PCR: Detected. Patient cannot be discharged to the rehab until negative x2           MEDICATIONS  (STANDING):  ascorbic acid 500 milliGRAM(s) Oral daily  aspirin enteric coated 81 milliGRAM(s) Oral daily  chlorhexidine 2% Cloths 1 Application(s) Topical daily  cholecalciferol 1000 Unit(s) Oral daily  cyanocobalamin 1000 MICROGram(s) Oral daily  doxazosin 2 milliGRAM(s) Oral at bedtime  enoxaparin Injectable 40 milliGRAM(s) SubCutaneous daily  ferrous    sulfate 325 milliGRAM(s) Oral daily  folic acid 1 milliGRAM(s) Oral daily  insulin lispro (HumaLOG) corrective regimen sliding scale   SubCutaneous Before meals and at bedtime  nystatin Powder 1 Application(s) Topical two times a day  piperacillin/tazobactam IVPB.. 3.375 Gram(s) IV Intermittent every 8 hours  povidone iodine 10% Solution 1 Application(s) Topical daily  simvastatin 40 milliGRAM(s) Oral at bedtime    MEDICATIONS  (PRN):  acetaminophen   Tablet .. 650 milliGRAM(s) Oral every 6 hours PRN Temp greater or equal to 38C (100.4F), Mild Pain (1 - 3)  ALBUTerol    90 MICROgram(s) HFA Inhaler 2 Puff(s) Inhalation every 4 hours PRN Shortness of Breath and/or Wheezing  guaifenesin/dextromethorphan  Syrup 10 milliLiter(s) Oral every 4 hours PRN Cough  ondansetron Injectable 4 milliGRAM(s) IV Push every 6 hours PRN Nausea and/or Vomiting  sodium chloride 0.9% lock flush 10 milliLiter(s) IV Push every 1 hour PRN Pre/post blood products, medications, blood draw, and to maintain line patency      __________________________________________________  REVIEW OF SYSTEMS:    CONSTITUTIONAL: No fever,   EYES: no acute visual disturbances  NECK: No pain or stiffness  RESPIRATORY: No cough; No shortness of breath  CARDIOVASCULAR: No chest pain, no palpitations  GASTROINTESTINAL: No pain. No nausea or vomiting; No diarrhea   NEUROLOGICAL: No headache or numbness, no tremors  MUSCULOSKELETAL: No joint pain, no muscle pain  GENITOURINARY: no dysuria, no frequency, no hesitancy  PSYCHIATRY: no depression , no anxiety  ALL OTHER  ROS negative        Vital Signs Last 24 Hrs  T(C): 36.4 (20 May 2020 05:34), Max: 37.7 (19 May 2020 20:08)  T(F): 97.5 (20 May 2020 05:34), Max: 99.8 (19 May 2020 20:08)  HR: 79 (20 May 2020 05:34) (70 - 87)  BP: 113/47 (20 May 2020 05:34) (113/47 - 141/55)  BP(mean): --  RR: 16 (20 May 2020 05:34) (16 - 17)  SpO2: 99% (20 May 2020 05:34) (99% - 100%)    ________________________________________________  PHYSICAL EXAM:  CONSTITUTIONAL: NAD, well-developed, well-groomed  RESPIRATORY: Normal respiratory effort; lungs are clear to auscultation bilaterally  CARDIOVASCULAR: Regular rate and rhythm,   ABDOMEN: Nontender to palpation, normoactive bowel sounds, no rebound/guarding;   PSYCH: A+O to person, place, and time; affect appropriate  NEUROLOGY: CN 2-12 are intact and symmetric; no gross sensory deficits   SKIN: No rashes; no palpable lesions  Left food wound dressing dry and intact     _________________________________________________  LABS:                        9.7    4.91  )-----------( 255      ( 20 May 2020 07:22 )             29.2     05-20    139  |  107  |  5<L>  ----------------------------<  133<H>  3.8   |  25  |  0.59    Ca    9.1      20 May 2020 07:22  Phos  2.6     05-20  Mg     2.2     05-20    TPro  5.8<L>  /  Alb  2.5<L>  /  TBili  0.6  /  DBili  x   /  AST  10  /  ALT  10  /  AlkPhos  52  05-20        CAPILLARY BLOOD GLUCOSE      POCT Blood Glucose.: 191 mg/dL (20 May 2020 11:37)  POCT Blood Glucose.: 136 mg/dL (20 May 2020 08:01)  POCT Blood Glucose.: 174 mg/dL (19 May 2020 21:20)  POCT Blood Glucose.: 192 mg/dL (19 May 2020 17:09)        RADIOLOGY & ADDITIONAL TESTS:    Imaging  Reviewed:  YES/NO    Consultant(s) Notes Reviewed:   YES/ No      Plan of care was discussed with patient and /or primary care giver; all questions and concerns were addressed

## 2020-05-20 NOTE — PROGRESS NOTE ADULT - PROBLEM SELECTOR PLAN 1
MRI of left foot including the left ankle on 05/12/2020  -cutaneous ulceration along the lateral aspect of the posterior calcaneus with associated calcaneal osteomyelitis.  sp bedside debridement by podiatry   patient refuses surgery . PICC line placed on 05/15/2020 . continue zosyn  for 5 additional weeks as per ID    ID Dr Wellington

## 2020-05-20 NOTE — PROGRESS NOTE ADULT - ATTENDING COMMENTS
HPI:  Patient is a 74 y.o F from [facility], non-ambulatory wheel chair bound with PMH of HTN, Diabetes, Osteoarthritis, Osteoporosis, Peripheral arterial disease, Diabetic neuropathy, HLD, Schizophrenia presented to Ed with worsening non healing heel ulcers. She reports having had the ulcer for about a year, recent got worse after she had bumped her foot on her wheelchair. 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 Patient refused surgery last admission and was discharged with local wound care. She reports daily dressing with betadine from her care provider. Pt denies any fever, chills , pain , cough , SOB,CP ,abd pain or any other symptoms. (11 May 2020 13:16)      #  OBTAIN IV ACCESS BY IR IN AM FOR IV . ZOSYN FOR TOTAL 6 WEEKS AT [facility] FOR LEFT CALCANEAL OSTEOMYELITIS.   # NONHEALING LEFT ANKLE ULCER , DIABETIC LEFT FOOT ULCER WITH PURULENT WOUND &  CELLULITIS, LEFT CALCANEAL OSTEOMYELITIS, LEFT FOOT ABSCESS  - WOUND CX SHOWING MORGANELLA, P. MIRABILIS - S/P I&D BY  PODIATRY. ON ZOSYN, ID CONSULTED. VASCULAR CONSULTED - TIMUR AND CT ANGIO SHOWED WORSENING MICROVASCULAR DISEASE OF LLE. PATIENT IS REFUSING SURGERY AND SHE IS NOT A CANDIDATE FOR SURGERY AT THIS TIME DUE TO COVID INFECTION. PATIENT IS COUNSELLED REGARDING AMPUTATION ( LEFT BKA )   #  PATIENT IS MODERATE SURGICAL RISK , BUT SURGERY WILL BE CANCELLED AT THIS TIME.   # COVID19 INFECTION, RPT NASAL SWAB PRIOR TO DC BACK TO [facility] FOR REHAB.   # PAD , MICROVASCULAR DISEASE OF B/L LOWER EXTREMITIES- TIMUR WITH WORSENED MICROVASCULAR DX -  POOR PROGNOSIS , WOUND MAY NOT HEEL DUE TO MULTIPLE RISK FACTORS.   #  DIABETIC PERIPHERAL NEUROPATHY  # CHRONIC NORMOCYTIC ANEMIA LIKELY S/T CHRONIC DISEASE  # HTN - WELL CONTROLLED, HLD, SCHIZOPHRENIA - STABLE   # DIABETES, UNCONTROLLED - IMPROVED GLYCEMIC CONTROL IN HOSPITAL  # GI AND DVT PPX    ELEN CASILLAS M.D. COVERING FOR FARHAN CASILLAS M.D.

## 2020-05-20 NOTE — PROGRESS NOTE ADULT - PROBLEM SELECTOR PLAN 6
pt on aspirin ,statin   Vascular Consult Dr Camacho -  reviewed  Patient is to be discharged to [facility] pending COVID results.

## 2020-05-21 LAB
GLUCOSE BLDC GLUCOMTR-MCNC: 117 MG/DL — HIGH (ref 70–99)
GLUCOSE BLDC GLUCOMTR-MCNC: 133 MG/DL — HIGH (ref 70–99)
GLUCOSE BLDC GLUCOMTR-MCNC: 146 MG/DL — HIGH (ref 70–99)
GLUCOSE BLDC GLUCOMTR-MCNC: 179 MG/DL — HIGH (ref 70–99)
GLUCOSE BLDC GLUCOMTR-MCNC: 211 MG/DL — HIGH (ref 70–99)
HCT VFR BLD CALC: 29.8 % — LOW (ref 34.5–45)
HGB BLD-MCNC: 9.8 G/DL — LOW (ref 11.5–15.5)
MCHC RBC-ENTMCNC: 29.5 PG — SIGNIFICANT CHANGE UP (ref 27–34)
MCHC RBC-ENTMCNC: 32.9 GM/DL — SIGNIFICANT CHANGE UP (ref 32–36)
MCV RBC AUTO: 89.8 FL — SIGNIFICANT CHANGE UP (ref 80–100)
NRBC # BLD: 0 /100 WBCS — SIGNIFICANT CHANGE UP (ref 0–0)
PLATELET # BLD AUTO: 229 K/UL — SIGNIFICANT CHANGE UP (ref 150–400)
RBC # BLD: 3.32 M/UL — LOW (ref 3.8–5.2)
RBC # FLD: 13.3 % — SIGNIFICANT CHANGE UP (ref 10.3–14.5)
SARS-COV-2 RNA SPEC QL NAA+PROBE: SIGNIFICANT CHANGE UP
WBC # BLD: 4.62 K/UL — SIGNIFICANT CHANGE UP (ref 3.8–10.5)
WBC # FLD AUTO: 4.62 K/UL — SIGNIFICANT CHANGE UP (ref 3.8–10.5)

## 2020-05-21 RX ADMIN — Medication 2: at 12:19

## 2020-05-21 RX ADMIN — NYSTATIN CREAM 1 APPLICATION(S): 100000 CREAM TOPICAL at 17:19

## 2020-05-21 RX ADMIN — PIPERACILLIN AND TAZOBACTAM 25 GRAM(S): 4; .5 INJECTION, POWDER, LYOPHILIZED, FOR SOLUTION INTRAVENOUS at 13:42

## 2020-05-21 RX ADMIN — Medication 650 MILLIGRAM(S): at 21:40

## 2020-05-21 RX ADMIN — PIPERACILLIN AND TAZOBACTAM 25 GRAM(S): 4; .5 INJECTION, POWDER, LYOPHILIZED, FOR SOLUTION INTRAVENOUS at 21:40

## 2020-05-21 RX ADMIN — NYSTATIN CREAM 1 APPLICATION(S): 100000 CREAM TOPICAL at 05:32

## 2020-05-21 RX ADMIN — Medication 1 MILLIGRAM(S): at 12:24

## 2020-05-21 RX ADMIN — Medication 1 APPLICATION(S): at 12:20

## 2020-05-21 RX ADMIN — Medication 1: at 17:19

## 2020-05-21 RX ADMIN — CHLORHEXIDINE GLUCONATE 1 APPLICATION(S): 213 SOLUTION TOPICAL at 12:24

## 2020-05-21 RX ADMIN — Medication 325 MILLIGRAM(S): at 12:20

## 2020-05-21 RX ADMIN — Medication 2 MILLIGRAM(S): at 21:40

## 2020-05-21 RX ADMIN — SIMVASTATIN 40 MILLIGRAM(S): 20 TABLET, FILM COATED ORAL at 21:40

## 2020-05-21 RX ADMIN — Medication 1000 UNIT(S): at 12:24

## 2020-05-21 RX ADMIN — Medication 81 MILLIGRAM(S): at 12:20

## 2020-05-21 RX ADMIN — PIPERACILLIN AND TAZOBACTAM 25 GRAM(S): 4; .5 INJECTION, POWDER, LYOPHILIZED, FOR SOLUTION INTRAVENOUS at 05:32

## 2020-05-21 RX ADMIN — ENOXAPARIN SODIUM 40 MILLIGRAM(S): 100 INJECTION SUBCUTANEOUS at 12:20

## 2020-05-21 RX ADMIN — Medication 500 MILLIGRAM(S): at 15:31

## 2020-05-21 RX ADMIN — PREGABALIN 1000 MICROGRAM(S): 225 CAPSULE ORAL at 12:20

## 2020-05-21 NOTE — PROGRESS NOTE ADULT - ATTENDING COMMENTS
HPI:  Patient is a 74 y.o F from [facility], non-ambulatory wheel chair bound with PMH of HTN, Diabetes, Osteoarthritis, Osteoporosis, Peripheral arterial disease, Diabetic neuropathy, HLD, Schizophrenia presented to Ed with worsening non healing heel ulcers. She reports having had the ulcer for about a year, recent got worse after she had bumped her foot on her wheelchair. 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 Patient refused surgery last admission and was discharged with local wound care. She reports daily dressing with betadine from her care provider. Pt denies any fever, chills , pain , cough , SOB,CP ,abd pain or any other symptoms. (11 May 2020 13:16)      #  OBTAIN IV ACCESS BY IR IN AM FOR IV . ZOSYN FOR TOTAL 6 WEEKS AT [facility] FOR LEFT CALCANEAL OSTEOMYELITIS.   # NONHEALING LEFT ANKLE ULCER , DIABETIC LEFT FOOT ULCER WITH PURULENT WOUND &  CELLULITIS, LEFT CALCANEAL OSTEOMYELITIS, LEFT FOOT ABSCESS  - WOUND CX SHOWING MORGANELLA, P. MIRABILIS - S/P I&D BY  PODIATRY. ON ZOSYN, ID CONSULTED. VASCULAR CONSULTED - TIMUR AND CT ANGIO SHOWED WORSENING MICROVASCULAR DISEASE OF LLE. PATIENT IS REFUSING SURGERY AND SHE IS NOT A CANDIDATE FOR SURGERY AT THIS TIME DUE TO COVID INFECTION. PATIENT IS COUNSELLED REGARDING AMPUTATION ( LEFT BKA )   #  PATIENT IS MODERATE SURGICAL RISK , BUT SURGERY WILL BE CANCELLED AT THIS TIME.   # COVID19 INFECTION, RPT NASAL SWAB PRIOR TO DC BACK TO [facility] FOR REHAB.   # PAD , MICROVASCULAR DISEASE OF B/L LOWER EXTREMITIES- TIMUR WITH WORSENED MICROVASCULAR DX -  POOR PROGNOSIS , WOUND MAY NOT HEEL DUE TO MULTIPLE RISK FACTORS.   #  DIABETIC PERIPHERAL NEUROPATHY  # CHRONIC NORMOCYTIC ANEMIA LIKELY S/T CHRONIC DISEASE  # HTN - WELL CONTROLLED, HLD, SCHIZOPHRENIA - STABLE   # DIABETES, UNCONTROLLED - IMPROVED GLYCEMIC CONTROL IN HOSPITAL  # GI AND DVT PPX    ELEN CASILLAS M.D. COVERING FOR FARHAN CASILLAS M.D.

## 2020-05-21 NOTE — PROGRESS NOTE ADULT - PROBLEM SELECTOR PLAN 6
pt on aspirin ,statin   Vascular Consult Dr Camacho -  reviewed  Patient is to be discharged to [facility] pending COVID results.

## 2020-05-21 NOTE — PROGRESS NOTE ADULT - SUBJECTIVE AND OBJECTIVE BOX
Medicine Progress Note    Patient is a 74y old  Female who presents with a chief complaint of Left ankle wound (20 May 2020 12:40)      SUBJECTIVE / OVERNIGHT EVENTS:    ADDITIONAL REVIEW OF SYSTEMS:    MEDICATIONS  (STANDING):  ascorbic acid 500 milliGRAM(s) Oral daily  aspirin enteric coated 81 milliGRAM(s) Oral daily  chlorhexidine 2% Cloths 1 Application(s) Topical daily  cholecalciferol 1000 Unit(s) Oral daily  cyanocobalamin 1000 MICROGram(s) Oral daily  doxazosin 2 milliGRAM(s) Oral at bedtime  enoxaparin Injectable 40 milliGRAM(s) SubCutaneous daily  ferrous    sulfate 325 milliGRAM(s) Oral daily  folic acid 1 milliGRAM(s) Oral daily  insulin lispro (HumaLOG) corrective regimen sliding scale   SubCutaneous Before meals and at bedtime  nystatin Powder 1 Application(s) Topical two times a day  piperacillin/tazobactam IVPB.. 3.375 Gram(s) IV Intermittent every 8 hours  povidone iodine 10% Solution 1 Application(s) Topical daily  simvastatin 40 milliGRAM(s) Oral at bedtime    MEDICATIONS  (PRN):  acetaminophen   Tablet .. 650 milliGRAM(s) Oral every 6 hours PRN Temp greater or equal to 38C (100.4F), Mild Pain (1 - 3)  ALBUTerol    90 MICROgram(s) HFA Inhaler 2 Puff(s) Inhalation every 4 hours PRN Shortness of Breath and/or Wheezing  guaifenesin/dextromethorphan  Syrup 10 milliLiter(s) Oral every 4 hours PRN Cough  ondansetron Injectable 4 milliGRAM(s) IV Push every 6 hours PRN Nausea and/or Vomiting  sodium chloride 0.9% lock flush 10 milliLiter(s) IV Push every 1 hour PRN Pre/post blood products, medications, blood draw, and to maintain line patency    CAPILLARY BLOOD GLUCOSE      POCT Blood Glucose.: 133 mg/dL (21 May 2020 08:34)  POCT Blood Glucose.: 124 mg/dL (20 May 2020 21:40)  POCT Blood Glucose.: 164 mg/dL (20 May 2020 17:02)  POCT Blood Glucose.: 191 mg/dL (20 May 2020 11:37)    I&O's Summary      PHYSICAL EXAM:  Vital Signs Last 24 Hrs  T(C): 36.7 (21 May 2020 05:31), Max: 37.8 (20 May 2020 20:35)  T(F): 98.1 (21 May 2020 05:31), Max: 100 (20 May 2020 20:35)  HR: 87 (21 May 2020 05:31) (85 - 99)  BP: 130/56 (21 May 2020 05:31) (113/62 - 146/48)  BP(mean): --  RR: 18 (21 May 2020 05:31) (18 - 20)  SpO2: 100% (21 May 2020 05:31) (98% - 100%)  CONSTITUTIONAL: NAD, well-developed, well-groomed  ENMT: Moist oral mucosa, no pharyngeal injection or exudates; normal dentition  RESPIRATORY: Normal respiratory effort; lungs are clear to auscultation bilaterally  CARDIOVASCULAR: Regular rate and rhythm, normal S1 and S2,  ABDOMEN: Nontender to palpation, normoactive bowel sounds,   EXTR: left foot dressing intact   PSYCH: A+O to person, place, and time; affect appropriate  SKIN: No rashes; no palpable lesions    LABS:                        9.8    4.62  )-----------( 229      ( 21 May 2020 07:13 )             29.8     05-20    139  |  107  |  5<L>  ----------------------------<  133<H>  3.8   |  25  |  0.59    Ca    9.1      20 May 2020 07:22  Phos  2.6     05-20  Mg     2.2     05-20    TPro  5.8<L>  /  Alb  2.5<L>  /  TBili  0.6  /  DBili  x   /  AST  10  /  ALT  10  /  AlkPhos  52  05-20              COVID-19 PCR: Detected (20 May 2020 14:03)      RADIOLOGY & ADDITIONAL TESTS:  Imaging from Last 24 Hours:    Electrocardiogram/QTc Interval:    COORDINATION OF CARE:  Care Discussed with Consultants/Other Providers:

## 2020-05-21 NOTE — PROGRESS NOTE ADULT - PROBLEM SELECTOR PLAN 1
MRI of left foot including the left ankle on 05/12/2020  -cutaneous ulceration along the lateral aspect of the posterior calcaneus with associated calcaneal osteomyelitis.  sp bedside debridement by podiatry   patient refuses surgery .  - continue zosyn  for 5 additional weeks as per ID  via left PICC   ID Dr Wellington on board

## 2020-05-22 LAB
ANION GAP SERPL CALC-SCNC: 5 MMOL/L — SIGNIFICANT CHANGE UP (ref 5–17)
BUN SERPL-MCNC: 6 MG/DL — LOW (ref 7–18)
CALCIUM SERPL-MCNC: 8.5 MG/DL — SIGNIFICANT CHANGE UP (ref 8.4–10.5)
CHLORIDE SERPL-SCNC: 107 MMOL/L — SIGNIFICANT CHANGE UP (ref 96–108)
CO2 SERPL-SCNC: 25 MMOL/L — SIGNIFICANT CHANGE UP (ref 22–31)
CREAT SERPL-MCNC: 0.73 MG/DL — SIGNIFICANT CHANGE UP (ref 0.5–1.3)
GLUCOSE BLDC GLUCOMTR-MCNC: 140 MG/DL — HIGH (ref 70–99)
GLUCOSE BLDC GLUCOMTR-MCNC: 149 MG/DL — HIGH (ref 70–99)
GLUCOSE BLDC GLUCOMTR-MCNC: 151 MG/DL — HIGH (ref 70–99)
GLUCOSE BLDC GLUCOMTR-MCNC: 205 MG/DL — HIGH (ref 70–99)
GLUCOSE SERPL-MCNC: 112 MG/DL — HIGH (ref 70–99)
HCT VFR BLD CALC: 29.2 % — LOW (ref 34.5–45)
HGB BLD-MCNC: 9.8 G/DL — LOW (ref 11.5–15.5)
MCHC RBC-ENTMCNC: 30 PG — SIGNIFICANT CHANGE UP (ref 27–34)
MCHC RBC-ENTMCNC: 33.6 GM/DL — SIGNIFICANT CHANGE UP (ref 32–36)
MCV RBC AUTO: 89.3 FL — SIGNIFICANT CHANGE UP (ref 80–100)
NRBC # BLD: 0 /100 WBCS — SIGNIFICANT CHANGE UP (ref 0–0)
PLATELET # BLD AUTO: 203 K/UL — SIGNIFICANT CHANGE UP (ref 150–400)
POTASSIUM SERPL-MCNC: 4 MMOL/L — SIGNIFICANT CHANGE UP (ref 3.5–5.3)
POTASSIUM SERPL-SCNC: 4 MMOL/L — SIGNIFICANT CHANGE UP (ref 3.5–5.3)
RBC # BLD: 3.27 M/UL — LOW (ref 3.8–5.2)
RBC # FLD: 13.7 % — SIGNIFICANT CHANGE UP (ref 10.3–14.5)
SODIUM SERPL-SCNC: 137 MMOL/L — SIGNIFICANT CHANGE UP (ref 135–145)
WBC # BLD: 3.77 K/UL — LOW (ref 3.8–10.5)
WBC # FLD AUTO: 3.77 K/UL — LOW (ref 3.8–10.5)

## 2020-05-22 RX ADMIN — CHLORHEXIDINE GLUCONATE 1 APPLICATION(S): 213 SOLUTION TOPICAL at 12:45

## 2020-05-22 RX ADMIN — PREGABALIN 1000 MICROGRAM(S): 225 CAPSULE ORAL at 11:47

## 2020-05-22 RX ADMIN — PIPERACILLIN AND TAZOBACTAM 25 GRAM(S): 4; .5 INJECTION, POWDER, LYOPHILIZED, FOR SOLUTION INTRAVENOUS at 21:22

## 2020-05-22 RX ADMIN — PIPERACILLIN AND TAZOBACTAM 25 GRAM(S): 4; .5 INJECTION, POWDER, LYOPHILIZED, FOR SOLUTION INTRAVENOUS at 14:06

## 2020-05-22 RX ADMIN — Medication 325 MILLIGRAM(S): at 11:47

## 2020-05-22 RX ADMIN — Medication 1000 UNIT(S): at 11:47

## 2020-05-22 RX ADMIN — NYSTATIN CREAM 1 APPLICATION(S): 100000 CREAM TOPICAL at 17:12

## 2020-05-22 RX ADMIN — Medication 81 MILLIGRAM(S): at 11:46

## 2020-05-22 RX ADMIN — PIPERACILLIN AND TAZOBACTAM 25 GRAM(S): 4; .5 INJECTION, POWDER, LYOPHILIZED, FOR SOLUTION INTRAVENOUS at 06:16

## 2020-05-22 RX ADMIN — Medication 500 MILLIGRAM(S): at 11:46

## 2020-05-22 RX ADMIN — Medication 1 APPLICATION(S): at 11:46

## 2020-05-22 RX ADMIN — Medication 2: at 21:22

## 2020-05-22 RX ADMIN — ENOXAPARIN SODIUM 40 MILLIGRAM(S): 100 INJECTION SUBCUTANEOUS at 11:47

## 2020-05-22 RX ADMIN — Medication 1 MILLIGRAM(S): at 11:47

## 2020-05-22 RX ADMIN — NYSTATIN CREAM 1 APPLICATION(S): 100000 CREAM TOPICAL at 06:16

## 2020-05-22 RX ADMIN — Medication 1: at 17:12

## 2020-05-22 RX ADMIN — SIMVASTATIN 40 MILLIGRAM(S): 20 TABLET, FILM COATED ORAL at 21:22

## 2020-05-22 RX ADMIN — Medication 2 MILLIGRAM(S): at 21:22

## 2020-05-22 NOTE — PROGRESS NOTE ADULT - PROBLEM SELECTOR PROBLEM 3
DM (diabetes mellitus)
HTN (hypertension)
DM (diabetes mellitus)

## 2020-05-22 NOTE — PROGRESS NOTE ADULT - PROBLEM SELECTOR PROBLEM 4
DM (diabetes mellitus)
DM (diabetes mellitus)
HLD (hyperlipidemia)
DM (diabetes mellitus)
DM (diabetes mellitus)
HLD (hyperlipidemia)

## 2020-05-22 NOTE — PROGRESS NOTE ADULT - PROBLEM SELECTOR PLAN 4
continue with statin
monitor blood sugar   sliding scale
continue with statin

## 2020-05-22 NOTE — PROGRESS NOTE ADULT - PROBLEM SELECTOR PLAN 2
COVID-19 PCR . (05.18.20 @ 09:47)    COVID-19 PCR: Detected
COVID-19 PCR . (05.18.20 @ 09:47)    COVID-19 PCR: Detected  needs to be retested in 72 hours ( 05/21/2020)
on losartan at home  losartan on hold as the BP is normal.  restart if indicated   monitor blood  pressure
COVID-19  positive on 5/20   needs to be retested in 72 hours ( 05/21/2020)
asymptomatic   COVID negative x 1 on 5/21  pending COVID PCR today
continue Cardura at bedtime   monitor blood  pressure

## 2020-05-22 NOTE — PROGRESS NOTE ADULT - REASON FOR ADMISSION
Left ankle wound

## 2020-05-22 NOTE — PROGRESS NOTE ADULT - SUBJECTIVE AND OBJECTIVE BOX
Medicine Progress Note    Patient is a 74y old  Female who presents with a chief complaint of Left ankle wound (21 May 2020 10:06)      SUBJECTIVE / OVERNIGHT EVENTS:    ADDITIONAL REVIEW OF SYSTEMS:    MEDICATIONS  (STANDING):  ascorbic acid 500 milliGRAM(s) Oral daily  aspirin enteric coated 81 milliGRAM(s) Oral daily  chlorhexidine 2% Cloths 1 Application(s) Topical daily  cholecalciferol 1000 Unit(s) Oral daily  cyanocobalamin 1000 MICROGram(s) Oral daily  doxazosin 2 milliGRAM(s) Oral at bedtime  enoxaparin Injectable 40 milliGRAM(s) SubCutaneous daily  ferrous    sulfate 325 milliGRAM(s) Oral daily  folic acid 1 milliGRAM(s) Oral daily  insulin lispro (HumaLOG) corrective regimen sliding scale   SubCutaneous Before meals and at bedtime  nystatin Powder 1 Application(s) Topical two times a day  piperacillin/tazobactam IVPB.. 3.375 Gram(s) IV Intermittent every 8 hours  povidone iodine 10% Solution 1 Application(s) Topical daily  simvastatin 40 milliGRAM(s) Oral at bedtime    MEDICATIONS  (PRN):  acetaminophen   Tablet .. 650 milliGRAM(s) Oral every 6 hours PRN Temp greater or equal to 38C (100.4F), Mild Pain (1 - 3)  ALBUTerol    90 MICROgram(s) HFA Inhaler 2 Puff(s) Inhalation every 4 hours PRN Shortness of Breath and/or Wheezing  guaifenesin/dextromethorphan  Syrup 10 milliLiter(s) Oral every 4 hours PRN Cough  ondansetron Injectable 4 milliGRAM(s) IV Push every 6 hours PRN Nausea and/or Vomiting  sodium chloride 0.9% lock flush 10 milliLiter(s) IV Push every 1 hour PRN Pre/post blood products, medications, blood draw, and to maintain line patency    CAPILLARY BLOOD GLUCOSE      POCT Blood Glucose.: 140 mg/dL (22 May 2020 08:35)  POCT Blood Glucose.: 117 mg/dL (21 May 2020 21:07)  POCT Blood Glucose.: 146 mg/dL (21 May 2020 19:30)  POCT Blood Glucose.: 179 mg/dL (21 May 2020 17:14)  POCT Blood Glucose.: 211 mg/dL (21 May 2020 12:00)    I&O's Summary      PHYSICAL EXAM:  Vital Signs Last 24 Hrs  T(C): 36.4 (22 May 2020 05:10), Max: 38.1 (21 May 2020 20:35)  T(F): 97.6 (22 May 2020 05:10), Max: 100.6 (21 May 2020 20:35)  HR: 83 (22 May 2020 05:10) (76 - 90)  BP: 126/47 (22 May 2020 05:10) (113/40 - 126/47)  BP(mean): --  RR: 18 (22 May 2020 05:10) (17 - 18)  SpO2: 97% (22 May 2020 05:10) (96% - 100%)  CONSTITUTIONAL: NAD, well-developed, well-groomed  ENMT: Moist oral mucosa  RESPIRATORY: Normal respiratory effort; lungs are clear   CARDIOVASCULAR: Regular rate and rhythm,  ABDOMEN: Nontender to palpation, normoactive bowel sounds,  PSYCH: A+O to person, place, and time; affect appropriate      LABS:                        9.8    3.77  )-----------( 203      ( 22 May 2020 07:17 )             29.2     05-22    137  |  107  |  6<L>  ----------------------------<  112<H>  4.0   |  25  |  0.73    Ca    8.5      22 May 2020 07:17                COVID-19 PCR: NotDetec (21 May 2020 10:36)      RADIOLOGY & ADDITIONAL TESTS:  Imaging from Last 24 Hours:    Electrocardiogram/QTc Interval:    COORDINATION OF CARE:  Care Discussed with Consultants/Other Providers:

## 2020-05-22 NOTE — PROGRESS NOTE ADULT - PROBLEM SELECTOR PROBLEM 1
Infected ulcer of skin, unspecified ulcer stage

## 2020-05-22 NOTE — PROGRESS NOTE ADULT - PROBLEM SELECTOR PLAN 6
pt on aspirin ,statin   Vascular Consult Dr Camacho -  reviewed  Patient is to be discharged to [facility] pending COVID results.

## 2020-05-22 NOTE — PROGRESS NOTE ADULT - ATTENDING COMMENTS
HPI:  Patient is a 74 y.o F from [facility], non-ambulatory wheel chair bound with PMH of HTN, Diabetes, Osteoarthritis, Osteoporosis, Peripheral arterial disease, Diabetic neuropathy, HLD, Schizophrenia presented to Ed with worsening non healing heel ulcers. She reports having had the ulcer for about a year, recent got worse after she had bumped her foot on her wheelchair. 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 Patient refused surgery last admission and was discharged with local wound care. She reports daily dressing with betadine from her care provider. Pt denies any fever, chills , pain , cough , SOB,CP ,abd pain or any other symptoms. (11 May 2020 13:16)      #  OBTAIN IV ACCESS BY IR IN AM FOR IV . ZOSYN FOR TOTAL 6 WEEKS AT [facility] FOR LEFT CALCANEAL OSTEOMYELITIS.   # NONHEALING LEFT ANKLE ULCER , DIABETIC LEFT FOOT ULCER WITH PURULENT WOUND &  CELLULITIS, LEFT CALCANEAL OSTEOMYELITIS, LEFT FOOT ABSCESS  - WOUND CX SHOWING MORGANELLA, P. MIRABILIS - S/P I&D BY  PODIATRY. ON ZOSYN, ID CONSULTED. VASCULAR CONSULTED - TIMUR AND CT ANGIO SHOWED WORSENING MICROVASCULAR DISEASE OF LLE. PATIENT IS REFUSING SURGERY AND SHE IS NOT A CANDIDATE FOR SURGERY AT THIS TIME DUE TO COVID INFECTION. PATIENT IS COUNSELLED REGARDING AMPUTATION ( LEFT BKA )   #  PATIENT IS MODERATE SURGICAL RISK , BUT SURGERY WILL BE CANCELLED AT THIS TIME.   # COVID19 INFECTION, RPT NASAL SWAB PRIOR TO DC BACK TO [facility] FOR REHAB.   # PAD , MICROVASCULAR DISEASE OF B/L LOWER EXTREMITIES- TIMUR WITH WORSENED MICROVASCULAR DX -  POOR PROGNOSIS , WOUND MAY NOT HEEL DUE TO MULTIPLE RISK FACTORS.   #  DIABETIC PERIPHERAL NEUROPATHY  # CHRONIC NORMOCYTIC ANEMIA LIKELY S/T CHRONIC DISEASE  # HTN - WELL CONTROLLED, HLD, SCHIZOPHRENIA - STABLE   # DIABETES, UNCONTROLLED - IMPROVED GLYCEMIC CONTROL IN HOSPITAL  # GI AND DVT PPX    ELEN CASILLAS M.D. COVERING FOR FARHAN CASILLAS M.D.

## 2020-05-22 NOTE — PROGRESS NOTE ADULT - PROBLEM SELECTOR PROBLEM 2
COVID-19 virus detected
COVID-19 virus detected
HTN (hypertension)
COVID-19 virus detected
COVID-19 virus detected
HTN (hypertension)

## 2020-05-22 NOTE — CHART NOTE - NSCHARTNOTEFT_GEN_A_CORE
Assessment:   74yFemalePatient is a 74y old  Female who presents with a chief complaint of Left ankle wound (22 May 2020 08:56)  PT On Airborne isolation. Pt W COVID +. PT is on Diabetic diet Pm snack DASH/ TLC. Appetite is Good  per RN. Po intake > 50 %.  Meds Noted On vitc , Vit D3, Folic acid. Pt w Infected Ulcer osteomyelitis L heel.        Factors impacting intake: [ ] none [ ] nausea  [ ] vomiting [ ] diarrhea [ ] constipation  [ ]chewing problems [ ] swallowing issues  [ ] other:     Diet Presciption: Diet, DASH/TLC:   Sodium & Cholesterol Restricted  Consistent Carbohydrate {Evening Snacks} (05-11-20 @ 15:03)    Intake:  >50 % of meal.    Current Weight:   % Weight Change    Pertinent Medications: MEDICATIONS  (STANDING):  ascorbic acid 500 milliGRAM(s) Oral daily  aspirin enteric coated 81 milliGRAM(s) Oral daily  chlorhexidine 2% Cloths 1 Application(s) Topical daily  cholecalciferol 1000 Unit(s) Oral daily  cyanocobalamin 1000 MICROGram(s) Oral daily  doxazosin 2 milliGRAM(s) Oral at bedtime  enoxaparin Injectable 40 milliGRAM(s) SubCutaneous daily  ferrous    sulfate 325 milliGRAM(s) Oral daily  folic acid 1 milliGRAM(s) Oral daily  insulin lispro (HumaLOG) corrective regimen sliding scale   SubCutaneous Before meals and at bedtime  nystatin Powder 1 Application(s) Topical two times a day  piperacillin/tazobactam IVPB.. 3.375 Gram(s) IV Intermittent every 8 hours  povidone iodine 10% Solution 1 Application(s) Topical daily  simvastatin 40 milliGRAM(s) Oral at bedtime    MEDICATIONS  (PRN):  acetaminophen   Tablet .. 650 milliGRAM(s) Oral every 6 hours PRN Temp greater or equal to 38C (100.4F), Mild Pain (1 - 3)  ALBUTerol    90 MICROgram(s) HFA Inhaler 2 Puff(s) Inhalation every 4 hours PRN Shortness of Breath and/or Wheezing  guaifenesin/dextromethorphan  Syrup 10 milliLiter(s) Oral every 4 hours PRN Cough  ondansetron Injectable 4 milliGRAM(s) IV Push every 6 hours PRN Nausea and/or Vomiting  sodium chloride 0.9% lock flush 10 milliLiter(s) IV Push every 1 hour PRN Pre/post blood products, medications, blood draw, and to maintain line patency    Pertinent Labs: 05-22 Na137 mmol/L Glu 112 mg/dL<H> K+ 4.0 mmol/L Cr  0.73 mg/dL BUN 6 mg/dL<L> 05-20 Phos 2.6 mg/dL 05-20 Alb 2.5 g/dL<L> 05-12 Chol 176 mg/dL  mg/dL HDL 30 mg/dL<L> Trig 100 mg/dL     CAPILLARY BLOOD GLUCOSE      POCT Blood Glucose.: 149 mg/dL (22 May 2020 12:05)  POCT Blood Glucose.: 140 mg/dL (22 May 2020 08:35)  POCT Blood Glucose.: 117 mg/dL (21 May 2020 21:07)  POCT Blood Glucose.: 146 mg/dL (21 May 2020 19:30)  POCT Blood Glucose.: 179 mg/dL (21 May 2020 17:14)    Skin: L heel Ulcer    Estimated Needs:   [ ] no change since previous assessment  [ ] recalculated:     Previous Nutrition Diagnosis:   [ ] Inadequate Energy Intake [ ]Inadequate Oral Intake [ ] Excessive Energy Intake   [ ] Underweight [ ] Increased Nutrient Needs [ ] Overweight/Obesity   [ ] Altered GI Function [ ] Unintended Weight Loss [ ] Food & Nutrition Related Knowledge Deficit [ ] Malnutrition     Nutrition Diagnosis is [ ] ongoing  [ ] resolved [ ] not applicable     New Nutrition Diagnosis: [ ] not applicable       Interventions:   Recommend  [ ] Change Diet To:  [ x] Nutrition Supplement Glucerna shakes TID.   [ ] Nutrition Support  [ x] Other:  Zn, MVIm,    Monitoring and Evaluation:   [ ] PO intake [ x ] Tolerance to diet prescription [ x ] weights [ x ] labs[ x ] follow up per protocol  [ ] other: Assessment:   74yFemalePatient is a 74y old  Female who presents with a chief complaint of Left ankle wound (22 May 2020 08:56)  PT On Airborne isolation. Pt W COVID +. PT is on Diabetic diet Pm snack DASH/ TLC. Appetite is Good  per RN. Po intake > 50 %.  Meds Noted On vitc , Vit D3, Folic acid. Pt w Infected Ulcer osteomyelitis L heel.        Factors impacting intake: [ ] none [ ] nausea  [ ] vomiting [ ] diarrhea [ ] constipation  [ ]chewing problems [ ] swallowing issues  [ ] other:     Diet Presciption: Diet, DASH/TLC:   Sodium & Cholesterol Restricted  Consistent Carbohydrate {Evening Snacks} (05-11-20 @ 15:03)    Intake:  >50 % of meal.    Current Weight:   % Weight Change    Pertinent Medications: MEDICATIONS  (STANDING):  ascorbic acid 500 milliGRAM(s) Oral daily  aspirin enteric coated 81 milliGRAM(s) Oral daily  chlorhexidine 2% Cloths 1 Application(s) Topical daily  cholecalciferol 1000 Unit(s) Oral daily  cyanocobalamin 1000 MICROGram(s) Oral daily  doxazosin 2 milliGRAM(s) Oral at bedtime  enoxaparin Injectable 40 milliGRAM(s) SubCutaneous daily  ferrous    sulfate 325 milliGRAM(s) Oral daily  folic acid 1 milliGRAM(s) Oral daily  insulin lispro (HumaLOG) corrective regimen sliding scale   SubCutaneous Before meals and at bedtime  nystatin Powder 1 Application(s) Topical two times a day  piperacillin/tazobactam IVPB.. 3.375 Gram(s) IV Intermittent every 8 hours  povidone iodine 10% Solution 1 Application(s) Topical daily  simvastatin 40 milliGRAM(s) Oral at bedtime    MEDICATIONS  (PRN):  acetaminophen   Tablet .. 650 milliGRAM(s) Oral every 6 hours PRN Temp greater or equal to 38C (100.4F), Mild Pain (1 - 3)  ALBUTerol    90 MICROgram(s) HFA Inhaler 2 Puff(s) Inhalation every 4 hours PRN Shortness of Breath and/or Wheezing  guaifenesin/dextromethorphan  Syrup 10 milliLiter(s) Oral every 4 hours PRN Cough  ondansetron Injectable 4 milliGRAM(s) IV Push every 6 hours PRN Nausea and/or Vomiting  sodium chloride 0.9% lock flush 10 milliLiter(s) IV Push every 1 hour PRN Pre/post blood products, medications, blood draw, and to maintain line patency    Pertinent Labs: 05-22 Na137 mmol/L Glu 112 mg/dL<H> K+ 4.0 mmol/L Cr  0.73 mg/dL BUN 6 mg/dL<L> 05-20 Phos 2.6 mg/dL 05-20 Alb 2.5 g/dL<L> 05-12 Chol 176 mg/dL  mg/dL HDL 30 mg/dL<L> Trig 100 mg/dL     CAPILLARY BLOOD GLUCOSE      POCT Blood Glucose.: 149 mg/dL (22 May 2020 12:05)  POCT Blood Glucose.: 140 mg/dL (22 May 2020 08:35)  POCT Blood Glucose.: 117 mg/dL (21 May 2020 21:07)  POCT Blood Glucose.: 146 mg/dL (21 May 2020 19:30)  POCT Blood Glucose.: 179 mg/dL (21 May 2020 17:14)    Skin: L heel Ulcer    Estimated Needs:   [ ] no change since previous assessment  [ ] recalculated:     Previous Nutrition Diagnosis:   [ ] Inadequate Energy Intake [ ]Inadequate Oral Intake [ ] Excessive Energy Intake   [ ] Underweight [x ] Increased Nutrient Needs [ ] Overweight/Obesity   [ ] Altered GI Function [ ] Unintended Weight Loss [ ] Food & Nutrition Related Knowledge Deficit [ ] Malnutrition     Nutrition Diagnosis is [ x] ongoing  [ ] resolved [ ] not applicable     New Nutrition Diagnosis: [ ] not applicable       Interventions:   Recommend  [ ] Change Diet To:  [ x] Nutrition Supplement Glucerna shakes TID.   [ ] Nutrition Support  [ x] Other:  Zn, MVIm,    Monitoring and Evaluation:   [ ] PO intake [ x ] Tolerance to diet prescription [ x ] weights [ x ] labs[ x ] follow up per protocol  [ ] other:

## 2020-05-22 NOTE — PROGRESS NOTE ADULT - ASSESSMENT
74 y.o female from [facility], non-ambulatory wheel chair bound with PMH of HTN, Diabetes, Osteoarthritis, Osteoporosis, Peripheral arterial disease, Diabetic neuropathy, HLD, Schizophrenia presented with worsening non healing left heel ulcers, getting worse  after recent injury. 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 Patient refused surgery last admission and was discharged with local wound care. bedside I&D was performed with  expression of purulence discharge. Wound care performed by podiatry team. Deep wound culture pending result IV antibiotics: Zosyn continued.  Patient had  MRI of left foot including the left ankle on 05/12/2020 that showed  cutaneous ulceration along the lateral aspect of the posterior calcaneus with associated calcaneal osteomyelitis.  Patient may require OR debridement with possible  partial calcanectomy as per podiatry  Pt is COVID +. patient is refusing surgery.  PICC line placed on 05/15/2020.  ID Dr Wellington on board. Pt needs  5  additional weeks of Zosyn 3.375 gram as per ID recommendations. Pt seen at bedside, states feeling well. No complaints of pain. No co SOB, cough, fever.
74 y.o female from [facility], non-ambulatory wheel chair bound with PMH of HTN, Diabetes, Osteoarthritis, Osteoporosis, Peripheral arterial disease, Diabetic neuropathy, HLD, Schizophrenia presented with worsening non healing left heel ulcers, getting worse  after recent injury. 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 Patient refused surgery last admission and was discharged with local wound care. bedside I&D was performed with  expression of purulence discharge. Wound care performed by podiatry team. Deep wound culture pending result IV antibiotics: Zosyn continued.  Patient had  MRI of left foot including the left ankle on 05/12/2020 that showed  cutaneous ulceration along the lateral aspect of the posterior calcaneus with associated calcaneal osteomyelitis.  Patient may require OR debridement with possible  partial calcanectomy as per podiatry  Pt is COVID +. patient is refusing surgery.  PICC line placed on 05/15/2020.  ID Dr Wellington on board. Pt needs  5  additional weeks of Zosyn 3.375 gram as per ID recommendations. Pt seen at bedside, states feeling well. HD stable. No complaints of pain. No co SOB, cough, fever.   Pending discharge back to [facility]. COVID negative #1. Pending second negative COVID PCR
74 y.o. F with L ankle OM    -Recommend TIMUR/PVR  -Recommend CTA abd with runoff to eval vasculature  -Vascular clearance pending results  -con't local wound care per podiatry
74y.o. Female with PAD, OM, LLE OM    -Recommend repeat CTA with runoff with different IV access.  -Podiatry may proceed with planned calcanectomy 5/16/2020.  -Vascular will follow CTA and wound healing.   -con't pre-op local wound care per podiatry.  -DM control
Diabetic foot ulcers of left foot  Osteomyelitis of left foot  Leukocytosis - normalized    Plan - cont Zosyn 3.375gms iv q8 hrs x 5 weeks more at NH   refused surgical debridement.  will likely be back with worsening Osteomyelitis in the near future.  DC planning to NH tomorrow  Reconsult prn.
Diabetic foot ulcers of left foot/ abscess  Osteomyelitis of left foot  Leukocytosis    Plan - cont Zosyn 3.375gms iv q8 hrs  will need surgical debridement vs BKA ( which she is not willing for)  if she gets a calcanectomy and abscess drainage , will still need a PICC line for 6 weeks of IV abxs.

## 2020-05-23 ENCOUNTER — TRANSCRIPTION ENCOUNTER (OUTPATIENT)
Age: 75
End: 2020-05-23

## 2020-05-23 VITALS — HEART RATE: 85 BPM | SYSTOLIC BLOOD PRESSURE: 115 MMHG | DIASTOLIC BLOOD PRESSURE: 55 MMHG

## 2020-05-23 LAB
GLUCOSE BLDC GLUCOMTR-MCNC: 131 MG/DL — HIGH (ref 70–99)
GLUCOSE BLDC GLUCOMTR-MCNC: 165 MG/DL — HIGH (ref 70–99)
SARS-COV-2 RNA SPEC QL NAA+PROBE: SIGNIFICANT CHANGE UP

## 2020-05-23 RX ORDER — NYSTATIN CREAM 100000 [USP'U]/G
1 CREAM TOPICAL
Qty: 0 | Refills: 0 | DISCHARGE
Start: 2020-05-23

## 2020-05-23 RX ORDER — SIMVASTATIN 20 MG/1
1 TABLET, FILM COATED ORAL
Qty: 0 | Refills: 0 | DISCHARGE
Start: 2020-05-23

## 2020-05-23 RX ORDER — PIPERACILLIN AND TAZOBACTAM 4; .5 G/20ML; G/20ML
3.38 INJECTION, POWDER, LYOPHILIZED, FOR SOLUTION INTRAVENOUS
Qty: 0 | Refills: 0 | DISCHARGE
Start: 2020-05-23 | End: 2020-06-22

## 2020-05-23 RX ORDER — ALBUTEROL 90 UG/1
2 AEROSOL, METERED ORAL
Qty: 0 | Refills: 0 | DISCHARGE
Start: 2020-05-23

## 2020-05-23 RX ORDER — PREGABALIN 225 MG/1
1 CAPSULE ORAL
Qty: 0 | Refills: 0 | DISCHARGE
Start: 2020-05-23

## 2020-05-23 RX ORDER — CHOLECALCIFEROL (VITAMIN D3) 125 MCG
1000 CAPSULE ORAL
Qty: 0 | Refills: 0 | DISCHARGE
Start: 2020-05-23

## 2020-05-23 RX ORDER — GUAIFENESIN/DEXTROMETHORPHAN 600MG-30MG
10 TABLET, EXTENDED RELEASE 12 HR ORAL
Qty: 0 | Refills: 0 | DISCHARGE
Start: 2020-05-23

## 2020-05-23 RX ORDER — LOSARTAN POTASSIUM 100 MG/1
1 TABLET, FILM COATED ORAL
Qty: 0 | Refills: 0 | DISCHARGE

## 2020-05-23 RX ADMIN — Medication 81 MILLIGRAM(S): at 11:20

## 2020-05-23 RX ADMIN — PIPERACILLIN AND TAZOBACTAM 25 GRAM(S): 4; .5 INJECTION, POWDER, LYOPHILIZED, FOR SOLUTION INTRAVENOUS at 05:46

## 2020-05-23 RX ADMIN — ENOXAPARIN SODIUM 40 MILLIGRAM(S): 100 INJECTION SUBCUTANEOUS at 11:20

## 2020-05-23 RX ADMIN — PREGABALIN 1000 MICROGRAM(S): 225 CAPSULE ORAL at 11:20

## 2020-05-23 RX ADMIN — Medication 1000 UNIT(S): at 11:20

## 2020-05-23 RX ADMIN — Medication 500 MILLIGRAM(S): at 11:20

## 2020-05-23 RX ADMIN — CHLORHEXIDINE GLUCONATE 1 APPLICATION(S): 213 SOLUTION TOPICAL at 11:23

## 2020-05-23 RX ADMIN — Medication 325 MILLIGRAM(S): at 11:20

## 2020-05-23 RX ADMIN — Medication 1 MILLIGRAM(S): at 11:20

## 2020-05-23 RX ADMIN — Medication 1: at 11:29

## 2020-05-23 RX ADMIN — Medication 1 APPLICATION(S): at 11:21

## 2020-05-23 RX ADMIN — NYSTATIN CREAM 1 APPLICATION(S): 100000 CREAM TOPICAL at 05:47

## 2020-05-23 RX ADMIN — PIPERACILLIN AND TAZOBACTAM 25 GRAM(S): 4; .5 INJECTION, POWDER, LYOPHILIZED, FOR SOLUTION INTRAVENOUS at 13:57

## 2020-05-23 NOTE — DISCHARGE NOTE NURSING/CASE MANAGEMENT/SOCIAL WORK - PATIENT PORTAL LINK FT
You can access the FollowMyHealth Patient Portal offered by [facility] by registering at the following website: [email or web address]. By joining [facility]’s FollowMyHealth portal, you will also be able to view your health information using other applications (apps) compatible with our system.

## 2020-06-02 PROCEDURE — 86900 BLOOD TYPING SEROLOGIC ABO: CPT

## 2020-06-02 PROCEDURE — 83036 HEMOGLOBIN GLYCOSYLATED A1C: CPT

## 2020-06-02 PROCEDURE — 96374 THER/PROPH/DIAG INJ IV PUSH: CPT

## 2020-06-02 PROCEDURE — 82306 VITAMIN D 25 HYDROXY: CPT

## 2020-06-02 PROCEDURE — 73721 MRI JNT OF LWR EXTRE W/O DYE: CPT

## 2020-06-02 PROCEDURE — 85730 THROMBOPLASTIN TIME PARTIAL: CPT

## 2020-06-02 PROCEDURE — 73630 X-RAY EXAM OF FOOT: CPT

## 2020-06-02 PROCEDURE — 84145 PROCALCITONIN (PCT): CPT

## 2020-06-02 PROCEDURE — 82962 GLUCOSE BLOOD TEST: CPT

## 2020-06-02 PROCEDURE — 82550 ASSAY OF CK (CPK): CPT

## 2020-06-02 PROCEDURE — 36415 COLL VENOUS BLD VENIPUNCTURE: CPT

## 2020-06-02 PROCEDURE — 86850 RBC ANTIBODY SCREEN: CPT

## 2020-06-02 PROCEDURE — 83615 LACTATE (LD) (LDH) ENZYME: CPT

## 2020-06-02 PROCEDURE — 99285 EMERGENCY DEPT VISIT HI MDM: CPT | Mod: 25

## 2020-06-02 PROCEDURE — 87040 BLOOD CULTURE FOR BACTERIA: CPT

## 2020-06-02 PROCEDURE — 85652 RBC SED RATE AUTOMATED: CPT

## 2020-06-02 PROCEDURE — 83605 ASSAY OF LACTIC ACID: CPT

## 2020-06-02 PROCEDURE — 87640 STAPH A DNA AMP PROBE: CPT

## 2020-06-02 PROCEDURE — 93005 ELECTROCARDIOGRAM TRACING: CPT

## 2020-06-02 PROCEDURE — 97110 THERAPEUTIC EXERCISES: CPT

## 2020-06-02 PROCEDURE — U0003: CPT

## 2020-06-02 PROCEDURE — 87641 MR-STAPH DNA AMP PROBE: CPT

## 2020-06-02 PROCEDURE — 87635 SARS-COV-2 COVID-19 AMP PRB: CPT

## 2020-06-02 PROCEDURE — 71045 X-RAY EXAM CHEST 1 VIEW: CPT

## 2020-06-02 PROCEDURE — 82746 ASSAY OF FOLIC ACID SERUM: CPT

## 2020-06-02 PROCEDURE — 97530 THERAPEUTIC ACTIVITIES: CPT

## 2020-06-02 PROCEDURE — 82607 VITAMIN B-12: CPT

## 2020-06-02 PROCEDURE — 86901 BLOOD TYPING SEROLOGIC RH(D): CPT

## 2020-06-02 PROCEDURE — 97161 PT EVAL LOW COMPLEX 20 MIN: CPT

## 2020-06-02 PROCEDURE — 85027 COMPLETE CBC AUTOMATED: CPT

## 2020-06-02 PROCEDURE — 83735 ASSAY OF MAGNESIUM: CPT

## 2020-06-02 PROCEDURE — 73610 X-RAY EXAM OF ANKLE: CPT

## 2020-06-02 PROCEDURE — 86140 C-REACTIVE PROTEIN: CPT

## 2020-06-02 PROCEDURE — 85610 PROTHROMBIN TIME: CPT

## 2020-06-02 PROCEDURE — 80048 BASIC METABOLIC PNL TOTAL CA: CPT

## 2020-06-02 PROCEDURE — 87070 CULTURE OTHR SPECIMN AEROBIC: CPT

## 2020-06-02 PROCEDURE — 36573 INSJ PICC RS&I 5 YR+: CPT

## 2020-06-02 PROCEDURE — 84100 ASSAY OF PHOSPHORUS: CPT

## 2020-06-02 PROCEDURE — 75635 CT ANGIO ABDOMINAL ARTERIES: CPT

## 2020-06-02 PROCEDURE — 97116 GAIT TRAINING THERAPY: CPT

## 2020-06-02 PROCEDURE — 80061 LIPID PANEL: CPT

## 2020-06-02 PROCEDURE — 93923 UPR/LXTR ART STDY 3+ LVLS: CPT

## 2020-06-02 PROCEDURE — 84443 ASSAY THYROID STIM HORMONE: CPT

## 2020-06-02 PROCEDURE — 80053 COMPREHEN METABOLIC PANEL: CPT

## 2020-06-02 PROCEDURE — C1751: CPT

## 2020-06-02 PROCEDURE — 85379 FIBRIN DEGRADATION QUANT: CPT

## 2020-06-02 PROCEDURE — 87186 SC STD MICRODIL/AGAR DIL: CPT

## 2021-01-05 ENCOUNTER — INPATIENT (INPATIENT)
Facility: HOSPITAL | Age: 76
LOS: 23 days | Discharge: NURSING, REHAB OR LONG-TERM CARE | DRG: 622 | End: 2021-01-29
Attending: INTERNAL MEDICINE | Admitting: INTERNAL MEDICINE
Payer: MEDICARE

## 2021-01-05 VITALS
RESPIRATION RATE: 20 BRPM | DIASTOLIC BLOOD PRESSURE: 69 MMHG | OXYGEN SATURATION: 98 % | WEIGHT: 119.93 LBS | TEMPERATURE: 99 F | HEART RATE: 71 BPM | SYSTOLIC BLOOD PRESSURE: 108 MMHG

## 2021-01-05 DIAGNOSIS — M86.9 OSTEOMYELITIS, UNSPECIFIED: ICD-10-CM

## 2021-01-05 DIAGNOSIS — I73.9 PERIPHERAL VASCULAR DISEASE, UNSPECIFIED: ICD-10-CM

## 2021-01-05 DIAGNOSIS — Z71.89 OTHER SPECIFIED COUNSELING: ICD-10-CM

## 2021-01-05 DIAGNOSIS — E11.9 TYPE 2 DIABETES MELLITUS WITHOUT COMPLICATIONS: ICD-10-CM

## 2021-01-05 DIAGNOSIS — L97.513 NON-PRESSURE CHRONIC ULCER OF OTHER PART OF RIGHT FOOT WITH NECROSIS OF MUSCLE: ICD-10-CM

## 2021-01-05 DIAGNOSIS — Z29.9 ENCOUNTER FOR PROPHYLACTIC MEASURES, UNSPECIFIED: ICD-10-CM

## 2021-01-05 DIAGNOSIS — I10 ESSENTIAL (PRIMARY) HYPERTENSION: ICD-10-CM

## 2021-01-05 LAB
ALBUMIN SERPL ELPH-MCNC: 2.1 G/DL — LOW (ref 3.5–5)
ALP SERPL-CCNC: 73 U/L — SIGNIFICANT CHANGE UP (ref 40–120)
ALT FLD-CCNC: 14 U/L DA — SIGNIFICANT CHANGE UP (ref 10–60)
ANION GAP SERPL CALC-SCNC: 7 MMOL/L — SIGNIFICANT CHANGE UP (ref 5–17)
APTT BLD: 55.8 SEC — HIGH (ref 27.5–35.5)
AST SERPL-CCNC: 10 U/L — SIGNIFICANT CHANGE UP (ref 10–40)
BASOPHILS # BLD AUTO: 0.02 K/UL — SIGNIFICANT CHANGE UP (ref 0–0.2)
BASOPHILS NFR BLD AUTO: 0.1 % — SIGNIFICANT CHANGE UP (ref 0–2)
BILIRUB SERPL-MCNC: 0.3 MG/DL — SIGNIFICANT CHANGE UP (ref 0.2–1.2)
BUN SERPL-MCNC: 11 MG/DL — SIGNIFICANT CHANGE UP (ref 7–18)
CALCIUM SERPL-MCNC: 9 MG/DL — SIGNIFICANT CHANGE UP (ref 8.4–10.5)
CHLORIDE SERPL-SCNC: 104 MMOL/L — SIGNIFICANT CHANGE UP (ref 96–108)
CHOLEST SERPL-MCNC: 121 MG/DL — SIGNIFICANT CHANGE UP
CO2 SERPL-SCNC: 28 MMOL/L — SIGNIFICANT CHANGE UP (ref 22–31)
CREAT SERPL-MCNC: 0.41 MG/DL — LOW (ref 0.5–1.3)
EOSINOPHIL # BLD AUTO: 0.02 K/UL — SIGNIFICANT CHANGE UP (ref 0–0.5)
EOSINOPHIL NFR BLD AUTO: 0.1 % — SIGNIFICANT CHANGE UP (ref 0–6)
ERYTHROCYTE [SEDIMENTATION RATE] IN BLOOD: 99 MM/HR — HIGH (ref 0–20)
GLUCOSE BLDC GLUCOMTR-MCNC: 190 MG/DL — HIGH (ref 70–99)
GLUCOSE BLDC GLUCOMTR-MCNC: 221 MG/DL — HIGH (ref 70–99)
GLUCOSE SERPL-MCNC: 173 MG/DL — HIGH (ref 70–99)
HCT VFR BLD CALC: 30.1 % — LOW (ref 34.5–45)
HDLC SERPL-MCNC: 36 MG/DL — LOW
HGB BLD-MCNC: 9.8 G/DL — LOW (ref 11.5–15.5)
IMM GRANULOCYTES NFR BLD AUTO: 1.8 % — HIGH (ref 0–1.5)
INR BLD: 1.24 RATIO — HIGH (ref 0.88–1.16)
INR BLD: 1.28 RATIO — HIGH (ref 0.88–1.16)
LIPID PNL WITH DIRECT LDL SERPL: 63 MG/DL — SIGNIFICANT CHANGE UP
LYMPHOCYTES # BLD AUTO: 1.63 K/UL — SIGNIFICANT CHANGE UP (ref 1–3.3)
LYMPHOCYTES # BLD AUTO: 9.6 % — LOW (ref 13–44)
MCHC RBC-ENTMCNC: 29.9 PG — SIGNIFICANT CHANGE UP (ref 27–34)
MCHC RBC-ENTMCNC: 32.6 GM/DL — SIGNIFICANT CHANGE UP (ref 32–36)
MCV RBC AUTO: 91.8 FL — SIGNIFICANT CHANGE UP (ref 80–100)
MONOCYTES # BLD AUTO: 1.24 K/UL — HIGH (ref 0–0.9)
MONOCYTES NFR BLD AUTO: 7.3 % — SIGNIFICANT CHANGE UP (ref 2–14)
NEUTROPHILS # BLD AUTO: 13.78 K/UL — HIGH (ref 1.8–7.4)
NEUTROPHILS NFR BLD AUTO: 81.1 % — HIGH (ref 43–77)
NON HDL CHOLESTEROL: 85 MG/DL — SIGNIFICANT CHANGE UP
NRBC # BLD: 0 /100 WBCS — SIGNIFICANT CHANGE UP (ref 0–0)
PLATELET # BLD AUTO: 419 K/UL — HIGH (ref 150–400)
POTASSIUM SERPL-MCNC: 4.2 MMOL/L — SIGNIFICANT CHANGE UP (ref 3.5–5.3)
POTASSIUM SERPL-SCNC: 4.2 MMOL/L — SIGNIFICANT CHANGE UP (ref 3.5–5.3)
PROT SERPL-MCNC: 6.2 G/DL — SIGNIFICANT CHANGE UP (ref 6–8.3)
PROTHROM AB SERPL-ACNC: 14.6 SEC — HIGH (ref 10.6–13.6)
PROTHROM AB SERPL-ACNC: 15.1 SEC — HIGH (ref 10.6–13.6)
RBC # BLD: 3.28 M/UL — LOW (ref 3.8–5.2)
RBC # FLD: 12.6 % — SIGNIFICANT CHANGE UP (ref 10.3–14.5)
SARS-COV-2 RNA SPEC QL NAA+PROBE: SIGNIFICANT CHANGE UP
SODIUM SERPL-SCNC: 139 MMOL/L — SIGNIFICANT CHANGE UP (ref 135–145)
TRIGL SERPL-MCNC: 112 MG/DL — SIGNIFICANT CHANGE UP
TSH SERPL-MCNC: 1.78 UU/ML — SIGNIFICANT CHANGE UP (ref 0.34–4.82)
WBC # BLD: 16.99 K/UL — HIGH (ref 3.8–10.5)
WBC # FLD AUTO: 16.99 K/UL — HIGH (ref 3.8–10.5)

## 2021-01-05 PROCEDURE — 73610 X-RAY EXAM OF ANKLE: CPT | Mod: 26,50

## 2021-01-05 PROCEDURE — 93923 UPR/LXTR ART STDY 3+ LVLS: CPT | Mod: 26

## 2021-01-05 PROCEDURE — 74018 RADEX ABDOMEN 1 VIEW: CPT | Mod: 26

## 2021-01-05 PROCEDURE — 73630 X-RAY EXAM OF FOOT: CPT | Mod: 26,RT

## 2021-01-05 PROCEDURE — 99285 EMERGENCY DEPT VISIT HI MDM: CPT | Mod: 25

## 2021-01-05 PROCEDURE — 71045 X-RAY EXAM CHEST 1 VIEW: CPT | Mod: 26

## 2021-01-05 RX ORDER — ACETAMINOPHEN 500 MG
650 TABLET ORAL EVERY 4 HOURS
Refills: 0 | Status: DISCONTINUED | OUTPATIENT
Start: 2021-01-05 | End: 2021-01-07

## 2021-01-05 RX ORDER — SIMVASTATIN 20 MG/1
40 TABLET, FILM COATED ORAL AT BEDTIME
Refills: 0 | Status: DISCONTINUED | OUTPATIENT
Start: 2021-01-05 | End: 2021-01-08

## 2021-01-05 RX ORDER — SIMVASTATIN 20 MG/1
1 TABLET, FILM COATED ORAL
Qty: 0 | Refills: 0 | DISCHARGE

## 2021-01-05 RX ORDER — VANCOMYCIN HCL 1 G
1000 VIAL (EA) INTRAVENOUS ONCE
Refills: 0 | Status: COMPLETED | OUTPATIENT
Start: 2021-01-05 | End: 2021-01-05

## 2021-01-05 RX ORDER — PIPERACILLIN AND TAZOBACTAM 4; .5 G/20ML; G/20ML
3.38 INJECTION, POWDER, LYOPHILIZED, FOR SOLUTION INTRAVENOUS ONCE
Refills: 0 | Status: COMPLETED | OUTPATIENT
Start: 2021-01-05 | End: 2021-01-05

## 2021-01-05 RX ORDER — CANAGLIFLOZIN 100 MG/1
1 TABLET, FILM COATED ORAL
Qty: 0 | Refills: 0 | DISCHARGE

## 2021-01-05 RX ORDER — CEFEPIME 1 G/1
1000 INJECTION, POWDER, FOR SOLUTION INTRAMUSCULAR; INTRAVENOUS ONCE
Refills: 0 | Status: COMPLETED | OUTPATIENT
Start: 2021-01-05 | End: 2021-01-05

## 2021-01-05 RX ORDER — SODIUM HYPOCHLORITE 0.125 %
1 SOLUTION, NON-ORAL MISCELLANEOUS ONCE
Refills: 0 | Status: DISCONTINUED | OUTPATIENT
Start: 2021-01-05 | End: 2021-01-08

## 2021-01-05 RX ORDER — CEFEPIME 1 G/1
1000 INJECTION, POWDER, FOR SOLUTION INTRAMUSCULAR; INTRAVENOUS EVERY 8 HOURS
Refills: 0 | Status: DISCONTINUED | OUTPATIENT
Start: 2021-01-05 | End: 2021-01-08

## 2021-01-05 RX ORDER — CEFEPIME 1 G/1
INJECTION, POWDER, FOR SOLUTION INTRAMUSCULAR; INTRAVENOUS
Refills: 0 | Status: DISCONTINUED | OUTPATIENT
Start: 2021-01-05 | End: 2021-01-08

## 2021-01-05 RX ORDER — DOXAZOSIN MESYLATE 4 MG
1 TABLET ORAL
Qty: 0 | Refills: 0 | DISCHARGE

## 2021-01-05 RX ORDER — INSULIN LISPRO 100/ML
VIAL (ML) SUBCUTANEOUS
Refills: 0 | Status: DISCONTINUED | OUTPATIENT
Start: 2021-01-05 | End: 2021-01-08

## 2021-01-05 RX ORDER — VANCOMYCIN HCL 1 G
750 VIAL (EA) INTRAVENOUS EVERY 12 HOURS
Refills: 0 | Status: DISCONTINUED | OUTPATIENT
Start: 2021-01-05 | End: 2021-01-08

## 2021-01-05 RX ORDER — PERPHENAZINE 8 MG/1
16 TABLET, FILM COATED ORAL
Refills: 0 | Status: DISCONTINUED | OUTPATIENT
Start: 2021-01-05 | End: 2021-01-08

## 2021-01-05 RX ORDER — SODIUM CHLORIDE 9 MG/ML
500 INJECTION INTRAMUSCULAR; INTRAVENOUS; SUBCUTANEOUS ONCE
Refills: 0 | Status: COMPLETED | OUTPATIENT
Start: 2021-01-05 | End: 2021-01-05

## 2021-01-05 RX ORDER — ASPIRIN/CALCIUM CARB/MAGNESIUM 324 MG
81 TABLET ORAL DAILY
Refills: 0 | Status: DISCONTINUED | OUTPATIENT
Start: 2021-01-05 | End: 2021-01-08

## 2021-01-05 RX ORDER — SODIUM CHLORIDE 9 MG/ML
1000 INJECTION INTRAMUSCULAR; INTRAVENOUS; SUBCUTANEOUS
Refills: 0 | Status: DISCONTINUED | OUTPATIENT
Start: 2021-01-05 | End: 2021-01-09

## 2021-01-05 RX ADMIN — Medication 250 MILLIGRAM(S): at 13:40

## 2021-01-05 RX ADMIN — PIPERACILLIN AND TAZOBACTAM 200 GRAM(S): 4; .5 INJECTION, POWDER, LYOPHILIZED, FOR SOLUTION INTRAVENOUS at 13:11

## 2021-01-05 RX ADMIN — CEFEPIME 100 MILLIGRAM(S): 1 INJECTION, POWDER, FOR SOLUTION INTRAMUSCULAR; INTRAVENOUS at 18:55

## 2021-01-05 RX ADMIN — Medication 2: at 18:54

## 2021-01-05 RX ADMIN — Medication 250 MILLIGRAM(S): at 23:06

## 2021-01-05 RX ADMIN — SODIUM CHLORIDE 1000 MILLILITER(S): 9 INJECTION INTRAMUSCULAR; INTRAVENOUS; SUBCUTANEOUS at 19:00

## 2021-01-05 RX ADMIN — PERPHENAZINE 16 MILLIGRAM(S): 8 TABLET, FILM COATED ORAL at 21:42

## 2021-01-05 RX ADMIN — Medication 650 MILLIGRAM(S): at 23:19

## 2021-01-05 RX ADMIN — SODIUM CHLORIDE 75 MILLILITER(S): 9 INJECTION INTRAMUSCULAR; INTRAVENOUS; SUBCUTANEOUS at 23:07

## 2021-01-05 RX ADMIN — SIMVASTATIN 40 MILLIGRAM(S): 20 TABLET, FILM COATED ORAL at 23:06

## 2021-01-05 NOTE — H&P ADULT - ASSESSMENT
76 yo F from [facility], wheelchair bound with medical history significant of HTN, Diabetes, Osteoarthritis, Osteoporosis, Peripheral arterial disease, Diabetic neuropathy, HLD, Schizophrenia comes in with worsening ulceration to b/l heels.      Patient is being admitted to medicine for evaluation of foot ulcers and r/o osteomyelitis.

## 2021-01-05 NOTE — ED PROVIDER NOTE - CLINICAL SUMMARY MEDICAL DECISION MAKING FREE TEXT BOX
74 y/o F pt here for worsening b/l necrotic heel ulcers. Will perform labs, administer abx and admit for vascular/podiatry eval.

## 2021-01-05 NOTE — ED PROVIDER NOTE - OBJECTIVE STATEMENT
74 y/o F pt with a PMHx of HTN, DM, CAD, PVD, Osteomyelitis and Covid-19 and no significant PSHx presents to ED c/o worsening ulceration to b/l heels. Pt was placed on abx but is not improving and is now w/foul smelling discharge. Pt denies any other acute complaints. NKDA.

## 2021-01-05 NOTE — CONSULT NOTE ADULT - SUBJECTIVE AND OBJECTIVE BOX
Patient is a 75y old  Female who presents with a chief complaint of foot ulcer (05 Jan 2021 14:24)      HPI:  74 yo F from [facility], wheelchair bound with medical history significant of HTN, Diabetes, Osteoarthritis, Osteoporosis, Peripheral arterial disease, Diabetic neuropathy, HLD, Schizophrenia comes in with worsening ulceration to b/l heels. She has been having ulcers for past couple months, has been progressive. She was treated with IV antbx at NH but did not get better. It was worsening with necrotic changes and increasing foul smelling discharge. She denies fever, abdominal pain, chest pain, urinary symptoms, fall, trauma. No h/o nausea, vomiting, diarrhea, cough, dyspnea, sick contacts, recent illness.  (05 Jan 2021 14:24)      Podiatry HPI: 76 y/o female with full history as noted above, podiatry consulted for b/l heel wounds. Pt is from [facility], wheelchair bound with medical history significant of HTN, Diabetes, Osteoarthritis, Osteoporosis, PAD, Diabetic neuropathy, HLD, Schizophrenia comes in with worsening ulceration to b/l heels. Patient states she has had the wounds for a long time, maybe more than 6 months. She relates receiving local wound care previously in the NH using santyl dressings. She endorses occasional pain to wound sites. Patient was  under podiatric care in previous admissions for b/l heel wounds with osteomyelitis. She states she is not interesting in surgery for her feet, she wishes to continue with local wound care. She denies nausea, vomiting, chills, fever, SOB.       PMH:COVID-19    Osteomyelitis    DM (diabetes mellitus)    Paranoid schizophrenia    HLD (hyperlipidemia)    PAD (peripheral artery disease)    HTN (hypertension)      Allergies: No Known Allergies    Medications: aspirin enteric coated 81 milliGRAM(s) Oral daily  cefepime   IVPB      cefepime   IVPB 1000 milliGRAM(s) IV Intermittent once  cefepime   IVPB 1000 milliGRAM(s) IV Intermittent every 8 hours  Dakins Solution - Full Strength 1 Application(s) Topical once  insulin lispro (ADMELOG) corrective regimen sliding scale   SubCutaneous three times a day before meals  perphenazine 16 milliGRAM(s) Oral two times a day  simvastatin 40 milliGRAM(s) Oral at bedtime  sodium chloride 0.9% Bolus 500 milliLiter(s) IV Bolus once  vancomycin  IVPB 750 milliGRAM(s) IV Intermittent every 12 hours    FH:No pertinent family history in first degree relatives      PSX: No significant past surgical history      SH: aspirin enteric coated 81 milliGRAM(s) Oral daily  cefepime   IVPB      cefepime   IVPB 1000 milliGRAM(s) IV Intermittent once  cefepime   IVPB 1000 milliGRAM(s) IV Intermittent every 8 hours  Dakins Solution - Full Strength 1 Application(s) Topical once  insulin lispro (ADMELOG) corrective regimen sliding scale   SubCutaneous three times a day before meals  perphenazine 16 milliGRAM(s) Oral two times a day  simvastatin 40 milliGRAM(s) Oral at bedtime  sodium chloride 0.9% Bolus 500 milliLiter(s) IV Bolus once  vancomycin  IVPB 750 milliGRAM(s) IV Intermittent every 12 hours      Vital Signs Last 24 Hrs  T(C): 36.4 (05 Jan 2021 16:00), Max: 37.1 (05 Jan 2021 10:19)  T(F): 97.5 (05 Jan 2021 16:00), Max: 98.8 (05 Jan 2021 10:19)  HR: 67 (05 Jan 2021 16:00) (67 - 71)  BP: 92/43 (05 Jan 2021 16:00) (92/43 - 108/69)  BP(mean): 59 (05 Jan 2021 16:00) (59 - 59)  RR: 18 (05 Jan 2021 16:00) (16 - 20)  SpO2: 100% (05 Jan 2021 16:00) (98% - 100%)    LABS                        9.8    16.99 )-----------( 419      ( 05 Jan 2021 11:45 )             30.1               01-05    139  |  104  |  11  ----------------------------<  173<H>  4.2   |  28  |  0.41<L>    Ca    9.0      05 Jan 2021 11:45    TPro  6.2  /  Alb  2.1<L>  /  TBili  0.3  /  DBili  x   /  AST  10  /  ALT  14  /  AlkPhos  73  01-05      ROS  REVIEW OF SYSTEMS:  Other Review of Systems: All other review of systems negative, except as noted in HPI      PHYSICAL EXAM  LE Focused:    Vasc:  DP/PT nonpalpable, monophasic on doppler b/l, CFT brisk to digits, TG warm to cold b/l,   Derm:   Wound 1: L heel closed necrotic eschar measuring ~4x3.5x0.1 cm with periwound erythema, no undermining, no tunneling, no discharge, no malodor, no PTB  Wound 2: R heel necrotic eschar measuring ~ 9x6.5x0.3 cm with proximal opening which undermines and tracks distally and proximally ~ 2cm, +Probe to calcaneus bone, purulent drainage, + malodor,   Neuro: protective sensation grossly diminished at level of digits b/l  MSK: no tenderness on palpation of periwound areas b/l     IMAGING:  a< from: Xray Ankle Complete 3 Views, Bilateral (01.05.21 @ 14:37) >    EXAM:  ANKLE BILATERAL (MINIMUM 3 V)                            PROCEDURE DATE:  01/05/2021          INTERPRETATION:  CLINICAL INDICATION:  Osteomyelitis; evaluate for soft tissue emphysema.    COMPARISON:  Left ankle radiography 5/11/2020.    TECHNIQUE:  AP, oblique and crosstable lateral views left ankle. Oblique and crosstable lateral views right ankle. Technologist noted that the best possible films were obtained.    FINDINGS:    Right ankle: Generalized osteopenia. Subcutaneous emphysema is identified along the plantar aspect of the right hindfoot inferior to the calcaneus, with an overlying skin defect noted along the posterior aspect of the calcaneus. Osteolysis involving the inferior/posterior aspect of the right calcaneus cannot beexcluded. MRI evaluation can be performed for further assessment. Extensive vascular calcification is noted. No ankle joint effusion is noted.    Left ankle: Generalized osteopenia. There is no evidence for acute fracture or dislocation. The ankle mortise appears grossly intact. No ankle joint effusion is noted. Extensive vascular calcifications identified. No significant soft tissue swelling.      IMPRESSION:  No evidence for acute fracture. Subcutaneous emphysema is identified along the plantaraspect of the right hindfoot inferior to the calcaneus, with an overlying skin defect noted along the posterior aspect of the calcaneus. Osteolysis involving the inferior/posterior aspect of the right calcaneus cannot be excluded. MRI evaluation can be performed for further assessment.                MARA LARKIN MD; Attending Radiologist  This document has been electronically signed. Jan 5 2021  3:57PM    < end of copied text >        CULTURES:   Pending                Patient is a 75y old  Female who presents with a chief complaint of foot ulcer (05 Jan 2021 14:24)      HPI:  76 yo F from [facility], wheelchair bound with medical history significant of HTN, Diabetes, Osteoarthritis, Osteoporosis, Peripheral arterial disease, Diabetic neuropathy, HLD, Schizophrenia comes in with worsening ulceration to b/l heels. She has been having ulcers for past couple months, has been progressive. She was treated with IV antbx at NH but did not get better. It was worsening with necrotic changes and increasing foul smelling discharge. She denies fever, abdominal pain, chest pain, urinary symptoms, fall, trauma. No h/o nausea, vomiting, diarrhea, cough, dyspnea, sick contacts, recent illness.  (05 Jan 2021 14:24)      Podiatry HPI: 76 y/o female with full history as noted above, podiatry consulted for b/l heel wounds. Pt is from [facility], wheelchair bound with medical history significant of HTN, Diabetes, Osteoarthritis, Osteoporosis, PAD, Diabetic neuropathy, HLD, Schizophrenia comes in with worsening ulceration to b/l heels. Patient seen resting in bed comfortably, AAOx3. Patient states she has had the wounds for a long time, maybe more than 6 months. She relates receiving local wound care previously in the NH using santyl dressings. She endorses occasional pain to wound sites. Patient was  under podiatric care in previous admissions for b/l heel wounds with osteomyelitis. She states she is not interesting in surgery for her feet, she wishes to continue with local wound care. She denies nausea, vomiting, chills, fever, SOB.       PMH:COVID-19    Osteomyelitis    DM (diabetes mellitus)    Paranoid schizophrenia    HLD (hyperlipidemia)    PAD (peripheral artery disease)    HTN (hypertension)      Allergies: No Known Allergies    Medications: aspirin enteric coated 81 milliGRAM(s) Oral daily  cefepime   IVPB      cefepime   IVPB 1000 milliGRAM(s) IV Intermittent once  cefepime   IVPB 1000 milliGRAM(s) IV Intermittent every 8 hours  Dakins Solution - Full Strength 1 Application(s) Topical once  insulin lispro (ADMELOG) corrective regimen sliding scale   SubCutaneous three times a day before meals  perphenazine 16 milliGRAM(s) Oral two times a day  simvastatin 40 milliGRAM(s) Oral at bedtime  sodium chloride 0.9% Bolus 500 milliLiter(s) IV Bolus once  vancomycin  IVPB 750 milliGRAM(s) IV Intermittent every 12 hours    FH:No pertinent family history in first degree relatives      PSX: No significant past surgical history      SH: aspirin enteric coated 81 milliGRAM(s) Oral daily  cefepime   IVPB      cefepime   IVPB 1000 milliGRAM(s) IV Intermittent once  cefepime   IVPB 1000 milliGRAM(s) IV Intermittent every 8 hours  Dakins Solution - Full Strength 1 Application(s) Topical once  insulin lispro (ADMELOG) corrective regimen sliding scale   SubCutaneous three times a day before meals  perphenazine 16 milliGRAM(s) Oral two times a day  simvastatin 40 milliGRAM(s) Oral at bedtime  sodium chloride 0.9% Bolus 500 milliLiter(s) IV Bolus once  vancomycin  IVPB 750 milliGRAM(s) IV Intermittent every 12 hours      Vital Signs Last 24 Hrs  T(C): 36.4 (05 Jan 2021 16:00), Max: 37.1 (05 Jan 2021 10:19)  T(F): 97.5 (05 Jan 2021 16:00), Max: 98.8 (05 Jan 2021 10:19)  HR: 67 (05 Jan 2021 16:00) (67 - 71)  BP: 92/43 (05 Jan 2021 16:00) (92/43 - 108/69)  BP(mean): 59 (05 Jan 2021 16:00) (59 - 59)  RR: 18 (05 Jan 2021 16:00) (16 - 20)  SpO2: 100% (05 Jan 2021 16:00) (98% - 100%)    LABS                        9.8    16.99 )-----------( 419      ( 05 Jan 2021 11:45 )             30.1               01-05    139  |  104  |  11  ----------------------------<  173<H>  4.2   |  28  |  0.41<L>    Ca    9.0      05 Jan 2021 11:45    TPro  6.2  /  Alb  2.1<L>  /  TBili  0.3  /  DBili  x   /  AST  10  /  ALT  14  /  AlkPhos  73  01-05      ROS  REVIEW OF SYSTEMS:  Other Review of Systems: All other review of systems negative, except as noted in HPI      PHYSICAL EXAM  LE Focused:    Vasc:  DP/PT nonpalpable, monophasic on doppler b/l, CFT brisk to digits, TG warm to cold b/l,   Derm:   Wound 1: L heel closed necrotic eschar measuring ~4x3.5x0.1 cm with periwound erythema, no undermining, no tunneling, no discharge, no malodor, no PTB  Wound 2: R heel necrotic eschar measuring ~ 9x6.5x0.3 cm with proximal opening which undermines and tracks distally and proximally ~ 2cm, +Probe to calcaneus bone, purulent drainage, + malodor,   Neuro: protective sensation grossly diminished at level of digits b/l  MSK: no tenderness on palpation of periwound areas b/l     IMAGING:  a< from: Xray Ankle Complete 3 Views, Bilateral (01.05.21 @ 14:37) >    EXAM:  ANKLE BILATERAL (MINIMUM 3 V)                            PROCEDURE DATE:  01/05/2021          INTERPRETATION:  CLINICAL INDICATION:  Osteomyelitis; evaluate for soft tissue emphysema.    COMPARISON:  Left ankle radiography 5/11/2020.    TECHNIQUE:  AP, oblique and crosstable lateral views left ankle. Oblique and crosstable lateral views right ankle. Technologist noted that the best possible films were obtained.    FINDINGS:    Right ankle: Generalized osteopenia. Subcutaneous emphysema is identified along the plantar aspect of the right hindfoot inferior to the calcaneus, with an overlying skin defect noted along the posterior aspect of the calcaneus. Osteolysis involving the inferior/posterior aspect of the right calcaneus cannot beexcluded. MRI evaluation can be performed for further assessment. Extensive vascular calcification is noted. No ankle joint effusion is noted.    Left ankle: Generalized osteopenia. There is no evidence for acute fracture or dislocation. The ankle mortise appears grossly intact. No ankle joint effusion is noted. Extensive vascular calcifications identified. No significant soft tissue swelling.      IMPRESSION:  No evidence for acute fracture. Subcutaneous emphysema is identified along the plantaraspect of the right hindfoot inferior to the calcaneus, with an overlying skin defect noted along the posterior aspect of the calcaneus. Osteolysis involving the inferior/posterior aspect of the right calcaneus cannot be excluded. MRI evaluation can be performed for further assessment.                MARA LARKIN MD; Attending Radiologist  This document has been electronically signed. Jan 5 2021  3:57PM    < end of copied text >        CULTURES:   Pending

## 2021-01-05 NOTE — H&P ADULT - PROBLEM SELECTOR PLAN 1
Presented with ulcers of b/l heals, non healing, foul smelling  OE has 2+ edema to legs. R heel unstageable ulcer 8x8.5cm. L heel 3x3.5cm necrotic ulcer w/purulent drainage and malodorous  Xrays were done, awaiting official result  Podiatry was consulted  Will start on vanc and zosyn  blood culture were sent  ESr is elevated  f/u podiatry recommendation  ID consult Dr Wellington Presented with ulcers of b/l heals, non healing, foul smelling  OE has 2+ edema to legs. R heel unstageable ulcer 8x8.5cm. L heel 3x3.5cm necrotic ulcer w/purulent drainage and malodorous  Xrays were done, awaiting official result  Podiatry was consulted  Will start on vanc and cefepime  blood culture were sent  ESr is elevated  f/u podiatry recommendation  ID consult Dr Wellington

## 2021-01-05 NOTE — ED PROVIDER NOTE - SKIN, MLM
2+ edema to legs. R heel unstageable ulcer 8x8.5cm. L heel 3x3.5cm necrotic ulcer w/purulent drainage and malodor.

## 2021-01-05 NOTE — H&P ADULT - NSHPPHYSICALEXAM_GEN_ALL_CORE
Vital Signs (24 Hrs):  T(C): 36.9 (01-05-21 @ 11:54), Max: 37.1 (01-05-21 @ 10:19)  HR: 68 (01-05-21 @ 11:54) (68 - 71)  BP: 98/59 (01-05-21 @ 11:54) (98/59 - 108/69)  RR: 16 (01-05-21 @ 11:54) (16 - 20)  SpO2: 99% (01-05-21 @ 11:54) (98% - 99%)

## 2021-01-05 NOTE — H&P ADULT - NECK DETAILS
Problem: Respiratory Impairment - Respiratory Therapy 253  Intervention: Inhaled medication delivery  Intervention Status  Done         supple/no JVD

## 2021-01-05 NOTE — H&P ADULT - ATTENDING COMMENTS
PATIENT SEEN AND EXAMINED. CASE D/W ER MD AND RESIDENT TEAM. ABOVE ROS/VS/PE REVIEWED AND VERIFIED.    HPI:    76 yo F from [facility], wheelchair bound with medical history significant of HTN, Diabetes, Osteoarthritis, Osteoporosis, Peripheral arterial disease, Diabetic neuropathy, HLD, Schizophrenia comes in with worsening ulceration to b/l heels. She has been having ulcers for past couple months, has been progressive. She was treated with IV antbx at NH but did not get better. It was worsening with necrotic changes and increasing foul smelling discharge. She denies fever, abdominal pain, chest pain, urinary symptoms, fall, trauma. No h/o nausea, vomiting, diarrhea, cough, dyspnea, sick contacts, recent illness.     PLAN:    # BILATERAL LE CHRONIC ULCER NOW PROGRESSIVELY WORSENING W/ SSTI; UNDERWENT BEDSIDE DEBRIDEMENT OF RIGHT LEG ULCER - PLACED ON VANCOMYCIN + CEFEPIME, F/U TIMUR/PVR, F/U BCX, F/U WOUND CX, ID CONSULT IN PROGRESS, PODIATRY CONSULT IN PROGRESS  - WILL OBTAIN CT OR MRI OF LEGS PER PODIATRY RECOMMENDATION  - CURRENTLY PATIENT IS REFUSING SURGICAL INTERVENTION  # PAD  # HTN  # DM W/ DIABETIC NEUROPATHY  # OA/OP  # SCHIZOPHRENIA  # GI AND DVT PPX    ELEN CASILLAS MD COVERING FARHAN CASILLAS MD

## 2021-01-05 NOTE — H&P ADULT - HISTORY OF PRESENT ILLNESS
74 yo F from [facility], wheelchair bound with medical history significant of HTN, Diabetes, Osteoarthritis, Osteoporosis, Peripheral arterial disease, Diabetic neuropathy, HLD, Schizophrenia comes in with worsening ulceration to b/l heels. She has been having ulcers for past couple months, has been progressive. She was treated with IV antbx at NH but did not get better. It was worsening with necrotic changes and increasing foul smelling discharge. She denies fever, abdominal pain, chest pain, urinary symptoms, fall, trauma. No h/o nausea, vomiting, diarrhea, cough, dyspnea, sick contacts, recent illness.  Name band;

## 2021-01-05 NOTE — CONSULT NOTE ADULT - ASSESSMENT
P:   Patient evaluated and chart reviewed  Discussed diagnosis and treatment with patient, including surgical intervention.   Patient refused surgical procedures and wishes to continue with local wound care only. Patient understands the risk of losing limb and/or life.   Refusal to consent obtained   Obtained wound culture to be sent to Lab  Excisional debridement of Right heel eschar wound and necrotic tissue down to and including subcutaneous tissue using 15 blade   Patient tolerated procedure well with no complications  Right heel wound flushed with dakin's solution   Applied dakin's soaked gauze, DSD to RLE, DSD to LLE  Right ankle xrays evaluated, results as noted above  Ordered post-debridement RLE  Continue with IV antibiotics As Per ID  TIMUR/PVR studies pending   Patient to remain NWB to b/l LE  Recommend offloading to bilateral Heels using CAIR boots  Discussed importance of daily foot examinations and proper shoe gear and to importance of lower Fasting Blood Glucose levels.   Podiatry will follow while in house.  Discussed with Attending Dr. Watkins    P:   Patient evaluated and chart reviewed  Discussed diagnosis and treatment with patient, including surgical debridement of necrotic tissue and bone R foot  Patient refused surgical procedures and wishes to continue with local wound care only.   Patient understands the risk of losing limb and/or life. Refusal to consent form signed by patient   Obtained wound culture to be sent to Lab  Excisional debridement of Right heel eschar wound and necrotic tissue down to and including subcutaneous tissue using 15 blade   Patient tolerated procedure well with no complications  Right heel wound flushed with dakin's solution   Applied dakin's soaked gauze, DSD to RLE, DSD to LLE  Right ankle xrays evaluated, results as noted above  Ordered post-debridement RLE  Continue with IV antibiotics As Per ID  TIMUR/PVR studies pending   Patient to remain NWB to b/l LE  Recommend offloading to bilateral Heels using CAIR boots  Discussed importance of daily foot examinations and proper shoe gear and to importance of lower Fasting Blood Glucose levels.   Podiatry will follow while in house.  Discussed with Attending Dr. Watkins    P:   Patient evaluated and chart reviewed  Discussed diagnosis and treatment with patient, including surgical debridement of necrotic tissue and bone R foot  Patient refused surgical procedures and wishes to continue with local wound care only.   Patient understands the risk of losing limb and/or life. Refusal to consent form signed by patient   Obtained wound culture to be sent to Lab  Excisional debridement of Right heel eschar wound and necrotic tissue down to and including subcutaneous tissue using 15 blade   Patient tolerated procedure well with no complications  Right heel wound flushed with dakin's solution   Applied dakin's soaked gauze, DSD to RLE, DSD to LLE  Right ankle xrays evaluated, results as noted above  Ordered post-debridement RLE  Continue with IV antibiotics As Per ID  TIMUR/PVR studies pending   Recommend RLE MRI  Patient to remain NWB to b/l LE  Recommend offloading to bilateral Heels using CAIR boots  Discussed importance of daily foot examinations and proper shoe gear and to importance of lower Fasting Blood Glucose levels.   Podiatry will follow while in house.  Discussed with Attending Dr. Watkins

## 2021-01-05 NOTE — H&P ADULT - NSICDXPASTMEDICALHX_GEN_ALL_CORE_FT
PAST MEDICAL HISTORY:  COVID-19     DM (diabetes mellitus)     HLD (hyperlipidemia)     HTN (hypertension)     Osteomyelitis     PAD (peripheral artery disease)     Paranoid schizophrenia

## 2021-01-05 NOTE — ED PROVIDER NOTE - PMH
COVID-19    DM (diabetes mellitus)    HLD (hyperlipidemia)    HTN (hypertension)    Osteomyelitis    PAD (peripheral artery disease)    Paranoid schizophrenia

## 2021-01-06 DIAGNOSIS — L98.429 NON-PRESSURE CHRONIC ULCER OF BACK WITH UNSPECIFIED SEVERITY: ICD-10-CM

## 2021-01-06 DIAGNOSIS — M86.9 OSTEOMYELITIS, UNSPECIFIED: ICD-10-CM

## 2021-01-06 DIAGNOSIS — Z02.9 ENCOUNTER FOR ADMINISTRATIVE EXAMINATIONS, UNSPECIFIED: ICD-10-CM

## 2021-01-06 DIAGNOSIS — E78.5 HYPERLIPIDEMIA, UNSPECIFIED: ICD-10-CM

## 2021-01-06 DIAGNOSIS — L97.409 NON-PRESSURE CHRONIC ULCER OF UNSPECIFIED HEEL AND MIDFOOT WITH UNSPECIFIED SEVERITY: ICD-10-CM

## 2021-01-06 DIAGNOSIS — F20.0 PARANOID SCHIZOPHRENIA: ICD-10-CM

## 2021-01-06 LAB
A1C WITH ESTIMATED AVERAGE GLUCOSE RESULT: 6.6 % — HIGH (ref 4–5.6)
A1C WITH ESTIMATED AVERAGE GLUCOSE RESULT: 6.8 % — HIGH (ref 4–5.6)
ALBUMIN SERPL ELPH-MCNC: 1.9 G/DL — LOW (ref 3.5–5)
ALP SERPL-CCNC: 68 U/L — SIGNIFICANT CHANGE UP (ref 40–120)
ALT FLD-CCNC: 11 U/L DA — SIGNIFICANT CHANGE UP (ref 10–60)
ANION GAP SERPL CALC-SCNC: 11 MMOL/L — SIGNIFICANT CHANGE UP (ref 5–17)
ANISOCYTOSIS BLD QL: SLIGHT — SIGNIFICANT CHANGE UP
APPEARANCE UR: CLEAR — SIGNIFICANT CHANGE UP
AST SERPL-CCNC: 7 U/L — LOW (ref 10–40)
BACTERIA # UR AUTO: ABNORMAL /HPF
BASOPHILS # BLD AUTO: 0.02 K/UL — SIGNIFICANT CHANGE UP (ref 0–0.2)
BASOPHILS NFR BLD AUTO: 0.1 % — SIGNIFICANT CHANGE UP (ref 0–2)
BILIRUB SERPL-MCNC: 0.4 MG/DL — SIGNIFICANT CHANGE UP (ref 0.2–1.2)
BILIRUB UR-MCNC: NEGATIVE — SIGNIFICANT CHANGE UP
BUN SERPL-MCNC: 13 MG/DL — SIGNIFICANT CHANGE UP (ref 7–18)
CALCIUM SERPL-MCNC: 8.5 MG/DL — SIGNIFICANT CHANGE UP (ref 8.4–10.5)
CHLORIDE SERPL-SCNC: 103 MMOL/L — SIGNIFICANT CHANGE UP (ref 96–108)
CO2 SERPL-SCNC: 23 MMOL/L — SIGNIFICANT CHANGE UP (ref 22–31)
COLOR SPEC: YELLOW — SIGNIFICANT CHANGE UP
COMMENT - URINE: SIGNIFICANT CHANGE UP
CREAT SERPL-MCNC: 0.54 MG/DL — SIGNIFICANT CHANGE UP (ref 0.5–1.3)
CRP SERPL-MCNC: 5.63 MG/DL — HIGH (ref 0–0.4)
DIFF PNL FLD: ABNORMAL
EOSINOPHIL # BLD AUTO: 0.01 K/UL — SIGNIFICANT CHANGE UP (ref 0–0.5)
EOSINOPHIL NFR BLD AUTO: 0.1 % — SIGNIFICANT CHANGE UP (ref 0–6)
EPI CELLS # UR: SIGNIFICANT CHANGE UP /HPF
ESTIMATED AVERAGE GLUCOSE: 143 MG/DL — HIGH (ref 68–114)
ESTIMATED AVERAGE GLUCOSE: 148 MG/DL — HIGH (ref 68–114)
FOLATE SERPL-MCNC: 13.2 NG/ML — SIGNIFICANT CHANGE UP
GLUCOSE BLDC GLUCOMTR-MCNC: 140 MG/DL — HIGH (ref 70–99)
GLUCOSE BLDC GLUCOMTR-MCNC: 149 MG/DL — HIGH (ref 70–99)
GLUCOSE BLDC GLUCOMTR-MCNC: 150 MG/DL — HIGH (ref 70–99)
GLUCOSE BLDC GLUCOMTR-MCNC: 174 MG/DL — HIGH (ref 70–99)
GLUCOSE BLDC GLUCOMTR-MCNC: 175 MG/DL — HIGH (ref 70–99)
GLUCOSE SERPL-MCNC: 210 MG/DL — HIGH (ref 70–99)
GLUCOSE UR QL: NEGATIVE — SIGNIFICANT CHANGE UP
HCT VFR BLD CALC: 27.2 % — LOW (ref 34.5–45)
HGB BLD-MCNC: 8.9 G/DL — LOW (ref 11.5–15.5)
IMM GRANULOCYTES NFR BLD AUTO: 1.9 % — HIGH (ref 0–1.5)
KETONES UR-MCNC: ABNORMAL
LEUKOCYTE ESTERASE UR-ACNC: ABNORMAL
LYMPHOCYTES # BLD AUTO: 0.46 K/UL — LOW (ref 1–3.3)
LYMPHOCYTES # BLD AUTO: 2.9 % — LOW (ref 13–44)
MACROCYTES BLD QL: SLIGHT — SIGNIFICANT CHANGE UP
MAGNESIUM SERPL-MCNC: 1.6 MG/DL — SIGNIFICANT CHANGE UP (ref 1.6–2.6)
MANUAL SMEAR VERIFICATION: SIGNIFICANT CHANGE UP
MCHC RBC-ENTMCNC: 29.8 PG — SIGNIFICANT CHANGE UP (ref 27–34)
MCHC RBC-ENTMCNC: 32.7 GM/DL — SIGNIFICANT CHANGE UP (ref 32–36)
MCV RBC AUTO: 91 FL — SIGNIFICANT CHANGE UP (ref 80–100)
MONOCYTES # BLD AUTO: 0.97 K/UL — HIGH (ref 0–0.9)
MONOCYTES NFR BLD AUTO: 6 % — SIGNIFICANT CHANGE UP (ref 2–14)
NEUTROPHILS # BLD AUTO: 14.34 K/UL — HIGH (ref 1.8–7.4)
NEUTROPHILS NFR BLD AUTO: 89 % — HIGH (ref 43–77)
NITRITE UR-MCNC: NEGATIVE — SIGNIFICANT CHANGE UP
NRBC # BLD: 0 /100 WBCS — SIGNIFICANT CHANGE UP (ref 0–0)
PH UR: 5 — SIGNIFICANT CHANGE UP (ref 5–8)
PHOSPHATE SERPL-MCNC: 2 MG/DL — LOW (ref 2.5–4.5)
PLAT MORPH BLD: ABNORMAL
PLATELET # BLD AUTO: 401 K/UL — HIGH (ref 150–400)
POIKILOCYTOSIS BLD QL AUTO: SLIGHT — SIGNIFICANT CHANGE UP
POLYCHROMASIA BLD QL SMEAR: SLIGHT — SIGNIFICANT CHANGE UP
POTASSIUM SERPL-MCNC: 3.9 MMOL/L — SIGNIFICANT CHANGE UP (ref 3.5–5.3)
POTASSIUM SERPL-SCNC: 3.9 MMOL/L — SIGNIFICANT CHANGE UP (ref 3.5–5.3)
PROT SERPL-MCNC: 5.8 G/DL — LOW (ref 6–8.3)
PROT UR-MCNC: 30 MG/DL
RBC # BLD: 2.99 M/UL — LOW (ref 3.8–5.2)
RBC # FLD: 12.4 % — SIGNIFICANT CHANGE UP (ref 10.3–14.5)
RBC BLD AUTO: ABNORMAL
RBC CASTS # UR COMP ASSIST: ABNORMAL /HPF (ref 0–2)
SODIUM SERPL-SCNC: 137 MMOL/L — SIGNIFICANT CHANGE UP (ref 135–145)
SP GR SPEC: 1.01 — SIGNIFICANT CHANGE UP (ref 1.01–1.02)
UROBILINOGEN FLD QL: NEGATIVE — SIGNIFICANT CHANGE UP
VIT B12 SERPL-MCNC: 1697 PG/ML — HIGH (ref 232–1245)
WBC # BLD: 16.11 K/UL — HIGH (ref 3.8–10.5)
WBC # FLD AUTO: 16.11 K/UL — HIGH (ref 3.8–10.5)
WBC UR QL: SIGNIFICANT CHANGE UP /HPF (ref 0–5)

## 2021-01-06 PROCEDURE — 73718 MRI LOWER EXTREMITY W/O DYE: CPT | Mod: 26,RT

## 2021-01-06 RX ORDER — METRONIDAZOLE 500 MG
500 TABLET ORAL EVERY 8 HOURS
Refills: 0 | Status: DISCONTINUED | OUTPATIENT
Start: 2021-01-06 | End: 2021-01-08

## 2021-01-06 RX ORDER — METRONIDAZOLE 500 MG
TABLET ORAL
Refills: 0 | Status: DISCONTINUED | OUTPATIENT
Start: 2021-01-06 | End: 2021-01-08

## 2021-01-06 RX ORDER — ENOXAPARIN SODIUM 100 MG/ML
40 INJECTION SUBCUTANEOUS DAILY
Refills: 0 | Status: DISCONTINUED | OUTPATIENT
Start: 2021-01-06 | End: 2021-01-08

## 2021-01-06 RX ORDER — METRONIDAZOLE 500 MG
500 TABLET ORAL ONCE
Refills: 0 | Status: COMPLETED | OUTPATIENT
Start: 2021-01-06 | End: 2021-01-06

## 2021-01-06 RX ORDER — SODIUM,POTASSIUM PHOSPHATES 278-250MG
1 POWDER IN PACKET (EA) ORAL
Refills: 0 | Status: DISCONTINUED | OUTPATIENT
Start: 2021-01-06 | End: 2021-01-07

## 2021-01-06 RX ORDER — COLLAGENASE CLOSTRIDIUM HIST. 250 UNIT/G
1 OINTMENT (GRAM) TOPICAL DAILY
Refills: 0 | Status: DISCONTINUED | OUTPATIENT
Start: 2021-01-06 | End: 2021-01-08

## 2021-01-06 RX ADMIN — Medication 1 TABLET(S): at 17:32

## 2021-01-06 RX ADMIN — ENOXAPARIN SODIUM 40 MILLIGRAM(S): 100 INJECTION SUBCUTANEOUS at 17:11

## 2021-01-06 RX ADMIN — CEFEPIME 100 MILLIGRAM(S): 1 INJECTION, POWDER, FOR SOLUTION INTRAMUSCULAR; INTRAVENOUS at 05:12

## 2021-01-06 RX ADMIN — PERPHENAZINE 16 MILLIGRAM(S): 8 TABLET, FILM COATED ORAL at 17:32

## 2021-01-06 RX ADMIN — SIMVASTATIN 40 MILLIGRAM(S): 20 TABLET, FILM COATED ORAL at 21:25

## 2021-01-06 RX ADMIN — Medication 100 MILLIGRAM(S): at 21:24

## 2021-01-06 RX ADMIN — Medication 250 MILLIGRAM(S): at 13:16

## 2021-01-06 RX ADMIN — Medication 81 MILLIGRAM(S): at 13:16

## 2021-01-06 RX ADMIN — PERPHENAZINE 16 MILLIGRAM(S): 8 TABLET, FILM COATED ORAL at 08:33

## 2021-01-06 RX ADMIN — CEFEPIME 100 MILLIGRAM(S): 1 INJECTION, POWDER, FOR SOLUTION INTRAMUSCULAR; INTRAVENOUS at 21:24

## 2021-01-06 RX ADMIN — Medication 1 APPLICATION(S): at 13:16

## 2021-01-06 RX ADMIN — Medication 1: at 08:31

## 2021-01-06 RX ADMIN — Medication 650 MILLIGRAM(S): at 00:39

## 2021-01-06 RX ADMIN — CEFEPIME 100 MILLIGRAM(S): 1 INJECTION, POWDER, FOR SOLUTION INTRAMUSCULAR; INTRAVENOUS at 13:16

## 2021-01-06 RX ADMIN — Medication 1 TABLET(S): at 21:25

## 2021-01-06 RX ADMIN — Medication 100 MILLIGRAM(S): at 17:10

## 2021-01-06 NOTE — PROGRESS NOTE ADULT - ASSESSMENT
P:   Patient evaluated and chart reviewed  Discussed diagnosis and treatment with patient, including recommendation for surgical debridement of necrotic tissue and bone R foot, patient reiterated she only wants local wound care and no surgeries   Discussed potential risks with patient including loss of limb and/or life, patient demonstrated verbal understanding   Right heel cx pending   Right ankle xrays results as noted above   Right foot s/p debridement xrays evaluated, results as noted above  RLE MRI evaluated, results as noted above   Applied dakin's soaked gauze, DSD to RLE, DSD to LLE  Continue with IV antibiotics As Per ID  Patient to remain NWB to b/l LE  Recommend offloading to bilateral Heels using CAIR boots  Podiatry will follow while in house.  Seen and evaluated with Attending Dr. Watkins

## 2021-01-06 NOTE — PROGRESS NOTE ADULT - SUBJECTIVE AND OBJECTIVE BOX
Patient is a 75y old  Female who presents with a chief complaint of foot ulcer (05 Jan 2021 17:26)    PATIENT IS SEEN AND EXAMINED IN MEDICAL FLOOR.    ALLERGIES:  No Known Allergies    VITALS:    Vital Signs Last 24 Hrs  T(C): 36.3 (06 Jan 2021 05:13), Max: 36.6 (05 Jan 2021 19:00)  T(F): 97.4 (06 Jan 2021 05:13), Max: 97.8 (05 Jan 2021 19:00)  HR: 66 (06 Jan 2021 05:13) (66 - 76)  BP: 118/51 (06 Jan 2021 05:13) (92/43 - 118/51)  BP(mean): 70 (05 Jan 2021 19:00) (59 - 70)  RR: 16 (06 Jan 2021 05:13) (16 - 18)  SpO2: 100% (06 Jan 2021 05:13) (97% - 100%)    LABS:    CBC Full  -  ( 06 Jan 2021 06:29 )  WBC Count : 16.11 K/uL  RBC Count : 2.99 M/uL  Hemoglobin : 8.9 g/dL  Hematocrit : 27.2 %  Platelet Count - Automated : 401 K/uL  Mean Cell Volume : 91.0 fl  Mean Cell Hemoglobin : 29.8 pg  Mean Cell Hemoglobin Concentration : 32.7 gm/dL  Auto Neutrophil # : 14.34 K/uL  Auto Lymphocyte # : 0.46 K/uL  Auto Monocyte # : 0.97 K/uL  Auto Eosinophil # : 0.01 K/uL  Auto Basophil # : 0.02 K/uL  Auto Neutrophil % : 89.0 %  Auto Lymphocyte % : 2.9 %  Auto Monocyte % : 6.0 %  Auto Eosinophil % : 0.1 %  Auto Basophil % : 0.1 %    PT/INR - ( 05 Jan 2021 16:24 )   PT: 14.6 sec;   INR: 1.24 ratio         PTT - ( 05 Jan 2021 11:45 )  PTT:55.8 sec  01-06    137  |  103  |  13  ----------------------------<  210<H>  3.9   |  23  |  0.54    Ca    8.5      06 Jan 2021 06:29  Phos  2.0     01-06  Mg     1.6     01-06    TPro  5.8<L>  /  Alb  1.9<L>  /  TBili  0.4  /  DBili  x   /  AST  7<L>  /  ALT  11  /  AlkPhos  68  01-06    CAPILLARY BLOOD GLUCOSE    POCT Blood Glucose.: 175 mg/dL (06 Jan 2021 08:04)  POCT Blood Glucose.: 190 mg/dL (05 Jan 2021 23:38)  POCT Blood Glucose.: 221 mg/dL (05 Jan 2021 18:42)      LIVER FUNCTIONS - ( 06 Jan 2021 06:29 )  Alb: 1.9 g/dL / Pro: 5.8 g/dL / ALK PHOS: 68 U/L / ALT: 11 U/L DA / AST: 7 U/L / GGT: x           Creatinine Trend: 0.54<--, 0.41<--  I&O's Summary      MEDICATIONS:    MEDICATIONS  (STANDING):  aspirin enteric coated 81 milliGRAM(s) Oral daily  cefepime   IVPB      cefepime   IVPB 1000 milliGRAM(s) IV Intermittent every 8 hours  collagenase Ointment 1 Application(s) Topical daily  Dakins Solution - Full Strength 1 Application(s) Topical once  insulin lispro (ADMELOG) corrective regimen sliding scale   SubCutaneous three times a day before meals  perphenazine 16 milliGRAM(s) Oral two times a day  simvastatin 40 milliGRAM(s) Oral at bedtime  sodium chloride 0.9%. 1000 milliLiter(s) (75 mL/Hr) IV Continuous <Continuous>  vancomycin  IVPB 750 milliGRAM(s) IV Intermittent every 12 hours      MEDICATIONS  (PRN):  acetaminophen   Tablet .. 650 milliGRAM(s) Oral every 4 hours PRN Mild Pain (1 - 3), Moderate Pain (4 - 6)      REVIEW OF SYSTEMS:                           ALL ROS DONE [ X   ]    CONSTITUTIONAL:  LETHARGIC [   ], FEVER [   ], UNRESPONSIVE [   ]  CVS:  CP  [   ], SOB, [   ], PALPITATIONS [   ], DIZZYNESS [   ]  RS: COUGH [   ], SPUTUM [   ]  GI: ABDOMINAL PAIN [   ], NAUSEA [   ], VOMITINGS [   ], DIARRHEA [   ], CONSTIPATION [   ]  :  DYSURIA [   ], NOCTURIA [   ], INCREASED FREQUENCY [   ], DRIBLING [   ],  SKELETAL: PAINFUL JOINTS [   ], SWOLLEN JOINTS [   ], NECK ACHE [   ], LOW BACK ACHE [   ],  SKIN : ULCERS [   ], RASH [   ], ITCHING [   ]  CNS: HEAD ACHE [   ], DOUBLE VISION [   ], BLURRED VISION [   ], AMS / CONFUSION [   ], SEIZURES [   ], WEAKNESS [   ],TINGLING / NUMBNESS [   ]    PHYSICAL EXAMINATION:  GENERAL APPEARANCE: NO DISTRESS  HEENT:  NO PALLOR, NO  JVD,  NO   NODES, NECK SUPPLE  CVS: S1 +, S2 +,   RS: AEEB,  OCCASIONAL  RALES +,   NO RONCHI  ABD: SOFT, NT, NO, BS +  EXT: NO PE  SKIN: WARM,   SKELETAL:  ROM ACCEPTABLE  CNS:  AAO X    ,   DEFICITS    RADIOLOGY :      ASSESSMENT :     Osteomyelitis    Yes    COVID-19    Osteomyelitis    DM (diabetes mellitus)    Paranoid schizophrenia    HLD (hyperlipidemia)    PAD (peripheral artery disease)    HTN (hypertension)    No significant past surgical history        PLAN:  HPI:  76 yo F from [facility], wheelchair bound with medical history significant of HTN, Diabetes, Osteoarthritis, Osteoporosis, Peripheral arterial disease, Diabetic neuropathy, HLD, Schizophrenia comes in with worsening ulceration to b/l heels. She has been having ulcers for past couple months, has been progressive. She was treated with IV antbx at NH but did not get better. It was worsening with necrotic changes and increasing foul smelling discharge. She denies fever, abdominal pain, chest pain, urinary symptoms, fall, trauma. No h/o nausea, vomiting, diarrhea, cough, dyspnea, sick contacts, recent illness.  (05 Jan 2021 14:24)    PLAN:    # BILATERAL LE CHRONIC ULCER NOW PROGRESSIVELY WORSENING W/ SSTI; UNDERWENT BEDSIDE DEBRIDEMENT OF RIGHT LEG ULCER - PLACED ON VANCOMYCIN + CEFEPIME, F/U TIMUR/PVR, F/U BCX, F/U WOUND CX, ID CONSULT IN PROGRESS, PODIATRY CONSULT IN PROGRESS  - WILL OBTAIN CT OR MRI OF LEGS PER PODIATRY RECOMMENDATION  - CURRENTLY PATIENT IS REFUSING SURGICAL INTERVENTION  # PAD  # HTN  # DM W/ DIABETIC NEUROPATHY  # OA/OP  # SCHIZOPHRENIA  # GI AND DVT PPX    ELEN CASILLAS MD COVERING FARHAN CASILLAS MD.        Patient is a 75y old  Female who presents with a chief complaint of foot ulcer (05 Jan 2021 17:26)    PATIENT IS SEEN AND EXAMINED IN MEDICAL FLOOR.    ALLERGIES:  No Known Allergies    VITALS:    Vital Signs Last 24 Hrs  T(C): 36.3 (06 Jan 2021 05:13), Max: 36.6 (05 Jan 2021 19:00)  T(F): 97.4 (06 Jan 2021 05:13), Max: 97.8 (05 Jan 2021 19:00)  HR: 66 (06 Jan 2021 05:13) (66 - 76)  BP: 118/51 (06 Jan 2021 05:13) (92/43 - 118/51)  BP(mean): 70 (05 Jan 2021 19:00) (59 - 70)  RR: 16 (06 Jan 2021 05:13) (16 - 18)  SpO2: 100% (06 Jan 2021 05:13) (97% - 100%)    LABS:    CBC Full  -  ( 06 Jan 2021 06:29 )  WBC Count : 16.11 K/uL  RBC Count : 2.99 M/uL  Hemoglobin : 8.9 g/dL  Hematocrit : 27.2 %  Platelet Count - Automated : 401 K/uL  Mean Cell Volume : 91.0 fl  Mean Cell Hemoglobin : 29.8 pg  Mean Cell Hemoglobin Concentration : 32.7 gm/dL  Auto Neutrophil # : 14.34 K/uL  Auto Lymphocyte # : 0.46 K/uL  Auto Monocyte # : 0.97 K/uL  Auto Eosinophil # : 0.01 K/uL  Auto Basophil # : 0.02 K/uL  Auto Neutrophil % : 89.0 %  Auto Lymphocyte % : 2.9 %  Auto Monocyte % : 6.0 %  Auto Eosinophil % : 0.1 %  Auto Basophil % : 0.1 %    PT/INR - ( 05 Jan 2021 16:24 )   PT: 14.6 sec;   INR: 1.24 ratio         PTT - ( 05 Jan 2021 11:45 )  PTT:55.8 sec  01-06    137  |  103  |  13  ----------------------------<  210<H>  3.9   |  23  |  0.54    Ca    8.5      06 Jan 2021 06:29  Phos  2.0     01-06  Mg     1.6     01-06    TPro  5.8<L>  /  Alb  1.9<L>  /  TBili  0.4  /  DBili  x   /  AST  7<L>  /  ALT  11  /  AlkPhos  68  01-06    CAPILLARY BLOOD GLUCOSE    POCT Blood Glucose.: 175 mg/dL (06 Jan 2021 08:04)  POCT Blood Glucose.: 190 mg/dL (05 Jan 2021 23:38)  POCT Blood Glucose.: 221 mg/dL (05 Jan 2021 18:42)      LIVER FUNCTIONS - ( 06 Jan 2021 06:29 )  Alb: 1.9 g/dL / Pro: 5.8 g/dL / ALK PHOS: 68 U/L / ALT: 11 U/L DA / AST: 7 U/L / GGT: x           Creatinine Trend: 0.54<--, 0.41<--  I&O's Summary      MEDICATIONS:    MEDICATIONS  (STANDING):  aspirin enteric coated 81 milliGRAM(s) Oral daily  cefepime   IVPB      cefepime   IVPB 1000 milliGRAM(s) IV Intermittent every 8 hours  collagenase Ointment 1 Application(s) Topical daily  Dakins Solution - Full Strength 1 Application(s) Topical once  insulin lispro (ADMELOG) corrective regimen sliding scale   SubCutaneous three times a day before meals  perphenazine 16 milliGRAM(s) Oral two times a day  simvastatin 40 milliGRAM(s) Oral at bedtime  sodium chloride 0.9%. 1000 milliLiter(s) (75 mL/Hr) IV Continuous <Continuous>  vancomycin  IVPB 750 milliGRAM(s) IV Intermittent every 12 hours      MEDICATIONS  (PRN):  acetaminophen   Tablet .. 650 milliGRAM(s) Oral every 4 hours PRN Mild Pain (1 - 3), Moderate Pain (4 - 6)      REVIEW OF SYSTEMS:                           ALL ROS DONE [ X   ]    CONSTITUTIONAL:  LETHARGIC [   ], FEVER [   ], UNRESPONSIVE [   ]  CVS:  CP  [   ], SOB, [   ], PALPITATIONS [   ], DIZZYNESS [   ]  RS: COUGH [   ], SPUTUM [   ]  GI: ABDOMINAL PAIN [   ], NAUSEA [   ], VOMITINGS [   ], DIARRHEA [   ], CONSTIPATION [   ]  :  DYSURIA [   ], NOCTURIA [   ], INCREASED FREQUENCY [   ], DRIBLING [   ],  SKELETAL: PAINFUL JOINTS [   ], SWOLLEN JOINTS [   ], NECK ACHE [   ], LOW BACK ACHE [   ],  SKIN : ULCERS [   ], RASH [   ], ITCHING [   ]  CNS: HEAD ACHE [   ], DOUBLE VISION [   ], BLURRED VISION [   ], AMS / CONFUSION [   ], SEIZURES [   ], WEAKNESS [   ],TINGLING / NUMBNESS [   ]    PHYSICAL EXAMINATION:  GENERAL APPEARANCE: NO DISTRESS  HEENT:  NO PALLOR, NO  JVD,  NO   NODES, NECK SUPPLE  CVS: S1 +, S2 +,   RS: AEEB,  OCCASIONAL  RALES +,   NO RONCHI  ABD: SOFT, NT, NO, BS +  EXT: NO PE  SKIN: WARM, BILATERAL HEEL ULCERS - COVERED, SACRAL ULCER. RIGHT ISCHIAL ULCER  SKELETAL:  ROM ACCEPTABLE  CNS:  AAO X 2-3     RADIOLOGY :    EXAM:  MR FOOT RT                            PROCEDURE DATE:  01/06/2021          INTERPRETATION:  Clinical indications: Right heel ulceration and eschar status post debridement. Concern for osteomyelitis.    Multiplanar multisequence MRI of the right foot was performed localized to the hindfoot.    Correlation is made with prior MRI from September 11, 2019.    FINDINGS:    There is a large wound along the plantar aspect of the calcaneus posteriorly which extends nearly extends to bone. There is surrounding soft tissue edema about this site with evidence of an overlying bandage. The degree of soft tissue deficiency is increased compared to the prior examination. There is extensive osseous edema and corresponding hypointense T1 marrow signalthroughout the calcaneus extending from the posterior plantar margin to the level of the angle of Gissane. This is progressed compared to the prior examination and consistent with osteomyelitis. Marrow signal within the remainder of the foot is otherwise preserved.    There is confluent edema tracking along the abductor hallucis and flexor digitorum brevis muscles and within the plantar subcutaneous fat subjacent to this region. In total this area measures approximately 2.2 cm in transverse dimension, approximately 4 cm in anteroposterior posterior dimension, and approximately 1.6 cm in craniocaudal dimension. Findings may be related to underlying phlegmon or abscess. Evaluation is limited by the lack of intravenous contrast. Distal to this site there is edema throughout the visualized musculature consistent with myositis.    Visualized tendinous structures remain intact. There is no acute ligamentous injury. Visualized joint spaces are preserved without joint effusion.    IMPRESSION:    Osteomyelitis involving the calcaneus which extends from the plantar posterior aspect of the calcaneus to the level of the angle of Gissane. This is progressed compared to the prior examination. This is seen in the setting of diffuse soft tissue deficiency along the posterior plantar aspect of the calcaneus.    Confluent edema adjacent to this region within the abductor hallucis and flexor digitorum brevis muscles and within the subjacent plantar subcutaneous fat. This may be related to abscess orphlegmon.    ASSESSMENT :     Osteomyelitis    Yes    COVID-19    Osteomyelitis    DM (diabetes mellitus)    Paranoid schizophrenia    HLD (hyperlipidemia)    PAD (peripheral artery disease)    HTN (hypertension)    No significant past surgical history        PLAN:  HPI:  76 yo F from [facility], wheelchair bound with medical history significant of HTN, Diabetes, Osteoarthritis, Osteoporosis, Peripheral arterial disease, Diabetic neuropathy, HLD, Schizophrenia comes in with worsening ulceration to b/l heels. She has been having ulcers for past couple months, has been progressive. She was treated with IV antbx at NH but did not get better. It was worsening with necrotic changes and increasing foul smelling discharge. She denies fever, abdominal pain, chest pain, urinary symptoms, fall, trauma. No h/o nausea, vomiting, diarrhea, cough, dyspnea, sick contacts, recent illness.  (05 Jan 2021 14:24)    PLAN:    # BILATERAL LE CHRONIC ULCER NOW PROGRESSIVELY WORSENING W/ SSTI AND RIGHT CALCANEAL OSTEOMYELITIS W/ POSSIBLE ABSCESS; UNDERWENT BEDSIDE DEBRIDEMENT OF RIGHT LEG ULCER - PLACED ON VANCOMYCIN + CEFEPIME, F/U TIMUR/PVR, F/U BCX, F/U WOUND CX, ID CONSULT IN PROGRESS, PODIATRY CONSULT IN PROGRESS  - WILL OBTAIN CT OR MRI OF LEGS PER PODIATRY RECOMMENDATION  - CURRENTLY PATIENT IS REFUSING SURGICAL INTERVENTION, DESPITE EXTENSIVE DISCUSSION REGARDING MORTALITY RISK - WILL OBTAIN PSYCHIATRY CONSULT FOR CAPACITY  # SACRAL AND RIGHT ISCHIAL UNSTAGEABLE ULCERS W/ SSTI - ON VANCOMYCIN AND CEFEPIME, SURGERY CONSULT IN PROGRESS - PATIENT IS AGREEABLE FOR DEBRIDEMENT  - OBTAIN PSYCHIATRY CONSULT FOR CAPACITY  # PAD  # HTN  # DM W/ DIABETIC NEUROPATHY  # OA/OP  # SCHIZOPHRENIA  # GI AND DVT PPX    ELEN CASILLAS MD COVERING FARHAN CASILLAS MD.

## 2021-01-06 NOTE — CONSULT NOTE ADULT - SKIN
2+ edema to legs. R heel unstageable ulcer 8x8.5cm. L heel 3x3.5cm necrotic ulcer w/purulent drainage and malodor. detailed exam

## 2021-01-06 NOTE — PROGRESS NOTE ADULT - ASSESSMENT
This is a 75 year old wheelchair bound female from [facility] with a past medical history of HTN, type II diabetes mellitus, OA, osteoporosis, PAD, diabetic neuropathy, HLD, paranoid schizophrenia and recurrent osteomyelitis who presented to the emergency department on 1/5 with c/o worsening infection of her bilateral heel ulcerations in spite local wound care with santyl dressings and outpatient antibiotic management. Per ECC, patient's wounds have had increasing purulent, foul smelling discharge and necrotic changes. Patient with additional ulcerations to her coccyx, ischium, and sacrum for which wound care and surgery were consulted. Podiatry also consulted for her heel ulcerations: patient s/p bedside debridement of her right heel ulceration, refusing any surgical interventions at this time. Infectious disease also following.    1/6: MRI with findings of: Osteomyelitis involving the calcaneus which extends from the plantar posterior aspect of the calcaneus to the level of the angle of Gissane. This is progressed compared to the prior examination.   Pending TIMUR       This is a 75 year old wheelchair bound female from [facility] with a past medical history of HTN, type II diabetes mellitus, OA, osteoporosis, PAD, diabetic neuropathy, HLD, paranoid schizophrenia and recurrent osteomyelitis who presented to the emergency department on 1/5 with c/o worsening infection of her bilateral heel ulcerations in spite local wound care with santyl dressings and outpatient antibiotic management. Per ECC, patient's wounds have had increasing purulent, foul smelling discharge and necrotic changes. Patient with additional ulcerations to her coccyx, ischium, and sacrum for which wound care and surgery were consulted. Podiatry also consulted for her heel ulcerations: patient s/p bedside debridement of her right heel ulceration, refusing any surgical interventions at this time. Infectious disease also following.    1/6: MRI with findings of: Osteomyelitis involving the calcaneus which extends from the plantar posterior aspect of the calcaneus to the level of the angle of Gissane. This is progressed compared to the prior examination.   Pending TIMUR  Patient refusing surgical intervention - per MD Wellington, osteomyelitis will not resolve with antibiotics alone (bone biopsy not needed) recommends wound debridement and partial calcanectomy. Psychiatry to evaluate patient for capacity.        This is a 75 year old wheelchair bound female from [facility] with a past medical history of HTN, type II diabetes mellitus, OA, osteoporosis, PAD, diabetic neuropathy, HLD, paranoid schizophrenia and recurrent osteomyelitis who presented to the emergency department on 1/5 with c/o worsening infection of her bilateral heel ulcerations in spite local wound care with santyl dressings and outpatient antibiotic management. Per ECC, patient's wounds have had increasing purulent, foul smelling discharge and necrotic changes. Patient with additional ulcerations to her coccyx, ischium, and sacrum for which wound care and surgery were consulted. Podiatry also consulted for her heel ulcerations: patient s/p bedside debridement of her right heel ulceration, refusing any surgical interventions at this time. Infectious disease also following.    1/6: MRI with findings of: Osteomyelitis involving the calcaneus which extends from the plantar posterior aspect of the calcaneus to the level of the angle of Gissane. This is progressed compared to the prior examination.   Pending TIMUR  Patient refusing podiatry surgical intervention - per MD Wellington, osteomyelitis will not resolve with antibiotics alone (bone biopsy not needed) recommends wound debridement and partial calcanectomy. Psychiatry to evaluate patient for capacity. Of note, patient is amenable to surgical debridement of her sacral wound.

## 2021-01-06 NOTE — CHART NOTE - NSCHARTNOTEFT_GEN_A_CORE
Discussed treatment course with Dr. Wellington-ID including bone biopsy/PICC line vs surgical intervention. Per ID recommendation, bone infection will not resolve with abx alone, recommend wound debridement and partial calcanectomy  Patient currently refusing any surgical intervention for heel wound   F/u Psych evaluation   Podiatry to continue local wound care for b/l heel wounds  Discussed with attending Dr. Watkins Discussed treatment course with Dr. Wellington-ID including bone biopsy/PICC line vs surgical intervention. Per ID recommendation, bone infection will not resolve with abx alone, bone biopsy not needed at this time, recommend wound debridement and partial calcanectomy  Patient currently refusing any surgical intervention for heel wound   F/u Psych evaluation   Podiatry to continue local wound care for b/l heel wounds  Discussed with attending Dr. Watkins

## 2021-01-06 NOTE — CONSULT NOTE ADULT - GASTROINTESTINAL DETAILS
normal/soft/nontender normal/soft/nontender/no distention/no masses palpable/bowel sounds normal/no guarding/no rigidity/no organomegaly

## 2021-01-06 NOTE — PROGRESS NOTE ADULT - SUBJECTIVE AND OBJECTIVE BOX
Patient is a 75y old  Female who presents with a chief complaint of foot ulcer (05 Jan 2021 14:24)      HPI:  76 yo F from [facility], wheelchair bound with medical history significant of HTN, Diabetes, Osteoarthritis, Osteoporosis, Peripheral arterial disease, Diabetic neuropathy, HLD, Schizophrenia comes in with worsening ulceration to b/l heels. She has been having ulcers for past couple months, has been progressive. She was treated with IV antbx at NH but did not get better. It was worsening with necrotic changes and increasing foul smelling discharge. She denies fever, abdominal pain, chest pain, urinary symptoms, fall, trauma. No h/o nausea, vomiting, diarrhea, cough, dyspnea, sick contacts, recent illness.  (05 Jan 2021 14:24)      Podiatry HPI: 74 y/o female with full history as noted above, podiatry consulted for b/l heel wounds. Pt is from [facility], wheelchair bound with medical history significant of HTN, Diabetes, Osteoarthritis, Osteoporosis, PAD, Diabetic neuropathy, HLD, Schizophrenia comes in with worsening ulceration to b/l heels. Patient seen resting in bed comfortably, AAOx3. Patient states she has had the wounds for a long time, maybe more than 6 months. She relates receiving local wound care previously in the NH using santyl dressings. She endorses occasional pain to wound sites. Patient was  under podiatric care in previous admissions for b/l heel wounds with osteomyelitis. She states she is not interesting in surgery for her feet, she wishes to continue with local wound care. She denies nausea, vomiting, chills, fever, SOB.     Podiatry Progress: Full history as noted above, podiatry f/u for b/l heel wounds. Patient seen resting in bed comfortably, AAOx3. Patient denies nausea, vomiting, chills, fever, SOB.     Medications acetaminophen   Tablet .. 650 milliGRAM(s) Oral every 4 hours PRN  aspirin enteric coated 81 milliGRAM(s) Oral daily  cefepime   IVPB      cefepime   IVPB 1000 milliGRAM(s) IV Intermittent every 8 hours  collagenase Ointment 1 Application(s) Topical daily  Dakins Solution - Full Strength 1 Application(s) Topical once  enoxaparin Injectable 40 milliGRAM(s) SubCutaneous daily  insulin lispro (ADMELOG) corrective regimen sliding scale   SubCutaneous three times a day before meals  perphenazine 16 milliGRAM(s) Oral two times a day  potassium phosphate / sodium phosphate Tablet (K-PHOS No. 2) 1 Tablet(s) Oral four times a day with meals  simvastatin 40 milliGRAM(s) Oral at bedtime  sodium chloride 0.9%. 1000 milliLiter(s) IV Continuous <Continuous>  vancomycin  IVPB 750 milliGRAM(s) IV Intermittent every 12 hours    FHNo pertinent family history in first degree relatives    ,   PMHCOVID-19    Osteomyelitis    DM (diabetes mellitus)    Paranoid schizophrenia    HLD (hyperlipidemia)    PAD (peripheral artery disease)    HTN (hypertension)       PSHNo significant past surgical history        Labs                          8.9    16.11 )-----------( 401      ( 06 Jan 2021 06:29 )             27.2      01-06    137  |  103  |  13  ----------------------------<  210<H>  3.9   |  23  |  0.54    Ca    8.5      06 Jan 2021 06:29  Phos  2.0     01-06  Mg     1.6     01-06    TPro  5.8<L>  /  Alb  1.9<L>  /  TBili  0.4  /  DBili  x   /  AST  7<L>  /  ALT  11  /  AlkPhos  68  01-06     Vital Signs Last 24 Hrs  T(C): 36.5 (06 Jan 2021 12:41), Max: 36.6 (05 Jan 2021 19:00)  T(F): 97.7 (06 Jan 2021 12:41), Max: 97.8 (05 Jan 2021 19:00)  HR: 72 (06 Jan 2021 12:41) (66 - 76)  BP: 114/44 (06 Jan 2021 12:41) (92/43 - 118/51)  BP(mean): 70 (05 Jan 2021 19:00) (59 - 70)  RR: 16 (06 Jan 2021 12:41) (16 - 18)  SpO2: 100% (06 Jan 2021 12:41) (97% - 100%)  Sedimentation Rate, Erythrocyte: 99 mm/Hr (01-05-21 @ 11:45)         C-Reactive Protein, Serum: 5.63 mg/dL (01-06-21 @ 10:27)   WBC Count: 16.11 K/uL <H> (01-06-21 @ 06:29)        ROS  REVIEW OF SYSTEMS:  Other Review of Systems: All other review of systems negative, except as noted in HPI      PHYSICAL EXAM  LE Focused:    Vasc:  DP/PT nonpalpable, monophasic on doppler b/l, CFT brisk to digits, TG warm to cold b/l,   Derm:   Wound 1: L heel closed necrotic eschar measuring ~4x3.5x0.1 cm with periwound erythema, no undermining, no tunneling, no discharge, no malodor, no PTB  Wound 2: R heel wound s/p bedside debridement measuring ~ 9x6.5x0.3 cm with proximal opening which undermines and tracks distally and proximally ~ 2cm, +Probe to calcaneus bone, scant purulent drainage, + malodor,   Neuro: protective sensation grossly diminished at level of digits b/l  MSK: no tenderness on palpation of periwound areas b/l     IMAGING:  < from: MR Foot No Cont, Right (01.06.21 @ 11:08) >    EXAM:  MR FOOT RT                            PROCEDURE DATE:  01/06/2021          INTERPRETATION:  Clinical indications: Right heel ulceration and eschar status post debridement. Concern for osteomyelitis.    Multiplanar multisequence MRI of the right foot was performed localized to the hindfoot.    Correlation is made with prior MRI from September 11, 2019.    FINDINGS:    There is a large wound along the plantar aspect of the calcaneus posteriorly which extends nearly extends to bone. There is surrounding soft tissue edema about this site with evidence of an overlying bandage. The degree of soft tissue deficiency is increased compared to the prior examination. There is extensive osseous edema and corresponding hypointense T1 marrow signalthroughout the calcaneus extending from the posterior plantar margin to the level of the angle of Gissane. This is progressed compared to the prior examination and consistent with osteomyelitis. Marrow signal within the remainder of the foot is otherwise preserved.    There is confluent edema tracking along the abductor hallucis and flexor digitorum brevis muscles and within the plantar subcutaneous fat subjacent to this region. In total this area measures approximately 2.2 cm in transverse dimension, approximately 4 cm in anteroposterior posterior dimension, and approximately 1.6 cm in craniocaudal dimension. Findings may be related to underlying phlegmon or abscess. Evaluation is limited by the lack of intravenous contrast. Distal to this site there is edema throughout the visualized musculature consistent with myositis.    Visualized tendinous structures remain intact. There is no acute ligamentous injury. Visualized joint spaces are preserved without joint effusion.    IMPRESSION:    Osteomyelitis involving the calcaneus which extends from the plantar posterior aspect of the calcaneus to the level of the angle of Gissane. This is progressed compared to the prior examination. This is seen in the setting of diffuse soft tissue deficiency along the posterior plantar aspect of the calcaneus.    Confluent edema adjacent to this region within the abductor hallucis and flexor digitorum brevis muscles and within the subjacent plantar subcutaneous fat. This may be related to abscess orphlegmon.            OSWALDO ALFRED MD; Attending Radiologist  This document has been electronically signed. Jan 6 2021 11:49AM    < end of copied text >    -------------------------------------------------------------------------------------------------------------  < from: [facility] Physiol Extremity Lower 3+ Level, BI (01.05.21 @ 17:55) >    EXAM:  US PHYSIOL LWR EXT 3+ LEV BI                            PROCEDURE DATE:  01/05/2021          INTERPRETATION:  Clinical information: History of diabetes, nonsmoker, hypertension, hyperlipidemia, presents with bilateral heel ulcers.    Comparison: Lower extremity arterial Doppler study dated 5/13/2020.    Technique: Lower extremity arterial Doppler study. Note that pressure measurements were not obtained at the thigh level.    Findings: Ankle brachial index measures 1.33 bilaterally, compared with prior values of 1.41 on the right and 1.36 on the left. No segmental arterial pressure gradient is present.    PVR waveforms are normal in amplitude and pulsatility from the thigh through the ankle level. They're diminished in amplitude at the metatarsal and digital levels in symmetric fashion, similar to the prior exam.    Impression: No Doppler evidence of hemodynamically significant arterial inflow abnormality in either lower extremity.    Evidence of small vessel disease in the feet.    No significant interval change since study of 5/13/2020.            SOHAN PA MD; Attending Radiologist  This document has been electronically signed. Jan 6 2021  9:13AM    < end of copied text >      --------------------------------------------------------------------------------------------------------------  < from: Xray Foot AP + Lateral + Oblique, Right (01.05.21 @ 18:00) >    EXAM:  FOOT RIGHT (MINIMUM 3 VIEWS)                            PROCEDURE DATE:  01/05/2021          INTERPRETATION:  Right foot    HISTORY: Status post bedside debridement.     Two views of the right foot show thinning of soft tissues near the calcaneus likely indicating site of debridement. The joint spaces are maintained. There is questionable exposure of the plantar surface of the calcaneus.    IMPRESSION: Calcaneal soft tissues and questionable exposure as noted. Clinical correlation is suggested.        Thank you for this referral.            REID CRAMER MD; Attending Interventional Radiologist  This document has been electronically signed. Jan 6 2021 11:10AM    < end of copied text >    -------------------------------------------------------------------------------------------  a< from: Xray Ankle Complete 3 Views, Bilateral (01.05.21 @ 14:37) >    EXAM:  ANKLE BILATERAL (MINIMUM 3 V)                            PROCEDURE DATE:  01/05/2021          INTERPRETATION:  CLINICAL INDICATION:  Osteomyelitis; evaluate for soft tissue emphysema.    COMPARISON:  Left ankle radiography 5/11/2020.    TECHNIQUE:  AP, oblique and crosstable lateral views left ankle. Oblique and crosstable lateral views right ankle. Technologist noted that the best possible films were obtained.    FINDINGS:    Right ankle: Generalized osteopenia. Subcutaneous emphysema is identified along the plantar aspect of the right hindfoot inferior to the calcaneus, with an overlying skin defect noted along the posterior aspect of the calcaneus. Osteolysis involving the inferior/posterior aspect of the right calcaneus cannot beexcluded. MRI evaluation can be performed for further assessment. Extensive vascular calcification is noted. No ankle joint effusion is noted.    Left ankle: Generalized osteopenia. There is no evidence for acute fracture or dislocation. The ankle mortise appears grossly intact. No ankle joint effusion is noted. Extensive vascular calcifications identified. No significant soft tissue swelling.      IMPRESSION:  No evidence for acute fracture. Subcutaneous emphysema is identified along the plantaraspect of the right hindfoot inferior to the calcaneus, with an overlying skin defect noted along the posterior aspect of the calcaneus. Osteolysis involving the inferior/posterior aspect of the right calcaneus cannot be excluded. MRI evaluation can be performed for further assessment.                MARA LARKIN MD; Attending Radiologist  This document has been electronically signed. Jan 5 2021  3:57PM    < end of copied text >        CULTURES:   Pending

## 2021-01-06 NOTE — PROGRESS NOTE ADULT - PROBLEM SELECTOR PLAN 10
- will likely need long term antibiotic administration via PICC given findings of osteomyelitis, will be discharged back to [facility] once medically optimized - will likely need long term antibiotic administration via PICC given findings of osteomyelitis, will be discharged back to [facility] once medically optimized  Goals of care: patient is DNR/DNI, JE in chart

## 2021-01-06 NOTE — CONSULT NOTE ADULT - ASSESSMENT
76 y/o female with unstageable sacral decub  -Poss OR Friday for debridement   -Medical clearance, please document in chart  -Continue care as per primary team   -Preop tomorrow for OR debridement Friday 1/8, preop labs, NPO at midnight   -Continue local wound care, c/w santyl   -Offloading q2  -Discussed with Dr. Rivas who agrees

## 2021-01-06 NOTE — PROGRESS NOTE ADULT - ATTENDING COMMENTS
Patient seen at beside.  Stressed severity of this current condition and risk of potential loss of limb/life.  Patient still refusing any intervention.  Continue local wound care, antibioitics, bedside debridements if patient will allow.  f/u ID recs for antibiotic treatment.  antibiotics alone will not be curative

## 2021-01-06 NOTE — PROGRESS NOTE ADULT - SUBJECTIVE AND OBJECTIVE BOX
NP Note discussed with  Primary Attending    Patient is a 75y old  Female who presents with a chief complaint of foot ulcer (2021 13:14)      INTERVAL HPI/OVERNIGHT EVENTS: no new complaints    MEDICATIONS  (STANDING):  aspirin enteric coated 81 milliGRAM(s) Oral daily  cefepime   IVPB      cefepime   IVPB 1000 milliGRAM(s) IV Intermittent every 8 hours  collagenase Ointment 1 Application(s) Topical daily  Dakins Solution - Full Strength 1 Application(s) Topical once  enoxaparin Injectable 40 milliGRAM(s) SubCutaneous daily  insulin lispro (ADMELOG) corrective regimen sliding scale   SubCutaneous three times a day before meals  perphenazine 16 milliGRAM(s) Oral two times a day  potassium phosphate / sodium phosphate Tablet (K-PHOS No. 2) 1 Tablet(s) Oral four times a day with meals  simvastatin 40 milliGRAM(s) Oral at bedtime  sodium chloride 0.9%. 1000 milliLiter(s) (75 mL/Hr) IV Continuous <Continuous>  vancomycin  IVPB 750 milliGRAM(s) IV Intermittent every 12 hours    MEDICATIONS  (PRN):  acetaminophen   Tablet .. 650 milliGRAM(s) Oral every 4 hours PRN Mild Pain (1 - 3), Moderate Pain (4 - 6)      __________________________________________________  REVIEW OF SYSTEMS:    CONSTITUTIONAL: No fever,   EYES: no acute visual disturbances  NECK: No pain or stiffness  RESPIRATORY: No cough; No shortness of breath  CARDIOVASCULAR: No chest pain, no palpitations  GASTROINTESTINAL: No pain. No nausea or vomiting; No diarrhea   NEUROLOGICAL: + paresthesias to BLE   MUSCULOSKELETAL:  + pain to bilateral heels  GENITOURINARY: no dysuria, no frequency, no hesitancy  PSYCHIATRY: no depression , no anxiety  ALL OTHER  ROS negative        Vital Signs Last 24 Hrs  T(C): 36.5 (2021 12:41), Max: 36.6 (2021 19:00)  T(F): 97.7 (2021 12:41), Max: 97.8 (2021 19:00)  HR: 72 (2021 12:41) (66 - 76)  BP: 114/44 (2021 12:41) (92/43 - 118/51)  BP(mean): 70 (2021 19:00) (59 - 70)  RR: 16 (2021 12:41) (16 - 18)  SpO2: 100% (2021 12:41) (97% - 100%)    ________________________________________________  PHYSICAL EXAM:  GENERAL: NAD  HEENT: Normocephalic;  conjunctivae and sclerae clear; moist mucous membranes;   NECK : supple  CHEST/LUNG: Clear to auscultation bilaterally with good air entry   HEART: S1 S2  regular; no murmurs, gallops or rubs  ABDOMEN: Soft, Nontender, Nondistended; Bowel sounds present  EXTREMITIES:  nonpalpable pulses to bilateral DP/PT with +1 nonpitting edema  SKIN: eschar and slough to right heel, eschar to left heel, + sacral deep tissue injury, + unstageable ulcers to right ischium and coccyx   NERVOUS SYSTEM:  Awake and alert; Oriented  to place, person and time ; no new deficits    _________________________________________________  LABS:                        8.9    16.11 )-----------( 401      ( 2021 06:29 )             27.2     -    137  |  103  |  13  ----------------------------<  210<H>  3.9   |  23  |  0.54    Ca    8.5      2021 06:29  Phos  2.0     -  Mg     1.6     -    TPro  5.8<L>  /  Alb  1.9<L>  /  TBili  0.4  /  DBili  x   /  AST  7<L>  /  ALT  11  /  AlkPhos  68  -06    PT/INR - ( 2021 16:24 )   PT: 14.6 sec;   INR: 1.24 ratio         PTT - ( 2021 11:45 )  PTT:55.8 sec  Urinalysis Basic - ( 2021 02:33 )    Color: Yellow / Appearance: Clear / S.015 / pH: x  Gluc: x / Ketone: Small  / Bili: Negative / Urobili: Negative   Blood: x / Protein: 30 mg/dL / Nitrite: Negative   Leuk Esterase: Small / RBC: 10-25 /HPF / WBC 3-5 /HPF   Sq Epi: x / Non Sq Epi: Few /HPF / Bacteria: Few /HPF      CAPILLARY BLOOD GLUCOSE      POCT Blood Glucose.: 149 mg/dL (2021 13:14)  POCT Blood Glucose.: 175 mg/dL (2021 08:04)  POCT Blood Glucose.: 190 mg/dL (2021 23:38)  POCT Blood Glucose.: 221 mg/dL (2021 18:42)        RADIOLOGY & ADDITIONAL TESTS:    Imaging  Reviewed:  YES/NO    Consultant(s) Notes Reviewed:   YES/ No      Plan of care was discussed with patient and /or primary care giver; all questions and concerns were addressed

## 2021-01-06 NOTE — PROGRESS NOTE ADULT - PROBLEM SELECTOR PLAN 8
-There is an Unstageable Pressure Injury to the R. Ischium and Coccyx with slough, red tissue, drainage, and periwound maceration  -There is an intact DTI to the L. Sacral area without drainage  -Clean all wounds with normal saline and apply skin prep to the surrounding skin  -Please consult Surgery for possible debridement of the Coccyx wound  -Until then apply Collagenase ointment to the slough areas of the R. Ischial and Coccyx wound bed, apply saline moistened gauze, and cover with a Foam dressing Daily PRN  -Please consider s/p debridement for a 2 week course of Collagenase ointment to the slough areas of the Coccyx wound bed, apply saline moistened gauze, and cover with a Foam dressing Daily PRN  -Apply Adaptic gauze to the L. Sacral wound and cover with a Foam dressing Q 72hrs PRN  -Elevate/float the patients heels using heel protectors and reposition the patient Q 2hrs using wedges or pillows

## 2021-01-06 NOTE — CONSULT NOTE ADULT - ASSESSMENT
Gangrene and Osteomyelitis of right Calcaneous  Decubitus ulcer of right heel   Leukocytosis    Plan - Cont Vancomycin 750mgs iv q12 hrs for now  Cont Maxipime 1 gm iv q8 hrs for now  will add flagyl 500mgs iv q8 hrs  get Psych eval for competency to make medical decisions  will need surgical debridement and partial Calcanectomy of right heel and debridement of left heel also

## 2021-01-06 NOTE — ADVANCED PRACTICE NURSE CONSULT - RECOMMEDATIONS
-Clean all wounds with normal saline and apply skin prep to the surrounding skin  -Please consult Palliative Care for further evaluation  -Please consult Surgery for possible debridement of the Coccyx wound  -Until then apply Collagenase ointment to the slough areas of the R. Ischial and Coccyx wound bed, apply saline moistened gauze, and cover with a Foam dressing Daily PRN  -Please consider s/p debridement for a 2 week course of Collagenase ointment to the slough areas of the Coccyx wound bed, apply saline moistened gauze, and cover with a Foam dressing Daily PRN  -Apply Adaptic gauze to the L. Sacral wound and cover with a Foam dressing Q 72hrs PRN  -Elevate/float the patients heels using heel protectors and reposition the patient Q 2hrs using wedges or pillows

## 2021-01-06 NOTE — PROGRESS NOTE ADULT - PROBLEM SELECTOR PLAN 2
- heel lift boots, offloading of bony prominences  - rest of plan as above - heel lift boots, offloading of bony prominences  - NWB to right heel  - rest of plan as above

## 2021-01-06 NOTE — PROGRESS NOTE ADULT - PROBLEM SELECTOR PLAN 6
Problem: Patient/Family Goals  Goal: Patient/Family Long Term Goal  Patient's Long Term Goal: return [facility]    Interventions:  - lasix and monitoring.   - See additional Care Plan goals for specific interventions    Outcome: Progressing    Goal: Jaqui safest level of function  INTERVENTIONS:  - Assess patient stability and activity tolerance for standing, transferring and ambulating w/ or w/o assistive devices  - Assist with transfers and ambulation using safe patient handling equipment as needed  - Ens - continue perphenazine

## 2021-01-06 NOTE — PATIENT PROFILE ADULT - BILL PAYMENT
Patient would like to know the results of her recent lab work and would also like testing ordered to measure her hormone levels.    Patient would also like the resutts printed for her.        [phone]  
Per Dr. Rivera, patient should follow up with ob/gyne.  Patient notified and has verbalized understanding.  
Pt asking for hormone testing to be done. She states she has been fatigued and anxious, and also noticing dry skin and hair loss. All of this has been going on over the last 2 years since having her daughter. Reviewed normal labs. Discussed that some of these symptoms are expected changes postpartum.     Will review with provider for recommendations. Additional labwork? Or should she see ob/gyn?  
no

## 2021-01-06 NOTE — CONSULT NOTE ADULT - RS GEN PE MLT RESP DETAILS PC
breath sounds equal/good air movement/clear to auscultation bilaterally/no rales/no rhonchi/no wheezes

## 2021-01-06 NOTE — PROGRESS NOTE ADULT - PROBLEM SELECTOR PLAN 1
-  MRI with findings of: Osteomyelitis involving the calcaneus which extends from the plantar posterior aspect of the calcaneus to the level of the angle of Gissane. This is progressed compared to the prior examination  - s/p debridement of right heel   - on Vancomycin/Cefepime   - f/u wound cultures   - podiatry following  - infectious disease Dr. Wellington -  MRI with findings of: Osteomyelitis involving the calcaneus which extends from the plantar posterior aspect of the calcaneus to the level of the angle of Gissane. This is progressed compared to the prior examination  - s/p debridement of right heel   - on Vancomycin/Cefepime   - leukocytosis, WBC 16, trend CBC, fever curve  - f/u wound cultures   - podiatry following  - infectious disease Dr. Wellington

## 2021-01-06 NOTE — CONSULT NOTE ADULT - SUBJECTIVE AND OBJECTIVE BOX
HPI:  76 yo F from [facility], wheelchair bound with medical history significant of HTN, Diabetes, Osteoarthritis, Osteoporosis, Peripheral arterial disease, Diabetic neuropathy, HLD, Schizophrenia comes in with worsening ulceration to b/l heels. She has been having ulcers for past couple months, has been progressive. She was treated with IV antbx at NH but did not get better. It was worsening with necrotic changes and increasing foul smelling discharge. She denies fever, abdominal pain, chest pain, urinary symptoms, fall, trauma. No h/o nausea, vomiting, diarrhea, cough, dyspnea, sick contacts, recent illness.  (2021 14:24)      PAST MEDICAL & SURGICAL HISTORY:  COVID-19    Osteomyelitis    DM (diabetes mellitus)    Paranoid schizophrenia    HLD (hyperlipidemia)    PAD (peripheral artery disease)    HTN (hypertension)    No significant past surgical history        No Known Allergies      Meds:  acetaminophen   Tablet .. 650 milliGRAM(s) Oral every 4 hours PRN  aspirin enteric coated 81 milliGRAM(s) Oral daily  cefepime   IVPB      cefepime   IVPB 1000 milliGRAM(s) IV Intermittent every 8 hours  collagenase Ointment 1 Application(s) Topical daily  Dakins Solution - Full Strength 1 Application(s) Topical once  insulin lispro (ADMELOG) corrective regimen sliding scale   SubCutaneous three times a day before meals  perphenazine 16 milliGRAM(s) Oral two times a day  simvastatin 40 milliGRAM(s) Oral at bedtime  sodium chloride 0.9%. 1000 milliLiter(s) IV Continuous <Continuous>  vancomycin  IVPB 750 milliGRAM(s) IV Intermittent every 12 hours      SOCIAL HISTORY:  Smoker:  YES / NO        PACK YEARS:                         WHEN QUIT?  ETOH use:  YES / NO               FREQUENCY / QUANTITY:  Ilicit Drug use:  YES / NO  Occupation:  Assisted device use (Cane / Walker):  Live with:    FAMILY HISTORY:  No pertinent family history in first degree relatives        VITALS:  Vital Signs Last 24 Hrs  T(C): 36.5 (2021 12:41), Max: 36.6 (2021 19:00)  T(F): 97.7 (2021 12:41), Max: 97.8 (2021 19:00)  HR: 72 (2021 12:41) (66 - 76)  BP: 114/44 (2021 12:41) (92/43 - 118/51)  BP(mean): 70 (2021 19:00) (59 - 70)  RR: 16 (2021 12:41) (16 - 18)  SpO2: 100% (2021 12:41) (97% - 100%)    LABS/DIAGNOSTIC TESTS:                          8.9    16.11 )-----------( 401      ( 2021 06:29 )             27.2     WBC Count: 16.11 K/uL ( @ 06:29)  WBC Count: 16.99 K/uL ( @ 11:45)          137  |  103  |  13  ----------------------------<  210<H>  3.9   |  23  |  0.54    Ca    8.5      2021 06:29  Phos  2.0       Mg     1.6         TPro  5.8<L>  /  Alb  1.9<L>  /  TBili  0.4  /  DBili  x   /  AST  7<L>  /  ALT  11  /  AlkPhos  68  01-06      Urinalysis Basic - ( 2021 02:33 )    Color: Yellow / Appearance: Clear / S.015 / pH: x  Gluc: x / Ketone: Small  / Bili: Negative / Urobili: Negative   Blood: x / Protein: 30 mg/dL / Nitrite: Negative   Leuk Esterase: Small / RBC: 10-25 /HPF / WBC 3-5 /HPF   Sq Epi: x / Non Sq Epi: Few /HPF / Bacteria: Few /HPF        LIVER FUNCTIONS - ( 2021 06:29 )  Alb: 1.9 g/dL / Pro: 5.8 g/dL / ALK PHOS: 68 U/L / ALT: 11 U/L DA / AST: 7 U/L / GGT: x             PT/INR - ( 2021 16:24 )   PT: 14.6 sec;   INR: 1.24 ratio         PTT - ( 2021 11:45 )  PTT:55.8 sec    LACTATE:    ABG -     CULTURES:       RADIOLOGY:  ay< from: Xray Foot AP + Lateral + Oblique, Right (21 @ 18:00) >     Two views of the right foot show thinning of soft tissues near the calcaneus likely indicating site of debridement. The joint spaces are maintained. There is questionable exposure of the plantar surface of the calcaneus.    IMPRESSION: Calcaneal soft tissues and questionable exposure as noted. Clinical correlation is suggested.            ROS  [  ] UNABLE TO ELICIT               HPI:  74 yo F from [facility], wheelchair bound with medical history significant of HTN, Diabetes, Osteoarthritis, Osteoporosis, Peripheral arterial disease, Diabetic neuropathy, HLD, Schizophrenia comes in with worsening ulceration to b/l heels. She has been having ulcers for past couple months, has been progressive. She was treated with IV antbx at NH but did not get better. It was worsening with necrotic changes and increasing foul smelling discharge. She denies fever, abdominal pain, chest pain, urinary symptoms, fall, trauma. No h/o nausea, vomiting, diarrhea, cough, dyspnea, sick contacts, recent illness.  (2021 14:24)      History as above, pt is a poor historian and she is sometimes active in our discussions and answering our questions and sometimes will not answer anything? Asked to evaluate this patient as she has bilat heel ulcers with necrosis and right heel osteo chronically and has been refusing surgery for it for several months despite knowing the consequences.    PAST MEDICAL & SURGICAL HISTORY:  COVID-19    Osteomyelitis    DM (diabetes mellitus)    Paranoid schizophrenia    HLD (hyperlipidemia)    PAD (peripheral artery disease)    HTN (hypertension)    No significant past surgical history        No Known Allergies      Meds:  acetaminophen   Tablet .. 650 milliGRAM(s) Oral every 4 hours PRN  aspirin enteric coated 81 milliGRAM(s) Oral daily  cefepime   IVPB      cefepime   IVPB 1000 milliGRAM(s) IV Intermittent every 8 hours  collagenase Ointment 1 Application(s) Topical daily  Dakins Solution - Full Strength 1 Application(s) Topical once  insulin lispro (ADMELOG) corrective regimen sliding scale   SubCutaneous three times a day before meals  perphenazine 16 milliGRAM(s) Oral two times a day  simvastatin 40 milliGRAM(s) Oral at bedtime  sodium chloride 0.9%. 1000 milliLiter(s) IV Continuous <Continuous>  vancomycin  IVPB 750 milliGRAM(s) IV Intermittent every 12 hours      SOCIAL HISTORY:  Smoker:  denies  ETOH use:  denies      FAMILY HISTORY:  No pertinent family history in first degree relatives        VITALS:  Vital Signs Last 24 Hrs  T(C): 36.5 (2021 12:41), Max: 36.6 (2021 19:00)  T(F): 97.7 (2021 12:41), Max: 97.8 (2021 19:00)  HR: 72 (2021 12:41) (66 - 76)  BP: 114/44 (2021 12:41) (92/43 - 118/51)  BP(mean): 70 (2021 19:00) (59 - 70)  RR: 16 (2021 12:41) (16 - 18)  SpO2: 100% (2021 12:41) (97% - 100%)    LABS/DIAGNOSTIC TESTS:                          8.9    16.11 )-----------( 401      ( 2021 06:29 )             27.2     WBC Count: 16.11 K/uL ( @ 06:29)  WBC Count: 16.99 K/uL ( @ 11:45)          137  |  103  |  13  ----------------------------<  210<H>  3.9   |  23  |  0.54    Ca    8.5      2021 06:29  Phos  2.0       Mg     1.6         TPro  5.8<L>  /  Alb  1.9<L>  /  TBili  0.4  /  DBili  x   /  AST  7<L>  /  ALT  11  /  AlkPhos  68  06      Urinalysis Basic - ( 2021 02:33 )    Color: Yellow / Appearance: Clear / S.015 / pH: x  Gluc: x / Ketone: Small  / Bili: Negative / Urobili: Negative   Blood: x / Protein: 30 mg/dL / Nitrite: Negative   Leuk Esterase: Small / RBC: 10-25 /HPF / WBC 3-5 /HPF   Sq Epi: x / Non Sq Epi: Few /HPF / Bacteria: Few /HPF        LIVER FUNCTIONS - ( 2021 06:29 )  Alb: 1.9 g/dL / Pro: 5.8 g/dL / ALK PHOS: 68 U/L / ALT: 11 U/L DA / AST: 7 U/L / GGT: x             PT/INR - ( 2021 16:24 )   PT: 14.6 sec;   INR: 1.24 ratio         PTT - ( 2021 11:45 )  PTT:55.8 sec    LACTATE:    ABG -     CULTURES:       RADIOLOGY:< from: Xray Foot AP + Lateral + Oblique, Right (21 @ 18:00) >  EXAM:  FOOT RIGHT (MINIMUM 3 VIEWS)                            PROCEDURE DATE:  2021          INTERPRETATION:  Right foot    HISTORY: Status post bedside debridement.     Two views of the right foot show thinning of soft tissues near the calcaneus likely indicating site of debridement. The joint spaces are maintained. There is questionable exposure of the plantar surface of the calcaneus.    IMPRESSION: Calcaneal soft tissues and questionable exposure as noted. Clinical correlation is suggested.        Thank you for this referral.    < end of copied text >            ROS  [  ] UNABLE TO ELICIT

## 2021-01-06 NOTE — CONSULT NOTE ADULT - SKIN COMMENTS
right heel ulcer is large with necrotic skin overlying ulcer and palpable bone within the ulcer with a very foul necrotic odor and some purulent drainage, no surrounding cellulitis but tender to touch. the left heel ulcer is a circular area about 3 x 3 cm in size with a dry necrotic base which is unstageable  but does not appear deep and is non tender

## 2021-01-07 ENCOUNTER — TRANSCRIPTION ENCOUNTER (OUTPATIENT)
Age: 76
End: 2021-01-07

## 2021-01-07 LAB
-  AMIKACIN: SIGNIFICANT CHANGE UP
-  AMIKACIN: SIGNIFICANT CHANGE UP
-  AMOXICILLIN/CLAVULANIC ACID: SIGNIFICANT CHANGE UP
-  AMOXICILLIN/CLAVULANIC ACID: SIGNIFICANT CHANGE UP
-  AMPICILLIN/SULBACTAM: SIGNIFICANT CHANGE UP
-  AMPICILLIN/SULBACTAM: SIGNIFICANT CHANGE UP
-  AMPICILLIN: SIGNIFICANT CHANGE UP
-  AMPICILLIN: SIGNIFICANT CHANGE UP
-  AZTREONAM: SIGNIFICANT CHANGE UP
-  AZTREONAM: SIGNIFICANT CHANGE UP
-  CEFAZOLIN: SIGNIFICANT CHANGE UP
-  CEFAZOLIN: SIGNIFICANT CHANGE UP
-  CEFEPIME: SIGNIFICANT CHANGE UP
-  CEFEPIME: SIGNIFICANT CHANGE UP
-  CEFOXITIN: SIGNIFICANT CHANGE UP
-  CEFOXITIN: SIGNIFICANT CHANGE UP
-  CEFTAZIDIME/AVIBACTAM: SIGNIFICANT CHANGE UP
-  CEFTOLOZANE/TAZOBACTAM: SIGNIFICANT CHANGE UP
-  CEFTRIAXONE: SIGNIFICANT CHANGE UP
-  CEFTRIAXONE: SIGNIFICANT CHANGE UP
-  CIPROFLOXACIN: SIGNIFICANT CHANGE UP
-  CIPROFLOXACIN: SIGNIFICANT CHANGE UP
-  ERTAPENEM: SIGNIFICANT CHANGE UP
-  ERTAPENEM: SIGNIFICANT CHANGE UP
-  GENTAMICIN: SIGNIFICANT CHANGE UP
-  GENTAMICIN: SIGNIFICANT CHANGE UP
-  IMIPENEM: SIGNIFICANT CHANGE UP
-  LEVOFLOXACIN: SIGNIFICANT CHANGE UP
-  LEVOFLOXACIN: SIGNIFICANT CHANGE UP
-  MEROPENEM: SIGNIFICANT CHANGE UP
-  MEROPENEM: SIGNIFICANT CHANGE UP
-  PIPERACILLIN/TAZOBACTAM: SIGNIFICANT CHANGE UP
-  PIPERACILLIN/TAZOBACTAM: SIGNIFICANT CHANGE UP
-  TOBRAMYCIN: SIGNIFICANT CHANGE UP
-  TOBRAMYCIN: SIGNIFICANT CHANGE UP
-  TRIMETHOPRIM/SULFAMETHOXAZOLE: SIGNIFICANT CHANGE UP
-  TRIMETHOPRIM/SULFAMETHOXAZOLE: SIGNIFICANT CHANGE UP
ALBUMIN SERPL ELPH-MCNC: 1.7 G/DL — LOW (ref 3.5–5)
ALP SERPL-CCNC: 69 U/L — SIGNIFICANT CHANGE UP (ref 40–120)
ALT FLD-CCNC: 10 U/L DA — SIGNIFICANT CHANGE UP (ref 10–60)
ANION GAP SERPL CALC-SCNC: 9 MMOL/L — SIGNIFICANT CHANGE UP (ref 5–17)
ANISOCYTOSIS BLD QL: SLIGHT — SIGNIFICANT CHANGE UP
AST SERPL-CCNC: 12 U/L — SIGNIFICANT CHANGE UP (ref 10–40)
BASOPHILS # BLD AUTO: 0 K/UL — SIGNIFICANT CHANGE UP (ref 0–0.2)
BASOPHILS NFR BLD AUTO: 0 % — SIGNIFICANT CHANGE UP (ref 0–2)
BILIRUB SERPL-MCNC: 0.5 MG/DL — SIGNIFICANT CHANGE UP (ref 0.2–1.2)
BUN SERPL-MCNC: 20 MG/DL — HIGH (ref 7–18)
CALCIUM SERPL-MCNC: 8.5 MG/DL — SIGNIFICANT CHANGE UP (ref 8.4–10.5)
CHLORIDE SERPL-SCNC: 107 MMOL/L — SIGNIFICANT CHANGE UP (ref 96–108)
CO2 SERPL-SCNC: 23 MMOL/L — SIGNIFICANT CHANGE UP (ref 22–31)
CREAT SERPL-MCNC: 1.24 MG/DL — SIGNIFICANT CHANGE UP (ref 0.5–1.3)
CULTURE RESULTS: NO GROWTH — SIGNIFICANT CHANGE UP
EOSINOPHIL # BLD AUTO: 0 K/UL — SIGNIFICANT CHANGE UP (ref 0–0.5)
EOSINOPHIL NFR BLD AUTO: 0 % — SIGNIFICANT CHANGE UP (ref 0–6)
GLUCOSE BLDC GLUCOMTR-MCNC: 128 MG/DL — HIGH (ref 70–99)
GLUCOSE BLDC GLUCOMTR-MCNC: 140 MG/DL — HIGH (ref 70–99)
GLUCOSE BLDC GLUCOMTR-MCNC: 147 MG/DL — HIGH (ref 70–99)
GLUCOSE BLDC GLUCOMTR-MCNC: 154 MG/DL — HIGH (ref 70–99)
GLUCOSE SERPL-MCNC: 144 MG/DL — HIGH (ref 70–99)
HCT VFR BLD CALC: 26.1 % — LOW (ref 34.5–45)
HGB BLD-MCNC: 8.7 G/DL — LOW (ref 11.5–15.5)
LYMPHOCYTES # BLD AUTO: 0.65 K/UL — LOW (ref 1–3.3)
LYMPHOCYTES # BLD AUTO: 4 % — LOW (ref 13–44)
MAGNESIUM SERPL-MCNC: 1.7 MG/DL — SIGNIFICANT CHANGE UP (ref 1.6–2.6)
MANUAL SMEAR VERIFICATION: SIGNIFICANT CHANGE UP
MCHC RBC-ENTMCNC: 30 PG — SIGNIFICANT CHANGE UP (ref 27–34)
MCHC RBC-ENTMCNC: 33.3 GM/DL — SIGNIFICANT CHANGE UP (ref 32–36)
MCV RBC AUTO: 90 FL — SIGNIFICANT CHANGE UP (ref 80–100)
METHOD TYPE: SIGNIFICANT CHANGE UP
METHOD TYPE: SIGNIFICANT CHANGE UP
MICROCYTES BLD QL: SLIGHT — SIGNIFICANT CHANGE UP
MONOCYTES # BLD AUTO: 0.97 K/UL — HIGH (ref 0–0.9)
MONOCYTES NFR BLD AUTO: 6 % — SIGNIFICANT CHANGE UP (ref 2–14)
NEUTROPHILS # BLD AUTO: 14.53 K/UL — HIGH (ref 1.8–7.4)
NEUTROPHILS NFR BLD AUTO: 90 % — HIGH (ref 43–77)
NRBC # BLD: 2 /100 — HIGH (ref 0–0)
OVALOCYTES BLD QL SMEAR: SLIGHT — SIGNIFICANT CHANGE UP
PHOSPHATE SERPL-MCNC: 2.8 MG/DL — SIGNIFICANT CHANGE UP (ref 2.5–4.5)
PLAT MORPH BLD: NORMAL — SIGNIFICANT CHANGE UP
PLATELET # BLD AUTO: 399 K/UL — SIGNIFICANT CHANGE UP (ref 150–400)
POIKILOCYTOSIS BLD QL AUTO: SLIGHT — SIGNIFICANT CHANGE UP
POLYCHROMASIA BLD QL SMEAR: SLIGHT — SIGNIFICANT CHANGE UP
POTASSIUM SERPL-MCNC: 3.4 MMOL/L — LOW (ref 3.5–5.3)
POTASSIUM SERPL-SCNC: 3.4 MMOL/L — LOW (ref 3.5–5.3)
PROT SERPL-MCNC: 5.7 G/DL — LOW (ref 6–8.3)
RBC # BLD: 2.9 M/UL — LOW (ref 3.8–5.2)
RBC # FLD: 12.7 % — SIGNIFICANT CHANGE UP (ref 10.3–14.5)
RBC BLD AUTO: ABNORMAL
SARS-COV-2 IGG SERPL QL IA: POSITIVE
SARS-COV-2 IGM SERPL IA-ACNC: 38 INDEX — HIGH
SMUDGE CELLS # BLD: PRESENT — SIGNIFICANT CHANGE UP
SODIUM SERPL-SCNC: 139 MMOL/L — SIGNIFICANT CHANGE UP (ref 135–145)
SPECIMEN SOURCE: SIGNIFICANT CHANGE UP
WBC # BLD: 16.14 K/UL — HIGH (ref 3.8–10.5)
WBC # FLD AUTO: 16.14 K/UL — HIGH (ref 3.8–10.5)

## 2021-01-07 RX ORDER — MAGNESIUM SULFATE 500 MG/ML
2 VIAL (ML) INJECTION ONCE
Refills: 0 | Status: COMPLETED | OUTPATIENT
Start: 2021-01-07 | End: 2021-01-07

## 2021-01-07 RX ORDER — ACETAMINOPHEN 500 MG
650 TABLET ORAL EVERY 6 HOURS
Refills: 0 | Status: DISCONTINUED | OUTPATIENT
Start: 2021-01-07 | End: 2021-01-08

## 2021-01-07 RX ORDER — POTASSIUM CHLORIDE 20 MEQ
40 PACKET (EA) ORAL ONCE
Refills: 0 | Status: COMPLETED | OUTPATIENT
Start: 2021-01-07 | End: 2021-01-07

## 2021-01-07 RX ORDER — CHLORHEXIDINE GLUCONATE 213 G/1000ML
1 SOLUTION TOPICAL DAILY
Refills: 0 | Status: DISCONTINUED | OUTPATIENT
Start: 2021-01-07 | End: 2021-01-08

## 2021-01-07 RX ADMIN — Medication 100 MILLIGRAM(S): at 05:06

## 2021-01-07 RX ADMIN — Medication 650 MILLIGRAM(S): at 17:18

## 2021-01-07 RX ADMIN — Medication 650 MILLIGRAM(S): at 11:06

## 2021-01-07 RX ADMIN — CEFEPIME 100 MILLIGRAM(S): 1 INJECTION, POWDER, FOR SOLUTION INTRAMUSCULAR; INTRAVENOUS at 21:55

## 2021-01-07 RX ADMIN — Medication 40 MILLIEQUIVALENT(S): at 11:07

## 2021-01-07 RX ADMIN — Medication 1 APPLICATION(S): at 11:08

## 2021-01-07 RX ADMIN — Medication 250 MILLIGRAM(S): at 00:34

## 2021-01-07 RX ADMIN — PERPHENAZINE 16 MILLIGRAM(S): 8 TABLET, FILM COATED ORAL at 05:06

## 2021-01-07 RX ADMIN — ENOXAPARIN SODIUM 40 MILLIGRAM(S): 100 INJECTION SUBCUTANEOUS at 11:07

## 2021-01-07 RX ADMIN — Medication 81 MILLIGRAM(S): at 11:07

## 2021-01-07 RX ADMIN — CHLORHEXIDINE GLUCONATE 1 APPLICATION(S): 213 SOLUTION TOPICAL at 21:55

## 2021-01-07 RX ADMIN — Medication 250 MILLIGRAM(S): at 13:29

## 2021-01-07 RX ADMIN — CEFEPIME 100 MILLIGRAM(S): 1 INJECTION, POWDER, FOR SOLUTION INTRAMUSCULAR; INTRAVENOUS at 05:06

## 2021-01-07 RX ADMIN — Medication 100 MILLIGRAM(S): at 21:55

## 2021-01-07 RX ADMIN — Medication 100 MILLIGRAM(S): at 13:29

## 2021-01-07 RX ADMIN — CEFEPIME 100 MILLIGRAM(S): 1 INJECTION, POWDER, FOR SOLUTION INTRAMUSCULAR; INTRAVENOUS at 13:29

## 2021-01-07 RX ADMIN — Medication 50 GRAM(S): at 13:29

## 2021-01-07 RX ADMIN — PERPHENAZINE 16 MILLIGRAM(S): 8 TABLET, FILM COATED ORAL at 17:18

## 2021-01-07 RX ADMIN — Medication 1: at 17:18

## 2021-01-07 NOTE — DIETITIAN INITIAL EVALUATION ADULT. - FACTORS AFF FOOD INTAKE
weakness, osteomyelitis, PAD, diabetes, paranoid schizophrenia, sacral ulcer, HTN,/change in mental status/difficulty chewing/difficulty with food procurement/preparation/pain/other (specify)

## 2021-01-07 NOTE — PROGRESS NOTE ADULT - ASSESSMENT
75 y.o. Female with unstageable sacral decubitus, unstageable ischium ulcer     -OR tomorrow 1/7 with Dr. Rivas   -Preop labs in AM  -T & S in AM  -PT/PTT/INR in AM  -Discussed with medicine NP Tate, patient cleared for surgery, to be documented in chart, as per cardiology low cardiac risk  -NPO after midnight except PO meds  -IVF when NPO  -Chlorhexidine wipes  -DVT ppx  -Consent to be obtained 75 y.o. Female with unstageable sacral decubitus, unstageable ischium ulcer     -OR tomorrow 1/7 with Dr. Rivas   -Preop labs in AM  -T & S in AM  -PT/PTT/INR in AM  -Discussed with medicine NP Tate, patient cleared for surgery, to be documented in chart, as per cardiology low cardiac risk  -NPO after midnight except PO meds  -IVF when NPO  -Chlorhexidine wipes  -DVT ppx  -Consent to be obtained  -Continue care as per primary team  -Continue local wound care  75 y.o. Female with unstageable sacral decubitus, unstageable ischium ulcer     -OR tomorrow 1/7 with Dr. Rivas   -Preop labs in AM  -T & S in AM  -PT/PTT/INR in AM  -Discussed with medicine NP Carlotta, patient cleared for surgery, to be documented in chart, as per cardiology low cardiac risk  -NPO after midnight except PO meds  -IVF when NPO  -Chlorhexidine wipes  -DVT ppx  -Consent to be obtained  -Continue care as per primary team  -Continue local wound care

## 2021-01-07 NOTE — PROGRESS NOTE ADULT - PROBLEM SELECTOR PLAN 4
Controlled  Pt takes Amlodipine 5 mg & Metoprolol 100 mg at home  Resume home medications when medically appropriate

## 2021-01-07 NOTE — DIETITIAN INITIAL EVALUATION ADULT. - PERTINENT LABORATORY DATA
01-07 Na139 mmol/L Glu 144 mg/dL<H> K+ 3.4 mmol/L<L> Cr  1.24 mg/dL BUN 20 mg/dL<H> 01-07 Phos 2.8 mg/dL 01-07 Alb 1.7 g/dL<L> 01-05 Chol 121 mg/dL LDL --    HDL 36 mg/dL<L> Trig 112 mg/dL

## 2021-01-07 NOTE — PROGRESS NOTE ADULT - PROBLEM SELECTOR PLAN 3
Controlled as evidenced by A1c 6.8  Continue with sliding scale insulin coverage  Continue with glucose monitoring

## 2021-01-07 NOTE — PROGRESS NOTE ADULT - PROBLEM SELECTOR PLAN 1
MRI noted above  Pt refusing debridement of RLE  Continue with Vancomycin (day 3) and Cefepime (day 3)  Wound culture grew few Morganella morganii and few Proteus mirabilis  Podiatry following  - infectious disease Dr. Wellington

## 2021-01-07 NOTE — PROGRESS NOTE ADULT - PROBLEM SELECTOR PLAN 7
Pt to go to OR for debridement on 1/8  Continue with cleaning all wounds with normal saline and apply skin prep to the surrounding skin  Surgery following

## 2021-01-07 NOTE — PROGRESS NOTE ADULT - SUBJECTIVE AND OBJECTIVE BOX
Patient is a 75y old  Female who presents with a chief complaint of foot ulcer (07 Jan 2021 18:19)    PATIENT IS SEEN AND EXAMINED IN MEDICAL FLOOR.    ALLERGIES:  No Known Allergies    VITALS:    Vital Signs Last 24 Hrs  T(C): 36.6 (07 Jan 2021 13:05), Max: 37 (06 Jan 2021 20:40)  T(F): 97.9 (07 Jan 2021 13:05), Max: 98.6 (06 Jan 2021 20:40)  HR: 77 (07 Jan 2021 13:05) (77 - 95)  BP: 115/47 (07 Jan 2021 13:05) (115/47 - 129/79)  BP(mean): --  RR: 17 (07 Jan 2021 13:05) (16 - 17)  SpO2: 97% (07 Jan 2021 13:05) (97% - 100%)    LABS:    CBC Full  -  ( 07 Jan 2021 08:05 )  WBC Count : 16.14 K/uL  RBC Count : 2.90 M/uL  Hemoglobin : 8.7 g/dL  Hematocrit : 26.1 %  Platelet Count - Automated : 399 K/uL  Mean Cell Volume : 90.0 fl  Mean Cell Hemoglobin : 30.0 pg  Mean Cell Hemoglobin Concentration : 33.3 gm/dL  Auto Neutrophil # : 14.53 K/uL  Auto Lymphocyte # : 0.65 K/uL  Auto Monocyte # : 0.97 K/uL  Auto Eosinophil # : 0.00 K/uL  Auto Basophil # : 0.00 K/uL  Auto Neutrophil % : 90.0 %  Auto Lymphocyte % : 4.0 %  Auto Monocyte % : 6.0 %  Auto Eosinophil % : 0.0 %  Auto Basophil % : 0.0 %      01-07    139  |  107  |  20<H>  ----------------------------<  144<H>  3.4<L>   |  23  |  1.24    Ca    8.5      07 Jan 2021 08:05  Phos  2.8     01-07  Mg     1.7     01-07    TPro  5.7<L>  /  Alb  1.7<L>  /  TBili  0.5  /  DBili  x   /  AST  12  /  ALT  10  /  AlkPhos  69  01-07    CAPILLARY BLOOD GLUCOSE      POCT Blood Glucose.: 154 mg/dL (07 Jan 2021 16:21)  POCT Blood Glucose.: 140 mg/dL (07 Jan 2021 11:31)  POCT Blood Glucose.: 147 mg/dL (07 Jan 2021 07:49)  POCT Blood Glucose.: 140 mg/dL (06 Jan 2021 21:08)        LIVER FUNCTIONS - ( 07 Jan 2021 08:05 )  Alb: 1.7 g/dL / Pro: 5.7 g/dL / ALK PHOS: 69 U/L / ALT: 10 U/L DA / AST: 12 U/L / GGT: x           Creatinine Trend: 1.24<--, 0.54<--, 0.41<--  I&O's Summary          .Urine Clean Catch (Midstream)  01-06 @ 06:46   No growth  --  --      .Surgical Swab Right heel wound  01-05 @ 22:13   Few Morganella morganii  Few Proteus mirabilis  Moderate Corynebacterium species "Susceptibilities not performed"  --  Morganella morganii  Proteus mirabilis      .Blood Blood-Peripheral  01-05 @ 17:59   No growth to date.  --  --          MEDICATIONS:    MEDICATIONS  (STANDING):  acetaminophen   Tablet .. 650 milliGRAM(s) Oral every 6 hours  aspirin enteric coated 81 milliGRAM(s) Oral daily  cefepime   IVPB      cefepime   IVPB 1000 milliGRAM(s) IV Intermittent every 8 hours  chlorhexidine 2% Cloths 1 Application(s) Topical daily  collagenase Ointment 1 Application(s) Topical daily  Dakins Solution - Full Strength 1 Application(s) Topical once  enoxaparin Injectable 40 milliGRAM(s) SubCutaneous daily  insulin lispro (ADMELOG) corrective regimen sliding scale   SubCutaneous three times a day before meals  metroNIDAZOLE  IVPB      metroNIDAZOLE  IVPB 500 milliGRAM(s) IV Intermittent every 8 hours  perphenazine 16 milliGRAM(s) Oral two times a day  simvastatin 40 milliGRAM(s) Oral at bedtime  sodium chloride 0.9%. 1000 milliLiter(s) (75 mL/Hr) IV Continuous <Continuous>  vancomycin  IVPB 750 milliGRAM(s) IV Intermittent every 12 hours      MEDICATIONS  (PRN):      REVIEW OF SYSTEMS:                           ALL ROS DONE [ X   ]    CONSTITUTIONAL:  LETHARGIC [   ], FEVER [   ], UNRESPONSIVE [   ]  CVS:  CP  [   ], SOB, [   ], PALPITATIONS [   ], DIZZYNESS [   ]  RS: COUGH [   ], SPUTUM [   ]  GI: ABDOMINAL PAIN [   ], NAUSEA [   ], VOMITINGS [   ], DIARRHEA [   ], CONSTIPATION [   ]  :  DYSURIA [   ], NOCTURIA [   ], INCREASED FREQUENCY [   ], DRIBLING [   ],  SKELETAL: PAINFUL JOINTS [   ], SWOLLEN JOINTS [   ], NECK ACHE [   ], LOW BACK ACHE [   ],  SKIN : ULCERS [   ], RASH [   ], ITCHING [   ]  CNS: HEAD ACHE [   ], DOUBLE VISION [   ], BLURRED VISION [   ], AMS / CONFUSION [   ], SEIZURES [   ], WEAKNESS [   ],TINGLING / NUMBNESS [   ]    PHYSICAL EXAMINATION:  GENERAL APPEARANCE: NO DISTRESS  HEENT:  NO PALLOR, NO  JVD,  NO   NODES, NECK SUPPLE  CVS: S1 +, S2 +,   RS: AEEB,  OCCASIONAL  RALES +,   NO RONCHI  ABD: SOFT, NT, NO, BS +  EXT: NO PE  SKIN: WARM, BILATERAL HEEL ULCERS - COVERED, SACRAL ULCER. RIGHT ISCHIAL ULCER  SKELETAL:  ROM ACCEPTABLE  CNS:  AAO X 2-3     RADIOLOGY :    EXAM:  MR FOOT RT                            PROCEDURE DATE:  01/06/2021          INTERPRETATION:  Clinical indications: Right heel ulceration and eschar status post debridement. Concern for osteomyelitis.    Multiplanar multisequence MRI of the right foot was performed localized to the hindfoot.    Correlation is made with prior MRI from September 11, 2019.    FINDINGS:    There is a large wound along the plantar aspect of the calcaneus posteriorly which extends nearly extends to bone. There is surrounding soft tissue edema about this site with evidence of an overlying bandage. The degree of soft tissue deficiency is increased compared to the prior examination. There is extensive osseous edema and corresponding hypointense T1 marrow signalthroughout the calcaneus extending from the posterior plantar margin to the level of the angle of Gissane. This is progressed compared to the prior examination and consistent with osteomyelitis. Marrow signal within the remainder of the foot is otherwise preserved.    There is confluent edema tracking along the abductor hallucis and flexor digitorum brevis muscles and within the plantar subcutaneous fat subjacent to this region. In total this area measures approximately 2.2 cm in transverse dimension, approximately 4 cm in anteroposterior posterior dimension, and approximately 1.6 cm in craniocaudal dimension. Findings may be related to underlying phlegmon or abscess. Evaluation is limited by the lack of intravenous contrast. Distal to this site there is edema throughout the visualized musculature consistent with myositis.    Visualized tendinous structures remain intact. There is no acute ligamentous injury. Visualized joint spaces are preserved without joint effusion.    IMPRESSION:    Osteomyelitis involving the calcaneus which extends from the plantar posterior aspect of the calcaneus to the level of the angle of Gissane. This is progressed compared to the prior examination. This is seen in the setting of diffuse soft tissue deficiency along the posterior plantar aspect of the calcaneus.    Confluent edema adjacent to this region within the abductor hallucis and flexor digitorum brevis muscles and within the subjacent plantar subcutaneous fat. This may be related to abscess orphlegmon.    ASSESSMENT :     Osteomyelitis    Yes    COVID-19    Osteomyelitis    DM (diabetes mellitus)    Paranoid schizophrenia    HLD (hyperlipidemia)    PAD (peripheral artery disease)    HTN (hypertension)    No significant past surgical history        PLAN:  HPI:  74 yo F from [facility], wheelchair bound with medical history significant of HTN, Diabetes, Osteoarthritis, Osteoporosis, Peripheral arterial disease, Diabetic neuropathy, HLD, Schizophrenia comes in with worsening ulceration to b/l heels. She has been having ulcers for past couple months, has been progressive. She was treated with IV antbx at NH but did not get better. It was worsening with necrotic changes and increasing foul smelling discharge. She denies fever, abdominal pain, chest pain, urinary symptoms, fall, trauma. No h/o nausea, vomiting, diarrhea, cough, dyspnea, sick contacts, recent illness.  (05 Jan 2021 14:24)    PLAN:    # BILATERAL LE CHRONIC ULCER NOW PROGRESSIVELY WORSENING W/ SSTI AND RIGHT CALCANEAL OSTEOMYELITIS W/ POSSIBLE ABSCESS; UNDERWENT BEDSIDE DEBRIDEMENT OF RIGHT LEG ULCER - PLACED ON VANCOMYCIN + CEFEPIME, F/U TIMUR/PVR, F/U BCX, F/U WOUND CX, ID CONSULT IN PROGRESS, PODIATRY CONSULT IN PROGRESS  - REVIEWED RIGHT LE MRI  - CURRENTLY PATIENT IS REFUSING SURGICAL INTERVENTION, DESPITE EXTENSIVE DISCUSSION REGARDING MORTALITY RISK - PSYCHIATRY CONSULT FOR CAPACITY IN PROGRESS  # MEDICAL CLEARANCE FOR SURGERY - RCRI 1 POINT - 6% RISK OF DEATH, MI OR CARDIAC ARREST; CARDIOLOGY CONSULT IN PROGRESS FOR MEDICAL CLEARANCE  # SACRAL AND RIGHT ISCHIAL UNSTAGEABLE ULCERS W/ SSTI - ON VANCOMYCIN AND CEFEPIME, SURGERY CONSULT IN PROGRESS - PATIENT IS AGREEABLE FOR DEBRIDEMENT  -  PSYCHIATRY CONSULT FOR CAPACITY IN PROGRESS  # PAD  # HTN  # DM W/ DIABETIC NEUROPATHY  # OA/OP  # SCHIZOPHRENIA  # GI AND DVT PPX    ELEN CASILLAS MD COVERING FARHAN CASILLAS MD.

## 2021-01-07 NOTE — DIETITIAN INITIAL EVALUATION ADULT. - ORAL NUTRITION SUPPLEMENTS
Add Glucerna Shake 1can bid (440kcal, 20g protein) & 1 packet of Prosource (60 Kcal, Protein 15 grams) daily as medically feasible

## 2021-01-07 NOTE — DIETITIAN INITIAL EVALUATION ADULT. - ADD RECOMMEND
Add MVI/minerals/Vit. C 500 mg BID & Zinc Sulfate 220 mg once daily for wound healing as medically feasible

## 2021-01-07 NOTE — DIETITIAN INITIAL EVALUATION ADULT. - PERTINENT MEDS FT
MEDICATIONS  (STANDING):  acetaminophen   Tablet .. 650 milliGRAM(s) Oral every 6 hours  aspirin enteric coated 81 milliGRAM(s) Oral daily  cefepime   IVPB      cefepime   IVPB 1000 milliGRAM(s) IV Intermittent every 8 hours  collagenase Ointment 1 Application(s) Topical daily  Dakins Solution - Full Strength 1 Application(s) Topical once  enoxaparin Injectable 40 milliGRAM(s) SubCutaneous daily  insulin lispro (ADMELOG) corrective regimen sliding scale   SubCutaneous three times a day before meals  magnesium sulfate  IVPB 2 Gram(s) IV Intermittent once  metroNIDAZOLE  IVPB      metroNIDAZOLE  IVPB 500 milliGRAM(s) IV Intermittent every 8 hours  perphenazine 16 milliGRAM(s) Oral two times a day  simvastatin 40 milliGRAM(s) Oral at bedtime  sodium chloride 0.9%. 1000 milliLiter(s) (75 mL/Hr) IV Continuous <Continuous>  vancomycin  IVPB 750 milliGRAM(s) IV Intermittent every 12 hours    MEDICATIONS  (PRN):

## 2021-01-07 NOTE — PROGRESS NOTE ADULT - ASSESSMENT
A:  Osteomyelitis of calcaneus R foot   DMII    P:   Patient evaluated and chart reviewed  Once again diagnosis and treatment with patient, including recommendation for surgical debridement of necrotic tissue and bone R foot, patient reiterated she only wants local wound care and no surgeries   Discussed potential risks with patient including loss of limb and/or life, patient demonstrated verbal understanding   Right heel cx preliminary results as noted above  Right ankle xrays results as noted above   Right foot s/p debridement xrays evaluated, results as noted above  RLE MRI evaluated, results as noted above   Applied dakin's soaked gauze, DSD to RLE, DSD to LLE  Continue with IV antibiotics As Per ID  Patient to remain NWB to b/l LE  Recommend offloading to bilateral Heels using CAIR boots  Podiatry will follow while in house.  Seen and evaluated with Attending Dr. Bean  discussed with attending Dr. Watkins

## 2021-01-07 NOTE — DIETITIAN INITIAL EVALUATION ADULT. - OTHER INFO
Patient from [facility] & recently d/[place] in May, 2020. Visited pt. alert but confused, poor historian, d/w PCA, pt. with fair po intake, consumed ~40% of breakfast meal today, needs encouragement, has no partial dentures & dry mouth noted, Psych consulted, multiple pressure ulcers (unstageable & suspected DTI's) with wound care, also possible surgery on 01/08/21 for debridement. Podiatry following for heel ulcerations, s/p bedside debridement of rt heel ulcer on 01/05/21 & rt./left leg edema 1+ note.

## 2021-01-07 NOTE — PROGRESS NOTE ADULT - SUBJECTIVE AND OBJECTIVE BOX
Surgery Pre-op    Dx: Unstageable sacral decubitus ulcer, unstageable right ischium ulcer   Procedure: Debridement of sacral decubitus ulcer, unstageable right ischium ulcer     Vital Signs Last 24 Hrs  T(C): 36.6 (2021 13:05), Max: 37 (2021 20:40)  T(F): 97.9 (2021 13:05), Max: 98.6 (2021 20:40)  HR: 77 (2021 13:05) (77 - 95)  BP: 115/47 (2021 13:05) (110/59 - 129/79)  BP(mean): --  RR: 17 (2021 13:05) (16 - 17)  SpO2: 97% (2021 13:05) (97% - 100%)    LABS:                        8.7    16.14 )-----------( 399      ( 2021 08:05 )             26.1              01-07    139  |  107  |  20<H>  ----------------------------<  144<H>  3.4<L>   |  23  |  1.24    Ca    8.5      2021 08:05  Phos  2.8     01-07  Mg     1.7     -07    TPro  5.7<L>  /  Alb  1.7<L>  /  TBili  0.5  /  DBili  x   /  AST  12  /  ALT  10  /  AlkPhos  69  01-07      Urinalysis Basic - ( 2021 02:33 )    Color: Yellow / Appearance: Clear / S.015 / pH: x  Gluc: x / Ketone: Small  / Bili: Negative / Urobili: Negative   Blood: x / Protein: 30 mg/dL / Nitrite: Negative   Leuk Esterase: Small / RBC: 10-25 /HPF / WBC 3-5 /HPF   Sq Epi: x / Non Sq Epi: Few /HPF / Bacteria: Few /HPF     Surgery Pre-op    Dx: Unstageable sacral decubitus ulcer, unstageable right ischium ulcer     Procedure: Debridement of sacral decubitus ulcer, unstageable right ischium ulcer     Vital Signs Last 24 Hrs  T(C): 36.6 (2021 13:05), Max: 37 (2021 20:40)  T(F): 97.9 (2021 13:05), Max: 98.6 (2021 20:40)  HR: 77 (2021 13:05) (77 - 95)  BP: 115/47 (2021 13:05) (110/59 - 129/79)  BP(mean): --  RR: 17 (2021 13:05) (16 - 17)  SpO2: 97% (2021 13:05) (97% - 100%)    LABS:                        8.7    16.14 )-----------( 399      ( 2021 08:05 )             26.1              01-07    139  |  107  |  20<H>  ----------------------------<  144<H>  3.4<L>   |  23  |  1.24    Ca    8.5      2021 08:05  Phos  2.8     01-07  Mg     1.7     -07    TPro  5.7<L>  /  Alb  1.7<L>  /  TBili  0.5  /  DBili  x   /  AST  12  /  ALT  10  /  AlkPhos  69  01-07      Urinalysis Basic - ( 2021 02:33 )    Color: Yellow / Appearance: Clear / S.015 / pH: x  Gluc: x / Ketone: Small  / Bili: Negative / Urobili: Negative   Blood: x / Protein: 30 mg/dL / Nitrite: Negative   Leuk Esterase: Small / RBC: 10-25 /HPF / WBC 3-5 /HPF   Sq Epi: x / Non Sq Epi: Few /HPF / Bacteria: Few /HPF

## 2021-01-07 NOTE — CONSULT NOTE ADULT - SUBJECTIVE AND OBJECTIVE BOX
HISTORY OF PRESENT ILLNESS: HPI:  74 yo F from [facility], wheelchair bound with medical history significant of HTN, Diabetes, Osteoarthritis, Osteoporosis, Peripheral arterial disease, Diabetic neuropathy, HLD, Schizophrenia historically preserved LV and R function, comes in with worsening ulceration to b/l heels. She has been having ulcers for past couple months, has been progressive. She was treated with IV antbx at NH but did not get better. It was worsening with necrotic changes and increasing foul smelling discharge. She denies fever, abdominal pain, chest pain, urinary symptoms, fall, trauma. No h/o nausea, vomiting, diarrhea, cough, dyspnea, sick contacts, recent illness.  Cardiology is called prior to debridement       PAST MEDICAL & SURGICAL HISTORY:  COVID-19    Osteomyelitis    DM (diabetes mellitus)    Paranoid schizophrenia    HLD (hyperlipidemia)    PAD (peripheral artery disease)    HTN (hypertension)    No significant past surgical history            MEDICATIONS:  MEDICATIONS  (STANDING):  acetaminophen   Tablet .. 650 milliGRAM(s) Oral every 6 hours  aspirin enteric coated 81 milliGRAM(s) Oral daily  cefepime   IVPB      cefepime   IVPB 1000 milliGRAM(s) IV Intermittent every 8 hours  collagenase Ointment 1 Application(s) Topical daily  Dakins Solution - Full Strength 1 Application(s) Topical once  enoxaparin Injectable 40 milliGRAM(s) SubCutaneous daily  insulin lispro (ADMELOG) corrective regimen sliding scale   SubCutaneous three times a day before meals  magnesium sulfate  IVPB 2 Gram(s) IV Intermittent once  metroNIDAZOLE  IVPB      metroNIDAZOLE  IVPB 500 milliGRAM(s) IV Intermittent every 8 hours  perphenazine 16 milliGRAM(s) Oral two times a day  simvastatin 40 milliGRAM(s) Oral at bedtime  sodium chloride 0.9%. 1000 milliLiter(s) (75 mL/Hr) IV Continuous <Continuous>  vancomycin  IVPB 750 milliGRAM(s) IV Intermittent every 12 hours      Allergies    No Known Allergies    Intolerances        FAMILY HISTORY:  No pertinent family history in first degree relatives      Non-contributary for premature coronary disease or sudden cardiac death    SOCIAL HISTORY:    [ X] Non-smoker  [ ] Smoker  [ ] Alcohol        REVIEW OF SYSTEMS:  [ ]chest pain  [  ]shortness of breath  [  ]palpitations  [  ]syncope  [ ]near syncope [ ]upper extremity weakness   [ ] lower extremity weakness  [  ]diplopia  [  ]altered mental status   [  ]fevers  [ ]chills [ ]nausea  [ ]vomitting  [  ]dysphagia    [ ]abdominal pain  [ ]melena  [ ]BRBPR    [  ]epistaxis  [  ]rash    [ ]lower extremity edema        [X ] All others negative	  [ ] Unable to obtain      LABS:	 	    CARDIAC MARKERS:                              8.7    16.14 )-----------( 399      ( 07 Jan 2021 08:05 )             26.1     Hb Trend: 8.7<--    01-07    139  |  107  |  20<H>  ----------------------------<  144<H>  3.4<L>   |  23  |  1.24    Ca    8.5      07 Jan 2021 08:05  Phos  2.8     01-07  Mg     1.7     01-07    TPro  5.7<L>  /  Alb  1.7<L>  /  TBili  0.5  /  DBili  x   /  AST  12  /  ALT  10  /  AlkPhos  69  01-07    Creatinine Trend: 1.24<--, 0.54<--, 0.41<--        PHYSICAL EXAM:  T(C): 36.6 (01-07-21 @ 13:05), Max: 37 (01-06-21 @ 20:40)  HR: 77 (01-07-21 @ 13:05) (77 - 95)  BP: 115/47 (01-07-21 @ 13:05) (110/59 - 129/79)  RR: 17 (01-07-21 @ 13:05) (16 - 17)  SpO2: 97% (01-07-21 @ 13:05) (97% - 100%)  Wt(kg): --   BMI (kg/m2): 21.9 (01-05-21 @ 23:15)  I&O's Summary    HEENT:  (-)icterus (-)pallor  CV: N S1 S2 1/6 ANGELIC (+)2 Pulses B/l  Resp:  Clear to ausculatation B/L, normal effort  GI: (+) BS Soft, NT, ND  Lymph:  (-)Edema, (-)obvious lymphadenopathy  Skin: Warm to touch, Normal turgor  Psych: Appropriate mood and affect        ECG:  	Sinus 65 BPM, Short TN    RADIOLOGY:         CXR:   No infiltrate       ASSESSMENT/PLAN: 	75y Female  wheelchair bound with medical history significant of HTN, Diabetes, Osteoarthritis, Osteoporosis, Peripheral arterial disease, Diabetic neuropathy, HLD, Schizophrenia historically preserved LV and R function, comes in with worsening ulceration to b/l heels preop debridement.    - No evidence of CHF or unstable cardiac syndromes   - cont asa and statin  - low cardiac risk for proposed debridement   - will follow with you    I once again thank you for allowing me to participate in the care of your patient.  If you have any questions or concerns please do not hesitate to contact me.    Jm Cortes MD, [facility]  BEEPER [phone]

## 2021-01-07 NOTE — PROGRESS NOTE ADULT - SUBJECTIVE AND OBJECTIVE BOX
Patient is a 75y old  Female who presents with a chief complaint of foot ulcer (05 Jan 2021 14:24)      HPI:  74 yo F from [facility], wheelchair bound with medical history significant of HTN, Diabetes, Osteoarthritis, Osteoporosis, Peripheral arterial disease, Diabetic neuropathy, HLD, Schizophrenia comes in with worsening ulceration to b/l heels. She has been having ulcers for past couple months, has been progressive. She was treated with IV antbx at NH but did not get better. It was worsening with necrotic changes and increasing foul smelling discharge. She denies fever, abdominal pain, chest pain, urinary symptoms, fall, trauma. No h/o nausea, vomiting, diarrhea, cough, dyspnea, sick contacts, recent illness.  (05 Jan 2021 14:24)      Podiatry HPI: 76 y/o female with full history as noted above, podiatry consulted for b/l heel wounds. Pt is from [facility], wheelchair bound with medical history significant of HTN, Diabetes, Osteoarthritis, Osteoporosis, PAD, Diabetic neuropathy, HLD, Schizophrenia comes in with worsening ulceration to b/l heels. Patient seen resting in bed comfortably, AAOx3. Patient states she has had the wounds for a long time, maybe more than 6 months. She relates receiving local wound care previously in the NH using santyl dressings. She endorses occasional pain to wound sites. Patient was  under podiatric care in previous admissions for b/l heel wounds with osteomyelitis. She states she is not interesting in surgery for her feet, she wishes to continue with local wound care. She denies nausea, vomiting, chills, fever, SOB.     Podiatry Progress: Full history as noted above, podiatry f/u for b/l heel wounds. Patient seen resting in bed comfortably. Patient once again reiterates shes not interested in surgical intervention for her foot. Patient denies nausea, vomiting, chills, fever, SOB.     Medications acetaminophen   Tablet .. 650 milliGRAM(s) Oral every 6 hours  aspirin enteric coated 81 milliGRAM(s) Oral daily  cefepime   IVPB      cefepime   IVPB 1000 milliGRAM(s) IV Intermittent every 8 hours  collagenase Ointment 1 Application(s) Topical daily  Dakins Solution - Full Strength 1 Application(s) Topical once  enoxaparin Injectable 40 milliGRAM(s) SubCutaneous daily  insulin lispro (ADMELOG) corrective regimen sliding scale   SubCutaneous three times a day before meals  metroNIDAZOLE  IVPB      metroNIDAZOLE  IVPB 500 milliGRAM(s) IV Intermittent every 8 hours  perphenazine 16 milliGRAM(s) Oral two times a day  potassium chloride   Powder 40 milliEquivalent(s) Oral once  simvastatin 40 milliGRAM(s) Oral at bedtime  sodium chloride 0.9%. 1000 milliLiter(s) IV Continuous <Continuous>  vancomycin  IVPB 750 milliGRAM(s) IV Intermittent every 12 hours    FHNo pertinent family history in first degree relatives    ,   PMHCOVID-19    Osteomyelitis    DM (diabetes mellitus)    Paranoid schizophrenia    HLD (hyperlipidemia)    PAD (peripheral artery disease)    HTN (hypertension)       PSHNo significant past surgical history        Labs                          8.7    16.14 )-----------( 399      ( 07 Jan 2021 08:05 )             26.1      01-07    139  |  107  |  20<H>  ----------------------------<  144<H>  3.4<L>   |  23  |  1.24    Ca    8.5      07 Jan 2021 08:05  Phos  2.8     01-07  Mg     1.7     01-07    TPro  5.7<L>  /  Alb  1.7<L>  /  TBili  0.5  /  DBili  x   /  AST  12  /  ALT  10  /  AlkPhos  69  01-07     Vital Signs Last 24 Hrs  T(C): 36.7 (07 Jan 2021 05:22), Max: 37 (06 Jan 2021 20:40)  T(F): 98.1 (07 Jan 2021 05:22), Max: 98.6 (06 Jan 2021 20:40)  HR: 95 (07 Jan 2021 05:22) (72 - 95)  BP: 129/79 (07 Jan 2021 05:22) (110/59 - 129/79)  BP(mean): --  RR: 16 (07 Jan 2021 05:22) (16 - 17)  SpO2: 97% (07 Jan 2021 05:22) (97% - 100%)  Sedimentation Rate, Erythrocyte: 99 mm/Hr (01-05-21 @ 11:45)         C-Reactive Protein, Serum: 5.63 mg/dL (01-06-21 @ 10:27)   WBC Count: 16.14 K/uL <H> (01-07-21 @ 08:05)      ROS  REVIEW OF SYSTEMS:  Other Review of Systems: All other review of systems negative, except as noted in HPI      PHYSICAL EXAM  LE Focused:    Vasc:  DP/PT nonpalpable, monophasic on doppler b/l, CFT brisk to digits, TG warm to cold b/l,   Derm:   Wound 1: L heel closed necrotic eschar measuring ~4x3.5x0.1 cm with periwound erythema, no undermining, no tunneling, no discharge, no malodor, no PTB  Wound 2: R heel wound s/p bedside debridement measuring ~ 9x6.5x0.3 cm with proximal opening which undermines and tracks distally and proximally ~ 2cm, +Probe to calcaneus bone, scant purulent drainage, + malodor,   Neuro: protective sensation grossly diminished at level of digits b/l  MSK: no tenderness on palpation of periwound areas b/l     IMAGING:  < from: MR Foot No Cont, Right (01.06.21 @ 11:08) >    EXAM:  MR FOOT RT                            PROCEDURE DATE:  01/06/2021          INTERPRETATION:  Clinical indications: Right heel ulceration and eschar status post debridement. Concern for osteomyelitis.    Multiplanar multisequence MRI of the right foot was performed localized to the hindfoot.    Correlation is made with prior MRI from September 11, 2019.    FINDINGS:    There is a large wound along the plantar aspect of the calcaneus posteriorly which extends nearly extends to bone. There is surrounding soft tissue edema about this site with evidence of an overlying bandage. The degree of soft tissue deficiency is increased compared to the prior examination. There is extensive osseous edema and corresponding hypointense T1 marrow signalthroughout the calcaneus extending from the posterior plantar margin to the level of the angle of Gissane. This is progressed compared to the prior examination and consistent with osteomyelitis. Marrow signal within the remainder of the foot is otherwise preserved.    There is confluent edema tracking along the abductor hallucis and flexor digitorum brevis muscles and within the plantar subcutaneous fat subjacent to this region. In total this area measures approximately 2.2 cm in transverse dimension, approximately 4 cm in anteroposterior posterior dimension, and approximately 1.6 cm in craniocaudal dimension. Findings may be related to underlying phlegmon or abscess. Evaluation is limited by the lack of intravenous contrast. Distal to this site there is edema throughout the visualized musculature consistent with myositis.    Visualized tendinous structures remain intact. There is no acute ligamentous injury. Visualized joint spaces are preserved without joint effusion.    IMPRESSION:    Osteomyelitis involving the calcaneus which extends from the plantar posterior aspect of the calcaneus to the level of the angle of Gissane. This is progressed compared to the prior examination. This is seen in the setting of diffuse soft tissue deficiency along the posterior plantar aspect of the calcaneus.    Confluent edema adjacent to this region within the abductor hallucis and flexor digitorum brevis muscles and within the subjacent plantar subcutaneous fat. This may be related to abscess orphlegmon.            OSWALDO ALFRED MD; Attending Radiologist  This document has been electronically signed. Jan 6 2021 11:49AM    < end of copied text >    -------------------------------------------------------------------------------------------------------------  < from: [facility] Physiol Extremity Lower 3+ Level, BI (01.05.21 @ 17:55) >    EXAM:  US PHYSIOL LWR EXT 3+ LEV BI                            PROCEDURE DATE:  01/05/2021          INTERPRETATION:  Clinical information: History of diabetes, nonsmoker, hypertension, hyperlipidemia, presents with bilateral heel ulcers.    Comparison: Lower extremity arterial Doppler study dated 5/13/2020.    Technique: Lower extremity arterial Doppler study. Note that pressure measurements were not obtained at the thigh level.    Findings: Ankle brachial index measures 1.33 bilaterally, compared with prior values of 1.41 on the right and 1.36 on the left. No segmental arterial pressure gradient is present.    PVR waveforms are normal in amplitude and pulsatility from the thigh through the ankle level. They're diminished in amplitude at the metatarsal and digital levels in symmetric fashion, similar to the prior exam.    Impression: No Doppler evidence of hemodynamically significant arterial inflow abnormality in either lower extremity.    Evidence of small vessel disease in the feet.    No significant interval change since study of 5/13/2020.            SOHAN PA MD; Attending Radiologist  This document has been electronically signed. Jan 6 2021  9:13AM    < end of copied text >      --------------------------------------------------------------------------------------------------------------  < from: Xray Foot AP + Lateral + Oblique, Right (01.05.21 @ 18:00) >    EXAM:  FOOT RIGHT (MINIMUM 3 VIEWS)                            PROCEDURE DATE:  01/05/2021          INTERPRETATION:  Right foot    HISTORY: Status post bedside debridement.     Two views of the right foot show thinning of soft tissues near the calcaneus likely indicating site of debridement. The joint spaces are maintained. There is questionable exposure of the plantar surface of the calcaneus.    IMPRESSION: Calcaneal soft tissues and questionable exposure as noted. Clinical correlation is suggested.        Thank you for this referral.            REID CRAMER MD; Attending Interventional Radiologist  This document has been electronically signed. Jan 6 2021 11:10AM    < end of copied text >    -------------------------------------------------------------------------------------------  a< from: Xray Ankle Complete 3 Views, Bilateral (01.05.21 @ 14:37) >    EXAM:  ANKLE BILATERAL (MINIMUM 3 V)                            PROCEDURE DATE:  01/05/2021          INTERPRETATION:  CLINICAL INDICATION:  Osteomyelitis; evaluate for soft tissue emphysema.    COMPARISON:  Left ankle radiography 5/11/2020.    TECHNIQUE:  AP, oblique and crosstable lateral views left ankle. Oblique and crosstable lateral views right ankle. Technologist noted that the best possible films were obtained.    FINDINGS:    Right ankle: Generalized osteopenia. Subcutaneous emphysema is identified along the plantar aspect of the right hindfoot inferior to the calcaneus, with an overlying skin defect noted along the posterior aspect of the calcaneus. Osteolysis involving the inferior/posterior aspect of the right calcaneus cannot beexcluded. MRI evaluation can be performed for further assessment. Extensive vascular calcification is noted. No ankle joint effusion is noted.    Left ankle: Generalized osteopenia. There is no evidence for acute fracture or dislocation. The ankle mortise appears grossly intact. No ankle joint effusion is noted. Extensive vascular calcifications identified. No significant soft tissue swelling.      IMPRESSION:  No evidence for acute fracture. Subcutaneous emphysema is identified along the plantaraspect of the right hindfoot inferior to the calcaneus, with an overlying skin defect noted along the posterior aspect of the calcaneus. Osteolysis involving the inferior/posterior aspect of the right calcaneus cannot be excluded. MRI evaluation can be performed for further assessment.                MARA LARKIN MD; Attending Radiologist  This document has been electronically signed. Jan 5 2021  3:57PM    < end of copied text >        CULTURES:   cCulture - Surgical Swab (01.05.21 @ 22:13)   Specimen Source: .Surgical Swab Right heel wound   Culture Results:   Few Morganella morganii   Few Proteus mirabilis       Historical Values  Culture - Surgical Swab (01.05.21 @ 22:13)   Specimen Source: .Surgical Swab Right heel wound   Culture Results:   Few Morganella morganii   Few Proteus mirabilis

## 2021-01-07 NOTE — DIETITIAN INITIAL EVALUATION ADULT. - PROBLEM SELECTOR PLAN 1
Presented with ulcers of b/l heals, non healing, foul smelling  OE has 2+ edema to legs. R heel unstageable ulcer 8x8.5cm. L heel 3x3.5cm necrotic ulcer w/purulent drainage and malodorous  Xrays were done, awaiting official result  Podiatry was consulted  Will start on vanc and cefepime  blood culture were sent  ESr is elevated  f/u podiatry recommendation  ID consult Dr Wellington

## 2021-01-07 NOTE — PROGRESS NOTE ADULT - SUBJECTIVE AND OBJECTIVE BOX
HPI:  75 year old wheelchair bound female from [facility] with a past medical history of HTN, type II diabetes mellitus, OA, osteoporosis, PAD, diabetic neuropathy, HLD, paranoid schizophrenia and recurrent osteomyelitis who presented to the emergency department on  with c/o worsening infection of her bilateral heel ulcerations in spite local wound care with santyl dressings and outpatient antibiotic management. Per ECC, patient's wounds have had increasing purulent, foul smelling discharge and necrotic changes. Patient with additional ulcerations to her coccyx, ischium, and sacrum for which wound care and surgery were consulted. Podiatry also consulted for her heel ulcerations: patient s/p bedside debridement of her right heel ulceration, refusing any surgical interventions at this time. Infectious disease also following.    : MRI with findings of: Osteomyelitis involving the calcaneus which extends from the plantar posterior aspect of the calcaneus to the level of the angle of Gissane. This is progressed compared to the prior examination.   :  Pt states she understands that she may loose her leg and/or her life without surgical intervention for RLE OM, still refusing debridement.  Psych consulted.     OVERNIGHT EVENTS:  No new overnight events.     REVIEW OF SYSTEMS:      CONSTITUTIONAL: No fever,   EYES: no acute visual disturbances  NECK: No pain or stiffness  RESPIRATORY: No cough; No shortness of breath  CARDIOVASCULAR: No chest pain, no palpitations  GASTROINTESTINAL: No pain. No nausea, vomiting or diarrhea   NEUROLOGICAL: No headache or numbness, no tremors  MUSCULOSKELETAL: No joint pain, no muscle pain  GENITOURINARY: no dysuria, no frequency, no hesitancy  PSYCHIATRY: no depression , no anxiety  ALL OTHER  ROS negative        Vital Signs Last 24 Hrs  T(C): 36.6 (2021 13:05), Max: 37 (2021 20:40)  T(F): 97.9 (2021 13:05), Max: 98.6 (2021 20:40)  HR: 77 (2021 13:05) (77 - 95)  BP: 115/47 (2021 13:05) (115/47 - 129/79)  BP(mean): --  RR: 17 (2021 13:05) (16 - 17)  SpO2: 97% (2021 13:05) (97% - 100%)    ________________________________________________  PHYSICAL EXAM:    GENERAL: NAD  HEENT: Normocephalic; conjunctivae and sclerae clear; moist mucous membranes;   NECK : supple, no JVD  CHEST/LUNG: Clear to auscultation bilaterally   HEART: S1 S2  regular  ABDOMEN: Soft, Nontender, Nondistended; Bowel sounds present  EXTREMITIES: no cyanosis; no LE edema; no calf tenderness  SKIN: warm and dry; no rash  NERVOUS SYSTEM:  Alert & Oriented x3; no new deficits    _________________________________________________  CURRENT MEDICATIONS:    MEDICATIONS  (STANDING):  acetaminophen   Tablet .. 650 milliGRAM(s) Oral every 6 hours  aspirin enteric coated 81 milliGRAM(s) Oral daily  cefepime   IVPB      cefepime   IVPB 1000 milliGRAM(s) IV Intermittent every 8 hours  chlorhexidine 2% Cloths 1 Application(s) Topical daily  collagenase Ointment 1 Application(s) Topical daily  Dakins Solution - Full Strength 1 Application(s) Topical once  enoxaparin Injectable 40 milliGRAM(s) SubCutaneous daily  insulin lispro (ADMELOG) corrective regimen sliding scale   SubCutaneous three times a day before meals  metroNIDAZOLE  IVPB      metroNIDAZOLE  IVPB 500 milliGRAM(s) IV Intermittent every 8 hours  perphenazine 16 milliGRAM(s) Oral two times a day  simvastatin 40 milliGRAM(s) Oral at bedtime  sodium chloride 0.9%. 1000 milliLiter(s) (75 mL/Hr) IV Continuous <Continuous>  vancomycin  IVPB 750 milliGRAM(s) IV Intermittent every 12 hours    MEDICATIONS  (PRN):      __________________________________________________  LABS:                          8.7    16.14 )-----------( 399      ( 2021 08:05 )             26.1     01-07    139  |  107  |  20<H>  ----------------------------<  144<H>  3.4<L>   |  23  |  1.24    Ca    8.5      2021 08:05  Phos  2.8       Mg     1.7         TPro  5.7<L>  /  Alb  1.7<L>  /  TBili  0.5  /  DBili  x   /  AST  12  /  ALT  10  /  AlkPhos  69        Urinalysis Basic - ( 2021 02:33 )    Color: Yellow / Appearance: Clear / S.015 / pH: x  Gluc: x / Ketone: Small  / Bili: Negative / Urobili: Negative   Blood: x / Protein: 30 mg/dL / Nitrite: Negative   Leuk Esterase: Small / RBC: 10-25 /HPF / WBC 3-5 /HPF   Sq Epi: x / Non Sq Epi: Few /HPF / Bacteria: Few /HPF      CAPILLARY BLOOD GLUCOSE      POCT Blood Glucose.: 154 mg/dL (2021 16:21)  POCT Blood Glucose.: 140 mg/dL (2021 11:31)  POCT Blood Glucose.: 147 mg/dL (2021 07:49)  POCT Blood Glucose.: 140 mg/dL (2021 21:08)      __________________________________________________  RADIOLOGY & ADDITIONAL TESTS:    Imaging Personally Reviewed:  YES    < from: MR Foot No Cont, Right (21 @ 11:08) >  IMPRESSION:    Osteomyelitis involving the calcaneus which extends from the plantar posterior aspect of the calcaneus to the level of the angle of Gissane. This is progressed compared to the prior examination. This is seen in the setting of diffuse soft tissue deficiency along the posterior plantar aspect of the calcaneus.    Confluent edema adjacent to this region within the abductor hallucis and flexor digitorum brevis muscles and within the subjacent plantar subcutaneous fat. This may be related to abscess orphlegmon.    < end of copied text >    Consultant(s) Notes Reviewed:   YES     Plan of care was discussed with patient and /or primary care giver; all questions and concerns were addressed and care was aligned with patient's wishes.    Plan discussed with attending and consulting physicians.   HPI:  75 year old wheelchair bound female from [facility] with a past medical history of HTN, type II diabetes mellitus, OA, osteoporosis, PAD, diabetic neuropathy, HLD, paranoid schizophrenia and recurrent osteomyelitis who presented to the emergency department on  with c/o worsening infection of her bilateral heel ulcerations in spite local wound care with santyl dressings and outpatient antibiotic management. Per ECC, patient's wounds have had increasing purulent, foul smelling discharge and necrotic changes. Patient with additional ulcerations to her coccyx, ischium, and sacrum for which wound care and surgery were consulted. Podiatry also consulted for her heel ulcerations: patient s/p bedside debridement of her right heel ulceration, refusing any surgical interventions at this time. Infectious disease also following.    : MRI with findings of: Osteomyelitis involving the calcaneus which extends from the plantar posterior aspect of the calcaneus to the level of the angle of Gissane. This is progressed compared to the prior examination.   :  Pt states she understands that she may loose her leg and/or her life without surgical intervention for RLE OM, still refusing debridement.  Psych consulted. Patient needs medical clearance for OR on .    OVERNIGHT EVENTS:  No new overnight events.     REVIEW OF SYSTEMS:      CONSTITUTIONAL: No fever,   EYES: no acute visual disturbances  NECK: No pain or stiffness  RESPIRATORY: No cough; No shortness of breath  CARDIOVASCULAR: No chest pain, no palpitations  GASTROINTESTINAL: No pain. No nausea, vomiting or diarrhea   NEUROLOGICAL: No headache or numbness, no tremors  MUSCULOSKELETAL: No joint pain, no muscle pain  GENITOURINARY: no dysuria, no frequency, no hesitancy  PSYCHIATRY: no depression , no anxiety  ALL OTHER  ROS negative        Vital Signs Last 24 Hrs  T(C): 36.6 (2021 13:05), Max: 37 (2021 20:40)  T(F): 97.9 (2021 13:05), Max: 98.6 (2021 20:40)  HR: 77 (2021 13:05) (77 - 95)  BP: 115/47 (2021 13:05) (115/47 - 129/79)  BP(mean): --  RR: 17 (2021 13:05) (16 - 17)  SpO2: 97% (2021 13:05) (97% - 100%)    ________________________________________________  PHYSICAL EXAM:    GENERAL: NAD  HEENT: Normocephalic; conjunctivae and sclerae clear; moist mucous membranes;   NECK : supple, no JVD  CHEST/LUNG: Clear to auscultation bilaterally   HEART: S1 S2  regular  ABDOMEN: Soft, Nontender, Nondistended; Bowel sounds present  EXTREMITIES: no cyanosis; no LE edema; no calf tenderness  SKIN: warm and dry; no rash  NERVOUS SYSTEM:  Alert & Oriented x3; no new deficits    _________________________________________________  CURRENT MEDICATIONS:    MEDICATIONS  (STANDING):  acetaminophen   Tablet .. 650 milliGRAM(s) Oral every 6 hours  aspirin enteric coated 81 milliGRAM(s) Oral daily  cefepime   IVPB      cefepime   IVPB 1000 milliGRAM(s) IV Intermittent every 8 hours  chlorhexidine 2% Cloths 1 Application(s) Topical daily  collagenase Ointment 1 Application(s) Topical daily  Dakins Solution - Full Strength 1 Application(s) Topical once  enoxaparin Injectable 40 milliGRAM(s) SubCutaneous daily  insulin lispro (ADMELOG) corrective regimen sliding scale   SubCutaneous three times a day before meals  metroNIDAZOLE  IVPB      metroNIDAZOLE  IVPB 500 milliGRAM(s) IV Intermittent every 8 hours  perphenazine 16 milliGRAM(s) Oral two times a day  simvastatin 40 milliGRAM(s) Oral at bedtime  sodium chloride 0.9%. 1000 milliLiter(s) (75 mL/Hr) IV Continuous <Continuous>  vancomycin  IVPB 750 milliGRAM(s) IV Intermittent every 12 hours    MEDICATIONS  (PRN):      __________________________________________________  LABS:                          8.7    16.14 )-----------( 399      ( 2021 08:05 )             26.1     01-07    139  |  107  |  20<H>  ----------------------------<  144<H>  3.4<L>   |  23  |  1.24    Ca    8.5      2021 08:05  Phos  2.8     01-  Mg     1.7     -    TPro  5.7<L>  /  Alb  1.7<L>  /  TBili  0.5  /  DBili  x   /  AST  12  /  ALT  10  /  AlkPhos  69  -      Urinalysis Basic - ( 2021 02:33 )    Color: Yellow / Appearance: Clear / S.015 / pH: x  Gluc: x / Ketone: Small  / Bili: Negative / Urobili: Negative   Blood: x / Protein: 30 mg/dL / Nitrite: Negative   Leuk Esterase: Small / RBC: 10-25 /HPF / WBC 3-5 /HPF   Sq Epi: x / Non Sq Epi: Few /HPF / Bacteria: Few /HPF      CAPILLARY BLOOD GLUCOSE      POCT Blood Glucose.: 154 mg/dL (2021 16:21)  POCT Blood Glucose.: 140 mg/dL (2021 11:31)  POCT Blood Glucose.: 147 mg/dL (2021 07:49)  POCT Blood Glucose.: 140 mg/dL (2021 21:08)      __________________________________________________  RADIOLOGY & ADDITIONAL TESTS:    Imaging Personally Reviewed:  YES    < from: MR Foot No Cont, Right (21 @ 11:08) >  IMPRESSION:    Osteomyelitis involving the calcaneus which extends from the plantar posterior aspect of the calcaneus to the level of the angle of Gissane. This is progressed compared to the prior examination. This is seen in the setting of diffuse soft tissue deficiency along the posterior plantar aspect of the calcaneus.    Confluent edema adjacent to this region within the abductor hallucis and flexor digitorum brevis muscles and within the subjacent plantar subcutaneous fat. This may be related to abscess orphlegmon.    < end of copied text >    Consultant(s) Notes Reviewed:   YES     Plan of care was discussed with patient and /or primary care giver; all questions and concerns were addressed and care was aligned with patient's wishes.    Plan discussed with attending and consulting physicians.

## 2021-01-08 ENCOUNTER — TRANSCRIPTION ENCOUNTER (OUTPATIENT)
Age: 76
End: 2021-01-08

## 2021-01-08 ENCOUNTER — RESULT REVIEW (OUTPATIENT)
Age: 76
End: 2021-01-08

## 2021-01-08 DIAGNOSIS — Z04.6 ENCOUNTER FOR GENERAL PSYCHIATRIC EXAMINATION, REQUESTED BY AUTHORITY: ICD-10-CM

## 2021-01-08 LAB
ALBUMIN SERPL ELPH-MCNC: 1.8 G/DL — LOW (ref 3.5–5)
ALP SERPL-CCNC: 68 U/L — SIGNIFICANT CHANGE UP (ref 40–120)
ALT FLD-CCNC: 11 U/L DA — SIGNIFICANT CHANGE UP (ref 10–60)
ANION GAP SERPL CALC-SCNC: 14 MMOL/L — SIGNIFICANT CHANGE UP (ref 5–17)
APTT BLD: 58.8 SEC — HIGH (ref 27.5–35.5)
AST SERPL-CCNC: 8 U/L — LOW (ref 10–40)
BILIRUB SERPL-MCNC: 0.6 MG/DL — SIGNIFICANT CHANGE UP (ref 0.2–1.2)
BLD GP AB SCN SERPL QL: SIGNIFICANT CHANGE UP
BUN SERPL-MCNC: 25 MG/DL — HIGH (ref 7–18)
CALCIUM SERPL-MCNC: 8.6 MG/DL — SIGNIFICANT CHANGE UP (ref 8.4–10.5)
CHLORIDE SERPL-SCNC: 107 MMOL/L — SIGNIFICANT CHANGE UP (ref 96–108)
CO2 SERPL-SCNC: 19 MMOL/L — LOW (ref 22–31)
CREAT SERPL-MCNC: 1.43 MG/DL — HIGH (ref 0.5–1.3)
GLUCOSE BLDC GLUCOMTR-MCNC: 152 MG/DL — HIGH (ref 70–99)
GLUCOSE BLDC GLUCOMTR-MCNC: 155 MG/DL — HIGH (ref 70–99)
GLUCOSE BLDC GLUCOMTR-MCNC: 190 MG/DL — HIGH (ref 70–99)
GLUCOSE SERPL-MCNC: 147 MG/DL — HIGH (ref 70–99)
HCT VFR BLD CALC: 27.9 % — LOW (ref 34.5–45)
HGB BLD-MCNC: 9.1 G/DL — LOW (ref 11.5–15.5)
INR BLD: 1.44 RATIO — HIGH (ref 0.88–1.16)
MAGNESIUM SERPL-MCNC: 2.6 MG/DL — SIGNIFICANT CHANGE UP (ref 1.6–2.6)
MCHC RBC-ENTMCNC: 29.7 PG — SIGNIFICANT CHANGE UP (ref 27–34)
MCHC RBC-ENTMCNC: 32.6 GM/DL — SIGNIFICANT CHANGE UP (ref 32–36)
MCV RBC AUTO: 91.2 FL — SIGNIFICANT CHANGE UP (ref 80–100)
NRBC # BLD: 0 /100 WBCS — SIGNIFICANT CHANGE UP (ref 0–0)
PHOSPHATE SERPL-MCNC: 2.3 MG/DL — LOW (ref 2.5–4.5)
PLATELET # BLD AUTO: 402 K/UL — HIGH (ref 150–400)
POTASSIUM SERPL-MCNC: 3.7 MMOL/L — SIGNIFICANT CHANGE UP (ref 3.5–5.3)
POTASSIUM SERPL-SCNC: 3.7 MMOL/L — SIGNIFICANT CHANGE UP (ref 3.5–5.3)
PROT SERPL-MCNC: 6 G/DL — SIGNIFICANT CHANGE UP (ref 6–8.3)
PROTHROM AB SERPL-ACNC: 16.8 SEC — HIGH (ref 10.6–13.6)
RBC # BLD: 3.06 M/UL — LOW (ref 3.8–5.2)
RBC # FLD: 12.8 % — SIGNIFICANT CHANGE UP (ref 10.3–14.5)
SODIUM SERPL-SCNC: 140 MMOL/L — SIGNIFICANT CHANGE UP (ref 135–145)
VANCOMYCIN FLD-MCNC: 28.8 UG/ML
VANCOMYCIN TROUGH SERPL-MCNC: 40.5 UG/ML — CRITICAL HIGH (ref 10–20)
VANCOMYCIN TROUGH SERPL-MCNC: 43.8 UG/ML — CRITICAL HIGH (ref 10–20)
WBC # BLD: 17.09 K/UL — HIGH (ref 3.8–10.5)
WBC # FLD AUTO: 17.09 K/UL — HIGH (ref 3.8–10.5)

## 2021-01-08 PROCEDURE — ZZZZZ: CPT

## 2021-01-08 PROCEDURE — 88304 TISSUE EXAM BY PATHOLOGIST: CPT | Mod: 26

## 2021-01-08 PROCEDURE — 90792 PSYCH DIAG EVAL W/MED SRVCS: CPT

## 2021-01-08 PROCEDURE — 11042 DBRDMT SUBQ TIS 1ST 20SQCM/<: CPT | Mod: RT

## 2021-01-08 RX ORDER — INSULIN LISPRO 100/ML
VIAL (ML) SUBCUTANEOUS
Refills: 0 | Status: DISCONTINUED | OUTPATIENT
Start: 2021-01-08 | End: 2021-01-11

## 2021-01-08 RX ORDER — DEXTROSE 50 % IN WATER 50 %
25 SYRINGE (ML) INTRAVENOUS ONCE
Refills: 0 | Status: DISCONTINUED | OUTPATIENT
Start: 2021-01-08 | End: 2021-01-11

## 2021-01-08 RX ORDER — INSULIN LISPRO 100/ML
VIAL (ML) SUBCUTANEOUS AT BEDTIME
Refills: 0 | Status: DISCONTINUED | OUTPATIENT
Start: 2021-01-08 | End: 2021-01-11

## 2021-01-08 RX ORDER — DEXTROSE 50 % IN WATER 50 %
15 SYRINGE (ML) INTRAVENOUS ONCE
Refills: 0 | Status: DISCONTINUED | OUTPATIENT
Start: 2021-01-08 | End: 2021-01-11

## 2021-01-08 RX ORDER — CEFEPIME 1 G/1
1000 INJECTION, POWDER, FOR SOLUTION INTRAMUSCULAR; INTRAVENOUS EVERY 8 HOURS
Refills: 0 | Status: DISCONTINUED | OUTPATIENT
Start: 2021-01-08 | End: 2021-01-11

## 2021-01-08 RX ORDER — CEFEPIME 1 G/1
INJECTION, POWDER, FOR SOLUTION INTRAMUSCULAR; INTRAVENOUS
Refills: 0 | Status: DISCONTINUED | OUTPATIENT
Start: 2021-01-08 | End: 2021-01-11

## 2021-01-08 RX ORDER — METRONIDAZOLE 500 MG
500 TABLET ORAL ONCE
Refills: 0 | Status: COMPLETED | OUTPATIENT
Start: 2021-01-08 | End: 2021-01-08

## 2021-01-08 RX ORDER — SIMVASTATIN 20 MG/1
40 TABLET, FILM COATED ORAL AT BEDTIME
Refills: 0 | Status: DISCONTINUED | OUTPATIENT
Start: 2021-01-08 | End: 2021-01-11

## 2021-01-08 RX ORDER — DEXTROSE 50 % IN WATER 50 %
12.5 SYRINGE (ML) INTRAVENOUS ONCE
Refills: 0 | Status: DISCONTINUED | OUTPATIENT
Start: 2021-01-08 | End: 2021-01-11

## 2021-01-08 RX ORDER — SODIUM CHLORIDE 9 MG/ML
1000 INJECTION, SOLUTION INTRAVENOUS
Refills: 0 | Status: DISCONTINUED | OUTPATIENT
Start: 2021-01-08 | End: 2021-01-11

## 2021-01-08 RX ORDER — METRONIDAZOLE 500 MG
500 TABLET ORAL EVERY 8 HOURS
Refills: 0 | Status: DISCONTINUED | OUTPATIENT
Start: 2021-01-08 | End: 2021-01-11

## 2021-01-08 RX ORDER — PERPHENAZINE 8 MG/1
16 TABLET, FILM COATED ORAL
Refills: 0 | Status: DISCONTINUED | OUTPATIENT
Start: 2021-01-08 | End: 2021-01-11

## 2021-01-08 RX ORDER — VANCOMYCIN HCL 1 G
750 VIAL (EA) INTRAVENOUS EVERY 12 HOURS
Refills: 0 | Status: DISCONTINUED | OUTPATIENT
Start: 2021-01-08 | End: 2021-01-08

## 2021-01-08 RX ORDER — POTASSIUM PHOSPHATE, MONOBASIC POTASSIUM PHOSPHATE, DIBASIC 236; 224 MG/ML; MG/ML
30 INJECTION, SOLUTION INTRAVENOUS ONCE
Refills: 0 | Status: DISCONTINUED | OUTPATIENT
Start: 2021-01-08 | End: 2021-01-08

## 2021-01-08 RX ORDER — ASPIRIN/CALCIUM CARB/MAGNESIUM 324 MG
81 TABLET ORAL DAILY
Refills: 0 | Status: DISCONTINUED | OUTPATIENT
Start: 2021-01-08 | End: 2021-01-11

## 2021-01-08 RX ORDER — ACETAMINOPHEN 500 MG
650 TABLET ORAL EVERY 6 HOURS
Refills: 0 | Status: DISCONTINUED | OUTPATIENT
Start: 2021-01-08 | End: 2021-01-11

## 2021-01-08 RX ORDER — CEFEPIME 1 G/1
1000 INJECTION, POWDER, FOR SOLUTION INTRAMUSCULAR; INTRAVENOUS ONCE
Refills: 0 | Status: COMPLETED | OUTPATIENT
Start: 2021-01-08 | End: 2021-01-08

## 2021-01-08 RX ORDER — ONDANSETRON 8 MG/1
4 TABLET, FILM COATED ORAL ONCE
Refills: 0 | Status: COMPLETED | OUTPATIENT
Start: 2021-01-08 | End: 2021-01-08

## 2021-01-08 RX ORDER — GLUCAGON INJECTION, SOLUTION 0.5 MG/.1ML
1 INJECTION, SOLUTION SUBCUTANEOUS ONCE
Refills: 0 | Status: DISCONTINUED | OUTPATIENT
Start: 2021-01-08 | End: 2021-01-11

## 2021-01-08 RX ORDER — ENOXAPARIN SODIUM 100 MG/ML
30 INJECTION SUBCUTANEOUS DAILY
Refills: 0 | Status: DISCONTINUED | OUTPATIENT
Start: 2021-01-09 | End: 2021-01-11

## 2021-01-08 RX ORDER — METRONIDAZOLE 500 MG
TABLET ORAL
Refills: 0 | Status: DISCONTINUED | OUTPATIENT
Start: 2021-01-08 | End: 2021-01-11

## 2021-01-08 RX ORDER — POTASSIUM PHOSPHATE, MONOBASIC POTASSIUM PHOSPHATE, DIBASIC 236; 224 MG/ML; MG/ML
30 INJECTION, SOLUTION INTRAVENOUS ONCE
Refills: 0 | Status: COMPLETED | OUTPATIENT
Start: 2021-01-08 | End: 2021-01-09

## 2021-01-08 RX ORDER — SODIUM HYPOCHLORITE 0.125 %
1 SOLUTION, NON-ORAL MISCELLANEOUS ONCE
Refills: 0 | Status: DISCONTINUED | OUTPATIENT
Start: 2021-01-08 | End: 2021-01-11

## 2021-01-08 RX ADMIN — Medication 100 MILLIGRAM(S): at 06:00

## 2021-01-08 RX ADMIN — ONDANSETRON 4 MILLIGRAM(S): 8 TABLET, FILM COATED ORAL at 19:00

## 2021-01-08 RX ADMIN — Medication 100 MILLIGRAM(S): at 15:00

## 2021-01-08 RX ADMIN — CEFEPIME 100 MILLIGRAM(S): 1 INJECTION, POWDER, FOR SOLUTION INTRAMUSCULAR; INTRAVENOUS at 05:59

## 2021-01-08 RX ADMIN — PERPHENAZINE 16 MILLIGRAM(S): 8 TABLET, FILM COATED ORAL at 06:00

## 2021-01-08 RX ADMIN — Medication 0: at 21:51

## 2021-01-08 RX ADMIN — Medication 250 MILLIGRAM(S): at 00:17

## 2021-01-08 RX ADMIN — CEFEPIME 100 MILLIGRAM(S): 1 INJECTION, POWDER, FOR SOLUTION INTRAMUSCULAR; INTRAVENOUS at 21:55

## 2021-01-08 RX ADMIN — Medication 100 MILLIGRAM(S): at 21:55

## 2021-01-08 RX ADMIN — SIMVASTATIN 40 MILLIGRAM(S): 20 TABLET, FILM COATED ORAL at 21:52

## 2021-01-08 RX ADMIN — Medication 1: at 08:27

## 2021-01-08 RX ADMIN — CEFEPIME 100 MILLIGRAM(S): 1 INJECTION, POWDER, FOR SOLUTION INTRAMUSCULAR; INTRAVENOUS at 14:29

## 2021-01-08 NOTE — BEHAVIORAL HEALTH ASSESSMENT NOTE - NSBHCHARTREVIEWLAB_PSY_A_CORE FT
01-08    140  |  107  |  25<H>  ----------------------------<  147<H>  3.7   |  19<L>  |  1.43<H>    Ca    8.6      08 Jan 2021 06:58  Phos  2.3     01-08  Mg     2.6     01-08    TPro  6.0  /  Alb  1.8<L>  /  TBili  0.6  /  DBili  x   /  AST  8<L>  /  ALT  11  /  AlkPhos  68  01-08                          9.1    17.09 )-----------( 402      ( 08 Jan 2021 06:58 )             27.9

## 2021-01-08 NOTE — PROGRESS NOTE ADULT - SUBJECTIVE AND OBJECTIVE BOX
Patient is a 75y old  Female who presents with a chief complaint of foot ulcer (08 Jan 2021 11:00)    PATIENT IS SEEN AND EXAMINED IN MEDICAL FLOOR.    ALLERGIES:  No Known Allergies    VITALS:    Vital Signs Last 24 Hrs  T(C): 36.6 (08 Jan 2021 14:44), Max: 36.7 (07 Jan 2021 20:31)  T(F): 97.9 (08 Jan 2021 14:44), Max: 98 (07 Jan 2021 20:31)  HR: 86 (08 Jan 2021 14:44) (75 - 99)  BP: 123/48 (08 Jan 2021 14:44) (117/54 - 153/66)  BP(mean): 81 (08 Jan 2021 13:25) (77 - 90)  RR: 16 (08 Jan 2021 14:44) (15 - 20)  SpO2: 100% (08 Jan 2021 14:44) (98% - 100%)    LABS:    CBC Full  -  ( 08 Jan 2021 06:58 )  WBC Count : 17.09 K/uL  RBC Count : 3.06 M/uL  Hemoglobin : 9.1 g/dL  Hematocrit : 27.9 %  Platelet Count - Automated : 402 K/uL  Mean Cell Volume : 91.2 fl  Mean Cell Hemoglobin : 29.7 pg  Mean Cell Hemoglobin Concentration : 32.6 gm/dL  Auto Neutrophil # : x  Auto Lymphocyte # : x  Auto Monocyte # : x  Auto Eosinophil # : x  Auto Basophil # : x  Auto Neutrophil % : x  Auto Lymphocyte % : x  Auto Monocyte % : x  Auto Eosinophil % : x  Auto Basophil % : x    PT/INR - ( 08 Jan 2021 06:58 )   PT: 16.8 sec;   INR: 1.44 ratio         PTT - ( 08 Jan 2021 06:58 )  PTT:58.8 sec  01-08    140  |  107  |  25<H>  ----------------------------<  147<H>  3.7   |  19<L>  |  1.43<H>    Ca    8.6      08 Jan 2021 06:58  Phos  2.3     01-08  Mg     2.6     01-08    TPro  6.0  /  Alb  1.8<L>  /  TBili  0.6  /  DBili  x   /  AST  8<L>  /  ALT  11  /  AlkPhos  68  01-08    CAPILLARY BLOOD GLUCOSE      POCT Blood Glucose.: 155 mg/dL (08 Jan 2021 07:54)  POCT Blood Glucose.: 128 mg/dL (07 Jan 2021 21:28)      LIVER FUNCTIONS - ( 08 Jan 2021 06:58 )  Alb: 1.8 g/dL / Pro: 6.0 g/dL / ALK PHOS: 68 U/L / ALT: 11 U/L DA / AST: 8 U/L / GGT: x           Creatinine Trend: 1.43<--, 1.24<--, 0.54<--, 0.41<--  I&O's Summary    .Urine Clean Catch (Midstream)  01-06 @ 06:46   No growth  --  --    .Surgical Swab Right heel wound  01-05 @ 22:13   Few Morganella morganii  Few Proteus mirabilis  Moderate Corynebacterium species "Susceptibilities not performed"  --  Morganella morganii  Proteus mirabilis      .Blood Blood-Peripheral  01-05 @ 17:59   No growth to date.  --  --    MEDICATIONS:    MEDICATIONS  (STANDING):  acetaminophen   Tablet .. 650 milliGRAM(s) Oral every 6 hours  aspirin enteric coated 81 milliGRAM(s) Oral daily  cefepime   IVPB      cefepime   IVPB 1000 milliGRAM(s) IV Intermittent every 8 hours  Dakins Solution - Full Strength 1 Application(s) Topical once  dextrose 40% Gel 15 Gram(s) Oral once  dextrose 5%. 1000 milliLiter(s) (50 mL/Hr) IV Continuous <Continuous>  dextrose 5%. 1000 milliLiter(s) (100 mL/Hr) IV Continuous <Continuous>  dextrose 50% Injectable 25 Gram(s) IV Push once  dextrose 50% Injectable 12.5 Gram(s) IV Push once  dextrose 50% Injectable 25 Gram(s) IV Push once  glucagon  Injectable 1 milliGRAM(s) IntraMuscular once  insulin lispro (ADMELOG) corrective regimen sliding scale   SubCutaneous three times a day before meals  insulin lispro (ADMELOG) corrective regimen sliding scale   SubCutaneous at bedtime  metroNIDAZOLE  IVPB      metroNIDAZOLE  IVPB 500 milliGRAM(s) IV Intermittent every 8 hours  perphenazine 16 milliGRAM(s) Oral two times a day  potassium phosphate IVPB 30 milliMole(s) IV Intermittent once  simvastatin 40 milliGRAM(s) Oral at bedtime  sodium chloride 0.9%. 1000 milliLiter(s) (75 mL/Hr) IV Continuous <Continuous>  vancomycin  IVPB 750 milliGRAM(s) IV Intermittent every 12 hours      MEDICATIONS  (PRN):      REVIEW OF SYSTEMS:                           ALL ROS DONE [ X   ]    CONSTITUTIONAL:  LETHARGIC [   ], FEVER [   ], UNRESPONSIVE [   ]  CVS:  CP  [   ], SOB, [   ], PALPITATIONS [   ], DIZZYNESS [   ]  RS: COUGH [   ], SPUTUM [   ]  GI: ABDOMINAL PAIN [   ], NAUSEA [   ], VOMITINGS [   ], DIARRHEA [   ], CONSTIPATION [   ]  :  DYSURIA [   ], NOCTURIA [   ], INCREASED FREQUENCY [   ], DRIBLING [   ],  SKELETAL: PAINFUL JOINTS [   ], SWOLLEN JOINTS [   ], NECK ACHE [   ], LOW BACK ACHE [   ],  SKIN : ULCERS [   ], RASH [   ], ITCHING [   ]  CNS: HEAD ACHE [   ], DOUBLE VISION [   ], BLURRED VISION [   ], AMS / CONFUSION [   ], SEIZURES [   ], WEAKNESS [   ],TINGLING / NUMBNESS [   ]      PHYSICAL EXAMINATION:  GENERAL APPEARANCE: NO DISTRESS  HEENT:  NO PALLOR, NO  JVD,  NO   NODES, NECK SUPPLE  CVS: S1 +, S2 +,   RS: AEEB,  OCCASIONAL  RALES +,   NO RONCHI  ABD: SOFT, NT, NO, BS +  EXT: NO PE  SKIN: WARM, BILATERAL HEEL ULCERS - COVERED, SACRAL ULCER. RIGHT ISCHIAL ULCER  SKELETAL:  ROM ACCEPTABLE  CNS:  AAO X 2-3     RADIOLOGY :    EXAM:  MR FOOT RT                            PROCEDURE DATE:  01/06/2021          INTERPRETATION:  Clinical indications: Right heel ulceration and eschar status post debridement. Concern for osteomyelitis.    Multiplanar multisequence MRI of the right foot was performed localized to the hindfoot.    Correlation is made with prior MRI from September 11, 2019.    FINDINGS:    There is a large wound along the plantar aspect of the calcaneus posteriorly which extends nearly extends to bone. There is surrounding soft tissue edema about this site with evidence of an overlying bandage. The degree of soft tissue deficiency is increased compared to the prior examination. There is extensive osseous edema and corresponding hypointense T1 marrow signalthroughout the calcaneus extending from the posterior plantar margin to the level of the angle of Gissane. This is progressed compared to the prior examination and consistent with osteomyelitis. Marrow signal within the remainder of the foot is otherwise preserved.    There is confluent edema tracking along the abductor hallucis and flexor digitorum brevis muscles and within the plantar subcutaneous fat subjacent to this region. In total this area measures approximately 2.2 cm in transverse dimension, approximately 4 cm in anteroposterior posterior dimension, and approximately 1.6 cm in craniocaudal dimension. Findings may be related to underlying phlegmon or abscess. Evaluation is limited by the lack of intravenous contrast. Distal to this site there is edema throughout the visualized musculature consistent with myositis.    Visualized tendinous structures remain intact. There is no acute ligamentous injury. Visualized joint spaces are preserved without joint effusion.    IMPRESSION:    Osteomyelitis involving the calcaneus which extends from the plantar posterior aspect of the calcaneus to the level of the angle of Gissane. This is progressed compared to the prior examination. This is seen in the setting of diffuse soft tissue deficiency along the posterior plantar aspect of the calcaneus.    Confluent edema adjacent to this region within the abductor hallucis and flexor digitorum brevis muscles and within the subjacent plantar subcutaneous fat. This may be related to abscess orphlegmon.    ASSESSMENT :     Osteomyelitis    Yes    COVID-19    Osteomyelitis    DM (diabetes mellitus)    Paranoid schizophrenia    HLD (hyperlipidemia)    PAD (peripheral artery disease)    HTN (hypertension)    No significant past surgical history        PLAN:  HPI:  76 yo F from [facility], wheelchair bound with medical history significant of HTN, Diabetes, Osteoarthritis, Osteoporosis, Peripheral arterial disease, Diabetic neuropathy, HLD, Schizophrenia comes in with worsening ulceration to b/l heels. She has been having ulcers for past couple months, has been progressive. She was treated with IV antbx at NH but did not get better. It was worsening with necrotic changes and increasing foul smelling discharge. She denies fever, abdominal pain, chest pain, urinary symptoms, fall, trauma. No h/o nausea, vomiting, diarrhea, cough, dyspnea, sick contacts, recent illness.  (05 Jan 2021 14:24)    PLAN:    # BILATERAL LE CHRONIC ULCER NOW PROGRESSIVELY WORSENING W/ SSTI AND RIGHT CALCANEAL OSTEOMYELITIS W/ POSSIBLE ABSCESS; UNDERWENT BEDSIDE DEBRIDEMENT OF RIGHT LEG ULCER - PLACED ON VANCOMYCIN + CEFEPIME, F/U TIMUR/PVR, F/U BCX, F/U WOUND CX, ID CONSULT IN PROGRESS, PODIATRY CONSULT IN PROGRESS  - REVIEWED RIGHT LE MRI  - CURRENTLY PATIENT IS REFUSING SURGICAL INTERVENTION, DESPITE EXTENSIVE DISCUSSION REGARDING MORTALITY RISK - PSYCHIATRY CONSULT FOR CAPACITY IN PROGRESS  # MEDICAL CLEARANCE FOR SURGERY - RCRI 1 POINT - 6% RISK OF DEATH, MI OR CARDIAC ARREST; CARDIOLOGY CONSULT IN PROGRESS FOR MEDICAL CLEARANCE  # SACRAL AND RIGHT ISCHIAL UNSTAGEABLE ULCERS W/ SSTI - ON VANCOMYCIN AND CEFEPIME, SURGERY CONSULT IN PROGRESS - PATIENT IS AGREEABLE FOR DEBRIDEMENT; HOWEVER IN OR NO DEBRIDEMENT WAS DONE  -  PSYCHIATRY CONSULT FOR CAPACITY IN PROGRESS  # PAD  # HTN  # DM W/ DIABETIC NEUROPATHY  # OA/OP  # SCHIZOPHRENIA  # GI AND DVT PPX    ELEN CASILLAS MD COVERING FARHAN CASILLAS MD.      Patient is a 75y old  Female who presents with a chief complaint of foot ulcer (08 Jan 2021 11:00)    PATIENT IS SEEN AND EXAMINED IN MEDICAL FLOOR.    ALLERGIES:  No Known Allergies    VITALS:    Vital Signs Last 24 Hrs  T(C): 36.6 (08 Jan 2021 14:44), Max: 36.7 (07 Jan 2021 20:31)  T(F): 97.9 (08 Jan 2021 14:44), Max: 98 (07 Jan 2021 20:31)  HR: 86 (08 Jan 2021 14:44) (75 - 99)  BP: 123/48 (08 Jan 2021 14:44) (117/54 - 153/66)  BP(mean): 81 (08 Jan 2021 13:25) (77 - 90)  RR: 16 (08 Jan 2021 14:44) (15 - 20)  SpO2: 100% (08 Jan 2021 14:44) (98% - 100%)    LABS:    CBC Full  -  ( 08 Jan 2021 06:58 )  WBC Count : 17.09 K/uL  RBC Count : 3.06 M/uL  Hemoglobin : 9.1 g/dL  Hematocrit : 27.9 %  Platelet Count - Automated : 402 K/uL  Mean Cell Volume : 91.2 fl  Mean Cell Hemoglobin : 29.7 pg  Mean Cell Hemoglobin Concentration : 32.6 gm/dL  Auto Neutrophil # : x  Auto Lymphocyte # : x  Auto Monocyte # : x  Auto Eosinophil # : x  Auto Basophil # : x  Auto Neutrophil % : x  Auto Lymphocyte % : x  Auto Monocyte % : x  Auto Eosinophil % : x  Auto Basophil % : x    PT/INR - ( 08 Jan 2021 06:58 )   PT: 16.8 sec;   INR: 1.44 ratio         PTT - ( 08 Jan 2021 06:58 )  PTT:58.8 sec  01-08    140  |  107  |  25<H>  ----------------------------<  147<H>  3.7   |  19<L>  |  1.43<H>    Ca    8.6      08 Jan 2021 06:58  Phos  2.3     01-08  Mg     2.6     01-08    TPro  6.0  /  Alb  1.8<L>  /  TBili  0.6  /  DBili  x   /  AST  8<L>  /  ALT  11  /  AlkPhos  68  01-08    CAPILLARY BLOOD GLUCOSE      POCT Blood Glucose.: 155 mg/dL (08 Jan 2021 07:54)  POCT Blood Glucose.: 128 mg/dL (07 Jan 2021 21:28)      LIVER FUNCTIONS - ( 08 Jan 2021 06:58 )  Alb: 1.8 g/dL / Pro: 6.0 g/dL / ALK PHOS: 68 U/L / ALT: 11 U/L DA / AST: 8 U/L / GGT: x           Creatinine Trend: 1.43<--, 1.24<--, 0.54<--, 0.41<--  I&O's Summary    .Urine Clean Catch (Midstream)  01-06 @ 06:46   No growth  --  --    .Surgical Swab Right heel wound  01-05 @ 22:13   Few Morganella morganii  Few Proteus mirabilis  Moderate Corynebacterium species "Susceptibilities not performed"  --  Morganella morganii  Proteus mirabilis      .Blood Blood-Peripheral  01-05 @ 17:59   No growth to date.  --  --    MEDICATIONS:    MEDICATIONS  (STANDING):  acetaminophen   Tablet .. 650 milliGRAM(s) Oral every 6 hours  aspirin enteric coated 81 milliGRAM(s) Oral daily  cefepime   IVPB      cefepime   IVPB 1000 milliGRAM(s) IV Intermittent every 8 hours  Dakins Solution - Full Strength 1 Application(s) Topical once  dextrose 40% Gel 15 Gram(s) Oral once  dextrose 5%. 1000 milliLiter(s) (50 mL/Hr) IV Continuous <Continuous>  dextrose 5%. 1000 milliLiter(s) (100 mL/Hr) IV Continuous <Continuous>  dextrose 50% Injectable 25 Gram(s) IV Push once  dextrose 50% Injectable 12.5 Gram(s) IV Push once  dextrose 50% Injectable 25 Gram(s) IV Push once  glucagon  Injectable 1 milliGRAM(s) IntraMuscular once  insulin lispro (ADMELOG) corrective regimen sliding scale   SubCutaneous three times a day before meals  insulin lispro (ADMELOG) corrective regimen sliding scale   SubCutaneous at bedtime  metroNIDAZOLE  IVPB      metroNIDAZOLE  IVPB 500 milliGRAM(s) IV Intermittent every 8 hours  perphenazine 16 milliGRAM(s) Oral two times a day  potassium phosphate IVPB 30 milliMole(s) IV Intermittent once  simvastatin 40 milliGRAM(s) Oral at bedtime  sodium chloride 0.9%. 1000 milliLiter(s) (75 mL/Hr) IV Continuous <Continuous>  vancomycin  IVPB 750 milliGRAM(s) IV Intermittent every 12 hours      MEDICATIONS  (PRN):      REVIEW OF SYSTEMS:                           ALL ROS DONE [ X   ]    CONSTITUTIONAL:  LETHARGIC [   ], FEVER [   ], UNRESPONSIVE [   ]  CVS:  CP  [   ], SOB, [   ], PALPITATIONS [   ], DIZZYNESS [   ]  RS: COUGH [   ], SPUTUM [   ]  GI: ABDOMINAL PAIN [   ], NAUSEA [   ], VOMITINGS [   ], DIARRHEA [   ], CONSTIPATION [   ]  :  DYSURIA [   ], NOCTURIA [   ], INCREASED FREQUENCY [   ], DRIBLING [   ],  SKELETAL: PAINFUL JOINTS [   ], SWOLLEN JOINTS [   ], NECK ACHE [   ], LOW BACK ACHE [   ],  SKIN : ULCERS [   ], RASH [   ], ITCHING [   ]  CNS: HEAD ACHE [   ], DOUBLE VISION [   ], BLURRED VISION [   ], AMS / CONFUSION [   ], SEIZURES [   ], WEAKNESS [   ],TINGLING / NUMBNESS [   ]      PHYSICAL EXAMINATION:  GENERAL APPEARANCE: NO DISTRESS  HEENT:  NO PALLOR, NO  JVD,  NO   NODES, NECK SUPPLE  CVS: S1 +, S2 +,   RS: AEEB,  OCCASIONAL  RALES +,   NO RONCHI  ABD: SOFT, NT, NO, BS +  EXT: NO PE  SKIN: WARM, BILATERAL HEEL ULCERS - COVERED, SACRAL ULCER + RIGHT ISCHIAL ULCER COVERED  SKELETAL:  ROM ACCEPTABLE  CNS:  AAO X 2-3     RADIOLOGY :    EXAM:  MR FOOT RT                            PROCEDURE DATE:  01/06/2021          INTERPRETATION:  Clinical indications: Right heel ulceration and eschar status post debridement. Concern for osteomyelitis.    Multiplanar multisequence MRI of the right foot was performed localized to the hindfoot.    Correlation is made with prior MRI from September 11, 2019.    FINDINGS:    There is a large wound along the plantar aspect of the calcaneus posteriorly which extends nearly extends to bone. There is surrounding soft tissue edema about this site with evidence of an overlying bandage. The degree of soft tissue deficiency is increased compared to the prior examination. There is extensive osseous edema and corresponding hypointense T1 marrow signalthroughout the calcaneus extending from the posterior plantar margin to the level of the angle of Gissane. This is progressed compared to the prior examination and consistent with osteomyelitis. Marrow signal within the remainder of the foot is otherwise preserved.    There is confluent edema tracking along the abductor hallucis and flexor digitorum brevis muscles and within the plantar subcutaneous fat subjacent to this region. In total this area measures approximately 2.2 cm in transverse dimension, approximately 4 cm in anteroposterior posterior dimension, and approximately 1.6 cm in craniocaudal dimension. Findings may be related to underlying phlegmon or abscess. Evaluation is limited by the lack of intravenous contrast. Distal to this site there is edema throughout the visualized musculature consistent with myositis.    Visualized tendinous structures remain intact. There is no acute ligamentous injury. Visualized joint spaces are preserved without joint effusion.    IMPRESSION:    Osteomyelitis involving the calcaneus which extends from the plantar posterior aspect of the calcaneus to the level of the angle of Gissane. This is progressed compared to the prior examination. This is seen in the setting of diffuse soft tissue deficiency along the posterior plantar aspect of the calcaneus.    Confluent edema adjacent to this region within the abductor hallucis and flexor digitorum brevis muscles and within the subjacent plantar subcutaneous fat. This may be related to abscess orphlegmon.    ASSESSMENT :     Osteomyelitis    Yes    COVID-19    Osteomyelitis    DM (diabetes mellitus)    Paranoid schizophrenia    HLD (hyperlipidemia)    PAD (peripheral artery disease)    HTN (hypertension)    No significant past surgical history        PLAN:  HPI:  74 yo F from [facility], wheelchair bound with medical history significant of HTN, Diabetes, Osteoarthritis, Osteoporosis, Peripheral arterial disease, Diabetic neuropathy, HLD, Schizophrenia comes in with worsening ulceration to b/l heels. She has been having ulcers for past couple months, has been progressive. She was treated with IV antbx at NH but did not get better. It was worsening with necrotic changes and increasing foul smelling discharge. She denies fever, abdominal pain, chest pain, urinary symptoms, fall, trauma. No h/o nausea, vomiting, diarrhea, cough, dyspnea, sick contacts, recent illness.  (05 Jan 2021 14:24)    PLAN:    # BILATERAL LE CHRONIC ULCER NOW PROGRESSIVELY WORSENING W/ SSTI AND RIGHT CALCANEAL OSTEOMYELITIS W/ POSSIBLE ABSCESS; UNDERWENT BEDSIDE DEBRIDEMENT OF RIGHT LEG ULCER - PLACED ON VANCOMYCIN + CEFEPIME [HOLD VANC B/C OF ELEVATED TROUGH], F/U TIMUR/PVR, F/U BCX, F/U WOUND CX, ID CONSULT IN PROGRESS, PODIATRY CONSULT IN PROGRESS  - REVIEWED RIGHT LE MRI  - CURRENTLY PATIENT IS REFUSING SURGICAL INTERVENTION, DESPITE EXTENSIVE DISCUSSION REGARDING MORTALITY RISK - PSYCHIATRY CONSULT FOR CAPACITY IN PROGRESS - DEEM PATIENT AS HAVING CAPACITY. ON REPEAT CONVERSATION TODAY, PATIENT IS AGREEABLE - WILL ADDRESS AGAIN TOMORROW PRIOR TO CONFIRMING W/ PODIATRY TO SCHEDULE.  # MEDICAL CLEARANCE FOR SURGERY - RCRI 1 POINT - 6% RISK OF DEATH, MI OR CARDIAC ARREST; CARDIOLOGY CONSULT IN PROGRESS FOR MEDICAL CLEARANCE  # SACRAL AND RIGHT ISCHIAL UNSTAGEABLE ULCERS W/ SSTI  S/P DEBRIDEMENT 1/8- ON VANCOMYCIN AND CEFEPIME [HOLD VANC GIVEN ELEVATED TROUGH], SURGERY CONSULT IN PROGRESS - PATIENT IS AGREEABLE FOR DEBRIDEMENT  -  PSYCHIATRY CONSULT FOR CAPACITY IN PROGRESS  # ASHLEY - HOLDING VANCOMYCIN, MONITOR - F/U UA, BLADDER SCAN  # PAD  # HTN  # DM W/ DIABETIC NEUROPATHY  # OA/OP  # SCHIZOPHRENIA  # GI AND DVT PPX    ELEN CASILLAS MD COVERING FARHAN CASILLAS MD.

## 2021-01-08 NOTE — PROGRESS NOTE ADULT - ASSESSMENT
A:  Osteomyelitis of calcaneus R foot   DMII    P:   Patient evaluated and chart reviewed  Once again discussed diagnosis and treatment with patient, emphasizing that this wound could lead to loss of limb and life. Patient now stating that she is amenable to surgery, for debridement and removal of infected bone. Patient continued to demonstrate understanding of consequences for refusal of surgery.   Patient tentatively scheduled for 2:00 pm on Monday, 1/11/21  Pending psych consult  Right heel cx preliminary results as noted above  Right ankle xrays results as noted above   RLE MRI evaluated, results as noted above   Applied dakin's soaked gauze, DSD to RLE, DSD to LLE  Continue with IV antibiotics As Per ID  Patient to remain NWB to b/l LE  Recommend continued offloading of bilateral Heels using CAIR boots  Podiatry will follow while in house.  Seen and evaluated with Attending Dr. Morgan

## 2021-01-08 NOTE — BEHAVIORAL HEALTH ASSESSMENT NOTE - NSBHCHARTREVIEWIMAGING_PSY_A_CORE FT
< from: MR Foot No Cont, Right (01.06.21 @ 11:08) >    IMPRESSION:    Osteomyelitis involving the calcaneus which extends from the plantar posterior aspect of the calcaneus to the level of the angle of Gissane. This is progressed compared to the prior examination. This is seen in the setting of diffuse soft tissue deficiency along the posterior plantar aspect of the calcaneus.    Confluent edema adjacent to this region within the abductor hallucis and flexor digitorum brevis muscles and within the subjacent plantar subcutaneous fat. This may be related to abscess or phlegmon.    < end of copied text >

## 2021-01-08 NOTE — BEHAVIORAL HEALTH ASSESSMENT NOTE - NSBHSOCIALHXDETAILSFT_PSY_A_CORE
B/R in [facility]. States that all family members except brother (lives in [place]) have . Never , no children. Reports that she formerly lived in [facility], but moved to nursing home ([facility]) about 1 year ago.

## 2021-01-08 NOTE — BEHAVIORAL HEALTH ASSESSMENT NOTE - SUMMARY
75F, single and living in nursing home ([facility]), with PHx of schizophrenia and MHx of HTN, Diabetes, Osteoarthritis, Osteoporosis, Peripheral arterial disease, Diabetic neuropathy, and HLD, BIBEMS on 1/5 from NH for worsening BL heel ulcerations which have been chronic for the past few months, as well as chronic sacral decubitus ulcer. MRI showed progressive osteomyelitis of R foot and surgical debridement was recommended for both sacral and calcaneal ulcers, but pt initially stated she only wanted sacral debridement and refused it for her feet, despite being informed that the heel infection presented a more immediate risk to life and limb. Psych consult was requested for assessment of capacity to refuse heel debridement, but pt could not be interviewed on 1/7 due to somnolence. Pt was seen again today, s/p OR debridement of sacral ulcer. On exam, pt presents pleasant, cooperative, linear and organized, denies SI/HI/AVH and is not manifesting any s/s of psychosis, mike, depression or suicidality at the time of assessment. While pt appears to have obvious memory deficits c/w age-related dementia, she does appear to have an adequate basic understanding of her medical condition (osteomyelitis), the recommended treatment and the possible risks/benefits of the treatment and therefore DOES appear to have capacity to make decisions about the proposed procedure. While pt initially refused debridement of her heel ulcers, today she is stating that she wants to have the operation in order to prevent loss of limb or life. As pt appears to have capacity to consent to debridement, her wishes should be respected and the procedure should be carried out as soon as possible. Pt does not appear to present an acute risk of harm to self or others at the time of assessment, and does not appear to be in need of admission to  psych at the time of assessment.

## 2021-01-08 NOTE — BEHAVIORAL HEALTH ASSESSMENT NOTE - DETAILS
Sacral decubitus ulcer, BL heel ulcers with eschar and surrounding edema Osteomyelitis of R foot None reported

## 2021-01-08 NOTE — PROGRESS NOTE ADULT - SUBJECTIVE AND OBJECTIVE BOX
S/P debridement of sacral decubitus ulcer POD#0  75y old Female seen and examined at bedside.   Denies back pain. Denies chest pain, shortness of breath, nausea/ vomiting, and dizziness.     Vital Signs Last 24 Hrs  T(F): 97.9 (01-08-21 @ 14:44), Max: 98 (01-07-21 @ 20:31)  HR: 86 (01-08-21 @ 14:44)  BP: 123/48 (01-08-21 @ 14:44)  RR: 16 (01-08-21 @ 14:44)  SpO2: 100% (01-08-21 @ 14:44)  POCT Blood Glucose.: 190 mg/dL (08 Jan 2021 17:23)    GENERAL: Alert, NAD  CHEST/LUNG: respirations nonlabored  SACRUM: Dressing C/D/I;

## 2021-01-08 NOTE — BEHAVIORAL HEALTH ASSESSMENT NOTE - HPI (INCLUDE ILLNESS QUALITY, SEVERITY, DURATION, TIMING, CONTEXT, MODIFYING FACTORS, ASSOCIATED SIGNS AND SYMPTOMS)
75F, single and living in nursing home ([facility]), with PHx of schizophrenia and MHx of HTN, Diabetes, Osteoarthritis, Osteoporosis, Peripheral arterial disease, Diabetic neuropathy, and HLD, BIBEMS on 1/5 from NH for worsening BL heel ulcerations which have been chronic for the past few months, as well as chronic sacral decubitus ulcer. MRI showed progressive osteomyelitis of R foot and surgical debridement was recommended for both sacral and calcaneal ulcers, but pt initially stated she only wanted sacral debridement and refused it for her feet, despite being informed that the heel infection presented a more immediate risk to life and limb. Psych consult was requested for assessment of capacity to refuse heel debridement, but pt could not be interviewed on 1/7 due to somnolence. Pt was seen again today, s/p OR debridement of sacral ulcer. Pt encountered in her room, lying in bed initially asleep, but waking to writer's voice and cooperating with interview. Pt initially gives grossly incorrect orientation information but quickly corrects herself, reporting the date is February 10, 2021 and that she is in a hospital. When asked why she is here, pt says, "They told me I need an operation on my feet. I didn't want it at first, but I do now." MD reviews pt's condition (infection of her R heel ulcer that has spread to bone) and stresses that if untreated, infection could cause pt to lose her foot or even develop sepsis and die. Pt immediately responds, "Oh no, I wouldn't want that to happen." Pt confirms that she wants the operation, which has been scheduled for Monday 1/11. Pt firmly denies SI/HI and states that she has never had any thoughts of wanting her life to be over. MD reviews pt's PHx ("Yes, I did have a mental health condition, but it's much better now") and social situation (she says she lived at [facility] for 25 years, but moved to [facility] about 1 year ago due to health problems). Pt reports that her only living family is her brother, who lives in [place] and never visits her.

## 2021-01-08 NOTE — PROGRESS NOTE ADULT - SUBJECTIVE AND OBJECTIVE BOX
Patient is a 75y old  Female who presents with a chief complaint of foot ulcer (05 Jan 2021 14:24)      HPI:  76 yo F from [facility], wheelchair bound with medical history significant of HTN, Diabetes, Osteoarthritis, Osteoporosis, Peripheral arterial disease, Diabetic neuropathy, HLD, Schizophrenia comes in with worsening ulceration to b/l heels. She has been having ulcers for past couple months, has been progressive. She was treated with IV antbx at NH but did not get better. It was worsening with necrotic changes and increasing foul smelling discharge. She denies fever, abdominal pain, chest pain, urinary symptoms, fall, trauma. No h/o nausea, vomiting, diarrhea, cough, dyspnea, sick contacts, recent illness.  (05 Jan 2021 14:24)      Podiatry HPI: 74 y/o female with full history as noted above, podiatry consulted for b/l heel wounds. Pt is from [facility], wheelchair bound with medical history significant of HTN, Diabetes, Osteoarthritis, Osteoporosis, PAD, Diabetic neuropathy, HLD, Schizophrenia comes in with worsening ulceration to b/l heels. Patient seen resting in bed comfortably, AAOx3. Patient states she has had the wounds for a long time, maybe more than 6 months. She relates receiving local wound care previously in the NH using santyl dressings. She endorses occasional pain to wound sites. Patient was  under podiatric care in previous admissions for b/l heel wounds with osteomyelitis. She states she is not interesting in surgery for her feet, she wishes to continue with local wound care. She denies nausea, vomiting, chills, fever, SOB.     Podiatry Progress: Full history as noted above, podiatry f/u for b/l heel wounds. Patient seen resting in bed comfortably. Patient denies nausea, vomiting, chills, fever, SOB.       Medications acetaminophen   Tablet .. 650 milliGRAM(s) Oral every 6 hours  aspirin enteric coated 81 milliGRAM(s) Oral daily  cefepime   IVPB      cefepime   IVPB 1000 milliGRAM(s) IV Intermittent every 8 hours  chlorhexidine 2% Cloths 1 Application(s) Topical daily  collagenase Ointment 1 Application(s) Topical daily  Dakins Solution - Full Strength 1 Application(s) Topical once  enoxaparin Injectable 40 milliGRAM(s) SubCutaneous daily  insulin lispro (ADMELOG) corrective regimen sliding scale   SubCutaneous three times a day before meals  metroNIDAZOLE  IVPB      metroNIDAZOLE  IVPB 500 milliGRAM(s) IV Intermittent every 8 hours  perphenazine 16 milliGRAM(s) Oral two times a day  simvastatin 40 milliGRAM(s) Oral at bedtime  sodium chloride 0.9%. 1000 milliLiter(s) IV Continuous <Continuous>  vancomycin  IVPB 750 milliGRAM(s) IV Intermittent every 12 hours    FHNo pertinent family history in first degree relatives    ,   PMHCOVID-19    Osteomyelitis    DM (diabetes mellitus)    Paranoid schizophrenia    HLD (hyperlipidemia)    PAD (peripheral artery disease)    HTN (hypertension)       PSHNo significant past surgical history        Labs                          9.1    17.09 )-----------( 402      ( 08 Jan 2021 06:58 )             27.9      01-08    140  |  107  |  25<H>  ----------------------------<  147<H>  3.7   |  19<L>  |  1.43<H>    Ca    8.6      08 Jan 2021 06:58  Phos  2.3     01-08  Mg     2.6     01-08    TPro  6.0  /  Alb  1.8<L>  /  TBili  0.6  /  DBili  x   /  AST  8<L>  /  ALT  11  /  AlkPhos  68  01-08     Vital Signs Last 24 Hrs  T(C): 36.6 (08 Jan 2021 05:10), Max: 36.7 (07 Jan 2021 20:31)  T(F): 97.8 (08 Jan 2021 05:10), Max: 98 (07 Jan 2021 20:31)  HR: 81 (08 Jan 2021 05:10) (77 - 84)  BP: 130/56 (08 Jan 2021 05:10) (115/47 - 130/56)  BP(mean): --  RR: 16 (08 Jan 2021 05:10) (16 - 18)  SpO2: 100% (08 Jan 2021 05:10) (97% - 100%)  Sedimentation Rate, Erythrocyte: 99 mm/Hr (01-05-21 @ 11:45)         C-Reactive Protein, Serum: 5.63 mg/dL (01-06-21 @ 10:27)   WBC Count: 17.09 K/uL <H> (01-08-21 @ 06:58)      ROS  REVIEW OF SYSTEMS:  Other Review of Systems: All other review of systems negative, except as noted in HPI      PHYSICAL EXAM  LE Focused:    Vasc:  DP/PT nonpalpable, monophasic on doppler b/l, CFT brisk to digits, TG warm to warm b/l,   Derm:   Wound 1: L heel closed necrotic eschar measuring ~4x3.5x0.1 cm with periwound erythema, no undermining, no tunneling, no discharge, no malodor, -PTB  Wound 2: R heel wound s/p bedside debridement performed 1/5/21, now measuring ~ 9x6.5x0.3 cm, fibrous, necrotic base, +PTB. No active drainage. +Malodor.     Neuro: protective sensation grossly diminished at level of digits b/l  MSK: no tenderness on palpation of periwound areas b/l     IMAGING:  < from: MR Foot No Cont, Right (01.06.21 @ 11:08) >    EXAM:  MR FOOT RT                            PROCEDURE DATE:  01/06/2021          INTERPRETATION:  Clinical indications: Right heel ulceration and eschar status post debridement. Concern for osteomyelitis.    Multiplanar multisequence MRI of the right foot was performed localized to the hindfoot.    Correlation is made with prior MRI from September 11, 2019.    FINDINGS:    There is a large wound along the plantar aspect of the calcaneus posteriorly which extends nearly extends to bone. There is surrounding soft tissue edema about this site with evidence of an overlying bandage. The degree of soft tissue deficiency is increased compared to the prior examination. There is extensive osseous edema and corresponding hypointense T1 marrow signalthroughout the calcaneus extending from the posterior plantar margin to the level of the angle of Gissane. This is progressed compared to the prior examination and consistent with osteomyelitis. Marrow signal within the remainder of the foot is otherwise preserved.    There is confluent edema tracking along the abductor hallucis and flexor digitorum brevis muscles and within the plantar subcutaneous fat subjacent to this region. In total this area measures approximately 2.2 cm in transverse dimension, approximately 4 cm in anteroposterior posterior dimension, and approximately 1.6 cm in craniocaudal dimension. Findings may be related to underlying phlegmon or abscess. Evaluation is limited by the lack of intravenous contrast. Distal to this site there is edema throughout the visualized musculature consistent with myositis.    Visualized tendinous structures remain intact. There is no acute ligamentous injury. Visualized joint spaces are preserved without joint effusion.    IMPRESSION:    Osteomyelitis involving the calcaneus which extends from the plantar posterior aspect of the calcaneus to the level of the angle of Gissane. This is progressed compared to the prior examination. This is seen in the setting of diffuse soft tissue deficiency along the posterior plantar aspect of the calcaneus.    Confluent edema adjacent to this region within the abductor hallucis and flexor digitorum brevis muscles and within the subjacent plantar subcutaneous fat. This may be related to abscess orphlegmon.            OSWALDO ALFRED MD; Attending Radiologist  This document has been electronically signed. Jan 6 2021 11:49AM    < end of copied text >    -------------------------------------------------------------------------------------------------------------  < from: [facility] Physiol Extremity Lower 3+ Level, BI (01.05.21 @ 17:55) >    EXAM:  US PHYSIOL LWR EXT 3+ LEV BI                            PROCEDURE DATE:  01/05/2021          INTERPRETATION:  Clinical information: History of diabetes, nonsmoker, hypertension, hyperlipidemia, presents with bilateral heel ulcers.    Comparison: Lower extremity arterial Doppler study dated 5/13/2020.    Technique: Lower extremity arterial Doppler study. Note that pressure measurements were not obtained at the thigh level.    Findings: Ankle brachial index measures 1.33 bilaterally, compared with prior values of 1.41 on the right and 1.36 on the left. No segmental arterial pressure gradient is present.    PVR waveforms are normal in amplitude and pulsatility from the thigh through the ankle level. They're diminished in amplitude at the metatarsal and digital levels in symmetric fashion, similar to the prior exam.    Impression: No Doppler evidence of hemodynamically significant arterial inflow abnormality in either lower extremity.    Evidence of small vessel disease in the feet.    No significant interval change since study of 5/13/2020.            SOHAN PA MD; Attending Radiologist  This document has been electronically signed. Jan 6 2021  9:13AM    < end of copied text >      --------------------------------------------------------------------------------------------------------------  < from: Xray Foot AP + Lateral + Oblique, Right (01.05.21 @ 18:00) >    EXAM:  FOOT RIGHT (MINIMUM 3 VIEWS)                            PROCEDURE DATE:  01/05/2021          INTERPRETATION:  Right foot    HISTORY: Status post bedside debridement.     Two views of the right foot show thinning of soft tissues near the calcaneus likely indicating site of debridement. The joint spaces are maintained. There is questionable exposure of the plantar surface of the calcaneus.    IMPRESSION: Calcaneal soft tissues and questionable exposure as noted. Clinical correlation is suggested.        Thank you for this referral.            REID CRAMER MD; Attending Interventional Radiologist  This document has been electronically signed. Jan 6 2021 11:10AM    < end of copied text >    -------------------------------------------------------------------------------------------  a< from: Xray Ankle Complete 3 Views, Bilateral (01.05.21 @ 14:37) >    EXAM:  ANKLE BILATERAL (MINIMUM 3 V)                            PROCEDURE DATE:  01/05/2021          INTERPRETATION:  CLINICAL INDICATION:  Osteomyelitis; evaluate for soft tissue emphysema.    COMPARISON:  Left ankle radiography 5/11/2020.    TECHNIQUE:  AP, oblique and crosstable lateral views left ankle. Oblique and crosstable lateral views right ankle. Technologist noted that the best possible films were obtained.    FINDINGS:    Right ankle: Generalized osteopenia. Subcutaneous emphysema is identified along the plantar aspect of the right hindfoot inferior to the calcaneus, with an overlying skin defect noted along the posterior aspect of the calcaneus. Osteolysis involving the inferior/posterior aspect of the right calcaneus cannot beexcluded. MRI evaluation can be performed for further assessment. Extensive vascular calcification is noted. No ankle joint effusion is noted.    Left ankle: Generalized osteopenia. There is no evidence for acute fracture or dislocation. The ankle mortise appears grossly intact. No ankle joint effusion is noted. Extensive vascular calcifications identified. No significant soft tissue swelling.      IMPRESSION:  No evidence for acute fracture. Subcutaneous emphysema is identified along the plantaraspect of the right hindfoot inferior to the calcaneus, with an overlying skin defect noted along the posterior aspect of the calcaneus. Osteolysis involving the inferior/posterior aspect of the right calcaneus cannot be excluded. MRI evaluation can be performed for further assessment.                MARA LARKIN MD; Attending Radiologist  This document has been electronically signed. Jan 5 2021  3:57PM    < end of copied text >        CULTURES:   cCulture - Surgical Swab (01.05.21 @ 22:13)   Specimen Source: .Surgical Swab Right heel wound   Culture Results:   Few Morganella morganii   Few Proteus mirabilis       Historical Values  Culture - Surgical Swab (01.05.21 @ 22:13)   Specimen Source: .Surgical Swab Right heel wound   Culture Results:   Few Morganella morganii   Few Proteus mirabilis

## 2021-01-08 NOTE — PROGRESS NOTE ADULT - SUBJECTIVE AND OBJECTIVE BOX
Dr. Barker routed back to nurse message pool.    Patient denies chest pain or shortness of breath.   Review of systems otherwise (-)  	  MEDICATIONS:  MEDICATIONS  (STANDING):  acetaminophen   Tablet .. 650 milliGRAM(s) Oral every 6 hours  aspirin enteric coated 81 milliGRAM(s) Oral daily  cefepime   IVPB      cefepime   IVPB 1000 milliGRAM(s) IV Intermittent every 8 hours  chlorhexidine 2% Cloths 1 Application(s) Topical daily  collagenase Ointment 1 Application(s) Topical daily  Dakins Solution - Full Strength 1 Application(s) Topical once  enoxaparin Injectable 40 milliGRAM(s) SubCutaneous daily  insulin lispro (ADMELOG) corrective regimen sliding scale   SubCutaneous three times a day before meals  metroNIDAZOLE  IVPB      metroNIDAZOLE  IVPB 500 milliGRAM(s) IV Intermittent every 8 hours  perphenazine 16 milliGRAM(s) Oral two times a day  potassium phosphate IVPB 30 milliMole(s) IV Intermittent once  simvastatin 40 milliGRAM(s) Oral at bedtime  sodium chloride 0.9%. 1000 milliLiter(s) (75 mL/Hr) IV Continuous <Continuous>  vancomycin  IVPB 750 milliGRAM(s) IV Intermittent every 12 hours      LABS:	 	    CARDIAC MARKERS:                          9.1    17.09 )-----------( 402      ( 08 Jan 2021 06:58 )             27.9     Hemoglobin: 9.1 g/dL (01-08 @ 06:58)  Hemoglobin: 8.7 g/dL (01-07 @ 08:05)  Hemoglobin: 8.9 g/dL (01-06 @ 06:29)  Hemoglobin: 9.8 g/dL (01-05 @ 11:45)      01-08    140  |  107  |  25<H>  ----------------------------<  147<H>  3.7   |  19<L>  |  1.43<H>    Ca    8.6      08 Jan 2021 06:58  Phos  2.3     01-08  Mg     2.6     01-08    TPro  6.0  /  Alb  1.8<L>  /  TBili  0.6  /  DBili  x   /  AST  8<L>  /  ALT  11  /  AlkPhos  68  01-08    Creatinine Trend: 1.43<--, 1.24<--, 0.54<--, 0.41<--    COAGS:   PT/INR - ( 08 Jan 2021 06:58 )   PT: 16.8 sec;   INR: 1.44 ratio         PTT - ( 08 Jan 2021 06:58 )  PTT:58.8 sec      PHYSICAL EXAM:  T(C): 36.6 (01-08-21 @ 05:10), Max: 36.7 (01-07-21 @ 20:31)  HR: 81 (01-08-21 @ 05:10) (77 - 84)  BP: 130/56 (01-08-21 @ 05:10) (115/47 - 130/56)  RR: 16 (01-08-21 @ 05:10) (16 - 18)  SpO2: 100% (01-08-21 @ 05:10) (97% - 100%)  Wt(kg): --  I&O's Summary      HEENT:  (-)icterus (-)pallor  CV: N S1 S2 1/6 ANGELIC (+)2 Pulses B/l  Resp:  Clear to ausculatation B/L, normal effort  GI: (+) BS Soft, NT, ND  Lymph:  (-)Edema, (-)obvious lymphadenopathy  Skin: Warm to touch, Normal turgor  Psych: Appropriate mood and affect      ASSESSMENT/PLAN: 	75y  Female  wheelchair bound with medical history significant of HTN, Diabetes, Osteoarthritis, Osteoporosis, Peripheral arterial disease, Diabetic neuropathy, HLD, Schizophrenia historically preserved LV and R function, comes in with worsening ulceration to b/l heels preop debridement.    - No evidence of CHF or unstable cardiac syndromes   - cont asa and statin  - low cardiac risk for proposed debridement   - awaiting OR    Jm Cortes MD, [facility]  BEEPER [phone]

## 2021-01-08 NOTE — BEHAVIORAL HEALTH ASSESSMENT NOTE - NSBHCHARTREVIEWINVESTIGATE_PSY_A_CORE FT
< from: 12 Lead ECG (01.05.21 @ 12:27) >    QTC Calculation(Bazett) 386 ms    P Axis 35 degrees    R Axis 46 degrees    T Axis 54 degrees    Diagnosis Line Sinus rhythm with short ME  Otherwise normal ECG    < end of copied text >

## 2021-01-08 NOTE — BEHAVIORAL HEALTH ASSESSMENT NOTE - RISK ASSESSMENT
Risk factors: Older age, schizophrenia. Protective factors: Absent h/o SI/SA/SIB, stable domicile in nursing home, engaged in OP care and taking medications. Risk level appears low at the time of assessment.  . Low Acute Suicide Risk

## 2021-01-08 NOTE — DISCHARGE NOTE PROVIDER - HOSPITAL COURSE
76 yo F from [facility], wheelchair bound with medical history significant of HTN, Diabetes, Osteoarthritis, Osteoporosis, Peripheral arterial disease, Diabetic neuropathy, HLD, Schizophrenia comes in with worsening ulceration to b/l heels. She has been having ulcers for past couple months, has been progressive. She was treated with IV antbx at NH but did not get better. It was worsening with necrotic changes and increasing foul smelling discharge.    Patient treated with IV ABX.  Sacral wound debrided on 1/8.  Patient refuses debridement of bilat heels.  Psych consulted, patient determined to have capacity.    NOT COMPLETE 74 yo F from [facility], wheelchair bound with medical history significant of HTN, Diabetes, Osteoarthritis, Osteoporosis, Peripheral arterial disease, Diabetic neuropathy, HLD, Schizophrenia comes in with worsening ulceration to b/l heels. She has been having ulcers for past couple months, has been progressive. She was treated with IV antbx at NH but did not get better. It was worsening with necrotic changes and increasing foul smelling discharge.    Patient treated with IV ABX.  Sacral wound debrided on 1/8.  Patient refuses debridement of bilat heels.  Psych consulted, patient determined to have capacity.    Instructions for discharge: upon discharge, on Right heel wound apply santyl, white foam overlying exposed calcaneus on the right heel, and black foam overlying to cover the wound. Place wound vac over, and set at 125 mmHg. On the left heel, apply santyl to wound, cover with 4x4 gauze, ABD pad, Keyur overlying, and light ACE compression. Apply CAIR boots bilaterally.      NOT COMPLETE 74 yo F from [facility], wheelchair bound with medical history significant of HTN, Diabetes, Osteoarthritis, Osteoporosis, Peripheral arterial disease, Diabetic neuropathy, HLD, Schizophrenia came to ED c/o worsening ulceration to b/l heels and sacral wound. Patient admitted for bilateral heel ulcers concern for OM. Patient was treated with IV antibiotics with Vancomycin, Cefepime and Flagyl. Podiatry on board, patient s/p sacral debridement on 1/8, s/p debridement of right heel (partial calcanectomy) on 1/13, wound vac applied by podiatry to right heel, wound culture grew few Enterococcus Faecalis and Proteus mirabilis,  PICC line placed on 1/20 for IV antibiotics.   Patient with schizophrenia, on haldol PRN and started on Methylphenidate. For hypertension and diabetes closely monitored. Patient also found to have hypernatremia, most likely 2/2 to poor oral intake.         Upon discharge, wound care instructions: on Right heel wound apply santyl, white foam overlying exposed calcaneus on the right heel, and black foam overlying to cover the wound. Place wound vac over, and set at 125 mmHg. On the left heel, apply santyl to wound, cover with 4x4 gauze, ABD pad, Keyur overlying, and light ACE compression. Apply CAIR boots bilaterally.       74 yo F from [facility], wheelchair bound with medical history significant of HTN, Diabetes, Osteoarthritis, Osteoporosis, Peripheral arterial disease, Diabetic neuropathy, HLD, Schizophrenia came to ED c/o worsening ulceration to b/l heels and sacral wound. Patient admitted for bilateral heel ulcers concern for OM. Patient was treated with IV antibiotics with Vancomycin, Cefepime and Flagyl. Podiatry on board, patient s/p sacral debridement on 1/8, s/p debridement of right heel (partial calcanectomy) on 1/13, wound vac applied by podiatry to right heel, wound culture grew few Enterococcus Faecalis and Proteus mirabilis,  PICC line placed on 1/20 for IV antibiotics.   Patient with schizophrenia, on haldol PRN and started on Methylphenidate. For hypertension and diabetes closely monitored. Patient also found to have hypernatremia, hypocalcemia, hyperchloremia most likely 2/2 to poor oral intake.   On 1/24 code stroke, CTH negative, neuro on board, no concern for stroke or TIA. Also patient with elevated troponin, CXR bilateral effusions, elevated Pro BNP, s/p IV Lasix, Echo 1/25/21 showed GIDD, EF 30-35%, severe LV systolic dysfunction, on ASA and statin   For anemia, patient received 2 units of PRBC on 1/22, no drop in Hg, no signs of bleeding, also ASHLEY that resolved, Emanuel in place      Upon discharge, wound care instructions: on Right heel wound apply santyl, white foam overlying exposed calcaneus on the right heel, and black foam overlying to cover the wound. Place wound vac over, and set at 125 mmHg. On the left heel, apply santyl to wound, cover with 4x4 gauze, ABD pad, Keyur overlying, and light ACE compression. Apply CAIR boots bilaterally.                         76 yo F from [facility], wheelchair bound with medical history significant of HTN, Diabetes, Osteoarthritis, Osteoporosis, Peripheral arterial disease, Diabetic neuropathy, HLD, Schizophrenia came to ED c/o worsening ulceration to b/l heels and sacral wound. Patient admitted for bilateral heel ulcers concern for OM. Patient was treated with IV antibiotics with Vancomycin, Cefepime and Flagyl. Podiatry on board, patient s/p sacral debridement on 1/8, s/p debridement of right heel (partial calcanectomy) on 1/13, wound vac applied by podiatry to right heel, wound culture grew few Enterococcus Faecalis and Proteus mirabilis, PICC line placed on 1/20 for IV antibiotics.   Patient with schizophrenia, on haldol PRN and started on Methylphenidate as per psych recommendation. Patient with hypoactive delirium secondary to metabolic disturbances and infection. For hypertension and diabetes closely monitored. Patient also found to have hypernatremia, hypocalcemia, hyperchloremia most likely 2/2 to poor oral intake, seen by nephrologist.   On 1/24 code stroke, CTH negative, neuro on board, no concern for stroke or TIA. Also patient with elevated troponin, CXR bilateral effusions, elevated Pro BNP, CHFrEF s/p IV Lasix, Echo 1/25/21 showed GIDD, EF 30-35%, severe LV systolic dysfunction, on ASA and statin, not taking it due to poor mental status.    For anemia, patient received 2 units of PRBC on 1/22, no drop in Hg, no signs of bleeding, also ASHLEY that resolved, Emanuel in place.  Patient with poor prognosis, DNR DNI, hospice comfort measures.       Upon discharge, wound care instructions: on Right heel wound apply santyl, white foam overlying exposed calcaneus on the right heel, and black foam overlying to cover the wound. Place wound vac over, and set at 125 mmHg. On the left heel, apply santyl to wound, cover with 4x4 gauze, ABD pad, Keyur overlying, and light ACE compression. Apply CAIR boots bilaterally.                         76 yo F from [facility], wheelchair bound with medical history significant of HTN, Diabetes, Osteoarthritis, Osteoporosis, Peripheral arterial disease, Diabetic neuropathy, HLD, Schizophrenia came to ED c/o worsening ulceration to b/l heels and sacral wound. Patient admitted for bilateral heel ulcers concern for OM. Patient was treated with IV antibiotics with Vancomycin, Cefepime and Flagyl. Podiatry on board, patient s/p sacral debridement on 1/8, s/p debridement of right heel (partial calcanectomy) on 1/13, wound vac applied by podiatry to right heel, wound culture grew few Enterococcus Faecalis and Proteus mirabilis, PICC line placed on 1/20 for IV antibiotics.   Patient with schizophrenia, on haldol PRN and started on Methylphenidate as per psych recommendation. Patient with hypoactive delirium secondary to metabolic disturbances and infection. For hypertension and diabetes closely monitored. Patient also found to have hypernatremia, hypocalcemia, hyperchloremia most likely 2/2 to poor oral intake, seen by nephrologist.   On 1/24 code stroke, CTH negative, neuro on board, no concern for stroke or TIA. Also patient with elevated troponin, CXR bilateral effusions, elevated Pro BNP, CHFrEF s/p IV Lasix, Echo 1/25/21 showed GIDD, EF 30-35%, severe LV systolic dysfunction, on ASA and statin, not taking it due to poor mental status.    For anemia, patient received 2 units of PRBC on 1/22, no drop in Hg, no signs of bleeding, also ASHLEY that resolved, Emanuel in place.  Patient with poor prognosis, DNR DNI, hospice comfort measures.       Upon discharge, wound care instructions: on Right heel wound apply santyl, white foam overlying exposed calcaneus on the right heel, and black foam overlying to cover the wound. Place wound vac over, and set at 125 mmHg. On the left heel, apply santyl to wound, cover with 4x4 gauze, ABD pad, Keyur overlying, and light ACE compression. Apply CAIR boots bilaterally.      Please refer to patient's complete medical chart with documents for a full hospital course, for this is only a brief summary.

## 2021-01-08 NOTE — BEHAVIORAL HEALTH ASSESSMENT NOTE - NSBHCHARTREVIEWVS_PSY_A_CORE FT
Vital Signs Last 24 Hrs  T(C): 36.6 (08 Jan 2021 14:44), Max: 36.7 (07 Jan 2021 20:31)  T(F): 97.9 (08 Jan 2021 14:44), Max: 98 (07 Jan 2021 20:31)  HR: 86 (08 Jan 2021 14:44) (75 - 99)  BP: 123/48 (08 Jan 2021 14:44) (117/54 - 153/66)  BP(mean): 81 (08 Jan 2021 13:25) (77 - 90)  RR: 16 (08 Jan 2021 14:44) (15 - 20)  SpO2: 100% (08 Jan 2021 14:44) (98% - 100%)

## 2021-01-08 NOTE — DISCHARGE NOTE PROVIDER - NSDCCPCAREPLAN_GEN_ALL_CORE_FT
PRINCIPAL DISCHARGE DIAGNOSIS  Diagnosis: Osteomyelitis  Assessment and Plan of Treatment: You have a condition called osteomyelitis. This is a bone infection caused by bacteria or fungi. The infection may have come from one area of your body and spread to the bone by traveling through the blood. Osteomyelitis is described as "acute" when the infection is new and "chronic" when you have had the infection for a longer time. If you have any questions or concerns, call your healthcare provider.  •Take your medicine exactly as directed. If you were given antibiotics or antifungal medicine, make sure you finish the prescription-even if you feel better. If you don't finish the medicine, the infection may return and may make future infections harder to treat.   •Be careful not to injure the area where you have the infection.  •Carefully follow all instructions for taking care of any wounds.  •Use a splint, sling, or brace as directed by your healthcare provider.        SECONDARY DISCHARGE DIAGNOSES  Diagnosis: Paranoid schizophrenia  Assessment and Plan of Treatment: Paranoid schizophrenia    Diagnosis: Heel ulcer  Assessment and Plan of Treatment: Instructions for discharge: upon discharge, on Right heel wound apply santyl, white foam overlying exposed calcaneus on the right heel, and black foam overlying to cover the wound. Place wound vac over, and set at 125 mmHg. On the left heel, apply santyl to wound, cover with 4x4 gauze, ABD pad, Keyur overlying, and light ACE compression. Apply CAIR boots bilaterally.     PRINCIPAL DISCHARGE DIAGNOSIS  Diagnosis: Osteomyelitis  Assessment and Plan of Treatment: You have a condition called osteomyelitis. This is a bone infection caused by bacteria or fungi. The infection may have come from one area of your body and spread to the bone by traveling through the blood. Osteomyelitis is described as "acute" when the infection is new and "chronic" when you have had the infection for a longer time. If you have any questions or concerns, call your healthcare provider.  •Take your medicine exactly as directed. If you were given antibiotics or antifungal medicine, make sure you finish the prescription-even if you feel better. If you don't finish the medicine, the infection may return and may make future infections harder to treat.   •Be careful not to injure the area where you have the infection.  •Carefully follow all instructions for taking care of any wounds.  •Use a splint, sling, or brace as directed by your healthcare provider.        SECONDARY DISCHARGE DIAGNOSES  Diagnosis: Heel ulcer  Assessment and Plan of Treatment: You have a right heel ulcer under podiatry care. You were treated with antibiotics. Please follow wound care instructions:   On right heel apply santyl, white foam overlying exposed calcaneus on the right heel, and black foam overlying to cover the wound. Place wound vac over, and set at 125 mmHg.   On the left heel, apply santyl to wound, cover with 4x4 gauze, ABD pad, Keyur overlying, and light ACE compression. Apply CAIR boots bilaterally.    Diagnosis: Electrolyte abnormality  Assessment and Plan of Treatment: You were diagnosed with multiple Electrolyte abnormality including high sodium, high chloride, low calcium secondary to poor oral intake.    Diagnosis: Sacral ulcer  Assessment and Plan of Treatment: You have unstageable Sacral ulcer.    Diagnosis: Severe protein-calorie malnutrition  Assessment and Plan of Treatment: You have Severe protein-calorie malnutrition due to poor oral intake and poor mental status.    Diagnosis: DM (diabetes mellitus)  Assessment and Plan of Treatment: You have history of DM, A1c 6.8. You are not eating or drinking fluids due to poor mental status.    Diagnosis: HTN (hypertension)  Assessment and Plan of Treatment: You have histiry of Hypertension not on blood pressure medications.    Diagnosis: Altered mental status  Assessment and Plan of Treatment: Altered mental status    Diagnosis: HLD (hyperlipidemia)  Assessment and Plan of Treatment: HLD (hyperlipidemia)    Diagnosis: Paranoid schizophrenia  Assessment and Plan of Treatment: You have history of Paranoid schizophrenia. You were seen by psychiatrist.     PRINCIPAL DISCHARGE DIAGNOSIS  Diagnosis: Osteomyelitis  Assessment and Plan of Treatment: You were diagnosed with osteomyelitis of right heel. This is a bone infection caused by bacteria or fungi. The infection may have come from one area of your body and spread to the bone by traveling through the blood. Osteomyelitis is described as "acute" when the infection is new and "chronic" when you have had the infection for a longer time. You were seen by podiatry, had debridement on 1/13/21, on IV antibiotics and vac.         SECONDARY DISCHARGE DIAGNOSES  Diagnosis: Heel ulcer  Assessment and Plan of Treatment: You have bilateral heel ulcers under podiatry care. You were treated with IV antibiotics. Please follow wound care instructions as follows:   - On right heel apply santyl, white foam overlying exposed calcaneus on the right heel, and black foam overlying to cover the wound. Place wound vac over, and set at 125 mmHg.   - On the left heel, apply santyl to wound, cover with 4x4 gauze, ABD pad, Keyur overlying, and light ACE compression. Apply CAIR boots bilaterally.    Diagnosis: Sacral ulcer  Assessment and Plan of Treatment: You have unstageable Sacral ulcer. You had debridement on 1/8/21 by surgery. Please follow wound care instructions as follows:   - Continue local wound care  - Frequent repositioning and off-loading  - Foam dressing    Diagnosis: Delirium  Assessment and Plan of Treatment: You were diagnosed with delirium secondary to metabolic and infectious causes. You were seen by psychiatrist who recommended Haldol and Methylphenidate, however due to poor mental status you were not able to take medications by mouth and your clinical condition continued to deteriorate.    Diagnosis: Altered mental status  Assessment and Plan of Treatment: You have Altered mental status. It is multifactorial given your poor oral intake, electrolytes abnormalities, multiple open wounds in both heels and sacral area, infection, generalized swelling.    Diagnosis: Electrolyte abnormality  Assessment and Plan of Treatment: You were diagnosed with multiple Electrolyte abnormalities including high sodium, high chloride, low calcium secondary to poor oral intake.    Diagnosis: Severe protein-calorie malnutrition  Assessment and Plan of Treatment: You have Severe protein-calorie malnutrition due to poor oral intake and poor mental status.    Diagnosis: DM (diabetes mellitus)  Assessment and Plan of Treatment: You have history of DM, A1c 6.8. You are not eating or drinking fluids due to poor mental status.    Diagnosis: HTN (hypertension)  Assessment and Plan of Treatment: You have history of Hypertension. Your blood pressure has been low normal not requiring any blood pressure medications.    Diagnosis: HLD (hyperlipidemia)  Assessment and Plan of Treatment: You have history of HLD (hyperlipidemia). You are not taking any medications as you are able to swallow due to altered mental status.    Diagnosis: Paranoid schizophrenia  Assessment and Plan of Treatment: You have history of Paranoid schizophrenia. You were seen by psychiatrist. Recommended Methylphenidate but you are not able to take it.     PRINCIPAL DISCHARGE DIAGNOSIS  Diagnosis: Osteomyelitis  Assessment and Plan of Treatment: You were diagnosed with osteomyelitis of right heel. This is a bone infection caused by bacteria or fungi. The infection may have come from one area of your body and spread to the bone by traveling through the blood. Osteomyelitis is described as "acute" when the infection is new and "chronic" when you have had the infection for a longer time. You were seen by podiatry, had debridement on 1/13/21 and treated with IV antibiotics and vac. YOU ARE HOSPICE, COMFORT MEASURES ONLY        SECONDARY DISCHARGE DIAGNOSES  Diagnosis: Heel ulcer  Assessment and Plan of Treatment: You have bilateral heel ulcers under podiatry care. You were treated with IV antibiotics. Please follow wound care instructions as follows:   - On right heel apply santyl, white foam overlying exposed calcaneus on the right heel, and black foam overlying to cover the wound. Place wound vac over, and set at 125 mmHg.   - On the left heel, apply santyl to wound, cover with 4x4 gauze, ABD pad, Keyur overlying, and light ACE compression. Apply CAIR boots bilaterally.  YOU ARE HOSPICE, COMFORT MEASURES ONLY    Diagnosis: Sacral ulcer  Assessment and Plan of Treatment: You have unstageable Sacral ulcer. You had debridement on 1/8/21 by surgery. Please follow wound care instructions as follows:   - Continue local wound care  - Frequent repositioning and off-loading  - Foam dressing  YOU ARE HOSPICE, COMFORT MEASURES ONLY    Diagnosis: Delirium  Assessment and Plan of Treatment: You were diagnosed with delirium secondary to metabolic and infectious causes. You were seen by psychiatrist who recommended Haldol and Methylphenidate, however due to poor mental status you were not able to take medications by mouth and your clinical condition continued to deteriorate.  YOU ARE HOSPICE, COMFORT MEASURES ONLY    Diagnosis: Altered mental status  Assessment and Plan of Treatment: You have Altered mental status. It is multifactorial given your poor oral intake, electrolytes abnormalities, multiple open wounds in both heels and sacral area, infection, generalized swelling.  YOU ARE HOSPICE, COMFORT MEASURES ONLY    Diagnosis: Electrolyte abnormality  Assessment and Plan of Treatment: You were diagnosed with multiple Electrolyte abnormalities including high sodium, high chloride, low calcium secondary to poor oral intake.  YOU ARE HOSPICE, COMFORT MEASURES ONLY    Diagnosis: Severe protein-calorie malnutrition  Assessment and Plan of Treatment: You have Severe protein-calorie malnutrition due to poor oral intake and poor mental status.  YOU ARE HOSPICE, COMFORT MEASURES ONLY    Diagnosis: DM (diabetes mellitus)  Assessment and Plan of Treatment: You have history of DM, A1c 6.8. You are not eating or drinking fluids due to poor mental status.  YOU ARE HOSPICE, COMFORT MEASURES ONLY    Diagnosis: HTN (hypertension)  Assessment and Plan of Treatment: You have history of Hypertension. Your blood pressure has been low normal not requiring any blood pressure medications.  YOU ARE HOSPICE, COMFORT MEASURES ONLY    Diagnosis: HLD (hyperlipidemia)  Assessment and Plan of Treatment: You have history of HLD (hyperlipidemia). You are not taking any medications as you are able to swallow due to altered mental status.  YOU ARE HOSPICE, COMFORT MEASURES ONLY    Diagnosis: Paranoid schizophrenia  Assessment and Plan of Treatment: You have history of Paranoid schizophrenia. You were seen by psychiatrist. Recommended Methylphenidate but you are not able to take it. YOU ARE HOSPICE, COMFORT MEASURES ONLY     PRINCIPAL DISCHARGE DIAGNOSIS  Diagnosis: Osteomyelitis  Assessment and Plan of Treatment: You were diagnosed with osteomyelitis of right heel. This is a bone infection caused by bacteria or fungi. The infection may have come from one area of your body and spread to the bone by traveling through the blood. Osteomyelitis is described as "acute" when the infection is new and "chronic" when you have had the infection for a longer time. You were seen by podiatry, had debridement on 1/13/21 and treated with IV antibiotics and vac. YOU ARE HOSPICE, COMFORT MEASURES ONLY        SECONDARY DISCHARGE DIAGNOSES  Diagnosis: Heel ulcer  Assessment and Plan of Treatment: You have bilateral heel ulcers under podiatry care. You were treated with IV antibiotics. Please follow wound care instructions as follows:   - On right heel apply santyl, white foam overlying exposed calcaneus on the right heel, and black foam overlying to cover the wound.   - On the left heel, apply santyl to wound, cover with 4x4 gauze, ABD pad, Keyur overlying, and light ACE compression. Apply CAIR boots bilaterally.  YOU ARE HOSPICE, COMFORT MEASURES ONLY    Diagnosis: Sacral ulcer  Assessment and Plan of Treatment: You have unstageable Sacral ulcer. You had debridement on 1/8/21 by surgery. Please follow wound care instructions as follows:   - Continue local wound care  - Frequent repositioning and off-loading  - Foam dressing  YOU ARE HOSPICE, COMFORT MEASURES ONLY    Diagnosis: Delirium  Assessment and Plan of Treatment: You were diagnosed with delirium secondary to metabolic and infectious causes. You were seen by psychiatrist who recommended Haldol and Methylphenidate, however due to poor mental status you were not able to take medications by mouth and your clinical condition continued to deteriorate.  YOU ARE HOSPICE, COMFORT MEASURES ONLY    Diagnosis: Altered mental status  Assessment and Plan of Treatment: You have Altered mental status. It is multifactorial given your poor oral intake, electrolytes abnormalities, multiple open wounds in both heels and sacral area, infection, generalized swelling.  YOU ARE HOSPICE, COMFORT MEASURES ONLY    Diagnosis: Electrolyte abnormality  Assessment and Plan of Treatment: You were diagnosed with multiple Electrolyte abnormalities including high sodium, high chloride, low calcium secondary to poor oral intake.  YOU ARE HOSPICE, COMFORT MEASURES ONLY    Diagnosis: Severe protein-calorie malnutrition  Assessment and Plan of Treatment: You have Severe protein-calorie malnutrition due to poor oral intake and poor mental status.  YOU ARE HOSPICE, COMFORT MEASURES ONLY    Diagnosis: DM (diabetes mellitus)  Assessment and Plan of Treatment: You have history of DM, A1c 6.8. You are not eating or drinking fluids due to poor mental status.  YOU ARE HOSPICE, COMFORT MEASURES ONLY    Diagnosis: HTN (hypertension)  Assessment and Plan of Treatment: You have history of Hypertension. Your blood pressure has been low normal not requiring any blood pressure medications.  YOU ARE HOSPICE, COMFORT MEASURES ONLY    Diagnosis: HLD (hyperlipidemia)  Assessment and Plan of Treatment: You have history of HLD (hyperlipidemia). You are not taking any medications as you are able to swallow due to altered mental status.  YOU ARE HOSPICE, COMFORT MEASURES ONLY    Diagnosis: Paranoid schizophrenia  Assessment and Plan of Treatment: You have history of Paranoid schizophrenia. You were seen by psychiatrist. Recommended Methylphenidate but you are not able to take it. YOU ARE HOSPICE, COMFORT MEASURES ONLY

## 2021-01-08 NOTE — DISCHARGE NOTE PROVIDER - NSDCMRMEDTOKEN_GEN_ALL_CORE_FT
amLODIPine 5 mg oral tablet: 1 tab(s) orally once a day  aspirin 81 mg oral delayed release tablet: 1 tab(s) orally once a day  cholecalciferol oral tablet: 1000 unit(s) orally once a day  cyanocobalamin 1000 mcg oral tablet: 1 tab(s) orally once a day  metFORMIN 500 mg oral tablet, extended release: 1 tab(s) orally 2 times a day  Metoprolol Tartrate 100 mg oral tablet: 1 tab(s) orally 2 times a day  nystatin 100,000 units/g topical powder: 1 application topically 2 times a day  perphenazine 16 mg oral tablet: 1 tab(s) orally 2 times a day  simvastatin 40 mg oral tablet: 1 tab(s) orally once a day (at bedtime)   amLODIPine 5 mg oral tablet: 1 tab(s) orally once a day  ampicillin-sulbactam 2 g-1 g injection: 3 gram(s) intravenously every 6 hours till 02/26/21  aspirin 81 mg oral delayed release tablet: 1 tab(s) orally once a day  cholecalciferol oral tablet: 1000 unit(s) orally once a day  cyanocobalamin 1000 mcg oral tablet: 1 tab(s) orally once a day  metFORMIN 500 mg oral tablet, extended release: 1 tab(s) orally 2 times a day  Metoprolol Tartrate 100 mg oral tablet: 1 tab(s) orally 2 times a day  nystatin 100,000 units/g topical powder: 1 application topically 2 times a day  perphenazine 16 mg oral tablet: 1 tab(s) orally 2 times a day  simvastatin 40 mg oral tablet: 1 tab(s) orally once a day (at bedtime)   collagenase 250 units/g topical ointment: 1 application topically once a day  nystatin 100,000 units/g topical powder: 1 application topically 2 times a day  sodium hypochlorite 0.5% topical solution: 1 application topically once

## 2021-01-08 NOTE — PROGRESS NOTE ADULT - ASSESSMENT
75 year old female S/P debridement of sacral decubitus ulcer POD#0, stable    - continue local wound care  - frequent repositioning and off-loading  - foam dressing  - continue care per medicine

## 2021-01-08 NOTE — DISCHARGE NOTE PROVIDER - CARE PROVIDER_API CALL
Rosalio Llanes (MD)  Medicine  [address] [place] [address]  Phone: [phone]  Fax: [phone]  Follow Up Time: 1-3 days

## 2021-01-08 NOTE — BEHAVIORAL HEALTH ASSESSMENT NOTE - NSBHCONSULTRECOMMENDOTHER_PSY_A_CORE FT
1. Pt DOES have capacity to consent to or refuse OR debridement of her heel ulcers, and states that she consents to debridement  2. C/w home psychiatric medication: perphenazine 16 mg BID  3. No indication for other standing or PRN psychotropic medications at this time  4. Medical management as directed by primary team and podiatry  5. Psychiatry is signing off. Reconsult if additional issues arise as inpatient  6. Case d/w MARIAELENA Ga of primary team    Norma Lackey MD  Director, Consultation-Liaison Psychiatry Service  [id number]

## 2021-01-08 NOTE — DISCHARGE NOTE PROVIDER - DETAILS OF MALNUTRITION DIAGNOSIS/DIAGNOSES
This patient has been assessed with a concern for Malnutrition and was treated during this hospitalization for the following Nutrition diagnosis/diagnoses:     -  01/19/2021: Severe protein-calorie malnutrition   -  01/19/2021: Underweight (BMI < 19)   This patient has been assessed with a concern for Malnutrition and was treated during this hospitalization for the following Nutrition diagnosis/diagnoses:     -  01/19/2021: Severe protein-calorie malnutrition   -  01/19/2021: Underweight (BMI < 19)    This patient has been assessed with a concern for Malnutrition and was treated during this hospitalization for the following Nutrition diagnosis/diagnoses:     -  01/19/2021: Severe protein-calorie malnutrition   -  01/19/2021: Underweight (BMI < 19)   This patient has been assessed with a concern for Malnutrition and was treated during this hospitalization for the following Nutrition diagnosis/diagnoses:     -  01/19/2021: Severe protein-calorie malnutrition   -  01/19/2021: Underweight (BMI < 19)    This patient has been assessed with a concern for Malnutrition and was treated during this hospitalization for the following Nutrition diagnosis/diagnoses:     -  01/19/2021: Severe protein-calorie malnutrition   -  01/19/2021: Underweight (BMI < 19)    This patient has been assessed with a concern for Malnutrition and was treated during this hospitalization for the following Nutrition diagnosis/diagnoses:     -  01/19/2021: Severe protein-calorie malnutrition   -  01/19/2021: Underweight (BMI < 19)   This patient has been assessed with a concern for Malnutrition and was treated during this hospitalization for the following Nutrition diagnosis/diagnoses:     -  01/19/2021: Severe protein-calorie malnutrition   -  01/19/2021: Underweight (BMI < 19)    This patient has been assessed with a concern for Malnutrition and was treated during this hospitalization for the following Nutrition diagnosis/diagnoses:     -  01/19/2021: Severe protein-calorie malnutrition   -  01/19/2021: Underweight (BMI < 19)    This patient has been assessed with a concern for Malnutrition and was treated during this hospitalization for the following Nutrition diagnosis/diagnoses:     -  01/19/2021: Severe protein-calorie malnutrition   -  01/19/2021: Underweight (BMI < 19)    This patient has been assessed with a concern for Malnutrition and was treated during this hospitalization for the following Nutrition diagnosis/diagnoses:     -  01/19/2021: Severe protein-calorie malnutrition   -  01/19/2021: Underweight (BMI < 19)   This patient has been assessed with a concern for Malnutrition and was treated during this hospitalization for the following Nutrition diagnosis/diagnoses:     -  01/19/2021: Severe protein-calorie malnutrition   -  01/19/2021: Underweight (BMI < 19)    This patient has been assessed with a concern for Malnutrition and was treated during this hospitalization for the following Nutrition diagnosis/diagnoses:     -  01/19/2021: Severe protein-calorie malnutrition   -  01/19/2021: Underweight (BMI < 19)    This patient has been assessed with a concern for Malnutrition and was treated during this hospitalization for the following Nutrition diagnosis/diagnoses:     -  01/19/2021: Severe protein-calorie malnutrition   -  01/19/2021: Underweight (BMI < 19)    This patient has been assessed with a concern for Malnutrition and was treated during this hospitalization for the following Nutrition diagnosis/diagnoses:     -  01/19/2021: Severe protein-calorie malnutrition   -  01/19/2021: Underweight (BMI < 19)    This patient has been assessed with a concern for Malnutrition and was treated during this hospitalization for the following Nutrition diagnosis/diagnoses:     -  01/19/2021: Severe protein-calorie malnutrition   -  01/19/2021: Underweight (BMI < 19)   This patient has been assessed with a concern for Malnutrition and was treated during this hospitalization for the following Nutrition diagnosis/diagnoses:     -  01/19/2021: Severe protein-calorie malnutrition   -  01/19/2021: Underweight (BMI < 19)    This patient has been assessed with a concern for Malnutrition and was treated during this hospitalization for the following Nutrition diagnosis/diagnoses:     -  01/19/2021: Severe protein-calorie malnutrition   -  01/19/2021: Underweight (BMI < 19)    This patient has been assessed with a concern for Malnutrition and was treated during this hospitalization for the following Nutrition diagnosis/diagnoses:     -  01/19/2021: Severe protein-calorie malnutrition   -  01/19/2021: Underweight (BMI < 19)    This patient has been assessed with a concern for Malnutrition and was treated during this hospitalization for the following Nutrition diagnosis/diagnoses:     -  01/19/2021: Severe protein-calorie malnutrition   -  01/19/2021: Underweight (BMI < 19)    This patient has been assessed with a concern for Malnutrition and was treated during this hospitalization for the following Nutrition diagnosis/diagnoses:     -  01/19/2021: Severe protein-calorie malnutrition   -  01/19/2021: Underweight (BMI < 19)    This patient has been assessed with a concern for Malnutrition and was treated during this hospitalization for the following Nutrition diagnosis/diagnoses:     -  01/19/2021: Severe protein-calorie malnutrition   -  01/19/2021: Underweight (BMI < 19)   This patient has been assessed with a concern for Malnutrition and was treated during this hospitalization for the following Nutrition diagnosis/diagnoses:     -  01/19/2021: Severe protein-calorie malnutrition   -  01/19/2021: Underweight (BMI < 19)    This patient has been assessed with a concern for Malnutrition and was treated during this hospitalization for the following Nutrition diagnosis/diagnoses:     -  01/19/2021: Severe protein-calorie malnutrition   -  01/19/2021: Underweight (BMI < 19)    This patient has been assessed with a concern for Malnutrition and was treated during this hospitalization for the following Nutrition diagnosis/diagnoses:     -  01/19/2021: Severe protein-calorie malnutrition   -  01/19/2021: Underweight (BMI < 19)    This patient has been assessed with a concern for Malnutrition and was treated during this hospitalization for the following Nutrition diagnosis/diagnoses:     -  01/19/2021: Severe protein-calorie malnutrition   -  01/19/2021: Underweight (BMI < 19)    This patient has been assessed with a concern for Malnutrition and was treated during this hospitalization for the following Nutrition diagnosis/diagnoses:     -  01/19/2021: Severe protein-calorie malnutrition   -  01/19/2021: Underweight (BMI < 19)    This patient has been assessed with a concern for Malnutrition and was treated during this hospitalization for the following Nutrition diagnosis/diagnoses:     -  01/19/2021: Severe protein-calorie malnutrition   -  01/19/2021: Underweight (BMI < 19)    This patient has been assessed with a concern for Malnutrition and was treated during this hospitalization for the following Nutrition diagnosis/diagnoses:     -  01/19/2021: Severe protein-calorie malnutrition   -  01/19/2021: Underweight (BMI < 19)

## 2021-01-09 LAB
ANION GAP SERPL CALC-SCNC: 10 MMOL/L — SIGNIFICANT CHANGE UP (ref 5–17)
ANION GAP SERPL CALC-SCNC: 10 MMOL/L — SIGNIFICANT CHANGE UP (ref 5–17)
ANION GAP SERPL CALC-SCNC: 13 MMOL/L — SIGNIFICANT CHANGE UP (ref 5–17)
BUN SERPL-MCNC: 27 MG/DL — HIGH (ref 7–18)
BUN SERPL-MCNC: 31 MG/DL — HIGH (ref 7–18)
BUN SERPL-MCNC: 32 MG/DL — HIGH (ref 7–18)
CALCIUM SERPL-MCNC: 6.9 MG/DL — LOW (ref 8.4–10.5)
CALCIUM SERPL-MCNC: 8.2 MG/DL — LOW (ref 8.4–10.5)
CALCIUM SERPL-MCNC: 8.5 MG/DL — SIGNIFICANT CHANGE UP (ref 8.4–10.5)
CHLORIDE SERPL-SCNC: 109 MMOL/L — HIGH (ref 96–108)
CHLORIDE SERPL-SCNC: 112 MMOL/L — HIGH (ref 96–108)
CHLORIDE SERPL-SCNC: 115 MMOL/L — HIGH (ref 96–108)
CO2 SERPL-SCNC: 14 MMOL/L — LOW (ref 22–31)
CO2 SERPL-SCNC: 15 MMOL/L — LOW (ref 22–31)
CO2 SERPL-SCNC: 19 MMOL/L — LOW (ref 22–31)
CREAT SERPL-MCNC: 1.05 MG/DL — SIGNIFICANT CHANGE UP (ref 0.5–1.3)
CREAT SERPL-MCNC: 1.44 MG/DL — HIGH (ref 0.5–1.3)
CREAT SERPL-MCNC: 1.44 MG/DL — HIGH (ref 0.5–1.3)
GLUCOSE BLDC GLUCOMTR-MCNC: 133 MG/DL — HIGH (ref 70–99)
GLUCOSE BLDC GLUCOMTR-MCNC: 146 MG/DL — HIGH (ref 70–99)
GLUCOSE BLDC GLUCOMTR-MCNC: 206 MG/DL — HIGH (ref 70–99)
GLUCOSE BLDC GLUCOMTR-MCNC: 214 MG/DL — HIGH (ref 70–99)
GLUCOSE SERPL-MCNC: 177 MG/DL — HIGH (ref 70–99)
GLUCOSE SERPL-MCNC: 189 MG/DL — HIGH (ref 70–99)
GLUCOSE SERPL-MCNC: 248 MG/DL — HIGH (ref 70–99)
HCT VFR BLD CALC: 28.9 % — LOW (ref 34.5–45)
HGB BLD-MCNC: 9.4 G/DL — LOW (ref 11.5–15.5)
MAGNESIUM SERPL-MCNC: 2.5 MG/DL — SIGNIFICANT CHANGE UP (ref 1.6–2.6)
MCHC RBC-ENTMCNC: 30.3 PG — SIGNIFICANT CHANGE UP (ref 27–34)
MCHC RBC-ENTMCNC: 32.5 GM/DL — SIGNIFICANT CHANGE UP (ref 32–36)
MCV RBC AUTO: 93.2 FL — SIGNIFICANT CHANGE UP (ref 80–100)
NRBC # BLD: 0 /100 WBCS — SIGNIFICANT CHANGE UP (ref 0–0)
PHOSPHATE SERPL-MCNC: 4.1 MG/DL — SIGNIFICANT CHANGE UP (ref 2.5–4.5)
PLATELET # BLD AUTO: 426 K/UL — HIGH (ref 150–400)
POTASSIUM SERPL-MCNC: 4.3 MMOL/L — SIGNIFICANT CHANGE UP (ref 3.5–5.3)
POTASSIUM SERPL-MCNC: 6.3 MMOL/L — CRITICAL HIGH (ref 3.5–5.3)
POTASSIUM SERPL-MCNC: >10 MMOL/L — CRITICAL HIGH (ref 3.5–5.3)
POTASSIUM SERPL-SCNC: 4.3 MMOL/L — SIGNIFICANT CHANGE UP (ref 3.5–5.3)
POTASSIUM SERPL-SCNC: 6.3 MMOL/L — CRITICAL HIGH (ref 3.5–5.3)
POTASSIUM SERPL-SCNC: >10 MMOL/L — CRITICAL HIGH (ref 3.5–5.3)
RBC # BLD: 3.1 M/UL — LOW (ref 3.8–5.2)
RBC # FLD: 13 % — SIGNIFICANT CHANGE UP (ref 10.3–14.5)
SODIUM SERPL-SCNC: 137 MMOL/L — SIGNIFICANT CHANGE UP (ref 135–145)
SODIUM SERPL-SCNC: 139 MMOL/L — SIGNIFICANT CHANGE UP (ref 135–145)
SODIUM SERPL-SCNC: 141 MMOL/L — SIGNIFICANT CHANGE UP (ref 135–145)
WBC # BLD: 23.08 K/UL — HIGH (ref 3.8–10.5)
WBC # FLD AUTO: 23.08 K/UL — HIGH (ref 3.8–10.5)

## 2021-01-09 RX ORDER — SODIUM CHLORIDE 9 MG/ML
1000 INJECTION INTRAMUSCULAR; INTRAVENOUS; SUBCUTANEOUS
Refills: 0 | Status: DISCONTINUED | OUTPATIENT
Start: 2021-01-09 | End: 2021-01-10

## 2021-01-09 RX ORDER — ONDANSETRON 8 MG/1
4 TABLET, FILM COATED ORAL ONCE
Refills: 0 | Status: DISCONTINUED | OUTPATIENT
Start: 2021-01-09 | End: 2021-01-09

## 2021-01-09 RX ADMIN — CEFEPIME 100 MILLIGRAM(S): 1 INJECTION, POWDER, FOR SOLUTION INTRAMUSCULAR; INTRAVENOUS at 13:12

## 2021-01-09 RX ADMIN — PERPHENAZINE 16 MILLIGRAM(S): 8 TABLET, FILM COATED ORAL at 17:16

## 2021-01-09 RX ADMIN — SODIUM CHLORIDE 80 MILLILITER(S): 9 INJECTION INTRAMUSCULAR; INTRAVENOUS; SUBCUTANEOUS at 12:36

## 2021-01-09 RX ADMIN — Medication 81 MILLIGRAM(S): at 12:31

## 2021-01-09 RX ADMIN — CEFEPIME 100 MILLIGRAM(S): 1 INJECTION, POWDER, FOR SOLUTION INTRAMUSCULAR; INTRAVENOUS at 05:45

## 2021-01-09 RX ADMIN — Medication 100 MILLIGRAM(S): at 13:12

## 2021-01-09 RX ADMIN — Medication 2: at 08:48

## 2021-01-09 RX ADMIN — PERPHENAZINE 16 MILLIGRAM(S): 8 TABLET, FILM COATED ORAL at 05:46

## 2021-01-09 RX ADMIN — Medication 650 MILLIGRAM(S): at 22:26

## 2021-01-09 RX ADMIN — Medication 650 MILLIGRAM(S): at 05:44

## 2021-01-09 RX ADMIN — Medication 100 MILLIGRAM(S): at 21:01

## 2021-01-09 RX ADMIN — Medication 2: at 12:32

## 2021-01-09 RX ADMIN — Medication 650 MILLIGRAM(S): at 17:18

## 2021-01-09 RX ADMIN — ENOXAPARIN SODIUM 30 MILLIGRAM(S): 100 INJECTION SUBCUTANEOUS at 12:31

## 2021-01-09 RX ADMIN — Medication 100 MILLIGRAM(S): at 05:48

## 2021-01-09 RX ADMIN — CEFEPIME 100 MILLIGRAM(S): 1 INJECTION, POWDER, FOR SOLUTION INTRAMUSCULAR; INTRAVENOUS at 21:00

## 2021-01-09 RX ADMIN — POTASSIUM PHOSPHATE, MONOBASIC POTASSIUM PHOSPHATE, DIBASIC 83.33 MILLIMOLE(S): 236; 224 INJECTION, SOLUTION INTRAVENOUS at 02:07

## 2021-01-09 RX ADMIN — SIMVASTATIN 40 MILLIGRAM(S): 20 TABLET, FILM COATED ORAL at 22:26

## 2021-01-09 NOTE — PROGRESS NOTE ADULT - ASSESSMENT
A:  Osteomyelitis of calcaneus R foot   DMII    P:   Patient evaluated and chart reviewed  Once again discussed with patient the surgical treatment now scheduled for monday, 1/11/21. Patient understands procedure that is planned (debridement and partial calcanectomy) and she states that she is still willing to consent. Patient continued to demonstrate understanding of consequences for refusal of surgery.   Patient tentatively scheduled for 2:00 pm on Monday, 1/11/21  Request medical clearance  Appreciate psych consult  Right heel cx preliminary results as noted above  Right ankle xrays results as noted above   RLE MRI evaluated, results as noted above   Applied dakin's soaked gauze, DSD to RLE, DSD to LLE  Continue with IV antibiotics As Per ID  Patient to remain NWB to b/l LE  Recommend continued offloading of bilateral Heels using CAIR boots  Podiatry will follow while in house.  Seen and evaluated with Attending Dr. Morgan

## 2021-01-09 NOTE — PROGRESS NOTE ADULT - ATTENDING COMMENTS
CARDIOLOGY ATTENDING    Agree with above. No further inpatient cardiac workup expected. Would recheck potassium

## 2021-01-09 NOTE — PROGRESS NOTE ADULT - SUBJECTIVE AND OBJECTIVE BOX
Patient denies chest pain or shortness of breath.   Review of systems otherwise (-)  	        acetaminophen   Tablet .. 650 milliGRAM(s) Oral every 6 hours  aspirin enteric coated 81 milliGRAM(s) Oral daily  cefepime   IVPB      cefepime   IVPB 1000 milliGRAM(s) IV Intermittent every 8 hours  Dakins Solution - Full Strength 1 Application(s) Topical once  dextrose 40% Gel 15 Gram(s) Oral once  dextrose 5%. 1000 milliLiter(s) IV Continuous <Continuous>  dextrose 5%. 1000 milliLiter(s) IV Continuous <Continuous>  dextrose 50% Injectable 25 Gram(s) IV Push once  dextrose 50% Injectable 12.5 Gram(s) IV Push once  dextrose 50% Injectable 25 Gram(s) IV Push once  enoxaparin Injectable 30 milliGRAM(s) SubCutaneous daily  glucagon  Injectable 1 milliGRAM(s) IntraMuscular once  insulin lispro (ADMELOG) corrective regimen sliding scale   SubCutaneous three times a day before meals  insulin lispro (ADMELOG) corrective regimen sliding scale   SubCutaneous at bedtime  metroNIDAZOLE  IVPB      metroNIDAZOLE  IVPB 500 milliGRAM(s) IV Intermittent every 8 hours  perphenazine 16 milliGRAM(s) Oral two times a day  simvastatin 40 milliGRAM(s) Oral at bedtime  sodium chloride 0.9%. 1000 milliLiter(s) IV Continuous <Continuous>                            9.4    23.08 )-----------( 426      ( 09 Jan 2021 07:20 )             28.9       Hemoglobin: 9.4 g/dL (01-09 @ 07:20)  Hemoglobin: 9.1 g/dL (01-08 @ 06:58)  Hemoglobin: 8.7 g/dL (01-07 @ 08:05)  Hemoglobin: 8.9 g/dL (01-06 @ 06:29)  Hemoglobin: 9.8 g/dL (01-05 @ 11:45)      01-09    137  |  109<H>  |  31<H>  ----------------------------<  248<H>  x    |  15<L>  |  1.44<H>    Ca    8.5      09 Jan 2021 07:20  Phos  4.1     01-09  Mg     2.5     01-09    TPro  6.0  /  Alb  1.8<L>  /  TBili  0.6  /  DBili  x   /  AST  8<L>  /  ALT  11  /  AlkPhos  68  01-08    Creatinine Trend: 1.44<--, 1.43<--, 1.24<--, 0.54<--, 0.41<--    COAGS:           T(C): 36.6 (01-09-21 @ 05:15), Max: 36.7 (01-08-21 @ 20:40)  HR: 106 (01-09-21 @ 05:15) (75 - 106)  BP: 150/72 (01-09-21 @ 05:15) (122/51 - 153/66)  RR: 16 (01-09-21 @ 05:15) (15 - 20)  SpO2: 100% (01-09-21 @ 05:15) (89% - 100%)  Wt(kg): --    I&O's Summary    HEENT:  (-)icterus (-)pallor  CV: N S1 S2 1/6 ANGELIC (+)2 Pulses B/l  Resp:  Clear to ausculatation B/L, normal effort  GI: (+) BS Soft, NT, ND  Lymph:  (-)Edema, (-)obvious lymphadenopathy  Skin: Warm to touch, Normal turgor  Psych: Appropriate mood and affect      ASSESSMENT/PLAN: 	75y  Female  wheelchair bound with medical history significant of HTN, Diabetes, Osteoarthritis, Osteoporosis, Peripheral arterial disease, Diabetic neuropathy, HLD, Schizophrenia historically preserved LV and R function, comes in with worsening ulceration to b/l heels preop debridement.  s/p debridement of sacrum POD 1     - tolerated procedure well ,  - ABX per medicine   - wound care per medicine   - No evidence of CHF or unstable cardiac syndromes   - cont asa and statin

## 2021-01-09 NOTE — PROGRESS NOTE ADULT - SUBJECTIVE AND OBJECTIVE BOX
Patient is a 75y old  Female who presents with a chief complaint of foot ulcer (05 Jan 2021 14:24)      HPI:  74 yo F from [facility], wheelchair bound with medical history significant of HTN, Diabetes, Osteoarthritis, Osteoporosis, Peripheral arterial disease, Diabetic neuropathy, HLD, Schizophrenia comes in with worsening ulceration to b/l heels. She has been having ulcers for past couple months, has been progressive. She was treated with IV antbx at NH but did not get better. It was worsening with necrotic changes and increasing foul smelling discharge. She denies fever, abdominal pain, chest pain, urinary symptoms, fall, trauma. No h/o nausea, vomiting, diarrhea, cough, dyspnea, sick contacts, recent illness.  (05 Jan 2021 14:24)      Podiatry HPI: 76 y/o female with full history as noted above, podiatry consulted for b/l heel wounds. Pt is from [facility], wheelchair bound with medical history significant of HTN, Diabetes, Osteoarthritis, Osteoporosis, PAD, Diabetic neuropathy, HLD, Schizophrenia comes in with worsening ulceration to b/l heels. Patient seen resting in bed comfortably, AAOx3. Patient states she has had the wounds for a long time, maybe more than 6 months. She relates receiving local wound care previously in the NH using santyl dressings. She endorses occasional pain to wound sites. Patient was  under podiatric care in previous admissions for b/l heel wounds with osteomyelitis. She states she is not interesting in surgery for her feet, she wishes to continue with local wound care. She denies nausea, vomiting, chills, fever, SOB.     Podiatry Progress: Full history as noted above, podiatry f/u for b/l heel wounds. Patient seen resting in bed comfortably. Patient denies nausea, vomiting, chills, fever, SOB. Patient states that she is aware that surgery is scheduled for this coming monday, 1/11/21, and that she is still consenting to it.       Medications acetaminophen   Tablet .. 650 milliGRAM(s) Oral every 6 hours  aspirin enteric coated 81 milliGRAM(s) Oral daily  cefepime   IVPB      cefepime   IVPB 1000 milliGRAM(s) IV Intermittent every 8 hours  Dakins Solution - Full Strength 1 Application(s) Topical once  dextrose 40% Gel 15 Gram(s) Oral once  dextrose 5%. 1000 milliLiter(s) IV Continuous <Continuous>  dextrose 5%. 1000 milliLiter(s) IV Continuous <Continuous>  dextrose 50% Injectable 25 Gram(s) IV Push once  dextrose 50% Injectable 12.5 Gram(s) IV Push once  dextrose 50% Injectable 25 Gram(s) IV Push once  enoxaparin Injectable 30 milliGRAM(s) SubCutaneous daily  glucagon  Injectable 1 milliGRAM(s) IntraMuscular once  insulin lispro (ADMELOG) corrective regimen sliding scale   SubCutaneous three times a day before meals  insulin lispro (ADMELOG) corrective regimen sliding scale   SubCutaneous at bedtime  metroNIDAZOLE  IVPB      metroNIDAZOLE  IVPB 500 milliGRAM(s) IV Intermittent every 8 hours  perphenazine 16 milliGRAM(s) Oral two times a day  simvastatin 40 milliGRAM(s) Oral at bedtime  sodium chloride 0.9%. 1000 milliLiter(s) IV Continuous <Continuous>    FHNo pertinent family history in first degree relatives    ,   PMHCOVID-19    Osteomyelitis    DM (diabetes mellitus)    Paranoid schizophrenia    HLD (hyperlipidemia)    PAD (peripheral artery disease)    HTN (hypertension)       PSHNo significant past surgical history        Labs                          9.4    23.08 )-----------( 426      ( 09 Jan 2021 07:20 )             28.9      01-09    139  |  115<H>  |  27<H>  ----------------------------<  189<H>  >10.0<HH>   |  14<L>  |  1.05    Ca    6.9<L>      09 Jan 2021 10:35  Phos  4.1     01-09  Mg     2.5     01-09    TPro  6.0  /  Alb  1.8<L>  /  TBili  0.6  /  DBili  x   /  AST  8<L>  /  ALT  11  /  AlkPhos  68  01-08     Vital Signs Last 24 Hrs  T(C): 37 (09 Jan 2021 14:45), Max: 37 (09 Jan 2021 14:45)  T(F): 98.6 (09 Jan 2021 14:45), Max: 98.6 (09 Jan 2021 14:45)  HR: 102 (09 Jan 2021 14:45) (88 - 106)  BP: 120/47 (09 Jan 2021 14:45) (120/47 - 150/72)  BP(mean): --  RR: 16 (09 Jan 2021 14:45) (16 - 16)  SpO2: 100% (09 Jan 2021 14:45) (89% - 100%)  Sedimentation Rate, Erythrocyte: 99 mm/Hr (01-05-21 @ 11:45)         C-Reactive Protein, Serum: 5.63 mg/dL (01-06-21 @ 10:27)   WBC Count: 23.08 K/uL <H> (01-09-21 @ 07:20)      ROS  REVIEW OF SYSTEMS:  Other Review of Systems: All other review of systems negative, except as noted in HPI      PHYSICAL EXAM  LE Focused:    Vasc:  DP/PT nonpalpable, monophasic on doppler b/l, CFT brisk to digits, TG warm to warm b/l,   Derm:   Wound 1: L heel closed necrotic eschar measuring ~4x3.5x0.1 cm with mild mac wound erythema. No tunneling or undermining noted. No discharge from the area. No malodor or PTB noted. Stable in appearance  Wound 2: R heel wound s/p bedside debridement performed 1/5/21, now measuring ~ 9x6.5x0.3 cm, fibrous, necrotic base, +PTB. No active drainage. +Malodor.     Neuro: protective sensation grossly diminished at level of digits b/l  MSK: no tenderness on palpation of periwound areas b/l     IMAGING:  < from: MR Foot No Cont, Right (01.06.21 @ 11:08) >    EXAM:  MR FOOT RT                            PROCEDURE DATE:  01/06/2021          INTERPRETATION:  Clinical indications: Right heel ulceration and eschar status post debridement. Concern for osteomyelitis.    Multiplanar multisequence MRI of the right foot was performed localized to the hindfoot.    Correlation is made with prior MRI from September 11, 2019.    FINDINGS:    There is a large wound along the plantar aspect of the calcaneus posteriorly which extends nearly extends to bone. There is surrounding soft tissue edema about this site with evidence of an overlying bandage. The degree of soft tissue deficiency is increased compared to the prior examination. There is extensive osseous edema and corresponding hypointense T1 marrow signalthroughout the calcaneus extending from the posterior plantar margin to the level of the angle of Gissane. This is progressed compared to the prior examination and consistent with osteomyelitis. Marrow signal within the remainder of the foot is otherwise preserved.    There is confluent edema tracking along the abductor hallucis and flexor digitorum brevis muscles and within the plantar subcutaneous fat subjacent to this region. In total this area measures approximately 2.2 cm in transverse dimension, approximately 4 cm in anteroposterior posterior dimension, and approximately 1.6 cm in craniocaudal dimension. Findings may be related to underlying phlegmon or abscess. Evaluation is limited by the lack of intravenous contrast. Distal to this site there is edema throughout the visualized musculature consistent with myositis.    Visualized tendinous structures remain intact. There is no acute ligamentous injury. Visualized joint spaces are preserved without joint effusion.    IMPRESSION:    Osteomyelitis involving the calcaneus which extends from the plantar posterior aspect of the calcaneus to the level of the angle of Gissane. This is progressed compared to the prior examination. This is seen in the setting of diffuse soft tissue deficiency along the posterior plantar aspect of the calcaneus.    Confluent edema adjacent to this region within the abductor hallucis and flexor digitorum brevis muscles and within the subjacent plantar subcutaneous fat. This may be related to abscess orphlegmon.            OSWALDO ALFRED MD; Attending Radiologist  This document has been electronically signed. Jan 6 2021 11:49AM    < end of copied text >    -------------------------------------------------------------------------------------------------------------  < from: [facility] Physiol Extremity Lower 3+ Level, BI (01.05.21 @ 17:55) >    EXAM:  US PHYSIOL LWR EXT 3+ LEV BI                            PROCEDURE DATE:  01/05/2021          INTERPRETATION:  Clinical information: History of diabetes, nonsmoker, hypertension, hyperlipidemia, presents with bilateral heel ulcers.    Comparison: Lower extremity arterial Doppler study dated 5/13/2020.    Technique: Lower extremity arterial Doppler study. Note that pressure measurements were not obtained at the thigh level.    Findings: Ankle brachial index measures 1.33 bilaterally, compared with prior values of 1.41 on the right and 1.36 on the left. No segmental arterial pressure gradient is present.    PVR waveforms are normal in amplitude and pulsatility from the thigh through the ankle level. They're diminished in amplitude at the metatarsal and digital levels in symmetric fashion, similar to the prior exam.    Impression: No Doppler evidence of hemodynamically significant arterial inflow abnormality in either lower extremity.    Evidence of small vessel disease in the feet.    No significant interval change since study of 5/13/2020.            SOHAN PA MD; Attending Radiologist  This document has been electronically signed. Jan 6 2021  9:13AM    < end of copied text >      --------------------------------------------------------------------------------------------------------------  < from: Xray Foot AP + Lateral + Oblique, Right (01.05.21 @ 18:00) >    EXAM:  FOOT RIGHT (MINIMUM 3 VIEWS)                            PROCEDURE DATE:  01/05/2021          INTERPRETATION:  Right foot    HISTORY: Status post bedside debridement.     Two views of the right foot show thinning of soft tissues near the calcaneus likely indicating site of debridement. The joint spaces are maintained. There is questionable exposure of the plantar surface of the calcaneus.    IMPRESSION: Calcaneal soft tissues and questionable exposure as noted. Clinical correlation is suggested.        Thank you for this referral.            REID CRAMER MD; Attending Interventional Radiologist  This document has been electronically signed. Jan 6 2021 11:10AM    < end of copied text >    -------------------------------------------------------------------------------------------  a< from: Xray Ankle Complete 3 Views, Bilateral (01.05.21 @ 14:37) >    EXAM:  ANKLE BILATERAL (MINIMUM 3 V)                            PROCEDURE DATE:  01/05/2021          INTERPRETATION:  CLINICAL INDICATION:  Osteomyelitis; evaluate for soft tissue emphysema.    COMPARISON:  Left ankle radiography 5/11/2020.    TECHNIQUE:  AP, oblique and crosstable lateral views left ankle. Oblique and crosstable lateral views right ankle. Technologist noted that the best possible films were obtained.    FINDINGS:    Right ankle: Generalized osteopenia. Subcutaneous emphysema is identified along the plantar aspect of the right hindfoot inferior to the calcaneus, with an overlying skin defect noted along the posterior aspect of the calcaneus. Osteolysis involving the inferior/posterior aspect of the right calcaneus cannot beexcluded. MRI evaluation can be performed for further assessment. Extensive vascular calcification is noted. No ankle joint effusion is noted.    Left ankle: Generalized osteopenia. There is no evidence for acute fracture or dislocation. The ankle mortise appears grossly intact. No ankle joint effusion is noted. Extensive vascular calcifications identified. No significant soft tissue swelling.      IMPRESSION:  No evidence for acute fracture. Subcutaneous emphysema is identified along the plantaraspect of the right hindfoot inferior to the calcaneus, with an overlying skin defect noted along the posterior aspect of the calcaneus. Osteolysis involving the inferior/posterior aspect of the right calcaneus cannot be excluded. MRI evaluation can be performed for further assessment.                MARA LARKIN MD; Attending Radiologist  This document has been electronically signed. Jan 5 2021  3:57PM    < end of copied text >        CULTURES:   cCulture - Surgical Swab (01.05.21 @ 22:13)   Specimen Source: .Surgical Swab Right heel wound   Culture Results:   Few Morganella morganii   Few Proteus mirabilis       Historical Values  Culture - Surgical Swab (01.05.21 @ 22:13)   Specimen Source: .Surgical Swab Right heel wound   Culture Results:   Few Morganella morganii   Few Proteus mirabilis

## 2021-01-10 LAB
ANION GAP SERPL CALC-SCNC: 12 MMOL/L — SIGNIFICANT CHANGE UP (ref 5–17)
APPEARANCE UR: CLEAR — SIGNIFICANT CHANGE UP
BACTERIA # UR AUTO: ABNORMAL /HPF
BILIRUB UR-MCNC: NEGATIVE — SIGNIFICANT CHANGE UP
BUN SERPL-MCNC: 30 MG/DL — HIGH (ref 7–18)
CALCIUM SERPL-MCNC: 8 MG/DL — LOW (ref 8.4–10.5)
CHLORIDE SERPL-SCNC: 114 MMOL/L — HIGH (ref 96–108)
CO2 SERPL-SCNC: 18 MMOL/L — LOW (ref 22–31)
COLOR SPEC: YELLOW — SIGNIFICANT CHANGE UP
CREAT ?TM UR-MCNC: 52 MG/DL — SIGNIFICANT CHANGE UP
CREAT SERPL-MCNC: 1.06 MG/DL — SIGNIFICANT CHANGE UP (ref 0.5–1.3)
CULTURE RESULTS: SIGNIFICANT CHANGE UP
DIFF PNL FLD: ABNORMAL
EPI CELLS # UR: SIGNIFICANT CHANGE UP /HPF
GLUCOSE BLDC GLUCOMTR-MCNC: 128 MG/DL — HIGH (ref 70–99)
GLUCOSE BLDC GLUCOMTR-MCNC: 144 MG/DL — HIGH (ref 70–99)
GLUCOSE BLDC GLUCOMTR-MCNC: 166 MG/DL — HIGH (ref 70–99)
GLUCOSE BLDC GLUCOMTR-MCNC: 178 MG/DL — HIGH (ref 70–99)
GLUCOSE SERPL-MCNC: 147 MG/DL — HIGH (ref 70–99)
GLUCOSE UR QL: NEGATIVE — SIGNIFICANT CHANGE UP
HCT VFR BLD CALC: 25.4 % — LOW (ref 34.5–45)
HGB BLD-MCNC: 8.2 G/DL — LOW (ref 11.5–15.5)
KETONES UR-MCNC: ABNORMAL
LEUKOCYTE ESTERASE UR-ACNC: ABNORMAL
MAGNESIUM SERPL-MCNC: 2.2 MG/DL — SIGNIFICANT CHANGE UP (ref 1.6–2.6)
MCHC RBC-ENTMCNC: 29.7 PG — SIGNIFICANT CHANGE UP (ref 27–34)
MCHC RBC-ENTMCNC: 32.3 GM/DL — SIGNIFICANT CHANGE UP (ref 32–36)
MCV RBC AUTO: 92 FL — SIGNIFICANT CHANGE UP (ref 80–100)
NITRITE UR-MCNC: NEGATIVE — SIGNIFICANT CHANGE UP
NRBC # BLD: 0 /100 WBCS — SIGNIFICANT CHANGE UP (ref 0–0)
ORGANISM # SPEC MICROSCOPIC CNT: SIGNIFICANT CHANGE UP
PH UR: 5 — SIGNIFICANT CHANGE UP (ref 5–8)
PHOSPHATE SERPL-MCNC: 2.5 MG/DL — SIGNIFICANT CHANGE UP (ref 2.5–4.5)
PLATELET # BLD AUTO: 405 K/UL — HIGH (ref 150–400)
POTASSIUM SERPL-MCNC: 3.7 MMOL/L — SIGNIFICANT CHANGE UP (ref 3.5–5.3)
POTASSIUM SERPL-SCNC: 3.7 MMOL/L — SIGNIFICANT CHANGE UP (ref 3.5–5.3)
PROT UR-MCNC: 30 MG/DL
RBC # BLD: 2.76 M/UL — LOW (ref 3.8–5.2)
RBC # FLD: 13.3 % — SIGNIFICANT CHANGE UP (ref 10.3–14.5)
RBC CASTS # UR COMP ASSIST: ABNORMAL /HPF (ref 0–2)
SODIUM SERPL-SCNC: 144 MMOL/L — SIGNIFICANT CHANGE UP (ref 135–145)
SODIUM UR-SCNC: 13 MMOL/L — SIGNIFICANT CHANGE UP
SP GR SPEC: 1.02 — SIGNIFICANT CHANGE UP (ref 1.01–1.02)
SPECIMEN SOURCE: SIGNIFICANT CHANGE UP
UROBILINOGEN FLD QL: NEGATIVE — SIGNIFICANT CHANGE UP
WBC # BLD: 19.14 K/UL — HIGH (ref 3.8–10.5)
WBC # FLD AUTO: 19.14 K/UL — HIGH (ref 3.8–10.5)
WBC UR QL: SIGNIFICANT CHANGE UP /HPF (ref 0–5)

## 2021-01-10 PROCEDURE — 93971 EXTREMITY STUDY: CPT | Mod: 26,LT

## 2021-01-10 RX ORDER — SODIUM CHLORIDE 9 MG/ML
1000 INJECTION INTRAMUSCULAR; INTRAVENOUS; SUBCUTANEOUS
Refills: 0 | Status: DISCONTINUED | OUTPATIENT
Start: 2021-01-10 | End: 2021-01-10

## 2021-01-10 RX ORDER — SODIUM CHLORIDE 9 MG/ML
1000 INJECTION, SOLUTION INTRAVENOUS
Refills: 0 | Status: COMPLETED | OUTPATIENT
Start: 2021-01-10 | End: 2021-01-11

## 2021-01-10 RX ADMIN — CEFEPIME 100 MILLIGRAM(S): 1 INJECTION, POWDER, FOR SOLUTION INTRAMUSCULAR; INTRAVENOUS at 13:04

## 2021-01-10 RX ADMIN — SODIUM CHLORIDE 80 MILLILITER(S): 9 INJECTION, SOLUTION INTRAVENOUS at 13:05

## 2021-01-10 RX ADMIN — Medication 1: at 17:42

## 2021-01-10 RX ADMIN — Medication 100 MILLIGRAM(S): at 05:37

## 2021-01-10 RX ADMIN — CEFEPIME 100 MILLIGRAM(S): 1 INJECTION, POWDER, FOR SOLUTION INTRAMUSCULAR; INTRAVENOUS at 21:18

## 2021-01-10 RX ADMIN — CEFEPIME 100 MILLIGRAM(S): 1 INJECTION, POWDER, FOR SOLUTION INTRAMUSCULAR; INTRAVENOUS at 05:36

## 2021-01-10 RX ADMIN — Medication 100 MILLIGRAM(S): at 21:18

## 2021-01-10 RX ADMIN — Medication 100 MILLIGRAM(S): at 13:07

## 2021-01-10 NOTE — PROGRESS NOTE ADULT - SUBJECTIVE AND OBJECTIVE BOX
Patient is a 75y old  Female who presents with a chief complaint of foot ulcer (05 Jan 2021 14:24)      HPI:  76 yo F from [facility], wheelchair bound with medical history significant of HTN, Diabetes, Osteoarthritis, Osteoporosis, Peripheral arterial disease, Diabetic neuropathy, HLD, Schizophrenia comes in with worsening ulceration to b/l heels. She has been having ulcers for past couple months, has been progressive. She was treated with IV antbx at NH but did not get better. It was worsening with necrotic changes and increasing foul smelling discharge. She denies fever, abdominal pain, chest pain, urinary symptoms, fall, trauma. No h/o nausea, vomiting, diarrhea, cough, dyspnea, sick contacts, recent illness.  (05 Jan 2021 14:24)      Podiatry HPI: 74 y/o female with full history as noted above, podiatry consulted for b/l heel wounds. Pt is from [facility], wheelchair bound with medical history significant of HTN, Diabetes, Osteoarthritis, Osteoporosis, PAD, Diabetic neuropathy, HLD, Schizophrenia comes in with worsening ulceration to b/l heels. Patient seen resting in bed comfortably, AAOx3. Patient states she has had the wounds for a long time, maybe more than 6 months. She relates receiving local wound care previously in the NH using santyl dressings. She endorses occasional pain to wound sites. Patient was  under podiatric care in previous admissions for b/l heel wounds with osteomyelitis. She states she is not interesting in surgery for her feet, she wishes to continue with local wound care. She denies nausea, vomiting, chills, fever, SOB.     Podiatry Progress: Full history as noted above, podiatry f/u for b/l heel wounds. Patient seen resting in bed comfortably. Patient denies nausea, vomiting, chills, fever, SOB. Patient states that she is aware that surgery is scheduled for tomorrow  1/11/21, and that she is still consenting to it.       Medications acetaminophen   Tablet .. 650 milliGRAM(s) Oral every 6 hours  aspirin enteric coated 81 milliGRAM(s) Oral daily  cefepime   IVPB      cefepime   IVPB 1000 milliGRAM(s) IV Intermittent every 8 hours  Dakins Solution - Full Strength 1 Application(s) Topical once  dextrose 40% Gel 15 Gram(s) Oral once  dextrose 5%. 1000 milliLiter(s) IV Continuous <Continuous>  dextrose 5%. 1000 milliLiter(s) IV Continuous <Continuous>  dextrose 50% Injectable 25 Gram(s) IV Push once  dextrose 50% Injectable 12.5 Gram(s) IV Push once  dextrose 50% Injectable 25 Gram(s) IV Push once  enoxaparin Injectable 30 milliGRAM(s) SubCutaneous daily  glucagon  Injectable 1 milliGRAM(s) IntraMuscular once  insulin lispro (ADMELOG) corrective regimen sliding scale   SubCutaneous three times a day before meals  insulin lispro (ADMELOG) corrective regimen sliding scale   SubCutaneous at bedtime  metroNIDAZOLE  IVPB      metroNIDAZOLE  IVPB 500 milliGRAM(s) IV Intermittent every 8 hours  perphenazine 16 milliGRAM(s) Oral two times a day  simvastatin 40 milliGRAM(s) Oral at bedtime  sodium chloride 0.9%. 1000 milliLiter(s) IV Continuous <Continuous>    FHNo pertinent family history in first degree relatives    ,   PMHCOVID-19    Osteomyelitis    DM (diabetes mellitus)    Paranoid schizophrenia    HLD (hyperlipidemia)    PAD (peripheral artery disease)    HTN (hypertension)       PSHNo significant past surgical history        Labs                          8.2    19.14 )-----------( 405      ( 10 Ender 2021 06:22 )             25.4      01-10    144  |  114<H>  |  30<H>  ----------------------------<  147<H>  3.7   |  18<L>  |  1.06    Ca    8.0<L>      10 Ender 2021 06:22  Phos  2.5     01-10  Mg     2.2     01-10       Vital Signs Last 24 Hrs  T(C): 36.9 (10 Ender 2021 05:04), Max: 37 (09 Jan 2021 14:45)  T(F): 98.4 (10 Ender 2021 05:04), Max: 98.6 (09 Jan 2021 14:45)  HR: 82 (10 Ender 2021 05:04) (82 - 102)  BP: 132/48 (10 Ender 2021 05:04) (120/47 - 135/65)  BP(mean): --  RR: 16 (10 Ender 2021 05:04) (16 - 16)  SpO2: 100% (10 Ender 2021 05:04) (100% - 100%)  Sedimentation Rate, Erythrocyte: 99 mm/Hr (01-05-21 @ 11:45)         C-Reactive Protein, Serum: 5.63 mg/dL (01-06-21 @ 10:27)   WBC Count: 19.14 K/uL <H> (01-10-21 @ 06:22)      PHYSICAL EXAM  LE Focused:    Vasc:  DP/PT nonpalpable, monophasic on doppler b/l, CFT brisk to digits, TG warm to warm b/l,   Derm:   Wound 1: L heel closed necrotic eschar measuring ~4x3.5x0.1 cm with mild mac wound erythema. No tunneling or undermining noted. No discharge from the area. No malodor or PTB noted. Stable in appearance  Wound 2: R heel wound s/p bedside debridement performed 1/5/21, now measuring ~ 9x6.5x0.3 cm, fibrous, necrotic base, +PTB. No active drainage. +Malodor.     Neuro: protective sensation grossly diminished at level of digits b/l  MSK: no tenderness on palpation of periwound areas b/l     IMAGING:  < from: MR Foot No Cont, Right (01.06.21 @ 11:08) >    EXAM:  MR FOOT RT                            PROCEDURE DATE:  01/06/2021          INTERPRETATION:  Clinical indications: Right heel ulceration and eschar status post debridement. Concern for osteomyelitis.    Multiplanar multisequence MRI of the right foot was performed localized to the hindfoot.    Correlation is made with prior MRI from September 11, 2019.    FINDINGS:    There is a large wound along the plantar aspect of the calcaneus posteriorly which extends nearly extends to bone. There is surrounding soft tissue edema about this site with evidence of an overlying bandage. The degree of soft tissue deficiency is increased compared to the prior examination. There is extensive osseous edema and corresponding hypointense T1 marrow signalthroughout the calcaneus extending from the posterior plantar margin to the level of the angle of Gissane. This is progressed compared to the prior examination and consistent with osteomyelitis. Marrow signal within the remainder of the foot is otherwise preserved.    There is confluent edema tracking along the abductor hallucis and flexor digitorum brevis muscles and within the plantar subcutaneous fat subjacent to this region. In total this area measures approximately 2.2 cm in transverse dimension, approximately 4 cm in anteroposterior posterior dimension, and approximately 1.6 cm in craniocaudal dimension. Findings may be related to underlying phlegmon or abscess. Evaluation is limited by the lack of intravenous contrast. Distal to this site there is edema throughout the visualized musculature consistent with myositis.    Visualized tendinous structures remain intact. There is no acute ligamentous injury. Visualized joint spaces are preserved without joint effusion.    IMPRESSION:    Osteomyelitis involving the calcaneus which extends from the plantar posterior aspect of the calcaneus to the level of the angle of Gissane. This is progressed compared to the prior examination. This is seen in the setting of diffuse soft tissue deficiency along the posterior plantar aspect of the calcaneus.    Confluent edema adjacent to this region within the abductor hallucis and flexor digitorum brevis muscles and within the subjacent plantar subcutaneous fat. This may be related to abscess orphlegmon.            OSWALDO ALFRED MD; Attending Radiologist  This document has been electronically signed. Jan 6 2021 11:49AM    < end of copied text >    -------------------------------------------------------------------------------------------------------------  < from: [facility] Physiol Extremity Lower 3+ Level, BI (01.05.21 @ 17:55) >    EXAM:  US PHYSIOL LWR EXT 3+ LEV BI                            PROCEDURE DATE:  01/05/2021          INTERPRETATION:  Clinical information: History of diabetes, nonsmoker, hypertension, hyperlipidemia, presents with bilateral heel ulcers.    Comparison: Lower extremity arterial Doppler study dated 5/13/2020.    Technique: Lower extremity arterial Doppler study. Note that pressure measurements were not obtained at the thigh level.    Findings: Ankle brachial index measures 1.33 bilaterally, compared with prior values of 1.41 on the right and 1.36 on the left. No segmental arterial pressure gradient is present.    PVR waveforms are normal in amplitude and pulsatility from the thigh through the ankle level. They're diminished in amplitude at the metatarsal and digital levels in symmetric fashion, similar to the prior exam.    Impression: No Doppler evidence of hemodynamically significant arterial inflow abnormality in either lower extremity.    Evidence of small vessel disease in the feet.    No significant interval change since study of 5/13/2020.            SOHAN PA MD; Attending Radiologist  This document has been electronically signed. Jan 6 2021  9:13AM    < end of copied text >      --------------------------------------------------------------------------------------------------------------  < from: Xray Foot AP + Lateral + Oblique, Right (01.05.21 @ 18:00) >    EXAM:  FOOT RIGHT (MINIMUM 3 VIEWS)                            PROCEDURE DATE:  01/05/2021          INTERPRETATION:  Right foot    HISTORY: Status post bedside debridement.     Two views of the right foot show thinning of soft tissues near the calcaneus likely indicating site of debridement. The joint spaces are maintained. There is questionable exposure of the plantar surface of the calcaneus.    IMPRESSION: Calcaneal soft tissues and questionable exposure as noted. Clinical correlation is suggested.        Thank you for this referral.            REID CRAMER MD; Attending Interventional Radiologist  This document has been electronically signed. Jan 6 2021 11:10AM    < end of copied text >    -------------------------------------------------------------------------------------------  a< from: Xray Ankle Complete 3 Views, Bilateral (01.05.21 @ 14:37) >    EXAM:  ANKLE BILATERAL (MINIMUM 3 V)                            PROCEDURE DATE:  01/05/2021          INTERPRETATION:  CLINICAL INDICATION:  Osteomyelitis; evaluate for soft tissue emphysema.    COMPARISON:  Left ankle radiography 5/11/2020.    TECHNIQUE:  AP, oblique and crosstable lateral views left ankle. Oblique and crosstable lateral views right ankle. Technologist noted that the best possible films were obtained.    FINDINGS:    Right ankle: Generalized osteopenia. Subcutaneous emphysema is identified along the plantar aspect of the right hindfoot inferior to the calcaneus, with an overlying skin defect noted along the posterior aspect of the calcaneus. Osteolysis involving the inferior/posterior aspect of the right calcaneus cannot beexcluded. MRI evaluation can be performed for further assessment. Extensive vascular calcification is noted. No ankle joint effusion is noted.    Left ankle: Generalized osteopenia. There is no evidence for acute fracture or dislocation. The ankle mortise appears grossly intact. No ankle joint effusion is noted. Extensive vascular calcifications identified. No significant soft tissue swelling.      IMPRESSION:  No evidence for acute fracture. Subcutaneous emphysema is identified along the plantaraspect of the right hindfoot inferior to the calcaneus, with an overlying skin defect noted along the posterior aspect of the calcaneus. Osteolysis involving the inferior/posterior aspect of the right calcaneus cannot be excluded. MRI evaluation can be performed for further assessment.                MARA LARKIN MD; Attending Radiologist  This document has been electronically signed. Jan 5 2021  3:57PM    < end of copied text >        CULTURES:   cCulture - Surgical Swab (01.05.21 @ 22:13)   Specimen Source: .Surgical Swab Right heel wound   Culture Results:   Few Morganella morganii   Few Proteus mirabilis       Historical Values  Culture - Surgical Swab (01.05.21 @ 22:13)   Specimen Source: .Surgical Swab Right heel wound   Culture Results:   Few Morganella morganii   Few Proteus mirabilis

## 2021-01-10 NOTE — PROGRESS NOTE ADULT - SUBJECTIVE AND OBJECTIVE BOX
pt seen and examined, no complaints, ROS - .        acetaminophen   Tablet .. 650 milliGRAM(s) Oral every 6 hours  aspirin enteric coated 81 milliGRAM(s) Oral daily  cefepime   IVPB      cefepime   IVPB 1000 milliGRAM(s) IV Intermittent every 8 hours  Dakins Solution - Full Strength 1 Application(s) Topical once  dextrose 40% Gel 15 Gram(s) Oral once  dextrose 5%. 1000 milliLiter(s) IV Continuous <Continuous>  dextrose 5%. 1000 milliLiter(s) IV Continuous <Continuous>  dextrose 50% Injectable 25 Gram(s) IV Push once  dextrose 50% Injectable 12.5 Gram(s) IV Push once  dextrose 50% Injectable 25 Gram(s) IV Push once  enoxaparin Injectable 30 milliGRAM(s) SubCutaneous daily  glucagon  Injectable 1 milliGRAM(s) IntraMuscular once  insulin lispro (ADMELOG) corrective regimen sliding scale   SubCutaneous three times a day before meals  insulin lispro (ADMELOG) corrective regimen sliding scale   SubCutaneous at bedtime  metroNIDAZOLE  IVPB      metroNIDAZOLE  IVPB 500 milliGRAM(s) IV Intermittent every 8 hours  perphenazine 16 milliGRAM(s) Oral two times a day  simvastatin 40 milliGRAM(s) Oral at bedtime  sodium chloride 0.9%. 1000 milliLiter(s) IV Continuous <Continuous>                            8.2    19.14 )-----------( 405      ( 10 Ender 2021 06:22 )             25.4       Hemoglobin: 8.2 g/dL (01-10 @ 06:22)  Hemoglobin: 9.4 g/dL (01-09 @ 07:20)  Hemoglobin: 9.1 g/dL (01-08 @ 06:58)  Hemoglobin: 8.7 g/dL (01-07 @ 08:05)  Hemoglobin: 8.9 g/dL (01-06 @ 06:29)      01-10    144  |  114<H>  |  30<H>  ----------------------------<  147<H>  3.7   |  18<L>  |  1.06    Ca    8.0<L>      10 Ender 2021 06:22  Phos  2.5     01-10  Mg     2.2     01-10      Creatinine Trend: 1.06<--, 1.44<--, 1.05<--, 1.44<--, 1.43<--, 1.24<--    COAGS:           T(C): 36.9 (01-10-21 @ 05:04), Max: 37 (01-09-21 @ 14:45)  HR: 82 (01-10-21 @ 05:04) (82 - 102)  BP: 132/48 (01-10-21 @ 05:04) (120/47 - 135/65)  RR: 16 (01-10-21 @ 05:04) (16 - 16)  SpO2: 100% (01-10-21 @ 05:04) (100% - 100%)  Wt(kg): --    I&O's Summary    HEENT:  (-)icterus (-)pallor  CV: N S1 S2 1/6 ANGELIC (+)2 Pulses B/l  Resp:  Clear to ausculatation B/L, normal effort  GI: (+) BS Soft, NT, ND  Lymph:  (-)Edema, (-)obvious lymphadenopathy  Skin: Warm to touch, Normal turgor  Psych: Appropriate mood and affect      ASSESSMENT/PLAN: 	75y  Female  wheelchair bound with medical history significant of HTN, Diabetes, Osteoarthritis, Osteoporosis, Peripheral arterial disease, Diabetic neuropathy, HLD, Schizophrenia historically preserved LV and R function, comes in with worsening ulceration to b/l heels preop debridement.  s/p debridement of sacrum POD 2    - tolerated procedure well ,  - ABX per medicine   - wound care per medicine   - No evidence of CHF or unstable cardiac syndromes   - cont asa and statin

## 2021-01-10 NOTE — PROGRESS NOTE ADULT - ASSESSMENT
A:  Osteomyelitis of calcaneus R foot   DMII    P:   Patient evaluated and chart reviewed  Patient planing to go for surgery tomorrow  at 2 pm 1/11/21  Patient understands procedure that is planned (debridement and partial calcanectomy) and she states that she is still willing to consent. Patient continued to demonstrate understanding of consequences for refusal of surgery.   Medical clearance appreciated prior to surgery  Appreciate psych consult  Right heel cx preliminary results as noted above  Right ankle xrays results as noted above   RLE MRI evaluated, results as noted above   Applied dakin's soaked gauze, DSD to RLE, DSD to LLE  Continue with IV antibiotics As Per ID  Patient to remain NWB to b/l LE  Recommend continued offloading of bilateral Heels using CAIR boots  Podiatry will follow while in house.  Patient discussed with attending Dr. Watkins   A:  Osteomyelitis of calcaneus R foot   DMII    P:   Patient evaluated and chart reviewed  Patient will be NPO after midnight  Patient planing to go for surgery tomorrow  at 2 pm 1/11/21  Patient understands procedure that is planned (debridement and partial calcanectomy) and she states that she is still willing to consent. Patient continued to demonstrate understanding of consequences for refusal of surgery.   Medical clearance appreciated prior to surgery  Appreciate psych consult  Right heel cx preliminary results as noted above  Right ankle xrays results as noted above   RLE MRI evaluated, results as noted above   Applied dakin's soaked gauze, DSD to RLE, DSD to LLE  Continue with IV antibiotics As Per ID  Patient to remain NWB to b/l LE  Recommend continued offloading of bilateral Heels using CAIR boots  Podiatry will follow while in house.  Patient discussed with attending Dr. Watkins

## 2021-01-10 NOTE — PROGRESS NOTE ADULT - SUBJECTIVE AND OBJECTIVE BOX
Patient is a 75y old  Female who presents with a chief complaint of foot ulcer (10 Ender 2021 12:15)    PATIENT IS SEEN AND EXAMINED IN MEDICAL FLOOR.    ALLERGIES:  No Known Allergies    VITALS:    Vital Signs Last 24 Hrs  T(C): 37 (10 Ender 2021 14:13), Max: 37 (09 Jan 2021 14:45)  T(F): 98.6 (10 Ender 2021 14:13), Max: 98.6 (09 Jan 2021 14:45)  HR: 80 (10 Ender 2021 14:13) (80 - 102)  BP: 117/50 (10 Ender 2021 14:13) (117/50 - 135/65)  BP(mean): --  RR: 16 (10 Ender 2021 14:13) (16 - 16)  SpO2: 99% (10 Ender 2021 14:13) (99% - 100%)    LABS:    CBC Full  -  ( 10 Ender 2021 06:22 )  WBC Count : 19.14 K/uL  RBC Count : 2.76 M/uL  Hemoglobin : 8.2 g/dL  Hematocrit : 25.4 %  Platelet Count - Automated : 405 K/uL  Mean Cell Volume : 92.0 fl  Mean Cell Hemoglobin : 29.7 pg  Mean Cell Hemoglobin Concentration : 32.3 gm/dL  Auto Neutrophil # : x  Auto Lymphocyte # : x  Auto Monocyte # : x  Auto Eosinophil # : x  Auto Basophil # : x  Auto Neutrophil % : x  Auto Lymphocyte % : x  Auto Monocyte % : x  Auto Eosinophil % : x  Auto Basophil % : x      01-10    144  |  114<H>  |  30<H>  ----------------------------<  147<H>  3.7   |  18<L>  |  1.06    Ca    8.0<L>      10 Ender 2021 06:22  Phos  2.5     01-10  Mg     2.2     01-10      CAPILLARY BLOOD GLUCOSE      POCT Blood Glucose.: 128 mg/dL (10 Ender 2021 11:38)  POCT Blood Glucose.: 144 mg/dL (10 Ender 2021 07:43)  POCT Blood Glucose.: 146 mg/dL (09 Jan 2021 21:52)  POCT Blood Glucose.: 133 mg/dL (09 Jan 2021 16:40)        Creatinine Trend: 1.06<--, 1.44<--, 1.05<--, 1.44<--, 1.43<--, 1.24<--  I&O's Summary      .Urine Clean Catch (Midstream)  01-06 @ 06:46   No growth  --  --      .Surgical Swab Right heel wound  01-05 @ 22:13   Few Morganella morganii  Few Proteus mirabilis  Moderate Corynebacterium species "Susceptibilities not performed"  --  Morganella morganii  Proteus mirabilis      .Blood Blood-Peripheral  01-05 @ 17:59   No growth to date.  --  --        MEDICATIONS:    MEDICATIONS  (STANDING):  acetaminophen   Tablet .. 650 milliGRAM(s) Oral every 6 hours  aspirin enteric coated 81 milliGRAM(s) Oral daily  cefepime   IVPB      cefepime   IVPB 1000 milliGRAM(s) IV Intermittent every 8 hours  Dakins Solution - Full Strength 1 Application(s) Topical once  dextrose 40% Gel 15 Gram(s) Oral once  dextrose 5% + sodium chloride 0.45%. 1000 milliLiter(s) (80 mL/Hr) IV Continuous <Continuous>  dextrose 5%. 1000 milliLiter(s) (50 mL/Hr) IV Continuous <Continuous>  dextrose 5%. 1000 milliLiter(s) (100 mL/Hr) IV Continuous <Continuous>  dextrose 50% Injectable 25 Gram(s) IV Push once  dextrose 50% Injectable 12.5 Gram(s) IV Push once  dextrose 50% Injectable 25 Gram(s) IV Push once  enoxaparin Injectable 30 milliGRAM(s) SubCutaneous daily  glucagon  Injectable 1 milliGRAM(s) IntraMuscular once  insulin lispro (ADMELOG) corrective regimen sliding scale   SubCutaneous three times a day before meals  insulin lispro (ADMELOG) corrective regimen sliding scale   SubCutaneous at bedtime  metroNIDAZOLE  IVPB      metroNIDAZOLE  IVPB 500 milliGRAM(s) IV Intermittent every 8 hours  perphenazine 16 milliGRAM(s) Oral two times a day  simvastatin 40 milliGRAM(s) Oral at bedtime      MEDICATIONS  (PRN):      REVIEW OF SYSTEMS:                           ALL ROS DONE [ X   ]    CONSTITUTIONAL:  LETHARGIC [   ], FEVER [   ], UNRESPONSIVE [   ]  CVS:  CP  [   ], SOB, [   ], PALPITATIONS [   ], DIZZYNESS [   ]  RS: COUGH [   ], SPUTUM [   ]  GI: ABDOMINAL PAIN [   ], NAUSEA [   ], VOMITINGS [   ], DIARRHEA [   ], CONSTIPATION [   ]  :  DYSURIA [   ], NOCTURIA [   ], INCREASED FREQUENCY [   ], DRIBLING [   ],  SKELETAL: PAINFUL JOINTS [   ], SWOLLEN JOINTS [   ], NECK ACHE [   ], LOW BACK ACHE [   ],  SKIN : ULCERS [   ], RASH [   ], ITCHING [   ]  CNS: HEAD ACHE [   ], DOUBLE VISION [   ], BLURRED VISION [   ], AMS / CONFUSION [   ], SEIZURES [   ], WEAKNESS [   ],TINGLING / NUMBNESS [   ]    PHYSICAL EXAMINATION:  GENERAL APPEARANCE: NO DISTRESS  HEENT:  NO PALLOR, NO  JVD,  NO   NODES, NECK SUPPLE  CVS: S1 +, S2 +,   RS: AEEB,  OCCASIONAL  RALES +,   NO RONCHI  ABD: SOFT, NT, NO, BS +  EXT: NO PE  SKIN: WARM, BILATERAL HEEL ULCERS - COVERED, SACRAL ULCER + RIGHT ISCHIAL ULCER COVERED  SKELETAL:  ROM ACCEPTABLE  CNS:  AAO X 2-3     RADIOLOGY :    EXAM:  MR FOOT RT                            PROCEDURE DATE:  01/06/2021          INTERPRETATION:  Clinical indications: Right heel ulceration and eschar status post debridement. Concern for osteomyelitis.    Multiplanar multisequence MRI of the right foot was performed localized to the hindfoot.    Correlation is made with prior MRI from September 11, 2019.    FINDINGS:    There is a large wound along the plantar aspect of the calcaneus posteriorly which extends nearly extends to bone. There is surrounding soft tissue edema about this site with evidence of an overlying bandage. The degree of soft tissue deficiency is increased compared to the prior examination. There is extensive osseous edema and corresponding hypointense T1 marrow signalthroughout the calcaneus extending from the posterior plantar margin to the level of the angle of Gissane. This is progressed compared to the prior examination and consistent with osteomyelitis. Marrow signal within the remainder of the foot is otherwise preserved.    There is confluent edema tracking along the abductor hallucis and flexor digitorum brevis muscles and within the plantar subcutaneous fat subjacent to this region. In total this area measures approximately 2.2 cm in transverse dimension, approximately 4 cm in anteroposterior posterior dimension, and approximately 1.6 cm in craniocaudal dimension. Findings may be related to underlying phlegmon or abscess. Evaluation is limited by the lack of intravenous contrast. Distal to this site there is edema throughout the visualized musculature consistent with myositis.    Visualized tendinous structures remain intact. There is no acute ligamentous injury. Visualized joint spaces are preserved without joint effusion.    IMPRESSION:    Osteomyelitis involving the calcaneus which extends from the plantar posterior aspect of the calcaneus to the level of the angle of Gissane. This is progressed compared to the prior examination. This is seen in the setting of diffuse soft tissue deficiency along the posterior plantar aspect of the calcaneus.    Confluent edema adjacent to this region within the abductor hallucis and flexor digitorum brevis muscles and within the subjacent plantar subcutaneous fat. This may be related to abscess orphlegmon.    ASSESSMENT :     Osteomyelitis    Yes    COVID-19    Osteomyelitis    DM (diabetes mellitus)    Paranoid schizophrenia    HLD (hyperlipidemia)    PAD (peripheral artery disease)    HTN (hypertension)    No significant past surgical history        PLAN:  HPI:  74 yo F from [facility], wheelchair bound with medical history significant of HTN, Diabetes, Osteoarthritis, Osteoporosis, Peripheral arterial disease, Diabetic neuropathy, HLD, Schizophrenia comes in with worsening ulceration to b/l heels. She has been having ulcers for past couple months, has been progressive. She was treated with IV antbx at NH but did not get better. It was worsening with necrotic changes and increasing foul smelling discharge. She denies fever, abdominal pain, chest pain, urinary symptoms, fall, trauma. No h/o nausea, vomiting, diarrhea, cough, dyspnea, sick contacts, recent illness.  (05 Jan 2021 14:24)    PLAN:    # BILATERAL LE CHRONIC ULCER NOW PROGRESSIVELY WORSENING W/ SSTI AND RIGHT CALCANEAL OSTEOMYELITIS W/ POSSIBLE ABSCESS; UNDERWENT BEDSIDE DEBRIDEMENT OF RIGHT LEG ULCER - PLACED ON VANCOMYCIN + CEFEPIME [HOLD VANC B/C OF ELEVATED TROUGH], F/U TIMUR/PVR, F/U BCX, F/U WOUND CX, ID CONSULT IN PROGRESS, PODIATRY CONSULT IN PROGRESS  - REVIEWED RIGHT LE MRI  - CURRENTLY PATIENT IS REFUSING SURGICAL INTERVENTION, DESPITE EXTENSIVE DISCUSSION REGARDING MORTALITY RISK - PSYCHIATRY CONSULT FOR CAPACITY IN PROGRESS - DEEM PATIENT AS HAVING CAPACITY. ON REPEAT CONVERSATION TODAY, PATIENT IS AGREEABLE TODAY 1/09  # MEDICAL CLEARANCE FOR SURGERY - RCRI 1 POINT - 6% RISK OF DEATH, MI OR CARDIAC ARREST; CARDIOLOGY CONSULT IN PROGRESS FOR MEDICAL CLEARANCE  # SACRAL AND RIGHT ISCHIAL UNSTAGEABLE ULCERS W/ SSTI  S/P DEBRIDEMENT 1/8- ON VANCOMYCIN AND CEFEPIME [HOLD VANC GIVEN ELEVATED TROUGH], SURGERY CONSULT IN PROGRESS - PATIENT IS AGREEABLE FOR DEBRIDEMENT  -  PSYCHIATRY CONSULT FOR CAPACITY IN PROGRESS  # ASHLEY - HOLDING VANCOMYCIN, MONITOR - F/U UA, BLADDER SCAN  # PAD  # HTN  # DM W/ DIABETIC NEUROPATHY  # OA/OP  # SCHIZOPHRENIA  # GI AND DVT PPX    ELEN CASILLAS MD COVERING FARHAN CASILLAS MD.    Patient is a 75y old  Female who presents with a chief complaint of foot ulcer (10 Ender 2021 12:15)    PATIENT IS SEEN AND EXAMINED IN MEDICAL FLOOR.    ALLERGIES:  No Known Allergies    VITALS:    Vital Signs Last 24 Hrs  T(C): 37 (10 Ender 2021 14:13), Max: 37 (09 Jan 2021 14:45)  T(F): 98.6 (10 Ender 2021 14:13), Max: 98.6 (09 Jan 2021 14:45)  HR: 80 (10 Ender 2021 14:13) (80 - 102)  BP: 117/50 (10 Ender 2021 14:13) (117/50 - 135/65)  BP(mean): --  RR: 16 (10 Ender 2021 14:13) (16 - 16)  SpO2: 99% (10 Ender 2021 14:13) (99% - 100%)    LABS:    CBC Full  -  ( 10 Ender 2021 06:22 )  WBC Count : 19.14 K/uL  RBC Count : 2.76 M/uL  Hemoglobin : 8.2 g/dL  Hematocrit : 25.4 %  Platelet Count - Automated : 405 K/uL  Mean Cell Volume : 92.0 fl  Mean Cell Hemoglobin : 29.7 pg  Mean Cell Hemoglobin Concentration : 32.3 gm/dL  Auto Neutrophil # : x  Auto Lymphocyte # : x  Auto Monocyte # : x  Auto Eosinophil # : x  Auto Basophil # : x  Auto Neutrophil % : x  Auto Lymphocyte % : x  Auto Monocyte % : x  Auto Eosinophil % : x  Auto Basophil % : x      01-10    144  |  114<H>  |  30<H>  ----------------------------<  147<H>  3.7   |  18<L>  |  1.06    Ca    8.0<L>      10 Ender 2021 06:22  Phos  2.5     01-10  Mg     2.2     01-10      CAPILLARY BLOOD GLUCOSE      POCT Blood Glucose.: 128 mg/dL (10 Ender 2021 11:38)  POCT Blood Glucose.: 144 mg/dL (10 Ender 2021 07:43)  POCT Blood Glucose.: 146 mg/dL (09 Jan 2021 21:52)  POCT Blood Glucose.: 133 mg/dL (09 Jan 2021 16:40)        Creatinine Trend: 1.06<--, 1.44<--, 1.05<--, 1.44<--, 1.43<--, 1.24<--  I&O's Summary      .Urine Clean Catch (Midstream)  01-06 @ 06:46   No growth  --  --      .Surgical Swab Right heel wound  01-05 @ 22:13   Few Morganella morganii  Few Proteus mirabilis  Moderate Corynebacterium species "Susceptibilities not performed"  --  Morganella morganii  Proteus mirabilis      .Blood Blood-Peripheral  01-05 @ 17:59   No growth to date.  --  --        MEDICATIONS:    MEDICATIONS  (STANDING):  acetaminophen   Tablet .. 650 milliGRAM(s) Oral every 6 hours  aspirin enteric coated 81 milliGRAM(s) Oral daily  cefepime   IVPB      cefepime   IVPB 1000 milliGRAM(s) IV Intermittent every 8 hours  Dakins Solution - Full Strength 1 Application(s) Topical once  dextrose 40% Gel 15 Gram(s) Oral once  dextrose 5% + sodium chloride 0.45%. 1000 milliLiter(s) (80 mL/Hr) IV Continuous <Continuous>  dextrose 5%. 1000 milliLiter(s) (50 mL/Hr) IV Continuous <Continuous>  dextrose 5%. 1000 milliLiter(s) (100 mL/Hr) IV Continuous <Continuous>  dextrose 50% Injectable 25 Gram(s) IV Push once  dextrose 50% Injectable 12.5 Gram(s) IV Push once  dextrose 50% Injectable 25 Gram(s) IV Push once  enoxaparin Injectable 30 milliGRAM(s) SubCutaneous daily  glucagon  Injectable 1 milliGRAM(s) IntraMuscular once  insulin lispro (ADMELOG) corrective regimen sliding scale   SubCutaneous three times a day before meals  insulin lispro (ADMELOG) corrective regimen sliding scale   SubCutaneous at bedtime  metroNIDAZOLE  IVPB      metroNIDAZOLE  IVPB 500 milliGRAM(s) IV Intermittent every 8 hours  perphenazine 16 milliGRAM(s) Oral two times a day  simvastatin 40 milliGRAM(s) Oral at bedtime      MEDICATIONS  (PRN):      REVIEW OF SYSTEMS:                           ALL ROS DONE [ X   ]    CONSTITUTIONAL:  LETHARGIC [   ], FEVER [   ], UNRESPONSIVE [   ]  CVS:  CP  [   ], SOB, [   ], PALPITATIONS [   ], DIZZYNESS [   ]  RS: COUGH [   ], SPUTUM [   ]  GI: ABDOMINAL PAIN [   ], NAUSEA [   ], VOMITINGS [   ], DIARRHEA [   ], CONSTIPATION [   ]  :  DYSURIA [   ], NOCTURIA [   ], INCREASED FREQUENCY [   ], DRIBLING [   ],  SKELETAL: PAINFUL JOINTS [   ], SWOLLEN JOINTS [   ], NECK ACHE [   ], LOW BACK ACHE [   ],  SKIN : ULCERS [   ], RASH [   ], ITCHING [   ]  CNS: HEAD ACHE [   ], DOUBLE VISION [   ], BLURRED VISION [   ], AMS / CONFUSION [   ], SEIZURES [   ], WEAKNESS [   ],TINGLING / NUMBNESS [   ]    PHYSICAL EXAMINATION:  GENERAL APPEARANCE: NO DISTRESS  HEENT:  NO PALLOR, NO  JVD,  NO   NODES, NECK SUPPLE  CVS: S1 +, S2 +,   RS: AEEB,  OCCASIONAL  RALES +,   NO RONCHI  ABD: SOFT, NT, NO, BS +  EXT: NO PE  SKIN: WARM, BILATERAL HEEL ULCERS - COVERED, SACRAL ULCER + RIGHT ISCHIAL ULCER COVERED  SKELETAL:  ROM ACCEPTABLE, LEFT ARM SWELLING  CNS:  AAO X 2-3     RADIOLOGY :    EXAM:  MR FOOT RT                            PROCEDURE DATE:  01/06/2021          INTERPRETATION:  Clinical indications: Right heel ulceration and eschar status post debridement. Concern for osteomyelitis.    Multiplanar multisequence MRI of the right foot was performed localized to the hindfoot.    Correlation is made with prior MRI from September 11, 2019.    FINDINGS:    There is a large wound along the plantar aspect of the calcaneus posteriorly which extends nearly extends to bone. There is surrounding soft tissue edema about this site with evidence of an overlying bandage. The degree of soft tissue deficiency is increased compared to the prior examination. There is extensive osseous edema and corresponding hypointense T1 marrow signalthroughout the calcaneus extending from the posterior plantar margin to the level of the angle of Gissane. This is progressed compared to the prior examination and consistent with osteomyelitis. Marrow signal within the remainder of the foot is otherwise preserved.    There is confluent edema tracking along the abductor hallucis and flexor digitorum brevis muscles and within the plantar subcutaneous fat subjacent to this region. In total this area measures approximately 2.2 cm in transverse dimension, approximately 4 cm in anteroposterior posterior dimension, and approximately 1.6 cm in craniocaudal dimension. Findings may be related to underlying phlegmon or abscess. Evaluation is limited by the lack of intravenous contrast. Distal to this site there is edema throughout the visualized musculature consistent with myositis.    Visualized tendinous structures remain intact. There is no acute ligamentous injury. Visualized joint spaces are preserved without joint effusion.    IMPRESSION:    Osteomyelitis involving the calcaneus which extends from the plantar posterior aspect of the calcaneus to the level of the angle of Gissane. This is progressed compared to the prior examination. This is seen in the setting of diffuse soft tissue deficiency along the posterior plantar aspect of the calcaneus.    Confluent edema adjacent to this region within the abductor hallucis and flexor digitorum brevis muscles and within the subjacent plantar subcutaneous fat. This may be related to abscess orphlegmon.    ASSESSMENT :     Osteomyelitis    Yes    COVID-19    Osteomyelitis    DM (diabetes mellitus)    Paranoid schizophrenia    HLD (hyperlipidemia)    PAD (peripheral artery disease)    HTN (hypertension)    No significant past surgical history        PLAN:  HPI:  74 yo F from [facility], wheelchair bound with medical history significant of HTN, Diabetes, Osteoarthritis, Osteoporosis, Peripheral arterial disease, Diabetic neuropathy, HLD, Schizophrenia comes in with worsening ulceration to b/l heels. She has been having ulcers for past couple months, has been progressive. She was treated with IV antbx at NH but did not get better. It was worsening with necrotic changes and increasing foul smelling discharge. She denies fever, abdominal pain, chest pain, urinary symptoms, fall, trauma. No h/o nausea, vomiting, diarrhea, cough, dyspnea, sick contacts, recent illness.  (05 Jan 2021 14:24)    PLAN:    # BILATERAL LE CHRONIC ULCER NOW PROGRESSIVELY WORSENING W/ SSTI AND RIGHT CALCANEAL OSTEOMYELITIS W/ POSSIBLE ABSCESS; UNDERWENT BEDSIDE DEBRIDEMENT OF RIGHT LEG ULCER - PLACED ON VANCOMYCIN + CEFEPIME [HOLD VANC B/C OF ELEVATED TROUGH], F/U TIMUR/PVR, F/U BCX, F/U WOUND CX, ID CONSULT IN PROGRESS, PODIATRY CONSULT IN PROGRESS  - REVIEWED RIGHT LE MRI  - CURRENTLY PATIENT IS REFUSING SURGICAL INTERVENTION, DESPITE EXTENSIVE DISCUSSION REGARDING MORTALITY RISK - PSYCHIATRY CONSULT FOR CAPACITY IN PROGRESS - DEEM PATIENT AS HAVING CAPACITY. ON REPEAT CONVERSATION TODAY, PATIENT IS AGREEABLE TODAY 1/09  # MEDICAL CLEARANCE FOR SURGERY - RCRI 1 POINT - 6% RISK OF DEATH, MI OR CARDIAC ARREST; CARDIOLOGY CONSULT IN PROGRESS FOR MEDICAL CLEARANCE  # SACRAL AND RIGHT ISCHIAL UNSTAGEABLE ULCERS W/ SSTI  S/P DEBRIDEMENT 1/8- ON VANCOMYCIN AND CEFEPIME [HOLD VANC GIVEN ELEVATED TROUGH], SURGERY CONSULT IN PROGRESS - PATIENT IS AGREEABLE FOR DEBRIDEMENT  -  PSYCHIATRY CONSULT FOR CAPACITY IN PROGRESS  # LEFT ARM SWELLING - F/U LEFT ARM U/S AND PLACE WARM COMPRESSES  # ASHLEY - HOLDING VANCOMYCIN, MONITOR - F/U UA, BLADDER SCAN  # PAD  # HTN  # DM W/ DIABETIC NEUROPATHY  # OA/OP  # SCHIZOPHRENIA  # GI AND DVT PPX    ELEN CASILLAS MD COVERING FARHAN CASILLAS MD.

## 2021-01-11 LAB
ALBUMIN SERPL ELPH-MCNC: 1.8 G/DL — LOW (ref 3.5–5)
ALP SERPL-CCNC: 60 U/L — SIGNIFICANT CHANGE UP (ref 40–120)
ALT FLD-CCNC: 8 U/L DA — LOW (ref 10–60)
ANION GAP SERPL CALC-SCNC: 10 MMOL/L — SIGNIFICANT CHANGE UP (ref 5–17)
ANISOCYTOSIS BLD QL: SLIGHT — SIGNIFICANT CHANGE UP
AST SERPL-CCNC: 15 U/L — SIGNIFICANT CHANGE UP (ref 10–40)
BASOPHILS # BLD AUTO: 0 K/UL — SIGNIFICANT CHANGE UP (ref 0–0.2)
BASOPHILS NFR BLD AUTO: 0 % — SIGNIFICANT CHANGE UP (ref 0–2)
BILIRUB SERPL-MCNC: 0.5 MG/DL — SIGNIFICANT CHANGE UP (ref 0.2–1.2)
BLD GP AB SCN SERPL QL: SIGNIFICANT CHANGE UP
BUN SERPL-MCNC: 27 MG/DL — HIGH (ref 7–18)
CALCIUM SERPL-MCNC: 8.1 MG/DL — LOW (ref 8.4–10.5)
CHLORIDE SERPL-SCNC: 114 MMOL/L — HIGH (ref 96–108)
CO2 SERPL-SCNC: 19 MMOL/L — LOW (ref 22–31)
CREAT SERPL-MCNC: 0.93 MG/DL — SIGNIFICANT CHANGE UP (ref 0.5–1.3)
ELLIPTOCYTES BLD QL SMEAR: SLIGHT — SIGNIFICANT CHANGE UP
EOSINOPHIL # BLD AUTO: 0 K/UL — SIGNIFICANT CHANGE UP (ref 0–0.5)
EOSINOPHIL NFR BLD AUTO: 0 % — SIGNIFICANT CHANGE UP (ref 0–6)
GLUCOSE BLDC GLUCOMTR-MCNC: 105 MG/DL — HIGH (ref 70–99)
GLUCOSE BLDC GLUCOMTR-MCNC: 163 MG/DL — HIGH (ref 70–99)
GLUCOSE BLDC GLUCOMTR-MCNC: 198 MG/DL — HIGH (ref 70–99)
GLUCOSE BLDC GLUCOMTR-MCNC: 233 MG/DL — HIGH (ref 70–99)
GLUCOSE BLDC GLUCOMTR-MCNC: 239 MG/DL — HIGH (ref 70–99)
GLUCOSE BLDC GLUCOMTR-MCNC: 91 MG/DL — SIGNIFICANT CHANGE UP (ref 70–99)
GLUCOSE SERPL-MCNC: 234 MG/DL — HIGH (ref 70–99)
HCT VFR BLD CALC: 27.6 % — LOW (ref 34.5–45)
HGB BLD-MCNC: 9 G/DL — LOW (ref 11.5–15.5)
LG PLATELETS BLD QL AUTO: SLIGHT — SIGNIFICANT CHANGE UP
LYMPHOCYTES # BLD AUTO: 0.56 K/UL — LOW (ref 1–3.3)
LYMPHOCYTES # BLD AUTO: 3 % — LOW (ref 13–44)
MAGNESIUM SERPL-MCNC: 2.1 MG/DL — SIGNIFICANT CHANGE UP (ref 1.6–2.6)
MANUAL SMEAR VERIFICATION: SIGNIFICANT CHANGE UP
MCHC RBC-ENTMCNC: 29.6 PG — SIGNIFICANT CHANGE UP (ref 27–34)
MCHC RBC-ENTMCNC: 32.6 GM/DL — SIGNIFICANT CHANGE UP (ref 32–36)
MCV RBC AUTO: 90.8 FL — SIGNIFICANT CHANGE UP (ref 80–100)
MONOCYTES # BLD AUTO: 0.94 K/UL — HIGH (ref 0–0.9)
MONOCYTES NFR BLD AUTO: 5 % — SIGNIFICANT CHANGE UP (ref 2–14)
NEUTROPHILS # BLD AUTO: 17.29 K/UL — HIGH (ref 1.8–7.4)
NEUTROPHILS NFR BLD AUTO: 92 % — HIGH (ref 43–77)
NEUTS HYPERSEG # BLD: PRESENT — SIGNIFICANT CHANGE UP
NRBC # BLD: 0 /100 — SIGNIFICANT CHANGE UP (ref 0–0)
OVALOCYTES BLD QL SMEAR: SLIGHT — SIGNIFICANT CHANGE UP
PHOSPHATE SERPL-MCNC: 1.6 MG/DL — LOW (ref 2.5–4.5)
PLAT MORPH BLD: ABNORMAL
PLATELET # BLD AUTO: 444 K/UL — HIGH (ref 150–400)
PLATELET CLUMP BLD QL SMEAR: ABNORMAL
PLATELET COUNT - ESTIMATE: ABNORMAL
POIKILOCYTOSIS BLD QL AUTO: SLIGHT — SIGNIFICANT CHANGE UP
POTASSIUM SERPL-MCNC: 3.7 MMOL/L — SIGNIFICANT CHANGE UP (ref 3.5–5.3)
POTASSIUM SERPL-SCNC: 3.7 MMOL/L — SIGNIFICANT CHANGE UP (ref 3.5–5.3)
PROT SERPL-MCNC: 5.8 G/DL — LOW (ref 6–8.3)
RBC # BLD: 3.04 M/UL — LOW (ref 3.8–5.2)
RBC # FLD: 13.2 % — SIGNIFICANT CHANGE UP (ref 10.3–14.5)
RBC BLD AUTO: ABNORMAL
SODIUM SERPL-SCNC: 143 MMOL/L — SIGNIFICANT CHANGE UP (ref 135–145)
WBC # BLD: 18.79 K/UL — HIGH (ref 3.8–10.5)
WBC # FLD AUTO: 18.79 K/UL — HIGH (ref 3.8–10.5)

## 2021-01-11 RX ORDER — SODIUM HYPOCHLORITE 0.125 %
1 SOLUTION, NON-ORAL MISCELLANEOUS ONCE
Refills: 0 | Status: DISCONTINUED | OUTPATIENT
Start: 2021-01-11 | End: 2021-01-13

## 2021-01-11 RX ORDER — ENOXAPARIN SODIUM 100 MG/ML
30 INJECTION SUBCUTANEOUS DAILY
Refills: 0 | Status: DISCONTINUED | OUTPATIENT
Start: 2021-01-11 | End: 2021-01-11

## 2021-01-11 RX ORDER — ASPIRIN/CALCIUM CARB/MAGNESIUM 324 MG
81 TABLET ORAL DAILY
Refills: 0 | Status: DISCONTINUED | OUTPATIENT
Start: 2021-01-11 | End: 2021-01-11

## 2021-01-11 RX ORDER — SODIUM CHLORIDE 9 MG/ML
1000 INJECTION, SOLUTION INTRAVENOUS
Refills: 0 | Status: DISCONTINUED | OUTPATIENT
Start: 2021-01-11 | End: 2021-01-13

## 2021-01-11 RX ORDER — DEXTROSE 50 % IN WATER 50 %
15 SYRINGE (ML) INTRAVENOUS ONCE
Refills: 0 | Status: DISCONTINUED | OUTPATIENT
Start: 2021-01-11 | End: 2021-01-13

## 2021-01-11 RX ORDER — SODIUM CHLORIDE 9 MG/ML
1000 INJECTION INTRAMUSCULAR; INTRAVENOUS; SUBCUTANEOUS
Refills: 0 | Status: DISCONTINUED | OUTPATIENT
Start: 2021-01-11 | End: 2021-01-13

## 2021-01-11 RX ORDER — ACETAMINOPHEN 500 MG
650 TABLET ORAL EVERY 6 HOURS
Refills: 0 | Status: DISCONTINUED | OUTPATIENT
Start: 2021-01-11 | End: 2021-01-13

## 2021-01-11 RX ORDER — GLUCAGON INJECTION, SOLUTION 0.5 MG/.1ML
1 INJECTION, SOLUTION SUBCUTANEOUS ONCE
Refills: 0 | Status: DISCONTINUED | OUTPATIENT
Start: 2021-01-11 | End: 2021-01-13

## 2021-01-11 RX ORDER — SIMVASTATIN 20 MG/1
40 TABLET, FILM COATED ORAL AT BEDTIME
Refills: 0 | Status: DISCONTINUED | OUTPATIENT
Start: 2021-01-11 | End: 2021-01-13

## 2021-01-11 RX ORDER — METRONIDAZOLE 500 MG
500 TABLET ORAL EVERY 8 HOURS
Refills: 0 | Status: DISCONTINUED | OUTPATIENT
Start: 2021-01-11 | End: 2021-01-13

## 2021-01-11 RX ORDER — INSULIN LISPRO 100/ML
VIAL (ML) SUBCUTANEOUS
Refills: 0 | Status: DISCONTINUED | OUTPATIENT
Start: 2021-01-11 | End: 2021-01-13

## 2021-01-11 RX ORDER — ASPIRIN/CALCIUM CARB/MAGNESIUM 324 MG
81 TABLET ORAL DAILY
Refills: 0 | Status: DISCONTINUED | OUTPATIENT
Start: 2021-01-11 | End: 2021-01-12

## 2021-01-11 RX ORDER — PERPHENAZINE 8 MG/1
16 TABLET, FILM COATED ORAL
Refills: 0 | Status: DISCONTINUED | OUTPATIENT
Start: 2021-01-11 | End: 2021-01-12

## 2021-01-11 RX ORDER — DEXTROSE 50 % IN WATER 50 %
12.5 SYRINGE (ML) INTRAVENOUS ONCE
Refills: 0 | Status: DISCONTINUED | OUTPATIENT
Start: 2021-01-11 | End: 2021-01-13

## 2021-01-11 RX ORDER — INSULIN LISPRO 100/ML
VIAL (ML) SUBCUTANEOUS AT BEDTIME
Refills: 0 | Status: DISCONTINUED | OUTPATIENT
Start: 2021-01-11 | End: 2021-01-13

## 2021-01-11 RX ORDER — DEXTROSE 50 % IN WATER 50 %
25 SYRINGE (ML) INTRAVENOUS ONCE
Refills: 0 | Status: DISCONTINUED | OUTPATIENT
Start: 2021-01-11 | End: 2021-01-13

## 2021-01-11 RX ORDER — ENOXAPARIN SODIUM 100 MG/ML
30 INJECTION SUBCUTANEOUS DAILY
Refills: 0 | Status: DISCONTINUED | OUTPATIENT
Start: 2021-01-11 | End: 2021-01-12

## 2021-01-11 RX ORDER — POTASSIUM PHOSPHATE, MONOBASIC POTASSIUM PHOSPHATE, DIBASIC 236; 224 MG/ML; MG/ML
15 INJECTION, SOLUTION INTRAVENOUS ONCE
Refills: 0 | Status: COMPLETED | OUTPATIENT
Start: 2021-01-11 | End: 2021-01-11

## 2021-01-11 RX ORDER — CEFEPIME 1 G/1
1000 INJECTION, POWDER, FOR SOLUTION INTRAMUSCULAR; INTRAVENOUS EVERY 8 HOURS
Refills: 0 | Status: DISCONTINUED | OUTPATIENT
Start: 2021-01-11 | End: 2021-01-13

## 2021-01-11 RX ADMIN — CEFEPIME 100 MILLIGRAM(S): 1 INJECTION, POWDER, FOR SOLUTION INTRAMUSCULAR; INTRAVENOUS at 22:03

## 2021-01-11 RX ADMIN — Medication 2: at 06:50

## 2021-01-11 RX ADMIN — CEFEPIME 100 MILLIGRAM(S): 1 INJECTION, POWDER, FOR SOLUTION INTRAMUSCULAR; INTRAVENOUS at 05:59

## 2021-01-11 RX ADMIN — SODIUM CHLORIDE 80 MILLILITER(S): 9 INJECTION, SOLUTION INTRAVENOUS at 06:53

## 2021-01-11 RX ADMIN — Medication 100 MILLIGRAM(S): at 05:59

## 2021-01-11 RX ADMIN — Medication 1: at 11:40

## 2021-01-11 RX ADMIN — Medication 100 MILLIGRAM(S): at 22:02

## 2021-01-11 RX ADMIN — POTASSIUM PHOSPHATE, MONOBASIC POTASSIUM PHOSPHATE, DIBASIC 62.5 MILLIMOLE(S): 236; 224 INJECTION, SOLUTION INTRAVENOUS at 11:25

## 2021-01-11 NOTE — PROGRESS NOTE ADULT - PROBLEM SELECTOR PLAN 7
Continue with cleaning all wounds with normal saline and apply skin prep to the surrounding skin  Surgery following

## 2021-01-11 NOTE — PROGRESS NOTE ADULT - PROBLEM SELECTOR PLAN 1
MRI noted above  - c/w Cefepime day 7  - c/w metronidazole day 6   Wound culture grew few Morganella morganii and few Proteus mirabilis  Podiatry following  - infectious disease Dr. Wellington  -OR cancelled, anesthesia unable to get consent from patient.

## 2021-01-11 NOTE — PROGRESS NOTE ADULT - ASSESSMENT
HPI:  75 year old wheelchair bound female from [facility] with a past medical history of HTN, type II diabetes mellitus, OA, osteoporosis, PAD, diabetic neuropathy, HLD, paranoid schizophrenia and recurrent osteomyelitis who presented to the emergency department on 1/5 with c/o worsening infection of her bilateral heel ulcerations in spite local wound care with santyl dressings and outpatient antibiotic management. Per ECC, patient's wounds have had increasing purulent, foul smelling discharge and necrotic changes. Patient with additional ulcerations to her coccyx, ischium, and sacrum for which wound care and surgery were consulted. Podiatry also consulted for her heel ulcerations: patient s/p bedside debridement of her right heel ulceration, refusing any surgical interventions at this time. Infectious disease also following.    1/6: MRI with findings of: Osteomyelitis involving the calcaneus which extends from the plantar posterior aspect of the calcaneus to the level of the angle of Gissane. This is progressed compared to the prior examination.   1/7:  Pt states she understands that she may loose her leg and/or her life without surgical intervention for RLE OM, still refusing debridement.  Psych consulted. Patient needs medical clearance for OR on 1/8.    1/11; Patient I and D not done, anesthesia unable to get consent. Patient not consenting to OR procedure. OR procedure cancelled due to no consent. Patient deemed to have full capacity as per Psychiatry.

## 2021-01-11 NOTE — PROGRESS NOTE ADULT - SUBJECTIVE AND OBJECTIVE BOX
NP Note discussed with  Primary Attending    Patient is a 75y old  Female who presents with a chief complaint of foot ulcer (2021 11:29)      INTERVAL HPI/OVERNIGHT EVENTS: Patient seen and examined at bedside, awake alert to voice, not answering questions.     MEDICATIONS  (STANDING):  acetaminophen   Tablet .. 650 milliGRAM(s) Oral every 6 hours  aspirin enteric coated 81 milliGRAM(s) Oral daily  cefepime   IVPB 1000 milliGRAM(s) IV Intermittent every 8 hours  Dakins Solution - Full Strength 1 Application(s) Topical once  dextrose 40% Gel 15 Gram(s) Oral once  dextrose 5% + sodium chloride 0.45%. 1000 milliLiter(s) (80 mL/Hr) IV Continuous <Continuous>  dextrose 5%. 1000 milliLiter(s) (50 mL/Hr) IV Continuous <Continuous>  dextrose 5%. 1000 milliLiter(s) (100 mL/Hr) IV Continuous <Continuous>  dextrose 50% Injectable 25 Gram(s) IV Push once  dextrose 50% Injectable 12.5 Gram(s) IV Push once  dextrose 50% Injectable 25 Gram(s) IV Push once  enoxaparin Injectable 30 milliGRAM(s) SubCutaneous daily  glucagon  Injectable 1 milliGRAM(s) IntraMuscular once  insulin lispro (ADMELOG) corrective regimen sliding scale   SubCutaneous three times a day before meals  insulin lispro (ADMELOG) corrective regimen sliding scale   SubCutaneous at bedtime  metroNIDAZOLE  IVPB 500 milliGRAM(s) IV Intermittent every 8 hours  perphenazine 16 milliGRAM(s) Oral two times a day  simvastatin 40 milliGRAM(s) Oral at bedtime  sodium chloride 0.9%. 1000 milliLiter(s) (75 mL/Hr) IV Continuous <Continuous>  sodium chloride 0.9%. 1000 milliLiter(s) (80 mL/Hr) IV Continuous <Continuous>  sodium chloride 0.9%. 1000 milliLiter(s) (80 mL/Hr) IV Continuous <Continuous>    MEDICATIONS  (PRN):      __________________________________________________  REVIEW OF SYSTEMS:    unable to assess poor historian    Vital Signs Last 24 Hrs  T(C): 37.1 (2021 14:00), Max: 37.2 (2021 05:11)  T(F): 98.7 (2021 14:00), Max: 98.9 (2021 05:11)  HR: 93 (2021 14:00) (80 - 94)  BP: 138/59 (2021 14:00) (115/43 - 138/59)  BP(mean): --  RR: 18 (2021 14:00) (16 - 18)  SpO2: 100% (2021 14:00) (99% - 100%)    ________________________________________________  PHYSICAL EXAM:  GENERAL: NAD  HEENT: Normocephalic;  conjunctivae and sclerae clear; moist mucous membranes;   NECK : supple  CHEST/LUNG: Clear to auscultation bilaterally with good air entry   HEART: S1 S2  regular; no murmurs, gallops or rubs  ABDOMEN: Soft, Nontender, Nondistended; Bowel sounds present  EXTREMITIES: b/L heel ace dressing, no cyanosis; no edema; no calf tenderness  SKIN: warm and dry; no rash  NERVOUS SYSTEM:  Awake and alert; unwilling to answer    _________________________________________________  LABS:                        9.0    18.79 )-----------( 444      ( 2021 06:00 )             27.6     01-11    143  |  114<H>  |  27<H>  ----------------------------<  234<H>  3.7   |  19<L>  |  0.93    Ca    8.1<L>      2021 06:00  Phos  1.6       Mg     2.1         TPro  5.8<L>  /  Alb  1.8<L>  /  TBili  0.5  /  DBili  x   /  AST  15  /  ALT  8<L>  /  AlkPhos  60        Urinalysis Basic - ( 10 Ender 2021 03:38 )    Color: Yellow / Appearance: Clear / S.020 / pH: x  Gluc: x / Ketone: Small  / Bili: Negative / Urobili: Negative   Blood: x / Protein: 30 mg/dL / Nitrite: Negative   Leuk Esterase: Trace / RBC: 2-5 /HPF / WBC 3-5 /HPF   Sq Epi: x / Non Sq Epi: Few /HPF / Bacteria: Many /HPF      CAPILLARY BLOOD GLUCOSE      POCT Blood Glucose.: 105 mg/dL (2021 16:42)  POCT Blood Glucose.: 163 mg/dL (2021 11:37)  POCT Blood Glucose.: 239 mg/dL (2021 07:38)  POCT Blood Glucose.: 233 mg/dL (2021 06:38)  POCT Blood Glucose.: 198 mg/dL (2021 00:20)  POCT Blood Glucose.: 178 mg/dL (10 Ender 2021 22:05)        RADIOLOGY & ADDITIONAL TESTS:  < from: US Duplex Venous Upper Ext Ltd, Left (01.10.21 @ 17:09) >    EXAM:  US DPLX UPR EXT VEINS LTD LT                            PROCEDURE DATE:  01/10/2021          INTERPRETATION:  CLINICAL INDICATION: 75 years  Female with LUE swelling, rule out thrombosis.    COMPARISON: None available.    TECHNIQUE: Duplex sonography of the LEFT UPPER extremity veins with color and spectral Doppler, with and without compression.    FINDINGS:    The left internal jugular, subclavian, axillary and brachial veins are patent and compressible where applicable.  The basilic and cephalic veins (superficial veins) are patent and without thrombus.    Doppler examination shows normal spontaneous and phasic flow.    Edema is visualized.    IMPRESSION:  No evidence of left upper extremity deep venous thrombosis.      < end of copied text >  < from: MR Foot No Cont, Right (21 @ 11:08) >    EXAM:  MR FOOT RT                            PROCEDURE DATE:  2021          INTERPRETATION:  Clinical indications: Right heel ulceration and eschar status post debridement. Concern for osteomyelitis.    Multiplanar multisequence MRI of the right foot was performed localized to the hindfoot.    Correlation is made with prior MRI from 2019.    FINDINGS:    There is a large wound along the plantar aspect of the calcaneus posteriorly which extends nearly extends to bone. There is surrounding soft tissue edema about this site with evidence of an overlying bandage. The degree of soft tissue deficiency is increased compared to the prior examination. There is extensive osseous edema and corresponding hypointense T1 marrow signalthroughout the calcaneus extending from the posterior plantar margin to the level of the angle of Gissane. This is progressed compared to the prior examination and consistent with osteomyelitis. Marrow signal within the remainder of the foot is otherwise preserved.    There is confluent edema tracking along the abductor hallucis and flexor digitorum brevis muscles and within the plantar subcutaneous fat subjacent to this region. In total this area measures approximately 2.2 cm in transverse dimension, approximately 4 cm in anteroposterior posterior dimension, and approximately 1.6 cm in craniocaudal dimension. Findings may be related to underlying phlegmon or abscess. Evaluation is limited by the lack of intravenous contrast. Distal to this site there is edema throughout the visualized musculature consistent with myositis.    Visualized tendinous structures remain intact. There is no acute ligamentous injury. Visualized joint spaces are preserved without joint effusion.    IMPRESSION:    Osteomyelitis involving the calcaneus which extends from the plantar posterior aspect of the calcaneus to the level of the angle of Gissane. This is progressed compared to the prior examination. This is seen in the setting of diffuse soft tissue deficiency along the posterior plantar aspect of the calcaneus.    Confluent edema adjacent to this region within the abductor hallucis and flexor digitorum brevis muscles and within the subjacent plantar subcutaneous fat. This may be related to abscess orphlegmon.          < end of copied text >  < from: Xray Foot AP + Lateral + Oblique, Right (21 @ 18:00) >    EXAM:  FOOT RIGHT (MINIMUM 3 VIEWS)                            PROCEDURE DATE:  2021          INTERPRETATION:  Right foot    HISTORY: Status post bedside debridement.     Two views of the right foot show thinning of soft tissues near the calcaneus likely indicating site of debridement. The joint spaces are maintained. There is questionable exposure of the plantar surface of the calcaneus.    IMPRESSION: Calcaneal soft tissues and questionable exposure as noted. Clinical correlation is suggested.    < end of copied text >  < from: VA Physiol Extremity Lower 3+ Level, BI (21 @ 17:55) >  EXAM:  US PHYSIOL LWR EXT 3+ LEV BI                            PROCEDURE DATE:  2021          INTERPRETATION:  Clinical information: History of diabetes, nonsmoker, hypertension, hyperlipidemia, presents with bilateral heel ulcers.    Comparison: Lower extremity arterial Doppler study dated 2020.    Technique: Lower extremity arterial Doppler study. Note that pressure measurements were not obtained at the thigh level.    Findings: Ankle brachial index measures 1.33 bilaterally, compared with prior values of 1.41 on the right and 1.36 on the left. No segmental arterial pressure gradient is present.    PVR waveforms are normal in amplitude and pulsatility from the thigh through the ankle level. They're diminished in amplitude at the metatarsal and digital levels in symmetric fashion, similar to the prior exam.    Impression: No Doppler evidence of hemodynamically significant arterial inflow abnormality in either lower extremity.    Evidence of small vessel disease in the feet.      < end of copied text >    Imaging  Reviewed:  YES    Consultant(s) Notes Reviewed:   YES      Plan of care was discussed with patient and /or primary care giver; all questions and concerns were addressed

## 2021-01-11 NOTE — SWALLOW BEDSIDE ASSESSMENT ADULT - SLP PERTINENT HISTORY OF CURRENT PROBLEM
76 y/o female admitted on 1/5 for osteomyelitis. PMHx significant for COVID-19, DM, HTN, HLD, and schizophrenia. 76 y/o female from [facility] admitted on 1/5 for osteomyelitis. PMHx significant for COVID-19, DM, HTN, HLD, and schizophrenia.

## 2021-01-11 NOTE — PROGRESS NOTE ADULT - SUBJECTIVE AND OBJECTIVE BOX
Patient is a 75y old  Female who presents with a chief complaint of foot ulcer (11 Jan 2021 19:04)    PATIENT IS SEEN AND EXAMINED IN MEDICAL FLOOR.    ALLERGIES:  No Known Allergies    VITALS:    Vital Signs Last 24 Hrs  T(C): 36.6 (11 Jan 2021 20:33), Max: 37.2 (11 Jan 2021 05:11)  T(F): 97.9 (11 Jan 2021 20:33), Max: 98.9 (11 Jan 2021 05:11)  HR: 89 (11 Jan 2021 20:33) (84 - 94)  BP: 138/58 (11 Jan 2021 20:33) (115/43 - 138/59)  BP(mean): --  RR: 17 (11 Jan 2021 20:33) (17 - 18)  SpO2: 100% (11 Jan 2021 20:33) (99% - 100%)    LABS:    CBC Full  -  ( 11 Jan 2021 06:00 )  WBC Count : 18.79 K/uL  RBC Count : 3.04 M/uL  Hemoglobin : 9.0 g/dL  Hematocrit : 27.6 %  Platelet Count - Automated : 444 K/uL  Mean Cell Volume : 90.8 fl  Mean Cell Hemoglobin : 29.6 pg  Mean Cell Hemoglobin Concentration : 32.6 gm/dL  Auto Neutrophil # : 17.29 K/uL  Auto Lymphocyte # : 0.56 K/uL  Auto Monocyte # : 0.94 K/uL  Auto Eosinophil # : 0.00 K/uL  Auto Basophil # : 0.00 K/uL  Auto Neutrophil % : 92.0 %  Auto Lymphocyte % : 3.0 %  Auto Monocyte % : 5.0 %  Auto Eosinophil % : 0.0 %  Auto Basophil % : 0.0 %      01-11    143  |  114<H>  |  27<H>  ----------------------------<  234<H>  3.7   |  19<L>  |  0.93    Ca    8.1<L>      11 Jan 2021 06:00  Phos  1.6     01-11  Mg     2.1     01-11    TPro  5.8<L>  /  Alb  1.8<L>  /  TBili  0.5  /  DBili  x   /  AST  15  /  ALT  8<L>  /  AlkPhos  60  01-11    CAPILLARY BLOOD GLUCOSE      POCT Blood Glucose.: 105 mg/dL (11 Jan 2021 16:42)  POCT Blood Glucose.: 163 mg/dL (11 Jan 2021 11:37)  POCT Blood Glucose.: 239 mg/dL (11 Jan 2021 07:38)  POCT Blood Glucose.: 233 mg/dL (11 Jan 2021 06:38)  POCT Blood Glucose.: 198 mg/dL (11 Jan 2021 00:20)  POCT Blood Glucose.: 178 mg/dL (10 Ender 2021 22:05)      LIVER FUNCTIONS - ( 11 Jan 2021 06:00 )  Alb: 1.8 g/dL / Pro: 5.8 g/dL / ALK PHOS: 60 U/L / ALT: 8 U/L DA / AST: 15 U/L / GGT: x           Creatinine Trend: 0.93<--, 1.06<--, 1.44<--, 1.05<--, 1.44<--, 1.43<--  I&O's Summary      .Urine Clean Catch (Midstream)  01-06 @ 06:46   No growth  --  --      .Surgical Swab Right heel wound  01-05 @ 22:13   Few Morganella morganii  Few Proteus mirabilis  Moderate Corynebacterium species "Susceptibilities not performed"  Moderate Bacteroides vulgatus "Susceptibilities not performed"  --  Morganella morganii  Proteus mirabilis      .Blood Blood-Peripheral  01-05 @ 17:59   No Growth Final  --  --      MEDICATIONS:    MEDICATIONS  (STANDING):  acetaminophen   Tablet .. 650 milliGRAM(s) Oral every 6 hours  aspirin enteric coated 81 milliGRAM(s) Oral daily  cefepime   IVPB 1000 milliGRAM(s) IV Intermittent every 8 hours  Dakins Solution - Full Strength 1 Application(s) Topical once  dextrose 40% Gel 15 Gram(s) Oral once  dextrose 5% + sodium chloride 0.45%. 1000 milliLiter(s) (80 mL/Hr) IV Continuous <Continuous>  dextrose 5%. 1000 milliLiter(s) (50 mL/Hr) IV Continuous <Continuous>  dextrose 5%. 1000 milliLiter(s) (100 mL/Hr) IV Continuous <Continuous>  dextrose 50% Injectable 25 Gram(s) IV Push once  dextrose 50% Injectable 12.5 Gram(s) IV Push once  dextrose 50% Injectable 25 Gram(s) IV Push once  enoxaparin Injectable 30 milliGRAM(s) SubCutaneous daily  glucagon  Injectable 1 milliGRAM(s) IntraMuscular once  insulin lispro (ADMELOG) corrective regimen sliding scale   SubCutaneous three times a day before meals  insulin lispro (ADMELOG) corrective regimen sliding scale   SubCutaneous at bedtime  metroNIDAZOLE  IVPB 500 milliGRAM(s) IV Intermittent every 8 hours  perphenazine 16 milliGRAM(s) Oral two times a day  simvastatin 40 milliGRAM(s) Oral at bedtime  sodium chloride 0.9%. 1000 milliLiter(s) (75 mL/Hr) IV Continuous <Continuous>  sodium chloride 0.9%. 1000 milliLiter(s) (80 mL/Hr) IV Continuous <Continuous>  sodium chloride 0.9%. 1000 milliLiter(s) (80 mL/Hr) IV Continuous <Continuous>      MEDICATIONS  (PRN):      REVIEW OF SYSTEMS:                           ALL ROS DONE [ X   ]    CONSTITUTIONAL:  LETHARGIC [   ], FEVER [   ], UNRESPONSIVE [   ]  CVS:  CP  [   ], SOB, [   ], PALPITATIONS [   ], DIZZYNESS [   ]  RS: COUGH [   ], SPUTUM [   ]  GI: ABDOMINAL PAIN [   ], NAUSEA [   ], VOMITINGS [   ], DIARRHEA [   ], CONSTIPATION [   ]  :  DYSURIA [   ], NOCTURIA [   ], INCREASED FREQUENCY [   ], DRIBLING [   ],  SKELETAL: PAINFUL JOINTS [   ], SWOLLEN JOINTS [   ], NECK ACHE [   ], LOW BACK ACHE [   ],  SKIN : ULCERS [   ], RASH [   ], ITCHING [   ]  CNS: HEAD ACHE [   ], DOUBLE VISION [   ], BLURRED VISION [   ], AMS / CONFUSION [   ], SEIZURES [   ], WEAKNESS [   ],TINGLING / NUMBNESS [   ]    PHYSICAL EXAMINATION:  GENERAL APPEARANCE: NO DISTRESS  HEENT:  NO PALLOR, NO  JVD,  NO   NODES, NECK SUPPLE  CVS: S1 +, S2 +,   RS: AEEB,  OCCASIONAL  RALES +,   NO RONCHI  ABD: SOFT, NT, NO, BS +  EXT: NO PE  SKIN: WARM, BILATERAL HEEL ULCERS - COVERED, SACRAL ULCER + RIGHT ISCHIAL ULCER COVERED  SKELETAL:  ROM ACCEPTABLE, LEFT ARM SWELLING  CNS:  AAO X 2-3     RADIOLOGY :    EXAM:  MR FOOT RT                            PROCEDURE DATE:  01/06/2021          INTERPRETATION:  Clinical indications: Right heel ulceration and eschar status post debridement. Concern for osteomyelitis.    Multiplanar multisequence MRI of the right foot was performed localized to the hindfoot.    Correlation is made with prior MRI from September 11, 2019.    FINDINGS:    There is a large wound along the plantar aspect of the calcaneus posteriorly which extends nearly extends to bone. There is surrounding soft tissue edema about this site with evidence of an overlying bandage. The degree of soft tissue deficiency is increased compared to the prior examination. There is extensive osseous edema and corresponding hypointense T1 marrow signalthroughout the calcaneus extending from the posterior plantar margin to the level of the angle of Gissane. This is progressed compared to the prior examination and consistent with osteomyelitis. Marrow signal within the remainder of the foot is otherwise preserved.    There is confluent edema tracking along the abductor hallucis and flexor digitorum brevis muscles and within the plantar subcutaneous fat subjacent to this region. In total this area measures approximately 2.2 cm in transverse dimension, approximately 4 cm in anteroposterior posterior dimension, and approximately 1.6 cm in craniocaudal dimension. Findings may be related to underlying phlegmon or abscess. Evaluation is limited by the lack of intravenous contrast. Distal to this site there is edema throughout the visualized musculature consistent with myositis.    Visualized tendinous structures remain intact. There is no acute ligamentous injury. Visualized joint spaces are preserved without joint effusion.    IMPRESSION:    Osteomyelitis involving the calcaneus which extends from the plantar posterior aspect of the calcaneus to the level of the angle of Gissane. This is progressed compared to the prior examination. This is seen in the setting of diffuse soft tissue deficiency along the posterior plantar aspect of the calcaneus.    Confluent edema adjacent to this region within the abductor hallucis and flexor digitorum brevis muscles and within the subjacent plantar subcutaneous fat. This may be related to abscess orphlegmon.    ASSESSMENT :     Osteomyelitis    Yes    COVID-19    Osteomyelitis    DM (diabetes mellitus)    Paranoid schizophrenia    HLD (hyperlipidemia)    PAD (peripheral artery disease)    HTN (hypertension)    No significant past surgical history        PLAN:  HPI:  76 yo F from [facility], wheelchair bound with medical history significant of HTN, Diabetes, Osteoarthritis, Osteoporosis, Peripheral arterial disease, Diabetic neuropathy, HLD, Schizophrenia comes in with worsening ulceration to b/l heels. She has been having ulcers for past couple months, has been progressive. She was treated with IV antbx at NH but did not get better. It was worsening with necrotic changes and increasing foul smelling discharge. She denies fever, abdominal pain, chest pain, urinary symptoms, fall, trauma. No h/o nausea, vomiting, diarrhea, cough, dyspnea, sick contacts, recent illness.  (05 Jan 2021 14:24)    PLAN:    # BILATERAL LE CHRONIC ULCER NOW PROGRESSIVELY WORSENING W/ SSTI AND RIGHT CALCANEAL OSTEOMYELITIS W/ POSSIBLE ABSCESS; UNDERWENT BEDSIDE DEBRIDEMENT OF RIGHT LEG ULCER - PLACED ON VANCOMYCIN + CEFEPIME [HOLD VANC B/C OF ELEVATED TROUGH], F/U TIMUR/PVR, BCX - NGTD, WOUND CX - KOBE NORMAN & P. MIRABILIS, ID CONSULT IN PROGRESS, PODIATRY CONSULT IN PROGRESS  - REVIEWED RIGHT LE MRI  - CURRENTLY PATIENT IS REFUSING SURGICAL INTERVENTION, DESPITE EXTENSIVE DISCUSSION REGARDING MORTALITY RISK - PSYCHIATRY CONSULT FOR CAPACITY IN PROGRESS - DEEM PATIENT AS HAVING CAPACITY. ON REPEAT CONVERSATION TODAY, PATIENT IS AGREEABLE TODAY 1/09  - 2 PC CONSENT FOR SURGICAL DEBRIDEMENT PLANNED FOR 1/11  # MEDICAL CLEARANCE FOR SURGERY - RCRI 1 POINT - 6% RISK OF DEATH, MI OR CARDIAC ARREST; CARDIOLOGY CONSULT IN PROGRESS FOR MEDICAL CLEARANCE  # SACRAL AND RIGHT ISCHIAL UNSTAGEABLE ULCERS W/ SSTI  S/P DEBRIDEMENT 1/8- ON VANCOMYCIN AND CEFEPIME [HOLD VANC GIVEN ELEVATED TROUGH], SURGERY CONSULT IN PROGRESS - PATIENT IS AGREEABLE FOR DEBRIDEMENT  -  PSYCHIATRY CONSULT FOR CAPACITY IN PROGRESS  # LEFT ARM SWELLING - F/U LEFT ARM U/S AND PLACE WARM COMPRESSES  # ASHLEY - HOLDING VANCOMYCIN, MONITOR - F/U UA, BLADDER SCAN  # PAD  # HTN  # DM W/ DIABETIC NEUROPATHY  # OA/OP  # SCHIZOPHRENIA  # GI AND DVT PPX    ELEN CASILLAS MD COVERING FARHAN CASILLAS MD.

## 2021-01-11 NOTE — PROGRESS NOTE ADULT - ASSESSMENT
75y.o. Female s/p debridement of sacral decub ulcer POD#3    -Santyl to wound base daily  -Cover with dry gauze  -Offloading as scheduled  -Good hygiene practice  -No further surgical intervention indicated at present time  -Reconsult prn

## 2021-01-11 NOTE — CHART NOTE - NSCHARTNOTEFT_GEN_A_CORE
Surgery was cancelled this afternoon due to inability to consent herself with the anesthesia team.  Psych had previously deemed her able to consent herself however at the time of surgery she was not as oriented as her normal baseline status and the decision was made to cancel the surgery after trying to contact her brother without success.  Will continue to closely monitor

## 2021-01-11 NOTE — PROGRESS NOTE ADULT - ATTENDING COMMENTS
Discussed planned procedure.  Discussed risks and benefits.  May still need more proximal amputation if current wound does not heal.

## 2021-01-11 NOTE — SWALLOW BEDSIDE ASSESSMENT ADULT - ORAL PREPARATORY PHASE
Lateral loss of bolus/Bolus falls into anterior sulcus/Bolus falls into left lateral sulci/Bolus falls into right lateral sulci Lateral loss of bolus/Decreased mastication ability/Bolus falls into anterior sulcus/Bolus falls into left lateral sulci/Bolus falls into right lateral sulci

## 2021-01-11 NOTE — PROGRESS NOTE ADULT - SUBJECTIVE AND OBJECTIVE BOX
Patient denies chest pain or shortness of breath.   Review of systems otherwise (-)  	  acetaminophen   Tablet .. 650 milliGRAM(s) Oral every 6 hours  aspirin enteric coated 81 milliGRAM(s) Oral daily  cefepime   IVPB      cefepime   IVPB 1000 milliGRAM(s) IV Intermittent every 8 hours  Dakins Solution - Full Strength 1 Application(s) Topical once  dextrose 40% Gel 15 Gram(s) Oral once  dextrose 5% + sodium chloride 0.45%. 1000 milliLiter(s) IV Continuous <Continuous>  dextrose 5%. 1000 milliLiter(s) IV Continuous <Continuous>  dextrose 5%. 1000 milliLiter(s) IV Continuous <Continuous>  dextrose 50% Injectable 25 Gram(s) IV Push once  dextrose 50% Injectable 12.5 Gram(s) IV Push once  dextrose 50% Injectable 25 Gram(s) IV Push once  enoxaparin Injectable 30 milliGRAM(s) SubCutaneous daily  glucagon  Injectable 1 milliGRAM(s) IntraMuscular once  insulin lispro (ADMELOG) corrective regimen sliding scale   SubCutaneous three times a day before meals  insulin lispro (ADMELOG) corrective regimen sliding scale   SubCutaneous at bedtime  metroNIDAZOLE  IVPB      metroNIDAZOLE  IVPB 500 milliGRAM(s) IV Intermittent every 8 hours  perphenazine 16 milliGRAM(s) Oral two times a day  potassium phosphate IVPB 15 milliMole(s) IV Intermittent once  simvastatin 40 milliGRAM(s) Oral at bedtime                            9.0    18.79 )-----------( 444      ( 11 Jan 2021 06:00 )             27.6       Hemoglobin: 9.0 g/dL (01-11 @ 06:00)  Hemoglobin: 8.2 g/dL (01-10 @ 06:22)  Hemoglobin: 9.4 g/dL (01-09 @ 07:20)  Hemoglobin: 9.1 g/dL (01-08 @ 06:58)  Hemoglobin: 8.7 g/dL (01-07 @ 08:05)      01-11    143  |  114<H>  |  27<H>  ----------------------------<  234<H>  3.7   |  19<L>  |  0.93    Ca    8.1<L>      11 Jan 2021 06:00  Phos  1.6     01-11  Mg     2.1     01-11    TPro  5.8<L>  /  Alb  1.8<L>  /  TBili  0.5  /  DBili  x   /  AST  15  /  ALT  8<L>  /  AlkPhos  60  01-11    Creatinine Trend: 0.93<--, 1.06<--, 1.44<--, 1.05<--, 1.44<--, 1.43<--    COAGS:           T(C): 36.8 (01-11-21 @ 10:43), Max: 37.2 (01-11-21 @ 05:11)  HR: 84 (01-11-21 @ 10:43) (80 - 94)  BP: 115/43 (01-11-21 @ 10:43) (115/43 - 137/79)  RR: 18 (01-11-21 @ 10:43) (16 - 18)  SpO2: 99% (01-11-21 @ 10:43) (99% - 100%)  Wt(kg): --    I&O's Summary      HEENT:  (-)icterus (-)pallor  CV: N S1 S2 1/6 ANGELIC (+)2 Pulses B/l  Resp:  Clear to ausculatation B/L, normal effort  GI: (+) BS Soft, NT, ND  Lymph:  (-)Edema, (-)obvious lymphadenopathy  Skin: Warm to touch, Normal turgor  Psych: Appropriate mood and affect      ASSESSMENT/PLAN: 	75y  Female  wheelchair bound with medical history significant of HTN, Diabetes, Osteoarthritis, Osteoporosis, Peripheral arterial disease, Diabetic neuropathy, HLD, Schizophrenia historically preserved LV and R function, comes in with worsening ulceration to b/l heels preop debridement.    - tolerated debridement   - no clinical CHF  - Abx per primary team    Jm Cortes MD, [facility]  BEEPER [phone]

## 2021-01-11 NOTE — SWALLOW BEDSIDE ASSESSMENT ADULT - ORAL PHASE
Decreased anterior-posterior movement of the bolus/Delayed oral transit time/Stasis in anterior sulcus/Stasis in lateral sulci Decreased anterior-posterior movement of the bolus/Delayed oral transit time

## 2021-01-11 NOTE — PROGRESS NOTE ADULT - SUBJECTIVE AND OBJECTIVE BOX
Surgery    Subjective:  Pt resting comfortably. No overnight events.    T(C): 37.2 (01-11-21 @ 05:11), Max: 37.2 (01-11-21 @ 05:11)  HR: 94 (01-11-21 @ 05:11) (80 - 94)  BP: 137/79 (01-11-21 @ 05:11) (117/50 - 137/79)  RR: 17 (01-11-21 @ 05:11) (16 - 17)  SpO2: 100% (01-11-21 @ 05:11) (99% - 100%)    Physical:  Gen: A&O x1  Back: Fibrinous slough at wound base of sacral decub ulcer.

## 2021-01-11 NOTE — PROGRESS NOTE ADULT - SUBJECTIVE AND OBJECTIVE BOX
Patient is a 75y old  Female who presents with a chief complaint of foot ulcer (05 Jan 2021 14:24)      HPI:  74 yo F from [facility], wheelchair bound with medical history significant of HTN, Diabetes, Osteoarthritis, Osteoporosis, Peripheral arterial disease, Diabetic neuropathy, HLD, Schizophrenia comes in with worsening ulceration to b/l heels. She has been having ulcers for past couple months, has been progressive. She was treated with IV antbx at NH but did not get better. It was worsening with necrotic changes and increasing foul smelling discharge. She denies fever, abdominal pain, chest pain, urinary symptoms, fall, trauma. No h/o nausea, vomiting, diarrhea, cough, dyspnea, sick contacts, recent illness.  (05 Jan 2021 14:24)      Podiatry HPI: 76 y/o female with full history as noted above, podiatry consulted for b/l heel wounds. Pt is from [facility], wheelchair bound with medical history significant of HTN, Diabetes, Osteoarthritis, Osteoporosis, PAD, Diabetic neuropathy, HLD, Schizophrenia comes in with worsening ulceration to b/l heels. Patient seen resting in bed comfortably, AAOx3. Patient states she has had the wounds for a long time, maybe more than 6 months. She relates receiving local wound care previously in the NH using santyl dressings. She endorses occasional pain to wound sites. Patient was  under podiatric care in previous admissions for b/l heel wounds with osteomyelitis. She states she is not interesting in surgery for her feet, she wishes to continue with local wound care. She denies nausea, vomiting, chills, fever, SOB.     Podiatry Progress: Full history as noted above, podiatry f/u for b/l heel wounds. Patient seen resting in bed comfortably, asleep. Patient denies nausea, vomiting, chills, fever, SOB. Patient states that she is aware that surgery is scheduled for today, 1/11/21, and that she is still consenting to it.       Medications acetaminophen   Tablet .. 650 milliGRAM(s) Oral every 6 hours  aspirin enteric coated 81 milliGRAM(s) Oral daily  cefepime   IVPB      cefepime   IVPB 1000 milliGRAM(s) IV Intermittent every 8 hours  Dakins Solution - Full Strength 1 Application(s) Topical once  dextrose 40% Gel 15 Gram(s) Oral once  dextrose 5%. 1000 milliLiter(s) IV Continuous <Continuous>  dextrose 5%. 1000 milliLiter(s) IV Continuous <Continuous>  dextrose 50% Injectable 25 Gram(s) IV Push once  dextrose 50% Injectable 12.5 Gram(s) IV Push once  dextrose 50% Injectable 25 Gram(s) IV Push once  enoxaparin Injectable 30 milliGRAM(s) SubCutaneous daily  glucagon  Injectable 1 milliGRAM(s) IntraMuscular once  insulin lispro (ADMELOG) corrective regimen sliding scale   SubCutaneous three times a day before meals  insulin lispro (ADMELOG) corrective regimen sliding scale   SubCutaneous at bedtime  metroNIDAZOLE  IVPB      metroNIDAZOLE  IVPB 500 milliGRAM(s) IV Intermittent every 8 hours  perphenazine 16 milliGRAM(s) Oral two times a day  simvastatin 40 milliGRAM(s) Oral at bedtime  sodium chloride 0.9%. 1000 milliLiter(s) IV Continuous <Continuous>    FHNo pertinent family history in first degree relatives    ,   PMHCOVID-19    Osteomyelitis    DM (diabetes mellitus)    Paranoid schizophrenia    HLD (hyperlipidemia)    PAD (peripheral artery disease)    HTN (hypertension)       PSHNo significant past surgical history        Labs                          8.2    19.14 )-----------( 405      ( 10 Ender 2021 06:22 )             25.4      01-10    144  |  114<H>  |  30<H>  ----------------------------<  147<H>  3.7   |  18<L>  |  1.06    Ca    8.0<L>      10 Ender 2021 06:22  Phos  2.5     01-10  Mg     2.2     01-10       Vital Signs Last 24 Hrs  T(C): 36.9 (10 Ender 2021 05:04), Max: 37 (09 Jan 2021 14:45)  T(F): 98.4 (10 Ender 2021 05:04), Max: 98.6 (09 Jan 2021 14:45)  HR: 82 (10 Ender 2021 05:04) (82 - 102)  BP: 132/48 (10 Ender 2021 05:04) (120/47 - 135/65)  BP(mean): --  RR: 16 (10 Ender 2021 05:04) (16 - 16)  SpO2: 100% (10 Ender 2021 05:04) (100% - 100%)  Sedimentation Rate, Erythrocyte: 99 mm/Hr (01-05-21 @ 11:45)         C-Reactive Protein, Serum: 5.63 mg/dL (01-06-21 @ 10:27)   WBC Count: 19.14 K/uL <H> (01-10-21 @ 06:22)      PHYSICAL EXAM: Dressings left intact pre op, physical exam as per 1/10/21  LE Focused:    Vasc:  DP/PT nonpalpable, monophasic on doppler b/l, CFT brisk to digits, TG warm to warm b/l,   Derm:   Wound 1: L heel closed necrotic eschar measuring ~4x3.5x0.1 cm with mild mac wound erythema. No tunneling or undermining noted. No discharge from the area. No malodor or PTB noted. Stable in appearance  Wound 2: R heel wound s/p bedside debridement performed 1/5/21, now measuring ~ 9x6.5x0.3 cm, fibrous, necrotic base, +PTB. No active drainage. +Malodor.     Neuro: protective sensation grossly diminished at level of digits b/l  MSK: no tenderness on palpation of periwound areas b/l     IMAGING:  < from: MR Foot No Cont, Right (01.06.21 @ 11:08) >    EXAM:  MR FOOT RT                            PROCEDURE DATE:  01/06/2021          INTERPRETATION:  Clinical indications: Right heel ulceration and eschar status post debridement. Concern for osteomyelitis.    Multiplanar multisequence MRI of the right foot was performed localized to the hindfoot.    Correlation is made with prior MRI from September 11, 2019.    FINDINGS:    There is a large wound along the plantar aspect of the calcaneus posteriorly which extends nearly extends to bone. There is surrounding soft tissue edema about this site with evidence of an overlying bandage. The degree of soft tissue deficiency is increased compared to the prior examination. There is extensive osseous edema and corresponding hypointense T1 marrow signalthroughout the calcaneus extending from the posterior plantar margin to the level of the angle of Gissane. This is progressed compared to the prior examination and consistent with osteomyelitis. Marrow signal within the remainder of the foot is otherwise preserved.    There is confluent edema tracking along the abductor hallucis and flexor digitorum brevis muscles and within the plantar subcutaneous fat subjacent to this region. In total this area measures approximately 2.2 cm in transverse dimension, approximately 4 cm in anteroposterior posterior dimension, and approximately 1.6 cm in craniocaudal dimension. Findings may be related to underlying phlegmon or abscess. Evaluation is limited by the lack of intravenous contrast. Distal to this site there is edema throughout the visualized musculature consistent with myositis.    Visualized tendinous structures remain intact. There is no acute ligamentous injury. Visualized joint spaces are preserved without joint effusion.    IMPRESSION:    Osteomyelitis involving the calcaneus which extends from the plantar posterior aspect of the calcaneus to the level of the angle of Gissane. This is progressed compared to the prior examination. This is seen in the setting of diffuse soft tissue deficiency along the posterior plantar aspect of the calcaneus.    Confluent edema adjacent to this region within the abductor hallucis and flexor digitorum brevis muscles and within the subjacent plantar subcutaneous fat. This may be related to abscess orphlegmon.            OSWALDO ALFRED MD; Attending Radiologist  This document has been electronically signed. Jan 6 2021 11:49AM    < end of copied text >    -------------------------------------------------------------------------------------------------------------  < from: [facility] Physiol Extremity Lower 3+ Level, BI (01.05.21 @ 17:55) >    EXAM:  US PHYSIOL LWR EXT 3+ LEV BI                            PROCEDURE DATE:  01/05/2021          INTERPRETATION:  Clinical information: History of diabetes, nonsmoker, hypertension, hyperlipidemia, presents with bilateral heel ulcers.    Comparison: Lower extremity arterial Doppler study dated 5/13/2020.    Technique: Lower extremity arterial Doppler study. Note that pressure measurements were not obtained at the thigh level.    Findings: Ankle brachial index measures 1.33 bilaterally, compared with prior values of 1.41 on the right and 1.36 on the left. No segmental arterial pressure gradient is present.    PVR waveforms are normal in amplitude and pulsatility from the thigh through the ankle level. They're diminished in amplitude at the metatarsal and digital levels in symmetric fashion, similar to the prior exam.    Impression: No Doppler evidence of hemodynamically significant arterial inflow abnormality in either lower extremity.    Evidence of small vessel disease in the feet.    No significant interval change since study of 5/13/2020.            SOHAN PA MD; Attending Radiologist  This document has been electronically signed. Jan 6 2021  9:13AM    < end of copied text >      --------------------------------------------------------------------------------------------------------------  < from: Xray Foot AP + Lateral + Oblique, Right (01.05.21 @ 18:00) >    EXAM:  FOOT RIGHT (MINIMUM 3 VIEWS)                            PROCEDURE DATE:  01/05/2021          INTERPRETATION:  Right foot    HISTORY: Status post bedside debridement.     Two views of the right foot show thinning of soft tissues near the calcaneus likely indicating site of debridement. The joint spaces are maintained. There is questionable exposure of the plantar surface of the calcaneus.    IMPRESSION: Calcaneal soft tissues and questionable exposure as noted. Clinical correlation is suggested.        Thank you for this referral.            REID CRAMER MD; Attending Interventional Radiologist  This document has been electronically signed. Jan 6 2021 11:10AM    < end of copied text >    -------------------------------------------------------------------------------------------  a< from: Xray Ankle Complete 3 Views, Bilateral (01.05.21 @ 14:37) >    EXAM:  ANKLE BILATERAL (MINIMUM 3 V)                            PROCEDURE DATE:  01/05/2021          INTERPRETATION:  CLINICAL INDICATION:  Osteomyelitis; evaluate for soft tissue emphysema.    COMPARISON:  Left ankle radiography 5/11/2020.    TECHNIQUE:  AP, oblique and crosstable lateral views left ankle. Oblique and crosstable lateral views right ankle. Technologist noted that the best possible films were obtained.    FINDINGS:    Right ankle: Generalized osteopenia. Subcutaneous emphysema is identified along the plantar aspect of the right hindfoot inferior to the calcaneus, with an overlying skin defect noted along the posterior aspect of the calcaneus. Osteolysis involving the inferior/posterior aspect of the right calcaneus cannot beexcluded. MRI evaluation can be performed for further assessment. Extensive vascular calcification is noted. No ankle joint effusion is noted.    Left ankle: Generalized osteopenia. There is no evidence for acute fracture or dislocation. The ankle mortise appears grossly intact. No ankle joint effusion is noted. Extensive vascular calcifications identified. No significant soft tissue swelling.      IMPRESSION:  No evidence for acute fracture. Subcutaneous emphysema is identified along the plantaraspect of the right hindfoot inferior to the calcaneus, with an overlying skin defect noted along the posterior aspect of the calcaneus. Osteolysis involving the inferior/posterior aspect of the right calcaneus cannot be excluded. MRI evaluation can be performed for further assessment.                MARA LARKIN MD; Attending Radiologist  This document has been electronically signed. Jan 5 2021  3:57PM    < end of copied text >        CULTURES:   cCulture - Surgical Swab (01.05.21 @ 22:13)   Specimen Source: .Surgical Swab Right heel wound   Culture Results:   Few Morganella morganii   Few Proteus mirabilis       Historical Values  Culture - Surgical Swab (01.05.21 @ 22:13)   Specimen Source: .Surgical Swab Right heel wound   Culture Results:   Few Morganella morganii   Few Proteus mirabilis

## 2021-01-11 NOTE — PROGRESS NOTE ADULT - ASSESSMENT
A:  Osteomyelitis of calcaneus R foot   DMII    P:   Patient evaluated and chart reviewed  Patient has been NPO since midnight.   Patient aware of surgical plan for today, able to verbalize and repeat back risks and benefits of surgery.  Discussed with Dr. Llanes concerning patient's mental status, patient has been seen by psych and deemed to be with capacity.   Medical clearance appreciated   Appreciate psych consult  Patient to be taken to surgery today at 2:00 for debridement and partial calcanectomy  Podiatry will follow while in house.  Patient discussed with attending Dr. Watkins

## 2021-01-12 ENCOUNTER — TRANSCRIPTION ENCOUNTER (OUTPATIENT)
Age: 76
End: 2021-01-12

## 2021-01-12 LAB
ANION GAP SERPL CALC-SCNC: 11 MMOL/L — SIGNIFICANT CHANGE UP (ref 5–17)
BUN SERPL-MCNC: 24 MG/DL — HIGH (ref 7–18)
CALCIUM SERPL-MCNC: 8.2 MG/DL — LOW (ref 8.4–10.5)
CHLORIDE SERPL-SCNC: 119 MMOL/L — HIGH (ref 96–108)
CO2 SERPL-SCNC: 18 MMOL/L — LOW (ref 22–31)
CREAT SERPL-MCNC: 0.76 MG/DL — SIGNIFICANT CHANGE UP (ref 0.5–1.3)
GLUCOSE BLDC GLUCOMTR-MCNC: 117 MG/DL — HIGH (ref 70–99)
GLUCOSE BLDC GLUCOMTR-MCNC: 134 MG/DL — HIGH (ref 70–99)
GLUCOSE BLDC GLUCOMTR-MCNC: 136 MG/DL — HIGH (ref 70–99)
GLUCOSE BLDC GLUCOMTR-MCNC: 146 MG/DL — HIGH (ref 70–99)
GLUCOSE SERPL-MCNC: 126 MG/DL — HIGH (ref 70–99)
HCT VFR BLD CALC: 28.4 % — LOW (ref 34.5–45)
HGB BLD-MCNC: 9 G/DL — LOW (ref 11.5–15.5)
MAGNESIUM SERPL-MCNC: 1.9 MG/DL — SIGNIFICANT CHANGE UP (ref 1.6–2.6)
MCHC RBC-ENTMCNC: 29.3 PG — SIGNIFICANT CHANGE UP (ref 27–34)
MCHC RBC-ENTMCNC: 31.7 GM/DL — LOW (ref 32–36)
MCV RBC AUTO: 92.5 FL — SIGNIFICANT CHANGE UP (ref 80–100)
NRBC # BLD: 0 /100 WBCS — SIGNIFICANT CHANGE UP (ref 0–0)
PHOSPHATE SERPL-MCNC: 1.7 MG/DL — LOW (ref 2.5–4.5)
PLATELET # BLD AUTO: 419 K/UL — HIGH (ref 150–400)
POTASSIUM SERPL-MCNC: 3.5 MMOL/L — SIGNIFICANT CHANGE UP (ref 3.5–5.3)
POTASSIUM SERPL-SCNC: 3.5 MMOL/L — SIGNIFICANT CHANGE UP (ref 3.5–5.3)
RBC # BLD: 3.07 M/UL — LOW (ref 3.8–5.2)
RBC # FLD: 13.3 % — SIGNIFICANT CHANGE UP (ref 10.3–14.5)
SARS-COV-2 RNA SPEC QL NAA+PROBE: SIGNIFICANT CHANGE UP
SODIUM SERPL-SCNC: 148 MMOL/L — HIGH (ref 135–145)
WBC # BLD: 17.93 K/UL — HIGH (ref 3.8–10.5)
WBC # FLD AUTO: 17.93 K/UL — HIGH (ref 3.8–10.5)

## 2021-01-12 PROCEDURE — 99232 SBSQ HOSP IP/OBS MODERATE 35: CPT

## 2021-01-12 RX ORDER — POTASSIUM PHOSPHATE, MONOBASIC POTASSIUM PHOSPHATE, DIBASIC 236; 224 MG/ML; MG/ML
15 INJECTION, SOLUTION INTRAVENOUS ONCE
Refills: 0 | Status: COMPLETED | OUTPATIENT
Start: 2021-01-12 | End: 2021-01-12

## 2021-01-12 RX ADMIN — ENOXAPARIN SODIUM 30 MILLIGRAM(S): 100 INJECTION SUBCUTANEOUS at 12:00

## 2021-01-12 RX ADMIN — CEFEPIME 100 MILLIGRAM(S): 1 INJECTION, POWDER, FOR SOLUTION INTRAMUSCULAR; INTRAVENOUS at 06:08

## 2021-01-12 RX ADMIN — Medication 100 MILLIGRAM(S): at 06:07

## 2021-01-12 RX ADMIN — POTASSIUM PHOSPHATE, MONOBASIC POTASSIUM PHOSPHATE, DIBASIC 62.5 MILLIMOLE(S): 236; 224 INJECTION, SOLUTION INTRAVENOUS at 12:10

## 2021-01-12 RX ADMIN — Medication 100 MILLIGRAM(S): at 12:12

## 2021-01-12 RX ADMIN — CEFEPIME 100 MILLIGRAM(S): 1 INJECTION, POWDER, FOR SOLUTION INTRAMUSCULAR; INTRAVENOUS at 12:11

## 2021-01-12 NOTE — PROGRESS NOTE BEHAVIORAL HEALTH - NSBHCONSULTRECOMMENDOTHER_PSY_A_CORE FT
1. In multiple recent encounters with this writer and other providers, pt did have capacity to consent to debridement of her heel ulcers, and stated that she consents to debridement  2. Consent currently unobtainable due to pt somnolence and brother cannot be reached for surrogate consent. We agree with plan of primary team [facility], given previously expressed pt wishes and favorable risk-benefit tradeoff of procedure  3. While pt home psychiatric medication (perphenazine 16 mg BID) is unlikely to be cause of pt somnolence, given that she has not received it since 1/9, recommend DC'ing to give pt's alertness an opportunity to improve  4. No indication for other standing or PRN psychotropic medications at this time  5. Medical management as directed by primary team and podiatry  6. Psychiatry is signing off. Reconsult if additional issues arise as inpatient  7. Case d/w MARIAELENA Ga of primary team    Norma Lackey MD  Director, Consultation-Liaison Psychiatry Service  [id number]

## 2021-01-12 NOTE — PROGRESS NOTE ADULT - PROBLEM SELECTOR PLAN 1
MRI noted above  - c/w Cefepime day 8  - c/w metronidazole day 7  -IR planned for extended dwell for 1/13 in am. Must call in AM to follow up with IR for placement before OR procedure, Dr. Hopper approved.  -Wound culture grew few Morganella morganii and few Proteus mirabilis  -OR 1/12, 2PC consent  -ID Dr. Wellington  -Podiatry following  -Vascular Following

## 2021-01-12 NOTE — PROGRESS NOTE ADULT - ASSESSMENT
A:  Osteomyelitis of calcaneus R foot   DMII    P:   Patient evaluated and chart reviewed  Plan for possible bedside debridement 1/13/21 with attending Dr. Watkins   Dressed patient's RLE with dakins soaked gauze, eleno, ABD pads and ACE bandages. dressed LLE with gauze, eleno, ABD pads and ACE bandages  Podiatry will follow while in house.   Patient seen and discussed with attending Dr. Lane

## 2021-01-12 NOTE — CONSULT NOTE ADULT - ASSESSMENT
75F wheelchair bound with pmhx PAD a/w OM and b/l heel eschars  TIMUR/PVRs reviewed, Imaging reviewed, labs reviewed    - Recommend source control with right heel debridement/washout as per podiatry  - Pt will eventually need AKA given pt is wheelchair bound and vasculopath  - Continue care as per primary team  - Case and plan discussed with Dr. Lucero

## 2021-01-12 NOTE — PROGRESS NOTE ADULT - SUBJECTIVE AND OBJECTIVE BOX
Patient is a 75y old  Female who presents with a chief complaint of foot ulcer (12 Jan 2021 20:09)    PATIENT IS SEEN AND EXAMINED IN MEDICAL FLOOR.    ALLERGIES:  No Known Allergies    VITALS:    Vital Signs Last 24 Hrs  T(C): 36.4 (12 Jan 2021 20:04), Max: 36.5 (12 Jan 2021 05:25)  T(F): 97.5 (12 Jan 2021 20:04), Max: 97.7 (12 Jan 2021 05:25)  HR: 85 (12 Jan 2021 20:04) (83 - 91)  BP: 140/55 (12 Jan 2021 20:04) (137/56 - 140/55)  BP(mean): --  RR: 16 (12 Jan 2021 20:04) (16 - 18)  SpO2: 100% (12 Jan 2021 20:04) (95% - 100%)    LABS:    CBC Full  -  ( 12 Jan 2021 07:34 )  WBC Count : 17.93 K/uL  RBC Count : 3.07 M/uL  Hemoglobin : 9.0 g/dL  Hematocrit : 28.4 %  Platelet Count - Automated : 419 K/uL  Mean Cell Volume : 92.5 fl  Mean Cell Hemoglobin : 29.3 pg  Mean Cell Hemoglobin Concentration : 31.7 gm/dL  Auto Neutrophil # : x  Auto Lymphocyte # : x  Auto Monocyte # : x  Auto Eosinophil # : x  Auto Basophil # : x  Auto Neutrophil % : x  Auto Lymphocyte % : x  Auto Monocyte % : x  Auto Eosinophil % : x  Auto Basophil % : x      01-12    148<H>  |  119<H>  |  24<H>  ----------------------------<  126<H>  3.5   |  18<L>  |  0.76    Ca    8.2<L>      12 Jan 2021 07:34  Phos  1.7     01-12  Mg     1.9     01-12    TPro  5.8<L>  /  Alb  1.8<L>  /  TBili  0.5  /  DBili  x   /  AST  15  /  ALT  8<L>  /  AlkPhos  60  01-11    CAPILLARY BLOOD GLUCOSE      POCT Blood Glucose.: 136 mg/dL (12 Jan 2021 16:26)  POCT Blood Glucose.: 146 mg/dL (12 Jan 2021 11:26)  POCT Blood Glucose.: 134 mg/dL (12 Jan 2021 07:54)  POCT Blood Glucose.: 91 mg/dL (11 Jan 2021 21:29)        LIVER FUNCTIONS - ( 11 Jan 2021 06:00 )  Alb: 1.8 g/dL / Pro: 5.8 g/dL / ALK PHOS: 60 U/L / ALT: 8 U/L DA / AST: 15 U/L / GGT: x           Creatinine Trend: 0.76<--, 0.93<--, 1.06<--, 1.44<--, 1.05<--, 1.44<--  I&O's Summary          .Urine Clean Catch (Midstream)  01-06 @ 06:46   No growth  --  --      .Surgical Swab Right heel wound  01-05 @ 22:13   Few Morganella morganii  Few Proteus mirabilis  Moderate Corynebacterium species "Susceptibilities not performed"  Moderate Bacteroides vulgatus "Susceptibilities not performed"  --  Morganella morganii  Proteus mirabilis      .Blood Blood-Peripheral  01-05 @ 17:59   No Growth Final  --  --    MEDICATIONS:    MEDICATIONS  (STANDING):  acetaminophen   Tablet .. 650 milliGRAM(s) Oral every 6 hours  cefepime   IVPB 1000 milliGRAM(s) IV Intermittent every 8 hours  Dakins Solution - Full Strength 1 Application(s) Topical once  dextrose 40% Gel 15 Gram(s) Oral once  dextrose 5% + sodium chloride 0.45%. 1000 milliLiter(s) (80 mL/Hr) IV Continuous <Continuous>  dextrose 5%. 1000 milliLiter(s) (50 mL/Hr) IV Continuous <Continuous>  dextrose 5%. 1000 milliLiter(s) (100 mL/Hr) IV Continuous <Continuous>  dextrose 50% Injectable 25 Gram(s) IV Push once  dextrose 50% Injectable 12.5 Gram(s) IV Push once  dextrose 50% Injectable 25 Gram(s) IV Push once  glucagon  Injectable 1 milliGRAM(s) IntraMuscular once  insulin lispro (ADMELOG) corrective regimen sliding scale   SubCutaneous three times a day before meals  insulin lispro (ADMELOG) corrective regimen sliding scale   SubCutaneous at bedtime  metroNIDAZOLE  IVPB 500 milliGRAM(s) IV Intermittent every 8 hours  simvastatin 40 milliGRAM(s) Oral at bedtime  sodium chloride 0.9%. 1000 milliLiter(s) (75 mL/Hr) IV Continuous <Continuous>  sodium chloride 0.9%. 1000 milliLiter(s) (80 mL/Hr) IV Continuous <Continuous>  sodium chloride 0.9%. 1000 milliLiter(s) (80 mL/Hr) IV Continuous <Continuous>      MEDICATIONS  (PRN):      REVIEW OF SYSTEMS:                           ALL ROS DONE [ X   ]    CONSTITUTIONAL:  LETHARGIC [   ], FEVER [   ], UNRESPONSIVE [   ]  CVS:  CP  [   ], SOB, [   ], PALPITATIONS [   ], DIZZYNESS [   ]  RS: COUGH [   ], SPUTUM [   ]  GI: ABDOMINAL PAIN [   ], NAUSEA [   ], VOMITINGS [   ], DIARRHEA [   ], CONSTIPATION [   ]  :  DYSURIA [   ], NOCTURIA [   ], INCREASED FREQUENCY [   ], DRIBLING [   ],  SKELETAL: PAINFUL JOINTS [   ], SWOLLEN JOINTS [   ], NECK ACHE [   ], LOW BACK ACHE [   ],  SKIN : ULCERS [   ], RASH [   ], ITCHING [   ]  CNS: HEAD ACHE [   ], DOUBLE VISION [   ], BLURRED VISION [   ], AMS / CONFUSION [   ], SEIZURES [   ], WEAKNESS [   ],TINGLING / NUMBNESS [   ]    PHYSICAL EXAMINATION:  GENERAL APPEARANCE: NO DISTRESS  HEENT:  NO PALLOR, NO  JVD,  NO   NODES, NECK SUPPLE  CVS: S1 +, S2 +,   RS: AEEB,  OCCASIONAL  RALES +,   NO RONCHI  ABD: SOFT, NT, NO, BS +  EXT: NO PE  SKIN: WARM, BILATERAL HEEL ULCERS - COVERED, SACRAL ULCER + RIGHT ISCHIAL ULCER COVERED  SKELETAL:  ROM ACCEPTABLE, LEFT ARM SWELLING  CNS:  AAO X 2-3     RADIOLOGY :    EXAM:  MR FOOT RT                            PROCEDURE DATE:  01/06/2021          INTERPRETATION:  Clinical indications: Right heel ulceration and eschar status post debridement. Concern for osteomyelitis.    Multiplanar multisequence MRI of the right foot was performed localized to the hindfoot.    Correlation is made with prior MRI from September 11, 2019.    FINDINGS:    There is a large wound along the plantar aspect of the calcaneus posteriorly which extends nearly extends to bone. There is surrounding soft tissue edema about this site with evidence of an overlying bandage. The degree of soft tissue deficiency is increased compared to the prior examination. There is extensive osseous edema and corresponding hypointense T1 marrow signalthroughout the calcaneus extending from the posterior plantar margin to the level of the angle of Gissane. This is progressed compared to the prior examination and consistent with osteomyelitis. Marrow signal within the remainder of the foot is otherwise preserved.    There is confluent edema tracking along the abductor hallucis and flexor digitorum brevis muscles and within the plantar subcutaneous fat subjacent to this region. In total this area measures approximately 2.2 cm in transverse dimension, approximately 4 cm in anteroposterior posterior dimension, and approximately 1.6 cm in craniocaudal dimension. Findings may be related to underlying phlegmon or abscess. Evaluation is limited by the lack of intravenous contrast. Distal to this site there is edema throughout the visualized musculature consistent with myositis.    Visualized tendinous structures remain intact. There is no acute ligamentous injury. Visualized joint spaces are preserved without joint effusion.    IMPRESSION:    Osteomyelitis involving the calcaneus which extends from the plantar posterior aspect of the calcaneus to the level of the angle of Gissane. This is progressed compared to the prior examination. This is seen in the setting of diffuse soft tissue deficiency along the posterior plantar aspect of the calcaneus.    Confluent edema adjacent to this region within the abductor hallucis and flexor digitorum brevis muscles and within the subjacent plantar subcutaneous fat. This may be related to abscess orphlegmon.    ASSESSMENT :     Osteomyelitis    Yes    COVID-19    Osteomyelitis    DM (diabetes mellitus)    Paranoid schizophrenia    HLD (hyperlipidemia)    PAD (peripheral artery disease)    HTN (hypertension)    No significant past surgical history        PLAN:  HPI:  76 yo F from [facility], wheelchair bound with medical history significant of HTN, Diabetes, Osteoarthritis, Osteoporosis, Peripheral arterial disease, Diabetic neuropathy, HLD, Schizophrenia comes in with worsening ulceration to b/l heels. She has been having ulcers for past couple months, has been progressive. She was treated with IV antbx at NH but did not get better. It was worsening with necrotic changes and increasing foul smelling discharge. She denies fever, abdominal pain, chest pain, urinary symptoms, fall, trauma. No h/o nausea, vomiting, diarrhea, cough, dyspnea, sick contacts, recent illness.  (05 Jan 2021 14:24)    PLAN:    # BILATERAL LE CHRONIC ULCER NOW PROGRESSIVELY WORSENING W/ SSTI AND RIGHT CALCANEAL OSTEOMYELITIS W/ POSSIBLE ABSCESS; UNDERWENT BEDSIDE DEBRIDEMENT OF RIGHT LEG ULCER - CEFEPIME [ VANC DISCONTINUED BY ID], REVIEWED TIMUR, BCX - NGTD, WOUND CX - MORGANELLA MORGANI & P. MIRABILIS, ID CONSULT IN PROGRESS, PODIATRY CONSULT IN PROGRESS  - PATIENT TO GO FOR SURGICAL DEBRIDEMENT FOR SOURCE CONTROL OF INFECTION; D/W PODIATRY TEAM  - REVIEWED RIGHT LE MRI  -  PSYCHIATRY CONSULT FOR CAPACITY IN PROGRESS - DEEM PATIENT AS HAVING CAPACITY.  PATIENT WAS AGREEABLE FOR SURGICAL DEBRIDEMENT ON 1/09  - 2 PC CONSENT FOR SURGICAL DEBRIDEMENT PLANNED FOR 1/12; ATTEMPTED TO CALL NEXT OF [facility] [place] @ [phone] HOWEVER PHONE NUMBER APPEARS DISCONNECTED  - VASCULAR SX CONSULT IN PROGRESS  - PATIENT TO OBTAIN EXTENDED DWELL VIA IR FOR ONGOING ANTIBIOTICS IN A.M.  # MEDICAL CLEARANCE FOR SURGERY - RCRI 1 POINT - 6% RISK OF DEATH, MI OR CARDIAC ARREST; CARDIOLOGY CONSULT IN PROGRESS FOR MEDICAL CLEARANCE  # SACRAL AND RIGHT ISCHIAL UNSTAGEABLE ULCERS W/ SSTI  S/P DEBRIDEMENT 1/8- ON VANCOMYCIN AND CEFEPIME [HOLD VANC GIVEN ELEVATED TROUGH], SURGERY CONSULT IN PROGRESS - PATIENT IS AGREEABLE FOR DEBRIDEMENT  -  PSYCHIATRY CONSULT FOR CAPACITY IN PROGRESS  # ACUTE METABOLIC ENCEPHALOPATHY - SUSPECT S/T ACUTE INFECTION   # LEFT ARM SWELLING - REVIEWED LEFT ARM U/S W/ OUT THROMBUS AND PLACE WARM COMPRESSES  # ASHLEY - HOLDING VANCOMYCIN, MONITOR - F/U UA, BLADDER SCAN  # PAD  # HTN  # DM W/ DIABETIC NEUROPATHY  # OA/OP  # SCHIZOPHRENIA - HOLD MEDICATION  # CASE D/W PATIENT'S ? PARTNER - LISA KELLY - WHO REPORTS HE IS NOT NEXT OF KIN OR HCP - [phone] ON 1/12; HE UNDERSTOOD PATIENT'S PROGNOSIS WAS GUARDED  # GI AND DVT PPX    ELEN CASILLAS MD COVERING FARHAN CASILLAS MD.

## 2021-01-12 NOTE — PROGRESS NOTE ADULT - SUBJECTIVE AND OBJECTIVE BOX
Patient is a 75y old  Female who presents with a chief complaint of foot ulcer (05 Jan 2021 14:24)      HPI:  76 yo F from [facility], wheelchair bound with medical history significant of HTN, Diabetes, Osteoarthritis, Osteoporosis, Peripheral arterial disease, Diabetic neuropathy, HLD, Schizophrenia comes in with worsening ulceration to b/l heels. She has been having ulcers for past couple months, has been progressive. She was treated with IV antbx at NH but did not get better. It was worsening with necrotic changes and increasing foul smelling discharge. She denies fever, abdominal pain, chest pain, urinary symptoms, fall, trauma. No h/o nausea, vomiting, diarrhea, cough, dyspnea, sick contacts, recent illness.  (05 Jan 2021 14:24)      Podiatry HPI: 76 y/o female with full history as noted above, podiatry consulted for b/l heel wounds. Pt is from [facility], wheelchair bound with medical history significant of HTN, Diabetes, Osteoarthritis, Osteoporosis, PAD, Diabetic neuropathy, HLD, Schizophrenia comes in with worsening ulceration to b/l heels. Patient seen resting in bed comfortably, AAOx3. Patient states she has had the wounds for a long time, maybe more than 6 months. She relates receiving local wound care previously in the NH using santyl dressings. She endorses occasional pain to wound sites. Patient was  under podiatric care in previous admissions for b/l heel wounds with osteomyelitis. She states she is not interesting in surgery for her feet, she wishes to continue with local wound care. She denies nausea, vomiting, chills, fever, SOB.     Podiatry Progress: Full history as noted above, podiatry f/u for b/l heel wounds. Patient seen resting in bed, difficult to rouse. Unable to obtain ROS. Limited history secondary to patient's somnolence       Medications acetaminophen   Tablet .. 650 milliGRAM(s) Oral every 6 hours  aspirin enteric coated 81 milliGRAM(s) Oral daily  cefepime   IVPB 1000 milliGRAM(s) IV Intermittent every 8 hours  Dakins Solution - Full Strength 1 Application(s) Topical once  dextrose 40% Gel 15 Gram(s) Oral once  dextrose 5% + sodium chloride 0.45%. 1000 milliLiter(s) IV Continuous <Continuous>  dextrose 5%. 1000 milliLiter(s) IV Continuous <Continuous>  dextrose 5%. 1000 milliLiter(s) IV Continuous <Continuous>  dextrose 50% Injectable 25 Gram(s) IV Push once  dextrose 50% Injectable 12.5 Gram(s) IV Push once  dextrose 50% Injectable 25 Gram(s) IV Push once  enoxaparin Injectable 30 milliGRAM(s) SubCutaneous daily  glucagon  Injectable 1 milliGRAM(s) IntraMuscular once  insulin lispro (ADMELOG) corrective regimen sliding scale   SubCutaneous three times a day before meals  insulin lispro (ADMELOG) corrective regimen sliding scale   SubCutaneous at bedtime  metroNIDAZOLE  IVPB 500 milliGRAM(s) IV Intermittent every 8 hours  perphenazine 16 milliGRAM(s) Oral two times a day  simvastatin 40 milliGRAM(s) Oral at bedtime  sodium chloride 0.9%. 1000 milliLiter(s) IV Continuous <Continuous>  sodium chloride 0.9%. 1000 milliLiter(s) IV Continuous <Continuous>  sodium chloride 0.9%. 1000 milliLiter(s) IV Continuous <Continuous>    FHNo pertinent family history in first degree relatives    ,   PMHCOVID-19    Osteomyelitis    DM (diabetes mellitus)    Paranoid schizophrenia    HLD (hyperlipidemia)    PAD (peripheral artery disease)    HTN (hypertension)       PSHNo significant past surgical history        Labs                          9.0    17.93 )-----------( 419      ( 12 Jan 2021 07:34 )             28.4      01-12    148<H>  |  119<H>  |  24<H>  ----------------------------<  126<H>  3.5   |  18<L>  |  0.76    Ca    8.2<L>      12 Jan 2021 07:34  Phos  1.7     01-12  Mg     1.9     01-12    TPro  5.8<L>  /  Alb  1.8<L>  /  TBili  0.5  /  DBili  x   /  AST  15  /  ALT  8<L>  /  AlkPhos  60  01-11     Vital Signs Last 24 Hrs  T(C): 36.5 (12 Jan 2021 05:25), Max: 37.1 (11 Jan 2021 14:00)  T(F): 97.7 (12 Jan 2021 05:25), Max: 98.7 (11 Jan 2021 14:00)  HR: 91 (12 Jan 2021 05:25) (84 - 93)  BP: 138/67 (12 Jan 2021 05:25) (115/43 - 138/67)  BP(mean): --  RR: 17 (12 Jan 2021 05:25) (17 - 18)  SpO2: 99% (12 Jan 2021 05:25) (99% - 100%)  Sedimentation Rate, Erythrocyte: 99 mm/Hr (01-05-21 @ 11:45)         C-Reactive Protein, Serum: 5.63 mg/dL (01-06-21 @ 10:27)   WBC Count: 17.93 K/uL <H> (01-12-21 @ 07:34)        PHYSICAL EXAM:   LE Focused:    Vasc:  DP/PT nonpalpable, monophasic and regular on doppler b/l, CFT brisk to digits, TG warm to warm b/l,   Derm:   Wound 1: L heel closed necrotic eschar measuring ~4x3.5x0.1 cm with mild mac wound erythema. No tunneling or undermining noted. No discharge from the area. No malodor or PTB noted. Stable in appearance  Wound 2: R heel wound s/p bedside debridement performed 1/5/21, now measuring ~ 9x6.5x0.3 cm, fibrous, necrotic base, +PTB. No active drainage. +Malodor.     Neuro: protective sensation absent at level of digits b/l  MSK: no tenderness on palpation of periwound areas b/l     IMAGING:  < from: MR Foot No Cont, Right (01.06.21 @ 11:08) >    EXAM:  MR FOOT RT                            PROCEDURE DATE:  01/06/2021          INTERPRETATION:  Clinical indications: Right heel ulceration and eschar status post debridement. Concern for osteomyelitis.    Multiplanar multisequence MRI of the right foot was performed localized to the hindfoot.    Correlation is made with prior MRI from September 11, 2019.    FINDINGS:    There is a large wound along the plantar aspect of the calcaneus posteriorly which extends nearly extends to bone. There is surrounding soft tissue edema about this site with evidence of an overlying bandage. The degree of soft tissue deficiency is increased compared to the prior examination. There is extensive osseous edema and corresponding hypointense T1 marrow signalthroughout the calcaneus extending from the posterior plantar margin to the level of the angle of Gissane. This is progressed compared to the prior examination and consistent with osteomyelitis. Marrow signal within the remainder of the foot is otherwise preserved.    There is confluent edema tracking along the abductor hallucis and flexor digitorum brevis muscles and within the plantar subcutaneous fat subjacent to this region. In total this area measures approximately 2.2 cm in transverse dimension, approximately 4 cm in anteroposterior posterior dimension, and approximately 1.6 cm in craniocaudal dimension. Findings may be related to underlying phlegmon or abscess. Evaluation is limited by the lack of intravenous contrast. Distal to this site there is edema throughout the visualized musculature consistent with myositis.    Visualized tendinous structures remain intact. There is no acute ligamentous injury. Visualized joint spaces are preserved without joint effusion.    IMPRESSION:    Osteomyelitis involving the calcaneus which extends from the plantar posterior aspect of the calcaneus to the level of the angle of Gissane. This is progressed compared to the prior examination. This is seen in the setting of diffuse soft tissue deficiency along the posterior plantar aspect of the calcaneus.    Confluent edema adjacent to this region within the abductor hallucis and flexor digitorum brevis muscles and within the subjacent plantar subcutaneous fat. This may be related to abscess orphlegmon.            OSWALDO ALFRED MD; Attending Radiologist  This document has been electronically signed. Jan 6 2021 11:49AM    < end of copied text >    -------------------------------------------------------------------------------------------------------------  < from: [facility] Physiol Extremity Lower 3+ Level, BI (01.05.21 @ 17:55) >    EXAM:  US PHYSIOL LWR EXT 3+ LEV BI                            PROCEDURE DATE:  01/05/2021          INTERPRETATION:  Clinical information: History of diabetes, nonsmoker, hypertension, hyperlipidemia, presents with bilateral heel ulcers.    Comparison: Lower extremity arterial Doppler study dated 5/13/2020.    Technique: Lower extremity arterial Doppler study. Note that pressure measurements were not obtained at the thigh level.    Findings: Ankle brachial index measures 1.33 bilaterally, compared with prior values of 1.41 on the right and 1.36 on the left. No segmental arterial pressure gradient is present.    PVR waveforms are normal in amplitude and pulsatility from the thigh through the ankle level. They're diminished in amplitude at the metatarsal and digital levels in symmetric fashion, similar to the prior exam.    Impression: No Doppler evidence of hemodynamically significant arterial inflow abnormality in either lower extremity.    Evidence of small vessel disease in the feet.    No significant interval change since study of 5/13/2020.            SOHAN PA MD; Attending Radiologist  This document has been electronically signed. Jan 6 2021  9:13AM    < end of copied text >      --------------------------------------------------------------------------------------------------------------  < from: Xray Foot AP + Lateral + Oblique, Right (01.05.21 @ 18:00) >    EXAM:  FOOT RIGHT (MINIMUM 3 VIEWS)                            PROCEDURE DATE:  01/05/2021          INTERPRETATION:  Right foot    HISTORY: Status post bedside debridement.     Two views of the right foot show thinning of soft tissues near the calcaneus likely indicating site of debridement. The joint spaces are maintained. There is questionable exposure of the plantar surface of the calcaneus.    IMPRESSION: Calcaneal soft tissues and questionable exposure as noted. Clinical correlation is suggested.        Thank you for this referral.            REID CRAMER MD; Attending Interventional Radiologist  This document has been electronically signed. Jan 6 2021 11:10AM    < end of copied text >    -------------------------------------------------------------------------------------------  a< from: Xray Ankle Complete 3 Views, Bilateral (01.05.21 @ 14:37) >    EXAM:  ANKLE BILATERAL (MINIMUM 3 V)                            PROCEDURE DATE:  01/05/2021          INTERPRETATION:  CLINICAL INDICATION:  Osteomyelitis; evaluate for soft tissue emphysema.    COMPARISON:  Left ankle radiography 5/11/2020.    TECHNIQUE:  AP, oblique and crosstable lateral views left ankle. Oblique and crosstable lateral views right ankle. Technologist noted that the best possible films were obtained.    FINDINGS:    Right ankle: Generalized osteopenia. Subcutaneous emphysema is identified along the plantar aspect of the right hindfoot inferior to the calcaneus, with an overlying skin defect noted along the posterior aspect of the calcaneus. Osteolysis involving the inferior/posterior aspect of the right calcaneus cannot beexcluded. MRI evaluation can be performed for further assessment. Extensive vascular calcification is noted. No ankle joint effusion is noted.    Left ankle: Generalized osteopenia. There is no evidence for acute fracture or dislocation. The ankle mortise appears grossly intact. No ankle joint effusion is noted. Extensive vascular calcifications identified. No significant soft tissue swelling.      IMPRESSION:  No evidence for acute fracture. Subcutaneous emphysema is identified along the plantaraspect of the right hindfoot inferior to the calcaneus, with an overlying skin defect noted along the posterior aspect of the calcaneus. Osteolysis involving the inferior/posterior aspect of the right calcaneus cannot be excluded. MRI evaluation can be performed for further assessment.                MARA LARKIN MD; Attending Radiologist  This document has been electronically signed. Jan 5 2021  3:57PM    < end of copied text >        CULTURES:   cCulture - Surgical Swab (01.05.21 @ 22:13)   Specimen Source: .Surgical Swab Right heel wound   Culture Results:   Few Morganella morganii   Few Proteus mirabilis       Historical Values  Culture - Surgical Swab (01.05.21 @ 22:13)   Specimen Source: .Surgical Swab Right heel wound   Culture Results:   Few Morganella morganii   Few Proteus mirabilis

## 2021-01-12 NOTE — PROGRESS NOTE BEHAVIORAL HEALTH - SUMMARY
75F, single and living in nursing home ([facility]), with PHx of schizophrenia and MHx of HTN, Diabetes, Osteoarthritis, Osteoporosis, Peripheral arterial disease, Diabetic neuropathy, and HLD, BIBEMS on 1/5 from NH for worsening BL heel ulcerations which have been chronic for the past few months, as well as chronic sacral decubitus ulcer. MRI showed progressive osteomyelitis of R foot and surgical debridement was recommended for both sacral and calcaneal ulcers, but pt initially stated she only wanted sacral debridement and refused it for her feet, despite being informed that the heel infection presented a more immediate risk to life and limb. Psych consult was requested for assessment of capacity to refuse heel debridement, but pt could not be interviewed on 1/7 due to somnolence. Pt was seen again today, s/p OR debridement of sacral ulcer. On exam on 1/8, pt presented pleasant, cooperative, linear and organized, denied SI/HI/AVH and has not manifested any s/s of psychosis, mike, depression or suicidality at the time of assessment or at any time during this admission. As of 1/8, while pt appeared to have obvious memory deficits c/w age-related dementia, she also demonstrated an adequate basic understanding of her medical condition (osteomyelitis), the recommended treatment and the possible risks/benefits of the treatment and therefore did appear to have capacity to make decisions about the proposed procedure. While pt initially refused debridement of her heel ulcers, on 1/8 and on several subsequent occasions, she stated that she wants to have the operation in order to prevent loss of limb or life, but she is currently too somnolent to provide written consent and her NOK (brother) cannot be reached. Given pt's previously demonstrated capacity to consent to this procedure, statements that she wants to have it, and medical urgency of the surgery, we agree with assessment of primary team and associate medical director that it is appropriate to proceed with [facility] physician consent. Pt does not appear to present an acute risk of harm to self or others at the time of assessment, and does not appear to be in need of admission to [facility] at the time of assessment. 75F, single and living in nursing home ([facility]), with PHx of schizophrenia and MHx of HTN, Diabetes, Osteoarthritis, Osteoporosis, Peripheral arterial disease, Diabetic neuropathy, and HLD, BIBEMS on 1/5 from NH for worsening BL heel ulcerations which have been chronic for the past few months, as well as chronic sacral decubitus ulcer. MRI showed progressive osteomyelitis of R foot and surgical debridement was recommended for both sacral and calcaneal ulcers, but pt initially stated she only wanted sacral debridement and refused it for her feet, despite being informed that the heel infection presented a more immediate risk to life and limb. Psych consult was requested for assessment of capacity to refuse heel debridement, but pt could not be interviewed on 1/7 due to somnolence. On exam on 1/8, pt presented pleasant, cooperative, linear and organized, denied SI/HI/AVH and has not manifested any s/s of psychosis, mike, depression or suicidality at the time of assessment or at any time during this admission. As of 1/8, while pt appeared to have obvious memory deficits c/w age-related dementia, she also demonstrated an adequate basic understanding of her medical condition (osteomyelitis), the recommended treatment and the possible risks/benefits of the treatment and therefore did appear to have capacity to make decisions about the proposed procedure. While pt initially refused debridement of her heel ulcers, on 1/8 and on several subsequent occasions, she stated that she wants to have the operation in order to prevent loss of limb or life, but she is currently too somnolent to provide written consent and her NOK (brother) cannot be reached. Given pt's previously demonstrated capacity to consent to this procedure, statements that she wants to have it, and medical urgency of the surgery, we agree with assessment of primary team and associate medical director that it is appropriate to proceed with [facility] physician consent. Pt does not appear to present an acute risk of harm to self or others at the time of assessment, and does not appear to be in need of admission to  psych at the time of assessment.

## 2021-01-12 NOTE — PROGRESS NOTE ADULT - SUBJECTIVE AND OBJECTIVE BOX
NP Note discussed with  Primary Attending    Patient is a 75y old  Female who presents with a chief complaint of foot ulcer (12 Jan 2021 15:08)      INTERVAL HPI/OVERNIGHT EVENTS: Patient seen and examined at bedside. Lethargic, non verbal, difficult to arouse.    MEDICATIONS  (STANDING):  acetaminophen   Tablet .. 650 milliGRAM(s) Oral every 6 hours  aspirin enteric coated 81 milliGRAM(s) Oral daily  cefepime   IVPB 1000 milliGRAM(s) IV Intermittent every 8 hours  Dakins Solution - Full Strength 1 Application(s) Topical once  dextrose 40% Gel 15 Gram(s) Oral once  dextrose 5% + sodium chloride 0.45%. 1000 milliLiter(s) (80 mL/Hr) IV Continuous <Continuous>  dextrose 5%. 1000 milliLiter(s) (50 mL/Hr) IV Continuous <Continuous>  dextrose 5%. 1000 milliLiter(s) (100 mL/Hr) IV Continuous <Continuous>  dextrose 50% Injectable 25 Gram(s) IV Push once  dextrose 50% Injectable 12.5 Gram(s) IV Push once  dextrose 50% Injectable 25 Gram(s) IV Push once  enoxaparin Injectable 30 milliGRAM(s) SubCutaneous daily  glucagon  Injectable 1 milliGRAM(s) IntraMuscular once  insulin lispro (ADMELOG) corrective regimen sliding scale   SubCutaneous three times a day before meals  insulin lispro (ADMELOG) corrective regimen sliding scale   SubCutaneous at bedtime  metroNIDAZOLE  IVPB 500 milliGRAM(s) IV Intermittent every 8 hours  simvastatin 40 milliGRAM(s) Oral at bedtime  sodium chloride 0.9%. 1000 milliLiter(s) (75 mL/Hr) IV Continuous <Continuous>  sodium chloride 0.9%. 1000 milliLiter(s) (80 mL/Hr) IV Continuous <Continuous>  sodium chloride 0.9%. 1000 milliLiter(s) (80 mL/Hr) IV Continuous <Continuous>    MEDICATIONS  (PRN):      __________________________________________________  REVIEW OF SYSTEMS:    CONSTITUTIONAL: No fever,   EYES: no acute visual disturbances  NECK: No pain or stiffness  RESPIRATORY: No cough; No shortness of breath  CARDIOVASCULAR: No chest pain, no palpitations  GASTROINTESTINAL: No pain. No nausea or vomiting; No diarrhea   NEUROLOGICAL: No headache or numbness, no tremors  MUSCULOSKELETAL: No joint pain, no muscle pain  GENITOURINARY: no dysuria, no frequency, no hesitancy  PSYCHIATRY: no depression , no anxiety  ALL OTHER  ROS negative        Vital Signs Last 24 Hrs  T(C): 36.4 (12 Jan 2021 12:33), Max: 36.6 (11 Jan 2021 20:33)  T(F): 97.6 (12 Jan 2021 12:33), Max: 97.9 (11 Jan 2021 20:33)  HR: 83 (12 Jan 2021 12:33) (83 - 91)  BP: 137/56 (12 Jan 2021 12:33) (137/56 - 138/67)  BP(mean): --  RR: 18 (12 Jan 2021 12:33) (17 - 18)  SpO2: 95% (12 Jan 2021 12:33) (95% - 100%)    ________________________________________________  PHYSICAL EXAM:  GENERAL: NAD  HEENT: Normocephalic;  conjunctivae and sclerae clear; moist mucous membranes;   NECK : supple  CHEST/LUNG: Clear to auscultation bilaterally with good air entry   HEART: S1 S2  regular; no murmurs, gallops or rubs  ABDOMEN: Soft, Nontender, Nondistended; Bowel sounds present  EXTREMITIES: b/L heel ace dressing, no cyanosis; no edema; no calf tenderness  SKIN: warm and dry; no rash  NERVOUS SYSTEM:  Awake and alert; unwilling to answer    _________________________________________________  LABS:                        9.0    17.93 )-----------( 419      ( 12 Jan 2021 07:34 )             28.4     01-12    148<H>  |  119<H>  |  24<H>  ----------------------------<  126<H>  3.5   |  18<L>  |  0.76    Ca    8.2<L>      12 Jan 2021 07:34  Phos  1.7     01-12  Mg     1.9     01-12    TPro  5.8<L>  /  Alb  1.8<L>  /  TBili  0.5  /  DBili  x   /  AST  15  /  ALT  8<L>  /  AlkPhos  60  01-11        CAPILLARY BLOOD GLUCOSE      POCT Blood Glucose.: 136 mg/dL (12 Jan 2021 16:26)  POCT Blood Glucose.: 146 mg/dL (12 Jan 2021 11:26)  POCT Blood Glucose.: 134 mg/dL (12 Jan 2021 07:54)  POCT Blood Glucose.: 91 mg/dL (11 Jan 2021 21:29)    RADIOLOGY & ADDITIONAL TESTS:  < from: US Duplex Venous Upper Ext Ltd, Left (01.10.21 @ 17:09) >  EXAM:  US DPLX UPR EXT VEINS LTD LT                            PROCEDURE DATE:  01/10/2021          INTERPRETATION:  CLINICAL INDICATION: 75 years  Female with LUE swelling, rule out thrombosis.    COMPARISON: None available.    TECHNIQUE: Duplex sonography of the LEFT UPPER extremity veins with color and spectral Doppler, with and without compression.    FINDINGS:    The left internal jugular, subclavian, axillary and brachial veins are patent and compressible where applicable.  The basilic and cephalic veins (superficial veins) are patent and without thrombus.    Doppler examination shows normal spontaneous and phasic flow.    Edema is visualized.    IMPRESSION:  No evidence of left upper extremity deep venous thrombosis.    < end of copied text >  < from: MR Foot No Cont, Right (01.06.21 @ 11:08) >  EXAM:  MR FOOT RT                            PROCEDURE DATE:  01/06/2021          INTERPRETATION:  Clinical indications: Right heel ulceration and eschar status post debridement. Concern for osteomyelitis.    Multiplanar multisequence MRI of the right foot was performed localized to the hindfoot.    Correlation is made with prior MRI from September 11, 2019.    FINDINGS:    There is a large wound along the plantar aspect of the calcaneus posteriorly which extends nearly extends to bone. There is surrounding soft tissue edema about this site with evidence of an overlying bandage. The degree of soft tissue deficiency is increased compared to the prior examination. There is extensive osseous edema and corresponding hypointense T1 marrow signalthroughout the calcaneus extending from the posterior plantar margin to the level of the angle of Gissane. This is progressed compared to the prior examination and consistent with osteomyelitis. Marrow signal within the remainder of the foot is otherwise preserved.    There is confluent edema tracking along the abductor hallucis and flexor digitorum brevis muscles and within the plantar subcutaneous fat subjacent to this region. In total this area measures approximately 2.2 cm in transverse dimension, approximately 4 cm in anteroposterior posterior dimension, and approximately 1.6 cm in craniocaudal dimension. Findings may be related to underlying phlegmon or abscess. Evaluation is limited by the lack of intravenous contrast. Distal to this site there is edema throughout the visualized musculature consistent with myositis.    Visualized tendinous structures remain intact. There is no acute ligamentous injury. Visualized joint spaces are preserved without joint effusion.    IMPRESSION:    Osteomyelitis involving the calcaneus which extends from the plantar posterior aspect of the calcaneus to the level of the angle of Gissane. This is progressed compared to the prior examination. This is seen in the setting of diffuse soft tissue deficiency along the posterior plantar aspect of the calcaneus.    Confluent edema adjacent to this region within the abductor hallucis and flexor digitorum brevis muscles and within the subjacent plantar subcutaneous fat. This may be related to abscess orphlegmon.      < end of copied text >  < from: Xray Foot AP + Lateral + Oblique, Right (01.05.21 @ 18:00) >    EXAM:  FOOT RIGHT (MINIMUM 3 VIEWS)                            PROCEDURE DATE:  01/05/2021          INTERPRETATION:  Right foot    HISTORY: Status post bedside debridement.     Two views of the right foot show thinning of soft tissues near the calcaneus likely indicating site of debridement. The joint spaces are maintained. There is questionable exposure of the plantar surface of the calcaneus.    IMPRESSION: Calcaneal soft tissues and questionable exposure as noted. Clinical correlation is suggested.    < end of copied text >  < from: VA Physiol Extremity Lower 3+ Level, BI (01.05.21 @ 17:55) >  EXAM:  US PHYSIOL LWR EXT 3+ LEV BI                            PROCEDURE DATE:  01/05/2021          INTERPRETATION:  Clinical information: History of diabetes, nonsmoker, hypertension, hyperlipidemia, presents with bilateral heel ulcers.    Comparison: Lower extremity arterial Doppler study dated 5/13/2020.    Technique: Lower extremity arterial Doppler study. Note that pressure measurements were not obtained at the thigh level.    Findings: Ankle brachial index measures 1.33 bilaterally, compared with prior values of 1.41 on the right and 1.36 on the left. No segmental arterial pressure gradient is present.    PVR waveforms are normal in amplitude and pulsatility from the thigh through the ankle level. They're diminished in amplitude at the metatarsal and digital levels in symmetric fashion, similar to the prior exam.    Impression: No Doppler evidence of hemodynamically significant arterial inflow abnormality in either lower extremity.    Evidence of small vessel disease in the feet.    < end of copied text >    Imaging  Reviewed:  YES    Consultant(s) Notes Reviewed:   YES      Plan of care was discussed with patient and /or primary care giver; all questions and concerns were addressed

## 2021-01-12 NOTE — PROGRESS NOTE ADULT - SUBJECTIVE AND OBJECTIVE BOX
Patient denies chest pain or shortness of breath.   Review of systems otherwise (-)  	  acetaminophen   Tablet .. 650 milliGRAM(s) Oral every 6 hours  aspirin enteric coated 81 milliGRAM(s) Oral daily  cefepime   IVPB 1000 milliGRAM(s) IV Intermittent every 8 hours  Dakins Solution - Full Strength 1 Application(s) Topical once  dextrose 40% Gel 15 Gram(s) Oral once  dextrose 5% + sodium chloride 0.45%. 1000 milliLiter(s) IV Continuous <Continuous>  dextrose 5%. 1000 milliLiter(s) IV Continuous <Continuous>  dextrose 5%. 1000 milliLiter(s) IV Continuous <Continuous>  dextrose 50% Injectable 25 Gram(s) IV Push once  dextrose 50% Injectable 12.5 Gram(s) IV Push once  dextrose 50% Injectable 25 Gram(s) IV Push once  enoxaparin Injectable 30 milliGRAM(s) SubCutaneous daily  glucagon  Injectable 1 milliGRAM(s) IntraMuscular once  insulin lispro (ADMELOG) corrective regimen sliding scale   SubCutaneous three times a day before meals  insulin lispro (ADMELOG) corrective regimen sliding scale   SubCutaneous at bedtime  metroNIDAZOLE  IVPB 500 milliGRAM(s) IV Intermittent every 8 hours  perphenazine 16 milliGRAM(s) Oral two times a day  potassium phosphate IVPB 15 milliMole(s) IV Intermittent once  simvastatin 40 milliGRAM(s) Oral at bedtime  sodium chloride 0.9%. 1000 milliLiter(s) IV Continuous <Continuous>  sodium chloride 0.9%. 1000 milliLiter(s) IV Continuous <Continuous>  sodium chloride 0.9%. 1000 milliLiter(s) IV Continuous <Continuous>                            9.0    17.93 )-----------( 419      ( 12 Jan 2021 07:34 )             28.4       Hemoglobin: 9.0 g/dL (01-12 @ 07:34)  Hemoglobin: 9.0 g/dL (01-11 @ 06:00)  Hemoglobin: 8.2 g/dL (01-10 @ 06:22)  Hemoglobin: 9.4 g/dL (01-09 @ 07:20)  Hemoglobin: 9.1 g/dL (01-08 @ 06:58)      01-12    148<H>  |  119<H>  |  24<H>  ----------------------------<  126<H>  3.5   |  18<L>  |  0.76    Ca    8.2<L>      12 Jan 2021 07:34  Phos  1.7     01-12  Mg     1.9     01-12    TPro  5.8<L>  /  Alb  1.8<L>  /  TBili  0.5  /  DBili  x   /  AST  15  /  ALT  8<L>  /  AlkPhos  60  01-11    Creatinine Trend: 0.76<--, 0.93<--, 1.06<--, 1.44<--, 1.05<--, 1.44<--    COAGS:       T(C): 36.5 (01-12-21 @ 05:25), Max: 37.1 (01-11-21 @ 14:00)  HR: 91 (01-12-21 @ 05:25) (89 - 93)  BP: 138/67 (01-12-21 @ 05:25) (138/58 - 138/67)  RR: 17 (01-12-21 @ 05:25) (17 - 18)  SpO2: 99% (01-12-21 @ 05:25) (99% - 100%)  Wt(kg): --    I&O's Summary      HEENT:  (-)icterus (-)pallor  CV: N S1 S2 1/6 ANGELIC (+)2 Pulses B/l  Resp:  Clear to ausculatation B/L, normal effort  GI: (+) BS Soft, NT, ND  Lymph:  (-)Edema, (-)obvious lymphadenopathy  Skin: Warm to touch, Normal turgor  Psych: Appropriate mood and affect      ASSESSMENT/PLAN: 	75y  Female  wheelchair bound with medical history significant of HTN, Diabetes, Osteoarthritis, Osteoporosis, Peripheral arterial disease, Diabetic neuropathy, HLD, Schizophrenia historically preserved LV and R function, comes in with worsening ulceration to b/l heels preop debridement.    - tolerated debridement   - no clinical CHF  - Abx per primary team  - progressing well    Jm Cortes MD, [facility]  BEEPER [phone]

## 2021-01-12 NOTE — CONSULT NOTE ADULT - SUBJECTIVE AND OBJECTIVE BOX
VASCULAR SURGERY CONSULTATION NOTE:    HPI: 75F from [facility], wheelchair bound with PMHx of HTN, Diabetes, Osteoarthritis, Osteoporosis, Peripheral arterial disease, Diabetic neuropathy, HLD, Schizophrenia comes in with worsening ulceration to b/l heels. She has been having ulcers for past couple months, has been progressive. She was treated with IV antbx at NH but did not get better. It was worsening with necrotic changes and increasing foul smelling discharge. She denies fever, abdominal pain, chest pain, urinary symptoms, fall, trauma. No h/o nausea, vomiting, diarrhea, cough, dyspnea, sick contacts, recent illness.      Interval HPI: Vascular Surgery consulted for clearance prior to podiatry debridement and unroofing of right heel eschar 1/13/21.  Pt somnolent, unarousable, unable to obtain history. Unclear of prior vascular history. Patient was scheduled for a right heel debridement and partial calcanectomy with podiatry yesterday but refused surgery. Later on it was decided pt unable to give own consent and Two Physician consent would be obtained for planned procedure tomorrow 1/13/21.     REVIEW OF SYSTEMS:   Negative except stated above in HPI    Allergies: No Known Allergies    MEDICATIONS:  aspirin enteric coated 81 milliGRAM(s) Oral daily  enoxaparin Injectable 30 milliGRAM(s) SubCutaneous daily    cefepime   IVPB 1000 milliGRAM(s) IV Intermittent every 8 hours  metroNIDAZOLE  IVPB 500 milliGRAM(s) IV Intermittent every 8 hours  acetaminophen   Tablet .. 650 milliGRAM(s) Oral every 6 hours  perphenazine 16 milliGRAM(s) Oral two times a day  dextrose 40% Gel 15 Gram(s) Oral once  dextrose 50% Injectable 25 Gram(s) IV Push once  dextrose 50% Injectable 12.5 Gram(s) IV Push once  dextrose 50% Injectable 25 Gram(s) IV Push once  glucagon  Injectable 1 milliGRAM(s) IntraMuscular once  insulin lispro (ADMELOG) corrective regimen sliding scale   SubCutaneous three times a day before meals  insulin lispro (ADMELOG) corrective regimen sliding scale   SubCutaneous at bedtime  simvastatin 40 milliGRAM(s) Oral at bedtime  Dakins Solution - Full Strength 1 Application(s) Topical once  dextrose 5% + sodium chloride 0.45%. 1000 milliLiter(s) IV Continuous <Continuous>  dextrose 5%. 1000 milliLiter(s) IV Continuous <Continuous>  dextrose 5%. 1000 milliLiter(s) IV Continuous <Continuous>  sodium chloride 0.9%. 1000 milliLiter(s) IV Continuous <Continuous>  sodium chloride 0.9%. 1000 milliLiter(s) IV Continuous <Continuous>  sodium chloride 0.9%. 1000 milliLiter(s) IV Continuous <Continuous>    PAST MEDICAL & SURGICAL HISTORY:  COVID-19  Osteomyelitis  DM (diabetes mellitus)  Paranoid schizophrenia  HLD (hyperlipidemia)  PAD (peripheral artery disease)  HTN (hypertension)  No significant past surgical history    FAMILY HISTORY:  No pertinent family history in first degree relatives    VITALS  T(C): 36.4 (01-12-21 @ 12:33), Max: 36.6 (01-11-21 @ 20:33)  HR: 83 (01-12-21 @ 12:33) (83 - 91)  BP: 137/56 (01-12-21 @ 12:33) (137/56 - 138/67)  RR: 18 (01-12-21 @ 12:33) (17 - 18)  SpO2: 95% (01-12-21 @ 12:33) (95% - 100%)    PHYSICAL EXAM:   General: unarousable  Respiratory: non-labored  Gastrointestinal:  Soft, nondistended, nontender  Extremities:   right lower extremity with foul smelling 9x6cm heel eschar, boggy to touch  left lower extremity with approximately 4x3cm heel dried eschar  B/l lower extremities: non-palpable DP or PT, both audible on doppler; foot warm, no other ulcers noted, dry skin to b/l feet, hyperkeratotic nails x10 b/l; unable to assess sensation given pt mental status    LABS:	 	  CBC Full  -  ( 12 Jan 2021 07:34 )  WBC Count : 17.93 K/uL  Hemoglobin : 9.0 g/dL  Hematocrit : 28.4 %  Platelet Count - Automated : 419 K/uL  Mean Cell Volume : 92.5 fl  Mean Cell Hemoglobin : 29.3 pg  Mean Cell Hemoglobin Concentration : 31.7 gm/dL      01-12  148<H>  |  119<H>  |  24<H>  ----------------------------<  126<H>  3.5   |  18<L>  |  0.76  01-11    143  |  114<H>  |  27<H>  ----------------------------<  234<H>  3.7   |  19<L>  |  0.93    Ca    8.2<L>      12 Jan 2021 07:34  Ca    8.1<L>      11 Jan 2021 06:00  Phos  1.7     01-12  Phos  1.6     01-11  Mg     1.9     01-12  Mg     2.1     01-11    TPro  5.8<L>  /  Alb  1.8<L>  /  TBili  0.5  /  DBili  x   /  AST  15  /  ALT  8<L>  /  AlkPhos  60  01-11    < from: CT Angio Abd Aorta w/run-off w/ IV Cont (05.13.20 @ 19:59) >  INTERPRETATION:  CLINICAL HISTORY:  Left calcaneal osteomyelitis with chronic soft tissue ulceration.  CTA of the abdominal aorta with bilateral lower extremity runoff was requested by the referring physician.  However, the CT technologist reported that the administered 90 cc of Omnipaque 350 infiltrated in the left antecubital space.   Noncontrast axial imaging of the bilateral distal femur to toes performed by the technologist and submitted for review. Reformatted coronal and sagittal images are submitted.  COMPARISON: None  FINDINGS: This is a nondiagnostic evaluation for assessment of the bilateral lower extremity arterial anatomy and abdominal aorta. The CT technologist reported the infiltration event to the nurse on the medical floor. Examination can be reattempted; it is recommended that reevaluation of renal function be performed prior to repeat examination.  No osseous fractures are demonstrated. No regions of osteolysis or osteosclerosis identified. Extensive vascular calcification identified. Foci of air attenuation identified within the soft tissues lateral to the left calcaneus with associated soft tissue attenuation and skin thickening. No well-formed abscess is identified. No intravascular contrast material identified.  IMPRESSION:  Nondiagnostic evaluation, as described above due to infiltration of the administered intravascular contrast. See above discussion.  < end of copied text >    < from: US Duplex Venous Upper Ext Ltd, Left (01.10.21 @ 17:09) >  FINDINGS:  The left internal jugular, subclavian, axillary and brachial veins are patent and compressible where applicable.  The basilic and cephalic veins (superficial veins) are patent and without thrombus.  Doppler examination shows normal spontaneous and phasic flow.  Edema is visualized.  IMPRESSION:  No evidence of left upper extremity deep venous thrombosis.  < end of copied text >    < from: [facility] Physiol Extremity Lower 3+ Level, BI (01.05.21 @ 17:55) >  INTERPRETATION:  Clinical information: History of diabetes, nonsmoker, hypertension, hyperlipidemia, presents with bilateral heel ulcers.  Comparison: Lower extremity arterial Doppler study dated 5/13/2020.  Technique: Lower extremity arterial Doppler study. Note that pressure measurements were not obtained at the thigh level.  Findings: Ankle brachial index measures 1.33 bilaterally, compared with prior values of 1.41 on the right and 1.36 on the left. No segmental arterial pressure gradient is present.  PVR waveforms are normal in amplitude and pulsatility from the thigh through the ankle level. They're diminished in amplitude at the metatarsal and digital levels in symmetric fashion, similar to the prior exam.  Impression: No Doppler evidence of hemodynamically significant arterial inflow abnormality in either lower extremity.  Evidence of small vessel disease in the feet.  No significant interval change since study of 5/13/2020.  < end of copied text >    < from: MR Foot No Cont, Right (01.06.21 @ 11:08) >  IMPRESSION:  Osteomyelitis involving the calcaneus which extends from the plantar posterior aspect of the calcaneus to the level of the angle of Gissane. This is progressed compared to the prior examination. This is seen in the setting of diffuse soft tissue deficiency along the posterior plantar aspect of the calcaneus.  Confluent edema adjacent to this region within the abductor hallucis and flexor digitorum brevis muscles and within the subjacent plantar subcutaneous fat. This may be related to abscess or phlegmon.  < end of copied text >

## 2021-01-12 NOTE — PROGRESS NOTE ADULT - PROBLEM SELECTOR PLAN 5
Pt takes Perphenazine at home  -home medication held as recommended by Psychiatry  -Patient unconsentable due to somnolence and unarousable   -Psychiatry Dr. Lackey

## 2021-01-12 NOTE — CHART NOTE - NSCHARTNOTEFT_GEN_A_CORE
Patient to be taken to operating room Wednesday 1/13 @ 2 pm for Right heel wound excisional debridement and partial calcanectomy to better manage sepsis 2/2 to OM of calcaneous. Two-physician consent will be obtained by medicine team for procedure.     Patient had previously consented for surgical intervention of R heel wound with podiatry team but she was unable to consent with Anesthesia prior to procedure on 1/11/21; next of kin could not be reached as surrogate.

## 2021-01-12 NOTE — PROGRESS NOTE ADULT - ASSESSMENT
HPI:  75 year old wheelchair bound female from [facility] with a past medical history of HTN, type II diabetes mellitus, OA, osteoporosis, PAD, diabetic neuropathy, HLD, paranoid schizophrenia and recurrent osteomyelitis who presented to the emergency department on 1/5 with c/o worsening infection of her bilateral heel ulcerations in spite local wound care with santyl dressings and outpatient antibiotic management. Per ECC, patient's wounds have had increasing purulent, foul smelling discharge and necrotic changes. Patient with additional ulcerations to her coccyx, ischium, and sacrum for which wound care and surgery were consulted. Podiatry also consulted for her heel ulcerations: patient s/p bedside debridement of her right heel ulceration, refusing any surgical interventions at this time. Infectious disease also following.    1/6: MRI with findings of: Osteomyelitis involving the calcaneus which extends from the plantar posterior aspect of the calcaneus to the level of the angle of Gissane. This is progressed compared to the prior examination.   1/7:  Pt states she understands that she may loose her leg and/or her life without surgical intervention for RLE OM, still refusing debridement.  Psych consulted. Patient needs medical clearance for OR on 1/8.    1/11; Patient I and D not done, anesthesia unable to get consent. Patient not consenting to OR procedure. OR procedure cancelled due to no consent. Patient deemed to have full capacity as per Psychiatry.

## 2021-01-12 NOTE — CHART NOTE - NSCHARTNOTEFT_GEN_A_CORE
Request for 2nd physician review and consent for urgent right heel wound debridement and partial calcanectomy to help manage sepsis due to osteomyelitis of calcaneous and worsening ulcerations with necrotic wounds.    Patient has baseline stable schizophrenia with reasonable capacity as per primary attending and psychiatrist, however patient's mental status was fluctuating in the past day or two and unable to safely consent for this urgent procedure. Medical team unable to reach next of kin who could be possible surrogate. Medical team requested 2physician consent for this urgent procedure which if not done timely will continue to put patient at risk for increasing morbidity with increasing WBC, incomplete efficacy with antibiotic treatment alone, worsening mental status.   When patient was more oriented and lucid, patient had agreed for the procedure with full understand that it will help with managing her infection and wound. Reviewed the risks and benefits with the medical/podiatry/anesthesia team. It is reasonable and in the best interest of the patient to move forward with right heel wound debridement and partial calcanectomy.     Vitals, labwork and imaging reviewed.   Pt seen at bedside.

## 2021-01-13 ENCOUNTER — RESULT REVIEW (OUTPATIENT)
Age: 76
End: 2021-01-13

## 2021-01-13 LAB
ANION GAP SERPL CALC-SCNC: 13 MMOL/L — SIGNIFICANT CHANGE UP (ref 5–17)
BUN SERPL-MCNC: 24 MG/DL — HIGH (ref 7–18)
CALCIUM SERPL-MCNC: 8.4 MG/DL — SIGNIFICANT CHANGE UP (ref 8.4–10.5)
CHLORIDE SERPL-SCNC: 121 MMOL/L — HIGH (ref 96–108)
CO2 SERPL-SCNC: 17 MMOL/L — LOW (ref 22–31)
CREAT SERPL-MCNC: 0.68 MG/DL — SIGNIFICANT CHANGE UP (ref 0.5–1.3)
GLUCOSE BLDC GLUCOMTR-MCNC: 125 MG/DL — HIGH (ref 70–99)
GLUCOSE BLDC GLUCOMTR-MCNC: 128 MG/DL — HIGH (ref 70–99)
GLUCOSE BLDC GLUCOMTR-MCNC: 132 MG/DL — HIGH (ref 70–99)
GLUCOSE BLDC GLUCOMTR-MCNC: 190 MG/DL — HIGH (ref 70–99)
GLUCOSE SERPL-MCNC: 136 MG/DL — HIGH (ref 70–99)
GRAM STN FLD: SIGNIFICANT CHANGE UP
HCT VFR BLD CALC: 27 % — LOW (ref 34.5–45)
HGB BLD-MCNC: 8.7 G/DL — LOW (ref 11.5–15.5)
INR BLD: 1.62 RATIO — HIGH (ref 0.88–1.16)
MAGNESIUM SERPL-MCNC: 1.9 MG/DL — SIGNIFICANT CHANGE UP (ref 1.6–2.6)
MCHC RBC-ENTMCNC: 30 PG — SIGNIFICANT CHANGE UP (ref 27–34)
MCHC RBC-ENTMCNC: 32.2 GM/DL — SIGNIFICANT CHANGE UP (ref 32–36)
MCV RBC AUTO: 93.1 FL — SIGNIFICANT CHANGE UP (ref 80–100)
NRBC # BLD: 0 /100 WBCS — SIGNIFICANT CHANGE UP (ref 0–0)
PHOSPHATE SERPL-MCNC: 2.1 MG/DL — LOW (ref 2.5–4.5)
PLATELET # BLD AUTO: 404 K/UL — HIGH (ref 150–400)
POTASSIUM SERPL-MCNC: 3.6 MMOL/L — SIGNIFICANT CHANGE UP (ref 3.5–5.3)
POTASSIUM SERPL-SCNC: 3.6 MMOL/L — SIGNIFICANT CHANGE UP (ref 3.5–5.3)
PROTHROM AB SERPL-ACNC: 18.8 SEC — HIGH (ref 10.6–13.6)
RBC # BLD: 2.9 M/UL — LOW (ref 3.8–5.2)
RBC # FLD: 13.7 % — SIGNIFICANT CHANGE UP (ref 10.3–14.5)
SODIUM SERPL-SCNC: 151 MMOL/L — HIGH (ref 135–145)
SPECIMEN SOURCE: SIGNIFICANT CHANGE UP
WBC # BLD: 17.1 K/UL — HIGH (ref 3.8–10.5)
WBC # FLD AUTO: 17.1 K/UL — HIGH (ref 3.8–10.5)

## 2021-01-13 PROCEDURE — 88311 DECALCIFY TISSUE: CPT | Mod: 26

## 2021-01-13 PROCEDURE — 99232 SBSQ HOSP IP/OBS MODERATE 35: CPT

## 2021-01-13 PROCEDURE — 99231 SBSQ HOSP IP/OBS SF/LOW 25: CPT

## 2021-01-13 PROCEDURE — 88304 TISSUE EXAM BY PATHOLOGIST: CPT | Mod: 26

## 2021-01-13 PROCEDURE — 73610 X-RAY EXAM OF ANKLE: CPT | Mod: 26,RT

## 2021-01-13 RX ORDER — SODIUM CHLORIDE 9 MG/ML
1000 INJECTION, SOLUTION INTRAVENOUS
Refills: 0 | Status: DISCONTINUED | OUTPATIENT
Start: 2021-01-13 | End: 2021-01-13

## 2021-01-13 RX ORDER — PERPHENAZINE 8 MG/1
16 TABLET, FILM COATED ORAL
Refills: 0 | Status: DISCONTINUED | OUTPATIENT
Start: 2021-01-13 | End: 2021-01-13

## 2021-01-13 RX ORDER — DEXTROSE 50 % IN WATER 50 %
25 SYRINGE (ML) INTRAVENOUS ONCE
Refills: 0 | Status: DISCONTINUED | OUTPATIENT
Start: 2021-01-13 | End: 2021-01-29

## 2021-01-13 RX ORDER — ACETAMINOPHEN 500 MG
650 TABLET ORAL EVERY 6 HOURS
Refills: 0 | Status: DISCONTINUED | OUTPATIENT
Start: 2021-01-13 | End: 2021-01-13

## 2021-01-13 RX ORDER — INSULIN LISPRO 100/ML
VIAL (ML) SUBCUTANEOUS AT BEDTIME
Refills: 0 | Status: DISCONTINUED | OUTPATIENT
Start: 2021-01-13 | End: 2021-01-24

## 2021-01-13 RX ORDER — ACETAMINOPHEN 500 MG
650 TABLET ORAL EVERY 6 HOURS
Refills: 0 | Status: DISCONTINUED | OUTPATIENT
Start: 2021-01-13 | End: 2021-01-28

## 2021-01-13 RX ORDER — VANCOMYCIN HCL 1 G
750 VIAL (EA) INTRAVENOUS EVERY 12 HOURS
Refills: 0 | Status: DISCONTINUED | OUTPATIENT
Start: 2021-01-13 | End: 2021-01-25

## 2021-01-13 RX ORDER — SODIUM CHLORIDE 9 MG/ML
1000 INJECTION, SOLUTION INTRAVENOUS
Refills: 0 | Status: DISCONTINUED | OUTPATIENT
Start: 2021-01-13 | End: 2021-01-17

## 2021-01-13 RX ORDER — SIMVASTATIN 20 MG/1
40 TABLET, FILM COATED ORAL AT BEDTIME
Refills: 0 | Status: DISCONTINUED | OUTPATIENT
Start: 2021-01-13 | End: 2021-01-28

## 2021-01-13 RX ORDER — INSULIN LISPRO 100/ML
VIAL (ML) SUBCUTANEOUS
Refills: 0 | Status: DISCONTINUED | OUTPATIENT
Start: 2021-01-13 | End: 2021-01-24

## 2021-01-13 RX ORDER — ENOXAPARIN SODIUM 100 MG/ML
30 INJECTION SUBCUTANEOUS DAILY
Refills: 0 | Status: DISCONTINUED | OUTPATIENT
Start: 2021-01-13 | End: 2021-01-13

## 2021-01-13 RX ORDER — ASPIRIN/CALCIUM CARB/MAGNESIUM 324 MG
81 TABLET ORAL DAILY
Refills: 0 | Status: DISCONTINUED | OUTPATIENT
Start: 2021-01-13 | End: 2021-01-20

## 2021-01-13 RX ORDER — DEXTROSE 50 % IN WATER 50 %
12.5 SYRINGE (ML) INTRAVENOUS ONCE
Refills: 0 | Status: DISCONTINUED | OUTPATIENT
Start: 2021-01-13 | End: 2021-01-29

## 2021-01-13 RX ORDER — SIMVASTATIN 20 MG/1
40 TABLET, FILM COATED ORAL AT BEDTIME
Refills: 0 | Status: DISCONTINUED | OUTPATIENT
Start: 2021-01-13 | End: 2021-01-13

## 2021-01-13 RX ORDER — COLLAGENASE CLOSTRIDIUM HIST. 250 UNIT/G
1 OINTMENT (GRAM) TOPICAL DAILY
Refills: 0 | Status: DISCONTINUED | OUTPATIENT
Start: 2021-01-13 | End: 2021-01-29

## 2021-01-13 RX ORDER — ENOXAPARIN SODIUM 100 MG/ML
40 INJECTION SUBCUTANEOUS DAILY
Refills: 0 | Status: DISCONTINUED | OUTPATIENT
Start: 2021-01-13 | End: 2021-01-23

## 2021-01-13 RX ORDER — GLUCAGON INJECTION, SOLUTION 0.5 MG/.1ML
1 INJECTION, SOLUTION SUBCUTANEOUS ONCE
Refills: 0 | Status: DISCONTINUED | OUTPATIENT
Start: 2021-01-13 | End: 2021-01-29

## 2021-01-13 RX ORDER — CEFEPIME 1 G/1
1000 INJECTION, POWDER, FOR SOLUTION INTRAMUSCULAR; INTRAVENOUS EVERY 8 HOURS
Refills: 0 | Status: DISCONTINUED | OUTPATIENT
Start: 2021-01-13 | End: 2021-01-25

## 2021-01-13 RX ORDER — SODIUM HYPOCHLORITE 0.125 %
1 SOLUTION, NON-ORAL MISCELLANEOUS ONCE
Refills: 0 | Status: DISCONTINUED | OUTPATIENT
Start: 2021-01-13 | End: 2021-01-29

## 2021-01-13 RX ORDER — METRONIDAZOLE 500 MG
500 TABLET ORAL EVERY 8 HOURS
Refills: 0 | Status: DISCONTINUED | OUTPATIENT
Start: 2021-01-13 | End: 2021-01-25

## 2021-01-13 RX ORDER — DEXTROSE 50 % IN WATER 50 %
15 SYRINGE (ML) INTRAVENOUS ONCE
Refills: 0 | Status: DISCONTINUED | OUTPATIENT
Start: 2021-01-13 | End: 2021-01-29

## 2021-01-13 RX ADMIN — CEFEPIME 100 MILLIGRAM(S): 1 INJECTION, POWDER, FOR SOLUTION INTRAMUSCULAR; INTRAVENOUS at 21:50

## 2021-01-13 RX ADMIN — ENOXAPARIN SODIUM 40 MILLIGRAM(S): 100 INJECTION SUBCUTANEOUS at 21:49

## 2021-01-13 RX ADMIN — SODIUM CHLORIDE 75 MILLILITER(S): 9 INJECTION, SOLUTION INTRAVENOUS at 11:44

## 2021-01-13 RX ADMIN — Medication 1 APPLICATION(S): at 21:51

## 2021-01-13 RX ADMIN — SODIUM CHLORIDE 75 MILLILITER(S): 9 INJECTION, SOLUTION INTRAVENOUS at 18:24

## 2021-01-13 RX ADMIN — Medication 0: at 21:47

## 2021-01-13 RX ADMIN — Medication 250 MILLIGRAM(S): at 18:24

## 2021-01-13 RX ADMIN — Medication 100 MILLIGRAM(S): at 21:52

## 2021-01-13 NOTE — PROGRESS NOTE ADULT - ASSESSMENT
74 y/o wheelchair bound female with PAD and right calcaneal osteomyelitis    - OR 1/14/21 with Dr. Lucero and Dr. Camacho for right above knee amputation, possible left above knee amputation  - Chlorhexidene wipes  - NPO after midnight  - IVF while on NPO  - Preop labs ordered  - Consent to be obtained in AM  - Medical clearance documented with 6% risk of death, MI, cardiac arrest  - Pending cardiology clearance note

## 2021-01-13 NOTE — PROGRESS NOTE ADULT - PROBLEM SELECTOR PLAN 1
MRI noted above  c/w Cefepime day 9  c/w metronidazole day 8  IR placement of  extended dwell Dr. Hopper approved.  Wound culture grew few Morganella morganii and few Proteus mirabilis  s/p OR right heel debridement, obtained 2PC consent  ID Dr. Wellington  Podiatry following  Vascular Following.

## 2021-01-13 NOTE — PROGRESS NOTE ADULT - SUBJECTIVE AND OBJECTIVE BOX
SURGERY PRE-OP NOTE    Dx: peripheral artery disease with osteomyelitis  Procedure: right above knee amputation, possible below knee amputation    Vital Signs Last 24 Hrs  T(C): 36.9 (13 Jan 2021 16:20), Max: 36.9 (13 Jan 2021 16:20)  T(F): 98.4 (13 Jan 2021 16:20), Max: 98.4 (13 Jan 2021 16:20)  HR: 94 (13 Jan 2021 16:20) (84 - 106)  BP: 141/51 (13 Jan 2021 16:20) (117/56 - 146/75)  BP(mean): 84 (13 Jan 2021 15:35) (66 - 92)  RR: 17 (13 Jan 2021 16:20) (14 - 18)  SpO2: 100% (13 Jan 2021 16:20) (100% - 100%)    LABS:                      8.7    17.10 )-----------( 404      ( 13 Jan 2021 07:43 )             27.0              01-13    151<H>  |  121<H>  |  24<H>  ----------------------------<  136<H>  3.6   |  17<L>  |  0.68    Ca    8.4      13 Jan 2021 07:43  Phos  2.1     01-13  Mg     1.9     01-13    PT/INR - ( 13 Jan 2021 07:43 )   PT: 18.8 sec;   INR: 1.62 ratio       SURGERY PRE-OP NOTE    Dx: peripheral artery disease with osteomyelitis  Procedure: right above knee amputation, possible left above knee amputation    Vital Signs Last 24 Hrs  T(C): 36.9 (13 Jan 2021 16:20), Max: 36.9 (13 Jan 2021 16:20)  T(F): 98.4 (13 Jan 2021 16:20), Max: 98.4 (13 Jan 2021 16:20)  HR: 94 (13 Jan 2021 16:20) (84 - 106)  BP: 141/51 (13 Jan 2021 16:20) (117/56 - 146/75)  BP(mean): 84 (13 Jan 2021 15:35) (66 - 92)  RR: 17 (13 Jan 2021 16:20) (14 - 18)  SpO2: 100% (13 Jan 2021 16:20) (100% - 100%)    LABS:                      8.7    17.10 )-----------( 404      ( 13 Jan 2021 07:43 )             27.0              01-13    151<H>  |  121<H>  |  24<H>  ----------------------------<  136<H>  3.6   |  17<L>  |  0.68    Ca    8.4      13 Jan 2021 07:43  Phos  2.1     01-13  Mg     1.9     01-13    PT/INR - ( 13 Jan 2021 07:43 )   PT: 18.8 sec;   INR: 1.62 ratio

## 2021-01-13 NOTE — PROGRESS NOTE ADULT - SUBJECTIVE AND OBJECTIVE BOX
Patient is a 75y old  Female who presents with a chief complaint of foot ulcer (2021 18:50)    PATIENT IS SEEN AND EXAMINED IN MEDICAL FLOOR.    ALLERGIES:  No Known Allergies      Daily     Daily Weight in k.2 (2021 05:15)    VITALS:    Vital Signs Last 24 Hrs  T(C): 36.6 (2021 20:52), Max: 36.9 (2021 16:20)  T(F): 97.9 (2021 20:52), Max: 98.4 (2021 16:20)  HR: 86 (2021 22:04) (84 - 106)  BP: 126/49 (2021 22:04) (117/56 - 146/75)  BP(mean): 84 (2021 15:35) (66 - 92)  RR: 16 (2021 22:04) (14 - 18)  SpO2: 100% (2021 22:04) (100% - 100%)    LABS:    CBC Full  -  ( 2021 07:43 )  WBC Count : 17.10 K/uL  RBC Count : 2.90 M/uL  Hemoglobin : 8.7 g/dL  Hematocrit : 27.0 %  Platelet Count - Automated : 404 K/uL  Mean Cell Volume : 93.1 fl  Mean Cell Hemoglobin : 30.0 pg  Mean Cell Hemoglobin Concentration : 32.2 gm/dL  Auto Neutrophil # : x  Auto Lymphocyte # : x  Auto Monocyte # : x  Auto Eosinophil # : x  Auto Basophil # : x  Auto Neutrophil % : x  Auto Lymphocyte % : x  Auto Monocyte % : x  Auto Eosinophil % : x  Auto Basophil % : x    PT/INR - ( 2021 07:43 )   PT: 18.8 sec;   INR: 1.62 ratio           13    151<H>  |  121<H>  |  24<H>  ----------------------------<  136<H>  3.6   |  17<L>  |  0.68    Ca    8.4      2021 07:43  Phos  2.1     -  Mg     1.9           CAPILLARY BLOOD GLUCOSE      POCT Blood Glucose.: 190 mg/dL (2021 21:31)  POCT Blood Glucose.: 125 mg/dL (2021 16:58)  POCT Blood Glucose.: 128 mg/dL (2021 11:24)  POCT Blood Glucose.: 132 mg/dL (2021 07:49)          Creatinine Trend: 0.68<--, 0.76<--, 0.93<--, 1.06<--, 1.44<--, 1.05<--  I&O's Summary    .Urine Clean Catch (Midstream)   @ 06:46   No growth  --  --      .Surgical Swab Right heel wound   @ 22:13   Few Morganella morganii  Few Proteus mirabilis  Moderate Corynebacterium species "Susceptibilities not performed"  Moderate Bacteroides vulgatus "Susceptibilities not performed"  --  Morganella morganii  Proteus mirabilis      .Blood Blood-Peripheral   @ 17:59   No Growth Final  --  --    MEDICATIONS:    MEDICATIONS  (STANDING):  aspirin enteric coated 81 milliGRAM(s) Oral daily  cefepime   IVPB 1000 milliGRAM(s) IV Intermittent every 8 hours  collagenase Ointment 1 Application(s) Topical daily  Dakins Solution - Full Strength 1 Application(s) Topical once  dextrose 40% Gel 15 Gram(s) Oral once  dextrose 5% + sodium chloride 0.45%. 1000 milliLiter(s) (75 mL/Hr) IV Continuous <Continuous>  dextrose 50% Injectable 12.5 Gram(s) IV Push once  dextrose 50% Injectable 25 Gram(s) IV Push once  dextrose 50% Injectable 25 Gram(s) IV Push once  enoxaparin Injectable 40 milliGRAM(s) SubCutaneous daily  glucagon  Injectable 1 milliGRAM(s) IntraMuscular once  insulin lispro (ADMELOG) corrective regimen sliding scale   SubCutaneous three times a day before meals  insulin lispro (ADMELOG) corrective regimen sliding scale   SubCutaneous at bedtime  metroNIDAZOLE  IVPB 500 milliGRAM(s) IV Intermittent every 8 hours  perphenazine 16 milliGRAM(s) Oral two times a day  simvastatin 40 milliGRAM(s) Oral at bedtime  vancomycin  IVPB 750 milliGRAM(s) IV Intermittent every 12 hours      MEDICATIONS  (PRN):  acetaminophen   Tablet .. 650 milliGRAM(s) Oral every 6 hours PRN Moderate Pain (4 - 6)      REVIEW OF SYSTEMS:                           ALL ROS DONE [ X   ]    CONSTITUTIONAL:  LETHARGIC [   ], FEVER [   ], UNRESPONSIVE [   ]  CVS:  CP  [   ], SOB, [   ], PALPITATIONS [   ], DIZZYNESS [   ]  RS: COUGH [   ], SPUTUM [   ]  GI: ABDOMINAL PAIN [   ], NAUSEA [   ], VOMITINGS [   ], DIARRHEA [   ], CONSTIPATION [   ]  :  DYSURIA [   ], NOCTURIA [   ], INCREASED FREQUENCY [   ], DRIBLING [   ],  SKELETAL: PAINFUL JOINTS [   ], SWOLLEN JOINTS [   ], NECK ACHE [   ], LOW BACK ACHE [   ],  SKIN : ULCERS [   ], RASH [   ], ITCHING [   ]  CNS: HEAD ACHE [   ], DOUBLE VISION [   ], BLURRED VISION [   ], AMS / CONFUSION [   ], SEIZURES [   ], WEAKNESS [   ],TINGLING / NUMBNESS [   ]    PHYSICAL EXAMINATION:  GENERAL APPEARANCE: NO DISTRESS  HEENT:  NO PALLOR, NO  JVD,  NO   NODES, NECK SUPPLE  CVS: S1 +, S2 +,   RS: AEEB,  OCCASIONAL  RALES +,   NO RONCHI  ABD: SOFT, NT, NO, BS +  EXT: NO PE  SKIN: WARM, BILATERAL HEEL ULCERS - COVERED, SACRAL ULCER + RIGHT ISCHIAL ULCER COVERED  SKELETAL:  ROM ACCEPTABLE, LEFT ARM SWELLING  CNS:  AAO X 2-3     RADIOLOGY :    EXAM:  MR FOOT RT                            PROCEDURE DATE:  2021          INTERPRETATION:  Clinical indications: Right heel ulceration and eschar status post debridement. Concern for osteomyelitis.    Multiplanar multisequence MRI of the right foot was performed localized to the hindfoot.    Correlation is made with prior MRI from 2019.    FINDINGS:    There is a large wound along the plantar aspect of the calcaneus posteriorly which extends nearly extends to bone. There is surrounding soft tissue edema about this site with evidence of an overlying bandage. The degree of soft tissue deficiency is increased compared to the prior examination. There is extensive osseous edema and corresponding hypointense T1 marrow signalthroughout the calcaneus extending from the posterior plantar margin to the level of the angle of Gissane. This is progressed compared to the prior examination and consistent with osteomyelitis. Marrow signal within the remainder of the foot is otherwise preserved.    There is confluent edema tracking along the abductor hallucis and flexor digitorum brevis muscles and within the plantar subcutaneous fat subjacent to this region. In total this area measures approximately 2.2 cm in transverse dimension, approximately 4 cm in anteroposterior posterior dimension, and approximately 1.6 cm in craniocaudal dimension. Findings may be related to underlying phlegmon or abscess. Evaluation is limited by the lack of intravenous contrast. Distal to this site there is edema throughout the visualized musculature consistent with myositis.    Visualized tendinous structures remain intact. There is no acute ligamentous injury. Visualized joint spaces are preserved without joint effusion.    IMPRESSION:    Osteomyelitis involving the calcaneus which extends from the plantar posterior aspect of the calcaneus to the level of the angle of Gissane. This is progressed compared to the prior examination. This is seen in the setting of diffuse soft tissue deficiency along the posterior plantar aspect of the calcaneus.    Confluent edema adjacent to this region within the abductor hallucis and flexor digitorum brevis muscles and within the subjacent plantar subcutaneous fat. This may be related to abscess orphlegmon.    ASSESSMENT :     Osteomyelitis    Yes    COVID-19    Osteomyelitis    DM (diabetes mellitus)    Paranoid schizophrenia    HLD (hyperlipidemia)    PAD (peripheral artery disease)    HTN (hypertension)    No significant past surgical history        PLAN:  HPI:  74 yo F from [facility], wheelchair bound with medical history significant of HTN, Diabetes, Osteoarthritis, Osteoporosis, Peripheral arterial disease, Diabetic neuropathy, HLD, Schizophrenia comes in with worsening ulceration to b/l heels. She has been having ulcers for past couple months, has been progressive. She was treated with IV antbx at NH but did not get better. It was worsening with necrotic changes and increasing foul smelling discharge. She denies fever, abdominal pain, chest pain, urinary symptoms, fall, trauma. No h/o nausea, vomiting, diarrhea, cough, dyspnea, sick contacts, recent illness.  (2021 14:24)    PLAN:    # BILATERAL LE CHRONIC ULCER NOW PROGRESSIVELY WORSENING W/ SSTI AND RIGHT CALCANEAL OSTEOMYELITIS W/ POSSIBLE ABSCESS; UNDERWENT BEDSIDE DEBRIDEMENT OF RIGHT LEG ULCER AND UNDERWENT RIGHT PARTIAL CALCANECTOMY ON   - CEFEPIME + VANOMYCIN + FLAGYL, REVIEWED TIMUR, BCX - NGTD, WOUND CX - MORGANELLA MORGANI & P. MIRABILIS, ID CONSULT IN PROGRESS, PODIATRY CONSULT IN PROGRESS  - REVIEWED RIGHT LE MRI  -  PSYCHIATRY CONSULT FOR CAPACITY IN PROGRESS - DEEM PATIENT AS HAVING CAPACITY.  PATIENT WAS AGREEABLE FOR SURGICAL DEBRIDEMENT ON   - 2 PC CONSENT FOR SURGICAL DEBRIDEMENT PLANNED FOR ; ATTEMPTED TO CALL NEXT OF [facility] [place] @ [phone] HOWEVER PHONE NUMBER APPEARS DISCONNECTED  - VASCULAR SX CONSULT IN PROGRESS - PATIENT MAY REQUIRE AKA, BUT REFUSED ON PREVIOUS CONVERSATIONS - WAS AGREEABLE TO LOCAL DEBRIDMENT/INTERVENTIONS  # MEDICAL CLEARANCE FOR SURGERY - RCRI 1 POINT - 6% RISK OF DEATH, MI OR CARDIAC ARREST; CARDIOLOGY CONSULT IN PROGRESS FOR MEDICAL CLEARANCE  # SACRAL AND RIGHT ISCHIAL UNSTAGEABLE ULCERS W/ SSTI  S/P DEBRIDEMENT - ON VANCOMYCIN AND CEFEPIME, SURGERY CONSULT IN PROGRESS -  PSYCHIATRY CONSULT FOR CAPACITY IN PROGRESS  # ACUTE METABOLIC ENCEPHALOPATHY - SUSPECT S/T ACUTE INFECTION & HYPERNATREMIA - F/U CT HEAD  # HYPERNATREMIA SUSPECT S/T POOR PO INTAKE - PLACED ON IVF  # LEFT ARM SWELLING - REVIEWED LEFT ARM U/S W/ OUT THROMBUS, WARM COMPRESSES  # ASHLEY - HOLDING VANCOMYCIN, MONITOR - F/U UA, BLADDER SCAN  # PAD  # HTN  # DM W/ DIABETIC NEUROPATHY  # OA/OP  # SCHIZOPHRENIA - HOLD MEDICATION  # CASE D/W PATIENT'S ? PARTNER - LISA KELLY - WHO REPORTS HE IS NOT NEXT OF KIN OR HCP - [phone] ON ; HE UNDERSTOOD PATIENT'S PROGNOSIS WAS GUARDED  # GI AND DVT PPX    ELEN CASILLAS MD COVERING FARHAN CASILLAS MD.

## 2021-01-13 NOTE — PROGRESS NOTE ADULT - SUBJECTIVE AND OBJECTIVE BOX
Patient is a 75y old  Female who presents with a chief complaint of foot ulcer (05 Jan 2021 14:24)      HPI:  74 yo F from [facility], wheelchair bound with medical history significant of HTN, Diabetes, Osteoarthritis, Osteoporosis, Peripheral arterial disease, Diabetic neuropathy, HLD, Schizophrenia comes in with worsening ulceration to b/l heels. She has been having ulcers for past couple months, has been progressive. She was treated with IV antbx at NH but did not get better. It was worsening with necrotic changes and increasing foul smelling discharge. She denies fever, abdominal pain, chest pain, urinary symptoms, fall, trauma. No h/o nausea, vomiting, diarrhea, cough, dyspnea, sick contacts, recent illness.  (05 Jan 2021 14:24)      Podiatry HPI: 74 y/o female with full history as noted above, podiatry consulted for b/l heel wounds. Pt is from [facility], wheelchair bound with medical history significant of HTN, Diabetes, Osteoarthritis, Osteoporosis, PAD, Diabetic neuropathy, HLD, Schizophrenia comes in with worsening ulceration to b/l heels. Patient seen resting in bed comfortably, AAOx3. Patient states she has had the wounds for a long time, maybe more than 6 months. She relates receiving local wound care previously in the NH using santyl dressings. She endorses occasional pain to wound sites. Patient was  under podiatric care in previous admissions for b/l heel wounds with osteomyelitis. She states she is not interesting in surgery for her feet, she wishes to continue with local wound care. She denies nausea, vomiting, chills, fever, SOB.     Podiatry Progress: Full history as noted above, podiatry f/u for b/l heel wounds. Patient resting in bed. Patient to have debridement done today with 2 physician consent.      Medications acetaminophen   Tablet .. 650 milliGRAM(s) Oral every 6 hours  cefepime   IVPB 1000 milliGRAM(s) IV Intermittent every 8 hours  Dakins Solution - Full Strength 1 Application(s) Topical once  dextrose 40% Gel 15 Gram(s) Oral once  dextrose 5% + sodium chloride 0.45%. 1000 milliLiter(s) IV Continuous <Continuous>  dextrose 5%. 1000 milliLiter(s) IV Continuous <Continuous>  dextrose 5%. 1000 milliLiter(s) IV Continuous <Continuous>  dextrose 50% Injectable 25 Gram(s) IV Push once  dextrose 50% Injectable 12.5 Gram(s) IV Push once  dextrose 50% Injectable 25 Gram(s) IV Push once  glucagon  Injectable 1 milliGRAM(s) IntraMuscular once  insulin lispro (ADMELOG) corrective regimen sliding scale   SubCutaneous three times a day before meals  insulin lispro (ADMELOG) corrective regimen sliding scale   SubCutaneous at bedtime  metroNIDAZOLE  IVPB 500 milliGRAM(s) IV Intermittent every 8 hours  simvastatin 40 milliGRAM(s) Oral at bedtime  sodium chloride 0.9%. 1000 milliLiter(s) IV Continuous <Continuous>  sodium chloride 0.9%. 1000 milliLiter(s) IV Continuous <Continuous>  sodium chloride 0.9%. 1000 milliLiter(s) IV Continuous <Continuous>    FHNo pertinent family history in first degree relatives    ,   PMHCOVID-19    Osteomyelitis    DM (diabetes mellitus)    Paranoid schizophrenia    HLD (hyperlipidemia)    PAD (peripheral artery disease)    HTN (hypertension)       PSHNo significant past surgical history        Labs                          8.7    17.10 )-----------( 404      ( 13 Jan 2021 07:43 )             27.0      01-13    151<H>  |  121<H>  |  24<H>  ----------------------------<  136<H>  3.6   |  17<L>  |  0.68    Ca    8.4      13 Jan 2021 07:43  Phos  2.1     01-13  Mg     1.9     01-13       Vital Signs Last 24 Hrs  T(C): 36.8 (13 Jan 2021 05:15), Max: 36.8 (13 Jan 2021 05:15)  T(F): 98.2 (13 Jan 2021 05:15), Max: 98.2 (13 Jan 2021 05:15)  HR: 100 (13 Jan 2021 05:15) (83 - 100)  BP: 141/73 (13 Jan 2021 05:15) (137/56 - 141/73)  BP(mean): --  RR: 17 (13 Jan 2021 05:15) (16 - 18)  SpO2: 100% (13 Jan 2021 05:15) (95% - 100%)  Sedimentation Rate, Erythrocyte: 99 mm/Hr (01-05-21 @ 11:45)         C-Reactive Protein, Serum: 5.63 mg/dL (01-06-21 @ 10:27)   WBC Count: 17.10 K/uL <H> (01-13-21 @ 07:43)        PHYSICAL EXAM: Dressings left intact as patient is pre op. Physical exam as per 1/12/21  LE Focused:    Vasc:  DP/PT nonpalpable, monophasic and regular on doppler b/l, CFT brisk to digits, TG warm to warm b/l,   Derm:   Wound 1: L heel closed necrotic eschar measuring ~4x3.5x0.1 cm with mild mac wound erythema. No tunneling or undermining noted. No discharge from the area. No malodor or PTB noted. Stable in appearance  Wound 2: R heel wound s/p bedside debridement performed 1/5/21, now measuring ~ 9x6.5x0.3 cm, fibrous, necrotic base, +PTB. No active drainage. +Malodor.     Neuro: protective sensation absent at level of digits b/l  MSK: no tenderness on palpation of periwound areas b/l     IMAGING:  < from: MR Foot No Cont, Right (01.06.21 @ 11:08) >    EXAM:  MR FOOT RT                            PROCEDURE DATE:  01/06/2021          INTERPRETATION:  Clinical indications: Right heel ulceration and eschar status post debridement. Concern for osteomyelitis.    Multiplanar multisequence MRI of the right foot was performed localized to the hindfoot.    Correlation is made with prior MRI from September 11, 2019.    FINDINGS:    There is a large wound along the plantar aspect of the calcaneus posteriorly which extends nearly extends to bone. There is surrounding soft tissue edema about this site with evidence of an overlying bandage. The degree of soft tissue deficiency is increased compared to the prior examination. There is extensive osseous edema and corresponding hypointense T1 marrow signalthroughout the calcaneus extending from the posterior plantar margin to the level of the angle of Gissane. This is progressed compared to the prior examination and consistent with osteomyelitis. Marrow signal within the remainder of the foot is otherwise preserved.    There is confluent edema tracking along the abductor hallucis and flexor digitorum brevis muscles and within the plantar subcutaneous fat subjacent to this region. In total this area measures approximately 2.2 cm in transverse dimension, approximately 4 cm in anteroposterior posterior dimension, and approximately 1.6 cm in craniocaudal dimension. Findings may be related to underlying phlegmon or abscess. Evaluation is limited by the lack of intravenous contrast. Distal to this site there is edema throughout the visualized musculature consistent with myositis.    Visualized tendinous structures remain intact. There is no acute ligamentous injury. Visualized joint spaces are preserved without joint effusion.    IMPRESSION:    Osteomyelitis involving the calcaneus which extends from the plantar posterior aspect of the calcaneus to the level of the angle of Gissane. This is progressed compared to the prior examination. This is seen in the setting of diffuse soft tissue deficiency along the posterior plantar aspect of the calcaneus.    Confluent edema adjacent to this region within the abductor hallucis and flexor digitorum brevis muscles and within the subjacent plantar subcutaneous fat. This may be related to abscess orphlegmon.            OSWALDO ALFRED MD; Attending Radiologist  This document has been electronically signed. Jan 6 2021 11:49AM    < end of copied text >    -------------------------------------------------------------------------------------------------------------  < from: [facility] Physiol Extremity Lower 3+ Level, BI (01.05.21 @ 17:55) >    EXAM:  US PHYSIOL LWR EXT 3+ LEV BI                            PROCEDURE DATE:  01/05/2021          INTERPRETATION:  Clinical information: History of diabetes, nonsmoker, hypertension, hyperlipidemia, presents with bilateral heel ulcers.    Comparison: Lower extremity arterial Doppler study dated 5/13/2020.    Technique: Lower extremity arterial Doppler study. Note that pressure measurements were not obtained at the thigh level.    Findings: Ankle brachial index measures 1.33 bilaterally, compared with prior values of 1.41 on the right and 1.36 on the left. No segmental arterial pressure gradient is present.    PVR waveforms are normal in amplitude and pulsatility from the thigh through the ankle level. They're diminished in amplitude at the metatarsal and digital levels in symmetric fashion, similar to the prior exam.    Impression: No Doppler evidence of hemodynamically significant arterial inflow abnormality in either lower extremity.    Evidence of small vessel disease in the feet.    No significant interval change since study of 5/13/2020.            SOHAN PA MD; Attending Radiologist  This document has been electronically signed. Jan 6 2021  9:13AM    < end of copied text >      --------------------------------------------------------------------------------------------------------------  < from: Xray Foot AP + Lateral + Oblique, Right (01.05.21 @ 18:00) >    EXAM:  FOOT RIGHT (MINIMUM 3 VIEWS)                            PROCEDURE DATE:  01/05/2021          INTERPRETATION:  Right foot    HISTORY: Status post bedside debridement.     Two views of the right foot show thinning of soft tissues near the calcaneus likely indicating site of debridement. The joint spaces are maintained. There is questionable exposure of the plantar surface of the calcaneus.    IMPRESSION: Calcaneal soft tissues and questionable exposure as noted. Clinical correlation is suggested.        Thank you for this referral.            REID CRAMER MD; Attending Interventional Radiologist  This document has been electronically signed. Jan 6 2021 11:10AM    < end of copied text >    -------------------------------------------------------------------------------------------  a< from: Xray Ankle Complete 3 Views, Bilateral (01.05.21 @ 14:37) >    EXAM:  ANKLE BILATERAL (MINIMUM 3 V)                            PROCEDURE DATE:  01/05/2021          INTERPRETATION:  CLINICAL INDICATION:  Osteomyelitis; evaluate for soft tissue emphysema.    COMPARISON:  Left ankle radiography 5/11/2020.    TECHNIQUE:  AP, oblique and crosstable lateral views left ankle. Oblique and crosstable lateral views right ankle. Technologist noted that the best possible films were obtained.    FINDINGS:    Right ankle: Generalized osteopenia. Subcutaneous emphysema is identified along the plantar aspect of the right hindfoot inferior to the calcaneus, with an overlying skin defect noted along the posterior aspect of the calcaneus. Osteolysis involving the inferior/posterior aspect of the right calcaneus cannot beexcluded. MRI evaluation can be performed for further assessment. Extensive vascular calcification is noted. No ankle joint effusion is noted.    Left ankle: Generalized osteopenia. There is no evidence for acute fracture or dislocation. The ankle mortise appears grossly intact. No ankle joint effusion is noted. Extensive vascular calcifications identified. No significant soft tissue swelling.      IMPRESSION:  No evidence for acute fracture. Subcutaneous emphysema is identified along the plantaraspect of the right hindfoot inferior to the calcaneus, with an overlying skin defect noted along the posterior aspect of the calcaneus. Osteolysis involving the inferior/posterior aspect of the right calcaneus cannot be excluded. MRI evaluation can be performed for further assessment.                MARA LARKIN MD; Attending Radiologist  This document has been electronically signed. Jan 5 2021  3:57PM    < end of copied text >        CULTURES:   cCulture - Surgical Swab (01.05.21 @ 22:13)   Specimen Source: .Surgical Swab Right heel wound   Culture Results:   Few Morganella morganii   Few Proteus mirabilis       Historical Values  Culture - Surgical Swab (01.05.21 @ 22:13)   Specimen Source: .Surgical Swab Right heel wound   Culture Results:   Few Morganella morganii   Few Proteus mirabilis

## 2021-01-13 NOTE — PROGRESS NOTE ADULT - PROBLEM SELECTOR PLAN 4
Hospital Medicine History & Physical Note    Date of Service  7/31/2019    Primary Care Physician  Jenae Steward M.D.    Consultants  None   ERP discussed with , he said admit patient and monitor him until counts have improved    Code Status  Full Code    Chief Complaint  Chief Complaint   Patient presents with   • Fever   • Fatigue       History of Presenting Illness  Matthias is a very pleasant 47 y.o. male with a past medical history of Recently diagnosed gastric cancer T4b N3a Mx, currently undergoing chemotherapy with FLOT, last chemotherapy was 7/21 #Cycle 2/4 preoperatively, and is currently scheduled to undergo her next chemotherapy this coming Saturday, hence patient presented to the emergency room on 7/31/2019 for evaluation of fevers chills as well as leukopenia.  Patient was apparently at his oncologist office this morning when they stated that he should go to the ER for further evaluation given that he developed fever, headache increase weakness and overall fatigue for the past several days.  His hemoglobin ER revealed WBC count 1.5 neutrophil absolute count 0.08.  Fortunately chest x-ray urinalysis negative, no obvious source of infection noted however patient will be admitted on neutropenic precautions, and empiric IV antibiotics will be provided including further monitoring.     Review of Systems  Review of Systems   Constitutional: Positive for chills and malaise/fatigue. Negative for fever.   HENT: Negative for congestion, hearing loss, sore throat and tinnitus.    Eyes: Negative for blurred vision, double vision, photophobia and pain.   Respiratory: Negative for cough, hemoptysis, sputum production, shortness of breath and stridor.    Cardiovascular: Negative for chest pain, palpitations, orthopnea, claudication and PND.   Gastrointestinal: Negative for blood in stool, constipation, heartburn, melena, nausea and vomiting.   Genitourinary: Negative for dysuria, frequency and urgency.    Musculoskeletal: Negative for back pain, myalgias and neck pain.   Neurological: Negative for dizziness, tingling, tremors, sensory change, speech change, weakness and headaches.   Psychiatric/Behavioral: Negative for depression, memory loss and suicidal ideas. The patient is nervous/anxious.    All other systems reviewed and are negative.      Past Medical History  Past Medical History:   Diagnosis Date   • Cancer (HCC) 2019    Gastric   • Anesthesia     slow to wake up after EGD   • Anxiety    • Bell palsy 2000; 2010    x2; mild left facial droop   • Enlarged prostate    • History of positive PPD, untreated    • Hypertension    • Numbness of arm     and hands, bilateral   • Pain     neck and arms   • Prediabetes    • Psychiatric problem     depression    • Snoring    • Urinary bladder disorder     frequent urination (enlarged prostate)       Surgical History   has a past surgical history that includes other surgical procedure (2014); pr upper gi endoscopy,biopsy (6/12/2019); pr upper gi endoscopy,w/dilat,gastric out (6/12/2019); gastroscopy (6/12/2019); egd w/endoscopic ultrasound (6/12/2019); egd with asp/bx (6/12/2019); and cath placement (Right, 6/13/2019).    Family History  Denies family history of cancers    Social History   reports that he has been smoking cigarettes. He has a 15.00 pack-year smoking history. He has never used smokeless tobacco. He reports that he drinks alcohol. He reports that he has current or past drug history. Drug: Marijuana.    Allergies  No Known Allergies    Medications  Prior to Admission medications    Medication Sig Start Date End Date Taking? Authorizing Provider   NARCAN 4 MG/0.1ML Liquid Spray 1 Spray in nose as needed. 6/3/19   Physician Outpatient   morphine (MS IR) 15 MG tablet Take 15 mg by mouth every four hours as needed for Severe Pain.    Physician Outpatient   ondansetron (ZOFRAN ODT) 8 MG TABLET DISPERSIBLE Take 8 mg by mouth every 8 hours as needed for Nausea.     Physician Outpatient   oxyCODONE immediate release (ROXICODONE) 10 MG immediate release tablet Take 10 mg by mouth every 2 hours as needed for Moderate Pain.    Physician Outpatient   promethazine (PHENERGAN) 25 MG Tab Take 0.5 Tabs by mouth every 6 hours as needed. 6/13/19   Dickson Simpson M.D.   omeprazole (PRILOSEC) 40 MG delayed-release capsule Take 1 Cap by mouth every day. 6/13/19   Dickson Simpson M.D.   lisinopril (PRINIVIL) 10 MG Tab Take 10 mg by mouth every day.    Physician Outpatient   docusate sodium (COLACE) 100 MG Cap Take 100 mg by mouth 2 times a day.    Physician Outpatient       Physical Exam  Temp:  [38.5 °C (101.3 °F)] 38.5 °C (101.3 °F)  Pulse:  [94] 94  Resp:  [18] 18  BP: (100)/(53) 100/53  SpO2:  [98 %] 98 %  Physical Exam   Constitutional: He is oriented to person, place, and time. He appears well-developed and well-nourished. No distress.   Ill appearing   HENT:   Head: Normocephalic and atraumatic.   Mouth/Throat: No oropharyngeal exudate.   Mucous membranes dry   Eyes: Pupils are equal, round, and reactive to light. Conjunctivae are normal. Right eye exhibits no discharge. No scleral icterus.   Neck: Neck supple. No JVD present. No thyromegaly present.   Cardiovascular: Normal rate and intact distal pulses.   No murmur heard.  Pulses:       Dorsalis pedis pulses are 2+ on the right side, and 2+ on the left side.   Cap refill < 3 s   Pulmonary/Chest: Effort normal and breath sounds normal. No stridor. No respiratory distress. He has no wheezes. He has no rales.   Abdominal: Soft. Bowel sounds are normal. He exhibits no distension. There is no tenderness. There is no rebound.   Musculoskeletal: Normal range of motion. He exhibits no edema.   Neurological: He is alert and oriented to person, place, and time. No cranial nerve deficit.   Skin: Skin is warm and dry. He is not diaphoretic. No erythema.   Psychiatric: He has a normal mood and affect. His behavior is normal.  Thought content normal.   Nursing note and vitals reviewed.      Laboratory:  Recent Labs     07/31/19  1653   WBC 1.5*   RBC 3.05*   HEMOGLOBIN 9.1*   HEMATOCRIT 27.8*   MCV 91.1   MCH 29.8   MCHC 32.7*   RDW 42.6   PLATELETCT 192   MPV 10.1     Recent Labs     07/31/19  1653   SODIUM 133*   POTASSIUM 4.0   CHLORIDE 102   CO2 24   GLUCOSE 126*   BUN 15   CREATININE 1.25   CALCIUM 8.2*     Recent Labs     07/31/19  1653   ALTSGPT 16   ASTSGOT 20   ALKPHOSPHAT 40   TBILIRUBIN 0.6   GLUCOSE 126*     Recent Labs     07/31/19  1653   APTT 40.5*   INR 1.05           Urinalysis:          Imaging:  DX-CHEST-PORTABLE (1 VIEW)   Final Result      1.  There is no acute cardiopulmonary process.          Assessment/Plan:  I anticipate this patient will require at least two midnights for appropriate medical management, necessitating inpatient admission.    * Neutropenic fever (HCC)  Assessment & Plan  Unknown source, UA and CXR unremarkable, patient denies having any diarrhea.  Pan cultures ordered and pending  Started on IV vancomycin and IV cefepime, will de-escalate as warranted  Continue fever control with PRN Tylenol    Hyponatremia  Assessment & Plan  2/2 to dehydration  IV fluids started  Recheck bmp in the am    Chemotherapy-induced neutropenia (HCC)- (present on admission)  Assessment & Plan  Possibly infection related? Last chemotherapy was 7/21, should be out of yenifer window.  Continue to monitor  Will start neupogen.    HTN (hypertension)- (present on admission)  Assessment & Plan  Controlled  Resume home dose of lisinopril    Gastric cancer (HCC)- (present on admission)  Assessment & Plan  T4b N3a Mx  Undergoing chemotherapy under  direction  Thus far has received Cycle 2/4 preoperatively next one due 8/3       Epigastric pain- (present on admission)  Assessment & Plan  Intermittent, resume home oxycodone and MS IR      VTE prophylaxis: Prophylaxis: SCDs       Pt takes Perphenazine at home  home medication held as recommended by Psychiatry  Patient unconsentable due to somnolence and unarousable   Psychiatry Dr. Lackey.

## 2021-01-13 NOTE — PROGRESS NOTE ADULT - SUBJECTIVE AND OBJECTIVE BOX
Patient denies chest pain or shortness of breath.   Review of systems otherwise (-)  	  acetaminophen   Tablet .. 650 milliGRAM(s) Oral every 6 hours PRN  aspirin enteric coated 81 milliGRAM(s) Oral daily  cefepime   IVPB 1000 milliGRAM(s) IV Intermittent every 8 hours  collagenase Ointment 1 Application(s) Topical daily  Dakins Solution - Full Strength 1 Application(s) Topical once  dextrose 40% Gel 15 Gram(s) Oral once  dextrose 5% + sodium chloride 0.45%. 1000 milliLiter(s) IV Continuous <Continuous>  dextrose 50% Injectable 25 Gram(s) IV Push once  dextrose 50% Injectable 12.5 Gram(s) IV Push once  dextrose 50% Injectable 25 Gram(s) IV Push once  enoxaparin Injectable 40 milliGRAM(s) SubCutaneous daily  glucagon  Injectable 1 milliGRAM(s) IntraMuscular once  insulin lispro (ADMELOG) corrective regimen sliding scale   SubCutaneous three times a day before meals  insulin lispro (ADMELOG) corrective regimen sliding scale   SubCutaneous at bedtime  metroNIDAZOLE  IVPB 500 milliGRAM(s) IV Intermittent every 8 hours  perphenazine 16 milliGRAM(s) Oral two times a day  simvastatin 40 milliGRAM(s) Oral at bedtime  vancomycin  IVPB 750 milliGRAM(s) IV Intermittent every 12 hours                            8.7    17.10 )-----------( 404      ( 13 Jan 2021 07:43 )             27.0       Hemoglobin: 8.7 g/dL (01-13 @ 07:43)  Hemoglobin: 9.0 g/dL (01-12 @ 07:34)  Hemoglobin: 9.0 g/dL (01-11 @ 06:00)  Hemoglobin: 8.2 g/dL (01-10 @ 06:22)  Hemoglobin: 9.4 g/dL (01-09 @ 07:20)      01-13    151<H>  |  121<H>  |  24<H>  ----------------------------<  136<H>  3.6   |  17<L>  |  0.68    Ca    8.4      13 Jan 2021 07:43  Phos  2.1     01-13  Mg     1.9     01-13      Creatinine Trend: 0.68<--, 0.76<--, 0.93<--, 1.06<--, 1.44<--, 1.05<--    COAGS: PT/INR - ( 13 Jan 2021 07:43 )   PT: 18.8 sec;   INR: 1.62 ratio          T(C): 36.9 (01-13-21 @ 16:20), Max: 36.9 (01-13-21 @ 16:20)  HR: 94 (01-13-21 @ 16:20) (84 - 106)  BP: 141/51 (01-13-21 @ 16:20) (117/56 - 146/75)  RR: 17 (01-13-21 @ 16:20) (14 - 18)  SpO2: 100% (01-13-21 @ 16:20) (100% - 100%)  Wt(kg): --    I&O's Summary        HEENT:  (-)icterus (-)pallor  CV: N S1 S2 1/6 ANGELIC (+)2 Pulses B/l  Resp:  Clear to ausculatation B/L, normal effort  GI: (+) BS Soft, NT, ND  Lymph:  (-)Edema, (-)obvious lymphadenopathy  Skin: Warm to touch, Normal turgor  Psych: Appropriate mood and affect      ASSESSMENT/PLAN: 	75y  Female  wheelchair bound with medical history significant of HTN, Diabetes, Osteoarthritis, Osteoporosis, Peripheral arterial disease, Diabetic neuropathy, HLD, Schizophrenia historically preserved LV and R function, comes in with worsening ulceration to b/l heels s/p debridement.    - s/p Partial calcanectomy of right foot   - preop lower extremity amputation   - optimized from a cardiac prospective for potential lower extremity amputation  - cont abx per primary team    Jm Cortes MD, [facility]  BEEPER [phone]

## 2021-01-13 NOTE — PROGRESS NOTE ADULT - SUBJECTIVE AND OBJECTIVE BOX
extended dwell catheter placed via left cephalic vein . Good  flow . 6 cm 20 cyrus needle . Dressing  c/d/i. May use immediately .

## 2021-01-13 NOTE — BRIEF OPERATIVE NOTE - NSICDXBRIEFPREOP_GEN_ALL_CORE_FT
PRE-OP DIAGNOSIS:  Foot osteomyelitis, right 13-Jan-2021 14:27:27  Jose Garcia  
PRE-OP DIAGNOSIS:  Sacral decubitus ulcer 08-Jan-2021 12:31:38  Patito Martinez

## 2021-01-13 NOTE — BRIEF OPERATIVE NOTE - NSICDXBRIEFPROCEDURE_GEN_ALL_CORE_FT
PROCEDURES:  Partial calcanectomy of right foot 13-Jan-2021 14:27:10  Jose Garcia  Excisional debridement of ulcer of right foot 13-Jan-2021 14:26:56  Jose Garcia  
PROCEDURES:  Debridement of ulcer of sacrum or ulcer of ischium 08-Jan-2021 12:30:57  Patito Martinez

## 2021-01-13 NOTE — PROGRESS NOTE BEHAVIORAL HEALTH - NSBHCONSULTRECOMMENDOTHER_PSY_A_CORE FT
1. In multiple recent encounters with this writer and other providers, pt did have capacity to consent to debridement of her heel ulcers, and stated that she consents to debridement  2. Consent currently unobtainable due to pt somnolence and brother cannot be reached for surrogate consent. We agree with plan of primary team to proceed with [facility] physician consent, given previously expressed pt wishes and favorable risk-benefit tradeoff of procedure  3. While pt home psychiatric medication (perphenazine 16 mg BID) is unlikely to be cause of pt somnolence, given that she has not received it since 1/9, recommend DC'ing to give pt's alertness an opportunity to improve  4. No indication for other standing or PRN psychotropic medications at this time  5. Medical management as directed by primary team and podiatry  6. Psychiatry is signing off. Reconsult if additional issues arise as inpatient  7. Case d/w primary team    Norma Lackey MD  Director, Consultation-Liaison Psychiatry Service  [id number]

## 2021-01-13 NOTE — PROGRESS NOTE ADULT - ASSESSMENT
HPI:      Patient is 75 year old wheelchair bound female from [facility] with a past medical history of HTN, type II diabetes mellitus, OA, osteoporosis, PAD, diabetic neuropathy, HLD, paranoid schizophrenia and recurrent osteomyelitis who presented to the emergency department on 1/5 with c/o worsening infection of her bilateral heel ulcerations in spite local wound care with santyl dressings and outpatient antibiotic management. Per ECC, patient's wounds have had increasing purulent, foul smelling discharge and necrotic changes. Patient with additional ulcerations to her coccyx, ischium, and sacrum for which wound care and surgery were consulted. Podiatry also consulted for her heel ulcerations: patient s/p bedside debridement of her right heel ulceration, refusing any surgical interventions at this time. Infectious disease also following.    1/6: MRI with findings of: Osteomyelitis involving the calcaneus which extends from the plantar posterior aspect of the calcaneus to the level of the angle of Gissane. This is progressed compared to the prior examination.   1/7:  Pt states she understands that she may loose her leg and/or her life without surgical intervention for RLE OM, still refusing debridement.  Psych consulted. Patient needs medical clearance for OR on 1/8.    1/11; Patient I and D not done, anesthesia unable to get consent. Patient not consenting to OR procedure. OR procedure cancelled due to no consent. Patient deemed to have full capacity as per Psychiatry.     1/13: Patient seen and examined at bedside. S/P extended dwell placement by IR. Patient scheduled for surgical debridement of right heel.

## 2021-01-13 NOTE — PROGRESS NOTE BEHAVIORAL HEALTH - SUMMARY
75F, single and living in nursing home ([facility]), with PHx of schizophrenia and MHx of HTN, Diabetes, Osteoarthritis, Osteoporosis, Peripheral arterial disease, Diabetic neuropathy, and HLD, BIBEMS on 1/5 from NH for worsening BL heel ulcerations which have been chronic for the past few months, as well as chronic sacral decubitus ulcer. MRI showed progressive osteomyelitis of R foot and surgical debridement was recommended for both sacral and calcaneal ulcers, but pt initially stated she only wanted sacral debridement and refused it for her feet, despite being informed that the heel infection presented a more immediate risk to life and limb. Psych consult was requested for assessment of capacity to refuse heel debridement, but pt could not be interviewed on 1/7 due to somnolence. On exam on 1/8, pt presented pleasant, cooperative, linear and organized, denied SI/HI/AVH and has not manifested any s/s of psychosis, mike, depression or suicidality at the time of assessment or at any time during this admission. As of 1/8, while pt appeared to have obvious memory deficits c/w age-related dementia, she also demonstrated an adequate basic understanding of her medical condition (osteomyelitis), the recommended treatment and the possible risks/benefits of the treatment and therefore did appear to have capacity to make decisions about the proposed procedure. While pt initially refused debridement of her heel ulcers, on 1/8 and on several subsequent occasions, she stated that she wants to have the operation in order to prevent loss of limb or life, but for the past several days she has been too somnolent to provide written consent and her NOK (brother) cannot be reached. Given pt's previously demonstrated capacity to consent to this procedure, statements that she wants to have it, and medical urgency of the surgery, we agree with decision of primary team and associate medical director to proceed with [facility] physician consent. Pt does not appear to present an acute risk of harm to self or others at the time of assessment, and does not appear to be in need of admission to  psych at the time of assessment.

## 2021-01-13 NOTE — PROGRESS NOTE ADULT - ATTENDING COMMENTS
seen at bedside. 2 physician consent in chart. plan for debridement with partial calcanectomy as source control. may need more proximal amputation in the future.

## 2021-01-13 NOTE — PROGRESS NOTE ADULT - SUBJECTIVE AND OBJECTIVE BOX
NP Note discussed with Primary Attending      Patient is a 75y old  Female who presents with a chief complaint of foot ulcer (13 Jan 2021 11:52)      INTERVAL HPI/OVERNIGHT EVENTS: no new complaints    MEDICATIONS  (STANDING):  aspirin enteric coated 81 milliGRAM(s) Oral daily  cefepime   IVPB 1000 milliGRAM(s) IV Intermittent every 8 hours  collagenase Ointment 1 Application(s) Topical daily  Dakins Solution - Full Strength 1 Application(s) Topical once  dextrose 40% Gel 15 Gram(s) Oral once  dextrose 5% + sodium chloride 0.45%. 1000 milliLiter(s) (75 mL/Hr) IV Continuous <Continuous>  dextrose 50% Injectable 12.5 Gram(s) IV Push once  dextrose 50% Injectable 25 Gram(s) IV Push once  dextrose 50% Injectable 25 Gram(s) IV Push once  enoxaparin Injectable 40 milliGRAM(s) SubCutaneous daily  glucagon  Injectable 1 milliGRAM(s) IntraMuscular once  insulin lispro (ADMELOG) corrective regimen sliding scale   SubCutaneous three times a day before meals  insulin lispro (ADMELOG) corrective regimen sliding scale   SubCutaneous at bedtime  metroNIDAZOLE  IVPB 500 milliGRAM(s) IV Intermittent every 8 hours  perphenazine 16 milliGRAM(s) Oral two times a day  simvastatin 40 milliGRAM(s) Oral at bedtime  vancomycin  IVPB 750 milliGRAM(s) IV Intermittent every 12 hours    MEDICATIONS  (PRN):  acetaminophen   Tablet .. 650 milliGRAM(s) Oral every 6 hours PRN Moderate Pain (4 - 6)      __________________________________________________  REVIEW OF SYSTEMS:    CONSTITUTIONAL: No fever,   EYES: no acute visual disturbances  NECK: No pain or stiffness  RESPIRATORY: No cough; No shortness of breath  CARDIOVASCULAR: No chest pain, no palpitations  GASTROINTESTINAL: No pain. No nausea or vomiting; No diarrhea   NEUROLOGICAL: No headache or numbness, no tremors  MUSCULOSKELETAL: No joint pain, no muscle pain  GENITOURINARY: no dysuria, no frequency, no hesitancy  PSYCHIATRY: hx of schizophrenia , no anxiety  ALL OTHER  ROS negative        Vital Signs Last 24 Hrs  T(C): 36.9 (13 Jan 2021 16:20), Max: 36.9 (13 Jan 2021 16:20)  T(F): 98.4 (13 Jan 2021 16:20), Max: 98.4 (13 Jan 2021 16:20)  HR: 94 (13 Jan 2021 16:20) (84 - 106)  BP: 141/51 (13 Jan 2021 16:20) (117/56 - 146/75)  BP(mean): 84 (13 Jan 2021 15:35) (66 - 92)  RR: 17 (13 Jan 2021 16:20) (14 - 18)  SpO2: 100% (13 Jan 2021 16:20) (100% - 100%)    ________________________________________________  PHYSICAL EXAM:  GENERAL: NAD  HEENT: Normocephalic;  conjunctivae and sclerae clear; moist mucous membranes;   NECK : supple  CHEST/LUNG: Clear to auscultation bilaterally with good air entry   HEART: S1 S2  regular; no murmurs, gallops or rubs  ABDOMEN: Soft, Nontender, Nondistended; Bowel sounds present  EXTREMITIES: B/L heel pressure ulcers, rt heel debridement,   SKIN: warm and dry; no rash, multiple sacral ulcer, b/l heel ulcer s/p debridement  NERVOUS SYSTEM:  Awake and alert; Oriented  to place, person and time ; no new deficits    _________________________________________________  LABS:                        8.7    17.10 )-----------( 404      ( 13 Jan 2021 07:43 )             27.0     01-13    151<H>  |  121<H>  |  24<H>  ----------------------------<  136<H>  3.6   |  17<L>  |  0.68    Ca    8.4      13 Jan 2021 07:43  Phos  2.1     01-13  Mg     1.9     01-13      PT/INR - ( 13 Jan 2021 07:43 )   PT: 18.8 sec;   INR: 1.62 ratio             CAPILLARY BLOOD GLUCOSE      POCT Blood Glucose.: 125 mg/dL (13 Jan 2021 16:58)  POCT Blood Glucose.: 128 mg/dL (13 Jan 2021 11:24)  POCT Blood Glucose.: 132 mg/dL (13 Jan 2021 07:49)  POCT Blood Glucose.: 117 mg/dL (12 Jan 2021 21:29)        RADIOLOGY & ADDITIONAL TESTS:  EXAM:  US DPLX UPR EXT VEINS LTD LT                            PROCEDURE DATE:  01/10/2021          INTERPRETATION:  CLINICAL INDICATION: 75 years  Female with LUE swelling, rule out thrombosis.    COMPARISON: None available.    TECHNIQUE: Duplex sonography of the LEFT UPPER extremity veins with color and spectral Doppler, with and without compression.    FINDINGS:    The left internal jugular, subclavian, axillary and brachial veins are patent and compressible where applicable.  The basilic and cephalic veins (superficial veins) are patent and without thrombus.    Doppler examination shows normal spontaneous and phasic flow.    Edema is visualized.    IMPRESSION:  No evidence of left upper extremity deep venous thrombosis.    EXAM:  MR FOOT RT                            PROCEDURE DATE:  01/06/2021          INTERPRETATION:  Clinical indications: Right heel ulceration and eschar status post debridement. Concern for osteomyelitis.    Multiplanar multisequence MRI of the right foot was performed localized to the hindfoot.    Correlation is made with prior MRI from September 11, 2019.    FINDINGS:    There is a large wound along the plantar aspect of the calcaneus posteriorly which extends nearly extends to bone. There is surrounding soft tissue edema about this site with evidence of an overlying bandage. The degree of soft tissue deficiency is increased compared to the prior examination. There is extensive osseous edema and corresponding hypointense T1 marrow signalthroughout the calcaneus extending from the posterior plantar margin to the level of the angle of Gissane. This is progressed compared to the prior examination and consistent with osteomyelitis. Marrow signal within the remainder of the foot is otherwise preserved.    There is confluent edema tracking along the abductor hallucis and flexor digitorum brevis muscles and within the plantar subcutaneous fat subjacent to this region. In total this area measures approximately 2.2 cm in transverse dimension, approximately 4 cm in anteroposterior posterior dimension, and approximately 1.6 cm in craniocaudal dimension. Findings may be related to underlying phlegmon or abscess. Evaluation is limited by the lack of intravenous contrast. Distal to this site there is edema throughout the visualized musculature consistent with myositis.    Visualized tendinous structures remain intact. There is no acute ligamentous injury. Visualized joint spaces are preserved without joint effusion.    IMPRESSION:    Osteomyelitis involving the calcaneus which extends from the plantar posterior aspect of the calcaneus to the level of the angle of Gissane. This is progressed compared to the prior examination. This is seen in the setting of diffuse soft tissue deficiency along the posterior plantar aspect of the calcaneus.    Confluent edema adjacent to this region within the abductor hallucis and flexor digitorum brevis muscles and within the subjacent plantar subcutaneous fat. This may be related to abscess orphlegmon.    EXAM:  XR ABDOMEN PORTABLE ROUTINE 1V                            PROCEDURE DATE:  01/05/2021          INTERPRETATION:  CLINICAL INDICATION:  Abdominal pain, constipation.    COMPARISON:  None    TECHNIQUE:  Portable supine radiography of the abdomen performed. Evaluation limited due to patient rotation.    FINDINGS:  Abundant fecal material is identified within the rectum, with additional fecal material scattered throughout the colon. There is no evidence for mechanical small bowel obstruction.No free intraperitoneal air is identified. Central pelvic calcifications identified which may be related to uterine myomatous calcification. Patient status post left hip arthroplasty.    IMPRESSION:  As above.    Imaging  Reviewed:  YES/NO    Consultant(s) Notes Reviewed:   YES/ No      Plan of care was discussed with patient and /or primary care giver; all questions and concerns were addressed

## 2021-01-13 NOTE — PROGRESS NOTE ADULT - PROBLEM SELECTOR PLAN 6
Controlled  Pt takes Amlodipine 5 mg & Metoprolol 100 mg at home  Resume home medications when medically appropriate.

## 2021-01-13 NOTE — PROGRESS NOTE ADULT - ASSESSMENT
A:  Osteomyelitis of calcaneus R foot   DMII    P:   Patient evaluated and chart reviewed  Patient to go to OR today at 1:00 with Dr. Watkins for debridement of right heel wound.  2 Physician consent to be obtained prior to surgery.  Podiatry will follow while in house.   Patient discussed with Dr. Watkins.

## 2021-01-13 NOTE — BRIEF OPERATIVE NOTE - NSICDXBRIEFPOSTOP_GEN_ALL_CORE_FT
POST-OP DIAGNOSIS:  Sacral decubitus ulcer 08-Jan-2021 12:31:44  Patito Martinez  
POST-OP DIAGNOSIS:  Foot osteomyelitis, right 13-Jan-2021 14:27:37  Jose Garcia

## 2021-01-13 NOTE — PROGRESS NOTE ADULT - SUBJECTIVE AND OBJECTIVE BOX
Covering for Dr. Lucero.  Asked to do b/l AKAs tomorrow.  Pt w ch PAD, b/l LE ulcers, s/p R debridement by podiatry.  Bed bound, poor functional status.  Somnolent, unable to consent.  LEs: dsgs clean.  No asc infection.    DW'ed med/pod/pall attds:     L ulcers stable, R wound s/p I&D.      Pt w Mult past objections to amps.    Will hold off surg for now unless cond changes or if pt agrees.    Family unreachable

## 2021-01-13 NOTE — PROGRESS NOTE ADULT - PROBLEM SELECTOR PLAN 5
Controlled as evidenced by A1c 6.8  Continue with sliding scale insulin coverage  Continue with glucose monitoring.

## 2021-01-13 NOTE — PROGRESS NOTE ADULT - PROBLEM SELECTOR PLAN 3
Continue with cleaning all wounds with normal saline and apply skin prep to the surrounding skin  Surgery following.

## 2021-01-14 LAB
ALBUMIN SERPL ELPH-MCNC: 1.6 G/DL — LOW (ref 3.5–5)
ALP SERPL-CCNC: 44 U/L — SIGNIFICANT CHANGE UP (ref 40–120)
ALT FLD-CCNC: 8 U/L DA — LOW (ref 10–60)
ANION GAP SERPL CALC-SCNC: 10 MMOL/L — SIGNIFICANT CHANGE UP (ref 5–17)
AST SERPL-CCNC: 7 U/L — LOW (ref 10–40)
BILIRUB SERPL-MCNC: 0.3 MG/DL — SIGNIFICANT CHANGE UP (ref 0.2–1.2)
BUN SERPL-MCNC: 22 MG/DL — HIGH (ref 7–18)
CALCIUM SERPL-MCNC: 8.2 MG/DL — LOW (ref 8.4–10.5)
CHLORIDE SERPL-SCNC: 121 MMOL/L — HIGH (ref 96–108)
CO2 SERPL-SCNC: 19 MMOL/L — LOW (ref 22–31)
CREAT SERPL-MCNC: 0.69 MG/DL — SIGNIFICANT CHANGE UP (ref 0.5–1.3)
GLUCOSE BLDC GLUCOMTR-MCNC: 176 MG/DL — HIGH (ref 70–99)
GLUCOSE BLDC GLUCOMTR-MCNC: 206 MG/DL — HIGH (ref 70–99)
GLUCOSE BLDC GLUCOMTR-MCNC: 206 MG/DL — HIGH (ref 70–99)
GLUCOSE BLDC GLUCOMTR-MCNC: 216 MG/DL — HIGH (ref 70–99)
GLUCOSE BLDC GLUCOMTR-MCNC: 233 MG/DL — HIGH (ref 70–99)
GLUCOSE SERPL-MCNC: 214 MG/DL — HIGH (ref 70–99)
HCT VFR BLD CALC: 23.2 % — LOW (ref 34.5–45)
HGB BLD-MCNC: 7.4 G/DL — LOW (ref 11.5–15.5)
MCHC RBC-ENTMCNC: 30 PG — SIGNIFICANT CHANGE UP (ref 27–34)
MCHC RBC-ENTMCNC: 31.9 GM/DL — LOW (ref 32–36)
MCV RBC AUTO: 93.9 FL — SIGNIFICANT CHANGE UP (ref 80–100)
NRBC # BLD: 0 /100 WBCS — SIGNIFICANT CHANGE UP (ref 0–0)
PLATELET # BLD AUTO: 356 K/UL — SIGNIFICANT CHANGE UP (ref 150–400)
POTASSIUM SERPL-MCNC: 3.5 MMOL/L — SIGNIFICANT CHANGE UP (ref 3.5–5.3)
POTASSIUM SERPL-SCNC: 3.5 MMOL/L — SIGNIFICANT CHANGE UP (ref 3.5–5.3)
PROT SERPL-MCNC: 4.8 G/DL — LOW (ref 6–8.3)
RBC # BLD: 2.47 M/UL — LOW (ref 3.8–5.2)
RBC # FLD: 14.1 % — SIGNIFICANT CHANGE UP (ref 10.3–14.5)
SODIUM SERPL-SCNC: 150 MMOL/L — HIGH (ref 135–145)
SURGICAL PATHOLOGY STUDY: SIGNIFICANT CHANGE UP
WBC # BLD: 14.79 K/UL — HIGH (ref 3.8–10.5)
WBC # FLD AUTO: 14.79 K/UL — HIGH (ref 3.8–10.5)

## 2021-01-14 PROCEDURE — 70450 CT HEAD/BRAIN W/O DYE: CPT | Mod: 26

## 2021-01-14 RX ADMIN — CEFEPIME 100 MILLIGRAM(S): 1 INJECTION, POWDER, FOR SOLUTION INTRAMUSCULAR; INTRAVENOUS at 05:06

## 2021-01-14 RX ADMIN — SODIUM CHLORIDE 75 MILLILITER(S): 9 INJECTION, SOLUTION INTRAVENOUS at 05:07

## 2021-01-14 RX ADMIN — SODIUM CHLORIDE 75 MILLILITER(S): 9 INJECTION, SOLUTION INTRAVENOUS at 12:00

## 2021-01-14 RX ADMIN — Medication 250 MILLIGRAM(S): at 17:08

## 2021-01-14 RX ADMIN — Medication 100 MILLIGRAM(S): at 13:28

## 2021-01-14 RX ADMIN — Medication 1 APPLICATION(S): at 13:32

## 2021-01-14 RX ADMIN — Medication 250 MILLIGRAM(S): at 05:07

## 2021-01-14 RX ADMIN — Medication 2: at 11:58

## 2021-01-14 RX ADMIN — Medication 2: at 08:42

## 2021-01-14 RX ADMIN — Medication 100 MILLIGRAM(S): at 05:06

## 2021-01-14 RX ADMIN — CEFEPIME 100 MILLIGRAM(S): 1 INJECTION, POWDER, FOR SOLUTION INTRAMUSCULAR; INTRAVENOUS at 13:28

## 2021-01-14 RX ADMIN — Medication 1: at 17:37

## 2021-01-14 RX ADMIN — Medication 100 MILLIGRAM(S): at 21:39

## 2021-01-14 RX ADMIN — CEFEPIME 100 MILLIGRAM(S): 1 INJECTION, POWDER, FOR SOLUTION INTRAMUSCULAR; INTRAVENOUS at 21:39

## 2021-01-14 NOTE — PROGRESS NOTE ADULT - PROBLEM SELECTOR PLAN 1
MRI noted above  c/w Cefepime day 10  c/w metronidazole day 9  IR placement of  extended dwell Dr. Hopper approved.  Wound culture grew few Morganella morganii and few Proteus mirabilis  s/p OR right heel debridement, obtained 2PC consent  ID Dr. Wellington  Podiatry following  Vascular Following.

## 2021-01-14 NOTE — PROGRESS NOTE ADULT - ASSESSMENT
HPI:    Patient is 75 year old wheelchair bound female from [facility] with a past medical history of HTN, type II diabetes mellitus, OA, osteoporosis, PAD, diabetic neuropathy, HLD, paranoid schizophrenia and recurrent osteomyelitis who presented to the emergency department on 1/5 with c/o worsening infection of her bilateral heel ulcerations in spite local wound care with santyl dressings and outpatient antibiotic management. Per ECC, patient's wounds have had increasing purulent, foul smelling discharge and necrotic changes. Patient with additional ulcerations to her coccyx, ischium, and sacrum for which wound care and surgery were consulted. Podiatry also consulted for her heel ulcerations: patient s/p bedside debridement of her right heel ulceration, refusing any surgical interventions at this time. Infectious disease also following.    1/6: MRI with findings of: Osteomyelitis involving the calcaneus which extends from the plantar posterior aspect of the calcaneus to the level of the angle of Gissane. This is progressed compared to the prior examination.   1/7:  Pt states she understands that she may loose her leg and/or her life without surgical intervention for RLE OM, still refusing debridement.  Psych consulted. Patient needs medical clearance for OR on 1/8.    1/11; Patient I and D not done, anesthesia unable to get consent. Patient not consenting to OR procedure. OR procedure cancelled due to no consent. Patient deemed to have full capacity as per Psychiatry.     1/13: Patient seen and examined at bedside. S/P extended dwell placement by IR. Patient scheduled for surgical debridement of right heel.  1/14 s/p debridement of rt foot, no further surgical intervention at this time.

## 2021-01-14 NOTE — PROGRESS NOTE ADULT - PROBLEM SELECTOR PLAN 4
Pt takes Perphenazine at home  home medication held as recommended by Psychiatry  Patient unconsentable due to somnolence and unarousable   Psychiatry Dr. Lackey.

## 2021-01-14 NOTE — PROGRESS NOTE ADULT - SUBJECTIVE AND OBJECTIVE BOX
Patient is a 75y old  Female who presents with a chief complaint of foot ulcer (05 Jan 2021 14:24)      HPI:  76 yo F from [facility], wheelchair bound with medical history significant of HTN, Diabetes, Osteoarthritis, Osteoporosis, Peripheral arterial disease, Diabetic neuropathy, HLD, Schizophrenia comes in with worsening ulceration to b/l heels. She has been having ulcers for past couple months, has been progressive. She was treated with IV antbx at NH but did not get better. It was worsening with necrotic changes and increasing foul smelling discharge. She denies fever, abdominal pain, chest pain, urinary symptoms, fall, trauma. No h/o nausea, vomiting, diarrhea, cough, dyspnea, sick contacts, recent illness.  (05 Jan 2021 14:24)      Podiatry HPI: 74 y/o female with full history as noted above, podiatry consulted for b/l heel wounds. Pt is from [facility], wheelchair bound with medical history significant of HTN, Diabetes, Osteoarthritis, Osteoporosis, PAD, Diabetic neuropathy, HLD, Schizophrenia comes in with worsening ulceration to b/l heels. Patient seen resting in bed comfortably, AAOx3. Patient states she has had the wounds for a long time, maybe more than 6 months. She relates receiving local wound care previously in the NH using santyl dressings. She endorses occasional pain to wound sites. Patient was  under podiatric care in previous admissions for b/l heel wounds with osteomyelitis. She states she is not interesting in surgery for her feet, she wishes to continue with local wound care. She denies nausea, vomiting, chills, fever, SOB.     Podiatry Progress: Pt s/p Right foot partial calcanectomy and wound debridement. Patient seen resting in bed. Unable to further  assess due to mental status.     Medications acetaminophen   Tablet .. 650 milliGRAM(s) Oral every 6 hours PRN  aspirin enteric coated 81 milliGRAM(s) Oral daily  cefepime   IVPB 1000 milliGRAM(s) IV Intermittent every 8 hours  collagenase Ointment 1 Application(s) Topical daily  Dakins Solution - Full Strength 1 Application(s) Topical once  dextrose 40% Gel 15 Gram(s) Oral once  dextrose 5% + sodium chloride 0.45%. 1000 milliLiter(s) IV Continuous <Continuous>  dextrose 50% Injectable 12.5 Gram(s) IV Push once  dextrose 50% Injectable 25 Gram(s) IV Push once  dextrose 50% Injectable 25 Gram(s) IV Push once  enoxaparin Injectable 40 milliGRAM(s) SubCutaneous daily  glucagon  Injectable 1 milliGRAM(s) IntraMuscular once  insulin lispro (ADMELOG) corrective regimen sliding scale   SubCutaneous three times a day before meals  insulin lispro (ADMELOG) corrective regimen sliding scale   SubCutaneous at bedtime  metroNIDAZOLE  IVPB 500 milliGRAM(s) IV Intermittent every 8 hours  simvastatin 40 milliGRAM(s) Oral at bedtime  vancomycin  IVPB 750 milliGRAM(s) IV Intermittent every 12 hours    FHNo pertinent family history in first degree relatives    ,   PMHCOVID-19    Osteomyelitis    DM (diabetes mellitus)    Paranoid schizophrenia    HLD (hyperlipidemia)    PAD (peripheral artery disease)    HTN (hypertension)       PSHNo significant past surgical history        Labs                          7.4    14.79 )-----------( 356      ( 14 Jan 2021 07:45 )             23.2      01-14    150<H>  |  121<H>  |  22<H>  ----------------------------<  214<H>  3.5   |  19<L>  |  0.69    Ca    8.2<L>      14 Jan 2021 07:45  Phos  2.1     01-13  Mg     1.9     01-13    TPro  4.8<L>  /  Alb  1.6<L>  /  TBili  0.3  /  DBili  x   /  AST  7<L>  /  ALT  8<L>  /  AlkPhos  44  01-14     Vital Signs Last 24 Hrs  T(C): 37 (14 Jan 2021 05:13), Max: 37 (14 Jan 2021 05:13)  T(F): 98.6 (14 Jan 2021 05:13), Max: 98.6 (14 Jan 2021 05:13)  HR: 82 (14 Jan 2021 05:13) (82 - 106)  BP: 118/45 (14 Jan 2021 05:13) (117/56 - 146/75)  BP(mean): 84 (13 Jan 2021 15:35) (66 - 92)  RR: 16 (14 Jan 2021 05:13) (14 - 18)  SpO2: 99% (14 Jan 2021 05:13) (99% - 100%)  Sedimentation Rate, Erythrocyte: 99 mm/Hr (01-05-21 @ 11:45)         C-Reactive Protein, Serum: 5.63 mg/dL (01-06-21 @ 10:27)   WBC Count: 14.79 K/uL <H> (01-14-21 @ 07:45)      S/P DAY 1 PHYSICAL EXAM:   LE Focused:    Vasc:  DP/PT nonpalpable, CFT brisk to digits, TG warm to warm b/l,   Derm:   Wound 1:  L heel closed necrotic eschar measuring ~4x3.5x0.1 cm with mild mac wound erythema. No tunneling or undermining noted. No discharge from the area. No malodor or PTB noted. Stable in appearance  Wound 2: s/p R foot partial calcanectomy and wound debridement measuring ~9x6.5x1 cm with calcaneal bone exposed, mild periwound erythema, no drainage, no malodor,   Neuro: protective sensation absent at level of digits b/l  MSK: no tenderness on palpation of periwound areas b/l     PRE-SURGICAL PHYSICAL EXAM 1/12/21  LE Focused:    Vasc:  DP/PT nonpalpable, monophasic and regular on doppler b/l, CFT brisk to digits, TG warm to warm b/l,   Derm:   Wound 1: L heel closed necrotic eschar measuring ~4x3.5x0.1 cm with mild mac wound erythema. No tunneling or undermining noted. No discharge from the area. No malodor or PTB noted. Stable in appearance  Wound 2: R heel wound s/p bedside debridement performed 1/5/21, now measuring ~ 9x6.5x0.3 cm, fibrous, necrotic base, +PTB. No active drainage. +Malodor.     Neuro: protective sensation absent at level of digits b/l  MSK: no tenderness on palpation of periwound areas b/l     IMAGING:  f< from: Xray Ankle Complete 3 Views, Right (01.13.21 @ 18:15) >    EXAM:  ANKLE RIGHT (MINIMUM 3 V)                            PROCEDURE DATE:  01/13/2021          INTERPRETATION:  RIGHT ankle    CLINICAL INFORMATION: Postoperative radiographs.    TECHNIQUE: AP,lateral /views.    FINDINGS:    Large posterior calcaneal ulceration NOTED with the osteolysis /surgical debridement of the posterior calcaneus remaining osseous structures of the ankle are intact..    IMPRESSION:    Posterior calcaneal large ulceration within the posterior calcaneal postsurgical debridement versus/osteomyelitis.            ADITI SILVA MD; Attending Radiologist  This document has been electronically signed. Jan 13 2021  7:02PM    < end of copied text >    --------------------------  < from: MR Foot No Cont, Right (01.06.21 @ 11:08) >    EXAM:  MR FOOT RT                            PROCEDURE DATE:  01/06/2021          INTERPRETATION:  Clinical indications: Right heel ulceration and eschar status post debridement. Concern for osteomyelitis.    Multiplanar multisequence MRI of the right foot was performed localized to the hindfoot.    Correlation is made with prior MRI from September 11, 2019.    FINDINGS:    There is a large wound along the plantar aspect of the calcaneus posteriorly which extends nearly extends to bone. There is surrounding soft tissue edema about this site with evidence of an overlying bandage. The degree of soft tissue deficiency is increased compared to the prior examination. There is extensive osseous edema and corresponding hypointense T1 marrow signalthroughout the calcaneus extending from the posterior plantar margin to the level of the angle of Gissane. This is progressed compared to the prior examination and consistent with osteomyelitis. Marrow signal within the remainder of the foot is otherwise preserved.    There is confluent edema tracking along the abductor hallucis and flexor digitorum brevis muscles and within the plantar subcutaneous fat subjacent to this region. In total this area measures approximately 2.2 cm in transverse dimension, approximately 4 cm in anteroposterior posterior dimension, and approximately 1.6 cm in craniocaudal dimension. Findings may be related to underlying phlegmon or abscess. Evaluation is limited by the lack of intravenous contrast. Distal to this site there is edema throughout the visualized musculature consistent with myositis.    Visualized tendinous structures remain intact. There is no acute ligamentous injury. Visualized joint spaces are preserved without joint effusion.    IMPRESSION:    Osteomyelitis involving the calcaneus which extends from the plantar posterior aspect of the calcaneus to the level of the angle of Gissane. This is progressed compared to the prior examination. This is seen in the setting of diffuse soft tissue deficiency along the posterior plantar aspect of the calcaneus.    Confluent edema adjacent to this region within the abductor hallucis and flexor digitorum brevis muscles and within the subjacent plantar subcutaneous fat. This may be related to abscess orphlegmon.            OSWALDO ALFRED MD; Attending Radiologist  This document has been electronically signed. Jan 6 2021 11:49AM    < end of copied text >    -------------------------------------------------------------------------------------------------------------  < from: [facility] Physiol Extremity Lower 3+ Level, BI (01.05.21 @ 17:55) >    EXAM:  US PHYSIOL LWR EXT 3+ LEV BI                            PROCEDURE DATE:  01/05/2021          INTERPRETATION:  Clinical information: History of diabetes, nonsmoker, hypertension, hyperlipidemia, presents with bilateral heel ulcers.    Comparison: Lower extremity arterial Doppler study dated 5/13/2020.    Technique: Lower extremity arterial Doppler study. Note that pressure measurements were not obtained at the thigh level.    Findings: Ankle brachial index measures 1.33 bilaterally, compared with prior values of 1.41 on the right and 1.36 on the left. No segmental arterial pressure gradient is present.    PVR waveforms are normal in amplitude and pulsatility from the thigh through the ankle level. They're diminished in amplitude at the metatarsal and digital levels in symmetric fashion, similar to the prior exam.    Impression: No Doppler evidence of hemodynamically significant arterial inflow abnormality in either lower extremity.    Evidence of small vessel disease in the feet.    No significant interval change since study of 5/13/2020.            SOHAN PA MD; Attending Radiologist  This document has been electronically signed. Jan 6 2021  9:13AM    < end of copied text >      --------------------------------------------------------------------------------------------------------------  < from: Xray Foot AP + Lateral + Oblique, Right (01.05.21 @ 18:00) >    EXAM:  FOOT RIGHT (MINIMUM 3 VIEWS)                            PROCEDURE DATE:  01/05/2021          INTERPRETATION:  Right foot    HISTORY: Status post bedside debridement.     Two views of the right foot show thinning of soft tissues near the calcaneus likely indicating site of debridement. The joint spaces are maintained. There is questionable exposure of the plantar surface of the calcaneus.    IMPRESSION: Calcaneal soft tissues and questionable exposure as noted. Clinical correlation is suggested.        Thank you for this referral.            REID CRAMER MD; Attending Interventional Radiologist  This document has been electronically signed. Jan 6 2021 11:10AM    < end of copied text >    -------------------------------------------------------------------------------------------  a< from: Xray Ankle Complete 3 Views, Bilateral (01.05.21 @ 14:37) >    EXAM:  ANKLE BILATERAL (MINIMUM 3 V)                            PROCEDURE DATE:  01/05/2021          INTERPRETATION:  CLINICAL INDICATION:  Osteomyelitis; evaluate for soft tissue emphysema.    COMPARISON:  Left ankle radiography 5/11/2020.    TECHNIQUE:  AP, oblique and crosstable lateral views left ankle. Oblique and crosstable lateral views right ankle. Technologist noted that the best possible films were obtained.    FINDINGS:    Right ankle: Generalized osteopenia. Subcutaneous emphysema is identified along the plantar aspect of the right hindfoot inferior to the calcaneus, with an overlying skin defect noted along the posterior aspect of the calcaneus. Osteolysis involving the inferior/posterior aspect of the right calcaneus cannot beexcluded. MRI evaluation can be performed for further assessment. Extensive vascular calcification is noted. No ankle joint effusion is noted.    Left ankle: Generalized osteopenia. There is no evidence for acute fracture or dislocation. The ankle mortise appears grossly intact. No ankle joint effusion is noted. Extensive vascular calcifications identified. No significant soft tissue swelling.      IMPRESSION:  No evidence for acute fracture. Subcutaneous emphysema is identified along the plantaraspect of the right hindfoot inferior to the calcaneus, with an overlying skin defect noted along the posterior aspect of the calcaneus. Osteolysis involving the inferior/posterior aspect of the right calcaneus cannot be excluded. MRI evaluation can be performed for further assessment.                MARA LARKIN MD; Attending Radiologist  This document has been electronically signed. Jan 5 2021  3:57PM    < end of copied text >        CULTURES:   cCulture - Surgical Swab (01.05.21 @ 22:13)   Specimen Source: .Surgical Swab Right heel wound   Culture Results:   Few Morganella morganii   Few Proteus mirabilis       Historical Values  Culture - Surgical Swab (01.05.21 @ 22:13)   Specimen Source: .Surgical Swab Right heel wound   Culture Results:   Few Morganella morganii   Few Proteus mirabilis

## 2021-01-14 NOTE — PROGRESS NOTE ADULT - ASSESSMENT
A:  s/p R foot partial calcanectomy & wound debridement 1/13/21  Osteomyelitis of calcaneus R foot   DMII    P:   Patient evaluated and chart reviewed  Post-op R foot xrays evaluated, results as noted above  Intra-op cx and path results pending   Dressed R foot with santyl, DSD; L foot with DSD  Applied CAIR boots to b/l LE   Podiatry will follow while in house.   Patient discussed with Dr. Watkins.

## 2021-01-14 NOTE — PROGRESS NOTE ADULT - SUBJECTIVE AND OBJECTIVE BOX
Patient is a 75y old  Female who presents with a chief complaint of foot ulcer (14 Jan 2021 11:02)    PATIENT IS SEEN AND EXAMINED IN MEDICAL FLOOR.  CAROLINE [    ]    SABIHA [   ]      GT [   ]    ALLERGIES:  No Known Allergies      Daily     Daily     VITALS:    Vital Signs Last 24 Hrs  T(C): 36.9 (14 Jan 2021 12:34), Max: 37 (14 Jan 2021 05:13)  T(F): 98.5 (14 Jan 2021 12:34), Max: 98.6 (14 Jan 2021 05:13)  HR: 83 (14 Jan 2021 12:34) (82 - 106)  BP: 112/46 (14 Jan 2021 12:34) (112/46 - 146/75)  BP(mean): 84 (13 Jan 2021 15:35) (66 - 92)  RR: 18 (14 Jan 2021 12:34) (14 - 18)  SpO2: 100% (14 Jan 2021 12:34) (99% - 100%)    LABS:    CBC Full  -  ( 14 Jan 2021 07:45 )  WBC Count : 14.79 K/uL  RBC Count : 2.47 M/uL  Hemoglobin : 7.4 g/dL  Hematocrit : 23.2 %  Platelet Count - Automated : 356 K/uL  Mean Cell Volume : 93.9 fl  Mean Cell Hemoglobin : 30.0 pg  Mean Cell Hemoglobin Concentration : 31.9 gm/dL  Auto Neutrophil # : x  Auto Lymphocyte # : x  Auto Monocyte # : x  Auto Eosinophil # : x  Auto Basophil # : x  Auto Neutrophil % : x  Auto Lymphocyte % : x  Auto Monocyte % : x  Auto Eosinophil % : x  Auto Basophil % : x    PT/INR - ( 13 Jan 2021 07:43 )   PT: 18.8 sec;   INR: 1.62 ratio           01-14    150<H>  |  121<H>  |  22<H>  ----------------------------<  214<H>  3.5   |  19<L>  |  0.69    Ca    8.2<L>      14 Jan 2021 07:45  Phos  2.1     01-13  Mg     1.9     01-13    TPro  4.8<L>  /  Alb  1.6<L>  /  TBili  0.3  /  DBili  x   /  AST  7<L>  /  ALT  8<L>  /  AlkPhos  44  01-14    CAPILLARY BLOOD GLUCOSE      POCT Blood Glucose.: 206 mg/dL (14 Jan 2021 11:17)  POCT Blood Glucose.: 233 mg/dL (14 Jan 2021 07:54)  POCT Blood Glucose.: 190 mg/dL (13 Jan 2021 21:31)  POCT Blood Glucose.: 125 mg/dL (13 Jan 2021 16:58)        LIVER FUNCTIONS - ( 14 Jan 2021 07:45 )  Alb: 1.6 g/dL / Pro: 4.8 g/dL / ALK PHOS: 44 U/L / ALT: 8 U/L DA / AST: 7 U/L / GGT: x           Creatinine Trend: 0.69<--, 0.68<--, 0.76<--, 0.93<--, 1.06<--, 1.44<--  I&O's Summary    13 Jan 2021 07:01  -  14 Jan 2021 07:00  --------------------------------------------------------  IN: 0 mL / OUT: 1200 mL / NET: -1200 mL            .Tissue Right heel margin  01-13 @ 19:31 --  --    No WBC's seen. per low power field  No organisms seen      .Urine Clean Catch (Midstream)  01-06 @ 06:46   No growth  --  --      .Surgical Swab Right heel wound  01-05 @ 22:13   Few Morganella morganii  Few Proteus mirabilis  Moderate Corynebacterium species "Susceptibilities not performed"  Moderate Bacteroides vulgatus "Susceptibilities not performed"  --  Morganella morganii  Proteus mirabilis      .Blood Blood-Peripheral  01-05 @ 17:59   No Growth Final  --  --          MEDICATIONS:    MEDICATIONS  (STANDING):  aspirin enteric coated 81 milliGRAM(s) Oral daily  cefepime   IVPB 1000 milliGRAM(s) IV Intermittent every 8 hours  collagenase Ointment 1 Application(s) Topical daily  Dakins Solution - Full Strength 1 Application(s) Topical once  dextrose 40% Gel 15 Gram(s) Oral once  dextrose 5% + sodium chloride 0.45%. 1000 milliLiter(s) (75 mL/Hr) IV Continuous <Continuous>  dextrose 50% Injectable 12.5 Gram(s) IV Push once  dextrose 50% Injectable 25 Gram(s) IV Push once  dextrose 50% Injectable 25 Gram(s) IV Push once  enoxaparin Injectable 40 milliGRAM(s) SubCutaneous daily  glucagon  Injectable 1 milliGRAM(s) IntraMuscular once  insulin lispro (ADMELOG) corrective regimen sliding scale   SubCutaneous three times a day before meals  insulin lispro (ADMELOG) corrective regimen sliding scale   SubCutaneous at bedtime  metroNIDAZOLE  IVPB 500 milliGRAM(s) IV Intermittent every 8 hours  simvastatin 40 milliGRAM(s) Oral at bedtime  vancomycin  IVPB 750 milliGRAM(s) IV Intermittent every 12 hours      MEDICATIONS  (PRN):  acetaminophen   Tablet .. 650 milliGRAM(s) Oral every 6 hours PRN Moderate Pain (4 - 6)      REVIEW OF SYSTEMS:                           ALL ROS DONE [ X   ]    CONSTITUTIONAL:  LETHARGIC [   ], FEVER [   ], UNRESPONSIVE [   ]  CVS:  CP  [   ], SOB, [   ], PALPITATIONS [   ], DIZZYNESS [   ]  RS: COUGH [   ], SPUTUM [   ]  GI: ABDOMINAL PAIN [   ], NAUSEA [   ], VOMITINGS [   ], DIARRHEA [   ], CONSTIPATION [   ]  :  DYSURIA [   ], NOCTURIA [   ], INCREASED FREQUENCY [   ], DRIBLING [   ],  SKELETAL: PAINFUL JOINTS [   ], SWOLLEN JOINTS [   ], NECK ACHE [   ], LOW BACK ACHE [   ],  SKIN : ULCERS [   ], RASH [   ], ITCHING [   ]  CNS: HEAD ACHE [   ], DOUBLE VISION [   ], BLURRED VISION [   ], AMS / CONFUSION [   ], SEIZURES [   ], WEAKNESS [   ],TINGLING / NUMBNESS [   ]    PHYSICAL EXAMINATION:  GENERAL APPEARANCE: NO DISTRESS  HEENT:  NO PALLOR, NO  JVD,  NO   NODES, NECK SUPPLE  CVS: S1 +, S2 +,   RS: AEEB,  OCCASIONAL  RALES +,   NO RONCHI  ABD: SOFT, NT, NO, BS +  EXT: NO PE  SKIN: WARM, BILATERAL HEEL ULCERS - COVERED, SACRAL ULCER + RIGHT ISCHIAL ULCER COVERED  SKELETAL:  ROM ACCEPTABLE, LEFT ARM SWELLING  CNS:  AAO X 2-3     RADIOLOGY :    EXAM:  MR FOOT RT                            PROCEDURE DATE:  01/06/2021          INTERPRETATION:  Clinical indications: Right heel ulceration and eschar status post debridement. Concern for osteomyelitis.    Multiplanar multisequence MRI of the right foot was performed localized to the hindfoot.    Correlation is made with prior MRI from September 11, 2019.    FINDINGS:    There is a large wound along the plantar aspect of the calcaneus posteriorly which extends nearly extends to bone. There is surrounding soft tissue edema about this site with evidence of an overlying bandage. The degree of soft tissue deficiency is increased compared to the prior examination. There is extensive osseous edema and corresponding hypointense T1 marrow signalthroughout the calcaneus extending from the posterior plantar margin to the level of the angle of Gissane. This is progressed compared to the prior examination and consistent with osteomyelitis. Marrow signal within the remainder of the foot is otherwise preserved.    There is confluent edema tracking along the abductor hallucis and flexor digitorum brevis muscles and within the plantar subcutaneous fat subjacent to this region. In total this area measures approximately 2.2 cm in transverse dimension, approximately 4 cm in anteroposterior posterior dimension, and approximately 1.6 cm in craniocaudal dimension. Findings may be related to underlying phlegmon or abscess. Evaluation is limited by the lack of intravenous contrast. Distal to this site there is edema throughout the visualized musculature consistent with myositis.    Visualized tendinous structures remain intact. There is no acute ligamentous injury. Visualized joint spaces are preserved without joint effusion.    IMPRESSION:    Osteomyelitis involving the calcaneus which extends from the plantar posterior aspect of the calcaneus to the level of the angle of Gissane. This is progressed compared to the prior examination. This is seen in the setting of diffuse soft tissue deficiency along the posterior plantar aspect of the calcaneus.    Confluent edema adjacent to this region within the abductor hallucis and flexor digitorum brevis muscles and within the subjacent plantar subcutaneous fat. This may be related to abscess orphlegmon.    ASSESSMENT :     Osteomyelitis    Yes    COVID-19    Osteomyelitis    DM (diabetes mellitus)    Paranoid schizophrenia    HLD (hyperlipidemia)    PAD (peripheral artery disease)    HTN (hypertension)    No significant past surgical history        PLAN:  HPI:  76 yo F from [facility], wheelchair bound with medical history significant of HTN, Diabetes, Osteoarthritis, Osteoporosis, Peripheral arterial disease, Diabetic neuropathy, HLD, Schizophrenia comes in with worsening ulceration to b/l heels. She has been having ulcers for past couple months, has been progressive. She was treated with IV antbx at NH but did not get better. It was worsening with necrotic changes and increasing foul smelling discharge. She denies fever, abdominal pain, chest pain, urinary symptoms, fall, trauma. No h/o nausea, vomiting, diarrhea, cough, dyspnea, sick contacts, recent illness.  (05 Jan 2021 14:24)    PLAN:    # BILATERAL LE CHRONIC ULCER NOW PROGRESSIVELY WORSENING W/ SSTI AND RIGHT CALCANEAL OSTEOMYELITIS W/ POSSIBLE ABSCESS; UNDERWENT BEDSIDE DEBRIDEMENT OF RIGHT LEG ULCER AND UNDERWENT RIGHT PARTIAL CALCANECTOMY ON 1/13  - CEFEPIME + VANOMYCIN + FLAGYL, REVIEWED TIMUR, BCX - NGTD, WOUND CX - KOBE NORMAN & P. MIRABILIS, ID CONSULT IN PROGRESS, PODIATRY CONSULT IN PROGRESS  - REVIEWED RIGHT LE MRI  -  PSYCHIATRY CONSULT FOR CAPACITY IN PROGRESS - DEEM PATIENT AS HAVING CAPACITY.  PATIENT WAS AGREEABLE FOR SURGICAL DEBRIDEMENT ON 1/09  - 2 PC CONSENT FOR SURGICAL DEBRIDEMENT PLANNED FOR 1/12; ATTEMPTED TO CALL NEXT OF [facility] @ [phone] HOWEVER PHONE NUMBER APPEARS DISCONNECTED  - VASCULAR SX CONSULT IN PROGRESS - PATIENT MAY REQUIRE AKA, BUT REFUSED ON PREVIOUS CONVERSATIONS - WAS AGREEABLE TO LOCAL DEBRIDMENT/INTERVENTIONS  # MEDICAL CLEARANCE FOR SURGERY - RCRI 1 POINT - 6% RISK OF DEATH, MI OR CARDIAC ARREST; CARDIOLOGY CONSULT IN PROGRESS FOR MEDICAL CLEARANCE  # SACRAL AND RIGHT ISCHIAL UNSTAGEABLE ULCERS W/ SSTI  S/P DEBRIDEMENT 1/8- ON VANCOMYCIN AND CEFEPIME, SURGERY CONSULT IN PROGRESS -  PSYCHIATRY CONSULT FOR CAPACITY IN PROGRESS  # ACUTE METABOLIC ENCEPHALOPATHY - SUSPECT S/T ACUTE INFECTION & HYPERNATREMIA - F/U CT HEAD  # HYPERNATREMIA SUSPECT S/T POOR PO INTAKE - PLACED ON IVF  # LEFT ARM SWELLING - REVIEWED LEFT ARM U/S W/ OUT THROMBUS, WARM COMPRESSES  # ASHLEY - HOLDING VANCOMYCIN, MONITOR - F/U UA, BLADDER SCAN  # PAD  # HTN  # DM W/ DIABETIC NEUROPATHY  # OA/OP  # SCHIZOPHRENIA - HOLD MEDICATION  # CASE D/W PATIENT'S ? PARTNER - LISA KELLY - WHO REPORTS HE IS NOT NEXT OF [facility] OR HCP - [phone] ON 1/12; HE UNDERSTOOD PATIENT'S PROGNOSIS WAS GUARDED  # GI AND DVT PPX    ELEN CASILLAS MD COVERING FARHAN CASILLAS MD.     Patient is a 75y old  Female who presents with a chief complaint of foot ulcer (14 Jan 2021 11:02)    PATIENT IS SEEN AND EXAMINED IN MEDICAL FLOOR.    ALLERGIES:  No Known Allergies      VITALS:    Vital Signs Last 24 Hrs  T(C): 36.9 (14 Jan 2021 12:34), Max: 37 (14 Jan 2021 05:13)  T(F): 98.5 (14 Jan 2021 12:34), Max: 98.6 (14 Jan 2021 05:13)  HR: 83 (14 Jan 2021 12:34) (82 - 106)  BP: 112/46 (14 Jan 2021 12:34) (112/46 - 146/75)  BP(mean): 84 (13 Jan 2021 15:35) (66 - 92)  RR: 18 (14 Jan 2021 12:34) (14 - 18)  SpO2: 100% (14 Jan 2021 12:34) (99% - 100%)    LABS:    CBC Full  -  ( 14 Jan 2021 07:45 )  WBC Count : 14.79 K/uL  RBC Count : 2.47 M/uL  Hemoglobin : 7.4 g/dL  Hematocrit : 23.2 %  Platelet Count - Automated : 356 K/uL  Mean Cell Volume : 93.9 fl  Mean Cell Hemoglobin : 30.0 pg  Mean Cell Hemoglobin Concentration : 31.9 gm/dL  Auto Neutrophil # : x  Auto Lymphocyte # : x  Auto Monocyte # : x  Auto Eosinophil # : x  Auto Basophil # : x  Auto Neutrophil % : x  Auto Lymphocyte % : x  Auto Monocyte % : x  Auto Eosinophil % : x  Auto Basophil % : x    PT/INR - ( 13 Jan 2021 07:43 )   PT: 18.8 sec;   INR: 1.62 ratio           01-14    150<H>  |  121<H>  |  22<H>  ----------------------------<  214<H>  3.5   |  19<L>  |  0.69    Ca    8.2<L>      14 Jan 2021 07:45  Phos  2.1     01-13  Mg     1.9     01-13    TPro  4.8<L>  /  Alb  1.6<L>  /  TBili  0.3  /  DBili  x   /  AST  7<L>  /  ALT  8<L>  /  AlkPhos  44  01-14    CAPILLARY BLOOD GLUCOSE      POCT Blood Glucose.: 206 mg/dL (14 Jan 2021 11:17)  POCT Blood Glucose.: 233 mg/dL (14 Jan 2021 07:54)  POCT Blood Glucose.: 190 mg/dL (13 Jan 2021 21:31)  POCT Blood Glucose.: 125 mg/dL (13 Jan 2021 16:58)        LIVER FUNCTIONS - ( 14 Jan 2021 07:45 )  Alb: 1.6 g/dL / Pro: 4.8 g/dL / ALK PHOS: 44 U/L / ALT: 8 U/L DA / AST: 7 U/L / GGT: x           Creatinine Trend: 0.69<--, 0.68<--, 0.76<--, 0.93<--, 1.06<--, 1.44<--  I&O's Summary    13 Jan 2021 07:01  -  14 Jan 2021 07:00  --------------------------------------------------------  IN: 0 mL / OUT: 1200 mL / NET: -1200 mL            .Tissue Right heel margin  01-13 @ 19:31 --  --    No WBC's seen. per low power field  No organisms seen      .Urine Clean Catch (Midstream)  01-06 @ 06:46   No growth  --  --      .Surgical Swab Right heel wound  01-05 @ 22:13   Few Morganella morganii  Few Proteus mirabilis  Moderate Corynebacterium species "Susceptibilities not performed"  Moderate Bacteroides vulgatus "Susceptibilities not performed"  --  Morganella morganii  Proteus mirabilis      .Blood Blood-Peripheral  01-05 @ 17:59   No Growth Final  --  --          MEDICATIONS:    MEDICATIONS  (STANDING):  aspirin enteric coated 81 milliGRAM(s) Oral daily  cefepime   IVPB 1000 milliGRAM(s) IV Intermittent every 8 hours  collagenase Ointment 1 Application(s) Topical daily  Dakins Solution - Full Strength 1 Application(s) Topical once  dextrose 40% Gel 15 Gram(s) Oral once  dextrose 5% + sodium chloride 0.45%. 1000 milliLiter(s) (75 mL/Hr) IV Continuous <Continuous>  dextrose 50% Injectable 12.5 Gram(s) IV Push once  dextrose 50% Injectable 25 Gram(s) IV Push once  dextrose 50% Injectable 25 Gram(s) IV Push once  enoxaparin Injectable 40 milliGRAM(s) SubCutaneous daily  glucagon  Injectable 1 milliGRAM(s) IntraMuscular once  insulin lispro (ADMELOG) corrective regimen sliding scale   SubCutaneous three times a day before meals  insulin lispro (ADMELOG) corrective regimen sliding scale   SubCutaneous at bedtime  metroNIDAZOLE  IVPB 500 milliGRAM(s) IV Intermittent every 8 hours  simvastatin 40 milliGRAM(s) Oral at bedtime  vancomycin  IVPB 750 milliGRAM(s) IV Intermittent every 12 hours      MEDICATIONS  (PRN):  acetaminophen   Tablet .. 650 milliGRAM(s) Oral every 6 hours PRN Moderate Pain (4 - 6)      REVIEW OF SYSTEMS:                           ALL ROS DONE [ X   ]    CONSTITUTIONAL:  LETHARGIC [   ], FEVER [   ], UNRESPONSIVE [   ]  CVS:  CP  [   ], SOB, [   ], PALPITATIONS [   ], DIZZYNESS [   ]  RS: COUGH [   ], SPUTUM [   ]  GI: ABDOMINAL PAIN [   ], NAUSEA [   ], VOMITINGS [   ], DIARRHEA [   ], CONSTIPATION [   ]  :  DYSURIA [   ], NOCTURIA [   ], INCREASED FREQUENCY [   ], DRIBLING [   ],  SKELETAL: PAINFUL JOINTS [   ], SWOLLEN JOINTS [   ], NECK ACHE [   ], LOW BACK ACHE [   ],  SKIN : ULCERS [   ], RASH [   ], ITCHING [   ]  CNS: HEAD ACHE [   ], DOUBLE VISION [   ], BLURRED VISION [   ], AMS / CONFUSION [   ], SEIZURES [   ], WEAKNESS [   ],TINGLING / NUMBNESS [   ]    PHYSICAL EXAMINATION:  GENERAL APPEARANCE: NO DISTRESS  HEENT:  NO PALLOR, NO  JVD,  NO   NODES, NECK SUPPLE  CVS: S1 +, S2 +,   RS: AEEB,  OCCASIONAL  RALES +,   NO RONCHI  ABD: SOFT, NT, NO, BS +  EXT: NO PE  SKIN: WARM, BILATERAL HEEL ULCERS - COVERED, SACRAL ULCER + RIGHT ISCHIAL ULCER COVERED  SKELETAL:  ROM ACCEPTABLE, LEFT ARM SWELLING  CNS:  AAO X 2-3     RADIOLOGY :    EXAM:  MR FOOT RT                            PROCEDURE DATE:  01/06/2021          INTERPRETATION:  Clinical indications: Right heel ulceration and eschar status post debridement. Concern for osteomyelitis.    Multiplanar multisequence MRI of the right foot was performed localized to the hindfoot.    Correlation is made with prior MRI from September 11, 2019.    FINDINGS:    There is a large wound along the plantar aspect of the calcaneus posteriorly which extends nearly extends to bone. There is surrounding soft tissue edema about this site with evidence of an overlying bandage. The degree of soft tissue deficiency is increased compared to the prior examination. There is extensive osseous edema and corresponding hypointense T1 marrow signalthroughout the calcaneus extending from the posterior plantar margin to the level of the angle of Gissane. This is progressed compared to the prior examination and consistent with osteomyelitis. Marrow signal within the remainder of the foot is otherwise preserved.    There is confluent edema tracking along the abductor hallucis and flexor digitorum brevis muscles and within the plantar subcutaneous fat subjacent to this region. In total this area measures approximately 2.2 cm in transverse dimension, approximately 4 cm in anteroposterior posterior dimension, and approximately 1.6 cm in craniocaudal dimension. Findings may be related to underlying phlegmon or abscess. Evaluation is limited by the lack of intravenous contrast. Distal to this site there is edema throughout the visualized musculature consistent with myositis.    Visualized tendinous structures remain intact. There is no acute ligamentous injury. Visualized joint spaces are preserved without joint effusion.    IMPRESSION:    Osteomyelitis involving the calcaneus which extends from the plantar posterior aspect of the calcaneus to the level of the angle of Gissane. This is progressed compared to the prior examination. This is seen in the setting of diffuse soft tissue deficiency along the posterior plantar aspect of the calcaneus.    Confluent edema adjacent to this region within the abductor hallucis and flexor digitorum brevis muscles and within the subjacent plantar subcutaneous fat. This may be related to abscess orphlegmon.    ASSESSMENT :     Osteomyelitis    Yes    COVID-19    Osteomyelitis    DM (diabetes mellitus)    Paranoid schizophrenia    HLD (hyperlipidemia)    PAD (peripheral artery disease)    HTN (hypertension)    No significant past surgical history        PLAN:  HPI:  74 yo F from [facility], wheelchair bound with medical history significant of HTN, Diabetes, Osteoarthritis, Osteoporosis, Peripheral arterial disease, Diabetic neuropathy, HLD, Schizophrenia comes in with worsening ulceration to b/l heels. She has been having ulcers for past couple months, has been progressive. She was treated with IV antbx at NH but did not get better. It was worsening with necrotic changes and increasing foul smelling discharge. She denies fever, abdominal pain, chest pain, urinary symptoms, fall, trauma. No h/o nausea, vomiting, diarrhea, cough, dyspnea, sick contacts, recent illness.  (05 Jan 2021 14:24)    PLAN:    # BILATERAL LE CHRONIC ULCER NOW PROGRESSIVELY WORSENING W/ SSTI AND RIGHT CALCANEAL OSTEOMYELITIS W/ POSSIBLE ABSCESS; UNDERWENT BEDSIDE DEBRIDEMENT OF RIGHT LEG ULCER AND UNDERWENT RIGHT PARTIAL CALCANECTOMY ON 1/13  - CEFEPIME + VANOMYCIN + FLAGYL, REVIEWED TIMUR, BCX - NGTD, WOUND CX - MORGANELLA MORGANI & P. MIRABILIS, ID CONSULT IN PROGRESS, PODIATRY CONSULT IN PROGRESS  - REVIEWED RIGHT LE MRI  -  PSYCHIATRY CONSULT FOR CAPACITY IN PROGRESS - DEEM PATIENT AS HAVING CAPACITY.  PATIENT WAS AGREEABLE FOR SURGICAL DEBRIDEMENT ON 1/09  - 2 PC CONSENT FOR SURGICAL DEBRIDEMENT PLANNED FOR 1/12; ATTEMPTED TO CALL NEXT OF [facility] @ [phone] HOWEVER PHONE NUMBER APPEARS DISCONNECTED  - VASCULAR SX CONSULT IN PROGRESS - PATIENT MAY REQUIRE AKA, BUT REFUSED ON PREVIOUS CONVERSATIONS - WAS AGREEABLE TO LOCAL DEBRIDMENT/INTERVENTIONS  # MEDICAL CLEARANCE FOR SURGERY - RCRI 1 POINT - 6% RISK OF DEATH, MI OR CARDIAC ARREST; CARDIOLOGY CONSULT IN PROGRESS FOR MEDICAL CLEARANCE  # SACRAL AND RIGHT ISCHIAL UNSTAGEABLE ULCERS W/ SSTI  S/P DEBRIDEMENT 1/8- ON VANCOMYCIN AND CEFEPIME, SURGERY CONSULT IN PROGRESS -  PSYCHIATRY CONSULT FOR CAPACITY IN PROGRESS  # ACUTE METABOLIC ENCEPHALOPATHY - SUSPECT S/T ACUTE INFECTION & HYPERNATREMIA - REVIEWED CT HEAD  # HYPERNATREMIA SUSPECT S/T POOR PO INTAKE - PLACED ON IVF  # LEFT ARM SWELLING - REVIEWED LEFT ARM U/S W/ OUT THROMBUS, WARM COMPRESSES  # ASHLEY - RESOLVED  # PAD  # HTN  # DM W/ DIABETIC NEUROPATHY  # OA/OP  # SCHIZOPHRENIA - HOLD MEDICATION  # CASE D/W PATIENT'S ? PARTNER - LISA KELLY - WHO REPORTS HE IS NOT NEXT OF [facility] OR HCP - [phone] ON 1/12; HE UNDERSTOOD PATIENT'S PROGNOSIS WAS GUARDED  # GI AND DVT PPX    ELEN CASILLAS MD COVERING FARHAN CASILLAS MD.

## 2021-01-14 NOTE — PROGRESS NOTE ADULT - SUBJECTIVE AND OBJECTIVE BOX
NP Note discussed with Primary Attending    Patient is a 75y old  Female who presents with a chief complaint of foot ulcer (14 Jan 2021 13:54)      INTERVAL HPI/OVERNIGHT EVENTS: no new complaints    MEDICATIONS  (STANDING):  aspirin enteric coated 81 milliGRAM(s) Oral daily  cefepime   IVPB 1000 milliGRAM(s) IV Intermittent every 8 hours  collagenase Ointment 1 Application(s) Topical daily  Dakins Solution - Full Strength 1 Application(s) Topical once  dextrose 40% Gel 15 Gram(s) Oral once  dextrose 5% + sodium chloride 0.45%. 1000 milliLiter(s) (75 mL/Hr) IV Continuous <Continuous>  dextrose 50% Injectable 12.5 Gram(s) IV Push once  dextrose 50% Injectable 25 Gram(s) IV Push once  dextrose 50% Injectable 25 Gram(s) IV Push once  enoxaparin Injectable 40 milliGRAM(s) SubCutaneous daily  glucagon  Injectable 1 milliGRAM(s) IntraMuscular once  insulin lispro (ADMELOG) corrective regimen sliding scale   SubCutaneous three times a day before meals  insulin lispro (ADMELOG) corrective regimen sliding scale   SubCutaneous at bedtime  metroNIDAZOLE  IVPB 500 milliGRAM(s) IV Intermittent every 8 hours  simvastatin 40 milliGRAM(s) Oral at bedtime  vancomycin  IVPB 750 milliGRAM(s) IV Intermittent every 12 hours    MEDICATIONS  (PRN):  acetaminophen   Tablet .. 650 milliGRAM(s) Oral every 6 hours PRN Moderate Pain (4 - 6)      __________________________________________________  REVIEW OF SYSTEMS:    CONSTITUTIONAL: No fever,   EYES: no acute visual disturbances  NECK: No pain or stiffness  RESPIRATORY: No cough; No shortness of breath  CARDIOVASCULAR: No chest pain, no palpitations  GASTROINTESTINAL: No pain. No nausea or vomiting; No diarrhea   NEUROLOGICAL: No headache or numbness, no tremors  MUSCULOSKELETAL: No joint pain, no muscle pain  GENITOURINARY: no dysuria, no frequency, no hesitancy  PSYCHIATRY: no depression , no anxiety  ALL OTHER  ROS negative        Vital Signs Last 24 Hrs  T(C): 36.9 (14 Jan 2021 12:34), Max: 37 (14 Jan 2021 05:13)  T(F): 98.5 (14 Jan 2021 12:34), Max: 98.6 (14 Jan 2021 05:13)  HR: 83 (14 Jan 2021 12:34) (82 - 88)  BP: 112/46 (14 Jan 2021 12:34) (112/46 - 145/60)  BP(mean): --  RR: 18 (14 Jan 2021 12:34) (16 - 18)  SpO2: 100% (14 Jan 2021 12:34) (99% - 100%)    ________________________________________________  PHYSICAL EXAM:  GENERAL: NAD  HEENT: Normocephalic;  conjunctivae and sclerae clear; moist mucous membranes;   NECK : supple  CHEST/LUNG: Clear to auscultation bilaterally with good air entry   HEART: S1 S2  regular; no murmurs, gallops or rubs  ABDOMEN: Soft, Nontender, Nondistended; Bowel sounds present  EXTREMITIES: no cyanosis; no edema; no calf tenderness  SKIN: warm and dry; no rash  NERVOUS SYSTEM:  Awake and alert; Oriented  to place, person and time ; no new deficits    _________________________________________________  LABS:                        7.4    14.79 )-----------( 356      ( 14 Jan 2021 07:45 )             23.2     01-14    150<H>  |  121<H>  |  22<H>  ----------------------------<  214<H>  3.5   |  19<L>  |  0.69    Ca    8.2<L>      14 Jan 2021 07:45  Phos  2.1     01-13  Mg     1.9     01-13    TPro  4.8<L>  /  Alb  1.6<L>  /  TBili  0.3  /  DBili  x   /  AST  7<L>  /  ALT  8<L>  /  AlkPhos  44  01-14    PT/INR - ( 13 Jan 2021 07:43 )   PT: 18.8 sec;   INR: 1.62 ratio             CAPILLARY BLOOD GLUCOSE      POCT Blood Glucose.: 176 mg/dL (14 Jan 2021 17:22)  POCT Blood Glucose.: 216 mg/dL (14 Jan 2021 16:04)  POCT Blood Glucose.: 206 mg/dL (14 Jan 2021 11:17)  POCT Blood Glucose.: 233 mg/dL (14 Jan 2021 07:54)  POCT Blood Glucose.: 190 mg/dL (13 Jan 2021 21:31)        RADIOLOGY & ADDITIONAL TESTS:    EXAM:  CT BRAIN                            PROCEDURE DATE:  01/14/2021          INTERPRETATION:  .    CLINICAL INFORMATION: ACUTE ENCEPHALOPATHY    TECHNIQUE: Multiple axial CT images of the head were obtained without contrast. Sagittal and coronalreconstructed images were acquired from the source data.    COMPARISON: No prior CT studies of the brain are available for comparison at this institution.    FINDINGS: There is no acute intracranial hemorrhage, mass effect, shift of the midline structures, herniation, extra-axial fluid collection, or hydrocephalus.    There is diffuse cerebral volume loss with prominence of the sulci, fissures, and cisternal spaces which is normal for the patient's age. There is mild deep and periventricular white matter hypoattenuation statistically compatible with microvascular changes given calcific atherosclerotic disease of the intracranial arteries. There is a partially empty sella.    Polyps versus retention cysts are seen within the bilateral maxillary sinuses. Additional mild scattered mucosal thickening is seen throughout the ethmoid complex. The calvarium is intact. Chronic bilateral nasal bone fracture deformities are seen. The imaged orbits are unremarkable.    IMPRESSION: No acute intracranial hemorrhage, mass effect, or shift of the midline structures.    If clinical symptoms are new and persistent, consider further evaluation with brain MRI examination, if there are no MRI contraindications.        CHANTALE BOYER MD; Attending Radiologist  This document has been electronically signed. Jan 14 2021 10:10AM      EXAM:  ANKLE RIGHT (MINIMUM 3 V)                            PROCEDURE DATE:  01/13/2021          INTERPRETATION:  RIGHT ankle    CLINICAL INFORMATION: Postoperative radiographs.    TECHNIQUE: AP,lateral /views.    FINDINGS:    Large posterior calcaneal ulceration NOTED with the osteolysis /surgical debridement of the posterior calcaneus remaining osseous structures of the ankle are intact..    IMPRESSION:    Posterior calcaneal large ulceration within the posterior calcaneal postsurgical debridement versus/osteomyelitis.    EXAM:  US DPLX UPR EXT VEINS LTD LT                            PROCEDURE DATE:  01/10/2021          INTERPRETATION:  CLINICAL INDICATION: 75 years  Female with LUE swelling, rule out thrombosis.    COMPARISON: None available.    TECHNIQUE: Duplex sonography of the LEFT UPPER extremity veins with color and spectral Doppler, with and without compression.    FINDINGS:    The left internal jugular, subclavian, axillary and brachial veins are patent and compressible where applicable.  The basilic and cephalic veins (superficial veins) are patent and without thrombus.    Doppler examination shows normal spontaneous and phasic flow.    Edema is visualized.    IMPRESSION:  No evidence of left upper extremity deep venous thrombosis.      LUDA GALEAS MD; Attending Radiologist  This document has been electronically signed. Ender 10 2021  5:23PM  Imaging  Reviewed:  YES/NO    Consultant(s) Notes Reviewed:   YES/ No      Plan of care was discussed with patient and /or primary care giver; all questions and concerns were addressed

## 2021-01-14 NOTE — CHART NOTE - NSCHARTNOTEFT_GEN_A_CORE
Reassessment:   75yFemalePatient is a 75y old  Female who presents with a chief complaint of foot ulcer (2021 13:54)      Factors impacting intake: [ ] none [ ] nausea  [ ] vomiting [ ] diarrhea [ ] constipation  [ x]chewing problems [ X] swallowing issues  [X ] other: Osteomyelitis, PAD, Paranoid Schizophrenia, diabetes, PAD    Diet Prescription: Diet, NPO after Midnight:      NPO Start Date: 2021,   NPO Start Time: 23:59 (21 @ 18:16)  Diet, Dysphagia 3 Soft-No Liquids (21 @ 14:28)    Intake: Patient visited, asleep, d/w RN, reports pt. with poor oral intake & encouraging pt. "to eat" & noted today pt. refusing to "open her mouth", seen by Speech/Swallow team on 21 & unable to assess noted, rec. SLP re-evaluation, RN to f/up. Pt. s/p R foot partial calcanectomy & wound debridement on 21 and Osteomyelitis of calcaneus R foot, Intra-op cx and path results pending & further plan of possible amputation, Podiatry team/following, placed on NPO with IV fluids, Psych following, c/w diet as ordered & add MVI/minerals/Vit.C 500 mg BID for wound healing & Zinc Sulfate 220mg once daily as medically feasible. RD available     Daily Weight in k.2 (2021 05:15)    Pertinent Medications: MEDICATIONS  (STANDING):  aspirin enteric coated 81 milliGRAM(s) Oral daily  cefepime   IVPB 1000 milliGRAM(s) IV Intermittent every 8 hours  collagenase Ointment 1 Application(s) Topical daily  Dakins Solution - Full Strength 1 Application(s) Topical once  dextrose 40% Gel 15 Gram(s) Oral once  dextrose 5% + sodium chloride 0.45%. 1000 milliLiter(s) (75 mL/Hr) IV Continuous <Continuous>  dextrose 50% Injectable 12.5 Gram(s) IV Push once  dextrose 50% Injectable 25 Gram(s) IV Push once  dextrose 50% Injectable 25 Gram(s) IV Push once  enoxaparin Injectable 40 milliGRAM(s) SubCutaneous daily  glucagon  Injectable 1 milliGRAM(s) IntraMuscular once  insulin lispro (ADMELOG) corrective regimen sliding scale   SubCutaneous three times a day before meals  insulin lispro (ADMELOG) corrective regimen sliding scale   SubCutaneous at bedtime  metroNIDAZOLE  IVPB 500 milliGRAM(s) IV Intermittent every 8 hours  simvastatin 40 milliGRAM(s) Oral at bedtime  vancomycin  IVPB 750 milliGRAM(s) IV Intermittent every 12 hours    MEDICATIONS  (PRN):  acetaminophen   Tablet .. 650 milliGRAM(s) Oral every 6 hours PRN Moderate Pain (4 - 6)    Pertinent Labs:  Na150 mmol/L<H> Glu 214 mg/dL<H> K+ 3.5 mmol/L Cr  0.69 mg/dL BUN 22 mg/dL<H>  Phos 2.1 mg/dL<L>  Alb 1.6 g/dL<L>  Chol 121 mg/dL LDL --    HDL 36 mg/dL<L> Trig 112 mg/dL     CAPILLARY BLOOD GLUCOSE      POCT Blood Glucose.: 216 mg/dL (2021 16:04)  POCT Blood Glucose.: 206 mg/dL (2021 11:17)  POCT Blood Glucose.: 233 mg/dL (2021 07:54)  POCT Blood Glucose.: 190 mg/dL (2021 21:31)  POCT Blood Glucose.: 125 mg/dL (2021 16:58)    Skin: diabetic foot ulcers/pressure injuries with wound care    Estimated Needs:   [ X] no change since previous assessment  [ ] recalculated:     Previous Nutrition Diagnosis:   [ ] Inadequate Energy Intake [X ]Inadequate Oral Intake [ ] Excessive Energy Intake   [ ] Underweight [ ] Increased Nutrient Needs [ ] Overweight/Obesity   [ ] Altered GI Function [ ] Unintended Weight Loss [ ] Food & Nutrition Related Knowledge Deficit [ ] Malnutrition     Nutrition Diagnosis is [X ] ongoing  [ ] resolved [ ] not applicable     New Nutrition Diagnosis: [ ] not applicable     Interventions: To meet nutrition needs   Recommend  [ ] Change Diet To:  [ ] Nutrition Supplement  [ ] Nutrition Support  [ X] Other: Nursing to continue feeding assistance and encouragement, aspiration precaution     Monitoring and Evaluation:   [X ] PO intake [ x ] Tolerance to diet prescription [ x ] weights [ x ] labs[ x ] follow up per protocol  [ ] other:

## 2021-01-15 LAB
-  AMIKACIN: SIGNIFICANT CHANGE UP
-  AMOXICILLIN/CLAVULANIC ACID: SIGNIFICANT CHANGE UP
-  AMPICILLIN/SULBACTAM: SIGNIFICANT CHANGE UP
-  AMPICILLIN: SIGNIFICANT CHANGE UP
-  AMPICILLIN: SIGNIFICANT CHANGE UP
-  AZTREONAM: SIGNIFICANT CHANGE UP
-  CEFAZOLIN: SIGNIFICANT CHANGE UP
-  CEFEPIME: SIGNIFICANT CHANGE UP
-  CEFOXITIN: SIGNIFICANT CHANGE UP
-  CEFTRIAXONE: SIGNIFICANT CHANGE UP
-  CIPROFLOXACIN: SIGNIFICANT CHANGE UP
-  ERTAPENEM: SIGNIFICANT CHANGE UP
-  GENTAMICIN: SIGNIFICANT CHANGE UP
-  LEVOFLOXACIN: SIGNIFICANT CHANGE UP
-  MEROPENEM: SIGNIFICANT CHANGE UP
-  PIPERACILLIN/TAZOBACTAM: SIGNIFICANT CHANGE UP
-  TETRACYCLINE: SIGNIFICANT CHANGE UP
-  TOBRAMYCIN: SIGNIFICANT CHANGE UP
-  TRIMETHOPRIM/SULFAMETHOXAZOLE: SIGNIFICANT CHANGE UP
-  VANCOMYCIN: SIGNIFICANT CHANGE UP
ALBUMIN SERPL ELPH-MCNC: 1.6 G/DL — LOW (ref 3.5–5)
ALP SERPL-CCNC: 39 U/L — LOW (ref 40–120)
ALT FLD-CCNC: <6 U/L DA — LOW (ref 10–60)
ANION GAP SERPL CALC-SCNC: 9 MMOL/L — SIGNIFICANT CHANGE UP (ref 5–17)
AST SERPL-CCNC: 7 U/L — LOW (ref 10–40)
BILIRUB SERPL-MCNC: 0.3 MG/DL — SIGNIFICANT CHANGE UP (ref 0.2–1.2)
BUN SERPL-MCNC: 17 MG/DL — SIGNIFICANT CHANGE UP (ref 7–18)
CALCIUM SERPL-MCNC: 8.1 MG/DL — LOW (ref 8.4–10.5)
CHLORIDE SERPL-SCNC: 119 MMOL/L — HIGH (ref 96–108)
CO2 SERPL-SCNC: 19 MMOL/L — LOW (ref 22–31)
CREAT SERPL-MCNC: 0.61 MG/DL — SIGNIFICANT CHANGE UP (ref 0.5–1.3)
CULTURE RESULTS: SIGNIFICANT CHANGE UP
GLUCOSE BLDC GLUCOMTR-MCNC: 148 MG/DL — HIGH (ref 70–99)
GLUCOSE BLDC GLUCOMTR-MCNC: 191 MG/DL — HIGH (ref 70–99)
GLUCOSE BLDC GLUCOMTR-MCNC: 207 MG/DL — HIGH (ref 70–99)
GLUCOSE BLDC GLUCOMTR-MCNC: 215 MG/DL — HIGH (ref 70–99)
GLUCOSE SERPL-MCNC: 197 MG/DL — HIGH (ref 70–99)
HCT VFR BLD CALC: 22.4 % — LOW (ref 34.5–45)
HGB BLD-MCNC: 7.2 G/DL — LOW (ref 11.5–15.5)
MCHC RBC-ENTMCNC: 30.1 PG — SIGNIFICANT CHANGE UP (ref 27–34)
MCHC RBC-ENTMCNC: 32.1 GM/DL — SIGNIFICANT CHANGE UP (ref 32–36)
MCV RBC AUTO: 93.7 FL — SIGNIFICANT CHANGE UP (ref 80–100)
METHOD TYPE: SIGNIFICANT CHANGE UP
METHOD TYPE: SIGNIFICANT CHANGE UP
NRBC # BLD: 0 /100 WBCS — SIGNIFICANT CHANGE UP (ref 0–0)
ORGANISM # SPEC MICROSCOPIC CNT: SIGNIFICANT CHANGE UP
ORGANISM # SPEC MICROSCOPIC CNT: SIGNIFICANT CHANGE UP
PLATELET # BLD AUTO: 339 K/UL — SIGNIFICANT CHANGE UP (ref 150–400)
POTASSIUM SERPL-MCNC: 3.1 MMOL/L — LOW (ref 3.5–5.3)
POTASSIUM SERPL-SCNC: 3.1 MMOL/L — LOW (ref 3.5–5.3)
PROT SERPL-MCNC: 4.6 G/DL — LOW (ref 6–8.3)
RBC # BLD: 2.39 M/UL — LOW (ref 3.8–5.2)
RBC # FLD: 14.3 % — SIGNIFICANT CHANGE UP (ref 10.3–14.5)
SARS-COV-2 RNA SPEC QL NAA+PROBE: SIGNIFICANT CHANGE UP
SODIUM SERPL-SCNC: 147 MMOL/L — HIGH (ref 135–145)
SPECIMEN SOURCE: SIGNIFICANT CHANGE UP
VANCOMYCIN TROUGH SERPL-MCNC: 16.4 UG/ML — SIGNIFICANT CHANGE UP (ref 10–20)
WBC # BLD: 13.36 K/UL — HIGH (ref 3.8–10.5)
WBC # FLD AUTO: 13.36 K/UL — HIGH (ref 3.8–10.5)

## 2021-01-15 RX ORDER — POTASSIUM CHLORIDE 20 MEQ
40 PACKET (EA) ORAL EVERY 4 HOURS
Refills: 0 | Status: DISCONTINUED | OUTPATIENT
Start: 2021-01-15 | End: 2021-01-15

## 2021-01-15 RX ORDER — POTASSIUM CHLORIDE 20 MEQ
10 PACKET (EA) ORAL
Refills: 0 | Status: COMPLETED | OUTPATIENT
Start: 2021-01-15 | End: 2021-01-15

## 2021-01-15 RX ADMIN — Medication 100 MILLIEQUIVALENT(S): at 18:34

## 2021-01-15 RX ADMIN — Medication 100 MILLIGRAM(S): at 13:38

## 2021-01-15 RX ADMIN — Medication 100 MILLIGRAM(S): at 05:43

## 2021-01-15 RX ADMIN — Medication 100 MILLIEQUIVALENT(S): at 21:01

## 2021-01-15 RX ADMIN — Medication 250 MILLIGRAM(S): at 08:04

## 2021-01-15 RX ADMIN — CEFEPIME 100 MILLIGRAM(S): 1 INJECTION, POWDER, FOR SOLUTION INTRAMUSCULAR; INTRAVENOUS at 21:52

## 2021-01-15 RX ADMIN — CEFEPIME 100 MILLIGRAM(S): 1 INJECTION, POWDER, FOR SOLUTION INTRAMUSCULAR; INTRAVENOUS at 13:39

## 2021-01-15 RX ADMIN — Medication 2: at 11:57

## 2021-01-15 RX ADMIN — ENOXAPARIN SODIUM 40 MILLIGRAM(S): 100 INJECTION SUBCUTANEOUS at 11:59

## 2021-01-15 RX ADMIN — CEFEPIME 100 MILLIGRAM(S): 1 INJECTION, POWDER, FOR SOLUTION INTRAMUSCULAR; INTRAVENOUS at 05:43

## 2021-01-15 RX ADMIN — SODIUM CHLORIDE 75 MILLILITER(S): 9 INJECTION, SOLUTION INTRAVENOUS at 13:39

## 2021-01-15 RX ADMIN — Medication 1 APPLICATION(S): at 12:07

## 2021-01-15 RX ADMIN — Medication 2: at 08:25

## 2021-01-15 RX ADMIN — Medication 100 MILLIEQUIVALENT(S): at 20:22

## 2021-01-15 RX ADMIN — Medication 100 MILLIGRAM(S): at 21:51

## 2021-01-15 RX ADMIN — SODIUM CHLORIDE 75 MILLILITER(S): 9 INJECTION, SOLUTION INTRAVENOUS at 21:01

## 2021-01-15 RX ADMIN — Medication 250 MILLIGRAM(S): at 17:17

## 2021-01-15 NOTE — PROGRESS NOTE ADULT - PROBLEM SELECTOR PLAN 1
MRI noted above  c/w Cefepime day 10  c/w metronidazole day 9  IR placement of  extended dwell Dr. Hopper approved.  Wound culture grew few Morganella morganii and few Proteus mirabilis  s/p OR right heel debridement, obtained 2PC consent  ID Dr. Welilngton  Podiatry following  Vascular Following.

## 2021-01-15 NOTE — PROGRESS NOTE ADULT - PROBLEM SELECTOR PLAN 2
Continue with heel lift boots, offloading of bony prominences  Continue with NWB to right heel.  wound vac applied by podiatry

## 2021-01-15 NOTE — PROGRESS NOTE ADULT - SUBJECTIVE AND OBJECTIVE BOX
Patient is a 75y old  Female who presents with a chief complaint of foot ulcer (15 Ender 2021 10:45)    PATIENT IS SEEN AND EXAMINED IN MEDICAL FLOOR.    ALLERGIES:  No Known Allergies    VITALS:    Vital Signs Last 24 Hrs  T(C): 36.9 (15 Ender 2021 14:00), Max: 37.1 (15 Ender 2021 05:28)  T(F): 98.5 (15 Ender 2021 14:00), Max: 98.7 (15 Ender 2021 05:28)  HR: 83 (15 Ender 2021 14:00) (81 - 90)  BP: 126/62 (15 Ender 2021 14:00) (119/41 - 127/54)  BP(mean): --  RR: 18 (15 Ender 2021 14:00) (16 - 18)  SpO2: 100% (15 Ender 2021 14:00) (100% - 100%)    LABS:    CBC Full  -  ( 15 Ender 2021 08:22 )  WBC Count : 13.36 K/uL  RBC Count : 2.39 M/uL  Hemoglobin : 7.2 g/dL  Hematocrit : 22.4 %  Platelet Count - Automated : 339 K/uL  Mean Cell Volume : 93.7 fl  Mean Cell Hemoglobin : 30.1 pg  Mean Cell Hemoglobin Concentration : 32.1 gm/dL  Auto Neutrophil # : x  Auto Lymphocyte # : x  Auto Monocyte # : x  Auto Eosinophil # : x  Auto Basophil # : x  Auto Neutrophil % : x  Auto Lymphocyte % : x  Auto Monocyte % : x  Auto Eosinophil % : x  Auto Basophil % : x      01-15    147<H>  |  119<H>  |  17  ----------------------------<  197<H>  3.1<L>   |  19<L>  |  0.61    Ca    8.1<L>      15 Ender 2021 08:22    TPro  4.6<L>  /  Alb  1.6<L>  /  TBili  0.3  /  DBili  x   /  AST  7<L>  /  ALT  <6<L>  /  AlkPhos  39<L>  01-15    CAPILLARY BLOOD GLUCOSE      POCT Blood Glucose.: 215 mg/dL (15 Ender 2021 11:39)  POCT Blood Glucose.: 207 mg/dL (15 Ender 2021 07:37)  POCT Blood Glucose.: 206 mg/dL (14 Jan 2021 21:03)  POCT Blood Glucose.: 176 mg/dL (14 Jan 2021 17:22)  POCT Blood Glucose.: 216 mg/dL (14 Jan 2021 16:04)      LIVER FUNCTIONS - ( 15 Ender 2021 08:22 )  Alb: 1.6 g/dL / Pro: 4.6 g/dL / ALK PHOS: 39 U/L / ALT: <6 U/L DA / AST: 7 U/L / GGT: x           Creatinine Trend: 0.61<--, 0.69<--, 0.68<--, 0.76<--, 0.93<--, 1.06<--  I&O's Summary      .Tissue Right heel margin  01-13 @ 19:31   Rare Enterococcus faecalis  --    No WBC's seen. per low power field  No organisms seen      .Surgical Swab Right heel wound culture  01-13 @ 19:22   Moderate Proteus mirabilis  --  --      .Urine Clean Catch (Midstream)  01-06 @ 06:46   No growth  --  --      .Surgical Swab Right heel wound  01-05 @ 22:13   Few Morganella morganii  Few Proteus mirabilis  Moderate Corynebacterium species "Susceptibilities not performed"  Moderate Bacteroides vulgatus "Susceptibilities not performed"  --  Morganella morganii  Proteus mirabilis      .Blood Blood-Peripheral  01-05 @ 17:59   No Growth Final  --  --      MEDICATIONS:    MEDICATIONS  (STANDING):  aspirin enteric coated 81 milliGRAM(s) Oral daily  cefepime   IVPB 1000 milliGRAM(s) IV Intermittent every 8 hours  collagenase Ointment 1 Application(s) Topical daily  Dakins Solution - Full Strength 1 Application(s) Topical once  dextrose 40% Gel 15 Gram(s) Oral once  dextrose 5% + sodium chloride 0.45%. 1000 milliLiter(s) (75 mL/Hr) IV Continuous <Continuous>  dextrose 50% Injectable 25 Gram(s) IV Push once  dextrose 50% Injectable 12.5 Gram(s) IV Push once  dextrose 50% Injectable 25 Gram(s) IV Push once  enoxaparin Injectable 40 milliGRAM(s) SubCutaneous daily  glucagon  Injectable 1 milliGRAM(s) IntraMuscular once  insulin lispro (ADMELOG) corrective regimen sliding scale   SubCutaneous three times a day before meals  insulin lispro (ADMELOG) corrective regimen sliding scale   SubCutaneous at bedtime  metroNIDAZOLE  IVPB 500 milliGRAM(s) IV Intermittent every 8 hours  simvastatin 40 milliGRAM(s) Oral at bedtime  vancomycin  IVPB 750 milliGRAM(s) IV Intermittent every 12 hours      MEDICATIONS  (PRN):  acetaminophen   Tablet .. 650 milliGRAM(s) Oral every 6 hours PRN Moderate Pain (4 - 6)      REVIEW OF SYSTEMS:                           ALL ROS DONE [ X   ]    CONSTITUTIONAL:  LETHARGIC [   ], FEVER [   ], UNRESPONSIVE [   ]  CVS:  CP  [   ], SOB, [   ], PALPITATIONS [   ], DIZZYNESS [   ]  RS: COUGH [   ], SPUTUM [   ]  GI: ABDOMINAL PAIN [   ], NAUSEA [   ], VOMITINGS [   ], DIARRHEA [   ], CONSTIPATION [   ]  :  DYSURIA [   ], NOCTURIA [   ], INCREASED FREQUENCY [   ], DRIBLING [   ],  SKELETAL: PAINFUL JOINTS [   ], SWOLLEN JOINTS [   ], NECK ACHE [   ], LOW BACK ACHE [   ],  SKIN : ULCERS [   ], RASH [   ], ITCHING [   ]  CNS: HEAD ACHE [   ], DOUBLE VISION [   ], BLURRED VISION [   ], AMS / CONFUSION [   ], SEIZURES [   ], WEAKNESS [   ],TINGLING / NUMBNESS [   ]    PHYSICAL EXAMINATION:  GENERAL APPEARANCE: NO DISTRESS  HEENT:  NO PALLOR, NO  JVD,  NO   NODES, NECK SUPPLE  CVS: S1 +, S2 +,   RS: AEEB,  OCCASIONAL  RALES +,   NO RONCHI  ABD: SOFT, NT, NO, BS +  EXT: NO PE  SKIN: WARM, BILATERAL HEEL ULCERS - COVERED, SACRAL ULCER + RIGHT ISCHIAL ULCER COVERED  SKELETAL:  ROM ACCEPTABLE, LEFT ARM SWELLING  CNS:  AAO X 2-3     RADIOLOGY :    EXAM:  MR FOOT RT                            PROCEDURE DATE:  01/06/2021          INTERPRETATION:  Clinical indications: Right heel ulceration and eschar status post debridement. Concern for osteomyelitis.    Multiplanar multisequence MRI of the right foot was performed localized to the hindfoot.    Correlation is made with prior MRI from September 11, 2019.    FINDINGS:    There is a large wound along the plantar aspect of the calcaneus posteriorly which extends nearly extends to bone. There is surrounding soft tissue edema about this site with evidence of an overlying bandage. The degree of soft tissue deficiency is increased compared to the prior examination. There is extensive osseous edema and corresponding hypointense T1 marrow signalthroughout the calcaneus extending from the posterior plantar margin to the level of the angle of Gissane. This is progressed compared to the prior examination and consistent with osteomyelitis. Marrow signal within the remainder of the foot is otherwise preserved.    There is confluent edema tracking along the abductor hallucis and flexor digitorum brevis muscles and within the plantar subcutaneous fat subjacent to this region. In total this area measures approximately 2.2 cm in transverse dimension, approximately 4 cm in anteroposterior posterior dimension, and approximately 1.6 cm in craniocaudal dimension. Findings may be related to underlying phlegmon or abscess. Evaluation is limited by the lack of intravenous contrast. Distal to this site there is edema throughout the visualized musculature consistent with myositis.    Visualized tendinous structures remain intact. There is no acute ligamentous injury. Visualized joint spaces are preserved without joint effusion.    IMPRESSION:    Osteomyelitis involving the calcaneus which extends from the plantar posterior aspect of the calcaneus to the level of the angle of Gissane. This is progressed compared to the prior examination. This is seen in the setting of diffuse soft tissue deficiency along the posterior plantar aspect of the calcaneus.    Confluent edema adjacent to this region within the abductor hallucis and flexor digitorum brevis muscles and within the subjacent plantar subcutaneous fat. This may be related to abscess orphlegmon.    ASSESSMENT :     Osteomyelitis    Yes    COVID-19    Osteomyelitis    DM (diabetes mellitus)    Paranoid schizophrenia    HLD (hyperlipidemia)    PAD (peripheral artery disease)    HTN (hypertension)    No significant past surgical history        PLAN:  HPI:  74 yo F from [facility], wheelchair bound with medical history significant of HTN, Diabetes, Osteoarthritis, Osteoporosis, Peripheral arterial disease, Diabetic neuropathy, HLD, Schizophrenia comes in with worsening ulceration to b/l heels. She has been having ulcers for past couple months, has been progressive. She was treated with IV antbx at NH but did not get better. It was worsening with necrotic changes and increasing foul smelling discharge. She denies fever, abdominal pain, chest pain, urinary symptoms, fall, trauma. No h/o nausea, vomiting, diarrhea, cough, dyspnea, sick contacts, recent illness.  (05 Jan 2021 14:24)    PLAN:    # BILATERAL LE CHRONIC ULCER NOW PROGRESSIVELY WORSENING W/ SSTI AND RIGHT CALCANEAL OSTEOMYELITIS W/ POSSIBLE ABSCESS; UNDERWENT BEDSIDE DEBRIDEMENT OF RIGHT LEG ULCER AND UNDERWENT RIGHT PARTIAL CALCANECTOMY ON 1/13  - CEFEPIME + VANOMYCIN + FLAGYL, REVIEWED TIMUR, BCX - NGTD, WOUND CX - MORGANELLA MORGANI & P. MIRABILIS, ID CONSULT IN PROGRESS, PODIATRY CONSULT IN PROGRESS  - REVIEWED RIGHT LE MRI  -  PSYCHIATRY CONSULT FOR CAPACITY IN PROGRESS - DEEM PATIENT AS HAVING CAPACITY.  PATIENT WAS AGREEABLE FOR SURGICAL DEBRIDEMENT ON 1/09  - 2 PC CONSENT FOR SURGICAL DEBRIDEMENT PLANNED FOR 1/12; ATTEMPTED TO CALL NEXT OF [facility] [place] @ [phone] HOWEVER PHONE NUMBER APPEARS DISCONNECTED  - VASCULAR SX CONSULT IN PROGRESS - PATIENT MAY REQUIRE AKA, BUT REFUSED ON PREVIOUS CONVERSATIONS - WAS AGREEABLE TO LOCAL DEBRIDMENT/INTERVENTIONS  # MEDICAL CLEARANCE FOR SURGERY - RCRI 1 POINT - 6% RISK OF DEATH, MI OR CARDIAC ARREST; CARDIOLOGY CONSULT IN PROGRESS FOR MEDICAL CLEARANCE  # SACRAL AND RIGHT ISCHIAL UNSTAGEABLE ULCERS W/ SSTI  S/P DEBRIDEMENT 1/8- ON VANCOMYCIN AND CEFEPIME, SURGERY CONSULT IN PROGRESS -  PSYCHIATRY CONSULT FOR CAPACITY IN PROGRESS  # ACUTE METABOLIC ENCEPHALOPATHY - SUSPECT S/T ACUTE INFECTION & HYPERNATREMIA - REVIEWED CT HEAD  # HYPERNATREMIA SUSPECT S/T POOR PO INTAKE - PLACED ON IVF  # LEFT ARM SWELLING - REVIEWED LEFT ARM U/S W/ OUT THROMBUS, WARM COMPRESSES  # ASHLEY - RESOLVED  # PAD  # HTN  # DM W/ DIABETIC NEUROPATHY  # OA/OP  # SCHIZOPHRENIA - HOLD MEDICATION  # CASE D/W PATIENT'S ? PARTNER - LISA KELLY - WHO REPORTS HE IS NOT NEXT OF [facility] OR HCP - [phone] ON 1/12; HE UNDERSTOOD PATIENT'S PROGNOSIS WAS GUARDED  # GI AND DVT PPX    ELEN CASILLAS MD COVERING FARHAN CASILLAS MD.   Patient is a 75y old  Female who presents with a chief complaint of foot ulcer (15 Ender 2021 10:45)    PATIENT IS SEEN AND EXAMINED IN MEDICAL FLOOR.    ALLERGIES:  No Known Allergies    VITALS:    Vital Signs Last 24 Hrs  T(C): 36.9 (15 Ender 2021 14:00), Max: 37.1 (15 Ender 2021 05:28)  T(F): 98.5 (15 Enedr 2021 14:00), Max: 98.7 (15 Ender 2021 05:28)  HR: 83 (15 Ender 2021 14:00) (81 - 90)  BP: 126/62 (15 Ender 2021 14:00) (119/41 - 127/54)  BP(mean): --  RR: 18 (15 Ender 2021 14:00) (16 - 18)  SpO2: 100% (15 Ender 2021 14:00) (100% - 100%)    LABS:    CBC Full  -  ( 15 Ender 2021 08:22 )  WBC Count : 13.36 K/uL  RBC Count : 2.39 M/uL  Hemoglobin : 7.2 g/dL  Hematocrit : 22.4 %  Platelet Count - Automated : 339 K/uL  Mean Cell Volume : 93.7 fl  Mean Cell Hemoglobin : 30.1 pg  Mean Cell Hemoglobin Concentration : 32.1 gm/dL  Auto Neutrophil # : x  Auto Lymphocyte # : x  Auto Monocyte # : x  Auto Eosinophil # : x  Auto Basophil # : x  Auto Neutrophil % : x  Auto Lymphocyte % : x  Auto Monocyte % : x  Auto Eosinophil % : x  Auto Basophil % : x      01-15    147<H>  |  119<H>  |  17  ----------------------------<  197<H>  3.1<L>   |  19<L>  |  0.61    Ca    8.1<L>      15 Ender 2021 08:22    TPro  4.6<L>  /  Alb  1.6<L>  /  TBili  0.3  /  DBili  x   /  AST  7<L>  /  ALT  <6<L>  /  AlkPhos  39<L>  01-15    CAPILLARY BLOOD GLUCOSE      POCT Blood Glucose.: 215 mg/dL (15 Ender 2021 11:39)  POCT Blood Glucose.: 207 mg/dL (15 Ender 2021 07:37)  POCT Blood Glucose.: 206 mg/dL (14 Jan 2021 21:03)  POCT Blood Glucose.: 176 mg/dL (14 Jan 2021 17:22)  POCT Blood Glucose.: 216 mg/dL (14 Jan 2021 16:04)      LIVER FUNCTIONS - ( 15 Ender 2021 08:22 )  Alb: 1.6 g/dL / Pro: 4.6 g/dL / ALK PHOS: 39 U/L / ALT: <6 U/L DA / AST: 7 U/L / GGT: x           Creatinine Trend: 0.61<--, 0.69<--, 0.68<--, 0.76<--, 0.93<--, 1.06<--  I&O's Summary      .Tissue Right heel margin  01-13 @ 19:31   Rare Enterococcus faecalis  --    No WBC's seen. per low power field  No organisms seen      .Surgical Swab Right heel wound culture  01-13 @ 19:22   Moderate Proteus mirabilis  --  --      .Urine Clean Catch (Midstream)  01-06 @ 06:46   No growth  --  --      .Surgical Swab Right heel wound  01-05 @ 22:13   Few Morganella morganii  Few Proteus mirabilis  Moderate Corynebacterium species "Susceptibilities not performed"  Moderate Bacteroides vulgatus "Susceptibilities not performed"  --  Morganella morganii  Proteus mirabilis      .Blood Blood-Peripheral  01-05 @ 17:59   No Growth Final  --  --      MEDICATIONS:    MEDICATIONS  (STANDING):  aspirin enteric coated 81 milliGRAM(s) Oral daily  cefepime   IVPB 1000 milliGRAM(s) IV Intermittent every 8 hours  collagenase Ointment 1 Application(s) Topical daily  Dakins Solution - Full Strength 1 Application(s) Topical once  dextrose 40% Gel 15 Gram(s) Oral once  dextrose 5% + sodium chloride 0.45%. 1000 milliLiter(s) (75 mL/Hr) IV Continuous <Continuous>  dextrose 50% Injectable 25 Gram(s) IV Push once  dextrose 50% Injectable 12.5 Gram(s) IV Push once  dextrose 50% Injectable 25 Gram(s) IV Push once  enoxaparin Injectable 40 milliGRAM(s) SubCutaneous daily  glucagon  Injectable 1 milliGRAM(s) IntraMuscular once  insulin lispro (ADMELOG) corrective regimen sliding scale   SubCutaneous three times a day before meals  insulin lispro (ADMELOG) corrective regimen sliding scale   SubCutaneous at bedtime  metroNIDAZOLE  IVPB 500 milliGRAM(s) IV Intermittent every 8 hours  simvastatin 40 milliGRAM(s) Oral at bedtime  vancomycin  IVPB 750 milliGRAM(s) IV Intermittent every 12 hours      MEDICATIONS  (PRN):  acetaminophen   Tablet .. 650 milliGRAM(s) Oral every 6 hours PRN Moderate Pain (4 - 6)      REVIEW OF SYSTEMS:                           ALL ROS DONE [ X   ]    CONSTITUTIONAL:  LETHARGIC [   ], FEVER [   ], UNRESPONSIVE [   ]  CVS:  CP  [   ], SOB, [   ], PALPITATIONS [   ], DIZZYNESS [   ]  RS: COUGH [   ], SPUTUM [   ]  GI: ABDOMINAL PAIN [   ], NAUSEA [   ], VOMITINGS [   ], DIARRHEA [   ], CONSTIPATION [   ]  :  DYSURIA [   ], NOCTURIA [   ], INCREASED FREQUENCY [   ], DRIBLING [   ],  SKELETAL: PAINFUL JOINTS [   ], SWOLLEN JOINTS [   ], NECK ACHE [   ], LOW BACK ACHE [   ],  SKIN : ULCERS [   ], RASH [   ], ITCHING [   ]  CNS: HEAD ACHE [   ], DOUBLE VISION [   ], BLURRED VISION [   ], AMS / CONFUSION [   ], SEIZURES [   ], WEAKNESS [   ],TINGLING / NUMBNESS [   ]    PHYSICAL EXAMINATION:  GENERAL APPEARANCE: NO DISTRESS  HEENT:  NO PALLOR, NO  JVD,  NO   NODES, NECK SUPPLE  CVS: S1 +, S2 +,   RS: AEEB,  OCCASIONAL  RALES +,   NO RONCHI  ABD: SOFT, NT, NO, BS +  EXT: NO PE  SKIN: WARM, BILATERAL HEEL ULCERS - COVERED, SACRAL ULCER + RIGHT ISCHIAL ULCER COVERED  SKELETAL:  ROM ACCEPTABLE, LEFT ARM SWELLING  CNS:  AAO X 2-3     RADIOLOGY :    EXAM:  MR FOOT RT                            PROCEDURE DATE:  01/06/2021          INTERPRETATION:  Clinical indications: Right heel ulceration and eschar status post debridement. Concern for osteomyelitis.    Multiplanar multisequence MRI of the right foot was performed localized to the hindfoot.    Correlation is made with prior MRI from September 11, 2019.    FINDINGS:    There is a large wound along the plantar aspect of the calcaneus posteriorly which extends nearly extends to bone. There is surrounding soft tissue edema about this site with evidence of an overlying bandage. The degree of soft tissue deficiency is increased compared to the prior examination. There is extensive osseous edema and corresponding hypointense T1 marrow signalthroughout the calcaneus extending from the posterior plantar margin to the level of the angle of Gissane. This is progressed compared to the prior examination and consistent with osteomyelitis. Marrow signal within the remainder of the foot is otherwise preserved.    There is confluent edema tracking along the abductor hallucis and flexor digitorum brevis muscles and within the plantar subcutaneous fat subjacent to this region. In total this area measures approximately 2.2 cm in transverse dimension, approximately 4 cm in anteroposterior posterior dimension, and approximately 1.6 cm in craniocaudal dimension. Findings may be related to underlying phlegmon or abscess. Evaluation is limited by the lack of intravenous contrast. Distal to this site there is edema throughout the visualized musculature consistent with myositis.    Visualized tendinous structures remain intact. There is no acute ligamentous injury. Visualized joint spaces are preserved without joint effusion.    IMPRESSION:    Osteomyelitis involving the calcaneus which extends from the plantar posterior aspect of the calcaneus to the level of the angle of Gissane. This is progressed compared to the prior examination. This is seen in the setting of diffuse soft tissue deficiency along the posterior plantar aspect of the calcaneus.    Confluent edema adjacent to this region within the abductor hallucis and flexor digitorum brevis muscles and within the subjacent plantar subcutaneous fat. This may be related to abscess orphlegmon.    ASSESSMENT :     Osteomyelitis    Yes    COVID-19    Osteomyelitis    DM (diabetes mellitus)    Paranoid schizophrenia    HLD (hyperlipidemia)    PAD (peripheral artery disease)    HTN (hypertension)    No significant past surgical history        PLAN:  HPI:  74 yo F from [facility], wheelchair bound with medical history significant of HTN, Diabetes, Osteoarthritis, Osteoporosis, Peripheral arterial disease, Diabetic neuropathy, HLD, Schizophrenia comes in with worsening ulceration to b/l heels. She has been having ulcers for past couple months, has been progressive. She was treated with IV antbx at NH but did not get better. It was worsening with necrotic changes and increasing foul smelling discharge. She denies fever, abdominal pain, chest pain, urinary symptoms, fall, trauma. No h/o nausea, vomiting, diarrhea, cough, dyspnea, sick contacts, recent illness.  (05 Jan 2021 14:24)    PLAN:    # BILATERAL LE CHRONIC ULCER NOW PROGRESSIVELY WORSENING W/ SSTI AND RIGHT CALCANEAL OSTEOMYELITIS W/ POSSIBLE ABSCESS; UNDERWENT BEDSIDE DEBRIDEMENT OF RIGHT LEG ULCER AND UNDERWENT RIGHT PARTIAL CALCANECTOMY ON 1/13  - CEFEPIME + VANOMYCIN + FLAGYL, REVIEWED TIMUR, BCX - NGTD, WOUND CX - MORGANELLA MORGANI & P. MIRABILIS, ID CONSULT IN PROGRESS, PODIATRY CONSULT IN PROGRESS  - REVIEWED RIGHT LE MRI  -  PSYCHIATRY CONSULT FOR CAPACITY IN PROGRESS - DEEM PATIENT AS HAVING CAPACITY.  PATIENT WAS AGREEABLE FOR SURGICAL DEBRIDEMENT ON 1/09  - 2 PC CONSENT FOR SURGICAL DEBRIDEMENT PLANNED FOR 1/12; ATTEMPTED TO CALL NEXT OF [facility] [place] @ [phone] HOWEVER PHONE NUMBER APPEARS DISCONNECTED  - VASCULAR SX CONSULT IN PROGRESS - PATIENT MAY REQUIRE AKA, BUT REFUSED ON PREVIOUS CONVERSATIONS - WAS AGREEABLE TO LOCAL DEBRIDEMENT/INTERVENTIONS  - PLAN FOR WOUND VAC  - PATIENT WILL NEED 6 WEEKS OF IV ANTIBIOTICS - WILL NEED TO SWITCH TO PICC LINE FROM EXTENDED DWELL    # MEDICAL CLEARANCE FOR SURGERY - RCRI 1 POINT - 6% RISK OF DEATH, MI OR CARDIAC ARREST; CARDIOLOGY CONSULT IN PROGRESS FOR MEDICAL CLEARANCE    # SACRAL AND RIGHT ISCHIAL UNSTAGEABLE ULCERS W/ SSTI  S/P DEBRIDEMENT 1/8- ON VANCOMYCIN AND CEFEPIME, SURGERY CONSULT IN PROGRESS -  PSYCHIATRY CONSULT FOR CAPACITY IN PROGRESS    # ACUTE METABOLIC ENCEPHALOPATHY - SUSPECT S/T ACUTE INFECTION & HYPERNATREMIA - REVIEWED CT HEAD  - F/U ST EVAL AS RECENTLY MADE NPO BY SPEECH    # HYPERNATREMIA SUSPECT S/T POOR PO INTAKE - PLACED ON IVF    # LEFT ARM SWELLING - REVIEWED LEFT ARM U/S W/ OUT THROMBUS, WARM COMPRESSES  # ASHLEY - RESOLVED  # PAD  # HTN  # DM W/ DIABETIC NEUROPATHY  # OA/OP  # SCHIZOPHRENIA - HOLD MEDICATION  # CASE D/W PATIENT'S ? PARTNER - LISA KELLY - WHO REPORTS HE IS NOT NEXT OF [facility] OR HCP - [phone] ON 1/12; HE UNDERSTOOD PATIENT'S PROGNOSIS WAS GUARDED  # GI AND DVT PPX    ELEN CASILLAS MD COVERING FARHAN CASILLAS MD

## 2021-01-15 NOTE — PROGRESS NOTE ADULT - SUBJECTIVE AND OBJECTIVE BOX
Patient is a 75y old  Female who presents with a chief complaint of foot ulcer (05 Jan 2021 14:24)      HPI:  76 yo F from [facility], wheelchair bound with medical history significant of HTN, Diabetes, Osteoarthritis, Osteoporosis, Peripheral arterial disease, Diabetic neuropathy, HLD, Schizophrenia comes in with worsening ulceration to b/l heels. She has been having ulcers for past couple months, has been progressive. She was treated with IV antbx at NH but did not get better. It was worsening with necrotic changes and increasing foul smelling discharge. She denies fever, abdominal pain, chest pain, urinary symptoms, fall, trauma. No h/o nausea, vomiting, diarrhea, cough, dyspnea, sick contacts, recent illness.  (05 Jan 2021 14:24)      Podiatry HPI: 74 y/o female with full history as noted above, podiatry consulted for b/l heel wounds. Pt is from [facility], wheelchair bound with medical history significant of HTN, Diabetes, Osteoarthritis, Osteoporosis, PAD, Diabetic neuropathy, HLD, Schizophrenia comes in with worsening ulceration to b/l heels. Patient seen resting in bed comfortably, AAOx3. Patient states she has had the wounds for a long time, maybe more than 6 months. She relates receiving local wound care previously in the NH using santyl dressings. She endorses occasional pain to wound sites. Patient was  under podiatric care in previous admissions for b/l heel wounds with osteomyelitis. She states she is not interesting in surgery for her feet, she wishes to continue with local wound care. She denies nausea, vomiting, chills, fever, SOB.     Podiatry Progress: Pt s/p Right foot partial calcanectomy and wound debridement on 1/13/21. Patient seen sleeping in bed, not rousing to voice. Unable to assess further history due to patient mental status.    Medications acetaminophen   Tablet .. 650 milliGRAM(s) Oral every 6 hours PRN  aspirin enteric coated 81 milliGRAM(s) Oral daily  cefepime   IVPB 1000 milliGRAM(s) IV Intermittent every 8 hours  collagenase Ointment 1 Application(s) Topical daily  Dakins Solution - Full Strength 1 Application(s) Topical once  dextrose 40% Gel 15 Gram(s) Oral once  dextrose 5% + sodium chloride 0.45%. 1000 milliLiter(s) IV Continuous <Continuous>  dextrose 50% Injectable 12.5 Gram(s) IV Push once  dextrose 50% Injectable 25 Gram(s) IV Push once  dextrose 50% Injectable 25 Gram(s) IV Push once  enoxaparin Injectable 40 milliGRAM(s) SubCutaneous daily  glucagon  Injectable 1 milliGRAM(s) IntraMuscular once  insulin lispro (ADMELOG) corrective regimen sliding scale   SubCutaneous three times a day before meals  insulin lispro (ADMELOG) corrective regimen sliding scale   SubCutaneous at bedtime  metroNIDAZOLE  IVPB 500 milliGRAM(s) IV Intermittent every 8 hours  simvastatin 40 milliGRAM(s) Oral at bedtime  vancomycin  IVPB 750 milliGRAM(s) IV Intermittent every 12 hours    FHNo pertinent family history in first degree relatives    ,   PMHCOVID-19    Osteomyelitis    DM (diabetes mellitus)    Paranoid schizophrenia    HLD (hyperlipidemia)    PAD (peripheral artery disease)    HTN (hypertension)       PSHNo significant past surgical history        Labs                          7.2    13.36 )-----------( 339      ( 15 Ender 2021 08:22 )             22.4      01-15    147<H>  |  119<H>  |  17  ----------------------------<  197<H>  3.1<L>   |  19<L>  |  x     Ca    8.1<L>      15 Ender 2021 08:22    TPro  x   /  Alb  1.6<L>  /  TBili  x   /  DBili  x   /  AST  x   /  ALT  x   /  AlkPhos  x   01-15     Vital Signs Last 24 Hrs  T(C): 37.1 (15 Ender 2021 05:28), Max: 37.1 (15 Ender 2021 05:28)  T(F): 98.7 (15 Ender 2021 05:28), Max: 98.7 (15 Ender 2021 05:28)  HR: 90 (15 Ender 2021 05:28) (81 - 90)  BP: 127/54 (15 Ender 2021 05:28) (112/46 - 145/60)  BP(mean): --  RR: 16 (15 Ender 2021 05:28) (16 - 18)  SpO2: 100% (15 Ender 2021 05:28) (99% - 100%)  Sedimentation Rate, Erythrocyte: 99 mm/Hr (01-05-21 @ 11:45)         C-Reactive Protein, Serum: 5.63 mg/dL (01-06-21 @ 10:27)   WBC Count: 13.36 K/uL <H> (01-15-21 @ 08:22)      S/P DAY 1 PHYSICAL EXAM:   LE Focused:    Vasc:  DP/PT nonpalpable, CFT brisk to digits, TG warm to warm b/l,   Derm:   Wound 1:  L heel closed necrotic eschar measuring ~4x3.5x0.1 cm with mild mac wound erythema. No tunneling or undermining noted. No discharge from the area. No malodor or PTB noted. Stable in appearance  Wound 2: s/p R foot partial calcanectomy and wound debridement measuring ~9x6.5x1 cm with calcaneal bone exposed. No periwound erythema. No drainage, no malodor. Wound bed appearing fibrotic  Neuro: protective sensation absent at level of digits b/l  MSK: no tenderness on palpation of periwound areas b/l       IMAGING:  f< from: Xray Ankle Complete 3 Views, Right (01.13.21 @ 18:15) >    EXAM:  ANKLE RIGHT (MINIMUM 3 V)                            PROCEDURE DATE:  01/13/2021          INTERPRETATION:  RIGHT ankle    CLINICAL INFORMATION: Postoperative radiographs.    TECHNIQUE: AP,lateral /views.    FINDINGS:    Large posterior calcaneal ulceration NOTED with the osteolysis /surgical debridement of the posterior calcaneus remaining osseous structures of the ankle are intact..    IMPRESSION:    Posterior calcaneal large ulceration within the posterior calcaneal postsurgical debridement versus/osteomyelitis.            ADITI SILVA MD; Attending Radiologist  This document has been electronically signed. Jan 13 2021  7:02PM    < end of copied text >    --------------------------  < from: MR Foot No Cont, Right (01.06.21 @ 11:08) >    EXAM:  MR FOOT RT                            PROCEDURE DATE:  01/06/2021          INTERPRETATION:  Clinical indications: Right heel ulceration and eschar status post debridement. Concern for osteomyelitis.    Multiplanar multisequence MRI of the right foot was performed localized to the hindfoot.    Correlation is made with prior MRI from September 11, 2019.    FINDINGS:    There is a large wound along the plantar aspect of the calcaneus posteriorly which extends nearly extends to bone. There is surrounding soft tissue edema about this site with evidence of an overlying bandage. The degree of soft tissue deficiency is increased compared to the prior examination. There is extensive osseous edema and corresponding hypointense T1 marrow signalthroughout the calcaneus extending from the posterior plantar margin to the level of the angle of Gissane. This is progressed compared to the prior examination and consistent with osteomyelitis. Marrow signal within the remainder of the foot is otherwise preserved.    There is confluent edema tracking along the abductor hallucis and flexor digitorum brevis muscles and within the plantar subcutaneous fat subjacent to this region. In total this area measures approximately 2.2 cm in transverse dimension, approximately 4 cm in anteroposterior posterior dimension, and approximately 1.6 cm in craniocaudal dimension. Findings may be related to underlying phlegmon or abscess. Evaluation is limited by the lack of intravenous contrast. Distal to this site there is edema throughout the visualized musculature consistent with myositis.    Visualized tendinous structures remain intact. There is no acute ligamentous injury. Visualized joint spaces are preserved without joint effusion.    IMPRESSION:    Osteomyelitis involving the calcaneus which extends from the plantar posterior aspect of the calcaneus to the level of the angle of Gissane. This is progressed compared to the prior examination. This is seen in the setting of diffuse soft tissue deficiency along the posterior plantar aspect of the calcaneus.    Confluent edema adjacent to this region within the abductor hallucis and flexor digitorum brevis muscles and within the subjacent plantar subcutaneous fat. This may be related to abscess orphlegmon.            OSWALDO ALFRED MD; Attending Radiologist  This document has been electronically signed. Jan 6 2021 11:49AM    < end of copied text >    -------------------------------------------------------------------------------------------------------------  < from: [facility] Physiol Extremity Lower 3+ Level, BI (01.05.21 @ 17:55) >    EXAM:  US PHYSIOL LWR EXT 3+ LEV BI                            PROCEDURE DATE:  01/05/2021          INTERPRETATION:  Clinical information: History of diabetes, nonsmoker, hypertension, hyperlipidemia, presents with bilateral heel ulcers.    Comparison: Lower extremity arterial Doppler study dated 5/13/2020.    Technique: Lower extremity arterial Doppler study. Note that pressure measurements were not obtained at the thigh level.    Findings: Ankle brachial index measures 1.33 bilaterally, compared with prior values of 1.41 on the right and 1.36 on the left. No segmental arterial pressure gradient is present.    PVR waveforms are normal in amplitude and pulsatility from the thigh through the ankle level. They're diminished in amplitude at the metatarsal and digital levels in symmetric fashion, similar to the prior exam.    Impression: No Doppler evidence of hemodynamically significant arterial inflow abnormality in either lower extremity.    Evidence of small vessel disease in the feet.    No significant interval change since study of 5/13/2020.            SOHAN PA MD; Attending Radiologist  This document has been electronically signed. Jan 6 2021  9:13AM    < end of copied text >      --------------------------------------------------------------------------------------------------------------  < from: Xray Foot AP + Lateral + Oblique, Right (01.05.21 @ 18:00) >    EXAM:  FOOT RIGHT (MINIMUM 3 VIEWS)                            PROCEDURE DATE:  01/05/2021          INTERPRETATION:  Right foot    HISTORY: Status post bedside debridement.     Two views of the right foot show thinning of soft tissues near the calcaneus likely indicating site of debridement. The joint spaces are maintained. There is questionable exposure of the plantar surface of the calcaneus.    IMPRESSION: Calcaneal soft tissues and questionable exposure as noted. Clinical correlation is suggested.        Thank you for this referral.            REID CRAMER MD; Attending Interventional Radiologist  This document has been electronically signed. Jan 6 2021 11:10AM    < end of copied text >    -------------------------------------------------------------------------------------------  a< from: Xray Ankle Complete 3 Views, Bilateral (01.05.21 @ 14:37) >    EXAM:  ANKLE BILATERAL (MINIMUM 3 V)                            PROCEDURE DATE:  01/05/2021          INTERPRETATION:  CLINICAL INDICATION:  Osteomyelitis; evaluate for soft tissue emphysema.    COMPARISON:  Left ankle radiography 5/11/2020.    TECHNIQUE:  AP, oblique and crosstable lateral views left ankle. Oblique and crosstable lateral views right ankle. Technologist noted that the best possible films were obtained.    FINDINGS:    Right ankle: Generalized osteopenia. Subcutaneous emphysema is identified along the plantar aspect of the right hindfoot inferior to the calcaneus, with an overlying skin defect noted along the posterior aspect of the calcaneus. Osteolysis involving the inferior/posterior aspect of the right calcaneus cannot beexcluded. MRI evaluation can be performed for further assessment. Extensive vascular calcification is noted. No ankle joint effusion is noted.    Left ankle: Generalized osteopenia. There is no evidence for acute fracture or dislocation. The ankle mortise appears grossly intact. No ankle joint effusion is noted. Extensive vascular calcifications identified. No significant soft tissue swelling.      IMPRESSION:  No evidence for acute fracture. Subcutaneous emphysema is identified along the plantaraspect of the right hindfoot inferior to the calcaneus, with an overlying skin defect noted along the posterior aspect of the calcaneus. Osteolysis involving the inferior/posterior aspect of the right calcaneus cannot be excluded. MRI evaluation can be performed for further assessment.                MARA LARKIN MD; Attending Radiologist  This document has been electronically signed. Jan 5 2021  3:57PM    < end of copied text >        CULTURES:   cCulture - Surgical Swab (01.05.21 @ 22:13)   Specimen Source: .Surgical Swab Right heel wound   Culture Results:   Few Morganella morganii   Few Proteus mirabilis       Historical Values  Culture - Surgical Swab (01.05.21 @ 22:13)   Specimen Source: .Surgical Swab Right heel wound   Culture Results:   Few Morganella morganii   Few Proteus mirabilis

## 2021-01-15 NOTE — PROGRESS NOTE ADULT - ASSESSMENT
HPI:    Patient is 75 year old wheelchair bound female from [facility] with a past medical history of HTN, type II diabetes mellitus, OA, osteoporosis, PAD, diabetic neuropathy, HLD, paranoid schizophrenia and recurrent osteomyelitis who presented to the emergency department on 1/5 with c/o worsening infection of her bilateral heel ulcerations in spite local wound care with santyl dressings and outpatient antibiotic management. Per ECC, patient's wounds have had increasing purulent, foul smelling discharge and necrotic changes. Patient with additional ulcerations to her coccyx, ischium, and sacrum for which wound care and surgery were consulted. Podiatry also consulted for her heel ulcerations: patient s/p bedside debridement of her right heel ulceration, refusing any surgical interventions at this time. Infectious disease also following.    1/6: MRI with findings of: Osteomyelitis involving the calcaneus which extends from the plantar posterior aspect of the calcaneus to the level of the angle of Gissane. This is progressed compared to the prior examination.   1/7:  Pt states she understands that she may loose her leg and/or her life without surgical intervention for RLE OM, still refusing debridement.  Psych consulted. Patient needs medical clearance for OR on 1/8.    1/11; Patient I and D not done, anesthesia unable to get consent. Patient not consenting to OR procedure. OR procedure cancelled due to no consent. Patient deemed to have full capacity as per Psychiatry.     1/13: Patient seen and examined at bedside. S/P extended dwell placement by IR. Patient scheduled for surgical debridement of right heel.  1/14 s/p debridement of rt foot, no further surgical intervention at this time.    Patient seen and examined at bedside. Patient is minimally verbal responded to verbal commands. Speech and swallow consult ordered for po intake assessment.

## 2021-01-15 NOTE — PROGRESS NOTE ADULT - ASSESSMENT
A:  s/p R foot partial calcanectomy & wound debridement 1/13/21  Osteomyelitis of calcaneus R foot   DMII    P:   Patient evaluated and chart reviewed  Post-op R foot xrays evaluated, results as noted above  Intra-op cx and path results pending   Dressed left foot heel with santyl, allevyn pad  Placed wound vac with white foam overlying bone, black foam on top, set at 125 mmHg  Wound vac form completed  Instructions for discharge: upon discharge, place white foam overlying exposed calcaneus on the right heel, and black foam overlying to cover the wound. Place wound vac over, and set at 125 mmHg. On the left heel, apply betadine to wound, cover with 4x4 gauze, ABD pad, Keyur overlying, and light ACE compression. Apply CAIR boots bilaterally.  Applied CAIR boots to b/l LE   Podiatry will follow while in house.   Patient discussed and seen with Dr. Morgan   A:  s/p R foot partial calcanectomy & wound debridement 1/13/21  Osteomyelitis of calcaneus R foot   DMII    P:   Patient evaluated and chart reviewed  Post-op R foot xrays evaluated, results as noted above  Intra-op cx and path results pending   Dressed left foot heel with santyl, allevyn pad  Placed wound vac with white foam overlying bone, black foam on top, set at 125 mmHg  Wound vac form completed  Instructions for discharge: upon discharge, on Right heel wound apply santyl, white foam overlying exposed calcaneus on the right heel, and black foam overlying to cover the wound. Place wound vac over, and set at 125 mmHg. On the left heel, apply santyl to wound, cover with 4x4 gauze, ABD pad, Keuyr overlying, and light ACE compression. Apply CAIR boots bilaterally.  Applied CAIR boots to b/l LE   Podiatry will follow while in house.   Patient discussed and seen with Dr. Morgan

## 2021-01-16 LAB
ALBUMIN SERPL ELPH-MCNC: 1.5 G/DL — LOW (ref 3.5–5)
ALP SERPL-CCNC: 43 U/L — SIGNIFICANT CHANGE UP (ref 40–120)
ALT FLD-CCNC: <6 U/L DA — LOW (ref 10–60)
ANION GAP SERPL CALC-SCNC: 10 MMOL/L — SIGNIFICANT CHANGE UP (ref 5–17)
AST SERPL-CCNC: 5 U/L — LOW (ref 10–40)
BILIRUB SERPL-MCNC: 0.3 MG/DL — SIGNIFICANT CHANGE UP (ref 0.2–1.2)
BUN SERPL-MCNC: 14 MG/DL — SIGNIFICANT CHANGE UP (ref 7–18)
CALCIUM SERPL-MCNC: 7.6 MG/DL — LOW (ref 8.4–10.5)
CHLORIDE SERPL-SCNC: 117 MMOL/L — HIGH (ref 96–108)
CO2 SERPL-SCNC: 18 MMOL/L — LOW (ref 22–31)
CREAT SERPL-MCNC: 0.58 MG/DL — SIGNIFICANT CHANGE UP (ref 0.5–1.3)
GLUCOSE BLDC GLUCOMTR-MCNC: 156 MG/DL — HIGH (ref 70–99)
GLUCOSE BLDC GLUCOMTR-MCNC: 170 MG/DL — HIGH (ref 70–99)
GLUCOSE BLDC GLUCOMTR-MCNC: 182 MG/DL — HIGH (ref 70–99)
GLUCOSE BLDC GLUCOMTR-MCNC: 231 MG/DL — HIGH (ref 70–99)
GLUCOSE SERPL-MCNC: 168 MG/DL — HIGH (ref 70–99)
HCT VFR BLD CALC: 22.4 % — LOW (ref 34.5–45)
HGB BLD-MCNC: 7.4 G/DL — LOW (ref 11.5–15.5)
MCHC RBC-ENTMCNC: 30.5 PG — SIGNIFICANT CHANGE UP (ref 27–34)
MCHC RBC-ENTMCNC: 33 GM/DL — SIGNIFICANT CHANGE UP (ref 32–36)
MCV RBC AUTO: 92.2 FL — SIGNIFICANT CHANGE UP (ref 80–100)
NRBC # BLD: 0 /100 WBCS — SIGNIFICANT CHANGE UP (ref 0–0)
PLATELET # BLD AUTO: 326 K/UL — SIGNIFICANT CHANGE UP (ref 150–400)
POTASSIUM SERPL-MCNC: 3.2 MMOL/L — LOW (ref 3.5–5.3)
POTASSIUM SERPL-SCNC: 3.2 MMOL/L — LOW (ref 3.5–5.3)
PROT SERPL-MCNC: 4.5 G/DL — LOW (ref 6–8.3)
RBC # BLD: 2.43 M/UL — LOW (ref 3.8–5.2)
RBC # FLD: 14.1 % — SIGNIFICANT CHANGE UP (ref 10.3–14.5)
SODIUM SERPL-SCNC: 145 MMOL/L — SIGNIFICANT CHANGE UP (ref 135–145)
WBC # BLD: 14.16 K/UL — HIGH (ref 3.8–10.5)
WBC # FLD AUTO: 14.16 K/UL — HIGH (ref 3.8–10.5)

## 2021-01-16 RX ORDER — SODIUM CHLORIDE 9 MG/ML
1000 INJECTION, SOLUTION INTRAVENOUS
Refills: 0 | Status: DISCONTINUED | OUTPATIENT
Start: 2021-01-16 | End: 2021-01-17

## 2021-01-16 RX ORDER — POTASSIUM CHLORIDE 20 MEQ
10 PACKET (EA) ORAL ONCE
Refills: 0 | Status: COMPLETED | OUTPATIENT
Start: 2021-01-16 | End: 2021-01-16

## 2021-01-16 RX ADMIN — Medication 2: at 11:50

## 2021-01-16 RX ADMIN — Medication 100 MILLIEQUIVALENT(S): at 18:02

## 2021-01-16 RX ADMIN — Medication 100 MILLIGRAM(S): at 13:40

## 2021-01-16 RX ADMIN — Medication 81 MILLIGRAM(S): at 11:49

## 2021-01-16 RX ADMIN — CEFEPIME 100 MILLIGRAM(S): 1 INJECTION, POWDER, FOR SOLUTION INTRAMUSCULAR; INTRAVENOUS at 13:42

## 2021-01-16 RX ADMIN — Medication 250 MILLIGRAM(S): at 17:37

## 2021-01-16 RX ADMIN — Medication 100 MILLIGRAM(S): at 21:13

## 2021-01-16 RX ADMIN — Medication 100 MILLIGRAM(S): at 05:01

## 2021-01-16 RX ADMIN — SODIUM CHLORIDE 50 MILLILITER(S): 9 INJECTION, SOLUTION INTRAVENOUS at 21:13

## 2021-01-16 RX ADMIN — Medication 250 MILLIGRAM(S): at 05:01

## 2021-01-16 RX ADMIN — CEFEPIME 100 MILLIGRAM(S): 1 INJECTION, POWDER, FOR SOLUTION INTRAMUSCULAR; INTRAVENOUS at 05:01

## 2021-01-16 RX ADMIN — ENOXAPARIN SODIUM 40 MILLIGRAM(S): 100 INJECTION SUBCUTANEOUS at 11:50

## 2021-01-16 RX ADMIN — CEFEPIME 100 MILLIGRAM(S): 1 INJECTION, POWDER, FOR SOLUTION INTRAMUSCULAR; INTRAVENOUS at 21:13

## 2021-01-16 NOTE — PROGRESS NOTE ADULT - SUBJECTIVE AND OBJECTIVE BOX
Patient is a 75y old  Female who presents with a chief complaint of foot ulcer (16 Jan 2021 17:02)    PATIENT IS SEEN AND EXAMINED IN MEDICAL FLOOR.    DIMITRYT [    ]    SABIHA [   ]      GT [   ]    ALLERGIES:  No Known Allergies      Daily     Daily     VITALS:    Vital Signs Last 24 Hrs  T(C): 36.3 (16 Jan 2021 14:40), Max: 36.3 (15 Ender 2021 20:55)  T(F): 97.3 (16 Jan 2021 14:40), Max: 97.3 (15 Ender 2021 20:55)  HR: 83 (16 Jan 2021 14:40) (83 - 92)  BP: 122/55 (16 Jan 2021 14:40) (122/55 - 129/61)  BP(mean): 71 (15 Ender 2021 20:55) (71 - 71)  RR: 17 (16 Jan 2021 14:40) (16 - 18)  SpO2: 100% (16 Jan 2021 14:40) (100% - 100%)    LABS:    CBC Full  -  ( 16 Jan 2021 07:49 )  WBC Count : 14.16 K/uL  RBC Count : 2.43 M/uL  Hemoglobin : 7.4 g/dL  Hematocrit : 22.4 %  Platelet Count - Automated : 326 K/uL  Mean Cell Volume : 92.2 fl  Mean Cell Hemoglobin : 30.5 pg  Mean Cell Hemoglobin Concentration : 33.0 gm/dL  Auto Neutrophil # : x  Auto Lymphocyte # : x  Auto Monocyte # : x  Auto Eosinophil # : x  Auto Basophil # : x  Auto Neutrophil % : x  Auto Lymphocyte % : x  Auto Monocyte % : x  Auto Eosinophil % : x  Auto Basophil % : x      01-16    145  |  117<H>  |  14  ----------------------------<  168<H>  3.2<L>   |  18<L>  |  0.58    Ca    7.6<L>      16 Jan 2021 07:49    TPro  4.5<L>  /  Alb  1.5<L>  /  TBili  0.3  /  DBili  x   /  AST  5<L>  /  ALT  <6<L>  /  AlkPhos  43  01-16    CAPILLARY BLOOD GLUCOSE      POCT Blood Glucose.: 156 mg/dL (16 Jan 2021 16:45)  POCT Blood Glucose.: 231 mg/dL (16 Jan 2021 11:26)  POCT Blood Glucose.: 170 mg/dL (16 Jan 2021 07:44)  POCT Blood Glucose.: 191 mg/dL (15 Ender 2021 21:10)        LIVER FUNCTIONS - ( 16 Jan 2021 07:49 )  Alb: 1.5 g/dL / Pro: 4.5 g/dL / ALK PHOS: 43 U/L / ALT: <6 U/L DA / AST: 5 U/L / GGT: x           Creatinine Trend: 0.58<--, 0.61<--, 0.69<--, 0.68<--, 0.76<--, 0.93<--  I&O's Summary    15 Ender 2021 07:01  -  16 Jan 2021 07:00  --------------------------------------------------------  IN: 900 mL / OUT: 0 mL / NET: 900 mL            .Tissue Right heel margin  01-13 @ 19:31   Rare Enterococcus faecalis  --  Enterococcus faecalis      .Surgical Swab Right heel wound culture  01-13 @ 19:22   Moderate Proteus mirabilis  Few Bacteroides vulgatus group "Susceptibilities not performed"  --  Proteus mirabilis      .Urine Clean Catch (Midstream)  01-06 @ 06:46   No growth  --  --      .Surgical Swab Right heel wound  01-05 @ 22:13   Few Morganella morganii  Few Proteus mirabilis  Moderate Corynebacterium species "Susceptibilities not performed"  Moderate Bacteroides vulgatus "Susceptibilities not performed"  --  Morganella morganii  Proteus mirabilis      .Blood Blood-Peripheral  01-05 @ 17:59   No Growth Final  --  --          MEDICATIONS:    MEDICATIONS  (STANDING):  aspirin enteric coated 81 milliGRAM(s) Oral daily  cefepime   IVPB 1000 milliGRAM(s) IV Intermittent every 8 hours  collagenase Ointment 1 Application(s) Topical daily  Dakins Solution - Full Strength 1 Application(s) Topical once  dextrose 40% Gel 15 Gram(s) Oral once  dextrose 5% + sodium chloride 0.45%. 1000 milliLiter(s) (75 mL/Hr) IV Continuous <Continuous>  dextrose 50% Injectable 12.5 Gram(s) IV Push once  dextrose 50% Injectable 25 Gram(s) IV Push once  dextrose 50% Injectable 25 Gram(s) IV Push once  enoxaparin Injectable 40 milliGRAM(s) SubCutaneous daily  glucagon  Injectable 1 milliGRAM(s) IntraMuscular once  insulin lispro (ADMELOG) corrective regimen sliding scale   SubCutaneous three times a day before meals  insulin lispro (ADMELOG) corrective regimen sliding scale   SubCutaneous at bedtime  metroNIDAZOLE  IVPB 500 milliGRAM(s) IV Intermittent every 8 hours  simvastatin 40 milliGRAM(s) Oral at bedtime  sodium chloride 0.9% 1000 milliLiter(s) (50 mL/Hr) IV Continuous <Continuous>  vancomycin  IVPB 750 milliGRAM(s) IV Intermittent every 12 hours      MEDICATIONS  (PRN):  acetaminophen   Tablet .. 650 milliGRAM(s) Oral every 6 hours PRN Moderate Pain (4 - 6)        REVIEW OF SYSTEMS:                           ALL ROS DONE [ X   ]      CONSTITUTIONAL:  LETHARGIC [   ], FEVER [   ], UNRESPONSIVE [   ]  CVS:  CP  [   ], SOB, [   ], PALPITATIONS [   ], DIZZYNESS [   ]  RS: COUGH [   ], SPUTUM [   ]  GI: ABDOMINAL PAIN [   ], NAUSEA [   ], VOMITINGS [   ], DIARRHEA [   ], CONSTIPATION [   ]  :  DYSURIA [   ], NOCTURIA [   ], INCREASED FREQUENCY [   ], DRIBLING [   ],  SKELETAL: PAINFUL JOINTS [   ], SWOLLEN JOINTS [   ], NECK ACHE [   ], LOW BACK ACHE [   ],  SKIN : ULCERS [   ], RASH [   ], ITCHING [   ]  CNS: HEAD ACHE [   ], DOUBLE VISION [   ], BLURRED VISION [   ], AMS / CONFUSION [   ], SEIZURES [   ], WEAKNESS [   ],TINGLING / NUMBNESS [   ]    PHYSICAL EXAMINATION:  GENERAL APPEARANCE: NO DISTRESS  HEENT:  NO PALLOR, NO  JVD,  NO   NODES, NECK SUPPLE  CVS: S1 +, S2 +,   RS: AEEB,  OCCASIONAL  RALES +,   NO RONCHI  ABD: SOFT, NT, NO, BS +  EXT: NO PE  SKIN: WARM, BILATERAL HEEL ULCERS - COVERED, SACRAL ULCER + RIGHT ISCHIAL ULCER COVERED  SKELETAL:  ROM ACCEPTABLE, LEFT ARM SWELLING  CNS:  AAO X 2-3     RADIOLOGY :    EXAM:  MR FOOT RT                            PROCEDURE DATE:  01/06/2021          INTERPRETATION:  Clinical indications: Right heel ulceration and eschar status post debridement. Concern for osteomyelitis.    Multiplanar multisequence MRI of the right foot was performed localized to the hindfoot.    Correlation is made with prior MRI from September 11, 2019.    FINDINGS:    There is a large wound along the plantar aspect of the calcaneus posteriorly which extends nearly extends to bone. There is surrounding soft tissue edema about this site with evidence of an overlying bandage. The degree of soft tissue deficiency is increased compared to the prior examination. There is extensive osseous edema and corresponding hypointense T1 marrow signalthroughout the calcaneus extending from the posterior plantar margin to the level of the angle of Gissane. This is progressed compared to the prior examination and consistent with osteomyelitis. Marrow signal within the remainder of the foot is otherwise preserved.    There is confluent edema tracking along the abductor hallucis and flexor digitorum brevis muscles and within the plantar subcutaneous fat subjacent to this region. In total this area measures approximately 2.2 cm in transverse dimension, approximately 4 cm in anteroposterior posterior dimension, and approximately 1.6 cm in craniocaudal dimension. Findings may be related to underlying phlegmon or abscess. Evaluation is limited by the lack of intravenous contrast. Distal to this site there is edema throughout the visualized musculature consistent with myositis.    Visualized tendinous structures remain intact. There is no acute ligamentous injury. Visualized joint spaces are preserved without joint effusion.    IMPRESSION:    Osteomyelitis involving the calcaneus which extends from the plantar posterior aspect of the calcaneus to the level of the angle of Gissane. This is progressed compared to the prior examination. This is seen in the setting of diffuse soft tissue deficiency along the posterior plantar aspect of the calcaneus.    Confluent edema adjacent to this region within the abductor hallucis and flexor digitorum brevis muscles and within the subjacent plantar subcutaneous fat. This may be related to abscess orphlegmon.    ASSESSMENT :     Osteomyelitis    Yes    COVID-19    Osteomyelitis    DM (diabetes mellitus)    Paranoid schizophrenia    HLD (hyperlipidemia)    PAD (peripheral artery disease)    HTN (hypertension)    No significant past surgical history        PLAN:  HPI:  76 yo F from [facility], wheelchair bound with medical history significant of HTN, Diabetes, Osteoarthritis, Osteoporosis, Peripheral arterial disease, Diabetic neuropathy, HLD, Schizophrenia comes in with worsening ulceration to b/l heels. She has been having ulcers for past couple months, has been progressive. She was treated with IV antbx at NH but did not get better. It was worsening with necrotic changes and increasing foul smelling discharge. She denies fever, abdominal pain, chest pain, urinary symptoms, fall, trauma. No h/o nausea, vomiting, diarrhea, cough, dyspnea, sick contacts, recent illness.  (05 Jan 2021 14:24)    PLAN:    # BILATERAL LE CHRONIC ULCER NOW PROGRESSIVELY WORSENING W/ SSTI AND RIGHT CALCANEAL OSTEOMYELITIS W/ POSSIBLE ABSCESS; UNDERWENT BEDSIDE DEBRIDEMENT OF RIGHT LEG ULCER AND UNDERWENT RIGHT PARTIAL CALCANECTOMY ON 1/13  - CEFEPIME + VANOMYCIN + FLAGYL, REVIEWED TIMUR, BCX - NGTD, WOUND CX - MORGANELLA MORGANI & P. MIRABILIS, ID CONSULT IN PROGRESS, PODIATRY CONSULT IN PROGRESS  - REVIEWED RIGHT LE MRI  -  PSYCHIATRY CONSULT FOR CAPACITY IN PROGRESS - DEEM PATIENT AS HAVING CAPACITY.  PATIENT WAS AGREEABLE FOR SURGICAL DEBRIDEMENT ON 1/09  - 2 PC CONSENT FOR SURGICAL DEBRIDEMENT PLANNED FOR 1/12; ATTEMPTED TO CALL NEXT OF [facility] @ [phone] HOWEVER PHONE NUMBER APPEARS DISCONNECTED  - VASCULAR SX CONSULT IN PROGRESS - PATIENT MAY REQUIRE AKA, BUT REFUSED ON PREVIOUS CONVERSATIONS - WAS AGREEABLE TO LOCAL DEBRIDEMENT/INTERVENTIONS  - PLAN FOR WOUND VAC  - PATIENT WILL NEED 6 WEEKS OF IV ANTIBIOTICS - WILL NEED TO SWITCH TO PICC LINE FROM EXTENDED DWELL    # MEDICAL CLEARANCE FOR SURGERY - RCRI 1 POINT - 6% RISK OF DEATH, MI OR CARDIAC ARREST; CARDIOLOGY CONSULT IN PROGRESS FOR MEDICAL CLEARANCE    # SACRAL AND RIGHT ISCHIAL UNSTAGEABLE ULCERS W/ SSTI  S/P DEBRIDEMENT 1/8- ON VANCOMYCIN AND CEFEPIME, SURGERY CONSULT IN PROGRESS -  PSYCHIATRY CONSULT FOR CAPACITY IN PROGRESS    # ACUTE METABOLIC ENCEPHALOPATHY - SUSPECT S/T ACUTE INFECTION & HYPERNATREMIA - REVIEWED CT HEAD  - F/U ST EVAL AS RECENTLY MADE NPO BY SPEECH    # HYPERNATREMIA SUSPECT S/T POOR PO INTAKE - PLACED ON IVF    # LEFT ARM SWELLING - REVIEWED LEFT ARM U/S W/ OUT THROMBUS, WARM COMPRESSES  # ASHLEY - RESOLVED  # PAD  # HTN  # DM W/ DIABETIC NEUROPATHY  # OA/OP  # SCHIZOPHRENIA - HOLD MEDICATION  # CASE D/W PATIENT'S ? PARTNER - LISA KELLY - WHO REPORTS HE IS NOT NEXT OF [facility] OR HCP - [phone] ON 1/12; HE UNDERSTOOD PATIENT'S PROGNOSIS WAS GUARDED  # GI AND DVT PPX    ELEN CASILLAS MD COVERING FARHAN CASILLAS MD   Patient is a 75y old  Female who presents with a chief complaint of foot ulcer (16 Jan 2021 17:02)    PATIENT IS SEEN AND EXAMINED IN MEDICAL FLOOR.    DIMITRYT [    ]    SABIHA [   ]      GT [   ]    ALLERGIES:  No Known Allergies      Daily     Daily     VITALS:    Vital Signs Last 24 Hrs  T(C): 36.3 (16 Jan 2021 14:40), Max: 36.3 (15 Ender 2021 20:55)  T(F): 97.3 (16 Jan 2021 14:40), Max: 97.3 (15 Ender 2021 20:55)  HR: 83 (16 Jan 2021 14:40) (83 - 92)  BP: 122/55 (16 Jan 2021 14:40) (122/55 - 129/61)  BP(mean): 71 (15 Ender 2021 20:55) (71 - 71)  RR: 17 (16 Jan 2021 14:40) (16 - 18)  SpO2: 100% (16 Jan 2021 14:40) (100% - 100%)    LABS:    CBC Full  -  ( 16 Jan 2021 07:49 )  WBC Count : 14.16 K/uL  RBC Count : 2.43 M/uL  Hemoglobin : 7.4 g/dL  Hematocrit : 22.4 %  Platelet Count - Automated : 326 K/uL  Mean Cell Volume : 92.2 fl  Mean Cell Hemoglobin : 30.5 pg  Mean Cell Hemoglobin Concentration : 33.0 gm/dL  Auto Neutrophil # : x  Auto Lymphocyte # : x  Auto Monocyte # : x  Auto Eosinophil # : x  Auto Basophil # : x  Auto Neutrophil % : x  Auto Lymphocyte % : x  Auto Monocyte % : x  Auto Eosinophil % : x  Auto Basophil % : x      01-16    145  |  117<H>  |  14  ----------------------------<  168<H>  3.2<L>   |  18<L>  |  0.58    Ca    7.6<L>      16 Jan 2021 07:49    TPro  4.5<L>  /  Alb  1.5<L>  /  TBili  0.3  /  DBili  x   /  AST  5<L>  /  ALT  <6<L>  /  AlkPhos  43  01-16    CAPILLARY BLOOD GLUCOSE      POCT Blood Glucose.: 156 mg/dL (16 Jan 2021 16:45)  POCT Blood Glucose.: 231 mg/dL (16 Jan 2021 11:26)  POCT Blood Glucose.: 170 mg/dL (16 Jan 2021 07:44)  POCT Blood Glucose.: 191 mg/dL (15 Ender 2021 21:10)        LIVER FUNCTIONS - ( 16 Jan 2021 07:49 )  Alb: 1.5 g/dL / Pro: 4.5 g/dL / ALK PHOS: 43 U/L / ALT: <6 U/L DA / AST: 5 U/L / GGT: x           Creatinine Trend: 0.58<--, 0.61<--, 0.69<--, 0.68<--, 0.76<--, 0.93<--  I&O's Summary    15 Ender 2021 07:01  -  16 Jan 2021 07:00  --------------------------------------------------------  IN: 900 mL / OUT: 0 mL / NET: 900 mL            .Tissue Right heel margin  01-13 @ 19:31   Rare Enterococcus faecalis  --  Enterococcus faecalis      .Surgical Swab Right heel wound culture  01-13 @ 19:22   Moderate Proteus mirabilis  Few Bacteroides vulgatus group "Susceptibilities not performed"  --  Proteus mirabilis      .Urine Clean Catch (Midstream)  01-06 @ 06:46   No growth  --  --      .Surgical Swab Right heel wound  01-05 @ 22:13   Few Morganella morganii  Few Proteus mirabilis  Moderate Corynebacterium species "Susceptibilities not performed"  Moderate Bacteroides vulgatus "Susceptibilities not performed"  --  Morganella morganii  Proteus mirabilis      .Blood Blood-Peripheral  01-05 @ 17:59   No Growth Final  --  --          MEDICATIONS:    MEDICATIONS  (STANDING):  aspirin enteric coated 81 milliGRAM(s) Oral daily  cefepime   IVPB 1000 milliGRAM(s) IV Intermittent every 8 hours  collagenase Ointment 1 Application(s) Topical daily  Dakins Solution - Full Strength 1 Application(s) Topical once  dextrose 40% Gel 15 Gram(s) Oral once  dextrose 5% + sodium chloride 0.45%. 1000 milliLiter(s) (75 mL/Hr) IV Continuous <Continuous>  dextrose 50% Injectable 12.5 Gram(s) IV Push once  dextrose 50% Injectable 25 Gram(s) IV Push once  dextrose 50% Injectable 25 Gram(s) IV Push once  enoxaparin Injectable 40 milliGRAM(s) SubCutaneous daily  glucagon  Injectable 1 milliGRAM(s) IntraMuscular once  insulin lispro (ADMELOG) corrective regimen sliding scale   SubCutaneous three times a day before meals  insulin lispro (ADMELOG) corrective regimen sliding scale   SubCutaneous at bedtime  metroNIDAZOLE  IVPB 500 milliGRAM(s) IV Intermittent every 8 hours  simvastatin 40 milliGRAM(s) Oral at bedtime  sodium chloride 0.9% 1000 milliLiter(s) (50 mL/Hr) IV Continuous <Continuous>  vancomycin  IVPB 750 milliGRAM(s) IV Intermittent every 12 hours      MEDICATIONS  (PRN):  acetaminophen   Tablet .. 650 milliGRAM(s) Oral every 6 hours PRN Moderate Pain (4 - 6)        REVIEW OF SYSTEMS:                           ALL ROS DONE [ X   ]      CONSTITUTIONAL:  LETHARGIC [   ], FEVER [   ], UNRESPONSIVE [   ]  CVS:  CP  [   ], SOB, [   ], PALPITATIONS [   ], DIZZYNESS [   ]  RS: COUGH [   ], SPUTUM [   ]  GI: ABDOMINAL PAIN [   ], NAUSEA [   ], VOMITINGS [   ], DIARRHEA [   ], CONSTIPATION [   ]  :  DYSURIA [   ], NOCTURIA [   ], INCREASED FREQUENCY [   ], DRIBLING [   ],  SKELETAL: PAINFUL JOINTS [   ], SWOLLEN JOINTS [   ], NECK ACHE [   ], LOW BACK ACHE [   ],  SKIN : ULCERS [   ], RASH [   ], ITCHING [   ]  CNS: HEAD ACHE [   ], DOUBLE VISION [   ], BLURRED VISION [   ], AMS / CONFUSION [   ], SEIZURES [   ], WEAKNESS [   ],TINGLING / NUMBNESS [   ]    PHYSICAL EXAMINATION:  GENERAL APPEARANCE: NO DISTRESS  HEENT:  NO PALLOR, NO  JVD,  NO   NODES, NECK SUPPLE  CVS: S1 +, S2 +,   RS: AEEB,  OCCASIONAL  RALES +,   NO RONCHI  ABD: SOFT, NT, NO, BS +  EXT: NO PE  SKIN: WARM, BILATERAL HEEL ULCERS - COVERED, SACRAL ULCER + RIGHT ISCHIAL ULCER COVERED  SKELETAL:  ROM ACCEPTABLE, LEFT ARM SWELLING  CNS:  AAO X 2-3     RADIOLOGY :    EXAM:  MR FOOT RT                            PROCEDURE DATE:  01/06/2021          INTERPRETATION:  Clinical indications: Right heel ulceration and eschar status post debridement. Concern for osteomyelitis.    Multiplanar multisequence MRI of the right foot was performed localized to the hindfoot.    Correlation is made with prior MRI from September 11, 2019.    FINDINGS:    There is a large wound along the plantar aspect of the calcaneus posteriorly which extends nearly extends to bone. There is surrounding soft tissue edema about this site with evidence of an overlying bandage. The degree of soft tissue deficiency is increased compared to the prior examination. There is extensive osseous edema and corresponding hypointense T1 marrow signalthroughout the calcaneus extending from the posterior plantar margin to the level of the angle of Gissane. This is progressed compared to the prior examination and consistent with osteomyelitis. Marrow signal within the remainder of the foot is otherwise preserved.    There is confluent edema tracking along the abductor hallucis and flexor digitorum brevis muscles and within the plantar subcutaneous fat subjacent to this region. In total this area measures approximately 2.2 cm in transverse dimension, approximately 4 cm in anteroposterior posterior dimension, and approximately 1.6 cm in craniocaudal dimension. Findings may be related to underlying phlegmon or abscess. Evaluation is limited by the lack of intravenous contrast. Distal to this site there is edema throughout the visualized musculature consistent with myositis.    Visualized tendinous structures remain intact. There is no acute ligamentous injury. Visualized joint spaces are preserved without joint effusion.    IMPRESSION:    Osteomyelitis involving the calcaneus which extends from the plantar posterior aspect of the calcaneus to the level of the angle of Gissane. This is progressed compared to the prior examination. This is seen in the setting of diffuse soft tissue deficiency along the posterior plantar aspect of the calcaneus.    Confluent edema adjacent to this region within the abductor hallucis and flexor digitorum brevis muscles and within the subjacent plantar subcutaneous fat. This may be related to abscess orphlegmon.    ASSESSMENT :     Osteomyelitis    Yes    COVID-19    Osteomyelitis    DM (diabetes mellitus)    Paranoid schizophrenia    HLD (hyperlipidemia)    PAD (peripheral artery disease)    HTN (hypertension)    No significant past surgical history        PLAN:  HPI:  74 yo F from [facility], wheelchair bound with medical history significant of HTN, Diabetes, Osteoarthritis, Osteoporosis, Peripheral arterial disease, Diabetic neuropathy, HLD, Schizophrenia comes in with worsening ulceration to b/l heels. She has been having ulcers for past couple months, has been progressive. She was treated with IV antbx at NH but did not get better. It was worsening with necrotic changes and increasing foul smelling discharge. She denies fever, abdominal pain, chest pain, urinary symptoms, fall, trauma. No h/o nausea, vomiting, diarrhea, cough, dyspnea, sick contacts, recent illness.  (05 Jan 2021 14:24)    PLAN:    #  PATIENT HAS POOR PO INTAKE, STARTED IVF   # BILATERAL LE CHRONIC ULCER NOW PROGRESSIVELY WORSENING W/ SSTI AND RIGHT CALCANEAL OSTEOMYELITIS W/ POSSIBLE ABSCESS; UNDERWENT BEDSIDE DEBRIDEMENT OF RIGHT LEG ULCER AND UNDERWENT RIGHT PARTIAL CALCANECTOMY ON 1/13  - CEFEPIME + VANOMYCIN + FLAGYL, REVIEWED TIMUR, BCX - NGTD, WOUND CX - KOBE NORMAN & P. MIRABILIS, ID CONSULT IN PROGRESS, PODIATRY CONSULT IN PROGRESS  - REVIEWED RIGHT LE MRI  -  PSYCHIATRY CONSULT FOR CAPACITY IN PROGRESS - DEEM PATIENT AS HAVING CAPACITY.  PATIENT WAS AGREEABLE FOR SURGICAL DEBRIDEMENT ON 1/09  - 2 PC CONSENT FOR SURGICAL DEBRIDEMENT PLANNED FOR 1/12; ATTEMPTED TO CALL NEXT OF [facility] @ [phone] HOWEVER PHONE NUMBER APPEARS DISCONNECTED  - VASCULAR SX CONSULT IN PROGRESS - PATIENT MAY REQUIRE AKA, BUT REFUSED ON PREVIOUS CONVERSATIONS - WAS AGREEABLE TO LOCAL DEBRIDEMENT/INTERVENTIONS  - PLAN FOR WOUND VAC  - PATIENT WILL NEED 6 WEEKS OF IV ANTIBIOTICS - WILL NEED TO SWITCH TO PICC LINE FROM EXTENDED DWELL    # MEDICAL CLEARANCE FOR SURGERY - RCRI 1 POINT - 6% RISK OF DEATH, MI OR CARDIAC ARREST; CARDIOLOGY CONSULT IN PROGRESS FOR MEDICAL CLEARANCE    # SACRAL AND RIGHT ISCHIAL UNSTAGEABLE ULCERS W/ SSTI  S/P DEBRIDEMENT 1/8- ON VANCOMYCIN AND CEFEPIME, SURGERY CONSULT IN PROGRESS -  PSYCHIATRY CONSULT FOR CAPACITY IN PROGRESS    # ACUTE METABOLIC ENCEPHALOPATHY - SUSPECT S/T ACUTE INFECTION & HYPERNATREMIA - REVIEWED CT HEAD  - F/U ST EVAL AS RECENTLY MADE NPO BY SPEECH    # HYPERNATREMIA SUSPECT S/T POOR PO INTAKE - PLACED ON IVF    # LEFT ARM SWELLING - REVIEWED LEFT ARM U/S W/ OUT THROMBUS, WARM COMPRESSES  # ASHLEY - RESOLVED  # PAD  # HTN  # DM W/ DIABETIC NEUROPATHY  # OA/OP  # SCHIZOPHRENIA - HOLD MEDICATION  # CASE D/W PATIENT'S ? PARTNER - LISA KELLY - WHO REPORTS HE IS NOT NEXT OF KIN OR HCP - [phone] ON 1/12; HE UNDERSTOOD PATIENT'S PROGNOSIS WAS GUARDED  # GI AND DVT PPX    -  FARHAN CASILLAS MD

## 2021-01-16 NOTE — PROGRESS NOTE ADULT - SUBJECTIVE AND OBJECTIVE BOX
Patient denies chest pain or shortness of breath.  Review of systems otherwise (-)  	  acetaminophen   Tablet .. 650 milliGRAM(s) Oral every 6 hours PRN  aspirin enteric coated 81 milliGRAM(s) Oral daily  cefepime   IVPB 1000 milliGRAM(s) IV Intermittent every 8 hours  collagenase Ointment 1 Application(s) Topical daily  Dakins Solution - Full Strength 1 Application(s) Topical once  dextrose 40% Gel 15 Gram(s) Oral once  dextrose 5% + sodium chloride 0.45%. 1000 milliLiter(s) IV Continuous <Continuous>  dextrose 50% Injectable 12.5 Gram(s) IV Push once  dextrose 50% Injectable 25 Gram(s) IV Push once  dextrose 50% Injectable 25 Gram(s) IV Push once  enoxaparin Injectable 40 milliGRAM(s) SubCutaneous daily  glucagon  Injectable 1 milliGRAM(s) IntraMuscular once  insulin lispro (ADMELOG) corrective regimen sliding scale   SubCutaneous three times a day before meals  insulin lispro (ADMELOG) corrective regimen sliding scale   SubCutaneous at bedtime  metroNIDAZOLE  IVPB 500 milliGRAM(s) IV Intermittent every 8 hours  simvastatin 40 milliGRAM(s) Oral at bedtime  vancomycin  IVPB 750 milliGRAM(s) IV Intermittent every 12 hours                            7.4    14.16 )-----------( 326      ( 16 Jan 2021 07:49 )             22.4       Hemoglobin: 7.4 g/dL (01-16 @ 07:49)  Hemoglobin: 7.2 g/dL (01-15 @ 08:22)  Hemoglobin: 7.4 g/dL (01-14 @ 07:45)  Hemoglobin: 8.7 g/dL (01-13 @ 07:43)  Hemoglobin: 9.0 g/dL (01-12 @ 07:34)      01-16    145  |  117<H>  |  14  ----------------------------<  168<H>  3.2<L>   |  18<L>  |  0.58    Ca    7.6<L>      16 Jan 2021 07:49    TPro  4.5<L>  /  Alb  1.5<L>  /  TBili  0.3  /  DBili  x   /  AST  5<L>  /  ALT  <6<L>  /  AlkPhos  43  01-16    Creatinine Trend: 0.58<--, 0.61<--, 0.69<--, 0.68<--, 0.76<--, 0.93<--    COAGS:           T(C): 36.3 (01-16-21 @ 14:40), Max: 36.3 (01-15-21 @ 20:55)  HR: 83 (01-16-21 @ 14:40) (83 - 92)  BP: 122/55 (01-16-21 @ 14:40) (122/55 - 129/61)  RR: 17 (01-16-21 @ 14:40) (16 - 18)  SpO2: 100% (01-16-21 @ 14:40) (100% - 100%)  Wt(kg): --    I&O's Summary    15 Ender 2021 07:01  -  16 Jan 2021 07:00  --------------------------------------------------------  IN: 900 mL / OUT: 0 mL / NET: 900 mL        HEENT:  (-)icterus (-)pallor  CV: N S1 S2 1/6 ANGELIC (+)2 Pulses B/l  Resp:  Clear to ausculatation B/L, normal effort  GI: (+) BS Soft, NT, ND  Lymph:  (-)Edema, (-)obvious lymphadenopathy  Skin: Warm to touch, Normal turgor  Psych: Appropriate mood and affect      ASSESSMENT/PLAN: 	75y  Female  wheelchair bound with medical history significant of HTN, Diabetes, Osteoarthritis, Osteoporosis, Peripheral arterial disease, Diabetic neuropathy, HLD, Schizophrenia historically preserved LV and R function, comes in with worsening ulceration to b/l heels s/p debridement.    - s/p Partial calcanectomy of right foot   - preop lower extremity amputation   - optimized from a cardiac prospective for potential lower extremity amputation  - cont abx per primary team  - vascular f/u    Jm Cortes MD, [facility]  BEEPER [phone]

## 2021-01-16 NOTE — PROGRESS NOTE ADULT - ASSESSMENT
A:  s/p R foot partial calcanectomy & wound debridement 1/13/21  Osteomyelitis of calcaneus R foot   DMII    P:   Patient evaluated and chart reviewed  Post-op R foot xrays evaluated, results as noted above  Intra-op cx result reviewed as above  Intra-op path result reviewed as above  Dressed left foot heel with santyl, allevyn pad  keep wound vac intact with set at 125 mmHg  Instructions for discharge: upon discharge, on Right heel wound apply santyl, white foam overlying exposed calcaneus on the right heel, and black foam overlying to cover the wound. Place wound vac over, and set at 125 mmHg. On the left heel, apply santyl to wound, cover with 4x4 gauze, ABD pad, Keyur overlying, and light ACE compression. Apply CAIR boots bilaterally.  Applied CAIR boots to b/l LE   Podiatry will follow while in house.   Patient discussed with

## 2021-01-16 NOTE — PROGRESS NOTE ADULT - SUBJECTIVE AND OBJECTIVE BOX
Patient is a 75y old  Female who presents with a chief complaint of foot ulcer (05 Jan 2021 14:24)      HPI:  74 yo F from [facility], wheelchair bound with medical history significant of HTN, Diabetes, Osteoarthritis, Osteoporosis, Peripheral arterial disease, Diabetic neuropathy, HLD, Schizophrenia comes in with worsening ulceration to b/l heels. She has been having ulcers for past couple months, has been progressive. She was treated with IV antbx at NH but did not get better. It was worsening with necrotic changes and increasing foul smelling discharge. She denies fever, abdominal pain, chest pain, urinary symptoms, fall, trauma. No h/o nausea, vomiting, diarrhea, cough, dyspnea, sick contacts, recent illness.  (05 Jan 2021 14:24)      Podiatry HPI: 76 y/o female with full history as noted above, podiatry consulted for b/l heel wounds. Pt is from [facility], wheelchair bound with medical history significant of HTN, Diabetes, Osteoarthritis, Osteoporosis, PAD, Diabetic neuropathy, HLD, Schizophrenia comes in with worsening ulceration to b/l heels. Patient seen resting in bed comfortably, AAOx3. Patient states she has had the wounds for a long time, maybe more than 6 months. She relates receiving local wound care previously in the NH using santyl dressings. She endorses occasional pain to wound sites. Patient was  under podiatric care in previous admissions for b/l heel wounds with osteomyelitis. She states she is not interesting in surgery for her feet, she wishes to continue with local wound care. She denies nausea, vomiting, chills, fever, SOB.     Podiatry Progress: Pt s/p Right foot partial calcanectomy and wound debridement on 1/13/21. Patient seen sleeping in bed, not rousing to voice. Unable to assess further history due to patient mental status.      Medications acetaminophen   Tablet .. 650 milliGRAM(s) Oral every 6 hours PRN  aspirin enteric coated 81 milliGRAM(s) Oral daily  cefepime   IVPB 1000 milliGRAM(s) IV Intermittent every 8 hours  collagenase Ointment 1 Application(s) Topical daily  Dakins Solution - Full Strength 1 Application(s) Topical once  dextrose 40% Gel 15 Gram(s) Oral once  dextrose 5% + sodium chloride 0.45%. 1000 milliLiter(s) IV Continuous <Continuous>  dextrose 50% Injectable 12.5 Gram(s) IV Push once  dextrose 50% Injectable 25 Gram(s) IV Push once  dextrose 50% Injectable 25 Gram(s) IV Push once  enoxaparin Injectable 40 milliGRAM(s) SubCutaneous daily  glucagon  Injectable 1 milliGRAM(s) IntraMuscular once  insulin lispro (ADMELOG) corrective regimen sliding scale   SubCutaneous three times a day before meals  insulin lispro (ADMELOG) corrective regimen sliding scale   SubCutaneous at bedtime  metroNIDAZOLE  IVPB 500 milliGRAM(s) IV Intermittent every 8 hours  simvastatin 40 milliGRAM(s) Oral at bedtime  vancomycin  IVPB 750 milliGRAM(s) IV Intermittent every 12 hours    FHNo pertinent family history in first degree relatives    ,   PMHCOVID-19    Osteomyelitis    DM (diabetes mellitus)    Paranoid schizophrenia    HLD (hyperlipidemia)    PAD (peripheral artery disease)    HTN (hypertension)       PSHNo significant past surgical history        Labs                          7.4    14.16 )-----------( 326      ( 16 Jan 2021 07:49 )             22.4      01-16    145  |  117<H>  |  14  ----------------------------<  168<H>  3.2<L>   |  18<L>  |  0.58    Ca    7.6<L>      16 Jan 2021 07:49    TPro  4.5<L>  /  Alb  1.5<L>  /  TBili  0.3  /  DBili  x   /  AST  5<L>  /  ALT  <6<L>  /  AlkPhos  43  01-16     Vital Signs Last 24 Hrs  T(C): 36.2 (16 Jan 2021 05:29), Max: 36.3 (15 Ender 2021 20:55)  T(F): 97.2 (16 Jan 2021 05:29), Max: 97.3 (15 Ender 2021 20:55)  HR: 90 (16 Jan 2021 05:29) (90 - 92)  BP: 129/61 (16 Jan 2021 05:29) (124/53 - 129/61)  BP(mean): 71 (15 Ender 2021 20:55) (71 - 71)  RR: 16 (16 Jan 2021 05:29) (16 - 18)  SpO2: 100% (16 Jan 2021 05:29) (100% - 100%)  Sedimentation Rate, Erythrocyte: 99 mm/Hr (01-05-21 @ 11:45)         C-Reactive Protein, Serum: 5.63 mg/dL (01-06-21 @ 10:27)   WBC Count: 14.16 K/uL <H> (01-16-21 @ 07:49)                S/P DAY 1 PHYSICAL EXAM:   LE Focused:    Vasc:  DP/PT nonpalpable, CFT brisk to digits, TG warm to warm b/l,   Derm:   Wound 1:  L heel closed necrotic eschar measuring ~4x3.5x0.1 cm with mild mac wound erythema. No tunneling or undermining noted. No discharge from the area. No malodor or PTB noted. Stable in appearance  Wound 2: s/p R foot partial calcanectomy and wound debridement measuring ~9x6.5x1 cm with calcaneal bone exposed. No periwound erythema. No drainage, no malodor. Wound bed appearing fibrotic  Neuro: protective sensation absent at level of digits b/l  MSK: no tenderness on palpation of periwound areas b/l       IMAGING:  f< from: Xray Ankle Complete 3 Views, Right (01.13.21 @ 18:15) >    EXAM:  ANKLE RIGHT (MINIMUM 3 V)                            PROCEDURE DATE:  01/13/2021          INTERPRETATION:  RIGHT ankle    CLINICAL INFORMATION: Postoperative radiographs.    TECHNIQUE: AP,lateral /views.    FINDINGS:    Large posterior calcaneal ulceration NOTED with the osteolysis /surgical debridement of the posterior calcaneus remaining osseous structures of the ankle are intact..    IMPRESSION:    Posterior calcaneal large ulceration within the posterior calcaneal postsurgical debridement versus/osteomyelitis.            ADITI SILVA MD; Attending Radiologist  This document has been electronically signed. Jan 13 2021  7:02PM    < end of copied text >    --------------------------  < from: MR Foot No Cont, Right (01.06.21 @ 11:08) >    EXAM:  MR FOOT RT                            PROCEDURE DATE:  01/06/2021          INTERPRETATION:  Clinical indications: Right heel ulceration and eschar status post debridement. Concern for osteomyelitis.    Multiplanar multisequence MRI of the right foot was performed localized to the hindfoot.    Correlation is made with prior MRI from September 11, 2019.    FINDINGS:    There is a large wound along the plantar aspect of the calcaneus posteriorly which extends nearly extends to bone. There is surrounding soft tissue edema about this site with evidence of an overlying bandage. The degree of soft tissue deficiency is increased compared to the prior examination. There is extensive osseous edema and corresponding hypointense T1 marrow signalthroughout the calcaneus extending from the posterior plantar margin to the level of the angle of Gissane. This is progressed compared to the prior examination and consistent with osteomyelitis. Marrow signal within the remainder of the foot is otherwise preserved.    There is confluent edema tracking along the abductor hallucis and flexor digitorum brevis muscles and within the plantar subcutaneous fat subjacent to this region. In total this area measures approximately 2.2 cm in transverse dimension, approximately 4 cm in anteroposterior posterior dimension, and approximately 1.6 cm in craniocaudal dimension. Findings may be related to underlying phlegmon or abscess. Evaluation is limited by the lack of intravenous contrast. Distal to this site there is edema throughout the visualized musculature consistent with myositis.    Visualized tendinous structures remain intact. There is no acute ligamentous injury. Visualized joint spaces are preserved without joint effusion.    IMPRESSION:    Osteomyelitis involving the calcaneus which extends from the plantar posterior aspect of the calcaneus to the level of the angle of Gissane. This is progressed compared to the prior examination. This is seen in the setting of diffuse soft tissue deficiency along the posterior plantar aspect of the calcaneus.    Confluent edema adjacent to this region within the abductor hallucis and flexor digitorum brevis muscles and within the subjacent plantar subcutaneous fat. This may be related to abscess orphlegmon.            OSWALDO ALFRED MD; Attending Radiologist  This document has been electronically signed. Jan 6 2021 11:49AM    < end of copied text >    -------------------------------------------------------------------------------------------------------------  < from: [facility] Physiol Extremity Lower 3+ Level, BI (01.05.21 @ 17:55) >    EXAM:  US PHYSIOL LWR EXT 3+ LEV BI                            PROCEDURE DATE:  01/05/2021          INTERPRETATION:  Clinical information: History of diabetes, nonsmoker, hypertension, hyperlipidemia, presents with bilateral heel ulcers.    Comparison: Lower extremity arterial Doppler study dated 5/13/2020.    Technique: Lower extremity arterial Doppler study. Note that pressure measurements were not obtained at the thigh level.    Findings: Ankle brachial index measures 1.33 bilaterally, compared with prior values of 1.41 on the right and 1.36 on the left. No segmental arterial pressure gradient is present.    PVR waveforms are normal in amplitude and pulsatility from the thigh through the ankle level. They're diminished in amplitude at the metatarsal and digital levels in symmetric fashion, similar to the prior exam.    Impression: No Doppler evidence of hemodynamically significant arterial inflow abnormality in either lower extremity.    Evidence of small vessel disease in the feet.    No significant interval change since study of 5/13/2020.            SOHAN PA MD; Attending Radiologist  This document has been electronically signed. Jan 6 2021  9:13AM    < end of copied text >      --------------------------------------------------------------------------------------------------------------  < from: Xray Foot AP + Lateral + Oblique, Right (01.05.21 @ 18:00) >    EXAM:  FOOT RIGHT (MINIMUM 3 VIEWS)                            PROCEDURE DATE:  01/05/2021          INTERPRETATION:  Right foot    HISTORY: Status post bedside debridement.     Two views of the right foot show thinning of soft tissues near the calcaneus likely indicating site of debridement. The joint spaces are maintained. There is questionable exposure of the plantar surface of the calcaneus.    IMPRESSION: Calcaneal soft tissues and questionable exposure as noted. Clinical correlation is suggested.        Thank you for this referral.            REID CRAMER MD; Attending Interventional Radiologist  This document has been electronically signed. Jan 6 2021 11:10AM    < end of copied text >    -------------------------------------------------------------------------------------------  a< from: Xray Ankle Complete 3 Views, Bilateral (01.05.21 @ 14:37) >    EXAM:  ANKLE BILATERAL (MINIMUM 3 V)                            PROCEDURE DATE:  01/05/2021          INTERPRETATION:  CLINICAL INDICATION:  Osteomyelitis; evaluate for soft tissue emphysema.    COMPARISON:  Left ankle radiography 5/11/2020.    TECHNIQUE:  AP, oblique and crosstable lateral views left ankle. Oblique and crosstable lateral views right ankle. Technologist noted that the best possible films were obtained.    FINDINGS:    Right ankle: Generalized osteopenia. Subcutaneous emphysema is identified along the plantar aspect of the right hindfoot inferior to the calcaneus, with an overlying skin defect noted along the posterior aspect of the calcaneus. Osteolysis involving the inferior/posterior aspect of the right calcaneus cannot beexcluded. MRI evaluation can be performed for further assessment. Extensive vascular calcification is noted. No ankle joint effusion is noted.    Left ankle: Generalized osteopenia. There is no evidence for acute fracture or dislocation. The ankle mortise appears grossly intact. No ankle joint effusion is noted. Extensive vascular calcifications identified. No significant soft tissue swelling.      IMPRESSION:  No evidence for acute fracture. Subcutaneous emphysema is identified along the plantaraspect of the right hindfoot inferior to the calcaneus, with an overlying skin defect noted along the posterior aspect of the calcaneus. Osteolysis involving the inferior/posterior aspect of the right calcaneus cannot be excluded. MRI evaluation can be performed for further assessment.                MARA LARKIN MD; Attending Radiologist  This document has been electronically signed. Jan 5 2021  3:57PM    < end of copied text >        CULTURES:   cCulture - Surgical Swab (01.05.21 @ 22:13)   Specimen Source: .Surgical Swab Right heel wound   Culture Results:   Few Morganella morganii   Few Proteus mirabilis       Historical Values  Culture - Surgical Swab (01.05.21 @ 22:13)   Specimen Source: .Surgical Swab Right heel wound   Culture Results:   Few Morganella morganii   Few Proteus mirabilis   culture:Culture - Surgical Swab (01.13.21 @ 19:22)   - Ampicillin/Sulbactam: S <=4/2 Enterobacter, Citrobacter, and Serratia may develop resistance during prolonged therapy (3-4 days)   - Ampicillin: S <=8 These ampicillin results predict results for amoxicillin   - Amikacin: S <=16   - Amoxicillin/Clavulanic Acid: S <=8/4   - Aztreonam: S <=4   - Cefazolin: I 4 Enterobacter, Citrobacter, and Serratia may develop resistance during prolonged therapy (3-4 days)   - Cefepime: S <=2   - Cefoxitin: S <=8   - Ceftriaxone: S <=1 Enterobacter, Citrobacter, and Serratia may develop resistance during prolonged therapy   - Ciprofloxacin: R 1   - Ertapenem: S <=0.5   - Gentamicin: S <=2   - Levofloxacin: S <=0.5   - Meropenem: S <=1   - Trimethoprim/Sulfamethoxazole: S <=0.5/9.5   - Tobramycin: S <=2   - Piperacillin/Tazobactam: S <=8   Specimen Source: .Surgical Swab Right heel wound culture   Culture Results:   Moderate Proteus mirabilis   Few Bacteroides vulgatus group "Susceptibilities not performed"   Organism Identification: Proteus mirabilis   Organism: Proteus mirabilis   Method Type: RICARDO       Historical Values  Culture - Surgical Swab (01.13.21 @ 19:22)   - Ampicillin/Sulbactam: S <=4/2 Enterobacter, Citrobacter, and Serratia may develop resistance during prolonged therapy (3-4 days)   - Ampicillin: S <=8 These ampicillin results predict results for amoxicillin   - Amikacin: S <=16   - Amoxicillin/Clavulanic Acid: S <=8/4   - Aztreonam: S <=4   - Cefazolin: I 4 Enterobacter, Citrobacter, and Serratia may develop resistance during prolonged therapy (3-4 days)   - Cefepime: S <=2   - Cefoxitin: S <=8   - Ceftriaxone: S <=1 Enterobacter, Citrobacter, and Serratia may develop resistance during prolonged therapy   - Ciprofloxacin: R 1   - Ertapenem: S <=0.5   - Gentamicin: S <=2   - Levofloxacin: S <=0.5   - Meropenem: S <=1   - Trimethoprim/Sulfamethoxazole: S <=0.5/9.5   - Tobramycin: S <=2   - Piperacillin/Tazobactam: S <=8   Specimen Source: .Surgical Swab Right heel wound culture   Culture Results:   Moderate Proteus mirabilis   Few Bacteroides vulgatus group "Susceptibilities not performed"   Organism Identification: Proteus mirabilis   Organism: Proteus mirabilis   Method Type: RICARDO   ---------------------------------  Pathology result:Surgical Pathology Report (01.13.21 @ 14:03)   Surgical Pathology Report:   ACCESSION No: [id number]   [facility] [place] 2   Surgical Final Report   Final Diagnosis   1. Right calcaneum bone; partial calcanectomy, incision and   drainage:   Cancellous bone with acute osteomyelitis and reparative changes   2. Right heel, margin; biopsy:   Osteocartilaginous tissue with granulation tissue and   regenerating bony   trabeculae; no inflammatory exudate is identified   Verified by: Sarah Culver M.D.

## 2021-01-17 LAB
ALBUMIN SERPL ELPH-MCNC: 1.3 G/DL — LOW (ref 3.5–5)
ALP SERPL-CCNC: 46 U/L — SIGNIFICANT CHANGE UP (ref 40–120)
ALT FLD-CCNC: <6 U/L DA — LOW (ref 10–60)
ANION GAP SERPL CALC-SCNC: 10 MMOL/L — SIGNIFICANT CHANGE UP (ref 5–17)
AST SERPL-CCNC: 6 U/L — LOW (ref 10–40)
BILIRUB SERPL-MCNC: 0.4 MG/DL — SIGNIFICANT CHANGE UP (ref 0.2–1.2)
BUN SERPL-MCNC: 13 MG/DL — SIGNIFICANT CHANGE UP (ref 7–18)
CALCIUM SERPL-MCNC: 7.2 MG/DL — LOW (ref 8.4–10.5)
CHLORIDE SERPL-SCNC: 114 MMOL/L — HIGH (ref 96–108)
CO2 SERPL-SCNC: 17 MMOL/L — LOW (ref 22–31)
CREAT SERPL-MCNC: 0.52 MG/DL — SIGNIFICANT CHANGE UP (ref 0.5–1.3)
GLUCOSE BLDC GLUCOMTR-MCNC: 119 MG/DL — HIGH (ref 70–99)
GLUCOSE BLDC GLUCOMTR-MCNC: 130 MG/DL — HIGH (ref 70–99)
GLUCOSE BLDC GLUCOMTR-MCNC: 82 MG/DL — SIGNIFICANT CHANGE UP (ref 70–99)
GLUCOSE BLDC GLUCOMTR-MCNC: 97 MG/DL — SIGNIFICANT CHANGE UP (ref 70–99)
GLUCOSE SERPL-MCNC: 125 MG/DL — HIGH (ref 70–99)
HCT VFR BLD CALC: 23.2 % — LOW (ref 34.5–45)
HGB BLD-MCNC: 7.6 G/DL — LOW (ref 11.5–15.5)
MAGNESIUM SERPL-MCNC: 1.3 MG/DL — LOW (ref 1.6–2.6)
MCHC RBC-ENTMCNC: 30.2 PG — SIGNIFICANT CHANGE UP (ref 27–34)
MCHC RBC-ENTMCNC: 32.8 GM/DL — SIGNIFICANT CHANGE UP (ref 32–36)
MCV RBC AUTO: 92.1 FL — SIGNIFICANT CHANGE UP (ref 80–100)
NRBC # BLD: 0 /100 WBCS — SIGNIFICANT CHANGE UP (ref 0–0)
PHOSPHATE SERPL-MCNC: 0.7 MG/DL — CRITICAL LOW (ref 2.5–4.5)
PLATELET # BLD AUTO: 323 K/UL — SIGNIFICANT CHANGE UP (ref 150–400)
POTASSIUM SERPL-MCNC: 3.1 MMOL/L — LOW (ref 3.5–5.3)
POTASSIUM SERPL-SCNC: 3.1 MMOL/L — LOW (ref 3.5–5.3)
PROT SERPL-MCNC: 4.5 G/DL — LOW (ref 6–8.3)
RBC # BLD: 2.52 M/UL — LOW (ref 3.8–5.2)
RBC # FLD: 14.1 % — SIGNIFICANT CHANGE UP (ref 10.3–14.5)
SODIUM SERPL-SCNC: 141 MMOL/L — SIGNIFICANT CHANGE UP (ref 135–145)
VANCOMYCIN TROUGH SERPL-MCNC: 24.1 UG/ML — HIGH (ref 10–20)
WBC # BLD: 14.95 K/UL — HIGH (ref 3.8–10.5)
WBC # FLD AUTO: 14.95 K/UL — HIGH (ref 3.8–10.5)

## 2021-01-17 RX ORDER — POTASSIUM PHOSPHATE, MONOBASIC POTASSIUM PHOSPHATE, DIBASIC 236; 224 MG/ML; MG/ML
30 INJECTION, SOLUTION INTRAVENOUS ONCE
Refills: 0 | Status: COMPLETED | OUTPATIENT
Start: 2021-01-17 | End: 2021-01-17

## 2021-01-17 RX ORDER — SODIUM CHLORIDE 9 MG/ML
1000 INJECTION, SOLUTION INTRAVENOUS
Refills: 0 | Status: DISCONTINUED | OUTPATIENT
Start: 2021-01-17 | End: 2021-01-18

## 2021-01-17 RX ORDER — MAGNESIUM SULFATE 500 MG/ML
2 VIAL (ML) INJECTION ONCE
Refills: 0 | Status: COMPLETED | OUTPATIENT
Start: 2021-01-17 | End: 2021-01-17

## 2021-01-17 RX ORDER — POTASSIUM PHOSPHATE, MONOBASIC POTASSIUM PHOSPHATE, DIBASIC 236; 224 MG/ML; MG/ML
30 INJECTION, SOLUTION INTRAVENOUS ONCE
Refills: 0 | Status: DISCONTINUED | OUTPATIENT
Start: 2021-01-17 | End: 2021-01-17

## 2021-01-17 RX ADMIN — Medication 100 MILLIGRAM(S): at 21:00

## 2021-01-17 RX ADMIN — ENOXAPARIN SODIUM 40 MILLIGRAM(S): 100 INJECTION SUBCUTANEOUS at 13:26

## 2021-01-17 RX ADMIN — CEFEPIME 100 MILLIGRAM(S): 1 INJECTION, POWDER, FOR SOLUTION INTRAMUSCULAR; INTRAVENOUS at 21:00

## 2021-01-17 RX ADMIN — SODIUM CHLORIDE 50 MILLILITER(S): 9 INJECTION, SOLUTION INTRAVENOUS at 17:18

## 2021-01-17 RX ADMIN — CEFEPIME 100 MILLIGRAM(S): 1 INJECTION, POWDER, FOR SOLUTION INTRAMUSCULAR; INTRAVENOUS at 05:30

## 2021-01-17 RX ADMIN — Medication 100 MILLIGRAM(S): at 13:22

## 2021-01-17 RX ADMIN — Medication 100 MILLIGRAM(S): at 05:28

## 2021-01-17 RX ADMIN — Medication 81 MILLIGRAM(S): at 13:26

## 2021-01-17 RX ADMIN — CEFEPIME 100 MILLIGRAM(S): 1 INJECTION, POWDER, FOR SOLUTION INTRAMUSCULAR; INTRAVENOUS at 13:20

## 2021-01-17 RX ADMIN — POTASSIUM PHOSPHATE, MONOBASIC POTASSIUM PHOSPHATE, DIBASIC 83.33 MILLIMOLE(S): 236; 224 INJECTION, SOLUTION INTRAVENOUS at 07:08

## 2021-01-17 RX ADMIN — Medication 50 GRAM(S): at 06:11

## 2021-01-17 NOTE — PROGRESS NOTE ADULT - SUBJECTIVE AND OBJECTIVE BOX
Patient is a 75y old  Female who presents with a chief complaint of foot ulcer (17 Jan 2021 12:40)      PATIENT IS SEEN AND EXAMINED IN MEDICAL FLOOR.      ALLERGIES:  No Known Allergies      VITALS:    Vital Signs Last 24 Hrs  T(C): 36.3 (17 Jan 2021 14:40), Max: 36.6 (16 Jan 2021 20:25)  T(F): 97.3 (17 Jan 2021 14:40), Max: 97.8 (16 Jan 2021 20:25)  HR: 92 (17 Jan 2021 04:48) (86 - 92)  BP: 137/52 (17 Jan 2021 14:40) (125/67 - 137/52)  BP(mean): --  RR: 17 (17 Jan 2021 14:40) (16 - 17)  SpO2: 100% (17 Jan 2021 14:40) (100% - 100%)    LABS:    CBC Full  -  ( 17 Jan 2021 05:18 )  WBC Count : 14.95 K/uL  RBC Count : 2.52 M/uL  Hemoglobin : 7.6 g/dL  Hematocrit : 23.2 %  Platelet Count - Automated : 323 K/uL  Mean Cell Volume : 92.1 fl  Mean Cell Hemoglobin : 30.2 pg  Mean Cell Hemoglobin Concentration : 32.8 gm/dL  Auto Neutrophil # : x  Auto Lymphocyte # : x  Auto Monocyte # : x  Auto Eosinophil # : x  Auto Basophil # : x  Auto Neutrophil % : x  Auto Lymphocyte % : x  Auto Monocyte % : x  Auto Eosinophil % : x  Auto Basophil % : x      01-17    141  |  114<H>  |  13  ----------------------------<  125<H>  3.1<L>   |  17<L>  |  0.52    Ca    7.2<L>      17 Jan 2021 05:18  Phos  0.7     01-17  Mg     1.3     01-17    TPro  4.5<L>  /  Alb  1.3<L>  /  TBili  0.4  /  DBili  x   /  AST  6<L>  /  ALT  <6<L>  /  AlkPhos  46  01-17      CAPILLARY BLOOD GLUCOSE    POCT Blood Glucose.: 97 mg/dL (17 Jan 2021 16:52)  POCT Blood Glucose.: 119 mg/dL (17 Jan 2021 11:31)  POCT Blood Glucose.: 130 mg/dL (17 Jan 2021 07:47)  POCT Blood Glucose.: 182 mg/dL (16 Jan 2021 22:16)        LIVER FUNCTIONS - ( 17 Jan 2021 05:18 )  Alb: 1.3 g/dL / Pro: 4.5 g/dL / ALK PHOS: 46 U/L / ALT: <6 U/L DA / AST: 6 U/L / GGT: x           Creatinine Trend: 0.52<--, 0.58<--, 0.61<--, 0.69<--, 0.68<--, 0.76<--  I&O's Summary          .Tissue Right heel margin  01-13 @ 19:31   Rare Enterococcus faecalis  --  Enterococcus faecalis      .Surgical Swab Right heel wound culture  01-13 @ 19:22   Moderate Proteus mirabilis  Few Bacteroides vulgatus group "Susceptibilities not performed"  --  Proteus mirabilis      .Urine Clean Catch (Midstream)  01-06 @ 06:46   No growth  --  --      .Surgical Swab Right heel wound  01-05 @ 22:13   Few Morganella morganii  Few Proteus mirabilis  Moderate Corynebacterium species "Susceptibilities not performed"  Moderate Bacteroides vulgatus "Susceptibilities not performed"  --  Morganella morganii  Proteus mirabilis      .Blood Blood-Peripheral  01-05 @ 17:59   No Growth Final  --  --          MEDICATIONS:    MEDICATIONS  (STANDING):  aspirin enteric coated 81 milliGRAM(s) Oral daily  cefepime   IVPB 1000 milliGRAM(s) IV Intermittent every 8 hours  collagenase Ointment 1 Application(s) Topical daily  Dakins Solution - Full Strength 1 Application(s) Topical once  dextrose 40% Gel 15 Gram(s) Oral once  dextrose 5% + sodium chloride 0.9%. 1000 milliLiter(s) (100 mL/Hr) IV Continuous <Continuous>  dextrose 50% Injectable 12.5 Gram(s) IV Push once  dextrose 50% Injectable 25 Gram(s) IV Push once  dextrose 50% Injectable 25 Gram(s) IV Push once  enoxaparin Injectable 40 milliGRAM(s) SubCutaneous daily  glucagon  Injectable 1 milliGRAM(s) IntraMuscular once  insulin lispro (ADMELOG) corrective regimen sliding scale   SubCutaneous three times a day before meals  insulin lispro (ADMELOG) corrective regimen sliding scale   SubCutaneous at bedtime  metroNIDAZOLE  IVPB 500 milliGRAM(s) IV Intermittent every 8 hours  simvastatin 40 milliGRAM(s) Oral at bedtime  sodium chloride 0.9% 1000 milliLiter(s) (50 mL/Hr) IV Continuous <Continuous>  vancomycin  IVPB 750 milliGRAM(s) IV Intermittent every 12 hours      MEDICATIONS  (PRN):  acetaminophen   Tablet .. 650 milliGRAM(s) Oral every 6 hours PRN Moderate Pain (4 - 6)        REVIEW OF SYSTEMS:                           ALL ROS DONE [ X   ]      CONSTITUTIONAL:  LETHARGIC [   ], FEVER [   ], UNRESPONSIVE [   ]  CVS:  CP  [   ], SOB, [   ], PALPITATIONS [   ], DIZZYNESS [   ]  RS: COUGH [   ], SPUTUM [   ]  GI: ABDOMINAL PAIN [   ], NAUSEA [   ], VOMITINGS [   ], DIARRHEA [   ], CONSTIPATION [   ]  :  DYSURIA [   ], NOCTURIA [   ], INCREASED FREQUENCY [   ], DRIBLING [   ],  SKELETAL: PAINFUL JOINTS [   ], SWOLLEN JOINTS [   ], NECK ACHE [   ], LOW BACK ACHE [   ],  SKIN : ULCERS [   ], RASH [   ], ITCHING [   ]  CNS: HEAD ACHE [   ], DOUBLE VISION [   ], BLURRED VISION [   ], AMS / CONFUSION [   ], SEIZURES [   ], WEAKNESS [   ],TINGLING / NUMBNESS [   ]    PHYSICAL EXAMINATION:  GENERAL APPEARANCE: NO DISTRESS  HEENT:  NO PALLOR, NO  JVD,  NO   NODES, NECK SUPPLE  CVS: S1 +, S2 +,   RS: AEEB,  OCCASIONAL  RALES +,   NO RONCHI  ABD: SOFT, NT, NO, BS +  EXT: NO PE  SKIN: WARM, BILATERAL HEEL ULCERS - COVERED, SACRAL ULCER + RIGHT ISCHIAL ULCER COVERED  SKELETAL:  ROM ACCEPTABLE, LEFT ARM SWELLING  CNS:  AAO X 2-3     RADIOLOGY :    EXAM:  MR FOOT RT                            PROCEDURE DATE:  01/06/2021          INTERPRETATION:  Clinical indications: Right heel ulceration and eschar status post debridement. Concern for osteomyelitis.    Multiplanar multisequence MRI of the right foot was performed localized to the hindfoot.    Correlation is made with prior MRI from September 11, 2019.    FINDINGS:    There is a large wound along the plantar aspect of the calcaneus posteriorly which extends nearly extends to bone. There is surrounding soft tissue edema about this site with evidence of an overlying bandage. The degree of soft tissue deficiency is increased compared to the prior examination. There is extensive osseous edema and corresponding hypointense T1 marrow signalthroughout the calcaneus extending from the posterior plantar margin to the level of the angle of Gissane. This is progressed compared to the prior examination and consistent with osteomyelitis. Marrow signal within the remainder of the foot is otherwise preserved.    There is confluent edema tracking along the abductor hallucis and flexor digitorum brevis muscles and within the plantar subcutaneous fat subjacent to this region. In total this area measures approximately 2.2 cm in transverse dimension, approximately 4 cm in anteroposterior posterior dimension, and approximately 1.6 cm in craniocaudal dimension. Findings may be related to underlying phlegmon or abscess. Evaluation is limited by the lack of intravenous contrast. Distal to this site there is edema throughout the visualized musculature consistent with myositis.    Visualized tendinous structures remain intact. There is no acute ligamentous injury. Visualized joint spaces are preserved without joint effusion.    IMPRESSION:    Osteomyelitis involving the calcaneus which extends from the plantar posterior aspect of the calcaneus to the level of the angle of Gissane. This is progressed compared to the prior examination. This is seen in the setting of diffuse soft tissue deficiency along the posterior plantar aspect of the calcaneus.    Confluent edema adjacent to this region within the abductor hallucis and flexor digitorum brevis muscles and within the subjacent plantar subcutaneous fat. This may be related to abscess orphlegmon.    ASSESSMENT :     Osteomyelitis    Yes    COVID-19    Osteomyelitis    DM (diabetes mellitus)    Paranoid schizophrenia    HLD (hyperlipidemia)    PAD (peripheral artery disease)    HTN (hypertension)            PLAN:  HPI:  76 yo F from [facility], wheelchair bound with medical history significant of HTN, Diabetes, Osteoarthritis, Osteoporosis, Peripheral arterial disease, Diabetic neuropathy, HLD, Schizophrenia comes in with worsening ulceration to b/l heels. She has been having ulcers for past couple months, has been progressive. She was treated with IV antbx at NH but did not get better. It was worsening with necrotic changes and increasing foul smelling discharge. She denies fever, abdominal pain, chest pain, urinary symptoms, fall, trauma. No h/o nausea, vomiting, diarrhea, cough, dyspnea, sick contacts, recent illness.  (05 Jan 2021 14:24)    PLAN:    #  RESOLVING METABOLIC ENCEPHALOPATHY - PATIENT HAS POOR PO INTAKE, STARTED ON IVF WITH IMPROVING MENTAL STATUS   # BILATERAL LE CHRONIC ULCER NOW PROGRESSIVELY WORSENING W/ SSTI AND RIGHT CALCANEAL OSTEOMYELITIS W/ POSSIBLE ABSCESS; UNDERWENT BEDSIDE DEBRIDEMENT OF RIGHT LEG ULCER AND UNDERWENT RIGHT PARTIAL CALCANECTOMY ON 1/13  - CEFEPIME + VANOMYCIN + FLAGYL, REVIEWED TIMUR, BCX - NGTD, WOUND CX - MORGANELLA MORGANI & P. MIRABILIS, ID CONSULT IN PROGRESS, PODIATRY CONSULT IN PROGRESS  - REVIEWED RIGHT LE MRI  -  PSYCHIATRY CONSULT FOR CAPACITY IN PROGRESS - DEEM PATIENT AS HAVING CAPACITY.  PATIENT WAS AGREEABLE FOR SURGICAL DEBRIDEMENT ON 1/09  - 2 PC CONSENT FOR SURGICAL DEBRIDEMENT PLANNED FOR 1/12; ATTEMPTED TO CALL NEXT OF [facility] @ [phone] HOWEVER PHONE NUMBER APPEARS DISCONNECTED  - VASCULAR SX CONSULT IN PROGRESS - PATIENT MAY REQUIRE AKA, BUT REFUSED ON PREVIOUS CONVERSATIONS - WAS AGREEABLE TO LOCAL DEBRIDEMENT/INTERVENTIONS  - PLAN FOR WOUND VAC  - PATIENT WILL NEED 6 WEEKS OF IV ANTIBIOTICS - WILL NEED TO SWITCH TO PICC LINE FROM EXTENDED DWELL    # MEDICAL CLEARANCE FOR SURGERY - RCRI 1 POINT - 6% RISK OF DEATH, MI OR CARDIAC ARREST; CARDIOLOGY CONSULT IN PROGRESS FOR MEDICAL CLEARANCE    # SACRAL AND RIGHT ISCHIAL UNSTAGEABLE ULCERS W/ SSTI  S/P DEBRIDEMENT 1/8- ON VANCOMYCIN AND CEFEPIME, SURGERY CONSULT IN PROGRESS -  PSYCHIATRY CONSULT FOR CAPACITY IN PROGRESS    # ACUTE METABOLIC ENCEPHALOPATHY - SUSPECT S/T ACUTE INFECTION & HYPERNATREMIA - REVIEWED CT HEAD  - F/U ST EVAL AS RECENTLY MADE NPO BY SPEECH    # HYPERNATREMIA SUSPECT S/T POOR PO INTAKE - PLACED ON IVF    # LEFT ARM SWELLING - REVIEWED LEFT ARM U/S W/ OUT THROMBUS, WARM COMPRESSES  # ASHLEY - RESOLVED  # PAD  # HTN  # DM W/ DIABETIC NEUROPATHY  # OA/OP  # SCHIZOPHRENIA - HOLD MEDICATION  # CASE D/W PATIENT'S ? PARTNER - LISA KELLY - WHO REPORTS HE IS NOT NEXT OF [facility] OR HCP - [phone] ON 1/12; HE UNDERSTOOD PATIENT'S PROGNOSIS WAS GUARDED  # GI AND DVT PPX    -  FARHAN CASILLAS MD

## 2021-01-17 NOTE — PROGRESS NOTE ADULT - SUBJECTIVE AND OBJECTIVE BOX
Patient denies chest pain or shortness of breath.  Review of systems otherwise (-)  	  acetaminophen   Tablet .. 650 milliGRAM(s) Oral every 6 hours PRN  aspirin enteric coated 81 milliGRAM(s) Oral daily  cefepime   IVPB 1000 milliGRAM(s) IV Intermittent every 8 hours  collagenase Ointment 1 Application(s) Topical daily  Dakins Solution - Full Strength 1 Application(s) Topical once  dextrose 40% Gel 15 Gram(s) Oral once  dextrose 50% Injectable 12.5 Gram(s) IV Push once  dextrose 50% Injectable 25 Gram(s) IV Push once  dextrose 50% Injectable 25 Gram(s) IV Push once  enoxaparin Injectable 40 milliGRAM(s) SubCutaneous daily  glucagon  Injectable 1 milliGRAM(s) IntraMuscular once  insulin lispro (ADMELOG) corrective regimen sliding scale   SubCutaneous three times a day before meals  insulin lispro (ADMELOG) corrective regimen sliding scale   SubCutaneous at bedtime  metroNIDAZOLE  IVPB 500 milliGRAM(s) IV Intermittent every 8 hours  potassium phosphate IVPB 30 milliMole(s) IV Intermittent once  simvastatin 40 milliGRAM(s) Oral at bedtime  sodium chloride 0.9% 1000 milliLiter(s) IV Continuous <Continuous>  vancomycin  IVPB 750 milliGRAM(s) IV Intermittent every 12 hours                            7.6    14.95 )-----------( 323      ( 17 Jan 2021 05:18 )             23.2       Hemoglobin: 7.6 g/dL (01-17 @ 05:18)  Hemoglobin: 7.4 g/dL (01-16 @ 07:49)  Hemoglobin: 7.2 g/dL (01-15 @ 08:22)  Hemoglobin: 7.4 g/dL (01-14 @ 07:45)  Hemoglobin: 8.7 g/dL (01-13 @ 07:43)      01-17    141  |  114<H>  |  13  ----------------------------<  125<H>  3.1<L>   |  17<L>  |  0.52    Ca    7.2<L>      17 Jan 2021 05:18  Phos  0.7     01-17  Mg     1.3     01-17    TPro  4.5<L>  /  Alb  1.3<L>  /  TBili  0.4  /  DBili  x   /  AST  6<L>  /  ALT  <6<L>  /  AlkPhos  46  01-17    Creatinine Trend: 0.52<--, 0.58<--, 0.61<--, 0.69<--, 0.68<--, 0.76<--    COAGS:           T(C): 36.4 (01-17-21 @ 04:48), Max: 36.6 (01-16-21 @ 20:25)  HR: 92 (01-17-21 @ 04:48) (83 - 92)  BP: 125/67 (01-17-21 @ 04:48) (122/55 - 134/57)  RR: 16 (01-17-21 @ 04:48) (16 - 17)  SpO2: 100% (01-17-21 @ 04:48) (100% - 100%)  Wt(kg): --    I&O's Summary      HEENT:  (-)icterus (-)pallor  CV: N S1 S2 1/6 ANGELIC (+)2 Pulses B/l  Resp:  Clear to ausculatation B/L, normal effort  GI: (+) BS Soft, NT, ND  Lymph:  (-)Edema, (-)obvious lymphadenopathy  Skin: Warm to touch, Normal turgor  Psych: Appropriate mood and affect      ASSESSMENT/PLAN: 	75y  Female  wheelchair bound with medical history significant of HTN, Diabetes, Osteoarthritis, Osteoporosis, Peripheral arterial disease, Diabetic neuropathy, HLD, Schizophrenia historically preserved LV and R function, comes in with worsening ulceration to b/l heels s/p debridement.    - s/p Partial calcanectomy of right foot   - cont asa and statin  - preop lower extremity amputation   - optimized from a cardiac prospective for potential lower extremity amputation  - No need for further inpatient cardiac work up.  - cont abx per primary team  - vascular f/u    Jm Cortes MD, [facility]  BEEPER [phone]

## 2021-01-17 NOTE — PROGRESS NOTE ADULT - ASSESSMENT
A:  s/p R foot partial calcanectomy & wound debridement 1/13/21  Osteomyelitis of calcaneus R foot   DMII    P:   Patient evaluated and chart reviewed  Post-op R foot xrays results as noted above  Intra-op cx result reviewed as above  Intra-op path result reviewed as above  Dressed left foot heel with santyl, allevyn pad  Right foot dressing to remain clean, dry, intact with wound vac set at 125 mmHg  Instructions for discharge: upon discharge, on Right heel wound apply santyl, white foam overlying exposed calcaneus on the right heel, and black foam overlying to cover the wound. Place wound vac over, and set at 125 mmHg. On the left heel, apply santyl to wound, cover with 4x4 gauze, ABD pad, Keyur overlying, and light ACE compression. Apply CAIR boots bilaterally.  Applied CAIR boots to b/l LE   Podiatry will follow while in house.   Discussed with attending

## 2021-01-17 NOTE — PROGRESS NOTE ADULT - SUBJECTIVE AND OBJECTIVE BOX
Patient is a 75y old  Female who presents with a chief complaint of foot ulcer (05 Jan 2021 14:24)      HPI:  76 yo F from [facility], wheelchair bound with medical history significant of HTN, Diabetes, Osteoarthritis, Osteoporosis, Peripheral arterial disease, Diabetic neuropathy, HLD, Schizophrenia comes in with worsening ulceration to b/l heels. She has been having ulcers for past couple months, has been progressive. She was treated with IV antbx at NH but did not get better. It was worsening with necrotic changes and increasing foul smelling discharge. She denies fever, abdominal pain, chest pain, urinary symptoms, fall, trauma. No h/o nausea, vomiting, diarrhea, cough, dyspnea, sick contacts, recent illness.  (05 Jan 2021 14:24)      Podiatry HPI: 74 y/o female with full history as noted above, podiatry consulted for b/l heel wounds. Pt is from [facility], wheelchair bound with medical history significant of HTN, Diabetes, Osteoarthritis, Osteoporosis, PAD, Diabetic neuropathy, HLD, Schizophrenia comes in with worsening ulceration to b/l heels. Patient seen resting in bed comfortably, AAOx3. Patient states she has had the wounds for a long time, maybe more than 6 months. She relates receiving local wound care previously in the NH using santyl dressings. She endorses occasional pain to wound sites. Patient was  under podiatric care in previous admissions for b/l heel wounds with osteomyelitis. She states she is not interesting in surgery for her feet, she wishes to continue with local wound care. She denies nausea, vomiting, chills, fever, SOB.     Podiatry Progress: Pt s/p Right foot partial calcanectomy and wound debridement on 1/13/21. Patient seen resting in bed, AAOx2. Patient denied pain to her heels, endorsed pain to Right knee     Medications acetaminophen   Tablet .. 650 milliGRAM(s) Oral every 6 hours PRN  aspirin enteric coated 81 milliGRAM(s) Oral daily  cefepime   IVPB 1000 milliGRAM(s) IV Intermittent every 8 hours  collagenase Ointment 1 Application(s) Topical daily  Dakins Solution - Full Strength 1 Application(s) Topical once  dextrose 40% Gel 15 Gram(s) Oral once  dextrose 5% + sodium chloride 0.9%. 1000 milliLiter(s) IV Continuous <Continuous>  dextrose 50% Injectable 25 Gram(s) IV Push once  dextrose 50% Injectable 12.5 Gram(s) IV Push once  dextrose 50% Injectable 25 Gram(s) IV Push once  enoxaparin Injectable 40 milliGRAM(s) SubCutaneous daily  glucagon  Injectable 1 milliGRAM(s) IntraMuscular once  insulin lispro (ADMELOG) corrective regimen sliding scale   SubCutaneous three times a day before meals  insulin lispro (ADMELOG) corrective regimen sliding scale   SubCutaneous at bedtime  metroNIDAZOLE  IVPB 500 milliGRAM(s) IV Intermittent every 8 hours  simvastatin 40 milliGRAM(s) Oral at bedtime  sodium chloride 0.9% 1000 milliLiter(s) IV Continuous <Continuous>  vancomycin  IVPB 750 milliGRAM(s) IV Intermittent every 12 hours    FHNo pertinent family history in first degree relatives    ,   PMHCOVID-19    Osteomyelitis    DM (diabetes mellitus)    Paranoid schizophrenia    HLD (hyperlipidemia)    PAD (peripheral artery disease)    HTN (hypertension)       PSHNo significant past surgical history        Labs                          7.6    14.95 )-----------( 323      ( 17 Jan 2021 05:18 )             23.2      01-17    141  |  114<H>  |  13  ----------------------------<  125<H>  3.1<L>   |  17<L>  |  0.52    Ca    7.2<L>      17 Jan 2021 05:18  Phos  0.7     01-17  Mg     1.3     01-17    TPro  4.5<L>  /  Alb  1.3<L>  /  TBili  0.4  /  DBili  x   /  AST  6<L>  /  ALT  <6<L>  /  AlkPhos  46  01-17     Vital Signs Last 24 Hrs  T(C): 36.4 (17 Jan 2021 04:48), Max: 36.6 (16 Jan 2021 20:25)  T(F): 97.5 (17 Jan 2021 04:48), Max: 97.8 (16 Jan 2021 20:25)  HR: 92 (17 Jan 2021 04:48) (83 - 92)  BP: 125/67 (17 Jan 2021 04:48) (122/55 - 134/57)  BP(mean): --  RR: 16 (17 Jan 2021 04:48) (16 - 17)  SpO2: 100% (17 Jan 2021 04:48) (100% - 100%)  Sedimentation Rate, Erythrocyte: 99 mm/Hr (01-05-21 @ 11:45)         C-Reactive Protein, Serum: 5.63 mg/dL (01-06-21 @ 10:27)   WBC Count: 14.95 K/uL <H> (01-17-21 @ 05:18)      PHYSICAL EXAM 1/17: Right foot dressing clean, dry, intact, wound vac at 125mmHg continuous flow, CFT brisk to digits      S/P DAY 2 PHYSICAL EXAM:   LE Focused:    Vasc:  DP/PT nonpalpable, CFT brisk to digits, TG warm to warm b/l,   Derm:   Wound 1:  L heel closed necrotic eschar measuring ~4x3.5x0.1 cm with mild mac wound erythema. No tunneling or undermining noted. No discharge from the area. No malodor or PTB noted. Stable in appearance  Wound 2: s/p R foot partial calcanectomy and wound debridement measuring ~9x6.5x1 cm with calcaneal bone exposed. No periwound erythema. No drainage, no malodor. Wound bed appearing fibrotic  Neuro: protective sensation absent at level of digits b/l  MSK: no tenderness on palpation of periwound areas b/l       IMAGING:  f< from: Xray Ankle Complete 3 Views, Right (01.13.21 @ 18:15) >    EXAM:  ANKLE RIGHT (MINIMUM 3 V)                            PROCEDURE DATE:  01/13/2021          INTERPRETATION:  RIGHT ankle    CLINICAL INFORMATION: Postoperative radiographs.    TECHNIQUE: AP,lateral /views.    FINDINGS:    Large posterior calcaneal ulceration NOTED with the osteolysis /surgical debridement of the posterior calcaneus remaining osseous structures of the ankle are intact..    IMPRESSION:    Posterior calcaneal large ulceration within the posterior calcaneal postsurgical debridement versus/osteomyelitis.            ADITI SILVA MD; Attending Radiologist  This document has been electronically signed. Jan 13 2021  7:02PM    < end of copied text >    --------------------------  < from: MR Foot No Cont, Right (01.06.21 @ 11:08) >    EXAM:  MR FOOT RT                            PROCEDURE DATE:  01/06/2021          INTERPRETATION:  Clinical indications: Right heel ulceration and eschar status post debridement. Concern for osteomyelitis.    Multiplanar multisequence MRI of the right foot was performed localized to the hindfoot.    Correlation is made with prior MRI from September 11, 2019.    FINDINGS:    There is a large wound along the plantar aspect of the calcaneus posteriorly which extends nearly extends to bone. There is surrounding soft tissue edema about this site with evidence of an overlying bandage. The degree of soft tissue deficiency is increased compared to the prior examination. There is extensive osseous edema and corresponding hypointense T1 marrow signalthroughout the calcaneus extending from the posterior plantar margin to the level of the angle of Gissane. This is progressed compared to the prior examination and consistent with osteomyelitis. Marrow signal within the remainder of the foot is otherwise preserved.    There is confluent edema tracking along the abductor hallucis and flexor digitorum brevis muscles and within the plantar subcutaneous fat subjacent to this region. In total this area measures approximately 2.2 cm in transverse dimension, approximately 4 cm in anteroposterior posterior dimension, and approximately 1.6 cm in craniocaudal dimension. Findings may be related to underlying phlegmon or abscess. Evaluation is limited by the lack of intravenous contrast. Distal to this site there is edema throughout the visualized musculature consistent with myositis.    Visualized tendinous structures remain intact. There is no acute ligamentous injury. Visualized joint spaces are preserved without joint effusion.    IMPRESSION:    Osteomyelitis involving the calcaneus which extends from the plantar posterior aspect of the calcaneus to the level of the angle of Gissane. This is progressed compared to the prior examination. This is seen in the setting of diffuse soft tissue deficiency along the posterior plantar aspect of the calcaneus.    Confluent edema adjacent to this region within the abductor hallucis and flexor digitorum brevis muscles and within the subjacent plantar subcutaneous fat. This may be related to abscess orphlegmon.            OSWALDO ALFRED MD; Attending Radiologist  This document has been electronically signed. Jan 6 2021 11:49AM    < end of copied text >    -------------------------------------------------------------------------------------------------------------  < from: [facility] Physiol Extremity Lower 3+ Level, BI (01.05.21 @ 17:55) >    EXAM:  US PHYSIOL LWR EXT 3+ LEV BI                            PROCEDURE DATE:  01/05/2021          INTERPRETATION:  Clinical information: History of diabetes, nonsmoker, hypertension, hyperlipidemia, presents with bilateral heel ulcers.    Comparison: Lower extremity arterial Doppler study dated 5/13/2020.    Technique: Lower extremity arterial Doppler study. Note that pressure measurements were not obtained at the thigh level.    Findings: Ankle brachial index measures 1.33 bilaterally, compared with prior values of 1.41 on the right and 1.36 on the left. No segmental arterial pressure gradient is present.    PVR waveforms are normal in amplitude and pulsatility from the thigh through the ankle level. They're diminished in amplitude at the metatarsal and digital levels in symmetric fashion, similar to the prior exam.    Impression: No Doppler evidence of hemodynamically significant arterial inflow abnormality in either lower extremity.    Evidence of small vessel disease in the feet.    No significant interval change since study of 5/13/2020.            SOHAN PA MD; Attending Radiologist  This document has been electronically signed. Jan 6 2021  9:13AM    < end of copied text >      --------------------------------------------------------------------------------------------------------------  < from: Xray Foot AP + Lateral + Oblique, Right (01.05.21 @ 18:00) >    EXAM:  FOOT RIGHT (MINIMUM 3 VIEWS)                            PROCEDURE DATE:  01/05/2021          INTERPRETATION:  Right foot    HISTORY: Status post bedside debridement.     Two views of the right foot show thinning of soft tissues near the calcaneus likely indicating site of debridement. The joint spaces are maintained. There is questionable exposure of the plantar surface of the calcaneus.    IMPRESSION: Calcaneal soft tissues and questionable exposure as noted. Clinical correlation is suggested.        Thank you for this referral.            REID CRAMER MD; Attending Interventional Radiologist  This document has been electronically signed. Jan 6 2021 11:10AM    < end of copied text >    -------------------------------------------------------------------------------------------  a< from: Xray Ankle Complete 3 Views, Bilateral (01.05.21 @ 14:37) >    EXAM:  ANKLE BILATERAL (MINIMUM 3 V)                            PROCEDURE DATE:  01/05/2021          INTERPRETATION:  CLINICAL INDICATION:  Osteomyelitis; evaluate for soft tissue emphysema.    COMPARISON:  Left ankle radiography 5/11/2020.    TECHNIQUE:  AP, oblique and crosstable lateral views left ankle. Oblique and crosstable lateral views right ankle. Technologist noted that the best possible films were obtained.    FINDINGS:    Right ankle: Generalized osteopenia. Subcutaneous emphysema is identified along the plantar aspect of the right hindfoot inferior to the calcaneus, with an overlying skin defect noted along the posterior aspect of the calcaneus. Osteolysis involving the inferior/posterior aspect of the right calcaneus cannot beexcluded. MRI evaluation can be performed for further assessment. Extensive vascular calcification is noted. No ankle joint effusion is noted.    Left ankle: Generalized osteopenia. There is no evidence for acute fracture or dislocation. The ankle mortise appears grossly intact. No ankle joint effusion is noted. Extensive vascular calcifications identified. No significant soft tissue swelling.      IMPRESSION:  No evidence for acute fracture. Subcutaneous emphysema is identified along the plantaraspect of the right hindfoot inferior to the calcaneus, with an overlying skin defect noted along the posterior aspect of the calcaneus. Osteolysis involving the inferior/posterior aspect of the right calcaneus cannot be excluded. MRI evaluation can be performed for further assessment.                MARA LARKIN MD; Attending Radiologist  This document has been electronically signed. Jan 5 2021  3:57PM    < end of copied text >        CULTURES:   cCulture - Surgical Swab (01.05.21 @ 22:13)   Specimen Source: .Surgical Swab Right heel wound   Culture Results:   Few Morganella morganii   Few Proteus mirabilis       Historical Values  Culture - Surgical Swab (01.05.21 @ 22:13)   Specimen Source: .Surgical Swab Right heel wound   Culture Results:   Few Morganella morganii   Few Proteus mirabilis   culture:Culture - Surgical Swab (01.13.21 @ 19:22)   - Ampicillin/Sulbactam: S <=4/2 Enterobacter, Citrobacter, and Serratia may develop resistance during prolonged therapy (3-4 days)   - Ampicillin: S <=8 These ampicillin results predict results for amoxicillin   - Amikacin: S <=16   - Amoxicillin/Clavulanic Acid: S <=8/4   - Aztreonam: S <=4   - Cefazolin: I 4 Enterobacter, Citrobacter, and Serratia may develop resistance during prolonged therapy (3-4 days)   - Cefepime: S <=2   - Cefoxitin: S <=8   - Ceftriaxone: S <=1 Enterobacter, Citrobacter, and Serratia may develop resistance during prolonged therapy   - Ciprofloxacin: R 1   - Ertapenem: S <=0.5   - Gentamicin: S <=2   - Levofloxacin: S <=0.5   - Meropenem: S <=1   - Trimethoprim/Sulfamethoxazole: S <=0.5/9.5   - Tobramycin: S <=2   - Piperacillin/Tazobactam: S <=8   Specimen Source: .Surgical Swab Right heel wound culture   Culture Results:   Moderate Proteus mirabilis   Few Bacteroides vulgatus group "Susceptibilities not performed"   Organism Identification: Proteus mirabilis   Organism: Proteus mirabilis   Method Type: RICARDO       Historical Values  Culture - Surgical Swab (01.13.21 @ 19:22)   - Ampicillin/Sulbactam: S <=4/2 Enterobacter, Citrobacter, and Serratia may develop resistance during prolonged therapy (3-4 days)   - Ampicillin: S <=8 These ampicillin results predict results for amoxicillin   - Amikacin: S <=16   - Amoxicillin/Clavulanic Acid: S <=8/4   - Aztreonam: S <=4   - Cefazolin: I 4 Enterobacter, Citrobacter, and Serratia may develop resistance during prolonged therapy (3-4 days)   - Cefepime: S <=2   - Cefoxitin: S <=8   - Ceftriaxone: S <=1 Enterobacter, Citrobacter, and Serratia may develop resistance during prolonged therapy   - Ciprofloxacin: R 1   - Ertapenem: S <=0.5   - Gentamicin: S <=2   - Levofloxacin: S <=0.5   - Meropenem: S <=1   - Trimethoprim/Sulfamethoxazole: S <=0.5/9.5   - Tobramycin: S <=2   - Piperacillin/Tazobactam: S <=8   Specimen Source: .Surgical Swab Right heel wound culture   Culture Results:   Moderate Proteus mirabilis   Few Bacteroides vulgatus group "Susceptibilities not performed"   Organism Identification: Proteus mirabilis   Organism: Proteus mirabilis   Method Type: RICARDO   ---------------------------------  Pathology result:Surgical Pathology Report (01.13.21 @ 14:03)   Surgical Pathology Report:   ACCESSION No: [id number]   [facility] [place] 2   Surgical Final Report   Final Diagnosis   1. Right calcaneum bone; partial calcanectomy, incision and   drainage:   Cancellous bone with acute osteomyelitis and reparative changes   2. Right heel, margin; biopsy:   Osteocartilaginous tissue with granulation tissue and   regenerating bony   trabeculae; no inflammatory exudate is identified   Verified by: Sarah Culver M.D.

## 2021-01-18 LAB
ALBUMIN SERPL ELPH-MCNC: 1.3 G/DL — LOW (ref 3.5–5)
ALP SERPL-CCNC: 46 U/L — SIGNIFICANT CHANGE UP (ref 40–120)
ALT FLD-CCNC: <6 U/L DA — LOW (ref 10–60)
ANION GAP SERPL CALC-SCNC: 10 MMOL/L — SIGNIFICANT CHANGE UP (ref 5–17)
AST SERPL-CCNC: 9 U/L — LOW (ref 10–40)
BILIRUB SERPL-MCNC: 0.3 MG/DL — SIGNIFICANT CHANGE UP (ref 0.2–1.2)
BUN SERPL-MCNC: 12 MG/DL — SIGNIFICANT CHANGE UP (ref 7–18)
CALCIUM SERPL-MCNC: 7 MG/DL — LOW (ref 8.4–10.5)
CHLORIDE SERPL-SCNC: 118 MMOL/L — HIGH (ref 96–108)
CO2 SERPL-SCNC: 17 MMOL/L — LOW (ref 22–31)
CREAT SERPL-MCNC: 0.51 MG/DL — SIGNIFICANT CHANGE UP (ref 0.5–1.3)
CULTURE RESULTS: SIGNIFICANT CHANGE UP
GLUCOSE BLDC GLUCOMTR-MCNC: 119 MG/DL — HIGH (ref 70–99)
GLUCOSE BLDC GLUCOMTR-MCNC: 119 MG/DL — HIGH (ref 70–99)
GLUCOSE BLDC GLUCOMTR-MCNC: 155 MG/DL — HIGH (ref 70–99)
GLUCOSE BLDC GLUCOMTR-MCNC: 158 MG/DL — HIGH (ref 70–99)
GLUCOSE SERPL-MCNC: 113 MG/DL — HIGH (ref 70–99)
HCT VFR BLD CALC: 25.3 % — LOW (ref 34.5–45)
HGB BLD-MCNC: 8.1 G/DL — LOW (ref 11.5–15.5)
MAGNESIUM SERPL-MCNC: 1.9 MG/DL — SIGNIFICANT CHANGE UP (ref 1.6–2.6)
MCHC RBC-ENTMCNC: 30.2 PG — SIGNIFICANT CHANGE UP (ref 27–34)
MCHC RBC-ENTMCNC: 32 GM/DL — SIGNIFICANT CHANGE UP (ref 32–36)
MCV RBC AUTO: 94.4 FL — SIGNIFICANT CHANGE UP (ref 80–100)
NRBC # BLD: 0 /100 WBCS — SIGNIFICANT CHANGE UP (ref 0–0)
ORGANISM # SPEC MICROSCOPIC CNT: SIGNIFICANT CHANGE UP
ORGANISM # SPEC MICROSCOPIC CNT: SIGNIFICANT CHANGE UP
PHOSPHATE SERPL-MCNC: 2 MG/DL — LOW (ref 2.5–4.5)
PLATELET # BLD AUTO: 378 K/UL — SIGNIFICANT CHANGE UP (ref 150–400)
POTASSIUM SERPL-MCNC: 3.9 MMOL/L — SIGNIFICANT CHANGE UP (ref 3.5–5.3)
POTASSIUM SERPL-SCNC: 3.9 MMOL/L — SIGNIFICANT CHANGE UP (ref 3.5–5.3)
PROT SERPL-MCNC: 4.4 G/DL — LOW (ref 6–8.3)
RBC # BLD: 2.68 M/UL — LOW (ref 3.8–5.2)
RBC # FLD: 14.7 % — HIGH (ref 10.3–14.5)
SARS-COV-2 RNA SPEC QL NAA+PROBE: SIGNIFICANT CHANGE UP
SODIUM SERPL-SCNC: 145 MMOL/L — SIGNIFICANT CHANGE UP (ref 135–145)
SPECIMEN SOURCE: SIGNIFICANT CHANGE UP
VANCOMYCIN TROUGH SERPL-MCNC: 18 UG/ML — SIGNIFICANT CHANGE UP (ref 10–20)
WBC # BLD: 13.75 K/UL — HIGH (ref 3.8–10.5)
WBC # FLD AUTO: 13.75 K/UL — HIGH (ref 3.8–10.5)

## 2021-01-18 RX ORDER — DEXTROSE MONOHYDRATE, SODIUM CHLORIDE, AND POTASSIUM CHLORIDE 50; .745; 4.5 G/1000ML; G/1000ML; G/1000ML
1000 INJECTION, SOLUTION INTRAVENOUS
Refills: 0 | Status: DISCONTINUED | OUTPATIENT
Start: 2021-01-18 | End: 2021-01-23

## 2021-01-18 RX ORDER — SODIUM,POTASSIUM PHOSPHATES 278-250MG
1 POWDER IN PACKET (EA) ORAL EVERY 4 HOURS
Refills: 0 | Status: DISCONTINUED | OUTPATIENT
Start: 2021-01-18 | End: 2021-01-18

## 2021-01-18 RX ORDER — POTASSIUM PHOSPHATE, MONOBASIC POTASSIUM PHOSPHATE, DIBASIC 236; 224 MG/ML; MG/ML
15 INJECTION, SOLUTION INTRAVENOUS ONCE
Refills: 0 | Status: COMPLETED | OUTPATIENT
Start: 2021-01-18 | End: 2021-01-18

## 2021-01-18 RX ADMIN — Medication 100 MILLIGRAM(S): at 20:34

## 2021-01-18 RX ADMIN — Medication 100 MILLIGRAM(S): at 05:18

## 2021-01-18 RX ADMIN — POTASSIUM PHOSPHATE, MONOBASIC POTASSIUM PHOSPHATE, DIBASIC 62.5 MILLIMOLE(S): 236; 224 INJECTION, SOLUTION INTRAVENOUS at 14:52

## 2021-01-18 RX ADMIN — Medication 1: at 08:13

## 2021-01-18 RX ADMIN — CEFEPIME 100 MILLIGRAM(S): 1 INJECTION, POWDER, FOR SOLUTION INTRAMUSCULAR; INTRAVENOUS at 05:18

## 2021-01-18 RX ADMIN — Medication 1 APPLICATION(S): at 11:45

## 2021-01-18 RX ADMIN — DEXTROSE MONOHYDRATE, SODIUM CHLORIDE, AND POTASSIUM CHLORIDE 75 MILLILITER(S): 50; .745; 4.5 INJECTION, SOLUTION INTRAVENOUS at 18:34

## 2021-01-18 RX ADMIN — CEFEPIME 100 MILLIGRAM(S): 1 INJECTION, POWDER, FOR SOLUTION INTRAMUSCULAR; INTRAVENOUS at 13:27

## 2021-01-18 RX ADMIN — Medication 250 MILLIGRAM(S): at 06:13

## 2021-01-18 RX ADMIN — ENOXAPARIN SODIUM 40 MILLIGRAM(S): 100 INJECTION SUBCUTANEOUS at 11:44

## 2021-01-18 RX ADMIN — Medication 81 MILLIGRAM(S): at 11:43

## 2021-01-18 RX ADMIN — SIMVASTATIN 40 MILLIGRAM(S): 20 TABLET, FILM COATED ORAL at 20:34

## 2021-01-18 RX ADMIN — CEFEPIME 100 MILLIGRAM(S): 1 INJECTION, POWDER, FOR SOLUTION INTRAMUSCULAR; INTRAVENOUS at 21:24

## 2021-01-18 RX ADMIN — Medication 100 MILLIGRAM(S): at 13:27

## 2021-01-18 RX ADMIN — Medication 250 MILLIGRAM(S): at 17:09

## 2021-01-18 NOTE — SWALLOW BEDSIDE ASSESSMENT ADULT - SWALLOW EVAL: DIAGNOSIS
Pt p/w significant oropharyngeal dysphagia w/ suspected behavioral component c/b weak labial seal, impaired bolus formation and mastication, slow A-P transport, oral stasis, suspected premature spillage of bolus to the pharynx, delayed swallow reflex, and overt s&s of penetration/aspiration seen at this exam.
Pt p/w oropharyngeal dysphagia c/b decreased oral aperture, weak labial seal, oral holding/delayed initiation of oral phase, impaired bolus formation/mastication, slow A-P transport, oral stasis, suspected premature spillage of bolus to the pharynx, slightly delayed swallow trigger, and cough on cup sips of Thin liquids seen at this exam.

## 2021-01-18 NOTE — PROGRESS NOTE ADULT - SUBJECTIVE AND OBJECTIVE BOX
Patient is a 75y old  Female who presents with a chief complaint of foot ulcer (18 Jan 2021 11:39)    PATIENT IS SEEN AND EXAMINED IN MEDICAL FLOOR.    ALLERGIES:  No Known Allergies    VITALS:    Vital Signs Last 24 Hrs  T(C): 36.2 (18 Jan 2021 05:06), Max: 36.6 (17 Jan 2021 20:14)  T(F): 97.1 (18 Jan 2021 05:06), Max: 97.8 (17 Jan 2021 20:14)  HR: 90 (18 Jan 2021 05:06) (90 - 93)  BP: 136/57 (18 Jan 2021 05:06) (135/70 - 137/52)  BP(mean): --  RR: 17 (18 Jan 2021 05:06) (17 - 17)  SpO2: 100% (18 Jan 2021 05:06) (99% - 100%)    LABS:    CBC Full  -  ( 18 Jan 2021 05:14 )  WBC Count : 13.75 K/uL  RBC Count : 2.68 M/uL  Hemoglobin : 8.1 g/dL  Hematocrit : 25.3 %  Platelet Count - Automated : 378 K/uL  Mean Cell Volume : 94.4 fl  Mean Cell Hemoglobin : 30.2 pg  Mean Cell Hemoglobin Concentration : 32.0 gm/dL  Auto Neutrophil # : x  Auto Lymphocyte # : x  Auto Monocyte # : x  Auto Eosinophil # : x  Auto Basophil # : x  Auto Neutrophil % : x  Auto Lymphocyte % : x  Auto Monocyte % : x  Auto Eosinophil % : x  Auto Basophil % : x      01-18    145  |  118<H>  |  12  ----------------------------<  113<H>  3.9   |  17<L>  |  0.51    Ca    7.0<L>      18 Jan 2021 05:14  Phos  2.0     01-18  Mg     1.9     01-18    TPro  4.4<L>  /  Alb  1.3<L>  /  TBili  0.3  /  DBili  x   /  AST  9<L>  /  ALT  <6<L>  /  AlkPhos  46  01-18    CAPILLARY BLOOD GLUCOSE      POCT Blood Glucose.: 119 mg/dL (18 Jan 2021 11:39)  POCT Blood Glucose.: 158 mg/dL (18 Jan 2021 07:46)  POCT Blood Glucose.: 82 mg/dL (17 Jan 2021 20:56)  POCT Blood Glucose.: 97 mg/dL (17 Jan 2021 16:52)        LIVER FUNCTIONS - ( 18 Jan 2021 05:14 )  Alb: 1.3 g/dL / Pro: 4.4 g/dL / ALK PHOS: 46 U/L / ALT: <6 U/L DA / AST: 9 U/L / GGT: x           Creatinine Trend: 0.51<--, 0.52<--, 0.58<--, 0.61<--, 0.69<--, 0.68<--  I&O's Summary      .Tissue Right heel margin  01-13 @ 19:31   Rare Enterococcus faecalis  --  Enterococcus faecalis      .Surgical Swab Right heel wound culture  01-13 @ 19:22   Moderate Proteus mirabilis  Few Bacteroides vulgatus group "Susceptibilities not performed"  --  Proteus mirabilis      .Urine Clean Catch (Midstream)  01-06 @ 06:46   No growth  --  --      .Surgical Swab Right heel wound  01-05 @ 22:13   Few Morganella morganii  Few Proteus mirabilis  Moderate Corynebacterium species "Susceptibilities not performed"  Moderate Bacteroides vulgatus "Susceptibilities not performed"  --  Morganella morganii  Proteus mirabilis      .Blood Blood-Peripheral  01-05 @ 17:59   No Growth Final  --  --      MEDICATIONS:    MEDICATIONS  (STANDING):  aspirin enteric coated 81 milliGRAM(s) Oral daily  cefepime   IVPB 1000 milliGRAM(s) IV Intermittent every 8 hours  collagenase Ointment 1 Application(s) Topical daily  Dakins Solution - Full Strength 1 Application(s) Topical once  dextrose 40% Gel 15 Gram(s) Oral once  dextrose 5% + sodium chloride 0.9% with potassium chloride 20 mEq/L 1000 milliLiter(s) (75 mL/Hr) IV Continuous <Continuous>  dextrose 50% Injectable 12.5 Gram(s) IV Push once  dextrose 50% Injectable 25 Gram(s) IV Push once  dextrose 50% Injectable 25 Gram(s) IV Push once  enoxaparin Injectable 40 milliGRAM(s) SubCutaneous daily  glucagon  Injectable 1 milliGRAM(s) IntraMuscular once  insulin lispro (ADMELOG) corrective regimen sliding scale   SubCutaneous three times a day before meals  insulin lispro (ADMELOG) corrective regimen sliding scale   SubCutaneous at bedtime  metroNIDAZOLE  IVPB 500 milliGRAM(s) IV Intermittent every 8 hours  potassium phosphate IVPB 15 milliMole(s) IV Intermittent once  simvastatin 40 milliGRAM(s) Oral at bedtime  vancomycin  IVPB 750 milliGRAM(s) IV Intermittent every 12 hours      MEDICATIONS  (PRN):  acetaminophen   Tablet .. 650 milliGRAM(s) Oral every 6 hours PRN Moderate Pain (4 - 6)      REVIEW OF SYSTEMS:                           ALL ROS DONE [ X   ]    CONSTITUTIONAL:  LETHARGIC [   ], FEVER [   ], UNRESPONSIVE [   ]  CVS:  CP  [   ], SOB, [   ], PALPITATIONS [   ], DIZZYNESS [   ]  RS: COUGH [   ], SPUTUM [   ]  GI: ABDOMINAL PAIN [   ], NAUSEA [   ], VOMITINGS [   ], DIARRHEA [   ], CONSTIPATION [   ]  :  DYSURIA [   ], NOCTURIA [   ], INCREASED FREQUENCY [   ], DRIBLING [   ],  SKELETAL: PAINFUL JOINTS [   ], SWOLLEN JOINTS [   ], NECK ACHE [   ], LOW BACK ACHE [   ],  SKIN : ULCERS [   ], RASH [   ], ITCHING [   ]  CNS: HEAD ACHE [   ], DOUBLE VISION [   ], BLURRED VISION [   ], AMS / CONFUSION [   ], SEIZURES [   ], WEAKNESS [   ],TINGLING / NUMBNESS [   ]    PHYSICAL EXAMINATION:  GENERAL APPEARANCE: NO DISTRESS  HEENT:  NO PALLOR, NO  JVD,  NO   NODES, NECK SUPPLE  CVS: S1 +, S2 +,   RS: AEEB,  OCCASIONAL  RALES +,   NO RONCHI  ABD: SOFT, NT, NO, BS +  EXT: NO PE  SKIN: WARM, BILATERAL HEEL ULCERS - COVERED, SACRAL ULCER + RIGHT ISCHIAL ULCER COVERED  SKELETAL:  ROM ACCEPTABLE, LEFT ARM SWELLING  CNS:  AAO X 2-3     RADIOLOGY :    EXAM:  MR FOOT RT                            PROCEDURE DATE:  01/06/2021          INTERPRETATION:  Clinical indications: Right heel ulceration and eschar status post debridement. Concern for osteomyelitis.    Multiplanar multisequence MRI of the right foot was performed localized to the hindfoot.    Correlation is made with prior MRI from September 11, 2019.    FINDINGS:    There is a large wound along the plantar aspect of the calcaneus posteriorly which extends nearly extends to bone. There is surrounding soft tissue edema about this site with evidence of an overlying bandage. The degree of soft tissue deficiency is increased compared to the prior examination. There is extensive osseous edema and corresponding hypointense T1 marrow signalthroughout the calcaneus extending from the posterior plantar margin to the level of the angle of Gissane. This is progressed compared to the prior examination and consistent with osteomyelitis. Marrow signal within the remainder of the foot is otherwise preserved.    There is confluent edema tracking along the abductor hallucis and flexor digitorum brevis muscles and within the plantar subcutaneous fat subjacent to this region. In total this area measures approximately 2.2 cm in transverse dimension, approximately 4 cm in anteroposterior posterior dimension, and approximately 1.6 cm in craniocaudal dimension. Findings may be related to underlying phlegmon or abscess. Evaluation is limited by the lack of intravenous contrast. Distal to this site there is edema throughout the visualized musculature consistent with myositis.    Visualized tendinous structures remain intact. There is no acute ligamentous injury. Visualized joint spaces are preserved without joint effusion.    IMPRESSION:    Osteomyelitis involving the calcaneus which extends from the plantar posterior aspect of the calcaneus to the level of the angle of Gissane. This is progressed compared to the prior examination. This is seen in the setting of diffuse soft tissue deficiency along the posterior plantar aspect of the calcaneus.    Confluent edema adjacent to this region within the abductor hallucis and flexor digitorum brevis muscles and within the subjacent plantar subcutaneous fat. This may be related to abscess orphlegmon.    ASSESSMENT :     Osteomyelitis    Yes    COVID-19    Osteomyelitis    DM (diabetes mellitus)    Paranoid schizophrenia    HLD (hyperlipidemia)    PAD (peripheral artery disease)    HTN (hypertension)            PLAN:  HPI:  76 yo F from [facility], wheelchair bound with medical history significant of HTN, Diabetes, Osteoarthritis, Osteoporosis, Peripheral arterial disease, Diabetic neuropathy, HLD, Schizophrenia comes in with worsening ulceration to b/l heels. She has been having ulcers for past couple months, has been progressive. She was treated with IV antbx at NH but did not get better. It was worsening with necrotic changes and increasing foul smelling discharge. She denies fever, abdominal pain, chest pain, urinary symptoms, fall, trauma. No h/o nausea, vomiting, diarrhea, cough, dyspnea, sick contacts, recent illness.  (05 Jan 2021 14:24)    PLAN:    #  RESOLVING METABOLIC ENCEPHALOPATHY - PATIENT HAS POOR PO INTAKE, STARTED ON IVF WITH IMPROVING MENTAL STATUS   # BILATERAL LE CHRONIC ULCER NOW PROGRESSIVELY WORSENING W/ SSTI AND RIGHT CALCANEAL OSTEOMYELITIS W/ POSSIBLE ABSCESS; UNDERWENT BEDSIDE DEBRIDEMENT OF RIGHT LEG ULCER AND UNDERWENT RIGHT PARTIAL CALCANECTOMY ON 1/13  - CEFEPIME + VANOMYCIN + FLAGYL, REVIEWED TIMUR, BCX - NGTD, WOUND CX - MORGANELLA MORGANI & P. MIRABILIS, ID CONSULT IN PROGRESS, PODIATRY CONSULT IN PROGRESS  - REVIEWED RIGHT LE MRI  -  PSYCHIATRY CONSULT FOR CAPACITY IN PROGRESS - DEEM PATIENT AS HAVING CAPACITY.  PATIENT WAS AGREEABLE FOR SURGICAL DEBRIDEMENT ON 1/09  - 2 PC CONSENT FOR SURGICAL DEBRIDEMENT PLANNED FOR 1/12; ATTEMPTED TO CALL NEXT OF [facility] @ [phone] HOWEVER PHONE NUMBER APPEARS DISCONNECTED  - VASCULAR SX CONSULT IN PROGRESS - PATIENT MAY REQUIRE AKA, BUT REFUSED ON PREVIOUS CONVERSATIONS - WAS AGREEABLE TO LOCAL DEBRIDEMENT/INTERVENTIONS  - PLAN FOR WOUND VAC  - PATIENT WILL NEED 6 WEEKS OF IV ANTIBIOTICS - WILL NEED TO SWITCH TO PICC LINE FROM EXTENDED DWELL    # MEDICAL CLEARANCE FOR SURGERY - RCRI 1 POINT - 6% RISK OF DEATH, MI OR CARDIAC ARREST; CARDIOLOGY CONSULT IN PROGRESS FOR MEDICAL CLEARANCE    # SACRAL AND RIGHT ISCHIAL UNSTAGEABLE ULCERS W/ SSTI  S/P DEBRIDEMENT 1/8- ON VANCOMYCIN AND CEFEPIME, SURGERY CONSULT IN PROGRESS -  PSYCHIATRY CONSULT FOR CAPACITY IN PROGRESS    # ACUTE METABOLIC ENCEPHALOPATHY - SUSPECT S/T ACUTE INFECTION & HYPERNATREMIA - REVIEWED CT HEAD  - F/U ST EVAL AS RECENTLY MADE NPO BY SPEECH    # HYPERNATREMIA SUSPECT S/T POOR PO INTAKE - PLACED ON IVF    # LEFT ARM SWELLING - REVIEWED LEFT ARM U/S W/ OUT THROMBUS, WARM COMPRESSES  # ASHLEY - RESOLVED  # PAD  # HTN  # DM W/ DIABETIC NEUROPATHY  # OA/OP  # SCHIZOPHRENIA - HOLD MEDICATION  # CASE D/W PATIENT'S ? PARTNER - LISA KELLY - WHO REPORTS HE IS NOT NEXT OF [facility] OR HCP - [phone] ON 1/12; HE UNDERSTOOD PATIENT'S PROGNOSIS WAS GUARDED  # GI AND DVT PPX    ELEN CASILLAS MD COVERING FARHAN CASILLAS MD Patient is a 75y old  Female who presents with a chief complaint of foot ulcer (18 Jan 2021 11:39)    PATIENT IS SEEN AND EXAMINED IN MEDICAL FLOOR.    ALLERGIES:  No Known Allergies    VITALS:    Vital Signs Last 24 Hrs  T(C): 36.2 (18 Jan 2021 05:06), Max: 36.6 (17 Jan 2021 20:14)  T(F): 97.1 (18 Jan 2021 05:06), Max: 97.8 (17 Jan 2021 20:14)  HR: 90 (18 Jan 2021 05:06) (90 - 93)  BP: 136/57 (18 Jan 2021 05:06) (135/70 - 137/52)  BP(mean): --  RR: 17 (18 Jan 2021 05:06) (17 - 17)  SpO2: 100% (18 Jan 2021 05:06) (99% - 100%)    LABS:    CBC Full  -  ( 18 Jan 2021 05:14 )  WBC Count : 13.75 K/uL  RBC Count : 2.68 M/uL  Hemoglobin : 8.1 g/dL  Hematocrit : 25.3 %  Platelet Count - Automated : 378 K/uL  Mean Cell Volume : 94.4 fl  Mean Cell Hemoglobin : 30.2 pg  Mean Cell Hemoglobin Concentration : 32.0 gm/dL  Auto Neutrophil # : x  Auto Lymphocyte # : x  Auto Monocyte # : x  Auto Eosinophil # : x  Auto Basophil # : x  Auto Neutrophil % : x  Auto Lymphocyte % : x  Auto Monocyte % : x  Auto Eosinophil % : x  Auto Basophil % : x      01-18    145  |  118<H>  |  12  ----------------------------<  113<H>  3.9   |  17<L>  |  0.51    Ca    7.0<L>      18 Jan 2021 05:14  Phos  2.0     01-18  Mg     1.9     01-18    TPro  4.4<L>  /  Alb  1.3<L>  /  TBili  0.3  /  DBili  x   /  AST  9<L>  /  ALT  <6<L>  /  AlkPhos  46  01-18    CAPILLARY BLOOD GLUCOSE      POCT Blood Glucose.: 119 mg/dL (18 Jan 2021 11:39)  POCT Blood Glucose.: 158 mg/dL (18 Jan 2021 07:46)  POCT Blood Glucose.: 82 mg/dL (17 Jan 2021 20:56)  POCT Blood Glucose.: 97 mg/dL (17 Jan 2021 16:52)        LIVER FUNCTIONS - ( 18 Jan 2021 05:14 )  Alb: 1.3 g/dL / Pro: 4.4 g/dL / ALK PHOS: 46 U/L / ALT: <6 U/L DA / AST: 9 U/L / GGT: x           Creatinine Trend: 0.51<--, 0.52<--, 0.58<--, 0.61<--, 0.69<--, 0.68<--  I&O's Summary      .Tissue Right heel margin  01-13 @ 19:31   Rare Enterococcus faecalis  --  Enterococcus faecalis      .Surgical Swab Right heel wound culture  01-13 @ 19:22   Moderate Proteus mirabilis  Few Bacteroides vulgatus group "Susceptibilities not performed"  --  Proteus mirabilis      .Urine Clean Catch (Midstream)  01-06 @ 06:46   No growth  --  --      .Surgical Swab Right heel wound  01-05 @ 22:13   Few Morganella morganii  Few Proteus mirabilis  Moderate Corynebacterium species "Susceptibilities not performed"  Moderate Bacteroides vulgatus "Susceptibilities not performed"  --  Morganella morganii  Proteus mirabilis      .Blood Blood-Peripheral  01-05 @ 17:59   No Growth Final  --  --      MEDICATIONS:    MEDICATIONS  (STANDING):  aspirin enteric coated 81 milliGRAM(s) Oral daily  cefepime   IVPB 1000 milliGRAM(s) IV Intermittent every 8 hours  collagenase Ointment 1 Application(s) Topical daily  Dakins Solution - Full Strength 1 Application(s) Topical once  dextrose 40% Gel 15 Gram(s) Oral once  dextrose 5% + sodium chloride 0.9% with potassium chloride 20 mEq/L 1000 milliLiter(s) (75 mL/Hr) IV Continuous <Continuous>  dextrose 50% Injectable 12.5 Gram(s) IV Push once  dextrose 50% Injectable 25 Gram(s) IV Push once  dextrose 50% Injectable 25 Gram(s) IV Push once  enoxaparin Injectable 40 milliGRAM(s) SubCutaneous daily  glucagon  Injectable 1 milliGRAM(s) IntraMuscular once  insulin lispro (ADMELOG) corrective regimen sliding scale   SubCutaneous three times a day before meals  insulin lispro (ADMELOG) corrective regimen sliding scale   SubCutaneous at bedtime  metroNIDAZOLE  IVPB 500 milliGRAM(s) IV Intermittent every 8 hours  potassium phosphate IVPB 15 milliMole(s) IV Intermittent once  simvastatin 40 milliGRAM(s) Oral at bedtime  vancomycin  IVPB 750 milliGRAM(s) IV Intermittent every 12 hours      MEDICATIONS  (PRN):  acetaminophen   Tablet .. 650 milliGRAM(s) Oral every 6 hours PRN Moderate Pain (4 - 6)      REVIEW OF SYSTEMS:                           ALL ROS DONE [ X   ]    CONSTITUTIONAL:  LETHARGIC [   ], FEVER [   ], UNRESPONSIVE [   ]  CVS:  CP  [   ], SOB, [   ], PALPITATIONS [   ], DIZZYNESS [   ]  RS: COUGH [   ], SPUTUM [   ]  GI: ABDOMINAL PAIN [   ], NAUSEA [   ], VOMITINGS [   ], DIARRHEA [   ], CONSTIPATION [   ]  :  DYSURIA [   ], NOCTURIA [   ], INCREASED FREQUENCY [   ], DRIBLING [   ],  SKELETAL: PAINFUL JOINTS [   ], SWOLLEN JOINTS [   ], NECK ACHE [   ], LOW BACK ACHE [   ],  SKIN : ULCERS [   ], RASH [   ], ITCHING [   ]  CNS: HEAD ACHE [   ], DOUBLE VISION [   ], BLURRED VISION [   ], AMS / CONFUSION [   ], SEIZURES [   ], WEAKNESS [   ],TINGLING / NUMBNESS [   ]    PHYSICAL EXAMINATION:  GENERAL APPEARANCE: NO DISTRESS  HEENT:  NO PALLOR, NO  JVD,  NO   NODES, NECK SUPPLE  CVS: S1 +, S2 +,   RS: AEEB,  OCCASIONAL  RALES +,   NO RONCHI  ABD: SOFT, NT, NO, BS +  EXT: NO PE  SKIN: WARM, BILATERAL HEEL ULCERS - COVERED, SACRAL ULCER + RIGHT ISCHIAL ULCER COVERED  SKELETAL:  ROM ACCEPTABLE, LEFT ARM SWELLING  CNS:  AAO X 2-3     RADIOLOGY :    EXAM:  MR FOOT RT                            PROCEDURE DATE:  01/06/2021          INTERPRETATION:  Clinical indications: Right heel ulceration and eschar status post debridement. Concern for osteomyelitis.    Multiplanar multisequence MRI of the right foot was performed localized to the hindfoot.    Correlation is made with prior MRI from September 11, 2019.    FINDINGS:    There is a large wound along the plantar aspect of the calcaneus posteriorly which extends nearly extends to bone. There is surrounding soft tissue edema about this site with evidence of an overlying bandage. The degree of soft tissue deficiency is increased compared to the prior examination. There is extensive osseous edema and corresponding hypointense T1 marrow signalthroughout the calcaneus extending from the posterior plantar margin to the level of the angle of Gissane. This is progressed compared to the prior examination and consistent with osteomyelitis. Marrow signal within the remainder of the foot is otherwise preserved.    There is confluent edema tracking along the abductor hallucis and flexor digitorum brevis muscles and within the plantar subcutaneous fat subjacent to this region. In total this area measures approximately 2.2 cm in transverse dimension, approximately 4 cm in anteroposterior posterior dimension, and approximately 1.6 cm in craniocaudal dimension. Findings may be related to underlying phlegmon or abscess. Evaluation is limited by the lack of intravenous contrast. Distal to this site there is edema throughout the visualized musculature consistent with myositis.    Visualized tendinous structures remain intact. There is no acute ligamentous injury. Visualized joint spaces are preserved without joint effusion.    IMPRESSION:    Osteomyelitis involving the calcaneus which extends from the plantar posterior aspect of the calcaneus to the level of the angle of Gissane. This is progressed compared to the prior examination. This is seen in the setting of diffuse soft tissue deficiency along the posterior plantar aspect of the calcaneus.    Confluent edema adjacent to this region within the abductor hallucis and flexor digitorum brevis muscles and within the subjacent plantar subcutaneous fat. This may be related to abscess orphlegmon.    ASSESSMENT :     Osteomyelitis    Yes    COVID-19    Osteomyelitis    DM (diabetes mellitus)    Paranoid schizophrenia    HLD (hyperlipidemia)    PAD (peripheral artery disease)    HTN (hypertension)            PLAN:  HPI:  74 yo F from [facility], wheelchair bound with medical history significant of HTN, Diabetes, Osteoarthritis, Osteoporosis, Peripheral arterial disease, Diabetic neuropathy, HLD, Schizophrenia comes in with worsening ulceration to b/l heels. She has been having ulcers for past couple months, has been progressive. She was treated with IV antbx at NH but did not get better. It was worsening with necrotic changes and increasing foul smelling discharge. She denies fever, abdominal pain, chest pain, urinary symptoms, fall, trauma. No h/o nausea, vomiting, diarrhea, cough, dyspnea, sick contacts, recent illness.  (05 Jan 2021 14:24)    PLAN:    #  RESOLVING METABOLIC ENCEPHALOPATHY - PATIENT HAS POOR PO INTAKE, ST EVAL RECOMMENDED SOFT DIET, STARTED ON IVF WITH IMPROVING MENTAL STATUS   # BILATERAL LE CHRONIC ULCER NOW PROGRESSIVELY WORSENING W/ SSTI AND RIGHT CALCANEAL OSTEOMYELITIS W/ POSSIBLE ABSCESS; UNDERWENT BEDSIDE DEBRIDEMENT OF RIGHT LEG ULCER AND UNDERWENT RIGHT PARTIAL CALCANECTOMY ON 1/13  - CEFEPIME + VANOMYCIN + FLAGYL, REVIEWED TIMUR, BCX - NGTD, WOUND CX - MORGANELLA MORGANI & P. MIRABILIS, ID CONSULT IN PROGRESS, PODIATRY CONSULT IN PROGRESS  - REVIEWED RIGHT LE MRI  -  PSYCHIATRY CONSULT FOR CAPACITY IN PROGRESS - DEEM PATIENT AS HAVING CAPACITY.  PATIENT WAS AGREEABLE FOR SURGICAL DEBRIDEMENT ON 1/09  - 2 PC CONSENT FOR SURGICAL DEBRIDEMENT PLANNED FOR 1/12; ATTEMPTED TO CALL NEXT OF [facility] @ [phone] HOWEVER PHONE NUMBER APPEARS DISCONNECTED  - VASCULAR SX CONSULT IN PROGRESS - PATIENT MAY REQUIRE AKA, BUT REFUSED ON PREVIOUS CONVERSATIONS - WAS AGREEABLE TO LOCAL DEBRIDEMENT/INTERVENTIONS  - PLAN FOR WOUND VAC  - PATIENT WILL NEED 6 WEEKS OF IV ANTIBIOTICS - WILL NEED TO SWITCH TO PICC LINE FROM EXTENDED DWELL    # MEDICAL CLEARANCE FOR SURGERY - RCRI 1 POINT - 6% RISK OF DEATH, MI OR CARDIAC ARREST; CARDIOLOGY CONSULT IN PROGRESS FOR MEDICAL CLEARANCE    # SACRAL AND RIGHT ISCHIAL UNSTAGEABLE ULCERS W/ SSTI  S/P DEBRIDEMENT 1/8- ON VANCOMYCIN AND CEFEPIME, SURGERY CONSULT IN PROGRESS -  PSYCHIATRY CONSULT FOR CAPACITY IN PROGRESS    # ACUTE METABOLIC ENCEPHALOPATHY - SUSPECT S/T ACUTE INFECTION & HYPERNATREMIA - REVIEWED CT HEAD  - F/U ST EVAL AS RECENTLY MADE NPO BY SPEECH    # HYPERNATREMIA SUSPECT S/T POOR PO INTAKE - PLACED ON IVF    # LEFT ARM SWELLING - REVIEWED LEFT ARM U/S W/ OUT THROMBUS, WARM COMPRESSES  # ASHLEY - RESOLVED  # PAD  # HTN  # DM W/ DIABETIC NEUROPATHY  # OA/OP  # SCHIZOPHRENIA - HOLD MEDICATION  # CASE D/W PATIENT'S ? PARTNER - LISA ROGERSCI - WHO REPORTS HE IS NOT NEXT OF K[facility] OR HCP - [phone] ON 1/12; HE UNDERSTOOD PATIENT'S PROGNOSIS WAS GUARDED  # GI AND DVT PPX    ELEN CASILLAS MD COVERING FARHAN CASILLAS MD

## 2021-01-18 NOTE — SWALLOW BEDSIDE ASSESSMENT ADULT - ORAL PHASE
Decreased anterior-posterior movement of the bolus/Delayed oral transit time Decreased anterior-posterior movement of the bolus/Delayed oral transit time/Stasis in lateral sulci Decreased anterior-posterior movement of the bolus/Delayed oral transit time/Stasis in lateral sulci/Lingual stasis

## 2021-01-18 NOTE — PROGRESS NOTE ADULT - SUBJECTIVE AND OBJECTIVE BOX
Patient is a 75y old  Female who presents with a chief complaint of foot ulcer (05 Jan 2021 14:24)      HPI:  76 yo F from [facility], wheelchair bound with medical history significant of HTN, Diabetes, Osteoarthritis, Osteoporosis, Peripheral arterial disease, Diabetic neuropathy, HLD, Schizophrenia comes in with worsening ulceration to b/l heels. She has been having ulcers for past couple months, has been progressive. She was treated with IV antbx at NH but did not get better. It was worsening with necrotic changes and increasing foul smelling discharge. She denies fever, abdominal pain, chest pain, urinary symptoms, fall, trauma. No h/o nausea, vomiting, diarrhea, cough, dyspnea, sick contacts, recent illness.  (05 Jan 2021 14:24)      Podiatry HPI: 74 y/o female with full history as noted above, podiatry consulted for b/l heel wounds. Pt is from [facility], wheelchair bound with medical history significant of HTN, Diabetes, Osteoarthritis, Osteoporosis, PAD, Diabetic neuropathy, HLD, Schizophrenia comes in with worsening ulceration to b/l heels. Patient seen resting in bed comfortably, AAOx3. Patient states she has had the wounds for a long time, maybe more than 6 months. She relates receiving local wound care previously in the NH using santyl dressings. She endorses occasional pain to wound sites. Patient was  under podiatric care in previous admissions for b/l heel wounds with osteomyelitis. She states she is not interesting in surgery for her feet, she wishes to continue with local wound care. She denies nausea, vomiting, chills, fever, SOB.     Podiatry Progress: Pt s/p Right foot partial calcanectomy and wound debridement on 1/13/21. Patient seen resting in bed, AAOx2. Patient denied pain to her heels, denied nausea, vomiting, chills, fever, shortness of breath.     Medications acetaminophen   Tablet .. 650 milliGRAM(s) Oral every 6 hours PRN  aspirin enteric coated 81 milliGRAM(s) Oral daily  cefepime   IVPB 1000 milliGRAM(s) IV Intermittent every 8 hours  collagenase Ointment 1 Application(s) Topical daily  Dakins Solution - Full Strength 1 Application(s) Topical once  dextrose 40% Gel 15 Gram(s) Oral once  dextrose 5% + sodium chloride 0.9% with potassium chloride 20 mEq/L 1000 milliLiter(s) IV Continuous <Continuous>  dextrose 50% Injectable 12.5 Gram(s) IV Push once  dextrose 50% Injectable 25 Gram(s) IV Push once  dextrose 50% Injectable 25 Gram(s) IV Push once  enoxaparin Injectable 40 milliGRAM(s) SubCutaneous daily  glucagon  Injectable 1 milliGRAM(s) IntraMuscular once  insulin lispro (ADMELOG) corrective regimen sliding scale   SubCutaneous three times a day before meals  insulin lispro (ADMELOG) corrective regimen sliding scale   SubCutaneous at bedtime  metroNIDAZOLE  IVPB 500 milliGRAM(s) IV Intermittent every 8 hours  simvastatin 40 milliGRAM(s) Oral at bedtime  vancomycin  IVPB 750 milliGRAM(s) IV Intermittent every 12 hours    FHNo pertinent family history in first degree relatives    ,   PMHCOVID-19    Osteomyelitis    DM (diabetes mellitus)    Paranoid schizophrenia    HLD (hyperlipidemia)    PAD (peripheral artery disease)    HTN (hypertension)       PSHNo significant past surgical history        Labs                          8.1    13.75 )-----------( 378      ( 18 Jan 2021 05:14 )             25.3      01-18    145  |  118<H>  |  12  ----------------------------<  113<H>  3.9   |  17<L>  |  0.51    Ca    7.0<L>      18 Jan 2021 05:14  Phos  2.0     01-18  Mg     1.9     01-18    TPro  4.4<L>  /  Alb  1.3<L>  /  TBili  0.3  /  DBili  x   /  AST  9<L>  /  ALT  <6<L>  /  AlkPhos  46  01-18     Vital Signs Last 24 Hrs  T(C): 36.3 (18 Jan 2021 14:45), Max: 36.6 (17 Jan 2021 20:14)  T(F): 97.3 (18 Jan 2021 14:45), Max: 97.8 (17 Jan 2021 20:14)  HR: 68 (18 Jan 2021 14:45) (68 - 93)  BP: 115/52 (18 Jan 2021 14:45) (115/52 - 136/57)  BP(mean): --  RR: 18 (18 Jan 2021 14:45) (17 - 18)  SpO2: 100% (18 Jan 2021 14:45) (99% - 100%)  Sedimentation Rate, Erythrocyte: 99 mm/Hr (01-05-21 @ 11:45)         C-Reactive Protein, Serum: 5.63 mg/dL (01-06-21 @ 10:27)   WBC Count: 13.75 K/uL <H> (01-18-21 @ 05:14)    LE Focused:    Vasc:  DP/PT nonpalpable, CFT brisk to digits, TG warm to warm b/l,   Derm:   Wound 1:  L heel closed necrotic eschar measuring ~4x3.5x0.1 cm with mild mac wound erythema. No tunneling or undermining noted. No discharge from the area. No malodor or PTB noted. Stable in appearance  Wound 2: s/p R foot partial calcanectomy and wound debridement measuring ~9x6.5x1 cm with calcaneal bone exposed and surrounding fibrotic tissue; No periwound erythema. No drainage, no malodor  Neuro: protective sensation absent at level of digits b/l  MSK: no tenderness on palpation of periwound areas b/l       IMAGING:  f< from: Xray Ankle Complete 3 Views, Right (01.13.21 @ 18:15) >    EXAM:  ANKLE RIGHT (MINIMUM 3 V)                            PROCEDURE DATE:  01/13/2021          INTERPRETATION:  RIGHT ankle    CLINICAL INFORMATION: Postoperative radiographs.    TECHNIQUE: AP,lateral /views.    FINDINGS:    Large posterior calcaneal ulceration NOTED with the osteolysis /surgical debridement of the posterior calcaneus remaining osseous structures of the ankle are intact..    IMPRESSION:    Posterior calcaneal large ulceration within the posterior calcaneal postsurgical debridement versus/osteomyelitis.            ADITI SILVA MD; Attending Radiologist  This document has been electronically signed. Jan 13 2021  7:02PM    < end of copied text >    --------------------------  < from: MR Foot No Cont, Right (01.06.21 @ 11:08) >    EXAM:  MR FOOT RT                            PROCEDURE DATE:  01/06/2021          INTERPRETATION:  Clinical indications: Right heel ulceration and eschar status post debridement. Concern for osteomyelitis.    Multiplanar multisequence MRI of the right foot was performed localized to the hindfoot.    Correlation is made with prior MRI from September 11, 2019.    FINDINGS:    There is a large wound along the plantar aspect of the calcaneus posteriorly which extends nearly extends to bone. There is surrounding soft tissue edema about this site with evidence of an overlying bandage. The degree of soft tissue deficiency is increased compared to the prior examination. There is extensive osseous edema and corresponding hypointense T1 marrow signalthroughout the calcaneus extending from the posterior plantar margin to the level of the angle of Gissane. This is progressed compared to the prior examination and consistent with osteomyelitis. Marrow signal within the remainder of the foot is otherwise preserved.    There is confluent edema tracking along the abductor hallucis and flexor digitorum brevis muscles and within the plantar subcutaneous fat subjacent to this region. In total this area measures approximately 2.2 cm in transverse dimension, approximately 4 cm in anteroposterior posterior dimension, and approximately 1.6 cm in craniocaudal dimension. Findings may be related to underlying phlegmon or abscess. Evaluation is limited by the lack of intravenous contrast. Distal to this site there is edema throughout the visualized musculature consistent with myositis.    Visualized tendinous structures remain intact. There is no acute ligamentous injury. Visualized joint spaces are preserved without joint effusion.    IMPRESSION:    Osteomyelitis involving the calcaneus which extends from the plantar posterior aspect of the calcaneus to the level of the angle of Gissane. This is progressed compared to the prior examination. This is seen in the setting of diffuse soft tissue deficiency along the posterior plantar aspect of the calcaneus.    Confluent edema adjacent to this region within the abductor hallucis and flexor digitorum brevis muscles and within the subjacent plantar subcutaneous fat. This may be related to abscess orphlegmon.            OSWALDO ALFRED MD; Attending Radiologist  This document has been electronically signed. Jan 6 2021 11:49AM    < end of copied text >    -------------------------------------------------------------------------------------------------------------  < from: [facility] Extremity Lower 3+ Level, BI (01.05.21 @ 17:55) >    EXAM:  US PHYSIOL LWR EXT 3+ LEV BI                            PROCEDURE DATE:  01/05/2021          INTERPRETATION:  Clinical information: History of diabetes, nonsmoker, hypertension, hyperlipidemia, presents with bilateral heel ulcers.    Comparison: Lower extremity arterial Doppler study dated 5/13/2020.    Technique: Lower extremity arterial Doppler study. Note that pressure measurements were not obtained at the thigh level.    Findings: Ankle brachial index measures 1.33 bilaterally, compared with prior values of 1.41 on the right and 1.36 on the left. No segmental arterial pressure gradient is present.    PVR waveforms are normal in amplitude and pulsatility from the thigh through the ankle level. They're diminished in amplitude at the metatarsal and digital levels in symmetric fashion, similar to the prior exam.    Impression: No Doppler evidence of hemodynamically significant arterial inflow abnormality in either lower extremity.    Evidence of small vessel disease in the feet.    No significant interval change since study of 5/13/2020.            SOHAN PA MD; Attending Radiologist  This document has been electronically signed. Jan 6 2021  9:13AM    < end of copied text >      --------------------------------------------------------------------------------------------------------------  < from: Xray Foot AP + Lateral + Oblique, Right (01.05.21 @ 18:00) >    EXAM:  FOOT RIGHT (MINIMUM 3 VIEWS)                            PROCEDURE DATE:  01/05/2021          INTERPRETATION:  Right foot    HISTORY: Status post bedside debridement.     Two views of the right foot show thinning of soft tissues near the calcaneus likely indicating site of debridement. The joint spaces are maintained. There is questionable exposure of the plantar surface of the calcaneus.    IMPRESSION: Calcaneal soft tissues and questionable exposure as noted. Clinical correlation is suggested.        Thank you for this referral.            REID CRAMER MD; Attending Interventional Radiologist  This document has been electronically signed. Ender  6 2021 11:10AM    < end of copied text >    -------------------------------------------------------------------------------------------  a< from: Xray Ankle Complete 3 Views, Bilateral (01.05.21 @ 14:37) >    EXAM:  ANKLE BILATERAL (MINIMUM 3 V)                            PROCEDURE DATE:  01/05/2021          INTERPRETATION:  CLINICAL INDICATION:  Osteomyelitis; evaluate for soft tissue emphysema.    COMPARISON:  Left ankle radiography 5/11/2020.    TECHNIQUE:  AP, oblique and crosstable lateral views left ankle. Oblique and crosstable lateral views right ankle. Technologist noted that the best possible films were obtained.    FINDINGS:    Right ankle: Generalized osteopenia. Subcutaneous emphysema is identified along the plantar aspect of the right hindfoot inferior to the calcaneus, with an overlying skin defect noted along the posterior aspect of the calcaneus. Osteolysis involving the inferior/posterior aspect of the right calcaneus cannot beexcluded. MRI evaluation can be performed for further assessment. Extensive vascular calcification is noted. No ankle joint effusion is noted.    Left ankle: Generalized osteopenia. There is no evidence for acute fracture or dislocation. The ankle mortise appears grossly intact. No ankle joint effusion is noted. Extensive vascular calcifications identified. No significant soft tissue swelling.      IMPRESSION:  No evidence for acute fracture. Subcutaneous emphysema is identified along the plantaraspect of the right hindfoot inferior to the calcaneus, with an overlying skin defect noted along the posterior aspect of the calcaneus. Osteolysis involving the inferior/posterior aspect of the right calcaneus cannot be excluded. MRI evaluation can be performed for further assessment.                MARA LARKIN MD; Attending Radiologist  This document has been electronically signed. Jan 5 2021  3:57PM    < end of copied text >        CULTURES:   cCulture - Surgical Swab (01.05.21 @ 22:13)   Specimen Source: .Surgical Swab Right heel wound   Culture Results:   Few Morganella morganii   Few Proteus mirabilis       Historical Values  Culture - Surgical Swab (01.05.21 @ 22:13)   Specimen Source: .Surgical Swab Right heel wound   Culture Results:   Few Morganella morganii   Few Proteus mirabilis   culture:Culture - Surgical Swab (01.13.21 @ 19:22)   - Ampicillin/Sulbactam: S <=4/2 Enterobacter, Citrobacter, and Serratia may develop resistance during prolonged therapy (3-4 days)   - Ampicillin: S <=8 These ampicillin results predict results for amoxicillin   - Amikacin: S <=16   - Amoxicillin/Clavulanic Acid: S <=8/4   - Aztreonam: S <=4   - Cefazolin: I 4 Enterobacter, Citrobacter, and Serratia may develop resistance during prolonged therapy (3-4 days)   - Cefepime: S <=2   - Cefoxitin: S <=8   - Ceftriaxone: S <=1 Enterobacter, Citrobacter, and Serratia may develop resistance during prolonged therapy   - Ciprofloxacin: R 1   - Ertapenem: S <=0.5   - Gentamicin: S <=2   - Levofloxacin: S <=0.5   - Meropenem: S <=1   - Trimethoprim/Sulfamethoxazole: S <=0.5/9.5   - Tobramycin: S <=2   - Piperacillin/Tazobactam: S <=8   Specimen Source: .Surgical Swab Right heel wound culture   Culture Results:   Moderate Proteus mirabilis   Few Bacteroides vulgatus group "Susceptibilities not performed"   Organism Identification: Proteus mirabilis   Organism: Proteus mirabilis   Method Type: RICARDO       Historical Values  Culture - Surgical Swab (01.13.21 @ 19:22)   - Ampicillin/Sulbactam: S <=4/2 Enterobacter, Citrobacter, and Serratia may develop resistance during prolonged therapy (3-4 days)   - Ampicillin: S <=8 These ampicillin results predict results for amoxicillin   - Amikacin: S <=16   - Amoxicillin/Clavulanic Acid: S <=8/4   - Aztreonam: S <=4   - Cefazolin: I 4 Enterobacter, Citrobacter, and Serratia may develop resistance during prolonged therapy (3-4 days)   - Cefepime: S <=2   - Cefoxitin: S <=8   - Ceftriaxone: S <=1 Enterobacter, Citrobacter, and Serratia may develop resistance during prolonged therapy   - Ciprofloxacin: R 1   - Ertapenem: S <=0.5   - Gentamicin: S <=2   - Levofloxacin: S <=0.5   - Meropenem: S <=1   - Trimethoprim/Sulfamethoxazole: S <=0.5/9.5   - Tobramycin: S <=2   - Piperacillin/Tazobactam: S <=8   Specimen Source: .Surgical Swab Right heel wound culture   Culture Results:   Moderate Proteus mirabilis   Few Bacteroides vulgatus group "Susceptibilities not performed"   Organism Identification: Proteus mirabilis   Organism: Proteus mirabilis   Method Type: RICARDO   ---------------------------------  Pathology result:Surgical Pathology Report (01.13.21 @ 14:03)   Surgical Pathology Report:   ACCESSION No: [id number]   [place] 2   Surgical Final Report   Final Diagnosis   1. Right calcaneum bone; partial calcanectomy, incision and   drainage:   Cancellous bone with acute osteomyelitis and reparative changes   2. Right heel, margin; biopsy:   Osteocartilaginous tissue with granulation tissue and   regenerating bony   trabeculae; no inflammatory exudate is identified   Verified by: Sarah Culver M.D.

## 2021-01-18 NOTE — PROGRESS NOTE ADULT - SUBJECTIVE AND OBJECTIVE BOX
Patient denies chest pain or shortness of breath.  Review of systems otherwise (-)  	  acetaminophen   Tablet .. 650 milliGRAM(s) Oral every 6 hours PRN  aspirin enteric coated 81 milliGRAM(s) Oral daily  cefepime   IVPB 1000 milliGRAM(s) IV Intermittent every 8 hours  collagenase Ointment 1 Application(s) Topical daily  Dakins Solution - Full Strength 1 Application(s) Topical once  dextrose 40% Gel 15 Gram(s) Oral once  dextrose 5% + sodium chloride 0.9% with potassium chloride 20 mEq/L 1000 milliLiter(s) IV Continuous <Continuous>  dextrose 50% Injectable 12.5 Gram(s) IV Push once  dextrose 50% Injectable 25 Gram(s) IV Push once  dextrose 50% Injectable 25 Gram(s) IV Push once  enoxaparin Injectable 40 milliGRAM(s) SubCutaneous daily  glucagon  Injectable 1 milliGRAM(s) IntraMuscular once  insulin lispro (ADMELOG) corrective regimen sliding scale   SubCutaneous three times a day before meals  insulin lispro (ADMELOG) corrective regimen sliding scale   SubCutaneous at bedtime  metroNIDAZOLE  IVPB 500 milliGRAM(s) IV Intermittent every 8 hours  potassium phosphate / sodium phosphate Powder (PHOS-NaK) 1 Packet(s) Oral every 4 hours  simvastatin 40 milliGRAM(s) Oral at bedtime  vancomycin  IVPB 750 milliGRAM(s) IV Intermittent every 12 hours                            8.1    13.75 )-----------( 378      ( 18 Jan 2021 05:14 )             25.3       Hemoglobin: 8.1 g/dL (01-18 @ 05:14)  Hemoglobin: 7.6 g/dL (01-17 @ 05:18)  Hemoglobin: 7.4 g/dL (01-16 @ 07:49)  Hemoglobin: 7.2 g/dL (01-15 @ 08:22)  Hemoglobin: 7.4 g/dL (01-14 @ 07:45)      01-18    145  |  118<H>  |  12  ----------------------------<  113<H>  3.9   |  17<L>  |  0.51    Ca    7.0<L>      18 Jan 2021 05:14  Phos  2.0     01-18  Mg     1.9     01-18    TPro  4.4<L>  /  Alb  1.3<L>  /  TBili  0.3  /  DBili  x   /  AST  9<L>  /  ALT  <6<L>  /  AlkPhos  46  01-18    Creatinine Trend: 0.51<--, 0.52<--, 0.58<--, 0.61<--, 0.69<--, 0.68<--    COAGS:           T(C): 36.2 (01-18-21 @ 05:06), Max: 36.6 (01-17-21 @ 20:14)  HR: 90 (01-18-21 @ 05:06) (90 - 93)  BP: 136/57 (01-18-21 @ 05:06) (135/70 - 137/52)  RR: 17 (01-18-21 @ 05:06) (17 - 17)  SpO2: 100% (01-18-21 @ 05:06) (99% - 100%)  Wt(kg): --    I&O's Summary      HEENT:  (-)icterus (-)pallor  CV: N S1 S2 1/6 ANGELIC (+)2 Pulses B/l  Resp:  Clear to ausculatation B/L, normal effort  GI: (+) BS Soft, NT, ND  Lymph:  (-)Edema, (-)obvious lymphadenopathy  Skin: Warm to touch, Normal turgor  Psych: Appropriate mood and affect      ASSESSMENT/PLAN: 	75y  Female  wheelchair bound with medical history significant of HTN, Diabetes, Osteoarthritis, Osteoporosis, Peripheral arterial disease, Diabetic neuropathy, HLD, Schizophrenia historically preserved LV and R function, comes in with worsening ulceration to b/l heels s/p debridement.    - s/p Partial calcanectomy of right foot   - cont asa and statin  - preop lower extremity amputation   - optimized from a cardiac prospective for potential lower extremity amputation  - No need for further inpatient cardiac work up.  - cont abx per primary team  - vascular f/u    Jm Cortes MD, [facility]  BEEPER [phone]

## 2021-01-18 NOTE — SWALLOW BEDSIDE ASSESSMENT ADULT - PHARYNGEAL PHASE
Delayed pharyngeal swallow cough and multiple swallows noted on cup sips only/Delayed pharyngeal swallow

## 2021-01-18 NOTE — SWALLOW BEDSIDE ASSESSMENT ADULT - ORAL PREPARATORY PHASE
Reduced oral aperture, bolus holding/delayed initiation of oral phase Reduced oral aperture, bolus holding/delayed initiation of oral phase/Anterior loss of bolus Reduced oral aperture, bolus holding/delayed initiation of oral phase, incomplete bolus formation/Decreased mastication ability Reduced oral aperture, bolus holding/delayed initiation of oral phase/Decreased mastication ability

## 2021-01-18 NOTE — SWALLOW BEDSIDE ASSESSMENT ADULT - ADDITIONAL RECOMMENDATIONS
Pt requested an oral supplement - d/w RD Leonila.  If poor PO intake continues, may consider alternative means of nutrition/hydration if in alignment with pt's/HCP's goals of care.

## 2021-01-18 NOTE — SWALLOW BEDSIDE ASSESSMENT ADULT - SPECIFY REASON(S)
Subjective assessment of current swallow function
Subjective assessment of current swallow function.

## 2021-01-18 NOTE — SWALLOW BEDSIDE ASSESSMENT ADULT - ASR SWALLOW LABIAL MOBILITY
impaired retraction/impaired pursing/impaired seal/impaired coordination
could not follow commands, but grossly WFL

## 2021-01-18 NOTE — PROGRESS NOTE ADULT - ASSESSMENT
A:  s/p R foot partial calcanectomy & wound debridement 1/13/21  Osteomyelitis of calcaneus R foot   DMII    P:   Patient evaluated and chart reviewed  Post-op R foot xrays results as noted above  Intra-op cx result reviewed as above  Intra-op path result reviewed as above  Dressed left foot heel with santyl, allevyn pad  Applied santyl, white foam, black foam and wound vac set at 125mmHg continuous flow to Right foot   Instructions for discharge: upon discharge, on Right heel wound apply santyl, white foam overlying exposed calcaneus on the right heel, and black foam overlying to cover the wound. Place wound vac over, and set at 125 mmHg. On the left heel, apply santyl to wound, cover with 4x4 gauze, ABD pad, Keyur overlying, and light ACE compression. Apply CAIR boots bilaterally.  Applied CAIR boots to b/l LE   Podiatry will follow while in house.   Discussed with attending

## 2021-01-18 NOTE — SWALLOW BEDSIDE ASSESSMENT ADULT - COMMENTS
HOB elevated to 60°. Pt is AA+Ox2, however, appears confused. Pt is edentulous and reports not having dentures. When asked why she doesn't eat much, the Pt stated that she is just not hungry. However, she requested an oral supplement. The Pt was also noted to gag after trials of pudding during this evaluation.

## 2021-01-18 NOTE — CHART NOTE - NSCHARTNOTEFT_GEN_A_CORE
EVENT: Phosphorus Level, Serum: 2.0 mg/dL (01.18.21 @ 05:14)    BRIEF HPI: 76 yo F from [facility], wheelchair bound with medical history significant of HTN, Diabetes, Osteoarthritis, Osteoporosis, Peripheral arterial disease, Diabetic neuropathy, HLD, Schizophrenia comes in with worsening ulceration to b/l heels. She has been having ulcers for past couple months, has been progressive. She was treated with IV antbx at NH but did not get better. It was worsening with necrotic changes and increasing foul smelling discharge.    Vital Signs Last 24 Hrs  T(C): 36.2 (18 Jan 2021 05:06), Max: 36.6 (17 Jan 2021 20:14)  T(F): 97.1 (18 Jan 2021 05:06), Max: 97.8 (17 Jan 2021 20:14)  HR: 90 (18 Jan 2021 05:06) (90 - 93)  BP: 136/57 (18 Jan 2021 05:06) (135/70 - 137/52)  BP(mean): --  RR: 17 (18 Jan 2021 05:06) (17 - 17)  SpO2: 100% (18 Jan 2021 05:06) (99% - 100%)    01-18    145  |  118<H>  |  12  ----------------------------<  113<H>  3.9   |  17<L>  |  0.51    Ca    7.0<L>      18 Jan 2021 05:14  Phos  2.0     01-18  Mg     1.9     01-18    TPro  4.4<L>  /  Alb  1.3<L>  /  TBili  0.3  /  DBili  x   /  AST  9<L>  /  ALT  <6<L>  /  AlkPhos  46  01-18    PROBLEM: Hypophosphatemia probably due to poor oral intake.    PLAN  1. Potassium phosphate / sodium phosphate Powder (PHOS-NaK) 1 Packet(s) Oral every 4 hours X 2 EVENT: Phosphorus Level, Serum: 2.0 mg/dL (01.18.21 @ 05:14)    BRIEF HPI: 76 yo F from [facility], wheelchair bound with medical history significant of HTN, Diabetes, Osteoarthritis, Osteoporosis, Peripheral arterial disease, Diabetic neuropathy, HLD, Schizophrenia comes in with worsening ulceration to b/l heels. She has been having ulcers for past couple months, has been progressive. She was treated with IV antbx at NH but did not get better. It was worsening with necrotic changes and increasing foul smelling discharge.    Vital Signs Last 24 Hrs  T(C): 36.2 (18 Jan 2021 05:06), Max: 36.6 (17 Jan 2021 20:14)  T(F): 97.1 (18 Jan 2021 05:06), Max: 97.8 (17 Jan 2021 20:14)  HR: 90 (18 Jan 2021 05:06) (90 - 93)  BP: 136/57 (18 Jan 2021 05:06) (135/70 - 137/52)  BP(mean): --  RR: 17 (18 Jan 2021 05:06) (17 - 17)  SpO2: 100% (18 Jan 2021 05:06) (99% - 100%)    01-18    145  |  118<H>  |  12  ----------------------------<  113<H>  3.9   |  17<L>  |  0.51    Ca    7.0<L>      18 Jan 2021 05:14  Phos  2.0     01-18  Mg     1.9     01-18    TPro  4.4<L>  /  Alb  1.3<L>  /  TBili  0.3  /  DBili  x   /  AST  9<L>  /  ALT  <6<L>  /  AlkPhos  46  01-18    PROBLEM: Hypophosphatemia probably due to poor oral intake.    PLAN  1. Potassium phosphate / sodium phosphate Powder (PHOS-NaK) 1 Packet(s) Oral every 4 hours X 2    dextrose 5% + sodium chloride 0.9% with potassium chloride 20 mEq/L 1000 milliLiter(s) (75 mL/Hr) IV Continuous <Continuous> EVENT: Phosphorus Level, Serum: 2.0 mg/dL (01.18.21 @ 05:14)    BRIEF HPI: 74 yo F from [facility], wheelchair bound with medical history significant of HTN, Diabetes, Osteoarthritis, Osteoporosis, Peripheral arterial disease, Diabetic neuropathy, HLD, Schizophrenia comes in with worsening ulceration to b/l heels. She has been having ulcers for past couple months, has been progressive. She was treated with IV antbx at NH but did not get better. It was worsening with necrotic changes and increasing foul smelling discharge.    Vital Signs Last 24 Hrs  T(C): 36.2 (18 Jan 2021 05:06), Max: 36.6 (17 Jan 2021 20:14)  T(F): 97.1 (18 Jan 2021 05:06), Max: 97.8 (17 Jan 2021 20:14)  HR: 90 (18 Jan 2021 05:06) (90 - 93)  BP: 136/57 (18 Jan 2021 05:06) (135/70 - 137/52)  BP(mean): --  RR: 17 (18 Jan 2021 05:06) (17 - 17)  SpO2: 100% (18 Jan 2021 05:06) (99% - 100%)    01-18    145  |  118<H>  |  12  ----------------------------<  113<H>  3.9   |  17<L>  |  0.51    Ca    7.0<L>      18 Jan 2021 05:14  Phos  2.0     01-18  Mg     1.9     01-18    TPro  4.4<L>  /  Alb  1.3<L>  /  TBili  0.3  /  DBili  x   /  AST  9<L>  /  ALT  <6<L>  /  AlkPhos  46  01-18    PROBLEM: Hypophosphatemia probably due to poor oral intake.    PLAN  1. Potassium phosphate / sodium phosphate Powder (PHOS-NaK) 1 Packet(s) Oral every 4 hours X 2  2, Cont dextrose 5% + sodium chloride 0.9% with potassium chloride 20 mEq/L 1000 milliliter(s) (75 mL/Hr) IV Continuous <Continuous>

## 2021-01-18 NOTE — SWALLOW BEDSIDE ASSESSMENT ADULT - SLP PERTINENT HISTORY OF CURRENT PROBLEM
76 y/o female from [facility] admitted on 1/5 for osteomyelitis. PMHx significant for COVID-19, DM, HTN, HLD, and schizophrenia. Pt was initially seen by Speech on 1/11, with recommendation for NPO. Pt's diet was subsequently advanced by medical team to Soft solids with no liquids. However, as per RN, the Pt has been accepting minimal puree feeds only.

## 2021-01-18 NOTE — SWALLOW BEDSIDE ASSESSMENT ADULT - ASR SWALLOW LINGUAL MOBILITY
impaired protrusion/impaired anterior elevation/impaired left lateral movement/impaired right lateral movement
could not follow commands, but grossly WFL

## 2021-01-19 LAB
ANION GAP SERPL CALC-SCNC: 10 MMOL/L — SIGNIFICANT CHANGE UP (ref 5–17)
ANISOCYTOSIS BLD QL: SLIGHT — SIGNIFICANT CHANGE UP
BASOPHILS # BLD AUTO: 0.03 K/UL — SIGNIFICANT CHANGE UP (ref 0–0.2)
BASOPHILS NFR BLD AUTO: 0.2 % — SIGNIFICANT CHANGE UP (ref 0–2)
BUN SERPL-MCNC: 10 MG/DL — SIGNIFICANT CHANGE UP (ref 7–18)
BURR CELLS BLD QL SMEAR: SLIGHT — SIGNIFICANT CHANGE UP
CALCIUM SERPL-MCNC: 7.1 MG/DL — LOW (ref 8.4–10.5)
CHLORIDE SERPL-SCNC: 119 MMOL/L — HIGH (ref 96–108)
CO2 SERPL-SCNC: 15 MMOL/L — LOW (ref 22–31)
CREAT SERPL-MCNC: 0.55 MG/DL — SIGNIFICANT CHANGE UP (ref 0.5–1.3)
ELLIPTOCYTES BLD QL SMEAR: SLIGHT — SIGNIFICANT CHANGE UP
EOSINOPHIL # BLD AUTO: 0.08 K/UL — SIGNIFICANT CHANGE UP (ref 0–0.5)
EOSINOPHIL NFR BLD AUTO: 0.6 % — SIGNIFICANT CHANGE UP (ref 0–6)
GLUCOSE BLDC GLUCOMTR-MCNC: 144 MG/DL — HIGH (ref 70–99)
GLUCOSE BLDC GLUCOMTR-MCNC: 164 MG/DL — HIGH (ref 70–99)
GLUCOSE BLDC GLUCOMTR-MCNC: 169 MG/DL — HIGH (ref 70–99)
GLUCOSE BLDC GLUCOMTR-MCNC: 195 MG/DL — HIGH (ref 70–99)
GLUCOSE SERPL-MCNC: 198 MG/DL — HIGH (ref 70–99)
HCT VFR BLD CALC: 23.5 % — LOW (ref 34.5–45)
HGB BLD-MCNC: 7.4 G/DL — LOW (ref 11.5–15.5)
IMM GRANULOCYTES NFR BLD AUTO: 2.9 % — HIGH (ref 0–1.5)
LYMPHOCYTES # BLD AUTO: 0.92 K/UL — LOW (ref 1–3.3)
LYMPHOCYTES # BLD AUTO: 6.6 % — LOW (ref 13–44)
MACROCYTES BLD QL: SLIGHT — SIGNIFICANT CHANGE UP
MANUAL SMEAR VERIFICATION: SIGNIFICANT CHANGE UP
MCHC RBC-ENTMCNC: 30.1 PG — SIGNIFICANT CHANGE UP (ref 27–34)
MCHC RBC-ENTMCNC: 31.5 GM/DL — LOW (ref 32–36)
MCV RBC AUTO: 95.5 FL — SIGNIFICANT CHANGE UP (ref 80–100)
MONOCYTES # BLD AUTO: 1.31 K/UL — HIGH (ref 0–0.9)
MONOCYTES NFR BLD AUTO: 9.4 % — SIGNIFICANT CHANGE UP (ref 2–14)
NEUTROPHILS # BLD AUTO: 11.18 K/UL — HIGH (ref 1.8–7.4)
NEUTROPHILS NFR BLD AUTO: 80.3 % — HIGH (ref 43–77)
NRBC # BLD: 0 /100 WBCS — SIGNIFICANT CHANGE UP (ref 0–0)
PLAT MORPH BLD: NORMAL — SIGNIFICANT CHANGE UP
PLATELET # BLD AUTO: 357 K/UL — SIGNIFICANT CHANGE UP (ref 150–400)
POIKILOCYTOSIS BLD QL AUTO: SLIGHT — SIGNIFICANT CHANGE UP
POTASSIUM SERPL-MCNC: 4.5 MMOL/L — SIGNIFICANT CHANGE UP (ref 3.5–5.3)
POTASSIUM SERPL-SCNC: 4.5 MMOL/L — SIGNIFICANT CHANGE UP (ref 3.5–5.3)
RBC # BLD: 2.46 M/UL — LOW (ref 3.8–5.2)
RBC # FLD: 15.3 % — HIGH (ref 10.3–14.5)
RBC BLD AUTO: ABNORMAL
SODIUM SERPL-SCNC: 144 MMOL/L — SIGNIFICANT CHANGE UP (ref 135–145)
VANCOMYCIN TROUGH SERPL-MCNC: 28.4 UG/ML — CRITICAL HIGH (ref 10–20)
WBC # BLD: 13.92 K/UL — HIGH (ref 3.8–10.5)
WBC # FLD AUTO: 13.92 K/UL — HIGH (ref 3.8–10.5)

## 2021-01-19 RX ADMIN — Medication 1 APPLICATION(S): at 11:17

## 2021-01-19 RX ADMIN — Medication 100 MILLIGRAM(S): at 04:03

## 2021-01-19 RX ADMIN — DEXTROSE MONOHYDRATE, SODIUM CHLORIDE, AND POTASSIUM CHLORIDE 75 MILLILITER(S): 50; .745; 4.5 INJECTION, SOLUTION INTRAVENOUS at 08:28

## 2021-01-19 RX ADMIN — Medication 1: at 08:27

## 2021-01-19 RX ADMIN — CEFEPIME 100 MILLIGRAM(S): 1 INJECTION, POWDER, FOR SOLUTION INTRAMUSCULAR; INTRAVENOUS at 13:12

## 2021-01-19 RX ADMIN — Medication 100 MILLIGRAM(S): at 13:11

## 2021-01-19 RX ADMIN — CEFEPIME 100 MILLIGRAM(S): 1 INJECTION, POWDER, FOR SOLUTION INTRAMUSCULAR; INTRAVENOUS at 04:03

## 2021-01-19 RX ADMIN — Medication 1: at 17:15

## 2021-01-19 RX ADMIN — Medication 81 MILLIGRAM(S): at 11:17

## 2021-01-19 RX ADMIN — Medication 250 MILLIGRAM(S): at 05:53

## 2021-01-19 RX ADMIN — CEFEPIME 100 MILLIGRAM(S): 1 INJECTION, POWDER, FOR SOLUTION INTRAMUSCULAR; INTRAVENOUS at 21:03

## 2021-01-19 RX ADMIN — Medication 100 MILLIGRAM(S): at 21:03

## 2021-01-19 RX ADMIN — Medication 1: at 12:00

## 2021-01-19 RX ADMIN — SIMVASTATIN 40 MILLIGRAM(S): 20 TABLET, FILM COATED ORAL at 21:03

## 2021-01-19 RX ADMIN — ENOXAPARIN SODIUM 40 MILLIGRAM(S): 100 INJECTION SUBCUTANEOUS at 11:17

## 2021-01-19 NOTE — PROGRESS NOTE ADULT - SUBJECTIVE AND OBJECTIVE BOX
Patient is a 75y old  Female who presents with a chief complaint of foot ulcer (19 Jan 2021 11:43)    PATIENT IS SEEN AND EXAMINED IN MEDICAL FLOOR.    ALLERGIES:  No Known Allergies    VITALS:    Vital Signs Last 24 Hrs  T(C): 36.6 (19 Jan 2021 12:45), Max: 36.7 (18 Jan 2021 20:20)  T(F): 97.9 (19 Jan 2021 12:45), Max: 98.1 (18 Jan 2021 20:20)  HR: 85 (19 Jan 2021 12:45) (85 - 94)  BP: 117/50 (19 Jan 2021 12:45) (117/50 - 138/61)  BP(mean): --  RR: 18 (19 Jan 2021 12:45) (16 - 18)  SpO2: 100% (19 Jan 2021 12:45) (100% - 100%)    LABS:    CBC Full  -  ( 19 Jan 2021 07:32 )  WBC Count : 13.92 K/uL  RBC Count : 2.46 M/uL  Hemoglobin : 7.4 g/dL  Hematocrit : 23.5 %  Platelet Count - Automated : 357 K/uL  Mean Cell Volume : 95.5 fl  Mean Cell Hemoglobin : 30.1 pg  Mean Cell Hemoglobin Concentration : 31.5 gm/dL  Auto Neutrophil # : 11.18 K/uL  Auto Lymphocyte # : 0.92 K/uL  Auto Monocyte # : 1.31 K/uL  Auto Eosinophil # : 0.08 K/uL  Auto Basophil # : 0.03 K/uL  Auto Neutrophil % : 80.3 %  Auto Lymphocyte % : 6.6 %  Auto Monocyte % : 9.4 %  Auto Eosinophil % : 0.6 %  Auto Basophil % : 0.2 %      01-19    144  |  119<H>  |  10  ----------------------------<  198<H>  4.5   |  15<L>  |  0.55    Ca    7.1<L>      19 Jan 2021 07:32  Phos  2.0     01-18  Mg     1.9     01-18    TPro  4.4<L>  /  Alb  1.3<L>  /  TBili  0.3  /  DBili  x   /  AST  9<L>  /  ALT  <6<L>  /  AlkPhos  46  01-18    CAPILLARY BLOOD GLUCOSE      POCT Blood Glucose.: 164 mg/dL (19 Jan 2021 16:57)  POCT Blood Glucose.: 169 mg/dL (19 Jan 2021 11:45)  POCT Blood Glucose.: 195 mg/dL (19 Jan 2021 07:45)  POCT Blood Glucose.: 155 mg/dL (18 Jan 2021 21:18)      LIVER FUNCTIONS - ( 18 Jan 2021 05:14 )  Alb: 1.3 g/dL / Pro: 4.4 g/dL / ALK PHOS: 46 U/L / ALT: <6 U/L DA / AST: 9 U/L / GGT: x           Creatinine Trend: 0.55<--, 0.51<--, 0.52<--, 0.58<--, 0.61<--, 0.69<--  I&O's Summary    18 Jan 2021 07:01  -  19 Jan 2021 07:00  --------------------------------------------------------  IN: 0 mL / OUT: 550 mL / NET: -550 mL      .Tissue Right heel margin  01-13 @ 19:31   Rare Enterococcus faecalis  --  Enterococcus faecalis      .Surgical Swab Right heel wound culture  01-13 @ 19:22   Moderate Proteus mirabilis  Few Bacteroides vulgatus group "Susceptibilities not performed"  --  Proteus mirabilis      .Urine Clean Catch (Midstream)  01-06 @ 06:46   No growth  --  --      .Surgical Swab Right heel wound  01-05 @ 22:13   Few Morganella morganii  Few Proteus mirabilis  Moderate Corynebacterium species "Susceptibilities not performed"  Moderate Bacteroides vulgatus "Susceptibilities not performed"  --  Morganella morganii  Proteus mirabilis      .Blood Blood-Peripheral  01-05 @ 17:59   No Growth Final  --  --      MEDICATIONS:    MEDICATIONS  (STANDING):  aspirin enteric coated 81 milliGRAM(s) Oral daily  cefepime   IVPB 1000 milliGRAM(s) IV Intermittent every 8 hours  collagenase Ointment 1 Application(s) Topical daily  Dakins Solution - Full Strength 1 Application(s) Topical once  dextrose 40% Gel 15 Gram(s) Oral once  dextrose 5% + sodium chloride 0.9% with potassium chloride 20 mEq/L 1000 milliLiter(s) (75 mL/Hr) IV Continuous <Continuous>  dextrose 50% Injectable 25 Gram(s) IV Push once  dextrose 50% Injectable 12.5 Gram(s) IV Push once  dextrose 50% Injectable 25 Gram(s) IV Push once  enoxaparin Injectable 40 milliGRAM(s) SubCutaneous daily  glucagon  Injectable 1 milliGRAM(s) IntraMuscular once  insulin lispro (ADMELOG) corrective regimen sliding scale   SubCutaneous three times a day before meals  insulin lispro (ADMELOG) corrective regimen sliding scale   SubCutaneous at bedtime  metroNIDAZOLE  IVPB 500 milliGRAM(s) IV Intermittent every 8 hours  simvastatin 40 milliGRAM(s) Oral at bedtime  vancomycin  IVPB 750 milliGRAM(s) IV Intermittent every 12 hours      MEDICATIONS  (PRN):  acetaminophen   Tablet .. 650 milliGRAM(s) Oral every 6 hours PRN Moderate Pain (4 - 6)      REVIEW OF SYSTEMS:                           ALL ROS DONE [ X   ]    CONSTITUTIONAL:  LETHARGIC [   ], FEVER [   ], UNRESPONSIVE [   ]  CVS:  CP  [   ], SOB, [   ], PALPITATIONS [   ], DIZZYNESS [   ]  RS: COUGH [   ], SPUTUM [   ]  GI: ABDOMINAL PAIN [   ], NAUSEA [   ], VOMITINGS [   ], DIARRHEA [   ], CONSTIPATION [   ]  :  DYSURIA [   ], NOCTURIA [   ], INCREASED FREQUENCY [   ], DRIBLING [   ],  SKELETAL: PAINFUL JOINTS [   ], SWOLLEN JOINTS [   ], NECK ACHE [   ], LOW BACK ACHE [   ],  SKIN : ULCERS [   ], RASH [   ], ITCHING [   ]  CNS: HEAD ACHE [   ], DOUBLE VISION [   ], BLURRED VISION [   ], AMS / CONFUSION [   ], SEIZURES [   ], WEAKNESS [   ],TINGLING / NUMBNESS [   ]    PHYSICAL EXAMINATION:  GENERAL APPEARANCE: NO DISTRESS  HEENT:  NO PALLOR, NO  JVD,  NO   NODES, NECK SUPPLE  CVS: S1 +, S2 +,   RS: AEEB,  OCCASIONAL  RALES +,   NO RONCHI  ABD: SOFT, NT, NO, BS +  EXT: NO PE  SKIN: WARM, BILATERAL HEEL ULCERS - COVERED, SACRAL ULCER + RIGHT ISCHIAL ULCER COVERED  SKELETAL:  ROM ACCEPTABLE, LEFT ARM SWELLING  CNS:  AAO X 2-3     RADIOLOGY :    EXAM:  MR FOOT RT                            PROCEDURE DATE:  01/06/2021          INTERPRETATION:  Clinical indications: Right heel ulceration and eschar status post debridement. Concern for osteomyelitis.    Multiplanar multisequence MRI of the right foot was performed localized to the hindfoot.    Correlation is made with prior MRI from September 11, 2019.    FINDINGS:    There is a large wound along the plantar aspect of the calcaneus posteriorly which extends nearly extends to bone. There is surrounding soft tissue edema about this site with evidence of an overlying bandage. The degree of soft tissue deficiency is increased compared to the prior examination. There is extensive osseous edema and corresponding hypointense T1 marrow signalthroughout the calcaneus extending from the posterior plantar margin to the level of the angle of Gissane. This is progressed compared to the prior examination and consistent with osteomyelitis. Marrow signal within the remainder of the foot is otherwise preserved.    There is confluent edema tracking along the abductor hallucis and flexor digitorum brevis muscles and within the plantar subcutaneous fat subjacent to this region. In total this area measures approximately 2.2 cm in transverse dimension, approximately 4 cm in anteroposterior posterior dimension, and approximately 1.6 cm in craniocaudal dimension. Findings may be related to underlying phlegmon or abscess. Evaluation is limited by the lack of intravenous contrast. Distal to this site there is edema throughout the visualized musculature consistent with myositis.    Visualized tendinous structures remain intact. There is no acute ligamentous injury. Visualized joint spaces are preserved without joint effusion.    IMPRESSION:    Osteomyelitis involving the calcaneus which extends from the plantar posterior aspect of the calcaneus to the level of the angle of Gissane. This is progressed compared to the prior examination. This is seen in the setting of diffuse soft tissue deficiency along the posterior plantar aspect of the calcaneus.    Confluent edema adjacent to this region within the abductor hallucis and flexor digitorum brevis muscles and within the subjacent plantar subcutaneous fat. This may be related to abscess orphlegmon.    ASSESSMENT :     Osteomyelitis    Yes    COVID-19    Osteomyelitis    DM (diabetes mellitus)    Paranoid schizophrenia    HLD (hyperlipidemia)    PAD (peripheral artery disease)    HTN (hypertension)            PLAN:  HPI:  76 yo F from [facility], wheelchair bound with medical history significant of HTN, Diabetes, Osteoarthritis, Osteoporosis, Peripheral arterial disease, Diabetic neuropathy, HLD, Schizophrenia comes in with worsening ulceration to b/l heels. She has been having ulcers for past couple months, has been progressive. She was treated with IV antbx at NH but did not get better. It was worsening with necrotic changes and increasing foul smelling discharge. She denies fever, abdominal pain, chest pain, urinary symptoms, fall, trauma. No h/o nausea, vomiting, diarrhea, cough, dyspnea, sick contacts, recent illness.  (05 Jan 2021 14:24)    PLAN:    #  RESOLVING METABOLIC ENCEPHALOPATHY - PATIENT HAS POOR PO INTAKE, ST EVAL RECOMMENDED SOFT DIET, STARTED ON IVF WITH IMPROVING MENTAL STATUS   # BILATERAL LE CHRONIC ULCER NOW PROGRESSIVELY WORSENING W/ SSTI AND RIGHT CALCANEAL OSTEOMYELITIS W/ POSSIBLE ABSCESS; UNDERWENT BEDSIDE DEBRIDEMENT OF RIGHT LEG ULCER AND UNDERWENT RIGHT PARTIAL CALCANECTOMY ON 1/13  - CEFEPIME + VANOMYCIN + FLAGYL, REVIEWED TIMUR, BCX - NGTD, WOUND CX - KOBE NORMAN & ALEXANDER PEREZ, ID CONSULT IN PROGRESS, PODIATRY CONSULT IN PROGRESS  - REVIEWED RIGHT LE MRI  -  PSYCHIATRY CONSULT FOR CAPACITY IN PROGRESS - DEEM PATIENT AS HAVING CAPACITY.  PATIENT WAS AGREEABLE FOR SURGICAL DEBRIDEMENT ON 1/09  - 2 PC CONSENT FOR SURGICAL DEBRIDEMENT PLANNED FOR 1/12; ATTEMPTED TO CALL NEXT OF [facility] @ [phone] HOWEVER PHONE NUMBER APPEARS DISCONNECTED  - VASCULAR SX CONSULT IN PROGRESS - PATIENT MAY REQUIRE AKA, BUT REFUSED ON PREVIOUS CONVERSATIONS - WAS AGREEABLE TO LOCAL DEBRIDEMENT/INTERVENTIONS  - PLAN FOR WOUND VAC  - PATIENT WILL NEED 6 WEEKS OF IV ANTIBIOTICS - WILL NEED TO SWITCH TO PICC LINE FROM EXTENDED DWELL    # MEDICAL CLEARANCE FOR SURGERY - RCRI 1 POINT - 6% RISK OF DEATH, MI OR CARDIAC ARREST; CARDIOLOGY CONSULT IN PROGRESS FOR MEDICAL CLEARANCE    # SACRAL AND RIGHT ISCHIAL UNSTAGEABLE ULCERS W/ SSTI  S/P DEBRIDEMENT 1/8- ON VANCOMYCIN AND CEFEPIME, SURGERY CONSULT IN PROGRESS -  PSYCHIATRY CONSULT FOR CAPACITY IN PROGRESS    # ACUTE METABOLIC ENCEPHALOPATHY - SUSPECT S/T ACUTE INFECTION & HYPERNATREMIA - REVIEWED CT HEAD  - F/U ST EVAL AS RECENTLY MADE NPO BY SPEECH    # HYPERNATREMIA SUSPECT S/T POOR PO INTAKE - PLACED ON IVF    # LEFT ARM SWELLING - REVIEWED LEFT ARM U/S W/ OUT THROMBUS, WARM COMPRESSES  # ASHLEY - RESOLVED  # PAD  # HTN  # DM W/ DIABETIC NEUROPATHY  # OA/OP  # SCHIZOPHRENIA - HOLD MEDICATION  # CASE D/W PATIENT'S ? PARTNER - LISA KELLY - WHO REPORTS HE IS NOT NEXT OF [facility] OR HCP - [phone] ON 1/12; HE UNDERSTOOD PATIENT'S PROGNOSIS WAS GUARDED  # GI AND DVT PPX    ELEN CASILLAS MD COVERING FARHAN CASILLAS MD   Patient is a 75y old  Female who presents with a chief complaint of foot ulcer (19 Jan 2021 11:43)    PATIENT IS SEEN AND EXAMINED IN MEDICAL FLOOR.    ALLERGIES:  No Known Allergies    VITALS:    Vital Signs Last 24 Hrs  T(C): 36.6 (19 Jan 2021 12:45), Max: 36.7 (18 Jan 2021 20:20)  T(F): 97.9 (19 Jan 2021 12:45), Max: 98.1 (18 Jan 2021 20:20)  HR: 85 (19 Jan 2021 12:45) (85 - 94)  BP: 117/50 (19 Jan 2021 12:45) (117/50 - 138/61)  BP(mean): --  RR: 18 (19 Jan 2021 12:45) (16 - 18)  SpO2: 100% (19 Jan 2021 12:45) (100% - 100%)    LABS:    CBC Full  -  ( 19 Jan 2021 07:32 )  WBC Count : 13.92 K/uL  RBC Count : 2.46 M/uL  Hemoglobin : 7.4 g/dL  Hematocrit : 23.5 %  Platelet Count - Automated : 357 K/uL  Mean Cell Volume : 95.5 fl  Mean Cell Hemoglobin : 30.1 pg  Mean Cell Hemoglobin Concentration : 31.5 gm/dL  Auto Neutrophil # : 11.18 K/uL  Auto Lymphocyte # : 0.92 K/uL  Auto Monocyte # : 1.31 K/uL  Auto Eosinophil # : 0.08 K/uL  Auto Basophil # : 0.03 K/uL  Auto Neutrophil % : 80.3 %  Auto Lymphocyte % : 6.6 %  Auto Monocyte % : 9.4 %  Auto Eosinophil % : 0.6 %  Auto Basophil % : 0.2 %      01-19    144  |  119<H>  |  10  ----------------------------<  198<H>  4.5   |  15<L>  |  0.55    Ca    7.1<L>      19 Jan 2021 07:32  Phos  2.0     01-18  Mg     1.9     01-18    TPro  4.4<L>  /  Alb  1.3<L>  /  TBili  0.3  /  DBili  x   /  AST  9<L>  /  ALT  <6<L>  /  AlkPhos  46  01-18    CAPILLARY BLOOD GLUCOSE      POCT Blood Glucose.: 164 mg/dL (19 Jan 2021 16:57)  POCT Blood Glucose.: 169 mg/dL (19 Jan 2021 11:45)  POCT Blood Glucose.: 195 mg/dL (19 Jan 2021 07:45)  POCT Blood Glucose.: 155 mg/dL (18 Jan 2021 21:18)      LIVER FUNCTIONS - ( 18 Jan 2021 05:14 )  Alb: 1.3 g/dL / Pro: 4.4 g/dL / ALK PHOS: 46 U/L / ALT: <6 U/L DA / AST: 9 U/L / GGT: x           Creatinine Trend: 0.55<--, 0.51<--, 0.52<--, 0.58<--, 0.61<--, 0.69<--  I&O's Summary    18 Jan 2021 07:01  -  19 Jan 2021 07:00  --------------------------------------------------------  IN: 0 mL / OUT: 550 mL / NET: -550 mL      .Tissue Right heel margin  01-13 @ 19:31   Rare Enterococcus faecalis  --  Enterococcus faecalis      .Surgical Swab Right heel wound culture  01-13 @ 19:22   Moderate Proteus mirabilis  Few Bacteroides vulgatus group "Susceptibilities not performed"  --  Proteus mirabilis      .Urine Clean Catch (Midstream)  01-06 @ 06:46   No growth  --  --      .Surgical Swab Right heel wound  01-05 @ 22:13   Few Morganella morganii  Few Proteus mirabilis  Moderate Corynebacterium species "Susceptibilities not performed"  Moderate Bacteroides vulgatus "Susceptibilities not performed"  --  Morganella morganii  Proteus mirabilis      .Blood Blood-Peripheral  01-05 @ 17:59   No Growth Final  --  --      MEDICATIONS:    MEDICATIONS  (STANDING):  aspirin enteric coated 81 milliGRAM(s) Oral daily  cefepime   IVPB 1000 milliGRAM(s) IV Intermittent every 8 hours  collagenase Ointment 1 Application(s) Topical daily  Dakins Solution - Full Strength 1 Application(s) Topical once  dextrose 40% Gel 15 Gram(s) Oral once  dextrose 5% + sodium chloride 0.9% with potassium chloride 20 mEq/L 1000 milliLiter(s) (75 mL/Hr) IV Continuous <Continuous>  dextrose 50% Injectable 25 Gram(s) IV Push once  dextrose 50% Injectable 12.5 Gram(s) IV Push once  dextrose 50% Injectable 25 Gram(s) IV Push once  enoxaparin Injectable 40 milliGRAM(s) SubCutaneous daily  glucagon  Injectable 1 milliGRAM(s) IntraMuscular once  insulin lispro (ADMELOG) corrective regimen sliding scale   SubCutaneous three times a day before meals  insulin lispro (ADMELOG) corrective regimen sliding scale   SubCutaneous at bedtime  metroNIDAZOLE  IVPB 500 milliGRAM(s) IV Intermittent every 8 hours  simvastatin 40 milliGRAM(s) Oral at bedtime  vancomycin  IVPB 750 milliGRAM(s) IV Intermittent every 12 hours      MEDICATIONS  (PRN):  acetaminophen   Tablet .. 650 milliGRAM(s) Oral every 6 hours PRN Moderate Pain (4 - 6)      REVIEW OF SYSTEMS:                           ALL ROS DONE [ X   ]    CONSTITUTIONAL:  LETHARGIC [   ], FEVER [   ], UNRESPONSIVE [   ]  CVS:  CP  [   ], SOB, [   ], PALPITATIONS [   ], DIZZYNESS [   ]  RS: COUGH [   ], SPUTUM [   ]  GI: ABDOMINAL PAIN [   ], NAUSEA [   ], VOMITINGS [   ], DIARRHEA [   ], CONSTIPATION [   ]  :  DYSURIA [   ], NOCTURIA [   ], INCREASED FREQUENCY [   ], DRIBLING [   ],  SKELETAL: PAINFUL JOINTS [   ], SWOLLEN JOINTS [   ], NECK ACHE [   ], LOW BACK ACHE [   ],  SKIN : ULCERS [   ], RASH [   ], ITCHING [   ]  CNS: HEAD ACHE [   ], DOUBLE VISION [   ], BLURRED VISION [   ], AMS / CONFUSION [   ], SEIZURES [   ], WEAKNESS [   ],TINGLING / NUMBNESS [   ]    PHYSICAL EXAMINATION:  GENERAL APPEARANCE: NO DISTRESS  HEENT:  NO PALLOR, NO  JVD,  NO   NODES, NECK SUPPLE  CVS: S1 +, S2 +,   RS: AEEB,  OCCASIONAL  RALES +,   NO RONCHI  ABD: SOFT, NT, NO, BS +  EXT: NO PE  SKIN: WARM, BILATERAL HEEL ULCERS - COVERED, SACRAL ULCER + RIGHT ISCHIAL ULCER COVERED  SKELETAL:  ROM ACCEPTABLE, LEFT ARM SWELLING  CNS:  AAO X 2-3     RADIOLOGY :    EXAM:  MR FOOT RT                            PROCEDURE DATE:  01/06/2021          INTERPRETATION:  Clinical indications: Right heel ulceration and eschar status post debridement. Concern for osteomyelitis.    Multiplanar multisequence MRI of the right foot was performed localized to the hindfoot.    Correlation is made with prior MRI from September 11, 2019.    FINDINGS:    There is a large wound along the plantar aspect of the calcaneus posteriorly which extends nearly extends to bone. There is surrounding soft tissue edema about this site with evidence of an overlying bandage. The degree of soft tissue deficiency is increased compared to the prior examination. There is extensive osseous edema and corresponding hypointense T1 marrow signalthroughout the calcaneus extending from the posterior plantar margin to the level of the angle of Gissane. This is progressed compared to the prior examination and consistent with osteomyelitis. Marrow signal within the remainder of the foot is otherwise preserved.    There is confluent edema tracking along the abductor hallucis and flexor digitorum brevis muscles and within the plantar subcutaneous fat subjacent to this region. In total this area measures approximately 2.2 cm in transverse dimension, approximately 4 cm in anteroposterior posterior dimension, and approximately 1.6 cm in craniocaudal dimension. Findings may be related to underlying phlegmon or abscess. Evaluation is limited by the lack of intravenous contrast. Distal to this site there is edema throughout the visualized musculature consistent with myositis.    Visualized tendinous structures remain intact. There is no acute ligamentous injury. Visualized joint spaces are preserved without joint effusion.    IMPRESSION:    Osteomyelitis involving the calcaneus which extends from the plantar posterior aspect of the calcaneus to the level of the angle of Gissane. This is progressed compared to the prior examination. This is seen in the setting of diffuse soft tissue deficiency along the posterior plantar aspect of the calcaneus.    Confluent edema adjacent to this region within the abductor hallucis and flexor digitorum brevis muscles and within the subjacent plantar subcutaneous fat. This may be related to abscess orphlegmon.    ASSESSMENT :     Osteomyelitis    Yes    COVID-19    Osteomyelitis    DM (diabetes mellitus)    Paranoid schizophrenia    HLD (hyperlipidemia)    PAD (peripheral artery disease)    HTN (hypertension)            PLAN:  HPI:  76 yo F from [facility], wheelchair bound with medical history significant of HTN, Diabetes, Osteoarthritis, Osteoporosis, Peripheral arterial disease, Diabetic neuropathy, HLD, Schizophrenia comes in with worsening ulceration to b/l heels. She has been having ulcers for past couple months, has been progressive. She was treated with IV antbx at NH but did not get better. It was worsening with necrotic changes and increasing foul smelling discharge. She denies fever, abdominal pain, chest pain, urinary symptoms, fall, trauma. No h/o nausea, vomiting, diarrhea, cough, dyspnea, sick contacts, recent illness.  (05 Jan 2021 14:24)    PLAN:    #  RESOLVING METABOLIC ENCEPHALOPATHY - PATIENT HAS POOR PO INTAKE, ST EVAL RECOMMENDED SOFT DIET, STARTED ON IVF WITH IMPROVING MENTAL STATUS   # BILATERAL LE CHRONIC ULCER NOW PROGRESSIVELY WORSENING W/ SSTI AND RIGHT CALCANEAL OSTEOMYELITIS W/ POSSIBLE ABSCESS; UNDERWENT BEDSIDE DEBRIDEMENT OF RIGHT LEG ULCER AND UNDERWENT RIGHT PARTIAL CALCANECTOMY ON 1/13  - CEFEPIME + VANOMYCIN + FLAGYL, REVIEWED TIMUR, BCX - NGTD, WOUND CX - KOBE NORMAN & ALEXANDER PEREZ, ID CONSULT IN PROGRESS, PODIATRY CONSULT IN PROGRESS  - REVIEWED RIGHT LE MRI  -  PSYCHIATRY CONSULT FOR CAPACITY IN PROGRESS - DEEM PATIENT AS HAVING CAPACITY.  PATIENT WAS AGREEABLE FOR SURGICAL DEBRIDEMENT ON 1/09  - 2 PC CONSENT FOR SURGICAL DEBRIDEMENT PLANNED FOR 1/12; ATTEMPTED TO CALL NEXT OF [facility] @ [phone] HOWEVER PHONE NUMBER APPEARS DISCONNECTED  - VASCULAR SX CONSULT IN PROGRESS - PATIENT MAY REQUIRE AKA, BUT REFUSED ON PREVIOUS CONVERSATIONS - WAS AGREEABLE TO LOCAL DEBRIDEMENT/INTERVENTIONS  - PLAN FOR WOUND VAC  - PATIENT WILL NEED 6 WEEKS OF IV ANTIBIOTICS - WILL NEED TO SWITCH TO PICC LINE FROM EXTENDED DWELL - D/W FLOOR TEAM    # MEDICAL CLEARANCE FOR SURGERY - RCRI 1 POINT - 6% RISK OF DEATH, MI OR CARDIAC ARREST; CARDIOLOGY CONSULT IN PROGRESS FOR MEDICAL CLEARANCE    # SACRAL AND RIGHT ISCHIAL UNSTAGEABLE ULCERS W/ SSTI  S/P DEBRIDEMENT 1/8- ON VANCOMYCIN AND CEFEPIME, SURGERY CONSULT IN PROGRESS -  PSYCHIATRY CONSULT FOR CAPACITY IN PROGRESS    # ACUTE METABOLIC ENCEPHALOPATHY - SUSPECT S/T ACUTE INFECTION & HYPERNATREMIA - REVIEWED CT HEAD  - F/U ST EVAL AS RECENTLY MADE NPO BY SPEECH    # HYPERNATREMIA SUSPECT S/T POOR PO INTAKE - PLACED ON IVF    # LEFT ARM SWELLING - REVIEWED LEFT ARM U/S W/ OUT THROMBUS, WARM COMPRESSES  # ASHLEY - RESOLVED  # PAD  # HTN  # DM W/ DIABETIC NEUROPATHY  # OA/OP  # SCHIZOPHRENIA - HOLD MEDICATION  # CASE D/W PATIENT'S ? PARTNER - LISA KELLY - WHO REPORTS HE IS NOT NEXT OF [facility] OR HCP - [phone] ON 1/12; HE UNDERSTOOD PATIENT'S PROGNOSIS WAS GUARDED  # GI AND DVT PPX    ELEN CASILLAS MD COVERING FARHAN CASILLAS MD

## 2021-01-19 NOTE — PROGRESS NOTE ADULT - SUBJECTIVE AND OBJECTIVE BOX
Patient denies chest pain or shortness of breath.  Review of systems otherwise (-)  	  acetaminophen   Tablet .. 650 milliGRAM(s) Oral every 6 hours PRN  aspirin enteric coated 81 milliGRAM(s) Oral daily  cefepime   IVPB 1000 milliGRAM(s) IV Intermittent every 8 hours  collagenase Ointment 1 Application(s) Topical daily  Dakins Solution - Full Strength 1 Application(s) Topical once  dextrose 40% Gel 15 Gram(s) Oral once  dextrose 5% + sodium chloride 0.9% with potassium chloride 20 mEq/L 1000 milliLiter(s) IV Continuous <Continuous>  dextrose 50% Injectable 25 Gram(s) IV Push once  dextrose 50% Injectable 12.5 Gram(s) IV Push once  dextrose 50% Injectable 25 Gram(s) IV Push once  enoxaparin Injectable 40 milliGRAM(s) SubCutaneous daily  glucagon  Injectable 1 milliGRAM(s) IntraMuscular once  insulin lispro (ADMELOG) corrective regimen sliding scale   SubCutaneous three times a day before meals  insulin lispro (ADMELOG) corrective regimen sliding scale   SubCutaneous at bedtime  metroNIDAZOLE  IVPB 500 milliGRAM(s) IV Intermittent every 8 hours  simvastatin 40 milliGRAM(s) Oral at bedtime  vancomycin  IVPB 750 milliGRAM(s) IV Intermittent every 12 hours                            7.4    13.92 )-----------( 357      ( 19 Jan 2021 07:32 )             23.5       Hemoglobin: 7.4 g/dL (01-19 @ 07:32)  Hemoglobin: 8.1 g/dL (01-18 @ 05:14)  Hemoglobin: 7.6 g/dL (01-17 @ 05:18)  Hemoglobin: 7.4 g/dL (01-16 @ 07:49)  Hemoglobin: 7.2 g/dL (01-15 @ 08:22)      01-19    144  |  119<H>  |  10  ----------------------------<  198<H>  4.5   |  15<L>  |  0.55    Ca    7.1<L>      19 Jan 2021 07:32  Phos  2.0     01-18  Mg     1.9     01-18    TPro  4.4<L>  /  Alb  1.3<L>  /  TBili  0.3  /  DBili  x   /  AST  9<L>  /  ALT  <6<L>  /  AlkPhos  46  01-18    Creatinine Trend: 0.55<--, 0.51<--, 0.52<--, 0.58<--, 0.61<--, 0.69<--    COAGS:           T(C): 36.5 (01-19-21 @ 04:55), Max: 36.7 (01-18-21 @ 20:20)  HR: 93 (01-19-21 @ 04:55) (68 - 94)  BP: 128/58 (01-19-21 @ 04:55) (115/52 - 138/61)  RR: 16 (01-19-21 @ 04:55) (16 - 18)  SpO2: 100% (01-19-21 @ 04:55) (100% - 100%)  Wt(kg): --    I&O's Summary    18 Jan 2021 07:01  -  19 Jan 2021 07:00  --------------------------------------------------------  IN: 0 mL / OUT: 550 mL / NET: -550 mL        HEENT:  (-)icterus (-)pallor  CV: N S1 S2 1/6 ANGELIC (+)2 Pulses B/l  Resp:  Clear to ausculatation B/L, normal effort  GI: (+) BS Soft, NT, ND  Lymph:  (-)Edema, (-)obvious lymphadenopathy  Skin: Warm to touch, Normal turgor  Psych: Appropriate mood and affect      ASSESSMENT/PLAN: 	75y  Female  wheelchair bound with medical history significant of HTN, Diabetes, Osteoarthritis, Osteoporosis, Peripheral arterial disease, Diabetic neuropathy, HLD, Schizophrenia historically preserved LV and R function, comes in with worsening ulceration to b/l heels s/p debridement.    - s/p Partial calcanectomy of right foot   - cont asa and statin  - optimized from a cardiac prospective for potential lower extremity amputation  - No need for further inpatient cardiac work up.  - cont abx per primary team  - vascular f/u    Jm Cortes MD, [facility]  BEEPER [phone]

## 2021-01-19 NOTE — DIETITIAN NUTRITION RISK NOTIFICATION - TREATMENT: THE FOLLOWING DIET HAS BEEN RECOMMENDED
Diet, Dysphagia 1 Pureed-Thin Liquids:   Supplement Feeding Modality:  Oral  Glucerna Shake Cans or Servings Per Day:  1       Frequency:  Two Times a day (01-19-21 @ 13:51) [Pending Verification By Attending]  Diet, Dysphagia 1 Pureed-Thin Liquids:   Supplement Feeding Modality:  Oral  Ensure Muscle Health Cans or Servings Per Day:  2       Frequency:  Two Times a day (01-18-21 @ 22:45) [Active]       H/O total knee replacement, right    surgical repair of left Rotator cuff 2009    surgical repair of right foot Bunion and hammer toe 2010    wide excision of left thigh Melanoma with removal of left groin lymh nodes  1980

## 2021-01-20 LAB
GLUCOSE BLDC GLUCOMTR-MCNC: 105 MG/DL — HIGH (ref 70–99)
GLUCOSE BLDC GLUCOMTR-MCNC: 106 MG/DL — HIGH (ref 70–99)
GLUCOSE BLDC GLUCOMTR-MCNC: 108 MG/DL — HIGH (ref 70–99)
GLUCOSE BLDC GLUCOMTR-MCNC: 116 MG/DL — HIGH (ref 70–99)
VANCOMYCIN TROUGH SERPL-MCNC: 19.2 UG/ML — SIGNIFICANT CHANGE UP (ref 10–20)
VANCOMYCIN TROUGH SERPL-MCNC: 21.7 UG/ML — HIGH (ref 10–20)

## 2021-01-20 PROCEDURE — 36573 INSJ PICC RS&I 5 YR+: CPT

## 2021-01-20 PROCEDURE — 71045 X-RAY EXAM CHEST 1 VIEW: CPT | Mod: 26

## 2021-01-20 RX ORDER — SODIUM CHLORIDE 9 MG/ML
10 INJECTION INTRAMUSCULAR; INTRAVENOUS; SUBCUTANEOUS
Refills: 0 | Status: DISCONTINUED | OUTPATIENT
Start: 2021-01-20 | End: 2021-01-29

## 2021-01-20 RX ORDER — ASPIRIN/CALCIUM CARB/MAGNESIUM 324 MG
81 TABLET ORAL DAILY
Refills: 0 | Status: DISCONTINUED | OUTPATIENT
Start: 2021-01-20 | End: 2021-01-23

## 2021-01-20 RX ORDER — CHLORHEXIDINE GLUCONATE 213 G/1000ML
1 SOLUTION TOPICAL DAILY
Refills: 0 | Status: DISCONTINUED | OUTPATIENT
Start: 2021-01-21 | End: 2021-01-29

## 2021-01-20 RX ADMIN — CEFEPIME 100 MILLIGRAM(S): 1 INJECTION, POWDER, FOR SOLUTION INTRAMUSCULAR; INTRAVENOUS at 13:23

## 2021-01-20 RX ADMIN — Medication 100 MILLIGRAM(S): at 05:11

## 2021-01-20 RX ADMIN — Medication 100 MILLIGRAM(S): at 21:38

## 2021-01-20 RX ADMIN — Medication 100 MILLIGRAM(S): at 13:23

## 2021-01-20 RX ADMIN — CEFEPIME 100 MILLIGRAM(S): 1 INJECTION, POWDER, FOR SOLUTION INTRAMUSCULAR; INTRAVENOUS at 05:11

## 2021-01-20 RX ADMIN — Medication 1 APPLICATION(S): at 13:23

## 2021-01-20 RX ADMIN — Medication 250 MILLIGRAM(S): at 18:08

## 2021-01-20 RX ADMIN — SIMVASTATIN 40 MILLIGRAM(S): 20 TABLET, FILM COATED ORAL at 21:38

## 2021-01-20 RX ADMIN — CEFEPIME 100 MILLIGRAM(S): 1 INJECTION, POWDER, FOR SOLUTION INTRAMUSCULAR; INTRAVENOUS at 21:38

## 2021-01-20 RX ADMIN — ENOXAPARIN SODIUM 40 MILLIGRAM(S): 100 INJECTION SUBCUTANEOUS at 13:23

## 2021-01-20 NOTE — PROGRESS NOTE ADULT - PROBLEM SELECTOR PLAN 9
Pt admitted from [facility], will likely return to facility upon discharge.   Pending clearance for possible COVID exposure by other patient   Last COVID 01/18 negative.

## 2021-01-20 NOTE — CHART NOTE - NSCHARTNOTEFT_GEN_A_CORE
2PC consent for PICC placement for Osteomylelitis   Pt is again likely delirious and unable to consent for PICC placement at this time. This procedure is needed to continue treatment for her sepsis/infection which will decrease morbidity.   Case was reviewed by and consent was also obtained from Dr. Silvia San.

## 2021-01-20 NOTE — PROGRESS NOTE ADULT - SUBJECTIVE AND OBJECTIVE BOX
Patient is a 75y old  Female who presents with a chief complaint of foot ulcer (2021 11:24)    PATIENT IS SEEN AND EXAMINED IN MEDICAL FLOOR.    ALLERGIES:  No Known Allergies    Daily     Daily Weight in k.3 (2021 04:55)    VITALS:    Vital Signs Last 24 Hrs  T(C): 36.5 (2021 13:25), Max: 37.1 (2021 21:15)  T(F): 97.7 (2021 13:25), Max: 98.7 (2021 21:15)  HR: 91 (2021 13:25) (91 - 100)  BP: 131/59 (2021 13:25) (114/51 - 136/58)  BP(mean): 78 (2021 13:25) (78 - 78)  RR: 18 (2021 13:25) (17 - 18)  SpO2: 100% (2021 13:25) (100% - 100%)    LABS:    CBC Full  -  ( 2021 07:32 )  WBC Count : 13.92 K/uL  RBC Count : 2.46 M/uL  Hemoglobin : 7.4 g/dL  Hematocrit : 23.5 %  Platelet Count - Automated : 357 K/uL  Mean Cell Volume : 95.5 fl  Mean Cell Hemoglobin : 30.1 pg  Mean Cell Hemoglobin Concentration : 31.5 gm/dL  Auto Neutrophil # : 11.18 K/uL  Auto Lymphocyte # : 0.92 K/uL  Auto Monocyte # : 1.31 K/uL  Auto Eosinophil # : 0.08 K/uL  Auto Basophil # : 0.03 K/uL  Auto Neutrophil % : 80.3 %  Auto Lymphocyte % : 6.6 %  Auto Monocyte % : 9.4 %  Auto Eosinophil % : 0.6 %  Auto Basophil % : 0.2 %      -19    144  |  119<H>  |  10  ----------------------------<  198<H>  4.5   |  15<L>  |  0.55    Ca    7.1<L>      2021 07:32      CAPILLARY BLOOD GLUCOSE      POCT Blood Glucose.: 105 mg/dL (2021 12:49)  POCT Blood Glucose.: 106 mg/dL (2021 08:07)  POCT Blood Glucose.: 144 mg/dL (2021 21:46)  POCT Blood Glucose.: 164 mg/dL (2021 16:57)      Creatinine Trend: 0.55<--, 0.51<--, 0.52<--, 0.58<--, 0.61<--, 0.69<--  I&O's Summary    2021 07:01  -  2021 07:00  --------------------------------------------------------  IN: 0 mL / OUT: 800 mL / NET: -800 mL      .Tissue Right heel margin   @ 19:31   Rare Enterococcus faecalis  --  Enterococcus faecalis      .Surgical Swab Right heel wound culture   @ 19:22   Moderate Proteus mirabilis  Few Bacteroides vulgatus group "Susceptibilities not performed"  --  Proteus mirabilis      .Urine Clean Catch (Midstream)   @ 06:46   No growth  --  --      .Surgical Swab Right heel wound   @ 22:13   Few Morganella morganii  Few Proteus mirabilis  Moderate Corynebacterium species "Susceptibilities not performed"  Moderate Bacteroides vulgatus "Susceptibilities not performed"  --  Morganella morganii  Proteus mirabilis      .Blood Blood-Peripheral   @ 17:59   No Growth Final  --  --      MEDICATIONS:    MEDICATIONS  (STANDING):  aspirin  chewable 81 milliGRAM(s) Oral daily  cefepime   IVPB 1000 milliGRAM(s) IV Intermittent every 8 hours  collagenase Ointment 1 Application(s) Topical daily  Dakins Solution - Full Strength 1 Application(s) Topical once  dextrose 40% Gel 15 Gram(s) Oral once  dextrose 5% + sodium chloride 0.9% with potassium chloride 20 mEq/L 1000 milliLiter(s) (75 mL/Hr) IV Continuous <Continuous>  dextrose 50% Injectable 25 Gram(s) IV Push once  dextrose 50% Injectable 12.5 Gram(s) IV Push once  dextrose 50% Injectable 25 Gram(s) IV Push once  enoxaparin Injectable 40 milliGRAM(s) SubCutaneous daily  glucagon  Injectable 1 milliGRAM(s) IntraMuscular once  insulin lispro (ADMELOG) corrective regimen sliding scale   SubCutaneous three times a day before meals  insulin lispro (ADMELOG) corrective regimen sliding scale   SubCutaneous at bedtime  metroNIDAZOLE  IVPB 500 milliGRAM(s) IV Intermittent every 8 hours  simvastatin 40 milliGRAM(s) Oral at bedtime  vancomycin  IVPB 750 milliGRAM(s) IV Intermittent every 12 hours      MEDICATIONS  (PRN):  acetaminophen   Tablet .. 650 milliGRAM(s) Oral every 6 hours PRN Moderate Pain (4 - 6)  sodium chloride 0.9% lock flush 10 milliLiter(s) IV Push every 1 hour PRN Pre/post blood products, medications, blood draw, and to maintain line patency      REVIEW OF SYSTEMS:                           ALL ROS DONE [ X   ]    CONSTITUTIONAL:  LETHARGIC [   ], FEVER [   ], UNRESPONSIVE [   ]  CVS:  CP  [   ], SOB, [   ], PALPITATIONS [   ], DIZZYNESS [   ]  RS: COUGH [   ], SPUTUM [   ]  GI: ABDOMINAL PAIN [   ], NAUSEA [   ], VOMITINGS [   ], DIARRHEA [   ], CONSTIPATION [   ]  :  DYSURIA [   ], NOCTURIA [   ], INCREASED FREQUENCY [   ], DRIBLING [   ],  SKELETAL: PAINFUL JOINTS [   ], SWOLLEN JOINTS [   ], NECK ACHE [   ], LOW BACK ACHE [   ],  SKIN : ULCERS [   ], RASH [   ], ITCHING [   ]  CNS: HEAD ACHE [   ], DOUBLE VISION [   ], BLURRED VISION [   ], AMS / CONFUSION [   ], SEIZURES [   ], WEAKNESS [   ],TINGLING / NUMBNESS [   ]    PHYSICAL EXAMINATION:  GENERAL APPEARANCE: NO DISTRESS  HEENT:  NO PALLOR, NO  JVD,  NO   NODES, NECK SUPPLE  CVS: S1 +, S2 +,   RS: AEEB,  OCCASIONAL  RALES +,   NO RONCHI  ABD: SOFT, NT, NO, BS +  EXT: NO PE  SKIN: WARM, BILATERAL HEEL ULCERS - COVERED, SACRAL ULCER + RIGHT ISCHIAL ULCER COVERED  SKELETAL:  ROM ACCEPTABLE, LEFT ARM SWELLING  CNS:  AAO X 2-3     RADIOLOGY :    EXAM:  MR FOOT RT                            PROCEDURE DATE:  2021          INTERPRETATION:  Clinical indications: Right heel ulceration and eschar status post debridement. Concern for osteomyelitis.    Multiplanar multisequence MRI of the right foot was performed localized to the hindfoot.    Correlation is made with prior MRI from 2019.    FINDINGS:    There is a large wound along the plantar aspect of the calcaneus posteriorly which extends nearly extends to bone. There is surrounding soft tissue edema about this site with evidence of an overlying bandage. The degree of soft tissue deficiency is increased compared to the prior examination. There is extensive osseous edema and corresponding hypointense T1 marrow signalthroughout the calcaneus extending from the posterior plantar margin to the level of the angle of Gissane. This is progressed compared to the prior examination and consistent with osteomyelitis. Marrow signal within the remainder of the foot is otherwise preserved.    There is confluent edema tracking along the abductor hallucis and flexor digitorum brevis muscles and within the plantar subcutaneous fat subjacent to this region. In total this area measures approximately 2.2 cm in transverse dimension, approximately 4 cm in anteroposterior posterior dimension, and approximately 1.6 cm in craniocaudal dimension. Findings may be related to underlying phlegmon or abscess. Evaluation is limited by the lack of intravenous contrast. Distal to this site there is edema throughout the visualized musculature consistent with myositis.    Visualized tendinous structures remain intact. There is no acute ligamentous injury. Visualized joint spaces are preserved without joint effusion.    IMPRESSION:    Osteomyelitis involving the calcaneus which extends from the plantar posterior aspect of the calcaneus to the level of the angle of Gissane. This is progressed compared to the prior examination. This is seen in the setting of diffuse soft tissue deficiency along the posterior plantar aspect of the calcaneus.    Confluent edema adjacent to this region within the abductor hallucis and flexor digitorum brevis muscles and within the subjacent plantar subcutaneous fat. This may be related to abscess orphlegmon.    ASSESSMENT :     Osteomyelitis    Yes    COVID-19    Osteomyelitis    DM (diabetes mellitus)    Paranoid schizophrenia    HLD (hyperlipidemia)    PAD (peripheral artery disease)    HTN (hypertension)            PLAN:  HPI:  76 yo F from [facility], wheelchair bound with medical history significant of HTN, Diabetes, Osteoarthritis, Osteoporosis, Peripheral arterial disease, Diabetic neuropathy, HLD, Schizophrenia comes in with worsening ulceration to b/l heels. She has been having ulcers for past couple months, has been progressive. She was treated with IV antbx at NH but did not get better. It was worsening with necrotic changes and increasing foul smelling discharge. She denies fever, abdominal pain, chest pain, urinary symptoms, fall, trauma. No h/o nausea, vomiting, diarrhea, cough, dyspnea, sick contacts, recent illness.  (2021 14:24)    PLAN:    #  RESOLVING METABOLIC ENCEPHALOPATHY - PATIENT HAS POOR PO INTAKE, ST EVAL RECOMMENDED SOFT DIET, STARTED ON IVF WITH IMPROVING MENTAL STATUS   # BILATERAL LE CHRONIC ULCER NOW PROGRESSIVELY WORSENING W/ SSTI AND RIGHT CALCANEAL OSTEOMYELITIS W/ POSSIBLE ABSCESS; UNDERWENT BEDSIDE DEBRIDEMENT OF RIGHT LEG ULCER AND UNDERWENT RIGHT PARTIAL CALCANECTOMY ON   - CEFEPIME + VANOMYCIN + FLAGYL, REVIEWED TIMUR, BCX - NGTD, WOUND CX - MORGANELLA MORGANI & P. MIRABILIS, ID CONSULT IN PROGRESS, PODIATRY CONSULT IN PROGRESS  - REVIEWED RIGHT LE MRI  -  PSYCHIATRY CONSULT FOR CAPACITY IN PROGRESS - DEEM PATIENT AS HAVING CAPACITY.  PATIENT WAS AGREEABLE FOR SURGICAL DEBRIDEMENT ON   - 2 PC CONSENT FOR SURGICAL DEBRIDEMENT PLANNED FOR ; ATTEMPTED TO CALL NEXT OF [facility] [place] @ [phone] HOWEVER PHONE NUMBER APPEARS DISCONNECTED  - VASCULAR SX CONSULT IN PROGRESS - PATIENT MAY REQUIRE AKA, BUT REFUSED ON PREVIOUS CONVERSATIONS - WAS AGREEABLE TO LOCAL DEBRIDEMENT/INTERVENTIONS  - PLAN FOR WOUND VAC  - PATIENT WILL NEED 6 WEEKS OF IV ANTIBIOTICS - WILL NEED TO SWITCH TO PICC LINE FROM EXTENDED DWELL - D/W FLOOR TEAM    # MEDICAL CLEARANCE FOR SURGERY - RCRI 1 POINT - 6% RISK OF DEATH, MI OR CARDIAC ARREST; CARDIOLOGY CONSULT IN PROGRESS FOR MEDICAL CLEARANCE    # SACRAL AND RIGHT ISCHIAL UNSTAGEABLE ULCERS W/ SSTI  S/P DEBRIDEMENT - ON VANCOMYCIN AND CEFEPIME, SURGERY CONSULT IN PROGRESS -  PSYCHIATRY CONSULT FOR CAPACITY IN PROGRESS    # ACUTE METABOLIC ENCEPHALOPATHY - SUSPECT S/T ACUTE INFECTION & HYPERNATREMIA - REVIEWED CT HEAD  - F/U ST EVAL AS RECENTLY MADE NPO BY SPEECH    # HYPERNATREMIA SUSPECT S/T POOR PO INTAKE - PLACED ON IVF    # LEFT ARM SWELLING - REVIEWED LEFT ARM U/S W/ OUT THROMBUS, WARM COMPRESSES  # ASHLEY - RESOLVED  # PAD  # HTN  # DM W/ DIABETIC NEUROPATHY  # OA/OP  # SCHIZOPHRENIA - HOLD MEDICATION  # CASE D/W PATIENT'S ? PARTNER - LISA KELLY - WHO REPORTS HE IS NOT NEXT OF KIN OR HCP - [phone] ON ; HE UNDERSTOOD PATIENT'S PROGNOSIS WAS GUARDED  # GI AND DVT PPX    ELEN CASILLAS MD COVERING FARHAN CASILLAS MD        Patient is a 75y old  Female who presents with a chief complaint of foot ulcer (2021 11:24)    PATIENT IS SEEN AND EXAMINED IN MEDICAL FLOOR.    ALLERGIES:  No Known Allergies    Daily     Daily Weight in k.3 (2021 04:55)    VITALS:    Vital Signs Last 24 Hrs  T(C): 36.5 (2021 13:25), Max: 37.1 (2021 21:15)  T(F): 97.7 (2021 13:25), Max: 98.7 (2021 21:15)  HR: 91 (2021 13:25) (91 - 100)  BP: 131/59 (2021 13:25) (114/51 - 136/58)  BP(mean): 78 (2021 13:25) (78 - 78)  RR: 18 (2021 13:25) (17 - 18)  SpO2: 100% (2021 13:25) (100% - 100%)    LABS:    CBC Full  -  ( 2021 07:32 )  WBC Count : 13.92 K/uL  RBC Count : 2.46 M/uL  Hemoglobin : 7.4 g/dL  Hematocrit : 23.5 %  Platelet Count - Automated : 357 K/uL  Mean Cell Volume : 95.5 fl  Mean Cell Hemoglobin : 30.1 pg  Mean Cell Hemoglobin Concentration : 31.5 gm/dL  Auto Neutrophil # : 11.18 K/uL  Auto Lymphocyte # : 0.92 K/uL  Auto Monocyte # : 1.31 K/uL  Auto Eosinophil # : 0.08 K/uL  Auto Basophil # : 0.03 K/uL  Auto Neutrophil % : 80.3 %  Auto Lymphocyte % : 6.6 %  Auto Monocyte % : 9.4 %  Auto Eosinophil % : 0.6 %  Auto Basophil % : 0.2 %      -19    144  |  119<H>  |  10  ----------------------------<  198<H>  4.5   |  15<L>  |  0.55    Ca    7.1<L>      2021 07:32      CAPILLARY BLOOD GLUCOSE      POCT Blood Glucose.: 105 mg/dL (2021 12:49)  POCT Blood Glucose.: 106 mg/dL (2021 08:07)  POCT Blood Glucose.: 144 mg/dL (2021 21:46)  POCT Blood Glucose.: 164 mg/dL (2021 16:57)      Creatinine Trend: 0.55<--, 0.51<--, 0.52<--, 0.58<--, 0.61<--, 0.69<--  I&O's Summary    2021 07:01  -  2021 07:00  --------------------------------------------------------  IN: 0 mL / OUT: 800 mL / NET: -800 mL      .Tissue Right heel margin   @ 19:31   Rare Enterococcus faecalis  --  Enterococcus faecalis      .Surgical Swab Right heel wound culture   @ 19:22   Moderate Proteus mirabilis  Few Bacteroides vulgatus group "Susceptibilities not performed"  --  Proteus mirabilis      .Urine Clean Catch (Midstream)   @ 06:46   No growth  --  --      .Surgical Swab Right heel wound   @ 22:13   Few Morganella morganii  Few Proteus mirabilis  Moderate Corynebacterium species "Susceptibilities not performed"  Moderate Bacteroides vulgatus "Susceptibilities not performed"  --  Morganella morganii  Proteus mirabilis      .Blood Blood-Peripheral   @ 17:59   No Growth Final  --  --      MEDICATIONS:    MEDICATIONS  (STANDING):  aspirin  chewable 81 milliGRAM(s) Oral daily  cefepime   IVPB 1000 milliGRAM(s) IV Intermittent every 8 hours  collagenase Ointment 1 Application(s) Topical daily  Dakins Solution - Full Strength 1 Application(s) Topical once  dextrose 40% Gel 15 Gram(s) Oral once  dextrose 5% + sodium chloride 0.9% with potassium chloride 20 mEq/L 1000 milliLiter(s) (75 mL/Hr) IV Continuous <Continuous>  dextrose 50% Injectable 25 Gram(s) IV Push once  dextrose 50% Injectable 12.5 Gram(s) IV Push once  dextrose 50% Injectable 25 Gram(s) IV Push once  enoxaparin Injectable 40 milliGRAM(s) SubCutaneous daily  glucagon  Injectable 1 milliGRAM(s) IntraMuscular once  insulin lispro (ADMELOG) corrective regimen sliding scale   SubCutaneous three times a day before meals  insulin lispro (ADMELOG) corrective regimen sliding scale   SubCutaneous at bedtime  metroNIDAZOLE  IVPB 500 milliGRAM(s) IV Intermittent every 8 hours  simvastatin 40 milliGRAM(s) Oral at bedtime  vancomycin  IVPB 750 milliGRAM(s) IV Intermittent every 12 hours      MEDICATIONS  (PRN):  acetaminophen   Tablet .. 650 milliGRAM(s) Oral every 6 hours PRN Moderate Pain (4 - 6)  sodium chloride 0.9% lock flush 10 milliLiter(s) IV Push every 1 hour PRN Pre/post blood products, medications, blood draw, and to maintain line patency      REVIEW OF SYSTEMS:                           ALL ROS DONE [ X   ]    CONSTITUTIONAL:  LETHARGIC [   ], FEVER [   ], UNRESPONSIVE [   ]  CVS:  CP  [   ], SOB, [   ], PALPITATIONS [   ], DIZZYNESS [   ]  RS: COUGH [   ], SPUTUM [   ]  GI: ABDOMINAL PAIN [   ], NAUSEA [   ], VOMITINGS [   ], DIARRHEA [   ], CONSTIPATION [   ]  :  DYSURIA [   ], NOCTURIA [   ], INCREASED FREQUENCY [   ], DRIBLING [   ],  SKELETAL: PAINFUL JOINTS [   ], SWOLLEN JOINTS [   ], NECK ACHE [   ], LOW BACK ACHE [   ],  SKIN : ULCERS [   ], RASH [   ], ITCHING [   ]  CNS: HEAD ACHE [   ], DOUBLE VISION [   ], BLURRED VISION [   ], AMS / CONFUSION [   ], SEIZURES [   ], WEAKNESS [   ],TINGLING / NUMBNESS [   ]    PHYSICAL EXAMINATION:  GENERAL APPEARANCE: NO DISTRESS  HEENT:  NO PALLOR, NO  JVD,  NO   NODES, NECK SUPPLE  CVS: S1 +, S2 +,   RS: AEEB,  OCCASIONAL  RALES +,   NO RONCHI  ABD: SOFT, NT, NO, BS +  EXT: NO PE  SKIN: WARM, BILATERAL HEEL ULCERS - COVERED, SACRAL ULCER + RIGHT ISCHIAL ULCER COVERED  SKELETAL:  ROM ACCEPTABLE, LEFT ARM SWELLING  CNS:  AAO X 0-1    RADIOLOGY :    EXAM:  MR FOOT RT                            PROCEDURE DATE:  2021          INTERPRETATION:  Clinical indications: Right heel ulceration and eschar status post debridement. Concern for osteomyelitis.    Multiplanar multisequence MRI of the right foot was performed localized to the hindfoot.    Correlation is made with prior MRI from 2019.    FINDINGS:    There is a large wound along the plantar aspect of the calcaneus posteriorly which extends nearly extends to bone. There is surrounding soft tissue edema about this site with evidence of an overlying bandage. The degree of soft tissue deficiency is increased compared to the prior examination. There is extensive osseous edema and corresponding hypointense T1 marrow signalthroughout the calcaneus extending from the posterior plantar margin to the level of the angle of Gissane. This is progressed compared to the prior examination and consistent with osteomyelitis. Marrow signal within the remainder of the foot is otherwise preserved.    There is confluent edema tracking along the abductor hallucis and flexor digitorum brevis muscles and within the plantar subcutaneous fat subjacent to this region. In total this area measures approximately 2.2 cm in transverse dimension, approximately 4 cm in anteroposterior posterior dimension, and approximately 1.6 cm in craniocaudal dimension. Findings may be related to underlying phlegmon or abscess. Evaluation is limited by the lack of intravenous contrast. Distal to this site there is edema throughout the visualized musculature consistent with myositis.    Visualized tendinous structures remain intact. There is no acute ligamentous injury. Visualized joint spaces are preserved without joint effusion.    IMPRESSION:    Osteomyelitis involving the calcaneus which extends from the plantar posterior aspect of the calcaneus to the level of the angle of Gissane. This is progressed compared to the prior examination. This is seen in the setting of diffuse soft tissue deficiency along the posterior plantar aspect of the calcaneus.    Confluent edema adjacent to this region within the abductor hallucis and flexor digitorum brevis muscles and within the subjacent plantar subcutaneous fat. This may be related to abscess orphlegmon.    ASSESSMENT :     Osteomyelitis    Yes    COVID-19    Osteomyelitis    DM (diabetes mellitus)    Paranoid schizophrenia    HLD (hyperlipidemia)    PAD (peripheral artery disease)    HTN (hypertension)            PLAN:  HPI:  76 yo F from [facility], wheelchair bound with medical history significant of HTN, Diabetes, Osteoarthritis, Osteoporosis, Peripheral arterial disease, Diabetic neuropathy, HLD, Schizophrenia comes in with worsening ulceration to b/l heels. She has been having ulcers for past couple months, has been progressive. She was treated with IV antbx at NH but did not get better. It was worsening with necrotic changes and increasing foul smelling discharge. She denies fever, abdominal pain, chest pain, urinary symptoms, fall, trauma. No h/o nausea, vomiting, diarrhea, cough, dyspnea, sick contacts, recent illness.  (2021 14:24)    PLAN:    # BILATERAL LE CHRONIC ULCER NOW PROGRESSIVELY WORSENING W/ SSTI AND RIGHT CALCANEAL OSTEOMYELITIS W/ POSSIBLE ABSCESS; UNDERWENT BEDSIDE DEBRIDEMENT OF RIGHT LEG ULCER AND UNDERWENT RIGHT PARTIAL CALCANECTOMY ON  AND PICC LINE PLACEMENT   - CEFEPIME + VANOMYCIN + FLAGYL, REVIEWED TIMUR, BCX - NGTD, WOUND CX - MORGANELLA MORGANI & P. MIRABILIS, ID CONSULT IN PROGRESS, PODIATRY CONSULT IN PROGRESS  - REVIEWED RIGHT LE MRI  -  PSYCHIATRY CONSULT FOR CAPACITY IN PROGRESS - DEEM PATIENT AS HAVING CAPACITY.  PATIENT WAS AGREEABLE FOR SURGICAL DEBRIDEMENT ON   - 2 PC CONSENT FOR SURGICAL DEBRIDEMENT PLANNED FOR ; ATTEMPTED TO CALL NEXT OF [facility] [place] @ [phone] HOWEVER PHONE NUMBER APPEARS DISCONNECTED  - VASCULAR SX CONSULT IN PROGRESS - PATIENT MAY REQUIRE AKA, BUT REFUSED ON PREVIOUS CONVERSATIONS - WAS AGREEABLE TO LOCAL DEBRIDEMENT/INTERVENTIONS  - PLAN FOR WOUND VAC  - PATIENT WILL NEED 6 WEEKS OF IV ANTIBIOTICS - WILL NEED TO SWITCH TO PICC LINE FROM EXTENDED DWELL - D/W FLOOR TEAM    # MEDICAL CLEARANCE FOR SURGERY - RCRI 1 POINT - 6% RISK OF DEATH, MI OR CARDIAC ARREST; CARDIOLOGY CONSULT IN PROGRESS FOR MEDICAL CLEARANCE    # SACRAL AND RIGHT ISCHIAL UNSTAGEABLE ULCERS W/ SSTI  S/P DEBRIDEMENT - ON VANCOMYCIN AND CEFEPIME, SURGERY CONSULT IN PROGRESS -  PSYCHIATRY CONSULT FOR CAPACITY IN PROGRESS    # ACUTE METABOLIC ENCEPHALOPATHY - SUSPECT S/T ACUTE INFECTION & HYPERNATREMIA - REVIEWED CT HEAD  - NEUROLOGY CONSULT TO BE PLACED GIVEN WAXING AND WANING ENCEPHALOPATHY - PLAN FOR MR BRAIN    # HYPERNATREMIA SUSPECT S/T POOR PO INTAKE - PLACED ON IVF    # LEFT ARM SWELLING - REVIEWED LEFT ARM U/S W/ OUT THROMBUS, WARM COMPRESSES  # ASHLEY - RESOLVED  # PAD  # HTN  # DM W/ DIABETIC NEUROPATHY  # OA/OP  # SCHIZOPHRENIA - HOLD MEDICATION  # CASE D/W PATIENT'S ? PARTNER - LISA ROBIN - WHO REPORTS HE IS NOT NEXT OF KIN OR HCP - [phone] ON ; HE UNDERSTOOD PATIENT'S PROGNOSIS WAS GUARDED  # GI AND DVT PPX    ELEN CASILLAS MD COVERING FARHAN CASILLAS MD

## 2021-01-20 NOTE — PROGRESS NOTE ADULT - ASSESSMENT
A:  s/p R foot partial calcanectomy & wound debridement 1/13/21  Osteomyelitis of calcaneus R foot   DMII    P:   Patient evaluated and chart reviewed  Post-op R foot xrays results as noted above  Intra-op cx result reviewed as above  Intra-op path result reviewed as above  Removed loosely adhered surrounding skin to Left heel eschar   Dressed left foot heel with santyl, allevyn pad  Applied santyl, white foam, black foam and wound vac set at 125mmHg continuous flow to Right foot   Instructions for discharge: upon discharge, on Right heel wound apply santyl, white foam overlying exposed calcaneus on the right heel, and black foam overlying to cover the wound. Place wound vac over, and set at 125 mmHg. On the left heel, apply santyl to wound, cover with 4x4 gauze, ABD pad, Keyur overlying, and light ACE compression. Apply CAIR boots bilaterally.  Applied CAIR boots to b/l LE   Podiatry will follow while in house.   Discussed with attending

## 2021-01-20 NOTE — CHART NOTE - NSCHARTNOTEFT_GEN_A_CORE
2PC consent for PICC placement for Osteomylelitis   Pt is again likely delirious and unable to consent for PICC placement at this time. This procedure is needed to continue treatment for her sepsis/infection which will decrease morbidity. Agree with Dr. Llanes that PICC is needed urgently.

## 2021-01-20 NOTE — PROGRESS NOTE ADULT - PROBLEM SELECTOR PLAN 2
Continue with heel lift boots, offloading of bony prominences  Continue with NWB to right heel.  wound vac applied by podiatry.

## 2021-01-20 NOTE — PROGRESS NOTE ADULT - ASSESSMENT
HPI:  76 yo F from [facility], wheelchair bound with medical history significant of HTN, Diabetes, Osteoarthritis, Osteoporosis, Peripheral arterial disease, Diabetic neuropathy, HLD, Schizophrenia comes in with worsening ulceration to b/l heels. She has been having ulcers for past couple months, has been progressive. She was treated with IV antbx at [facility] but did not get better. It was worsening with necrotic changes and increasing foul smelling discharge. Patient with additional ulcerations to her coccyx, ischium, and sacrum for which wound care and surgery were consulted. Podiatry also consulted for her heel ulcerations: patient s/p bedside debridement of her right heel ulceration, refusing any surgical interventions at this time. Infectious disease also following.    1/6: MRI with findings of: Osteomyelitis involving the calcaneus which extends from the plantar posterior aspect of the calcaneus to the level of the angle of Gissane. This is progressed compared to the prior examination.   1/7:  Pt states she understands that she may loose her leg and/or her life without surgical intervention for RLE OM, still refusing debridement.  Psych consulted. Patient needs medical clearance for OR on 1/8.  1/11; Patient I and D not done, anesthesia unable to get consent. Patient not consenting to OR procedure. OR procedure cancelled due to no consent. Patient deemed to have full capacity as per Psychiatry.   1/13: Patient seen and examined at bedside. S/P extended dwell placement by IR. Patient scheduled for surgical debridement of right heel.  1/14 s/p debridement of rt foot, no further surgical intervention at this time.    Patient seen and examined at bedside. She denies fever, abdominal pain, chest pain, urinary symptoms, fall, trauma. No h/o nausea, vomiting, diarrhea, cough, dyspnea, sick contacts, recent illness.   Patient had PICC line inserted today (left subclavian) for abx treatment(she needs 6 weeks of abx after discharge). Patient on airborne isolation as she was in contact with COVID positive patient . Last COVID test negative by pcr 01/18.

## 2021-01-20 NOTE — CHART NOTE - NSCHARTNOTEFT_GEN_A_CORE
Contacted by Uzair from CT that PIV infiltrated it would need to be re-sited and GFr repeated so that CT scan can be done tomorrow per radiologist Dr Johns. CT scan reordered and AM labs pending. Headache Monitoring: I recommended monitoring the headaches for now. There is no evidence of increased intracranial pressure. They were instructed to call if the headaches are worsening. Female Pregnancy Counseling Text: Female patients should also be on two forms of birth control while taking this medication and for one month after their last dose. Contacted by Uzair from CT that PIV infiltrated it would need to be re-sited and GFr repeated so that CT scan can be done tomorrow per radiologist Dr Johns. CT scan reordered and AM labs pending.    Vital Signs Last 24 Hrs  T(C): 37.4 (13 May 2020 20:24), Max: 37.4 (13 May 2020 20:24)  T(F): 99.4 (13 May 2020 20:24), Max: 99.4 (13 May 2020 20:24)  HR: 66 (13 May 2020 20:24) (66 - 75)  BP: 124/48 (13 May 2020 20:24) (114/34 - 124/48)  BP(mean): --  RR: 16 (13 May 2020 20:24) (16 - 18)  SpO2: 100% (13 May 2020 20:24) (100% - 100%) Hypertriglyceridemia Monitoring: I explained this is common when taking isotretinoin. We will monitor closely. Dosing Month 1 (Required For Cumulative Dosing): 30mg BID Show How Many Months Of Anticipated Therapy Are Left: Yes What Is The Patient's Gender: Female Is Cheilitis Present?: Yes - Normal Treatment Retinoid Dermatitis Aggressive Treatment: I recommended more frequent application of Cetaphil or CeraVe to the areas of dermatitis. I also prescribed a topical steroid for twice daily use until the dermatitis resolves. Completed Therapy?: No Xerosis Normal Treatment: I recommended application of Cetaphil or CeraVe numerous times a day and before going to bed to all dry areas. Nosebleeds Normal Treatment: I explained this is common when taking isotretinoin. I recommended saline mist in each nostril multiple times a day. If this worsens they will contact us. Use Therapeutic Ranged Or Therapeutic Target: Target Are Labs Available For Review?: No- Pending Pounds Preamble Statement (Weight Entered In Details Tab): Reported Weight in pounds: Xerosis Aggressive Treatment: I recommended application of Cetaphil or CeraVe numerous times a day and before going to bed to all dry areas. also a topical steroid for twice daily use. Cheilitis Normal Treatment: I recommended application of Vaseline or Aquaphor numerous times a day (as often as every hour) and before going to bed. Kilograms Preamble Statement (Weight Entered In Details Tab): Reported Weight in kilograms: Lower Range (In Mg/Kg): 120 Female Completion Statement: After discussing her treatment course we decided to discontinue isotretinoin therapy at this time. I explained that she would need to continue her birth control methods for at least one month after the last dosage. She should also get a pregnancy test one month after the last dose. She shouldn't donate blood for one month after the last dose. She should call with any new symptoms of depression. Upper Range (In Mg/Kg): 150 Retinoid Dermatitis Normal Treatment: I recommended more frequent application of Cetaphil or CeraVe to the areas of dermatitis. Cheilitis Aggressive Treatment: I recommended application of Vaseline or Aquaphor numerous times a day (as often as every hour) and before going to bed. I also prescribed a topical steroid for twice daily use. Ipledge Number (Optional): [id number] Hypertriglyceridemia Monitoring: I explained this is common when taking isotretinoin. If this worsens they will contact us. Male Completion Statement: After discussing his treatment course we decided to discontinue isotretinoin therapy at this time. He shouldn't donate blood for one month after the last dose. He should call with any new symptoms of depression. Target Cumulative Dosage (In Mg/Kg): 135 Weight Units: pounds Patient Weight (Optional But Required For Cumulative Dose-Numbers And Decimals Only): 160 Nosebleeds Normal Treatment: I explained this is common when taking isotretinoin. I recommended saline mist or Vaseline in each nostril multiple times a day. If this worsens they will contact us. Hypertriglyceridemia Treatment: I explained this is common when taking isotretinoin. If this worsens they will contact us. They may try OTC ibuprofen. Xerosis Normal Treatment: I recommended application of Cetaphil or CeraVe numerous times a day going to bed to all dry areas. Xerosis Aggressive Treatment: I recommended application of Cetaphil or CeraVe numerous times a day going to bed to all dry areas. I also prescribed a topical steroid for twice daily use. Detail Level: Simple Is Xerosis Present?: No - Continue Current Regimen Counseling Text: I reviewed the side effect in detail. Patient should get monthly blood tests, not donate blood, not drive at night if vision affected, and not share medication.

## 2021-01-20 NOTE — PROGRESS NOTE ADULT - SUBJECTIVE AND OBJECTIVE BOX
NP Note discussed with Primary Attending.    Patient is a 75y old  Female who presents with a chief complaint of foot ulcer (20 Jan 2021 17:32)      INTERVAL HPI/OVERNIGHT EVENTS: no new complaints    MEDICATIONS  (STANDING):  aspirin  chewable 81 milliGRAM(s) Oral daily  cefepime   IVPB 1000 milliGRAM(s) IV Intermittent every 8 hours  collagenase Ointment 1 Application(s) Topical daily  Dakins Solution - Full Strength 1 Application(s) Topical once  dextrose 40% Gel 15 Gram(s) Oral once  dextrose 5% + sodium chloride 0.9% with potassium chloride 20 mEq/L 1000 milliLiter(s) (75 mL/Hr) IV Continuous <Continuous>  dextrose 50% Injectable 25 Gram(s) IV Push once  dextrose 50% Injectable 12.5 Gram(s) IV Push once  dextrose 50% Injectable 25 Gram(s) IV Push once  enoxaparin Injectable 40 milliGRAM(s) SubCutaneous daily  glucagon  Injectable 1 milliGRAM(s) IntraMuscular once  insulin lispro (ADMELOG) corrective regimen sliding scale   SubCutaneous three times a day before meals  insulin lispro (ADMELOG) corrective regimen sliding scale   SubCutaneous at bedtime  metroNIDAZOLE  IVPB 500 milliGRAM(s) IV Intermittent every 8 hours  simvastatin 40 milliGRAM(s) Oral at bedtime  vancomycin  IVPB 750 milliGRAM(s) IV Intermittent every 12 hours    MEDICATIONS  (PRN):  acetaminophen   Tablet .. 650 milliGRAM(s) Oral every 6 hours PRN Moderate Pain (4 - 6)  sodium chloride 0.9% lock flush 10 milliLiter(s) IV Push every 1 hour PRN Pre/post blood products, medications, blood draw, and to maintain line patency      __________________________________________________  REVIEW OF SYSTEMS:    CONSTITUTIONAL: No fever,   EYES: no acute visual disturbances  NECK: No pain or stiffness  RESPIRATORY: No cough; No shortness of breath  CARDIOVASCULAR: No chest pain, no palpitations  GASTROINTESTINAL: No pain. No nausea or vomiting; No diarrhea   NEUROLOGICAL: No headache or numbness, no tremors  MUSCULOSKELETAL: No joint pain, no muscle pain  GENITOURINARY: no dysuria, no frequency, no hesitancy  PSYCHIATRY: Hx schizophrenia,   ALL OTHER  ROS negative        Vital Signs Last 24 Hrs  T(C): 36.5 (20 Jan 2021 13:25), Max: 37.1 (19 Jan 2021 21:15)  T(F): 97.7 (20 Jan 2021 13:25), Max: 98.7 (19 Jan 2021 21:15)  HR: 91 (20 Jan 2021 13:25) (91 - 100)  BP: 131/59 (20 Jan 2021 13:25) (114/51 - 136/58)  BP(mean): 78 (20 Jan 2021 13:25) (78 - 78)  RR: 18 (20 Jan 2021 13:25) (17 - 18)  SpO2: 100% (20 Jan 2021 13:25) (100% - 100%)    ________________________________________________  PHYSICAL EXAM:  GENERAL: NAD  HEENT: Normocephalic;  conjunctivae and sclerae clear; moist mucous membranes;   NECK : supple  CHEST/LUNG: Clear to auscultation bilaterally with good air entry   HEART: S1 S2  regular; no murmurs, gallops or rubs  ABDOMEN: Soft, Nontender, Nondistended; Bowel sounds present  EXTREMITIES: no cyanosis; no edema; no calf tenderness  SKIN: warm and dry; no rash, wound vac applied to LE  NERVOUS SYSTEM:  Awake and alert; oriented to self and person, disoriented to time and situation. ; no new deficits    _________________________________________________  LABS:                        7.4    13.92 )-----------( 357      ( 19 Jan 2021 07:32 )             23.5     01-19    144  |  119<H>  |  10  ----------------------------<  198<H>  4.5   |  15<L>  |  0.55    Ca    7.1<L>      19 Jan 2021 07:32          CAPILLARY BLOOD GLUCOSE      POCT Blood Glucose.: 108 mg/dL (20 Jan 2021 16:49)  POCT Blood Glucose.: 105 mg/dL (20 Jan 2021 12:49)  POCT Blood Glucose.: 106 mg/dL (20 Jan 2021 08:07)  POCT Blood Glucose.: 144 mg/dL (19 Jan 2021 21:46)        RADIOLOGY & ADDITIONAL TESTS:  EXAM:  XR CHEST PORTABLE IMMED 1V                            PROCEDURE DATE:  01/20/2021          INTERPRETATION:  CLINICAL INDICATION: 75 years  Female with PICC Insertion.    COMPARISON: 1/5/2021    The left PICC line catheter tip overlies the superior vena cava.    AP view of the chest demonstrates haziness over the right hemithorax which may be related to rotation or to effusion layering posteriorly. The left lung is clear. There is no pleural effusion. There is no pneumothorax.    The heart is normal in size. The aorta is atherosclerotic. There is no mediastinal or hilar mass.    The pulmonary vasculature is normal.    Mild thoracic degenerative changes are present.    IMPRESSION:    Left PICC line in satisfactory position.    Right hazy opacity may be related to effusion layering posteriorly or to rotation.      EXAM:  CT BRAIN                            PROCEDURE DATE:  01/14/2021          INTERPRETATION:  .    CLINICAL INFORMATION: ACUTE ENCEPHALOPATHY    TECHNIQUE: Multiple axial CT images of the head were obtained without contrast. Sagittal and coronalreconstructed images were acquired from the source data.    COMPARISON: No prior CT studies of the brain are available for comparison at this institution.    FINDINGS: There is no acute intracranial hemorrhage, mass effect, shift of the midline structures, herniation, extra-axial fluid collection, or hydrocephalus.    There is diffuse cerebral volume loss with prominence of the sulci, fissures, and cisternal spaces which is normal for the patient's age. There is mild deep and periventricular white matter hypoattenuation statistically compatible with microvascular changes given calcific atherosclerotic disease of the intracranial arteries. There is a partially empty sella.    Polyps versus retention cysts are seen within the bilateral maxillary sinuses. Additional mild scattered mucosal thickening is seen throughout the ethmoid complex. The calvarium is intact. Chronic bilateral nasal bone fracture deformities are seen. The imaged orbits are unremarkable.    IMPRESSION: No acute intracranial hemorrhage, mass effect, or shift of the midline structures.      EXAM:  US DPLX UPR EXT VEINS LTD LT                            PROCEDURE DATE:  01/10/2021          INTERPRETATION:  CLINICAL INDICATION: 75 years  Female with LUE swelling, rule out thrombosis.    COMPARISON: None available.    TECHNIQUE: Duplex sonography of the LEFT UPPER extremity veins with color and spectral Doppler, with and without compression.    FINDINGS:    The left internal jugular, subclavian, axillary and brachial veins are patent and compressible where applicable.  The basilic and cephalic veins (superficial veins) are patent and without thrombus.    Doppler examination shows normal spontaneous and phasic flow.    Edema is visualized.    IMPRESSION:  No evidence of left upper extremity deep venous thrombosis.    EXAM:  MR FOOT RT                            PROCEDURE DATE:  01/06/2021          INTERPRETATION:  Clinical indications: Right heel ulceration and eschar status post debridement. Concern for osteomyelitis.    Multiplanar multisequence MRI of the right foot was performed localized to the hindfoot.    Correlation is made with prior MRI from September 11, 2019.    FINDINGS:    There is a large wound along the plantar aspect of the calcaneus posteriorly which extends nearly extends to bone. There is surrounding soft tissue edema about this site with evidence of an overlying bandage. The degree of soft tissue deficiency is increased compared to the prior examination. There is extensive osseous edema and corresponding hypointense T1 marrow signalthroughout the calcaneus extending from the posterior plantar margin to the level of the angle of Gissane. This is progressed compared to the prior examination and consistent with osteomyelitis. Marrow signal within the remainder of the foot is otherwise preserved.    There is confluent edema tracking along the abductor hallucis and flexor digitorum brevis muscles and within the plantar subcutaneous fat subjacent to this region. In total this area measures approximately 2.2 cm in transverse dimension, approximately 4 cm in anteroposterior posterior dimension, and approximately 1.6 cm in craniocaudal dimension. Findings may be related to underlying phlegmon or abscess. Evaluation is limited by the lack of intravenous contrast. Distal to this site there is edema throughout the visualized musculature consistent with myositis.    Visualized tendinous structures remain intact. There is no acute ligamentous injury. Visualized joint spaces are preserved without joint effusion.    IMPRESSION:    Osteomyelitis involving the calcaneus which extends from the plantar posterior aspect of the calcaneus to the level of the angle of Gissane. This is progressed compared to the prior examination. This is seen in the setting of diffuse soft tissue deficiency along the posterior plantar aspect of the calcaneus.    Confluent edema adjacent to this region within the abductor hallucis and flexor digitorum brevis muscles and within the subjacent plantar subcutaneous fat. This may be related to abscess orphlegmon.    Imaging  Reviewed:  YES/NO    Consultant(s) Notes Reviewed:   YES/ No      Plan of care was discussed with patient and /or primary care giver; all questions and concerns were addressed

## 2021-01-20 NOTE — PROGRESS NOTE ADULT - PROBLEM SELECTOR PLAN 1
MRI noted above  c/w Cefepime   c/w metronidazole   will need 6 weeks of abx,  IR placement of  extended dwell Dr. Hopper approved.  Wound culture grew few Morganella morganii and few Proteus mirabilis  s/p OR right heel debridement, obtained 2PC consent  wound vac applied by podiatry  PICC line inserted by IR today  ID Dr. Wellington  Podiatry following  Vascular Following.

## 2021-01-20 NOTE — PROGRESS NOTE ADULT - SUBJECTIVE AND OBJECTIVE BOX
Patient is a 75y old  Female who presents with a chief complaint of foot ulcer (05 Jan 2021 14:24)      HPI:  76 yo F from [facility], wheelchair bound with medical history significant of HTN, Diabetes, Osteoarthritis, Osteoporosis, Peripheral arterial disease, Diabetic neuropathy, HLD, Schizophrenia comes in with worsening ulceration to b/l heels. She has been having ulcers for past couple months, has been progressive. She was treated with IV antbx at NH but did not get better. It was worsening with necrotic changes and increasing foul smelling discharge. She denies fever, abdominal pain, chest pain, urinary symptoms, fall, trauma. No h/o nausea, vomiting, diarrhea, cough, dyspnea, sick contacts, recent illness.  (05 Jan 2021 14:24)      Podiatry HPI: 76 y/o female with full history as noted above, podiatry consulted for b/l heel wounds. Pt is from [facility], wheelchair bound with medical history significant of HTN, Diabetes, Osteoarthritis, Osteoporosis, PAD, Diabetic neuropathy, HLD, Schizophrenia comes in with worsening ulceration to b/l heels. Patient seen resting in bed comfortably, AAOx3. Patient states she has had the wounds for a long time, maybe more than 6 months. She relates receiving local wound care previously in the NH using santyl dressings. She endorses occasional pain to wound sites. Patient was  under podiatric care in previous admissions for b/l heel wounds with osteomyelitis. She states she is not interesting in surgery for her feet, she wishes to continue with local wound care. She denies nausea, vomiting, chills, fever, SOB.     Podiatry Progress: Pt s/p Right foot partial calcanectomy and wound debridement on 1/13/21. Patient seen resting in bed, nonresponsive to verbal stimuli. Unable to further assess symptoms due to mental status.     Medications acetaminophen   Tablet .. 650 milliGRAM(s) Oral every 6 hours PRN  aspirin  chewable 81 milliGRAM(s) Oral daily  cefepime   IVPB 1000 milliGRAM(s) IV Intermittent every 8 hours  collagenase Ointment 1 Application(s) Topical daily  Dakins Solution - Full Strength 1 Application(s) Topical once  dextrose 40% Gel 15 Gram(s) Oral once  dextrose 5% + sodium chloride 0.9% with potassium chloride 20 mEq/L 1000 milliLiter(s) IV Continuous <Continuous>  dextrose 50% Injectable 25 Gram(s) IV Push once  dextrose 50% Injectable 12.5 Gram(s) IV Push once  dextrose 50% Injectable 25 Gram(s) IV Push once  enoxaparin Injectable 40 milliGRAM(s) SubCutaneous daily  glucagon  Injectable 1 milliGRAM(s) IntraMuscular once  insulin lispro (ADMELOG) corrective regimen sliding scale   SubCutaneous three times a day before meals  insulin lispro (ADMELOG) corrective regimen sliding scale   SubCutaneous at bedtime  metroNIDAZOLE  IVPB 500 milliGRAM(s) IV Intermittent every 8 hours  simvastatin 40 milliGRAM(s) Oral at bedtime  sodium chloride 0.9% lock flush 10 milliLiter(s) IV Push every 1 hour PRN  vancomycin  IVPB 750 milliGRAM(s) IV Intermittent every 12 hours    FHNo pertinent family history in first degree relatives    ,   PMHCOVID-19    Osteomyelitis    DM (diabetes mellitus)    Paranoid schizophrenia    HLD (hyperlipidemia)    PAD (peripheral artery disease)    HTN (hypertension)       PSHNo significant past surgical history        Labs                          7.4    13.92 )-----------( 357      ( 19 Jan 2021 07:32 )             23.5      01-19    144  |  119<H>  |  10  ----------------------------<  198<H>  4.5   |  15<L>  |  0.55    Ca    7.1<L>      19 Jan 2021 07:32       Vital Signs Last 24 Hrs  T(C): 36.5 (20 Jan 2021 13:25), Max: 37.1 (19 Jan 2021 21:15)  T(F): 97.7 (20 Jan 2021 13:25), Max: 98.7 (19 Jan 2021 21:15)  HR: 91 (20 Jan 2021 13:25) (91 - 100)  BP: 131/59 (20 Jan 2021 13:25) (114/51 - 136/58)  BP(mean): 78 (20 Jan 2021 13:25) (78 - 78)  RR: 18 (20 Jan 2021 13:25) (17 - 18)  SpO2: 100% (20 Jan 2021 13:25) (100% - 100%)  Sedimentation Rate, Erythrocyte: 99 mm/Hr (01-05-21 @ 11:45)         C-Reactive Protein, Serum: 5.63 mg/dL (01-06-21 @ 10:27)       LE Focused:    Vasc:  DP/PT nonpalpable, CFT brisk to digits, TG warm to warm b/l,   Derm:   Wound 1:  L heel closed necrotic eschar measuring ~4x3.5x0.1 cm with mild mac wound erythema and loosely adhered periwound skin, no discharge noted, no malodor or PTB noted, stable in appearance    Wound 2: s/p R foot partial calcanectomy and wound debridement measuring ~9x6.5x1 cm with calcaneal bone exposed and surrounding fibrotic tissue; No periwound erythema. No drainage, no malodor  Neuro: protective sensation absent at level of digits b/l  MSK: no tenderness on palpation of periwound areas b/l       IMAGING:  f< from: Xray Ankle Complete 3 Views, Right (01.13.21 @ 18:15) >    EXAM:  ANKLE RIGHT (MINIMUM 3 V)                            PROCEDURE DATE:  01/13/2021          INTERPRETATION:  RIGHT ankle    CLINICAL INFORMATION: Postoperative radiographs.    TECHNIQUE: AP,lateral /views.    FINDINGS:    Large posterior calcaneal ulceration NOTED with the osteolysis /surgical debridement of the posterior calcaneus remaining osseous structures of the ankle are intact..    IMPRESSION:    Posterior calcaneal large ulceration within the posterior calcaneal postsurgical debridement versus/osteomyelitis.            ADITI SILVA MD; Attending Radiologist  This document has been electronically signed. Jan 13 2021  7:02PM    < end of copied text >    --------------------------  < from: MR Foot No Cont, Right (01.06.21 @ 11:08) >    EXAM:  MR FOOT RT                            PROCEDURE DATE:  01/06/2021          INTERPRETATION:  Clinical indications: Right heel ulceration and eschar status post debridement. Concern for osteomyelitis.    Multiplanar multisequence MRI of the right foot was performed localized to the hindfoot.    Correlation is made with prior MRI from September 11, 2019.    FINDINGS:    There is a large wound along the plantar aspect of the calcaneus posteriorly which extends nearly extends to bone. There is surrounding soft tissue edema about this site with evidence of an overlying bandage. The degree of soft tissue deficiency is increased compared to the prior examination. There is extensive osseous edema and corresponding hypointense T1 marrow signalthroughout the calcaneus extending from the posterior plantar margin to the level of the angle of Gissane. This is progressed compared to the prior examination and consistent with osteomyelitis. Marrow signal within the remainder of the foot is otherwise preserved.    There is confluent edema tracking along the abductor hallucis and flexor digitorum brevis muscles and within the plantar subcutaneous fat subjacent to this region. In total this area measures approximately 2.2 cm in transverse dimension, approximately 4 cm in anteroposterior posterior dimension, and approximately 1.6 cm in craniocaudal dimension. Findings may be related to underlying phlegmon or abscess. Evaluation is limited by the lack of intravenous contrast. Distal to this site there is edema throughout the visualized musculature consistent with myositis.    Visualized tendinous structures remain intact. There is no acute ligamentous injury. Visualized joint spaces are preserved without joint effusion.    IMPRESSION:    Osteomyelitis involving the calcaneus which extends from the plantar posterior aspect of the calcaneus to the level of the angle of Gissane. This is progressed compared to the prior examination. This is seen in the setting of diffuse soft tissue deficiency along the posterior plantar aspect of the calcaneus.    Confluent edema adjacent to this region within the abductor hallucis and flexor digitorum brevis muscles and within the subjacent plantar subcutaneous fat. This may be related to abscess orphlegmon.            OSWALDO ALFRED MD; Attending Radiologist  This document has been electronically signed. Jan 6 2021 11:49AM    < end of copied text >    -------------------------------------------------------------------------------------------------------------  < from: [facility] Physiol Extremity Lower 3+ Level, BI (01.05.21 @ 17:55) >    EXAM:  US PHYSIOL LWR EXT 3+ LEV BI                            PROCEDURE DATE:  01/05/2021          INTERPRETATION:  Clinical information: History of diabetes, nonsmoker, hypertension, hyperlipidemia, presents with bilateral heel ulcers.    Comparison: Lower extremity arterial Doppler study dated 5/13/2020.    Technique: Lower extremity arterial Doppler study. Note that pressure measurements were not obtained at the thigh level.    Findings: Ankle brachial index measures 1.33 bilaterally, compared with prior values of 1.41 on the right and 1.36 on the left. No segmental arterial pressure gradient is present.    PVR waveforms are normal in amplitude and pulsatility from the thigh through the ankle level. They're diminished in amplitude at the metatarsal and digital levels in symmetric fashion, similar to the prior exam.    Impression: No Doppler evidence of hemodynamically significant arterial inflow abnormality in either lower extremity.    Evidence of small vessel disease in the feet.    No significant interval change since study of 5/13/2020.            SOHAN PA MD; Attending Radiologist  This document has been electronically signed. Jan 6 2021  9:13AM    < end of copied text >      --------------------------------------------------------------------------------------------------------------  < from: Xray Foot AP + Lateral + Oblique, Right (01.05.21 @ 18:00) >    EXAM:  FOOT RIGHT (MINIMUM 3 VIEWS)                            PROCEDURE DATE:  01/05/2021          INTERPRETATION:  Right foot    HISTORY: Status post bedside debridement.     Two views of the right foot show thinning of soft tissues near the calcaneus likely indicating site of debridement. The joint spaces are maintained. There is questionable exposure of the plantar surface of the calcaneus.    IMPRESSION: Calcaneal soft tissues and questionable exposure as noted. Clinical correlation is suggested.        Thank you for this referral.            REID CRAMER MD; Attending Interventional Radiologist  This document has been electronically signed. Jan 6 2021 11:10AM    < end of copied text >    -------------------------------------------------------------------------------------------  a< from: Xray Ankle Complete 3 Views, Bilateral (01.05.21 @ 14:37) >    EXAM:  ANKLE BILATERAL (MINIMUM 3 V)                            PROCEDURE DATE:  01/05/2021          INTERPRETATION:  CLINICAL INDICATION:  Osteomyelitis; evaluate for soft tissue emphysema.    COMPARISON:  Left ankle radiography 5/11/2020.    TECHNIQUE:  AP, oblique and crosstable lateral views left ankle. Oblique and crosstable lateral views right ankle. Technologist noted that the best possible films were obtained.    FINDINGS:    Right ankle: Generalized osteopenia. Subcutaneous emphysema is identified along the plantar aspect of the right hindfoot inferior to the calcaneus, with an overlying skin defect noted along the posterior aspect of the calcaneus. Osteolysis involving the inferior/posterior aspect of the right calcaneus cannot beexcluded. MRI evaluation can be performed for further assessment. Extensive vascular calcification is noted. No ankle joint effusion is noted.    Left ankle: Generalized osteopenia. There is no evidence for acute fracture or dislocation. The ankle mortise appears grossly intact. No ankle joint effusion is noted. Extensive vascular calcifications identified. No significant soft tissue swelling.      IMPRESSION:  No evidence for acute fracture. Subcutaneous emphysema is identified along the plantaraspect of the right hindfoot inferior to the calcaneus, with an overlying skin defect noted along the posterior aspect of the calcaneus. Osteolysis involving the inferior/posterior aspect of the right calcaneus cannot be excluded. MRI evaluation can be performed for further assessment.                MARA LARKIN MD; Attending Radiologist  This document has been electronically signed. Jan 5 2021  3:57PM    < end of copied text >        CULTURES:   cCulture - Surgical Swab (01.05.21 @ 22:13)   Specimen Source: .Surgical Swab Right heel wound   Culture Results:   Few Morganella morganii   Few Proteus mirabilis       Historical Values  Culture - Surgical Swab (01.05.21 @ 22:13)   Specimen Source: .Surgical Swab Right heel wound   Culture Results:   Few Morganella morganii   Few Proteus mirabilis   culture:Culture - Surgical Swab (01.13.21 @ 19:22)   - Ampicillin/Sulbactam: S <=4/2 Enterobacter, Citrobacter, and Serratia may develop resistance during prolonged therapy (3-4 days)   - Ampicillin: S <=8 These ampicillin results predict results for amoxicillin   - Amikacin: S <=16   - Amoxicillin/Clavulanic Acid: S <=8/4   - Aztreonam: S <=4   - Cefazolin: I 4 Enterobacter, Citrobacter, and Serratia may develop resistance during prolonged therapy (3-4 days)   - Cefepime: S <=2   - Cefoxitin: S <=8   - Ceftriaxone: S <=1 Enterobacter, Citrobacter, and Serratia may develop resistance during prolonged therapy   - Ciprofloxacin: R 1   - Ertapenem: S <=0.5   - Gentamicin: S <=2   - Levofloxacin: S <=0.5   - Meropenem: S <=1   - Trimethoprim/Sulfamethoxazole: S <=0.5/9.5   - Tobramycin: S <=2   - Piperacillin/Tazobactam: S <=8   Specimen Source: .Surgical Swab Right heel wound culture   Culture Results:   Moderate Proteus mirabilis   Few Bacteroides vulgatus group "Susceptibilities not performed"   Organism Identification: Proteus mirabilis   Organism: Proteus mirabilis   Method Type: RICARDO       Historical Values  Culture - Surgical Swab (01.13.21 @ 19:22)   - Ampicillin/Sulbactam: S <=4/2 Enterobacter, Citrobacter, and Serratia may develop resistance during prolonged therapy (3-4 days)   - Ampicillin: S <=8 These ampicillin results predict results for amoxicillin   - Amikacin: S <=16   - Amoxicillin/Clavulanic Acid: S <=8/4   - Aztreonam: S <=4   - Cefazolin: I 4 Enterobacter, Citrobacter, and Serratia may develop resistance during prolonged therapy (3-4 days)   - Cefepime: S <=2   - Cefoxitin: S <=8   - Ceftriaxone: S <=1 Enterobacter, Citrobacter, and Serratia may develop resistance during prolonged therapy   - Ciprofloxacin: R 1   - Ertapenem: S <=0.5   - Gentamicin: S <=2   - Levofloxacin: S <=0.5   - Meropenem: S <=1   - Trimethoprim/Sulfamethoxazole: S <=0.5/9.5   - Tobramycin: S <=2   - Piperacillin/Tazobactam: S <=8   Specimen Source: .Surgical Swab Right heel wound culture   Culture Results:   Moderate Proteus mirabilis   Few Bacteroides vulgatus group "Susceptibilities not performed"   Organism Identification: Proteus mirabilis   Organism: Proteus mirabilis   Method Type: RICARDO   ---------------------------------  Pathology result:Surgical Pathology Report (01.13.21 @ 14:03)   Surgical Pathology Report:   ACCESSION No: [id number]   [facility] [place] 2   Surgical Final Report   Final Diagnosis   1. Right calcaneum bone; partial calcanectomy, incision and   drainage:   Cancellous bone with acute osteomyelitis and reparative changes   2. Right heel, margin; biopsy:   Osteocartilaginous tissue with granulation tissue and   regenerating bony   trabeculae; no inflammatory exudate is identified   Verified by: Sarah Culver M.D.

## 2021-01-20 NOTE — PROGRESS NOTE ADULT - SUBJECTIVE AND OBJECTIVE BOX
Patient denies chest pain or shortness of breath, is confused.  Review of systems otherwise (-)  	  acetaminophen   Tablet .. 650 milliGRAM(s) Oral every 6 hours PRN  aspirin  chewable 81 milliGRAM(s) Oral daily  cefepime   IVPB 1000 milliGRAM(s) IV Intermittent every 8 hours  collagenase Ointment 1 Application(s) Topical daily  Dakins Solution - Full Strength 1 Application(s) Topical once  dextrose 40% Gel 15 Gram(s) Oral once  dextrose 5% + sodium chloride 0.9% with potassium chloride 20 mEq/L 1000 milliLiter(s) IV Continuous <Continuous>  dextrose 50% Injectable 25 Gram(s) IV Push once  dextrose 50% Injectable 12.5 Gram(s) IV Push once  dextrose 50% Injectable 25 Gram(s) IV Push once  enoxaparin Injectable 40 milliGRAM(s) SubCutaneous daily  glucagon  Injectable 1 milliGRAM(s) IntraMuscular once  insulin lispro (ADMELOG) corrective regimen sliding scale   SubCutaneous three times a day before meals  insulin lispro (ADMELOG) corrective regimen sliding scale   SubCutaneous at bedtime  metroNIDAZOLE  IVPB 500 milliGRAM(s) IV Intermittent every 8 hours  simvastatin 40 milliGRAM(s) Oral at bedtime  vancomycin  IVPB 750 milliGRAM(s) IV Intermittent every 12 hours                            7.4    13.92 )-----------( 357      ( 19 Jan 2021 07:32 )             23.5       Hemoglobin: 7.4 g/dL (01-19 @ 07:32)  Hemoglobin: 8.1 g/dL (01-18 @ 05:14)  Hemoglobin: 7.6 g/dL (01-17 @ 05:18)  Hemoglobin: 7.4 g/dL (01-16 @ 07:49)      01-19    144  |  119<H>  |  10  ----------------------------<  198<H>  4.5   |  15<L>  |  0.55    Ca    7.1<L>      19 Jan 2021 07:32      Creatinine Trend: 0.55<--, 0.51<--, 0.52<--, 0.58<--, 0.61<--, 0.69<--    COAGS:           T(C): 36.8 (01-20-21 @ 04:55), Max: 37.1 (01-19-21 @ 21:15)  HR: 95 (01-20-21 @ 04:55) (85 - 100)  BP: 114/51 (01-20-21 @ 04:55) (114/51 - 136/58)  RR: 17 (01-20-21 @ 04:55) (17 - 18)  SpO2: 100% (01-20-21 @ 04:55) (100% - 100%)  Wt(kg): --    I&O's Summary    19 Jan 2021 07:01  -  20 Jan 2021 07:00  --------------------------------------------------------  IN: 0 mL / OUT: 800 mL / NET: -800 mL          HEENT:  (-)icterus (-)pallor  CV: N S1 S2 1/6 ANGELIC (+)2 Pulses B/l  Resp:  Clear to ausculatation B/L, normal effort  GI: (+) BS Soft, NT, ND  Lymph:  (-)Edema, (-)obvious lymphadenopathy  Skin: Warm to touch, Normal turgor  Psych: Appropriate mood and affect      ASSESSMENT/PLAN: 	75y  Female  wheelchair bound with medical history significant of HTN, Diabetes, Osteoarthritis, Osteoporosis, Peripheral arterial disease, Diabetic neuropathy, HLD, Schizophrenia historically preserved LV and R function, comes in with worsening ulceration to b/l heels s/p debridement.    - s/p Partial calcanectomy of right foot   - cont asa and statin  - optimized from a cardiac prospective for potential lower extremity amputation, however no plans for further OR at present  - No need for further inpatient cardiac work up.  - cont abx per primary team  - plan for PICC line    Jm Cortes MD, [facility]  BEEPER [phone]

## 2021-01-21 LAB
ALBUMIN SERPL ELPH-MCNC: 1.2 G/DL — LOW (ref 3.5–5)
ALP SERPL-CCNC: 64 U/L — SIGNIFICANT CHANGE UP (ref 40–120)
ALT FLD-CCNC: 7 U/L DA — LOW (ref 10–60)
ANION GAP SERPL CALC-SCNC: 9 MMOL/L — SIGNIFICANT CHANGE UP (ref 5–17)
AST SERPL-CCNC: 10 U/L — SIGNIFICANT CHANGE UP (ref 10–40)
BILIRUB SERPL-MCNC: 0.4 MG/DL — SIGNIFICANT CHANGE UP (ref 0.2–1.2)
BUN SERPL-MCNC: 10 MG/DL — SIGNIFICANT CHANGE UP (ref 7–18)
CALCIUM SERPL-MCNC: 7.5 MG/DL — LOW (ref 8.4–10.5)
CHLORIDE SERPL-SCNC: 120 MMOL/L — HIGH (ref 96–108)
CO2 SERPL-SCNC: 18 MMOL/L — LOW (ref 22–31)
CREAT SERPL-MCNC: 0.59 MG/DL — SIGNIFICANT CHANGE UP (ref 0.5–1.3)
GLUCOSE BLDC GLUCOMTR-MCNC: 115 MG/DL — HIGH (ref 70–99)
GLUCOSE BLDC GLUCOMTR-MCNC: 125 MG/DL — HIGH (ref 70–99)
GLUCOSE BLDC GLUCOMTR-MCNC: 138 MG/DL — HIGH (ref 70–99)
GLUCOSE BLDC GLUCOMTR-MCNC: 97 MG/DL — SIGNIFICANT CHANGE UP (ref 70–99)
GLUCOSE SERPL-MCNC: 149 MG/DL — HIGH (ref 70–99)
HCT VFR BLD CALC: 23.5 % — LOW (ref 34.5–45)
HGB BLD-MCNC: 7.6 G/DL — LOW (ref 11.5–15.5)
MCHC RBC-ENTMCNC: 30.2 PG — SIGNIFICANT CHANGE UP (ref 27–34)
MCHC RBC-ENTMCNC: 32.3 GM/DL — SIGNIFICANT CHANGE UP (ref 32–36)
MCV RBC AUTO: 93.3 FL — SIGNIFICANT CHANGE UP (ref 80–100)
MRSA PCR RESULT.: SIGNIFICANT CHANGE UP
NRBC # BLD: 0 /100 WBCS — SIGNIFICANT CHANGE UP (ref 0–0)
OB PNL STL: NEGATIVE — SIGNIFICANT CHANGE UP
PLATELET # BLD AUTO: 355 K/UL — SIGNIFICANT CHANGE UP (ref 150–400)
POTASSIUM SERPL-MCNC: 2.9 MMOL/L — CRITICAL LOW (ref 3.5–5.3)
POTASSIUM SERPL-SCNC: 2.9 MMOL/L — CRITICAL LOW (ref 3.5–5.3)
PROT SERPL-MCNC: 4.5 G/DL — LOW (ref 6–8.3)
RBC # BLD: 2.52 M/UL — LOW (ref 3.8–5.2)
RBC # FLD: 15.7 % — HIGH (ref 10.3–14.5)
S AUREUS DNA NOSE QL NAA+PROBE: SIGNIFICANT CHANGE UP
SARS-COV-2 RNA SPEC QL NAA+PROBE: SIGNIFICANT CHANGE UP
SODIUM SERPL-SCNC: 147 MMOL/L — HIGH (ref 135–145)
WBC # BLD: 14.4 K/UL — HIGH (ref 3.8–10.5)
WBC # FLD AUTO: 14.4 K/UL — HIGH (ref 3.8–10.5)

## 2021-01-21 PROCEDURE — 99222 1ST HOSP IP/OBS MODERATE 55: CPT

## 2021-01-21 RX ORDER — POTASSIUM PHOSPHATE, MONOBASIC POTASSIUM PHOSPHATE, DIBASIC 236; 224 MG/ML; MG/ML
30 INJECTION, SOLUTION INTRAVENOUS ONCE
Refills: 0 | Status: COMPLETED | OUTPATIENT
Start: 2021-01-21 | End: 2021-01-22

## 2021-01-21 RX ORDER — SODIUM CHLORIDE 9 MG/ML
1000 INJECTION, SOLUTION INTRAVENOUS
Refills: 0 | Status: DISCONTINUED | OUTPATIENT
Start: 2021-01-21 | End: 2021-01-23

## 2021-01-21 RX ORDER — POTASSIUM CHLORIDE 20 MEQ
10 PACKET (EA) ORAL
Refills: 0 | Status: COMPLETED | OUTPATIENT
Start: 2021-01-21 | End: 2021-01-21

## 2021-01-21 RX ADMIN — ENOXAPARIN SODIUM 40 MILLIGRAM(S): 100 INJECTION SUBCUTANEOUS at 12:04

## 2021-01-21 RX ADMIN — Medication 100 MILLIGRAM(S): at 15:30

## 2021-01-21 RX ADMIN — Medication 100 MILLIGRAM(S): at 22:57

## 2021-01-21 RX ADMIN — CEFEPIME 100 MILLIGRAM(S): 1 INJECTION, POWDER, FOR SOLUTION INTRAMUSCULAR; INTRAVENOUS at 05:04

## 2021-01-21 RX ADMIN — CHLORHEXIDINE GLUCONATE 1 APPLICATION(S): 213 SOLUTION TOPICAL at 12:03

## 2021-01-21 RX ADMIN — Medication 100 MILLIEQUIVALENT(S): at 13:20

## 2021-01-21 RX ADMIN — Medication 1 APPLICATION(S): at 12:04

## 2021-01-21 RX ADMIN — CEFEPIME 100 MILLIGRAM(S): 1 INJECTION, POWDER, FOR SOLUTION INTRAMUSCULAR; INTRAVENOUS at 22:57

## 2021-01-21 RX ADMIN — CEFEPIME 100 MILLIGRAM(S): 1 INJECTION, POWDER, FOR SOLUTION INTRAMUSCULAR; INTRAVENOUS at 15:00

## 2021-01-21 RX ADMIN — Medication 100 MILLIGRAM(S): at 05:04

## 2021-01-21 RX ADMIN — Medication 100 MILLIEQUIVALENT(S): at 12:03

## 2021-01-21 RX ADMIN — Medication 250 MILLIGRAM(S): at 05:04

## 2021-01-21 RX ADMIN — SODIUM CHLORIDE 75 MILLILITER(S): 9 INJECTION, SOLUTION INTRAVENOUS at 12:07

## 2021-01-21 RX ADMIN — Medication 100 MILLIEQUIVALENT(S): at 09:00

## 2021-01-21 RX ADMIN — Medication 250 MILLIGRAM(S): at 17:43

## 2021-01-21 NOTE — PROGRESS NOTE ADULT - PROBLEM SELECTOR PLAN 9
Pt admitted from [facility], will likely return to facility upon discharge.   Pending clearance for possible COVID exposure by other patient   Last COVID 01/18 negative.

## 2021-01-21 NOTE — PROGRESS NOTE ADULT - SUBJECTIVE AND OBJECTIVE BOX
Patient is a 75y old  Female who presents with a chief complaint of foot ulcer (05 Jan 2021 14:24)      HPI:  74 yo F from [facility], wheelchair bound with medical history significant of HTN, Diabetes, Osteoarthritis, Osteoporosis, Peripheral arterial disease, Diabetic neuropathy, HLD, Schizophrenia comes in with worsening ulceration to b/l heels. She has been having ulcers for past couple months, has been progressive. She was treated with IV antbx at NH but did not get better. It was worsening with necrotic changes and increasing foul smelling discharge. She denies fever, abdominal pain, chest pain, urinary symptoms, fall, trauma. No h/o nausea, vomiting, diarrhea, cough, dyspnea, sick contacts, recent illness.  (05 Jan 2021 14:24)      Podiatry HPI: 76 y/o female with full history as noted above, podiatry consulted for b/l heel wounds. Pt is from [facility], wheelchair bound with medical history significant of HTN, Diabetes, Osteoarthritis, Osteoporosis, PAD, Diabetic neuropathy, HLD, Schizophrenia comes in with worsening ulceration to b/l heels. Patient seen resting in bed comfortably, AAOx3. Patient states she has had the wounds for a long time, maybe more than 6 months. She relates receiving local wound care previously in the NH using santyl dressings. She endorses occasional pain to wound sites. Patient was  under podiatric care in previous admissions for b/l heel wounds with osteomyelitis. She states she is not interesting in surgery for her feet, she wishes to continue with local wound care. She denies nausea, vomiting, chills, fever, SOB.     Podiatry Progress: Pt s/p Right foot partial calcanectomy and wound debridement on 1/13/21. Patient seen resting in bed, AAOx1. Denies pain to b/l LE. Denies nausea, vomiting, fever, chills, shortness of breath. Denies any acute overnight events.     Medications acetaminophen   Tablet .. 650 milliGRAM(s) Oral every 6 hours PRN  aspirin  chewable 81 milliGRAM(s) Oral daily  cefepime   IVPB 1000 milliGRAM(s) IV Intermittent every 8 hours  chlorhexidine 2% Cloths 1 Application(s) Topical daily  collagenase Ointment 1 Application(s) Topical daily  Dakins Solution - Full Strength 1 Application(s) Topical once  dextrose 40% Gel 15 Gram(s) Oral once  dextrose 5% + sodium chloride 0.9% with potassium chloride 20 mEq/L 1000 milliLiter(s) IV Continuous <Continuous>  dextrose 50% Injectable 12.5 Gram(s) IV Push once  dextrose 50% Injectable 25 Gram(s) IV Push once  dextrose 50% Injectable 25 Gram(s) IV Push once  enoxaparin Injectable 40 milliGRAM(s) SubCutaneous daily  glucagon  Injectable 1 milliGRAM(s) IntraMuscular once  insulin lispro (ADMELOG) corrective regimen sliding scale   SubCutaneous three times a day before meals  insulin lispro (ADMELOG) corrective regimen sliding scale   SubCutaneous at bedtime  lactated ringers. 1000 milliLiter(s) IV Continuous <Continuous>  metroNIDAZOLE  IVPB 500 milliGRAM(s) IV Intermittent every 8 hours  simvastatin 40 milliGRAM(s) Oral at bedtime  sodium chloride 0.9% lock flush 10 milliLiter(s) IV Push every 1 hour PRN  vancomycin  IVPB 750 milliGRAM(s) IV Intermittent every 12 hours    FHNo pertinent family history in first degree relatives    ,   PMHCOVID-19    Osteomyelitis    DM (diabetes mellitus)    Paranoid schizophrenia    HLD (hyperlipidemia)    PAD (peripheral artery disease)    HTN (hypertension)       PSHNo significant past surgical history        Labs                          7.6    14.40 )-----------( 355      ( 21 Jan 2021 08:09 )             23.5      01-21    147<H>  |  120<H>  |  10  ----------------------------<  149<H>  2.9<LL>   |  18<L>  |  0.59    Ca    7.5<L>      21 Jan 2021 08:09    TPro  4.5<L>  /  Alb  1.2<L>  /  TBili  0.4  /  DBili  x   /  AST  10  /  ALT  7<L>  /  AlkPhos  64  01-21     Vital Signs Last 24 Hrs  T(C): 36.3 (21 Jan 2021 14:15), Max: 37.2 (21 Jan 2021 05:06)  T(F): 97.4 (21 Jan 2021 14:15), Max: 98.9 (21 Jan 2021 05:06)  HR: 89 (21 Jan 2021 14:15) (82 - 89)  BP: 125/62 (21 Jan 2021 14:15) (124/55 - 143/62)  BP(mean): --  RR: 17 (21 Jan 2021 14:15) (16 - 17)  SpO2: 100% (21 Jan 2021 14:15) (98% - 100%)  Sedimentation Rate, Erythrocyte: 99 mm/Hr (01-05-21 @ 11:45)         C-Reactive Protein, Serum: 5.63 mg/dL (01-06-21 @ 10:27)   WBC Count: 14.40 K/uL <H> (01-21-21 @ 08:09)    PHYSICAL EXAM 1/21/21: B/L FOOT DRESSING MAINTAINED CLEAN, DRY, INTACT; R FOOT WOUND VAC SET AT 125MMHG CONTINUOUS FLOW      PHYSICAL EXAM 1/20/21:   LE Focused:    Vasc:  DP/PT nonpalpable, CFT brisk to digits, TG warm to warm b/l,   Derm:   Wound 1:  L heel closed necrotic eschar measuring ~4x3.5x0.1 cm with mild mac wound erythema and loosely adhered periwound skin, no discharge noted, no malodor or PTB noted, stable in appearance    Wound 2: s/p R foot partial calcanectomy and wound debridement measuring ~9x6.5x1 cm with calcaneal bone exposed and surrounding fibrotic tissue; No periwound erythema. No drainage, no malodor  Neuro: protective sensation absent at level of digits b/l  MSK: no tenderness on palpation of periwound areas b/l       IMAGING:  f< from: Xray Ankle Complete 3 Views, Right (01.13.21 @ 18:15) >    EXAM:  ANKLE RIGHT (MINIMUM 3 V)                            PROCEDURE DATE:  01/13/2021          INTERPRETATION:  RIGHT ankle    CLINICAL INFORMATION: Postoperative radiographs.    TECHNIQUE: AP,lateral /views.    FINDINGS:    Large posterior calcaneal ulceration NOTED with the osteolysis /surgical debridement of the posterior calcaneus remaining osseous structures of the ankle are intact..    IMPRESSION:    Posterior calcaneal large ulceration within the posterior calcaneal postsurgical debridement versus/osteomyelitis.            ADITI SILVA MD; Attending Radiologist  This document has been electronically signed. Jan 13 2021  7:02PM    < end of copied text >    --------------------------  < from: MR Foot No Cont, Right (01.06.21 @ 11:08) >    EXAM:  MR FOOT RT                            PROCEDURE DATE:  01/06/2021          INTERPRETATION:  Clinical indications: Right heel ulceration and eschar status post debridement. Concern for osteomyelitis.    Multiplanar multisequence MRI of the right foot was performed localized to the hindfoot.    Correlation is made with prior MRI from September 11, 2019.    FINDINGS:    There is a large wound along the plantar aspect of the calcaneus posteriorly which extends nearly extends to bone. There is surrounding soft tissue edema about this site with evidence of an overlying bandage. The degree of soft tissue deficiency is increased compared to the prior examination. There is extensive osseous edema and corresponding hypointense T1 marrow signalthroughout the calcaneus extending from the posterior plantar margin to the level of the angle of Gissane. This is progressed compared to the prior examination and consistent with osteomyelitis. Marrow signal within the remainder of the foot is otherwise preserved.    There is confluent edema tracking along the abductor hallucis and flexor digitorum brevis muscles and within the plantar subcutaneous fat subjacent to this region. In total this area measures approximately 2.2 cm in transverse dimension, approximately 4 cm in anteroposterior posterior dimension, and approximately 1.6 cm in craniocaudal dimension. Findings may be related to underlying phlegmon or abscess. Evaluation is limited by the lack of intravenous contrast. Distal to this site there is edema throughout the visualized musculature consistent with myositis.    Visualized tendinous structures remain intact. There is no acute ligamentous injury. Visualized joint spaces are preserved without joint effusion.    IMPRESSION:    Osteomyelitis involving the calcaneus which extends from the plantar posterior aspect of the calcaneus to the level of the angle of Gissane. This is progressed compared to the prior examination. This is seen in the setting of diffuse soft tissue deficiency along the posterior plantar aspect of the calcaneus.    Confluent edema adjacent to this region within the abductor hallucis and flexor digitorum brevis muscles and within the subjacent plantar subcutaneous fat. This may be related to abscess orphlegmon.            OSWALDO ALFRED MD; Attending Radiologist  This document has been electronically signed. Jan 6 2021 11:49AM    < end of copied text >    -------------------------------------------------------------------------------------------------------------  < from: [facility] Physiol Extremity Lower 3+ Level, BI (01.05.21 @ 17:55) >    EXAM:  US PHYSIOL LWR EXT 3+ LEV BI                            PROCEDURE DATE:  01/05/2021          INTERPRETATION:  Clinical information: History of diabetes, nonsmoker, hypertension, hyperlipidemia, presents with bilateral heel ulcers.    Comparison: Lower extremity arterial Doppler study dated 5/13/2020.    Technique: Lower extremity arterial Doppler study. Note that pressure measurements were not obtained at the thigh level.    Findings: Ankle brachial index measures 1.33 bilaterally, compared with prior values of 1.41 on the right and 1.36 on the left. No segmental arterial pressure gradient is present.    PVR waveforms are normal in amplitude and pulsatility from the thigh through the ankle level. They're diminished in amplitude at the metatarsal and digital levels in symmetric fashion, similar to the prior exam.    Impression: No Doppler evidence of hemodynamically significant arterial inflow abnormality in either lower extremity.    Evidence of small vessel disease in the feet.    No significant interval change since study of 5/13/2020.            SOHAN PA MD; Attending Radiologist  This document has been electronically signed. Jan 6 2021  9:13AM    < end of copied text >      --------------------------------------------------------------------------------------------------------------  < from: Xray Foot AP + Lateral + Oblique, Right (01.05.21 @ 18:00) >    EXAM:  FOOT RIGHT (MINIMUM 3 VIEWS)                            PROCEDURE DATE:  01/05/2021          INTERPRETATION:  Right foot    HISTORY: Status post bedside debridement.     Two views of the right foot show thinning of soft tissues near the calcaneus likely indicating site of debridement. The joint spaces are maintained. There is questionable exposure of the plantar surface of the calcaneus.    IMPRESSION: Calcaneal soft tissues and questionable exposure as noted. Clinical correlation is suggested.        Thank you for this referral.            REID CRAMER MD; Attending Interventional Radiologist  This document has been electronically signed. Jan 6 2021 11:10AM    < end of copied text >    -------------------------------------------------------------------------------------------  a< from: Xray Ankle Complete 3 Views, Bilateral (01.05.21 @ 14:37) >    EXAM:  ANKLE BILATERAL (MINIMUM 3 V)                            PROCEDURE DATE:  01/05/2021          INTERPRETATION:  CLINICAL INDICATION:  Osteomyelitis; evaluate for soft tissue emphysema.    COMPARISON:  Left ankle radiography 5/11/2020.    TECHNIQUE:  AP, oblique and crosstable lateral views left ankle. Oblique and crosstable lateral views right ankle. Technologist noted that the best possible films were obtained.    FINDINGS:    Right ankle: Generalized osteopenia. Subcutaneous emphysema is identified along the plantar aspect of the right hindfoot inferior to the calcaneus, with an overlying skin defect noted along the posterior aspect of the calcaneus. Osteolysis involving the inferior/posterior aspect of the right calcaneus cannot beexcluded. MRI evaluation can be performed for further assessment. Extensive vascular calcification is noted. No ankle joint effusion is noted.    Left ankle: Generalized osteopenia. There is no evidence for acute fracture or dislocation. The ankle mortise appears grossly intact. No ankle joint effusion is noted. Extensive vascular calcifications identified. No significant soft tissue swelling.      IMPRESSION:  No evidence for acute fracture. Subcutaneous emphysema is identified along the plantaraspect of the right hindfoot inferior to the calcaneus, with an overlying skin defect noted along the posterior aspect of the calcaneus. Osteolysis involving the inferior/posterior aspect of the right calcaneus cannot be excluded. MRI evaluation can be performed for further assessment.                MARA LARKIN MD; Attending Radiologist  This document has been electronically signed. Jan 5 2021  3:57PM    < end of copied text >        CULTURES:   cCulture - Surgical Swab (01.05.21 @ 22:13)   Specimen Source: .Surgical Swab Right heel wound   Culture Results:   Few Morganella morganii   Few Proteus mirabilis       Historical Values  Culture - Surgical Swab (01.05.21 @ 22:13)   Specimen Source: .Surgical Swab Right heel wound   Culture Results:   Few Morganella morganii   Few Proteus mirabilis   culture:Culture - Surgical Swab (01.13.21 @ 19:22)   - Ampicillin/Sulbactam: S <=4/2 Enterobacter, Citrobacter, and Serratia may develop resistance during prolonged therapy (3-4 days)   - Ampicillin: S <=8 These ampicillin results predict results for amoxicillin   - Amikacin: S <=16   - Amoxicillin/Clavulanic Acid: S <=8/4   - Aztreonam: S <=4   - Cefazolin: I 4 Enterobacter, Citrobacter, and Serratia may develop resistance during prolonged therapy (3-4 days)   - Cefepime: S <=2   - Cefoxitin: S <=8   - Ceftriaxone: S <=1 Enterobacter, Citrobacter, and Serratia may develop resistance during prolonged therapy   - Ciprofloxacin: R 1   - Ertapenem: S <=0.5   - Gentamicin: S <=2   - Levofloxacin: S <=0.5   - Meropenem: S <=1   - Trimethoprim/Sulfamethoxazole: S <=0.5/9.5   - Tobramycin: S <=2   - Piperacillin/Tazobactam: S <=8   Specimen Source: .Surgical Swab Right heel wound culture   Culture Results:   Moderate Proteus mirabilis   Few Bacteroides vulgatus group "Susceptibilities not performed"   Organism Identification: Proteus mirabilis   Organism: Proteus mirabilis   Method Type: RICARDO       Historical Values  Culture - Surgical Swab (01.13.21 @ 19:22)   - Ampicillin/Sulbactam: S <=4/2 Enterobacter, Citrobacter, and Serratia may develop resistance during prolonged therapy (3-4 days)   - Ampicillin: S <=8 These ampicillin results predict results for amoxicillin   - Amikacin: S <=16   - Amoxicillin/Clavulanic Acid: S <=8/4   - Aztreonam: S <=4   - Cefazolin: I 4 Enterobacter, Citrobacter, and Serratia may develop resistance during prolonged therapy (3-4 days)   - Cefepime: S <=2   - Cefoxitin: S <=8   - Ceftriaxone: S <=1 Enterobacter, Citrobacter, and Serratia may develop resistance during prolonged therapy   - Ciprofloxacin: R 1   - Ertapenem: S <=0.5   - Gentamicin: S <=2   - Levofloxacin: S <=0.5   - Meropenem: S <=1   - Trimethoprim/Sulfamethoxazole: S <=0.5/9.5   - Tobramycin: S <=2   - Piperacillin/Tazobactam: S <=8   Specimen Source: .Surgical Swab Right heel wound culture   Culture Results:   Moderate Proteus mirabilis   Few Bacteroides vulgatus group "Susceptibilities not performed"   Organism Identification: Proteus mirabilis   Organism: Proteus mirabilis   Method Type: RICARDO   ---------------------------------  Pathology result:Surgical Pathology Report (01.13.21 @ 14:03)   Surgical Pathology Report:   ACCESSION No: [id number]   [facility] [place] 2   Surgical Final Report   Final Diagnosis   1. Right calcaneum bone; partial calcanectomy, incision and   drainage:   Cancellous bone with acute osteomyelitis and reparative changes   2. Right heel, margin; biopsy:   Osteocartilaginous tissue with granulation tissue and   regenerating bony   trabeculae; no inflammatory exudate is identified   Verified by: Sarah Culver M.D.

## 2021-01-21 NOTE — PROGRESS NOTE ADULT - ASSESSMENT
A:  s/p R foot partial calcanectomy & wound debridement 1/13/21  Osteomyelitis of calcaneus R foot   DMII    P:   Patient evaluated and chart reviewed  Post-op R foot xrays results as noted above  Intra-op cx result reviewed as above  Intra-op path result reviewed as above  b/l LE dressings maintained clean, dry, intact; Right foot wound vac set at 125mmhg continuous flow   Pt to continue using CAIR boots to offload b/l LE   Instructions for discharge: upon discharge, on Right heel wound apply santyl, white foam overlying exposed calcaneus on the right heel, and black foam overlying to cover the wound. Place wound vac over, and set at 125 mmHg. On the left heel, apply santyl to wound, cover with 4x4 gauze, ABD pad, Keyur overlying, and light ACE compression. Apply CAIR boots bilaterally.  Podiatry will follow while in house.   Discussed with attending

## 2021-01-21 NOTE — PROGRESS NOTE ADULT - SUBJECTIVE AND OBJECTIVE BOX
Patient is a 75y old  Female who presents with a chief complaint of foot ulcer (21 Jan 2021 14:22)    PATIENT IS SEEN AND EXAMINED IN MEDICAL FLOOR.    ALLERGIES:  No Known Allergies    VITALS:    Vital Signs Last 24 Hrs  T(C): 36.3 (21 Jan 2021 14:15), Max: 37.2 (21 Jan 2021 05:06)  T(F): 97.4 (21 Jan 2021 14:15), Max: 98.9 (21 Jan 2021 05:06)  HR: 89 (21 Jan 2021 14:15) (82 - 89)  BP: 125/62 (21 Jan 2021 14:15) (124/55 - 143/62)  BP(mean): --  RR: 17 (21 Jan 2021 14:15) (16 - 17)  SpO2: 100% (21 Jan 2021 14:15) (98% - 100%)    LABS:    CBC Full  -  ( 21 Jan 2021 08:09 )  WBC Count : 14.40 K/uL  RBC Count : 2.52 M/uL  Hemoglobin : 7.6 g/dL  Hematocrit : 23.5 %  Platelet Count - Automated : 355 K/uL  Mean Cell Volume : 93.3 fl  Mean Cell Hemoglobin : 30.2 pg  Mean Cell Hemoglobin Concentration : 32.3 gm/dL  Auto Neutrophil # : x  Auto Lymphocyte # : x  Auto Monocyte # : x  Auto Eosinophil # : x  Auto Basophil # : x  Auto Neutrophil % : x  Auto Lymphocyte % : x  Auto Monocyte % : x  Auto Eosinophil % : x  Auto Basophil % : x      01-21    147<H>  |  120<H>  |  10  ----------------------------<  149<H>  2.9<LL>   |  18<L>  |  0.59    Ca    7.5<L>      21 Jan 2021 08:09    TPro  4.5<L>  /  Alb  1.2<L>  /  TBili  0.4  /  DBili  x   /  AST  10  /  ALT  7<L>  /  AlkPhos  64  01-21    CAPILLARY BLOOD GLUCOSE      POCT Blood Glucose.: 125 mg/dL (21 Jan 2021 11:41)  POCT Blood Glucose.: 138 mg/dL (21 Jan 2021 08:08)  POCT Blood Glucose.: 116 mg/dL (20 Jan 2021 21:15)  POCT Blood Glucose.: 108 mg/dL (20 Jan 2021 16:49)      LIVER FUNCTIONS - ( 21 Jan 2021 08:09 )  Alb: 1.2 g/dL / Pro: 4.5 g/dL / ALK PHOS: 64 U/L / ALT: 7 U/L DA / AST: 10 U/L / GGT: x           Creatinine Trend: 0.59<--, 0.55<--, 0.51<--, 0.52<--, 0.58<--, 0.61<--  I&O's Summary      .Tissue Right heel margin  01-13 @ 19:31   Rare Enterococcus faecalis  --  Enterococcus faecalis      .Surgical Swab Right heel wound culture  01-13 @ 19:22   Moderate Proteus mirabilis  Few Bacteroides vulgatus group "Susceptibilities not performed"  --  Proteus mirabilis      .Urine Clean Catch (Midstream)  01-06 @ 06:46   No growth  --  --      .Surgical Swab Right heel wound  01-05 @ 22:13   Few Morganella morganii  Few Proteus mirabilis  Moderate Corynebacterium species "Susceptibilities not performed"  Moderate Bacteroides vulgatus "Susceptibilities not performed"  --  Morganella morganii  Proteus mirabilis      .Blood Blood-Peripheral  01-05 @ 17:59   No Growth Final  --  --      MEDICATIONS:    MEDICATIONS  (STANDING):  aspirin  chewable 81 milliGRAM(s) Oral daily  cefepime   IVPB 1000 milliGRAM(s) IV Intermittent every 8 hours  chlorhexidine 2% Cloths 1 Application(s) Topical daily  collagenase Ointment 1 Application(s) Topical daily  Dakins Solution - Full Strength 1 Application(s) Topical once  dextrose 40% Gel 15 Gram(s) Oral once  dextrose 5% + sodium chloride 0.9% with potassium chloride 20 mEq/L 1000 milliLiter(s) (75 mL/Hr) IV Continuous <Continuous>  dextrose 50% Injectable 12.5 Gram(s) IV Push once  dextrose 50% Injectable 25 Gram(s) IV Push once  dextrose 50% Injectable 25 Gram(s) IV Push once  enoxaparin Injectable 40 milliGRAM(s) SubCutaneous daily  glucagon  Injectable 1 milliGRAM(s) IntraMuscular once  insulin lispro (ADMELOG) corrective regimen sliding scale   SubCutaneous three times a day before meals  insulin lispro (ADMELOG) corrective regimen sliding scale   SubCutaneous at bedtime  lactated ringers. 1000 milliLiter(s) (75 mL/Hr) IV Continuous <Continuous>  metroNIDAZOLE  IVPB 500 milliGRAM(s) IV Intermittent every 8 hours  simvastatin 40 milliGRAM(s) Oral at bedtime  vancomycin  IVPB 750 milliGRAM(s) IV Intermittent every 12 hours      MEDICATIONS  (PRN):  acetaminophen   Tablet .. 650 milliGRAM(s) Oral every 6 hours PRN Moderate Pain (4 - 6)  sodium chloride 0.9% lock flush 10 milliLiter(s) IV Push every 1 hour PRN Pre/post blood products, medications, blood draw, and to maintain line patency      REVIEW OF SYSTEMS:                           ALL ROS DONE [ X   ]    CONSTITUTIONAL:  LETHARGIC [   ], FEVER [   ], UNRESPONSIVE [   ]  CVS:  CP  [   ], SOB, [   ], PALPITATIONS [   ], DIZZYNESS [   ]  RS: COUGH [   ], SPUTUM [   ]  GI: ABDOMINAL PAIN [   ], NAUSEA [   ], VOMITINGS [   ], DIARRHEA [   ], CONSTIPATION [   ]  :  DYSURIA [   ], NOCTURIA [   ], INCREASED FREQUENCY [   ], DRIBLING [   ],  SKELETAL: PAINFUL JOINTS [   ], SWOLLEN JOINTS [   ], NECK ACHE [   ], LOW BACK ACHE [   ],  SKIN : ULCERS [   ], RASH [   ], ITCHING [   ]  CNS: HEAD ACHE [   ], DOUBLE VISION [   ], BLURRED VISION [   ], AMS / CONFUSION [   ], SEIZURES [   ], WEAKNESS [   ],TINGLING / NUMBNESS [   ]    PHYSICAL EXAMINATION:  GENERAL APPEARANCE: NO DISTRESS  HEENT:  NO PALLOR, NO  JVD,  NO   NODES, NECK SUPPLE  CVS: S1 +, S2 +,   RS: AEEB,  OCCASIONAL  RALES +,   NO RONCHI  ABD: SOFT, NT, NO, BS +  EXT: NO PE  SKIN: WARM, BILATERAL HEEL ULCERS - COVERED, SACRAL ULCER + RIGHT ISCHIAL ULCER COVERED, PICC LINE+  SKELETAL:  ROM ACCEPTABLE  CNS:  AAO X 0-1    RADIOLOGY :    EXAM:  MR FOOT RT                            PROCEDURE DATE:  01/06/2021          INTERPRETATION:  Clinical indications: Right heel ulceration and eschar status post debridement. Concern for osteomyelitis.    Multiplanar multisequence MRI of the right foot was performed localized to the hindfoot.    Correlation is made with prior MRI from September 11, 2019.    FINDINGS:    There is a large wound along the plantar aspect of the calcaneus posteriorly which extends nearly extends to bone. There is surrounding soft tissue edema about this site with evidence of an overlying bandage. The degree of soft tissue deficiency is increased compared to the prior examination. There is extensive osseous edema and corresponding hypointense T1 marrow signalthroughout the calcaneus extending from the posterior plantar margin to the level of the angle of Gissane. This is progressed compared to the prior examination and consistent with osteomyelitis. Marrow signal within the remainder of the foot is otherwise preserved.    There is confluent edema tracking along the abductor hallucis and flexor digitorum brevis muscles and within the plantar subcutaneous fat subjacent to this region. In total this area measures approximately 2.2 cm in transverse dimension, approximately 4 cm in anteroposterior posterior dimension, and approximately 1.6 cm in craniocaudal dimension. Findings may be related to underlying phlegmon or abscess. Evaluation is limited by the lack of intravenous contrast. Distal to this site there is edema throughout the visualized musculature consistent with myositis.    Visualized tendinous structures remain intact. There is no acute ligamentous injury. Visualized joint spaces are preserved without joint effusion.    IMPRESSION:    Osteomyelitis involving the calcaneus which extends from the plantar posterior aspect of the calcaneus to the level of the angle of Gissane. This is progressed compared to the prior examination. This is seen in the setting of diffuse soft tissue deficiency along the posterior plantar aspect of the calcaneus.    Confluent edema adjacent to this region within the abductor hallucis and flexor digitorum brevis muscles and within the subjacent plantar subcutaneous fat. This may be related to abscess orphlegmon.    ASSESSMENT :     Osteomyelitis    Yes    COVID-19    Osteomyelitis    DM (diabetes mellitus)    Paranoid schizophrenia    HLD (hyperlipidemia)    PAD (peripheral artery disease)    HTN (hypertension)            PLAN:  HPI:  74 yo F from [facility], wheelchair bound with medical history significant of HTN, Diabetes, Osteoarthritis, Osteoporosis, Peripheral arterial disease, Diabetic neuropathy, HLD, Schizophrenia comes in with worsening ulceration to b/l heels. She has been having ulcers for past couple months, has been progressive. She was treated with IV antbx at NH but did not get better. It was worsening with necrotic changes and increasing foul smelling discharge. She denies fever, abdominal pain, chest pain, urinary symptoms, fall, trauma. No h/o nausea, vomiting, diarrhea, cough, dyspnea, sick contacts, recent illness.  (05 Jan 2021 14:24)    PLAN:    # BILATERAL LE CHRONIC ULCER NOW PROGRESSIVELY WORSENING W/ SSTI AND RIGHT CALCANEAL OSTEOMYELITIS W/ POSSIBLE ABSCESS; UNDERWENT BEDSIDE DEBRIDEMENT OF RIGHT LEG ULCER AND UNDERWENT RIGHT PARTIAL CALCANECTOMY ON 1/13 AND PICC LINE PLACEMENT 1/20  - CEFEPIME + VANOMYCIN + FLAGYL, REVIEWED TIMUR, BCX - NGTD, WOUND CX - MORGANELLA MORGANI & P. MIRABILIS, ID CONSULT IN PROGRESS, PODIATRY CONSULT IN PROGRESS  - REVIEWED RIGHT LE MRI  -  PSYCHIATRY CONSULT FOR CAPACITY IN PROGRESS - DEEM PATIENT AS HAVING CAPACITY.  PATIENT WAS AGREEABLE FOR SURGICAL DEBRIDEMENT ON 1/09  - 2 PC CONSENT FOR SURGICAL DEBRIDEMENT PLANNED FOR 1/12; ATTEMPTED TO CALL NEXT OF [facility] [place] @ [phone] HOWEVER PHONE NUMBER APPEARS DISCONNECTED  - VASCULAR SX CONSULT IN PROGRESS - PATIENT MAY REQUIRE AKA, BUT REFUSED ON PREVIOUS CONVERSATIONS - WAS AGREEABLE TO LOCAL DEBRIDEMENT/INTERVENTIONS  - PLAN FOR WOUND VAC  - PATIENT WILL NEED 6 WEEKS OF IV ANTIBIOTICS - WILL NEED TO SWITCH TO PICC LINE FROM EXTENDED DWELL - D/W FLOOR TEAM    # MEDICAL CLEARANCE FOR SURGERY - RCRI 1 POINT - 6% RISK OF DEATH, MI OR CARDIAC ARREST; CARDIOLOGY CONSULT IN PROGRESS FOR MEDICAL CLEARANCE    # SACRAL AND RIGHT ISCHIAL UNSTAGEABLE ULCERS W/ SSTI  S/P DEBRIDEMENT 1/8- ON VANCOMYCIN AND CEFEPIME, SURGERY CONSULT IN PROGRESS -  PSYCHIATRY CONSULT FOR CAPACITY IN PROGRESS    # ACUTE METABOLIC ENCEPHALOPATHY - SUSPECT S/T ACUTE INFECTION & HYPERNATREMIA - REVIEWED CT HEAD  - NEUROLOGY CONSULT IN PROGRESS    # HYPERNATREMIA SUSPECT S/T POOR PO INTAKE - PLACED ON IVF    # LEFT ARM SWELLING - REVIEWED LEFT ARM U/S W/ OUT THROMBUS, WARM COMPRESSES  # ASHLEY - RESOLVED  # PAD  # HTN  # DM W/ DIABETIC NEUROPATHY  # OA/OP  # SCHIZOPHRENIA - HOLD MEDICATION  # CASE D/W PATIENT'S ? PARTNER - LISA KELLY - WHO REPORTS HE IS NOT NEXT OF KIN OR HCP - [phone] ON 1/12; HE UNDERSTOOD PATIENT'S PROGNOSIS WAS GUARDED  # GI AND DVT PPX    ELEN CASILLAS MD COVERING FARHAN CASILLAS MD

## 2021-01-21 NOTE — CONSULT NOTE ADULT - SUBJECTIVE AND OBJECTIVE BOX
Patient is a 75y old  Female who presents with a chief complaint of foot ulcer (21 Jan 2021 15:19)      HPI: Previously active and communicative. Has not recovered prior level of attention and function after osteomyelitis treatment     PAST MEDICAL & SURGICAL HISTORY:  COVID-19    Osteomyelitis    DM (diabetes mellitus)    Paranoid schizophrenia    HLD (hyperlipidemia)    PAD (peripheral artery disease)    HTN (hypertension)    No significant past surgical history        FAMILY HISTORY:  No pertinent family history in first degree relatives          Social Hx:  Nonsmoker, no drug or alcohol use    MEDICATIONS  (STANDING):  aspirin  chewable 81 milliGRAM(s) Oral daily  cefepime   IVPB 1000 milliGRAM(s) IV Intermittent every 8 hours  chlorhexidine 2% Cloths 1 Application(s) Topical daily  collagenase Ointment 1 Application(s) Topical daily  Dakins Solution - Full Strength 1 Application(s) Topical once  dextrose 40% Gel 15 Gram(s) Oral once  dextrose 5% + sodium chloride 0.9% with potassium chloride 20 mEq/L 1000 milliLiter(s) (75 mL/Hr) IV Continuous <Continuous>  dextrose 50% Injectable 12.5 Gram(s) IV Push once  dextrose 50% Injectable 25 Gram(s) IV Push once  dextrose 50% Injectable 25 Gram(s) IV Push once  enoxaparin Injectable 40 milliGRAM(s) SubCutaneous daily  glucagon  Injectable 1 milliGRAM(s) IntraMuscular once  insulin lispro (ADMELOG) corrective regimen sliding scale   SubCutaneous three times a day before meals  insulin lispro (ADMELOG) corrective regimen sliding scale   SubCutaneous at bedtime  lactated ringers. 1000 milliLiter(s) (75 mL/Hr) IV Continuous <Continuous>  metroNIDAZOLE  IVPB 500 milliGRAM(s) IV Intermittent every 8 hours  simvastatin 40 milliGRAM(s) Oral at bedtime  vancomycin  IVPB 750 milliGRAM(s) IV Intermittent every 12 hours       Allergies    No Known Allergies    Intolerances        ROS: Pertinent positives in HPI, all other ROS were reviewed and are negative.      Vital Signs Last 24 Hrs  T(C): 36.3 (21 Jan 2021 14:15), Max: 37.2 (21 Jan 2021 05:06)  T(F): 97.4 (21 Jan 2021 14:15), Max: 98.9 (21 Jan 2021 05:06)  HR: 89 (21 Jan 2021 14:15) (82 - 89)  BP: 125/62 (21 Jan 2021 14:15) (124/55 - 143/62)  BP(mean): --  RR: 17 (21 Jan 2021 14:15) (16 - 17)  SpO2: 100% (21 Jan 2021 14:15) (98% - 100%)        Constitutional: awake and alert.  HEENT: PERRLA, EOMI,   Neck: Supple.  Respiratory: Breath sounds are clear bilaterally  Cardiovascular: S1 and S2, regular / rhythm  Gastrointestinal: soft, nontender  Extremities:  ++ edema  Vascular: Carotid Bruit - no  Musculoskeletal: no joint swelling/tenderness, no abnormal movements  Skin: No rashes    Neurological exam:  HF: Alert oriented in month and year and oriented in hospital but does not know the correct name of the hospital. Appropriately interactive, normal affect. Speech fluent, No Aphasia or paraphasic errors. Naming /repetition intact   CN: SANDRA, EOMI - comitant strabismus (?), VFF, facial sensation normal, no NLFD, tongue midline, Palate moves equally, SCM equal bilaterally  Motor: Rigidity/ increase tone in extremities with 1/5 strength    Sens: Intact to light touch / PP/ VS/   Reflexes: Symmetric and normal . BJ 1+, BR 1+, KJ 0, unable to test due to splints bandages  Coord:  unable to cooperate  Gait/Balance: Cannot test    NIHSS:  14  1A: Level of consciousness       0= Alert; keenly responsive  1B: Ask month and age       0= Both questions right  1C: "Blink eyes" and "Squeeze Hands"       +1= Performs 1 task  2: Horizontal EOMs       +1= Partial gaze palsy: can be overcome  3: Visual fields       0= No visual loss  4: Facial palsy (use grimace if obtunded)       0= Normal symmetry  5A: Left arm motor drift (count out loud and use fingers to show count)       +3= No effort against gravity  5B: Right arm motor drift       +3= No effort against gravity  6A: Left leg motor drift       +3= No effort against gravity  6B: Right leg motor drift       +3= No effort against gravity  7: Limb ataxia (FNF/heel-shin)       0= No ataxia  8: Sensation       0= Normal, no sensory loss  9: Language/aphasia- describe the scene (on matthieu); name the items; read the sentences (on matthieu)       0= Normal, no aphasia  10: Dysarthria- read the words       0= Normal  11: Extinction/inattention       0= No abnormality      MRS 5    Labs:                        7.6    14.40 )-----------( 355      ( 21 Jan 2021 08:09 )             23.5     01-21    147<H>  |  120<H>  |  10  ----------------------------<  149<H>  2.9<LL>   |  18<L>  |  0.59    Ca    7.5<L>      21 Jan 2021 08:09    TPro  4.5<L>  /  Alb  1.2<L>  /  TBili  0.4  /  DBili  x   /  AST  10  /  ALT  7<L>  /  AlkPhos  64  01-21      01-05 Chol 121 LDL -- HDL 36<L> Trig 112      Radiology report:  - CT head:    < from: CT Head No Cont (01.14.21 @ 10:01) >  EXAM:  CT BRAIN                            PROCEDURE DATE:  01/14/2021          INTERPRETATION:  .    CLINICAL INFORMATION: ACUTE ENCEPHALOPATHY    TECHNIQUE: Multiple axial CT images of the head were obtained without contrast. Sagittal and coronalreconstructed images were acquired from the source data.    COMPARISON: No prior CT studies of the brain are available for comparison at this institution.    FINDINGS: There is no acute intracranial hemorrhage, mass effect, shift of the midline structures, herniation, extra-axial fluid collection, or hydrocephalus.    There is diffuse cerebral volume loss with prominence of the sulci, fissures, and cisternal spaces which is normal for the patient's age. There is mild deep and periventricular white matter hypoattenuation statistically compatible with microvascular changes given calcific atherosclerotic disease of the intracranial arteries. There is a partially empty sella.    Polyps versus retention cysts are seen within the bilateral maxillary sinuses. Additional mild scattered mucosal thickening is seen throughout the ethmoid complex. The calvarium is intact. Chronic bilateral nasal bone fracture deformities are seen. The imaged orbits are unremarkable.    IMPRESSION: No acute intracranial hemorrhage, mass effect, or shift of the midline structures.    If clinical symptoms are new and persistent, consider further evaluation with brain MRI examination, if there are no MRI contraindications.      CHANTALE BOYER MD; Attending Radiologist  This document has been electronically signed. Jan 14 2021 10:10AM    < end of copied text >

## 2021-01-21 NOTE — PROGRESS NOTE ADULT - ASSESSMENT
HPI:  76 yo F from [facility], wheelchair bound with medical history significant of HTN, Diabetes, Osteoarthritis, Osteoporosis, Peripheral arterial disease, Diabetic neuropathy, HLD, Schizophrenia comes in with worsening ulceration to b/l heels. She has been having ulcers for past couple months, has been progressive. She was treated with IV antbx at NH but did not get better. It was worsening with necrotic changes and increasing foul smelling discharge. Patient with additional ulcerations to her coccyx, ischium, and sacrum for which wound care and surgery were consulted. Podiatry also consulted for her heel ulcerations: patient s/p bedside debridement of her right heel ulceration, refusing any surgical interventions at this time. Infectious disease also following.    1/6: MRI with findings of: Osteomyelitis involving the calcaneus which extends from the plantar posterior aspect of the calcaneus to the level of the angle of Gissane. This is progressed compared to the prior examination.   1/7:  Pt states she understands that she may loose her leg and/or her life without surgical intervention for RLE OM, still refusing debridement.  Psych consulted. Patient needs medical clearance for OR on 1/8.  1/11; Patient I and D not done, anesthesia unable to get consent. Patient not consenting to OR procedure. OR procedure cancelled due to no consent. Patient deemed to have full capacity as per Psychiatry.   1/13: Patient seen and examined at bedside. S/P extended dwell placement by IR. Patient scheduled for surgical debridement of right heel.  1/14 s/p debridement of rt foot, no further surgical intervention at this time.    Patient seen and examined at bedside. She denies fever, abdominal pain, chest pain, urinary symptoms, fall, trauma. No h/o nausea, vomiting, diarrhea, cough, dyspnea, sick contacts, recent illness.   Patient had PICC line inserted today (left subclavian) for abx treatment(she needs 6 weeks of abx after discharge). Patient on airborne isolation as she was in contact with COVID positive patient . Last COVID test negative by pcr 01/18. COVID-19 PCR sent today for possible discharge tomorrow to NH. Hypokalemia 2.9 given K rider x 3.   HPI:  74 yo F from [facility], wheelchair bound with medical history significant of HTN, Diabetes, Osteoarthritis, Osteoporosis, Peripheral arterial disease, Diabetic neuropathy, HLD, Schizophrenia comes in with worsening ulceration to b/l heels. She has been having ulcers for past couple months, has been progressive. She was treated with IV antbx at NH but did not get better. It was worsening with necrotic changes and increasing foul smelling discharge. Patient with additional ulcerations to her coccyx, ischium, and sacrum for which wound care and surgery were consulted. Podiatry also consulted for her heel ulcerations: patient s/p bedside debridement of her right heel ulceration, refusing any surgical interventions at this time. Infectious disease also following.    1/6: MRI with findings of: Osteomyelitis involving the calcaneus which extends from the plantar posterior aspect of the calcaneus to the level of the angle of Gissane. This is progressed compared to the prior examination.   1/7:  Pt states she understands that she may loose her leg and/or her life without surgical intervention for RLE OM, still refusing debridement.  Psych consulted. Patient needs medical clearance for OR on 1/8.  1/11; Patient I and D not done, anesthesia unable to get consent. Patient not consenting to OR procedure. OR procedure cancelled due to no consent. Patient deemed to have full capacity as per Psychiatry.   1/13: Patient seen and examined at bedside. S/P extended dwell placement by IR. Patient scheduled for surgical debridement of right heel.  1/14 s/p debridement of rt foot, no further surgical intervention at this time.    Patient seen and examined at bedside. She denies fever, abdominal pain, chest pain, urinary symptoms, fall, trauma. No h/o nausea, vomiting, diarrhea, cough, dyspnea, sick contacts, recent illness.   Patient had PICC line inserted today (left subclavian) for abx treatment(she needs 6 weeks of abx after discharge). Patient on airborne isolation as she was in contact with COVID positive patient . Last COVID test negative by pcr 01/18. COVID-19 PCR sent today for possible discharge tomorrow to NH. Hypokalemia 2.9 given K rider x 3.  Patient is withdrawing from food and interaction. Will consult neurology dr. Norris for recommendations.

## 2021-01-21 NOTE — CONSULT NOTE ADULT - ASSESSMENT
A/P: Delayed recovery from multifactorial encephalopathy causing delirium; underlying microangiopathic cerebral disease with superimposed infection and metabolic abnormalities as yet uncorrected; it could take 1 week after correction of metabolic abnormalities for encephalopathy to improve. Recommend continue current supportive medical treatment; EEG    - No IV tpa given because not stroke syndrome  - ASA/ PLavix  - Statin  - Dysphagia screen  - DVT prophylaxia  -- PT eval  - Speech/swallow eval    Total Critical Care Time spent:

## 2021-01-21 NOTE — PROGRESS NOTE ADULT - PROBLEM SELECTOR PLAN 4
Pt takes Perphenazine at home  home medication held as recommended by Psychiatry  Patient unconsentable due to somnolence and unarousable   Psychiatry Dr. Lackey. Pt takes Perphenazine at home  home medication held as recommended by Psychiatry  Patient unconsentable due to somnolence and unarousable  Patient is withdrawing from food and interaction with medical staff  neuro dr. Norris consulted  f/u recommendations   Psychiatry Dr. Lackey.

## 2021-01-21 NOTE — PROGRESS NOTE ADULT - PROBLEM SELECTOR PLAN 1
MRI noted above  c/w Cefepime   c/w metronidazole   will need 6 weeks of abx,  IR placement of  PICC line.  Wound culture grew few Morganella morganii and few Proteus mirabilis  s/p OR right heel debridement, obtained 2PC consent  wound vac applied by podiatry  ID Dr. Wellington  Podiatry following  Vascular Following.

## 2021-01-21 NOTE — PROGRESS NOTE ADULT - SUBJECTIVE AND OBJECTIVE BOX
Patient denies chest pain or shortness of breath, is confused.  Review of systems otherwise (-)  	  acetaminophen   Tablet .. 650 milliGRAM(s) Oral every 6 hours PRN  aspirin  chewable 81 milliGRAM(s) Oral daily  cefepime   IVPB 1000 milliGRAM(s) IV Intermittent every 8 hours  chlorhexidine 2% Cloths 1 Application(s) Topical daily  collagenase Ointment 1 Application(s) Topical daily  Dakins Solution - Full Strength 1 Application(s) Topical once  dextrose 40% Gel 15 Gram(s) Oral once  dextrose 5% + sodium chloride 0.9% with potassium chloride 20 mEq/L 1000 milliLiter(s) IV Continuous <Continuous>  dextrose 50% Injectable 12.5 Gram(s) IV Push once  dextrose 50% Injectable 25 Gram(s) IV Push once  dextrose 50% Injectable 25 Gram(s) IV Push once  enoxaparin Injectable 40 milliGRAM(s) SubCutaneous daily  glucagon  Injectable 1 milliGRAM(s) IntraMuscular once  insulin lispro (ADMELOG) corrective regimen sliding scale   SubCutaneous three times a day before meals  insulin lispro (ADMELOG) corrective regimen sliding scale   SubCutaneous at bedtime  lactated ringers. 1000 milliLiter(s) IV Continuous <Continuous>  metroNIDAZOLE  IVPB 500 milliGRAM(s) IV Intermittent every 8 hours  potassium chloride  10 mEq/100 mL IVPB 10 milliEquivalent(s) IV Intermittent every 1 hour  simvastatin 40 milliGRAM(s) Oral at bedtime  sodium chloride 0.9% lock flush 10 milliLiter(s) IV Push every 1 hour PRN  vancomycin  IVPB 750 milliGRAM(s) IV Intermittent every 12 hours                            7.6    14.40 )-----------( 355      ( 21 Jan 2021 08:09 )             23.5       Hemoglobin: 7.6 g/dL (01-21 @ 08:09)  Hemoglobin: 7.4 g/dL (01-19 @ 07:32)  Hemoglobin: 8.1 g/dL (01-18 @ 05:14)  Hemoglobin: 7.6 g/dL (01-17 @ 05:18)      01-21    147<H>  |  120<H>  |  10  ----------------------------<  149<H>  2.9<LL>   |  18<L>  |  0.59    Ca    7.5<L>      21 Jan 2021 08:09    TPro  4.5<L>  /  Alb  1.2<L>  /  TBili  0.4  /  DBili  x   /  AST  10  /  ALT  7<L>  /  AlkPhos  64  01-21    Creatinine Trend: 0.59<--, 0.55<--, 0.51<--, 0.52<--, 0.58<--, 0.61<--    COAGS:           T(C): 37.2 (01-21-21 @ 05:06), Max: 37.2 (01-21-21 @ 05:06)  HR: 82 (01-21-21 @ 05:06) (82 - 91)  BP: 124/55 (01-21-21 @ 05:06) (124/55 - 143/62)  RR: 16 (01-21-21 @ 05:06) (16 - 18)  SpO2: 98% (01-21-21 @ 05:06) (98% - 100%)  Wt(kg): --    I&O's Summary    HEENT:  (-)icterus (-)pallor  CV: N S1 S2 1/6 ANGELIC (+)2 Pulses B/l  Resp:  Clear to ausculatation B/L, normal effort  GI: (+) BS Soft, NT, ND  Lymph:  (-)Edema, (-)obvious lymphadenopathy  Skin: Warm to touch, Normal turgor  Psych: Appropriate mood and affect      ASSESSMENT/PLAN: 	75y  Female  wheelchair bound with medical history significant of HTN, Diabetes, Osteoarthritis, Osteoporosis, Peripheral arterial disease, Diabetic neuropathy, HLD, Schizophrenia historically preserved LV and R function, comes in with worsening ulceration to b/l heels s/p debridement.    - s/p Partial calcanectomy of right foot   - cont asa and statin  - optimized from a cardiac prospective for potential lower extremity amputation, however no plans for further OR at present  - No need for further inpatient cardiac work up.  - cont abx per primary team  - s/p  PICC line  - Wound care per podiatry    Jm Cortes MD, [facility]  BEEPER [phone]

## 2021-01-21 NOTE — PROGRESS NOTE ADULT - PROBLEM SELECTOR PLAN 2
Continue with heel lift boots, offloading of bony prominences  Continue with NWB to right heel.  wound vac applied by podiatry. [phone]

## 2021-01-22 DIAGNOSIS — G93.41 METABOLIC ENCEPHALOPATHY: ICD-10-CM

## 2021-01-22 DIAGNOSIS — F05 DELIRIUM DUE TO KNOWN PHYSIOLOGICAL CONDITION: ICD-10-CM

## 2021-01-22 LAB
ALBUMIN SERPL ELPH-MCNC: 1.1 G/DL — LOW (ref 3.5–5)
ALP SERPL-CCNC: 57 U/L — SIGNIFICANT CHANGE UP (ref 40–120)
ALT FLD-CCNC: <6 U/L DA — LOW (ref 10–60)
ANION GAP SERPL CALC-SCNC: 8 MMOL/L — SIGNIFICANT CHANGE UP (ref 5–17)
ANION GAP SERPL CALC-SCNC: 9 MMOL/L — SIGNIFICANT CHANGE UP (ref 5–17)
APTT BLD: 55.6 SEC — HIGH (ref 27.5–35.5)
AST SERPL-CCNC: 6 U/L — LOW (ref 10–40)
BILIRUB SERPL-MCNC: 0.3 MG/DL — SIGNIFICANT CHANGE UP (ref 0.2–1.2)
BLD GP AB SCN SERPL QL: SIGNIFICANT CHANGE UP
BUN SERPL-MCNC: 10 MG/DL — SIGNIFICANT CHANGE UP (ref 7–18)
BUN SERPL-MCNC: 10 MG/DL — SIGNIFICANT CHANGE UP (ref 7–18)
CALCIUM SERPL-MCNC: 7.1 MG/DL — LOW (ref 8.4–10.5)
CALCIUM SERPL-MCNC: 7.2 MG/DL — LOW (ref 8.4–10.5)
CHLORIDE SERPL-SCNC: 120 MMOL/L — HIGH (ref 96–108)
CHLORIDE SERPL-SCNC: 122 MMOL/L — HIGH (ref 96–108)
CO2 SERPL-SCNC: 19 MMOL/L — LOW (ref 22–31)
CO2 SERPL-SCNC: 19 MMOL/L — LOW (ref 22–31)
CREAT SERPL-MCNC: 0.51 MG/DL — SIGNIFICANT CHANGE UP (ref 0.5–1.3)
CREAT SERPL-MCNC: 0.64 MG/DL — SIGNIFICANT CHANGE UP (ref 0.5–1.3)
GLUCOSE BLDC GLUCOMTR-MCNC: 100 MG/DL — HIGH (ref 70–99)
GLUCOSE BLDC GLUCOMTR-MCNC: 115 MG/DL — HIGH (ref 70–99)
GLUCOSE BLDC GLUCOMTR-MCNC: 95 MG/DL — SIGNIFICANT CHANGE UP (ref 70–99)
GLUCOSE BLDC GLUCOMTR-MCNC: 97 MG/DL — SIGNIFICANT CHANGE UP (ref 70–99)
GLUCOSE SERPL-MCNC: 100 MG/DL — HIGH (ref 70–99)
GLUCOSE SERPL-MCNC: 103 MG/DL — HIGH (ref 70–99)
HCT VFR BLD CALC: 20.1 % — CRITICAL LOW (ref 34.5–45)
HCT VFR BLD CALC: 22.4 % — LOW (ref 34.5–45)
HGB BLD-MCNC: 6.5 G/DL — CRITICAL LOW (ref 11.5–15.5)
HGB BLD-MCNC: 7 G/DL — CRITICAL LOW (ref 11.5–15.5)
IRON SATN MFR SERPL: 17 UG/DL — LOW (ref 40–150)
IRON SATN MFR SERPL: 30 % — SIGNIFICANT CHANGE UP (ref 15–50)
MAGNESIUM SERPL-MCNC: 1.5 MG/DL — LOW (ref 1.6–2.6)
MCHC RBC-ENTMCNC: 29.5 PG — SIGNIFICANT CHANGE UP (ref 27–34)
MCHC RBC-ENTMCNC: 30 PG — SIGNIFICANT CHANGE UP (ref 27–34)
MCHC RBC-ENTMCNC: 31.3 GM/DL — LOW (ref 32–36)
MCHC RBC-ENTMCNC: 32.3 GM/DL — SIGNIFICANT CHANGE UP (ref 32–36)
MCV RBC AUTO: 92.6 FL — SIGNIFICANT CHANGE UP (ref 80–100)
MCV RBC AUTO: 94.5 FL — SIGNIFICANT CHANGE UP (ref 80–100)
NRBC # BLD: 0 /100 WBCS — SIGNIFICANT CHANGE UP (ref 0–0)
NRBC # BLD: 0 /100 WBCS — SIGNIFICANT CHANGE UP (ref 0–0)
PHOSPHATE SERPL-MCNC: 0.8 MG/DL — CRITICAL LOW (ref 2.5–4.5)
PHOSPHATE SERPL-MCNC: 2 MG/DL — LOW (ref 2.5–4.5)
PLATELET # BLD AUTO: 304 K/UL — SIGNIFICANT CHANGE UP (ref 150–400)
PLATELET # BLD AUTO: 323 K/UL — SIGNIFICANT CHANGE UP (ref 150–400)
POTASSIUM SERPL-MCNC: 2.5 MMOL/L — CRITICAL LOW (ref 3.5–5.3)
POTASSIUM SERPL-MCNC: 2.9 MMOL/L — CRITICAL LOW (ref 3.5–5.3)
POTASSIUM SERPL-SCNC: 2.5 MMOL/L — CRITICAL LOW (ref 3.5–5.3)
POTASSIUM SERPL-SCNC: 2.9 MMOL/L — CRITICAL LOW (ref 3.5–5.3)
PROT SERPL-MCNC: 3.9 G/DL — LOW (ref 6–8.3)
RBC # BLD: 2.17 M/UL — LOW (ref 3.8–5.2)
RBC # BLD: 2.37 M/UL — LOW (ref 3.8–5.2)
RBC # FLD: 16.2 % — HIGH (ref 10.3–14.5)
RBC # FLD: 16.2 % — HIGH (ref 10.3–14.5)
SODIUM SERPL-SCNC: 148 MMOL/L — HIGH (ref 135–145)
SODIUM SERPL-SCNC: 149 MMOL/L — HIGH (ref 135–145)
TIBC SERPL-MCNC: 56 UG/DL — LOW (ref 250–450)
UIBC SERPL-MCNC: 39 UG/DL — LOW (ref 110–370)
VANCOMYCIN TROUGH SERPL-MCNC: 28.6 UG/ML — CRITICAL HIGH (ref 10–20)
WBC # BLD: 10.84 K/UL — HIGH (ref 3.8–10.5)
WBC # BLD: 11.58 K/UL — HIGH (ref 3.8–10.5)
WBC # FLD AUTO: 10.84 K/UL — HIGH (ref 3.8–10.5)
WBC # FLD AUTO: 11.58 K/UL — HIGH (ref 3.8–10.5)

## 2021-01-22 PROCEDURE — 99232 SBSQ HOSP IP/OBS MODERATE 35: CPT

## 2021-01-22 RX ORDER — HALOPERIDOL DECANOATE 100 MG/ML
0.5 INJECTION INTRAMUSCULAR EVERY 8 HOURS
Refills: 0 | Status: DISCONTINUED | OUTPATIENT
Start: 2021-01-22 | End: 2021-01-28

## 2021-01-22 RX ORDER — HALOPERIDOL DECANOATE 100 MG/ML
0.5 INJECTION INTRAMUSCULAR EVERY 12 HOURS
Refills: 0 | Status: DISCONTINUED | OUTPATIENT
Start: 2021-01-22 | End: 2021-01-22

## 2021-01-22 RX ORDER — POTASSIUM CHLORIDE 20 MEQ
10 PACKET (EA) ORAL
Refills: 0 | Status: COMPLETED | OUTPATIENT
Start: 2021-01-22 | End: 2021-01-22

## 2021-01-22 RX ORDER — MAGNESIUM SULFATE 500 MG/ML
2 VIAL (ML) INJECTION ONCE
Refills: 0 | Status: COMPLETED | OUTPATIENT
Start: 2021-01-22 | End: 2021-01-22

## 2021-01-22 RX ADMIN — CEFEPIME 100 MILLIGRAM(S): 1 INJECTION, POWDER, FOR SOLUTION INTRAMUSCULAR; INTRAVENOUS at 14:25

## 2021-01-22 RX ADMIN — Medication 1 APPLICATION(S): at 11:46

## 2021-01-22 RX ADMIN — CEFEPIME 100 MILLIGRAM(S): 1 INJECTION, POWDER, FOR SOLUTION INTRAMUSCULAR; INTRAVENOUS at 21:57

## 2021-01-22 RX ADMIN — Medication 100 MILLIGRAM(S): at 05:25

## 2021-01-22 RX ADMIN — CEFEPIME 100 MILLIGRAM(S): 1 INJECTION, POWDER, FOR SOLUTION INTRAMUSCULAR; INTRAVENOUS at 05:25

## 2021-01-22 RX ADMIN — Medication 50 GRAM(S): at 15:26

## 2021-01-22 RX ADMIN — Medication 100 MILLIGRAM(S): at 21:56

## 2021-01-22 RX ADMIN — Medication 100 MILLIGRAM(S): at 14:25

## 2021-01-22 RX ADMIN — Medication 100 MILLIEQUIVALENT(S): at 11:46

## 2021-01-22 RX ADMIN — Medication 100 MILLIEQUIVALENT(S): at 12:24

## 2021-01-22 RX ADMIN — Medication 100 MILLIEQUIVALENT(S): at 13:31

## 2021-01-22 RX ADMIN — POTASSIUM PHOSPHATE, MONOBASIC POTASSIUM PHOSPHATE, DIBASIC 83.33 MILLIMOLE(S): 236; 224 INJECTION, SOLUTION INTRAVENOUS at 06:23

## 2021-01-22 RX ADMIN — ENOXAPARIN SODIUM 40 MILLIGRAM(S): 100 INJECTION SUBCUTANEOUS at 11:46

## 2021-01-22 RX ADMIN — CHLORHEXIDINE GLUCONATE 1 APPLICATION(S): 213 SOLUTION TOPICAL at 11:46

## 2021-01-22 RX ADMIN — Medication 81 MILLIGRAM(S): at 11:45

## 2021-01-22 NOTE — PROGRESS NOTE ADULT - PROBLEM SELECTOR PLAN 9
Pt admitted from [facility], will likely return to facility upon discharge.   Pending clearance for possible COVID exposure by other patient   Last COVID 01/18 negative.

## 2021-01-22 NOTE — PROGRESS NOTE ADULT - SUBJECTIVE AND OBJECTIVE BOX
NP Note discussed with Primary Attending.    Patient is a 75y old  Female who presents with a chief complaint of foot ulcer (20 Jan 2021 17:32)      INTERVAL HPI/OVERNIGHT EVENTS: no new complaints    MEDICATIONS  (STANDING):  aspirin  chewable 81 milliGRAM(s) Oral daily  cefepime   IVPB 1000 milliGRAM(s) IV Intermittent every 8 hours  collagenase Ointment 1 Application(s) Topical daily  Dakins Solution - Full Strength 1 Application(s) Topical once  dextrose 40% Gel 15 Gram(s) Oral once  dextrose 5% + sodium chloride 0.9% with potassium chloride 20 mEq/L 1000 milliLiter(s) (75 mL/Hr) IV Continuous <Continuous>  dextrose 50% Injectable 25 Gram(s) IV Push once  dextrose 50% Injectable 12.5 Gram(s) IV Push once  dextrose 50% Injectable 25 Gram(s) IV Push once  enoxaparin Injectable 40 milliGRAM(s) SubCutaneous daily  glucagon  Injectable 1 milliGRAM(s) IntraMuscular once  insulin lispro (ADMELOG) corrective regimen sliding scale   SubCutaneous three times a day before meals  insulin lispro (ADMELOG) corrective regimen sliding scale   SubCutaneous at bedtime  metroNIDAZOLE  IVPB 500 milliGRAM(s) IV Intermittent every 8 hours  simvastatin 40 milliGRAM(s) Oral at bedtime  vancomycin  IVPB 750 milliGRAM(s) IV Intermittent every 12 hours    MEDICATIONS  (PRN):  acetaminophen   Tablet .. 650 milliGRAM(s) Oral every 6 hours PRN Moderate Pain (4 - 6)  sodium chloride 0.9% lock flush 10 milliLiter(s) IV Push every 1 hour PRN Pre/post blood products, medications, blood draw, and to maintain line patency      __________________________________________________  REVIEW OF SYSTEMS:    CONSTITUTIONAL: No fever,   EYES: no acute visual disturbances  NECK: No pain or stiffness  RESPIRATORY: No cough; No shortness of breath  CARDIOVASCULAR: No chest pain, no palpitations  GASTROINTESTINAL: No pain. No nausea or vomiting; No diarrhea   NEUROLOGICAL: No headache or numbness, no tremors  MUSCULOSKELETAL: No joint pain, no muscle pain  GENITOURINARY: no dysuria, no frequency, no hesitancy  PSYCHIATRY: Hx schizophrenia,   ALL OTHER  ROS negative        Vital Signs Last 24 Hrs  T(C): 36.5 (20 Jan 2021 13:25), Max: 37.1 (19 Jan 2021 21:15)  T(F): 97.7 (20 Jan 2021 13:25), Max: 98.7 (19 Jan 2021 21:15)  HR: 91 (20 Jan 2021 13:25) (91 - 100)  BP: 131/59 (20 Jan 2021 13:25) (114/51 - 136/58)  BP(mean): 78 (20 Jan 2021 13:25) (78 - 78)  RR: 18 (20 Jan 2021 13:25) (17 - 18)  SpO2: 100% (20 Jan 2021 13:25) (100% - 100%)    ________________________________________________  PHYSICAL EXAM:  GENERAL: NAD  HEENT: Normocephalic;  conjunctivae and sclerae clear; moist mucous membranes;   NECK : supple  CHEST/LUNG: Clear to auscultation bilaterally with good air entry   HEART: S1 S2  regular; no murmurs, gallops or rubs  ABDOMEN: Soft, Nontender, Nondistended; Bowel sounds present  EXTREMITIES: no cyanosis; no edema; no calf tenderness  SKIN: warm and dry; no rash, wound vac applied to LE  NERVOUS SYSTEM:  Awake and alert; oriented to self and person, disoriented to time and situation. ; no new deficits    _________________________________________________  LABS:                        7.4    13.92 )-----------( 357      ( 19 Jan 2021 07:32 )             23.5     01-19    144  |  119<H>  |  10  ----------------------------<  198<H>  4.5   |  15<L>  |  0.55    Ca    7.1<L>      19 Jan 2021 07:32          CAPILLARY BLOOD GLUCOSE      POCT Blood Glucose.: 108 mg/dL (20 Jan 2021 16:49)  POCT Blood Glucose.: 105 mg/dL (20 Jan 2021 12:49)  POCT Blood Glucose.: 106 mg/dL (20 Jan 2021 08:07)  POCT Blood Glucose.: 144 mg/dL (19 Jan 2021 21:46)        RADIOLOGY & ADDITIONAL TESTS:  EXAM:  XR CHEST PORTABLE IMMED 1V                            PROCEDURE DATE:  01/20/2021          INTERPRETATION:  CLINICAL INDICATION: 75 years  Female with PICC Insertion.    COMPARISON: 1/5/2021    The left PICC line catheter tip overlies the superior vena cava.    AP view of the chest demonstrates haziness over the right hemithorax which may be related to rotation or to effusion layering posteriorly. The left lung is clear. There is no pleural effusion. There is no pneumothorax.    The heart is normal in size. The aorta is atherosclerotic. There is no mediastinal or hilar mass.    The pulmonary vasculature is normal.    Mild thoracic degenerative changes are present.    IMPRESSION:    Left PICC line in satisfactory position.    Right hazy opacity may be related to effusion layering posteriorly or to rotation.      EXAM:  CT BRAIN                            PROCEDURE DATE:  01/14/2021          INTERPRETATION:  .    CLINICAL INFORMATION: ACUTE ENCEPHALOPATHY    TECHNIQUE: Multiple axial CT images of the head were obtained without contrast. Sagittal and coronalreconstructed images were acquired from the source data.    COMPARISON: No prior CT studies of the brain are available for comparison at this institution.    FINDINGS: There is no acute intracranial hemorrhage, mass effect, shift of the midline structures, herniation, extra-axial fluid collection, or hydrocephalus.    There is diffuse cerebral volume loss with prominence of the sulci, fissures, and cisternal spaces which is normal for the patient's age. There is mild deep and periventricular white matter hypoattenuation statistically compatible with microvascular changes given calcific atherosclerotic disease of the intracranial arteries. There is a partially empty sella.    Polyps versus retention cysts are seen within the bilateral maxillary sinuses. Additional mild scattered mucosal thickening is seen throughout the ethmoid complex. The calvarium is intact. Chronic bilateral nasal bone fracture deformities are seen. The imaged orbits are unremarkable.    IMPRESSION: No acute intracranial hemorrhage, mass effect, or shift of the midline structures.      EXAM:  US DPLX UPR EXT VEINS LTD LT                            PROCEDURE DATE:  01/10/2021          INTERPRETATION:  CLINICAL INDICATION: 75 years  Female with LUE swelling, rule out thrombosis.    COMPARISON: None available.    TECHNIQUE: Duplex sonography of the LEFT UPPER extremity veins with color and spectral Doppler, with and without compression.    FINDINGS:    The left internal jugular, subclavian, axillary and brachial veins are patent and compressible where applicable.  The basilic and cephalic veins (superficial veins) are patent and without thrombus.    Doppler examination shows normal spontaneous and phasic flow.    Edema is visualized.    IMPRESSION:  No evidence of left upper extremity deep venous thrombosis.    EXAM:  MR FOOT RT                            PROCEDURE DATE:  01/06/2021          INTERPRETATION:  Clinical indications: Right heel ulceration and eschar status post debridement. Concern for osteomyelitis.    Multiplanar multisequence MRI of the right foot was performed localized to the hindfoot.    Correlation is made with prior MRI from September 11, 2019.    FINDINGS:    There is a large wound along the plantar aspect of the calcaneus posteriorly which extends nearly extends to bone. There is surrounding soft tissue edema about this site with evidence of an overlying bandage. The degree of soft tissue deficiency is increased compared to the prior examination. There is extensive osseous edema and corresponding hypointense T1 marrow signalthroughout the calcaneus extending from the posterior plantar margin to the level of the angle of Gissane. This is progressed compared to the prior examination and consistent with osteomyelitis. Marrow signal within the remainder of the foot is otherwise preserved.    There is confluent edema tracking along the abductor hallucis and flexor digitorum brevis muscles and within the plantar subcutaneous fat subjacent to this region. In total this area measures approximately 2.2 cm in transverse dimension, approximately 4 cm in anteroposterior posterior dimension, and approximately 1.6 cm in craniocaudal dimension. Findings may be related to underlying phlegmon or abscess. Evaluation is limited by the lack of intravenous contrast. Distal to this site there is edema throughout the visualized musculature consistent with myositis.    Visualized tendinous structures remain intact. There is no acute ligamentous injury. Visualized joint spaces are preserved without joint effusion.    IMPRESSION:    Osteomyelitis involving the calcaneus which extends from the plantar posterior aspect of the calcaneus to the level of the angle of Gissane. This is progressed compared to the prior examination. This is seen in the setting of diffuse soft tissue deficiency along the posterior plantar aspect of the calcaneus.    Confluent edema adjacent to this region within the abductor hallucis and flexor digitorum brevis muscles and within the subjacent plantar subcutaneous fat. This may be related to abscess orphlegmon.    Imaging  Reviewed:  YES/NO    Consultant(s) Notes Reviewed:   YES/ No      Plan of care was discussed with patient and /or primary care giver; all questions and concerns were addressed  DVT Prophylaxis: Lovenox  Dispo: Home

## 2021-01-22 NOTE — PROGRESS NOTE ADULT - ASSESSMENT
HPI:  74 yo F from [facility], wheelchair bound with medical history significant of HTN, Diabetes, Osteoarthritis, Osteoporosis, Peripheral arterial disease, Diabetic neuropathy, HLD, Schizophrenia comes in with worsening ulceration to b/l heels. She has been having ulcers for past couple months, has been progressive. She was treated with IV antbx at NH but did not get better. It was worsening with necrotic changes and increasing foul smelling discharge. Patient with additional ulcerations to her coccyx, ischium, and sacrum for which wound care and surgery were consulted. Podiatry also consulted for her heel ulcerations: patient s/p bedside debridement of her right heel ulceration, refusing any surgical interventions at this time. Infectious disease also following.    1/6: MRI with findings of: Osteomyelitis involving the calcaneus which extends from the plantar posterior aspect of the calcaneus to the level of the angle of Gissane. This is progressed compared to the prior examination.   1/7:  Pt states she understands that she may loose her leg and/or her life without surgical intervention for RLE OM, still refusing debridement.  Psych consulted. Patient needs medical clearance for OR on 1/8.  1/11; Patient I and D not done, anesthesia unable to get consent. Patient not consenting to OR procedure. OR procedure cancelled due to no consent. Patient deemed to have full capacity as per Psychiatry.   1/13: Patient seen and examined at bedside. S/P extended dwell placement by IR. Patient scheduled for surgical debridement of right heel.  1/14 s/p debridement of rt foot, no further surgical intervention at this time.    Patient seen and examined at bedside. She denies fever, abdominal pain, chest pain, urinary symptoms, fall, trauma. No h/o nausea, vomiting, diarrhea, cough, dyspnea, sick contacts, recent illness.   Patient had PICC line inserted today (left subclavian) for abx treatment(she needs 6 weeks of abx after discharge from 01/06/21). Patient on airborne isolation as she was in contact with COVID positive patient . Last COVID test negative by pcr 01/21.   Patient is withdrawing from food and interaction. Neurology dr. Norris consulted : Delayed recovery from multifactorial encephalopathy causing delirium; underlying microangiopathic cerebral disease with superimposed infection and metabolic abnormalities as yet uncorrected; it could take 1 week after correction of metabolic abnormalities for encephalopathy to improve. Recommend continue current supportive medical treatment; EEG.  Hemoglobin 7.0 this AM will transfuse 2U PRBC.  Potassium 2.5 will give 3x potassium rider  Magnesium 1.5 will give Magnesium sulfate 2GM IVPB  f/u BMP and magnesium in AM.

## 2021-01-22 NOTE — PROGRESS NOTE ADULT - SUBJECTIVE AND OBJECTIVE BOX
Patient denies chest pain or shortness of breath, is confused.  Review of systems otherwise (-)  	  acetaminophen   Tablet .. 650 milliGRAM(s) Oral every 6 hours PRN  aspirin  chewable 81 milliGRAM(s) Oral daily  cefepime   IVPB 1000 milliGRAM(s) IV Intermittent every 8 hours  chlorhexidine 2% Cloths 1 Application(s) Topical daily  collagenase Ointment 1 Application(s) Topical daily  Dakins Solution - Full Strength 1 Application(s) Topical once  dextrose 40% Gel 15 Gram(s) Oral once  dextrose 5% + sodium chloride 0.9% with potassium chloride 20 mEq/L 1000 milliLiter(s) IV Continuous <Continuous>  dextrose 50% Injectable 12.5 Gram(s) IV Push once  dextrose 50% Injectable 25 Gram(s) IV Push once  dextrose 50% Injectable 25 Gram(s) IV Push once  enoxaparin Injectable 40 milliGRAM(s) SubCutaneous daily  glucagon  Injectable 1 milliGRAM(s) IntraMuscular once  haloperidol    Injectable 0.5 milliGRAM(s) IV Push every 12 hours  insulin lispro (ADMELOG) corrective regimen sliding scale   SubCutaneous three times a day before meals  insulin lispro (ADMELOG) corrective regimen sliding scale   SubCutaneous at bedtime  lactated ringers. 1000 milliLiter(s) IV Continuous <Continuous>  metroNIDAZOLE  IVPB 500 milliGRAM(s) IV Intermittent every 8 hours  potassium chloride  10 mEq/100 mL IVPB 10 milliEquivalent(s) IV Intermittent every 1 hour  simvastatin 40 milliGRAM(s) Oral at bedtime  sodium chloride 0.9% lock flush 10 milliLiter(s) IV Push every 1 hour PRN  vancomycin  IVPB 750 milliGRAM(s) IV Intermittent every 12 hours                            7.0    11.58 )-----------( 323      ( 22 Jan 2021 09:48 )             22.4       Hemoglobin: 7.0 g/dL (01-22 @ 09:48)  Hemoglobin: 6.5 g/dL (01-22 @ 05:29)  Hemoglobin: 7.6 g/dL (01-21 @ 08:09)  Hemoglobin: 7.4 g/dL (01-19 @ 07:32)  Hemoglobin: 8.1 g/dL (01-18 @ 05:14)      01-22    148<H>  |  120<H>  |  10  ----------------------------<  103<H>  2.9<LL>   |  19<L>  |  0.64    Ca    7.1<L>      22 Jan 2021 09:48  Phos  2.0     01-22    TPro  3.9<L>  /  Alb  1.1<L>  /  TBili  0.3  /  DBili  x   /  AST  6<L>  /  ALT  <6<L>  /  AlkPhos  57  01-22    Creatinine Trend: 0.64<--, 0.51<--, 0.59<--, 0.55<--, 0.51<--, 0.52<--    COAGS: PTT - ( 22 Jan 2021 09:48 )  PTT:55.6 sec          T(C): 36.3 (01-22-21 @ 05:20), Max: 36.3 (01-21-21 @ 14:15)  HR: 90 (01-22-21 @ 05:20) (89 - 93)  BP: 119/61 (01-22-21 @ 05:20) (119/61 - 125/62)  RR: 18 (01-22-21 @ 05:20) (17 - 18)  SpO2: 97% (01-22-21 @ 05:20) (97% - 100%)  Wt(kg): --    I&O's Summary    21 Jan 2021 07:01  -  22 Jan 2021 07:00  --------------------------------------------------------  IN: 0 mL / OUT: 1200 mL / NET: -1200 mL        HEENT:  (-)icterus (-)pallor  CV: N S1 S2 1/6 ANGELIC (+)2 Pulses B/l  Resp:  Clear to ausculatation B/L, normal effort  GI: (+) BS Soft, NT, ND  Lymph:  (-)Edema, (-)obvious lymphadenopathy  Skin: Warm to touch, Normal turgor  Psych: Appropriate mood and affect      ASSESSMENT/PLAN: 	75y  Female  wheelchair bound with medical history significant of HTN, Diabetes, Osteoarthritis, Osteoporosis, Peripheral arterial disease, Diabetic neuropathy, HLD, Schizophrenia historically preserved LV and R function, comes in with worsening ulceration to b/l heels s/p debridement.    - s/p Partial calcanectomy of right foot   - cont asa and statin  - optimized from a cardiac prospective for potential lower extremity amputation, however no plans for further OR at present  - No need for further inpatient cardiac work up.  - cont abx per primary team  - s/p  PICC line  - Wound care per podiatry    Jm Cortes MD, [facility]  BEEPER [phone]

## 2021-01-22 NOTE — PROGRESS NOTE ADULT - PROBLEM SELECTOR PLAN 4
Pt takes Perphenazine at home  home medication held as recommended by Psychiatry  Patient unconsentable due to somnolence and unarousable  Patient is withdrawing from food and interaction with medical staff  neuro dr. Norris consulted  f/u recommendations   Psychiatry Dr. Lackey.

## 2021-01-22 NOTE — PROGRESS NOTE ADULT - PROBLEM SELECTOR PLAN 1
MRI noted above  c/w Cefepime 1GM  c/w [id number]  c/w Vanco 750mg  will need 6 weeks of abx starting date 01/06/21  IR placement of  PICC line.  Wound culture grew few Morganella morganii and few Proteus mirabilis  s/p OR right heel debridement, obtained 2PC consent  wound vac applied by podiatry  ID Dr. Wellington  Podiatry following  Vascular Following.

## 2021-01-22 NOTE — PROGRESS NOTE ADULT - SUBJECTIVE AND OBJECTIVE BOX
Patient is a 75y old  Female who presents with a chief complaint of foot ulcer (22 Jan 2021 15:59)    PATIENT IS SEEN AND EXAMINED IN MEDICAL FLOOR.    ALLERGIES:  No Known Allergies    VITALS:    Vital Signs Last 24 Hrs  T(C): 36.3 (22 Jan 2021 13:25), Max: 36.3 (21 Jan 2021 20:27)  T(F): 97.4 (22 Jan 2021 13:25), Max: 97.4 (22 Jan 2021 05:20)  HR: 87 (22 Jan 2021 13:25) (87 - 93)  BP: 125/59 (22 Jan 2021 13:25) (119/61 - 125/59)  BP(mean): 75 (22 Jan 2021 13:25) (75 - 75)  RR: 18 (22 Jan 2021 13:25) (17 - 18)  SpO2: 100% (22 Jan 2021 13:25) (97% - 100%)    LABS:    CBC Full  -  ( 22 Jan 2021 09:48 )  WBC Count : 11.58 K/uL  RBC Count : 2.37 M/uL  Hemoglobin : 7.0 g/dL  Hematocrit : 22.4 %  Platelet Count - Automated : 323 K/uL  Mean Cell Volume : 94.5 fl  Mean Cell Hemoglobin : 29.5 pg  Mean Cell Hemoglobin Concentration : 31.3 gm/dL  Auto Neutrophil # : x  Auto Lymphocyte # : x  Auto Monocyte # : x  Auto Eosinophil # : x  Auto Basophil # : x  Auto Neutrophil % : x  Auto Lymphocyte % : x  Auto Monocyte % : x  Auto Eosinophil % : x  Auto Basophil % : x    PTT - ( 22 Jan 2021 09:48 )  PTT:55.6 sec  01-22    148<H>  |  120<H>  |  10  ----------------------------<  103<H>  2.9<LL>   |  19<L>  |  0.64    Ca    7.1<L>      22 Jan 2021 09:48  Phos  2.0     01-22  Mg     1.5     01-22    TPro  3.9<L>  /  Alb  1.1<L>  /  TBili  0.3  /  DBili  x   /  AST  6<L>  /  ALT  <6<L>  /  AlkPhos  57  01-22    CAPILLARY BLOOD GLUCOSE      POCT Blood Glucose.: 100 mg/dL (22 Jan 2021 11:53)  POCT Blood Glucose.: 97 mg/dL (22 Jan 2021 08:11)  POCT Blood Glucose.: 115 mg/dL (21 Jan 2021 21:38)  POCT Blood Glucose.: 97 mg/dL (21 Jan 2021 16:50)        LIVER FUNCTIONS - ( 22 Jan 2021 05:30 )  Alb: 1.1 g/dL / Pro: 3.9 g/dL / ALK PHOS: 57 U/L / ALT: <6 U/L DA / AST: 6 U/L / GGT: x           Creatinine Trend: 0.64<--, 0.51<--, 0.59<--, 0.55<--, 0.51<--, 0.52<--  I&O's Summary    21 Jan 2021 07:01  -  22 Jan 2021 07:00  --------------------------------------------------------  IN: 0 mL / OUT: 1200 mL / NET: -1200 mL      .Tissue Right heel margin  01-13 @ 19:31   Rare Enterococcus faecalis  --  Enterococcus faecalis      .Surgical Swab Right heel wound culture  01-13 @ 19:22   Moderate Proteus mirabilis  Few Bacteroides vulgatus group "Susceptibilities not performed"  --  Proteus mirabilis      .Urine Clean Catch (Midstream)  01-06 @ 06:46   No growth  --  --      .Surgical Swab Right heel wound  01-05 @ 22:13   Few Morganella morganii  Few Proteus mirabilis  Moderate Corynebacterium species "Susceptibilities not performed"  Moderate Bacteroides vulgatus "Susceptibilities not performed"  --  Morganella morganii  Proteus mirabilis      .Blood Blood-Peripheral  01-05 @ 17:59   No Growth Final  --  --      MEDICATIONS:    MEDICATIONS  (STANDING):  aspirin  chewable 81 milliGRAM(s) Oral daily  cefepime   IVPB 1000 milliGRAM(s) IV Intermittent every 8 hours  chlorhexidine 2% Cloths 1 Application(s) Topical daily  collagenase Ointment 1 Application(s) Topical daily  Dakins Solution - Full Strength 1 Application(s) Topical once  dextrose 40% Gel 15 Gram(s) Oral once  dextrose 5% + sodium chloride 0.9% with potassium chloride 20 mEq/L 1000 milliLiter(s) (75 mL/Hr) IV Continuous <Continuous>  dextrose 50% Injectable 12.5 Gram(s) IV Push once  dextrose 50% Injectable 25 Gram(s) IV Push once  dextrose 50% Injectable 25 Gram(s) IV Push once  enoxaparin Injectable 40 milliGRAM(s) SubCutaneous daily  glucagon  Injectable 1 milliGRAM(s) IntraMuscular once  haloperidol    Injectable 0.5 milliGRAM(s) IV Push every 12 hours  haloperidol IVPB 0.5 milliGRAM(s) IV Intermittent every 12 hours  insulin lispro (ADMELOG) corrective regimen sliding scale   SubCutaneous three times a day before meals  insulin lispro (ADMELOG) corrective regimen sliding scale   SubCutaneous at bedtime  lactated ringers. 1000 milliLiter(s) (75 mL/Hr) IV Continuous <Continuous>  metroNIDAZOLE  IVPB 500 milliGRAM(s) IV Intermittent every 8 hours  simvastatin 40 milliGRAM(s) Oral at bedtime  vancomycin  IVPB 750 milliGRAM(s) IV Intermittent every 12 hours      MEDICATIONS  (PRN):  acetaminophen   Tablet .. 650 milliGRAM(s) Oral every 6 hours PRN Moderate Pain (4 - 6)  sodium chloride 0.9% lock flush 10 milliLiter(s) IV Push every 1 hour PRN Pre/post blood products, medications, blood draw, and to maintain line patency      REVIEW OF SYSTEMS:                           ALL ROS DONE [ X   ]    CONSTITUTIONAL:  LETHARGIC [   ], FEVER [   ], UNRESPONSIVE [   ]  CVS:  CP  [   ], SOB, [   ], PALPITATIONS [   ], DIZZYNESS [   ]  RS: COUGH [   ], SPUTUM [   ]  GI: ABDOMINAL PAIN [   ], NAUSEA [   ], VOMITINGS [   ], DIARRHEA [   ], CONSTIPATION [   ]  :  DYSURIA [   ], NOCTURIA [   ], INCREASED FREQUENCY [   ], DRIBLING [   ],  SKELETAL: PAINFUL JOINTS [   ], SWOLLEN JOINTS [   ], NECK ACHE [   ], LOW BACK ACHE [   ],  SKIN : ULCERS [   ], RASH [   ], ITCHING [   ]  CNS: HEAD ACHE [   ], DOUBLE VISION [   ], BLURRED VISION [   ], AMS / CONFUSION [   ], SEIZURES [   ], WEAKNESS [   ],TINGLING / NUMBNESS [   ]    PHYSICAL EXAMINATION:  GENERAL APPEARANCE: NO DISTRESS  HEENT:  NO PALLOR, NO  JVD,  NO   NODES, NECK SUPPLE  CVS: S1 +, S2 +,   RS: AEEB,  OCCASIONAL  RALES +,   NO RONCHI  ABD: SOFT, NT, NO, BS +  EXT: NO PE  SKIN: WARM, BILATERAL HEEL ULCERS - COVERED, SACRAL ULCER + RIGHT ISCHIAL ULCER COVERED, PICC LINE+  SKELETAL:  ROM ACCEPTABLE  CNS:  AAO X 0-1    RADIOLOGY :    EXAM:  MR FOOT RT                            PROCEDURE DATE:  01/06/2021          INTERPRETATION:  Clinical indications: Right heel ulceration and eschar status post debridement. Concern for osteomyelitis.    Multiplanar multisequence MRI of the right foot was performed localized to the hindfoot.    Correlation is made with prior MRI from September 11, 2019.    FINDINGS:    There is a large wound along the plantar aspect of the calcaneus posteriorly which extends nearly extends to bone. There is surrounding soft tissue edema about this site with evidence of an overlying bandage. The degree of soft tissue deficiency is increased compared to the prior examination. There is extensive osseous edema and corresponding hypointense T1 marrow signalthroughout the calcaneus extending from the posterior plantar margin to the level of the angle of Gissane. This is progressed compared to the prior examination and consistent with osteomyelitis. Marrow signal within the remainder of the foot is otherwise preserved.    There is confluent edema tracking along the abductor hallucis and flexor digitorum brevis muscles and within the plantar subcutaneous fat subjacent to this region. In total this area measures approximately 2.2 cm in transverse dimension, approximately 4 cm in anteroposterior posterior dimension, and approximately 1.6 cm in craniocaudal dimension. Findings may be related to underlying phlegmon or abscess. Evaluation is limited by the lack of intravenous contrast. Distal to this site there is edema throughout the visualized musculature consistent with myositis.    Visualized tendinous structures remain intact. There is no acute ligamentous injury. Visualized joint spaces are preserved without joint effusion.    IMPRESSION:    Osteomyelitis involving the calcaneus which extends from the plantar posterior aspect of the calcaneus to the level of the angle of Gissane. This is progressed compared to the prior examination. This is seen in the setting of diffuse soft tissue deficiency along the posterior plantar aspect of the calcaneus.    Confluent edema adjacent to this region within the abductor hallucis and flexor digitorum brevis muscles and within the subjacent plantar subcutaneous fat. This may be related to abscess orphlegmon.    ASSESSMENT :     Osteomyelitis    Yes    COVID-19    Osteomyelitis    DM (diabetes mellitus)    Paranoid schizophrenia    HLD (hyperlipidemia)    PAD (peripheral artery disease)    HTN (hypertension)            PLAN:  HPI:  76 yo F from [facility], wheelchair bound with medical history significant of HTN, Diabetes, Osteoarthritis, Osteoporosis, Peripheral arterial disease, Diabetic neuropathy, HLD, Schizophrenia comes in with worsening ulceration to b/l heels. She has been having ulcers for past couple months, has been progressive. She was treated with IV antbx at NH but did not get better. It was worsening with necrotic changes and increasing foul smelling discharge. She denies fever, abdominal pain, chest pain, urinary symptoms, fall, trauma. No h/o nausea, vomiting, diarrhea, cough, dyspnea, sick contacts, recent illness.  (05 Jan 2021 14:24)    PLAN:    # BILATERAL LE CHRONIC ULCER NOW PROGRESSIVELY WORSENING W/ SSTI AND RIGHT CALCANEAL OSTEOMYELITIS W/ POSSIBLE ABSCESS; UNDERWENT BEDSIDE DEBRIDEMENT OF RIGHT LEG ULCER AND UNDERWENT RIGHT PARTIAL CALCANECTOMY ON 1/13 AND PICC LINE PLACEMENT 1/20  - CEFEPIME + VANOMYCIN + FLAGYL, REVIEWED TIMUR, BCX - NGTD, WOUND CX - MORGANELLA MORGANI & P. MIRABILIS, ID CONSULT IN PROGRESS, PODIATRY CONSULT IN PROGRESS  - REVIEWED RIGHT LE MRI  -  PSYCHIATRY CONSULT FOR CAPACITY IN PROGRESS - DEEM PATIENT AS HAVING CAPACITY.  PATIENT WAS AGREEABLE FOR SURGICAL DEBRIDEMENT ON 1/09  - 2 PC CONSENT FOR SURGICAL DEBRIDEMENT PLANNED FOR 1/12; ATTEMPTED TO CALL NEXT OF [facility] @ [phone] HOWEVER PHONE NUMBER APPEARS DISCONNECTED  - VASCULAR SX CONSULT IN PROGRESS - PATIENT MAY REQUIRE AKA, BUT REFUSED ON PREVIOUS CONVERSATIONS - WAS AGREEABLE TO LOCAL DEBRIDEMENT/INTERVENTIONS  - PLAN FOR WOUND VAC  - PATIENT WILL NEED 6 WEEKS OF IV ANTIBIOTICS - WILL NEED TO SWITCH TO PICC LINE FROM EXTENDED DWELL - D/W FLOOR TEAM    # MEDICAL CLEARANCE FOR SURGERY - RCRI 1 POINT - 6% RISK OF DEATH, MI OR CARDIAC ARREST; CARDIOLOGY CONSULT IN PROGRESS FOR MEDICAL CLEARANCE  # NORMOCYTIC ANEMIA - SUSPECT S/T AOCD AND POOR NUTRITIONAL STATUS - F/U ANEMIA PANEL; PRBC TRANSFUSION GIVEN TODAY  # SACRAL AND RIGHT ISCHIAL UNSTAGEABLE ULCERS W/ SSTI  S/P DEBRIDEMENT 1/8- ON VANCOMYCIN AND CEFEPIME, SURGERY CONSULT IN PROGRESS -  PSYCHIATRY CONSULT FOR CAPACITY IN PROGRESS    # ACUTE METABOLIC ENCEPHALOPATHY - SUSPECT S/T ACUTE INFECTION & HYPERNATREMIA [resolved] - REVIEWED CT HEAD  - NEUROLOGY CONSULT IN PROGRESS    # HYPERNATREMIA SUSPECT S/T POOR PO INTAKE - RESOLVED W/ IVF  # POOR PO INTAKE - PATIENT ATE SOME LUNCH TODAY; ST EVAL NOTED  # LEFT ARM SWELLING - REVIEWED LEFT ARM U/S W/ OUT THROMBUS, WARM COMPRESSES  # ASHLEY - RESOLVED  # PAD  # HTN  # DM W/ DIABETIC NEUROPATHY  # OA/OP  # SCHIZOPHRENIA - HOLD MEDICATION; PLACED ON IV HALDOL PER PSYCHIATRY RECOMMENDATION  # CASE D/W PATIENT'S ? PARTNER - LISA KELLY - WHO REPORTS HE IS NOT NEXT OF KIN OR HCP - [phone] ON 1/12; HE UNDERSTOOD PATIENT'S PROGNOSIS WAS GUARDED  # GI AND DVT PPX    ELEN CASILLAS MD COVERING FARHAN CASILLAS MD

## 2021-01-22 NOTE — PROGRESS NOTE BEHAVIORAL HEALTH - SUMMARY
75F, single and living in nursing home ([facility]), with PHx of schizophrenia and MHx of HTN, Diabetes, Osteoarthritis, Osteoporosis, Peripheral arterial disease, Diabetic neuropathy, and HLD, BIBEMS on 1/5 from NH for worsening BL heel ulcerations which have been chronic for the past few months, as well as chronic sacral decubitus ulcer. MRI showed progressive osteomyelitis of R foot and surgical debridement was recommended for both sacral and calcaneal ulcers, but pt initially stated she only wanted sacral debridement and refused it for her feet, despite being informed that the heel infection presented a more immediate risk to life and limb. Initial psych consult was requested for assessment of capacity to refuse heel debridement; on exam, pt expressed verbal agreement with debridement and was found to have capacity to consent to the procedure, but written consent could not be obtained due to subsequent prolonged somnolence, so procedure was performed on 1/13 with [facility] physician consent. Pt received PICC on 1/20 under similar circumstances (initially expressed verbal consent, but subsequently became somnolent and written consent could not be obtained; [facility] consent was used). Psychiatry is now reconsulted for assessment of prolonged postoperative somnolence and hyporesponsiveness. Although pt presents today with improved alertness and interactivity, we agree with neurology impression that pt's presentation is c/w prolonged hypoactive delirium 2/2 metabolic abnormalities and infection. We recommend starting Haldol IV 0.5 mg q12h standing for management of delirium (pt's home perphenazine PO is still being held, due to absence of active psychotic sx). Pt does not appear to present an acute risk of harm to self or others at the time of assessment, and does not appear to be in need of admission to  psych at the time of assessment. 75F, single and living in nursing home ([facility]), with PHx of schizophrenia and MHx of HTN, Diabetes, Osteoarthritis, Osteoporosis, Peripheral arterial disease, Diabetic neuropathy, and HLD, BIBEMS on 1/5 from NH for worsening BL heel ulcerations which have been chronic for the past few months, as well as chronic sacral decubitus ulcer. MRI showed progressive osteomyelitis of R foot and surgical debridement was recommended for both sacral and calcaneal ulcers, but pt initially stated she only wanted sacral debridement and refused it for her feet, despite being informed that the heel infection presented a more immediate risk to life and limb. Initial psych consult was requested for assessment of capacity to refuse heel debridement; on exam, pt expressed verbal agreement with debridement and was found to have capacity to consent to the procedure, but written consent could not be obtained due to subsequent prolonged somnolence, so procedure was performed on 1/13 with [facility] physician consent. Pt received PICC on 1/20 under similar circumstances (initially expressed verbal consent, but subsequently became somnolent and written consent could not be obtained; [facility] consent was used). Psychiatry is now reconsulted for assessment of prolonged postoperative somnolence and hyporesponsiveness. Although pt presents today with improved alertness and interactivity, we agree with neurology impression that pt's presentation is c/w prolonged hypoactive delirium 2/2 metabolic abnormalities and infection. We recommend starting Haldol IV 0.5 mg q12h PRN delirium (pt's home perphenazine PO is still being held, due to absence of active psychotic sx). Pt does not appear to present an acute risk of harm to self or others at the time of assessment, and does not appear to be in need of admission to  psych at the time of assessment.

## 2021-01-22 NOTE — PROGRESS NOTE ADULT - SUBJECTIVE AND OBJECTIVE BOX
Patient is a 75y old  Female who presents with a chief complaint of foot ulcer (05 Jan 2021 14:24)      HPI:  76 yo F from [facility], wheelchair bound with medical history significant of HTN, Diabetes, Osteoarthritis, Osteoporosis, Peripheral arterial disease, Diabetic neuropathy, HLD, Schizophrenia comes in with worsening ulceration to b/l heels. She has been having ulcers for past couple months, has been progressive. She was treated with IV antbx at NH but did not get better. It was worsening with necrotic changes and increasing foul smelling discharge. She denies fever, abdominal pain, chest pain, urinary symptoms, fall, trauma. No h/o nausea, vomiting, diarrhea, cough, dyspnea, sick contacts, recent illness.  (05 Jan 2021 14:24)      Podiatry HPI: 74 y/o female with full history as noted above, podiatry consulted for b/l heel wounds. Pt is from [facility], wheelchair bound with medical history significant of HTN, Diabetes, Osteoarthritis, Osteoporosis, PAD, Diabetic neuropathy, HLD, Schizophrenia comes in with worsening ulceration to b/l heels. Patient seen resting in bed comfortably, AAOx3. Patient states she has had the wounds for a long time, maybe more than 6 months. She relates receiving local wound care previously in the NH using santyl dressings. She endorses occasional pain to wound sites. Patient was  under podiatric care in previous admissions for b/l heel wounds with osteomyelitis. She states she is not interesting in surgery for her feet, she wishes to continue with local wound care. She denies nausea, vomiting, chills, fever, SOB.     Podiatry Progress: Pt s/p Right foot partial calcanectomy and wound debridement on 1/13/21. Patient seen resting in bed, lethargic, non-verbal; Unable to further assess symptoms due to mental status.     Medications acetaminophen   Tablet .. 650 milliGRAM(s) Oral every 6 hours PRN  aspirin  chewable 81 milliGRAM(s) Oral daily  cefepime   IVPB 1000 milliGRAM(s) IV Intermittent every 8 hours  chlorhexidine 2% Cloths 1 Application(s) Topical daily  collagenase Ointment 1 Application(s) Topical daily  Dakins Solution - Full Strength 1 Application(s) Topical once  dextrose 40% Gel 15 Gram(s) Oral once  dextrose 5% + sodium chloride 0.9% with potassium chloride 20 mEq/L 1000 milliLiter(s) IV Continuous <Continuous>  dextrose 50% Injectable 12.5 Gram(s) IV Push once  dextrose 50% Injectable 25 Gram(s) IV Push once  dextrose 50% Injectable 25 Gram(s) IV Push once  enoxaparin Injectable 40 milliGRAM(s) SubCutaneous daily  glucagon  Injectable 1 milliGRAM(s) IntraMuscular once  haloperidol    Injectable 0.5 milliGRAM(s) IV Push every 12 hours  insulin lispro (ADMELOG) corrective regimen sliding scale   SubCutaneous three times a day before meals  insulin lispro (ADMELOG) corrective regimen sliding scale   SubCutaneous at bedtime  lactated ringers. 1000 milliLiter(s) IV Continuous <Continuous>  metroNIDAZOLE  IVPB 500 milliGRAM(s) IV Intermittent every 8 hours  simvastatin 40 milliGRAM(s) Oral at bedtime  sodium chloride 0.9% lock flush 10 milliLiter(s) IV Push every 1 hour PRN  vancomycin  IVPB 750 milliGRAM(s) IV Intermittent every 12 hours    FHNo pertinent family history in first degree relatives    ,   PMHCOVID-19    Osteomyelitis    DM (diabetes mellitus)    Paranoid schizophrenia    HLD (hyperlipidemia)    PAD (peripheral artery disease)    HTN (hypertension)       PSHNo significant past surgical history        Labs                          7.0    11.58 )-----------( 323      ( 22 Jan 2021 09:48 )             22.4      01-22    148<H>  |  120<H>  |  10  ----------------------------<  103<H>  2.9<LL>   |  19<L>  |  0.64    Ca    7.1<L>      22 Jan 2021 09:48  Phos  2.0     01-22  Mg     1.5     01-22    TPro  3.9<L>  /  Alb  1.1<L>  /  TBili  0.3  /  DBili  x   /  AST  6<L>  /  ALT  <6<L>  /  AlkPhos  57  01-22     Vital Signs Last 24 Hrs  T(C): 36.3 (22 Jan 2021 13:25), Max: 36.3 (21 Jan 2021 20:27)  T(F): 97.4 (22 Jan 2021 13:25), Max: 97.4 (22 Jan 2021 05:20)  HR: 87 (22 Jan 2021 13:25) (87 - 93)  BP: 125/59 (22 Jan 2021 13:25) (119/61 - 125/59)  BP(mean): 75 (22 Jan 2021 13:25) (75 - 75)  RR: 18 (22 Jan 2021 13:25) (17 - 18)  SpO2: 100% (22 Jan 2021 13:25) (97% - 100%)  Sedimentation Rate, Erythrocyte: 99 mm/Hr (01-05-21 @ 11:45)         C-Reactive Protein, Serum: 5.63 mg/dL (01-06-21 @ 10:27)   WBC Count: 11.58 K/uL <H> (01-22-21 @ 09:48)  WBC Count: 10.84 K/uL <H> (01-22-21 @ 05:29)         LE Focused:    Vasc:  DP/PT nonpalpable, CFT brisk to digits, TG warm to warm b/l,   Derm:   Wound 1:  L heel closed necrotic eschar measuring ~4x3.5x0.1 cm with mild amc wound erythema and loosely adhered periwound skin, no discharge noted, no malodor or PTB noted, stable in appearance    Wound 2: s/p R foot partial calcanectomy and wound debridement measuring ~9x6.5x1 cm with calcaneal bone exposed and increased surrounding fibrotic tissue; No periwound erythema. No drainage, no malodor  Neuro: protective sensation absent at level of digits b/l  MSK: no tenderness on palpation of periwound areas b/l       IMAGING:  f< from: Xray Ankle Complete 3 Views, Right (01.13.21 @ 18:15) >    EXAM:  ANKLE RIGHT (MINIMUM 3 V)                            PROCEDURE DATE:  01/13/2021          INTERPRETATION:  RIGHT ankle    CLINICAL INFORMATION: Postoperative radiographs.    TECHNIQUE: AP,lateral /views.    FINDINGS:    Large posterior calcaneal ulceration NOTED with the osteolysis /surgical debridement of the posterior calcaneus remaining osseous structures of the ankle are intact..    IMPRESSION:    Posterior calcaneal large ulceration within the posterior calcaneal postsurgical debridement versus/osteomyelitis.            ADITI SILVA MD; Attending Radiologist  This document has been electronically signed. Jan 13 2021  7:02PM    < end of copied text >    --------------------------  < from: MR Foot No Cont, Right (01.06.21 @ 11:08) >    EXAM:  MR FOOT RT                            PROCEDURE DATE:  01/06/2021          INTERPRETATION:  Clinical indications: Right heel ulceration and eschar status post debridement. Concern for osteomyelitis.    Multiplanar multisequence MRI of the right foot was performed localized to the hindfoot.    Correlation is made with prior MRI from September 11, 2019.    FINDINGS:    There is a large wound along the plantar aspect of the calcaneus posteriorly which extends nearly extends to bone. There is surrounding soft tissue edema about this site with evidence of an overlying bandage. The degree of soft tissue deficiency is increased compared to the prior examination. There is extensive osseous edema and corresponding hypointense T1 marrow signalthroughout the calcaneus extending from the posterior plantar margin to the level of the angle of Gissane. This is progressed compared to the prior examination and consistent with osteomyelitis. Marrow signal within the remainder of the foot is otherwise preserved.    There is confluent edema tracking along the abductor hallucis and flexor digitorum brevis muscles and within the plantar subcutaneous fat subjacent to this region. In total this area measures approximately 2.2 cm in transverse dimension, approximately 4 cm in anteroposterior posterior dimension, and approximately 1.6 cm in craniocaudal dimension. Findings may be related to underlying phlegmon or abscess. Evaluation is limited by the lack of intravenous contrast. Distal to this site there is edema throughout the visualized musculature consistent with myositis.    Visualized tendinous structures remain intact. There is no acute ligamentous injury. Visualized joint spaces are preserved without joint effusion.    IMPRESSION:    Osteomyelitis involving the calcaneus which extends from the plantar posterior aspect of the calcaneus to the level of the angle of Gissane. This is progressed compared to the prior examination. This is seen in the setting of diffuse soft tissue deficiency along the posterior plantar aspect of the calcaneus.    Confluent edema adjacent to this region within the abductor hallucis and flexor digitorum brevis muscles and within the subjacent plantar subcutaneous fat. This may be related to abscess orphlegmon.            OSWALDO ALFRED MD; Attending Radiologist  This document has been electronically signed. Jan 6 2021 11:49AM    < end of copied text >    -------------------------------------------------------------------------------------------------------------  < from: [facility] Physiol Extremity Lower 3+ Level, BI (01.05.21 @ 17:55) >    EXAM:  US PHYSIOL LWR EXT 3+ LEV BI                            PROCEDURE DATE:  01/05/2021          INTERPRETATION:  Clinical information: History of diabetes, nonsmoker, hypertension, hyperlipidemia, presents with bilateral heel ulcers.    Comparison: Lower extremity arterial Doppler study dated 5/13/2020.    Technique: Lower extremity arterial Doppler study. Note that pressure measurements were not obtained at the thigh level.    Findings: Ankle brachial index measures 1.33 bilaterally, compared with prior values of 1.41 on the right and 1.36 on the left. No segmental arterial pressure gradient is present.    PVR waveforms are normal in amplitude and pulsatility from the thigh through the ankle level. They're diminished in amplitude at the metatarsal and digital levels in symmetric fashion, similar to the prior exam.    Impression: No Doppler evidence of hemodynamically significant arterial inflow abnormality in either lower extremity.    Evidence of small vessel disease in the feet.    No significant interval change since study of 5/13/2020.            SOHAN PA MD; Attending Radiologist  This document has been electronically signed. Jan 6 2021  9:13AM    < end of copied text >      --------------------------------------------------------------------------------------------------------------  < from: Xray Foot AP + Lateral + Oblique, Right (01.05.21 @ 18:00) >    EXAM:  FOOT RIGHT (MINIMUM 3 VIEWS)                            PROCEDURE DATE:  01/05/2021          INTERPRETATION:  Right foot    HISTORY: Status post bedside debridement.     Two views of the right foot show thinning of soft tissues near the calcaneus likely indicating site of debridement. The joint spaces are maintained. There is questionable exposure of the plantar surface of the calcaneus.    IMPRESSION: Calcaneal soft tissues and questionable exposure as noted. Clinical correlation is suggested.        Thank you for this referral.            REID CRAMER MD; Attending Interventional Radiologist  This document has been electronically signed. Jan 6 2021 11:10AM    < end of copied text >    -------------------------------------------------------------------------------------------  a< from: Xray Ankle Complete 3 Views, Bilateral (01.05.21 @ 14:37) >    EXAM:  ANKLE BILATERAL (MINIMUM 3 V)                            PROCEDURE DATE:  01/05/2021          INTERPRETATION:  CLINICAL INDICATION:  Osteomyelitis; evaluate for soft tissue emphysema.    COMPARISON:  Left ankle radiography 5/11/2020.    TECHNIQUE:  AP, oblique and crosstable lateral views left ankle. Oblique and crosstable lateral views right ankle. Technologist noted that the best possible films were obtained.    FINDINGS:    Right ankle: Generalized osteopenia. Subcutaneous emphysema is identified along the plantar aspect of the right hindfoot inferior to the calcaneus, with an overlying skin defect noted along the posterior aspect of the calcaneus. Osteolysis involving the inferior/posterior aspect of the right calcaneus cannot beexcluded. MRI evaluation can be performed for further assessment. Extensive vascular calcification is noted. No ankle joint effusion is noted.    Left ankle: Generalized osteopenia. There is no evidence for acute fracture or dislocation. The ankle mortise appears grossly intact. No ankle joint effusion is noted. Extensive vascular calcifications identified. No significant soft tissue swelling.      IMPRESSION:  No evidence for acute fracture. Subcutaneous emphysema is identified along the plantaraspect of the right hindfoot inferior to the calcaneus, with an overlying skin defect noted along the posterior aspect of the calcaneus. Osteolysis involving the inferior/posterior aspect of the right calcaneus cannot be excluded. MRI evaluation can be performed for further assessment.                MARA LARKIN MD; Attending Radiologist  This document has been electronically signed. Jan 5 2021  3:57PM    < end of copied text >        CULTURES:   cCulture - Surgical Swab (01.05.21 @ 22:13)   Specimen Source: .Surgical Swab Right heel wound   Culture Results:   Few Morganella morganii   Few Proteus mirabilis       Historical Values  Culture - Surgical Swab (01.05.21 @ 22:13)   Specimen Source: .Surgical Swab Right heel wound   Culture Results:   Few Morganella morganii   Few Proteus mirabilis   culture:Culture - Surgical Swab (01.13.21 @ 19:22)   - Ampicillin/Sulbactam: S <=4/2 Enterobacter, Citrobacter, and Serratia may develop resistance during prolonged therapy (3-4 days)   - Ampicillin: S <=8 These ampicillin results predict results for amoxicillin   - Amikacin: S <=16   - Amoxicillin/Clavulanic Acid: S <=8/4   - Aztreonam: S <=4   - Cefazolin: I 4 Enterobacter, Citrobacter, and Serratia may develop resistance during prolonged therapy (3-4 days)   - Cefepime: S <=2   - Cefoxitin: S <=8   - Ceftriaxone: S <=1 Enterobacter, Citrobacter, and Serratia may develop resistance during prolonged therapy   - Ciprofloxacin: R 1   - Ertapenem: S <=0.5   - Gentamicin: S <=2   - Levofloxacin: S <=0.5   - Meropenem: S <=1   - Trimethoprim/Sulfamethoxazole: S <=0.5/9.5   - Tobramycin: S <=2   - Piperacillin/Tazobactam: S <=8   Specimen Source: .Surgical Swab Right heel wound culture   Culture Results:   Moderate Proteus mirabilis   Few Bacteroides vulgatus group "Susceptibilities not performed"   Organism Identification: Proteus mirabilis   Organism: Proteus mirabilis   Method Type: RICARDO       Historical Values  Culture - Surgical Swab (01.13.21 @ 19:22)   - Ampicillin/Sulbactam: S <=4/2 Enterobacter, Citrobacter, and Serratia may develop resistance during prolonged therapy (3-4 days)   - Ampicillin: S <=8 These ampicillin results predict results for amoxicillin   - Amikacin: S <=16   - Amoxicillin/Clavulanic Acid: S <=8/4   - Aztreonam: S <=4   - Cefazolin: I 4 Enterobacter, Citrobacter, and Serratia may develop resistance during prolonged therapy (3-4 days)   - Cefepime: S <=2   - Cefoxitin: S <=8   - Ceftriaxone: S <=1 Enterobacter, Citrobacter, and Serratia may develop resistance during prolonged therapy   - Ciprofloxacin: R 1   - Ertapenem: S <=0.5   - Gentamicin: S <=2   - Levofloxacin: S <=0.5   - Meropenem: S <=1   - Trimethoprim/Sulfamethoxazole: S <=0.5/9.5   - Tobramycin: S <=2   - Piperacillin/Tazobactam: S <=8   Specimen Source: .Surgical Swab Right heel wound culture   Culture Results:   Moderate Proteus mirabilis   Few Bacteroides vulgatus group "Susceptibilities not performed"   Organism Identification: Proteus mirabilis   Organism: Proteus mirabilis   Method Type: RICARDO   ---------------------------------  Pathology result:Surgical Pathology Report (01.13.21 @ 14:03)   Surgical Pathology Report:   ACCESSION No: [id number]   [facility] [place] 2   Surgical Final Report   Final Diagnosis   1. Right calcaneum bone; partial calcanectomy, incision and   drainage:   Cancellous bone with acute osteomyelitis and reparative changes   2. Right heel, margin; biopsy:   Osteocartilaginous tissue with granulation tissue and   regenerating bony   trabeculae; no inflammatory exudate is identified   Verified by: Sarah Culver M.D.

## 2021-01-22 NOTE — PROGRESS NOTE BEHAVIORAL HEALTH - DETAILS
Osteomyelitis of R foot. Swelling of BL hands Sacral decubitus ulcer, BL heel ulcers with eschar and surrounding edema. Both hands are cool to the touch Poor appetite

## 2021-01-22 NOTE — PROGRESS NOTE BEHAVIORAL HEALTH - NSBHCONSULTRECOMMENDOTHER_PSY_A_CORE FT
1. Recommend starting Haldol IV 0.5 mg q12h standing for management of delirium, to be titrated for effect  2. Recommending continuing to hold pt home psychiatric medication (perphenazine 16 mg BID), due to absence of active psychotic sx and potential to exacerbate somnolence   3. No indication for other standing or PRN psychotropic medications at this time  4. Medical management as directed by primary team and podiatry  5. Psychiatry will continue to follow for delirium management  6. Case d/w NP Nawra of primary team    Norma Lackey MD  Director, Consultation-Liaison Psychiatry Service  [phone] 1. Recommend starting Haldol IV 0.5 mg q12h PRN delirium  2. Recommending continuing to hold pt home psychiatric medication (perphenazine 16 mg BID), due to absence of active psychotic sx and potential to exacerbate somnolence   3. No indication for other standing or PRN psychotropic medications at this time  4. Medical management as directed by primary team and podiatry  5. Psychiatry will continue to follow for delirium management  6. Case d/w NP Nawra of primary team    Norma Lackey MD  Director, Consultation-Liaison Psychiatry Service  [id number]

## 2021-01-23 LAB
ANION GAP SERPL CALC-SCNC: 11 MMOL/L — SIGNIFICANT CHANGE UP (ref 5–17)
BUN SERPL-MCNC: 13 MG/DL — SIGNIFICANT CHANGE UP (ref 7–18)
CALCIUM SERPL-MCNC: 7.4 MG/DL — LOW (ref 8.4–10.5)
CHLORIDE SERPL-SCNC: 117 MMOL/L — HIGH (ref 96–108)
CO2 SERPL-SCNC: 17 MMOL/L — LOW (ref 22–31)
CREAT SERPL-MCNC: 0.76 MG/DL — SIGNIFICANT CHANGE UP (ref 0.5–1.3)
FERRITIN SERPL-MCNC: 800 NG/ML — HIGH (ref 15–150)
FOLATE SERPL-MCNC: 5.8 NG/ML — SIGNIFICANT CHANGE UP
GLUCOSE BLDC GLUCOMTR-MCNC: 104 MG/DL — HIGH (ref 70–99)
GLUCOSE BLDC GLUCOMTR-MCNC: 137 MG/DL — HIGH (ref 70–99)
GLUCOSE BLDC GLUCOMTR-MCNC: 143 MG/DL — HIGH (ref 70–99)
GLUCOSE BLDC GLUCOMTR-MCNC: 145 MG/DL — HIGH (ref 70–99)
GLUCOSE SERPL-MCNC: 133 MG/DL — HIGH (ref 70–99)
HCT VFR BLD CALC: 38.9 % — SIGNIFICANT CHANGE UP (ref 34.5–45)
HGB BLD-MCNC: 12.6 G/DL — SIGNIFICANT CHANGE UP (ref 11.5–15.5)
MAGNESIUM SERPL-MCNC: 2.1 MG/DL — SIGNIFICANT CHANGE UP (ref 1.6–2.6)
MCHC RBC-ENTMCNC: 28.5 PG — SIGNIFICANT CHANGE UP (ref 27–34)
MCHC RBC-ENTMCNC: 32.4 GM/DL — SIGNIFICANT CHANGE UP (ref 32–36)
MCV RBC AUTO: 88 FL — SIGNIFICANT CHANGE UP (ref 80–100)
NRBC # BLD: 0 /100 WBCS — SIGNIFICANT CHANGE UP (ref 0–0)
PLATELET # BLD AUTO: 284 K/UL — SIGNIFICANT CHANGE UP (ref 150–400)
POTASSIUM SERPL-MCNC: 4.3 MMOL/L — SIGNIFICANT CHANGE UP (ref 3.5–5.3)
POTASSIUM SERPL-SCNC: 4.3 MMOL/L — SIGNIFICANT CHANGE UP (ref 3.5–5.3)
RBC # BLD: 4.42 M/UL — SIGNIFICANT CHANGE UP (ref 3.8–5.2)
RBC # FLD: 16.3 % — HIGH (ref 10.3–14.5)
SODIUM SERPL-SCNC: 145 MMOL/L — SIGNIFICANT CHANGE UP (ref 135–145)
VANCOMYCIN TROUGH SERPL-MCNC: 24.2 UG/ML — HIGH (ref 10–20)
VANCOMYCIN TROUGH SERPL-MCNC: 25.2 UG/ML — CRITICAL HIGH (ref 10–20)
VIT B12 SERPL-MCNC: 667 PG/ML — SIGNIFICANT CHANGE UP (ref 232–1245)
WBC # BLD: 15.82 K/UL — HIGH (ref 3.8–10.5)
WBC # FLD AUTO: 15.82 K/UL — HIGH (ref 3.8–10.5)

## 2021-01-23 PROCEDURE — 71045 X-RAY EXAM CHEST 1 VIEW: CPT | Mod: 26

## 2021-01-23 RX ORDER — PANTOPRAZOLE SODIUM 20 MG/1
40 TABLET, DELAYED RELEASE ORAL EVERY 12 HOURS
Refills: 0 | Status: DISCONTINUED | OUTPATIENT
Start: 2021-01-23 | End: 2021-01-29

## 2021-01-23 RX ORDER — SODIUM CHLORIDE 9 MG/ML
1000 INJECTION, SOLUTION INTRAVENOUS
Refills: 0 | Status: DISCONTINUED | OUTPATIENT
Start: 2021-01-23 | End: 2021-01-24

## 2021-01-23 RX ORDER — SUCRALFATE 1 G
1 TABLET ORAL
Refills: 0 | Status: DISCONTINUED | OUTPATIENT
Start: 2021-01-23 | End: 2021-01-28

## 2021-01-23 RX ADMIN — Medication 100 MILLIGRAM(S): at 22:58

## 2021-01-23 RX ADMIN — ENOXAPARIN SODIUM 40 MILLIGRAM(S): 100 INJECTION SUBCUTANEOUS at 12:32

## 2021-01-23 RX ADMIN — CEFEPIME 100 MILLIGRAM(S): 1 INJECTION, POWDER, FOR SOLUTION INTRAMUSCULAR; INTRAVENOUS at 12:33

## 2021-01-23 RX ADMIN — Medication 100 MILLIGRAM(S): at 12:33

## 2021-01-23 RX ADMIN — Medication 81 MILLIGRAM(S): at 12:25

## 2021-01-23 RX ADMIN — Medication 100 MILLIGRAM(S): at 05:41

## 2021-01-23 RX ADMIN — PANTOPRAZOLE SODIUM 40 MILLIGRAM(S): 20 TABLET, DELAYED RELEASE ORAL at 23:10

## 2021-01-23 RX ADMIN — CEFEPIME 100 MILLIGRAM(S): 1 INJECTION, POWDER, FOR SOLUTION INTRAMUSCULAR; INTRAVENOUS at 05:41

## 2021-01-23 RX ADMIN — CHLORHEXIDINE GLUCONATE 1 APPLICATION(S): 213 SOLUTION TOPICAL at 12:25

## 2021-01-23 RX ADMIN — CEFEPIME 100 MILLIGRAM(S): 1 INJECTION, POWDER, FOR SOLUTION INTRAMUSCULAR; INTRAVENOUS at 22:59

## 2021-01-23 RX ADMIN — Medication 1 APPLICATION(S): at 12:26

## 2021-01-23 NOTE — PROVIDER CONTACT NOTE (CRITICAL VALUE NOTIFICATION) - NAME OF MD/NP/PA/DO NOTIFIED:
MARIAELENA Ga
MARIAELENA Burgess
MARIAELENA Ga
MARIAELENA Malone
MARIAELENA Malone was paged through suzanne
MARIAELENA Hi was made aware
MARIAELENA Hi was made aware,
MARIAELENA Ga
MARIAELENA Villasenor
NP Fillip

## 2021-01-23 NOTE — PROGRESS NOTE ADULT - SUBJECTIVE AND OBJECTIVE BOX
Patient is a 75y old  Female who presents with a chief complaint of foot ulcer (05 Jan 2021 14:24)      HPI:  76 yo F from [facility], wheelchair bound with medical history significant of HTN, Diabetes, Osteoarthritis, Osteoporosis, Peripheral arterial disease, Diabetic neuropathy, HLD, Schizophrenia comes in with worsening ulceration to b/l heels. She has been having ulcers for past couple months, has been progressive. She was treated with IV antbx at NH but did not get better. It was worsening with necrotic changes and increasing foul smelling discharge. She denies fever, abdominal pain, chest pain, urinary symptoms, fall, trauma. No h/o nausea, vomiting, diarrhea, cough, dyspnea, sick contacts, recent illness.  (05 Jan 2021 14:24)      Podiatry HPI: 76 y/o female with full history as noted above, podiatry consulted for b/l heel wounds. Pt is from [facility], wheelchair bound with medical history significant of HTN, Diabetes, Osteoarthritis, Osteoporosis, PAD, Diabetic neuropathy, HLD, Schizophrenia comes in with worsening ulceration to b/l heels. Patient seen resting in bed comfortably, AAOx3. Patient states she has had the wounds for a long time, maybe more than 6 months. She relates receiving local wound care previously in the NH using santyl dressings. She endorses occasional pain to wound sites. Patient was  under podiatric care in previous admissions for b/l heel wounds with osteomyelitis. She states she is not interesting in surgery for her feet, she wishes to continue with local wound care. She denies nausea, vomiting, chills, fever, SOB.     Podiatry Progress: Pt s/p Right foot partial calcanectomy and wound debridement on 1/13/21. Patient seen sleeping in bed, nonresponsive to verbal stimuli.     Medications acetaminophen   Tablet .. 650 milliGRAM(s) Oral every 6 hours PRN  aspirin  chewable 81 milliGRAM(s) Oral daily  cefepime   IVPB 1000 milliGRAM(s) IV Intermittent every 8 hours  chlorhexidine 2% Cloths 1 Application(s) Topical daily  collagenase Ointment 1 Application(s) Topical daily  Dakins Solution - Full Strength 1 Application(s) Topical once  dextrose 40% Gel 15 Gram(s) Oral once  dextrose 50% Injectable 25 Gram(s) IV Push once  dextrose 50% Injectable 12.5 Gram(s) IV Push once  dextrose 50% Injectable 25 Gram(s) IV Push once  enoxaparin Injectable 40 milliGRAM(s) SubCutaneous daily  glucagon  Injectable 1 milliGRAM(s) IntraMuscular once  haloperidol    Injectable 0.5 milliGRAM(s) IV Push every 8 hours PRN  insulin lispro (ADMELOG) corrective regimen sliding scale   SubCutaneous three times a day before meals  insulin lispro (ADMELOG) corrective regimen sliding scale   SubCutaneous at bedtime  lactated ringers. 1000 milliLiter(s) IV Continuous <Continuous>  metroNIDAZOLE  IVPB 500 milliGRAM(s) IV Intermittent every 8 hours  simvastatin 40 milliGRAM(s) Oral at bedtime  sodium chloride 0.9% lock flush 10 milliLiter(s) IV Push every 1 hour PRN  vancomycin  IVPB 750 milliGRAM(s) IV Intermittent every 12 hours    FHNo pertinent family history in first degree relatives    ,   PMHCOVID-19    Osteomyelitis    DM (diabetes mellitus)    Paranoid schizophrenia    HLD (hyperlipidemia)    PAD (peripheral artery disease)    HTN (hypertension)       PSHNo significant past surgical history        Labs                          12.6   15.82 )-----------( 284      ( 23 Jan 2021 07:39 )             38.9      01-23    145  |  117<H>  |  13  ----------------------------<  133<H>  4.3   |  17<L>  |  0.76    Ca    7.4<L>      23 Jan 2021 07:39  Phos  2.0     01-22  Mg     2.1     01-23    TPro  3.9<L>  /  Alb  1.1<L>  /  TBili  0.3  /  DBili  x   /  AST  6<L>  /  ALT  <6<L>  /  AlkPhos  57  01-22     Vital Signs Last 24 Hrs  T(C): 36.4 (23 Jan 2021 13:11), Max: 36.7 (23 Jan 2021 01:30)  T(F): 97.6 (23 Jan 2021 13:11), Max: 98.1 (23 Jan 2021 01:30)  HR: 90 (23 Jan 2021 13:11) (90 - 96)  BP: 119/69 (23 Jan 2021 13:11) (119/69 - 144/79)  BP(mean): --  RR: 18 (23 Jan 2021 13:11) (17 - 18)  SpO2: 99% (23 Jan 2021 13:11) (97% - 100%)  Sedimentation Rate, Erythrocyte: 99 mm/Hr (01-05-21 @ 11:45)         C-Reactive Protein, Serum: 5.63 mg/dL (01-06-21 @ 10:27)   WBC Count: 15.82 K/uL <H> (01-23-21 @ 07:39)    ROS:  REVIEW OF SYSTEMS: negative except as noted on HPI      PHYSICAL EXAM 1/23/21: B/L FOOT DRESSING MAINTAINED CLEAN, DRY, INTACT; PERFUSION BRISK TO B/L DIGITS; R FOOT WOUND VAC SET AT 125MMHG CONTINUOUS FLOW      PHYSICAL EXAM 1/22/21:   LE Focused:    Vasc:  DP/PT nonpalpable, CFT brisk to digits, TG warm to warm b/l,   Derm:   Wound 1:  L heel closed necrotic eschar measuring ~4x3.5x0.1 cm, no discharge noted, no malodor or PTB noted, stable in appearance    Wound 2: s/p R foot partial calcanectomy and wound debridement measuring ~9x6.5x1 cm with calcaneal bone exposed and increased surrounding fibrotic tissue; No periwound erythema. No drainage, no malodor  Neuro: protective sensation absent at level of digits b/l  MSK: no tenderness on palpation of periwound areas b/l       IMAGING:  f< from: Xray Ankle Complete 3 Views, Right (01.13.21 @ 18:15) >    EXAM:  ANKLE RIGHT (MINIMUM 3 V)                            PROCEDURE DATE:  01/13/2021          INTERPRETATION:  RIGHT ankle    CLINICAL INFORMATION: Postoperative radiographs.    TECHNIQUE: AP,lateral /views.    FINDINGS:    Large posterior calcaneal ulceration NOTED with the osteolysis /surgical debridement of the posterior calcaneus remaining osseous structures of the ankle are intact..    IMPRESSION:    Posterior calcaneal large ulceration within the posterior calcaneal postsurgical debridement versus/osteomyelitis.            ADITI SILVA MD; Attending Radiologist  This document has been electronically signed. Jan 13 2021  7:02PM    < end of copied text >    --------------------------  < from: MR Foot No Cont, Right (01.06.21 @ 11:08) >    EXAM:  MR FOOT RT                            PROCEDURE DATE:  01/06/2021          INTERPRETATION:  Clinical indications: Right heel ulceration and eschar status post debridement. Concern for osteomyelitis.    Multiplanar multisequence MRI of the right foot was performed localized to the hindfoot.    Correlation is made with prior MRI from September 11, 2019.    FINDINGS:    There is a large wound along the plantar aspect of the calcaneus posteriorly which extends nearly extends to bone. There is surrounding soft tissue edema about this site with evidence of an overlying bandage. The degree of soft tissue deficiency is increased compared to the prior examination. There is extensive osseous edema and corresponding hypointense T1 marrow signalthroughout the calcaneus extending from the posterior plantar margin to the level of the angle of Gissane. This is progressed compared to the prior examination and consistent with osteomyelitis. Marrow signal within the remainder of the foot is otherwise preserved.    There is confluent edema tracking along the abductor hallucis and flexor digitorum brevis muscles and within the plantar subcutaneous fat subjacent to this region. In total this area measures approximately 2.2 cm in transverse dimension, approximately 4 cm in anteroposterior posterior dimension, and approximately 1.6 cm in craniocaudal dimension. Findings may be related to underlying phlegmon or abscess. Evaluation is limited by the lack of intravenous contrast. Distal to this site there is edema throughout the visualized musculature consistent with myositis.    Visualized tendinous structures remain intact. There is no acute ligamentous injury. Visualized joint spaces are preserved without joint effusion.    IMPRESSION:    Osteomyelitis involving the calcaneus which extends from the plantar posterior aspect of the calcaneus to the level of the angle of Gissane. This is progressed compared to the prior examination. This is seen in the setting of diffuse soft tissue deficiency along the posterior plantar aspect of the calcaneus.    Confluent edema adjacent to this region within the abductor hallucis and flexor digitorum brevis muscles and within the subjacent plantar subcutaneous fat. This may be related to abscess orphlegmon.            OSWALDO ALFRED MD; Attending Radiologist  This document has been electronically signed. Jan 6 2021 11:49AM    < end of copied text >    -------------------------------------------------------------------------------------------------------------  < from: [facility] Physiol Extremity Lower 3+ Level, BI (01.05.21 @ 17:55) >    EXAM:  US PHYSIOL LWR EXT 3+ LEV BI                            PROCEDURE DATE:  01/05/2021          INTERPRETATION:  Clinical information: History of diabetes, nonsmoker, hypertension, hyperlipidemia, presents with bilateral heel ulcers.    Comparison: Lower extremity arterial Doppler study dated 5/13/2020.    Technique: Lower extremity arterial Doppler study. Note that pressure measurements were not obtained at the thigh level.    Findings: Ankle brachial index measures 1.33 bilaterally, compared with prior values of 1.41 on the right and 1.36 on the left. No segmental arterial pressure gradient is present.    PVR waveforms are normal in amplitude and pulsatility from the thigh through the ankle level. They're diminished in amplitude at the metatarsal and digital levels in symmetric fashion, similar to the prior exam.    Impression: No Doppler evidence of hemodynamically significant arterial inflow abnormality in either lower extremity.    Evidence of small vessel disease in the feet.    No significant interval change since study of 5/13/2020.            SOHAN PA MD; Attending Radiologist  This document has been electronically signed. Jan 6 2021  9:13AM    < end of copied text >      --------------------------------------------------------------------------------------------------------------  < from: Xray Foot AP + Lateral + Oblique, Right (01.05.21 @ 18:00) >    EXAM:  FOOT RIGHT (MINIMUM 3 VIEWS)                            PROCEDURE DATE:  01/05/2021          INTERPRETATION:  Right foot    HISTORY: Status post bedside debridement.     Two views of the right foot show thinning of soft tissues near the calcaneus likely indicating site of debridement. The joint spaces are maintained. There is questionable exposure of the plantar surface of the calcaneus.    IMPRESSION: Calcaneal soft tissues and questionable exposure as noted. Clinical correlation is suggested.        Thank you for this referral.            REID CRAMER MD; Attending Interventional Radiologist  This document has been electronically signed. Jan 6 2021 11:10AM    < end of copied text >    -------------------------------------------------------------------------------------------  a< from: Xray Ankle Complete 3 Views, Bilateral (01.05.21 @ 14:37) >    EXAM:  ANKLE BILATERAL (MINIMUM 3 V)                            PROCEDURE DATE:  01/05/2021          INTERPRETATION:  CLINICAL INDICATION:  Osteomyelitis; evaluate for soft tissue emphysema.    COMPARISON:  Left ankle radiography 5/11/2020.    TECHNIQUE:  AP, oblique and crosstable lateral views left ankle. Oblique and crosstable lateral views right ankle. Technologist noted that the best possible films were obtained.    FINDINGS:    Right ankle: Generalized osteopenia. Subcutaneous emphysema is identified along the plantar aspect of the right hindfoot inferior to the calcaneus, with an overlying skin defect noted along the posterior aspect of the calcaneus. Osteolysis involving the inferior/posterior aspect of the right calcaneus cannot beexcluded. MRI evaluation can be performed for further assessment. Extensive vascular calcification is noted. No ankle joint effusion is noted.    Left ankle: Generalized osteopenia. There is no evidence for acute fracture or dislocation. The ankle mortise appears grossly intact. No ankle joint effusion is noted. Extensive vascular calcifications identified. No significant soft tissue swelling.      IMPRESSION:  No evidence for acute fracture. Subcutaneous emphysema is identified along the plantaraspect of the right hindfoot inferior to the calcaneus, with an overlying skin defect noted along the posterior aspect of the calcaneus. Osteolysis involving the inferior/posterior aspect of the right calcaneus cannot be excluded. MRI evaluation can be performed for further assessment.                MARA LARKIN MD; Attending Radiologist  This document has been electronically signed. Jan 5 2021  3:57PM    < end of copied text >        CULTURES:   cCulture - Surgical Swab (01.05.21 @ 22:13)   Specimen Source: .Surgical Swab Right heel wound   Culture Results:   Few Morganella morganii   Few Proteus mirabilis       Historical Values  Culture - Surgical Swab (01.05.21 @ 22:13)   Specimen Source: .Surgical Swab Right heel wound   Culture Results:   Few Morganella morganii   Few Proteus mirabilis   culture:Culture - Surgical Swab (01.13.21 @ 19:22)   - Ampicillin/Sulbactam: S <=4/2 Enterobacter, Citrobacter, and Serratia may develop resistance during prolonged therapy (3-4 days)   - Ampicillin: S <=8 These ampicillin results predict results for amoxicillin   - Amikacin: S <=16   - Amoxicillin/Clavulanic Acid: S <=8/4   - Aztreonam: S <=4   - Cefazolin: I 4 Enterobacter, Citrobacter, and Serratia may develop resistance during prolonged therapy (3-4 days)   - Cefepime: S <=2   - Cefoxitin: S <=8   - Ceftriaxone: S <=1 Enterobacter, Citrobacter, and Serratia may develop resistance during prolonged therapy   - Ciprofloxacin: R 1   - Ertapenem: S <=0.5   - Gentamicin: S <=2   - Levofloxacin: S <=0.5   - Meropenem: S <=1   - Trimethoprim/Sulfamethoxazole: S <=0.5/9.5   - Tobramycin: S <=2   - Piperacillin/Tazobactam: S <=8   Specimen Source: .Surgical Swab Right heel wound culture   Culture Results:   Moderate Proteus mirabilis   Few Bacteroides vulgatus group "Susceptibilities not performed"   Organism Identification: Proteus mirabilis   Organism: Proteus mirabilis   Method Type: RICARDO       Historical Values  Culture - Surgical Swab (01.13.21 @ 19:22)   - Ampicillin/Sulbactam: S <=4/2 Enterobacter, Citrobacter, and Serratia may develop resistance during prolonged therapy (3-4 days)   - Ampicillin: S <=8 These ampicillin results predict results for amoxicillin   - Amikacin: S <=16   - Amoxicillin/Clavulanic Acid: S <=8/4   - Aztreonam: S <=4   - Cefazolin: I 4 Enterobacter, Citrobacter, and Serratia may develop resistance during prolonged therapy (3-4 days)   - Cefepime: S <=2   - Cefoxitin: S <=8   - Ceftriaxone: S <=1 Enterobacter, Citrobacter, and Serratia may develop resistance during prolonged therapy   - Ciprofloxacin: R 1   - Ertapenem: S <=0.5   - Gentamicin: S <=2   - Levofloxacin: S <=0.5   - Meropenem: S <=1   - Trimethoprim/Sulfamethoxazole: S <=0.5/9.5   - Tobramycin: S <=2   - Piperacillin/Tazobactam: S <=8   Specimen Source: .Surgical Swab Right heel wound culture   Culture Results:   Moderate Proteus mirabilis   Few Bacteroides vulgatus group "Susceptibilities not performed"   Organism Identification: Proteus mirabilis   Organism: Proteus mirabilis   Method Type: RICARDO   ---------------------------------  Pathology result:Surgical Pathology Report (01.13.21 @ 14:03)   Surgical Pathology Report:   ACCESSION No: [id number]   [facility] [place] 2   Surgical Final Report   Final Diagnosis   1. Right calcaneum bone; partial calcanectomy, incision and   drainage:   Cancellous bone with acute osteomyelitis and reparative changes   2. Right heel, margin; biopsy:   Osteocartilaginous tissue with granulation tissue and   regenerating bony   trabeculae; no inflammatory exudate is identified   Verified by: Sarah Culver M.D.

## 2021-01-23 NOTE — PROGRESS NOTE ADULT - SUBJECTIVE AND OBJECTIVE BOX
Patient denies chest pain or shortness of breath, is confused.  Review of systems otherwise (-)    acetaminophen   Tablet .. 650 milliGRAM(s) Oral every 6 hours PRN  aspirin  chewable 81 milliGRAM(s) Oral daily  cefepime   IVPB 1000 milliGRAM(s) IV Intermittent every 8 hours  chlorhexidine 2% Cloths 1 Application(s) Topical daily  collagenase Ointment 1 Application(s) Topical daily  Dakins Solution - Full Strength 1 Application(s) Topical once  dextrose 40% Gel 15 Gram(s) Oral once  dextrose 50% Injectable 25 Gram(s) IV Push once  dextrose 50% Injectable 12.5 Gram(s) IV Push once  dextrose 50% Injectable 25 Gram(s) IV Push once  enoxaparin Injectable 40 milliGRAM(s) SubCutaneous daily  glucagon  Injectable 1 milliGRAM(s) IntraMuscular once  haloperidol    Injectable 0.5 milliGRAM(s) IV Push every 8 hours PRN  insulin lispro (ADMELOG) corrective regimen sliding scale   SubCutaneous three times a day before meals  insulin lispro (ADMELOG) corrective regimen sliding scale   SubCutaneous at bedtime  lactated ringers. 1000 milliLiter(s) IV Continuous <Continuous>  metroNIDAZOLE  IVPB 500 milliGRAM(s) IV Intermittent every 8 hours  simvastatin 40 milliGRAM(s) Oral at bedtime  sodium chloride 0.9% lock flush 10 milliLiter(s) IV Push every 1 hour PRN  vancomycin  IVPB 750 milliGRAM(s) IV Intermittent every 12 hours                            12.6   15.82 )-----------( 284      ( 23 Jan 2021 07:39 )             38.9       01-23    145  |  117<H>  |  13  ----------------------------<  133<H>  4.3   |  17<L>  |  0.76    Ca    7.4<L>      23 Jan 2021 07:39  Phos  2.0     01-22  Mg     2.1     01-23    TPro  3.9<L>  /  Alb  1.1<L>  /  TBili  0.3  /  DBili  x   /  AST  6<L>  /  ALT  <6<L>  /  AlkPhos  57  01-22      T(C): 36.4 (01-23-21 @ 13:11), Max: 36.7 (01-23-21 @ 01:30)  HR: 90 (01-23-21 @ 13:11) (90 - 96)  BP: 119/69 (01-23-21 @ 13:11) (119/69 - 144/79)  RR: 18 (01-23-21 @ 13:11) (17 - 18)  SpO2: 99% (01-23-21 @ 13:11) (97% - 100%)  Wt(kg): --    I&O's Summary    Gen: awake but confused  HEENT:  (-)icterus (-)pallor  CV: N S1 S2 1/6 ANGELIC (+)2 Pulses B/l  Resp:  Clear to ausculatation B/L, normal effort  GI: (+) BS Soft, NT, ND  Lymph:  (-)Edema, (-)obvious lymphadenopathy  Skin: Warm to touch, Normal turgor  Psych: not able to assess mood and affect due to schizophrenia vs dementia.      ASSESSMENT/PLAN: 	75y  Female  wheelchair bound with medical history significant of HTN, Diabetes, Osteoarthritis, Osteoporosis, Peripheral arterial disease, Diabetic neuropathy, HLD, Schizophrenia historically preserved LV and R function, comes in with worsening ulceration to b/l heels s/p debridement.    - s/p Partial calcanectomy of right foot   - cont asa and statin  - optimized from a cardiac prospective for potential lower extremity amputation, however no plans for further OR at present  - No need for further inpatient cardiac work up.  - cont abx per primary team  - s/p  PICC line  - Wound care per podiatry    Zach Griffiths M.D.  Cardiac Electrophysiology  [phone]

## 2021-01-23 NOTE — PROGRESS NOTE ADULT - SUBJECTIVE AND OBJECTIVE BOX
Patient is a 75y old  Female who presents with a chief complaint of foot ulcer (22 Jan 2021 16:59)    PATIENT IS SEEN AND EXAMINED IN MEDICAL FLOOR.    ALLERGIES:  No Known Allergies    VITALS:    Vital Signs Last 24 Hrs  T(C): 36.4 (23 Jan 2021 13:11), Max: 36.7 (23 Jan 2021 01:30)  T(F): 97.6 (23 Jan 2021 13:11), Max: 98.1 (23 Jan 2021 01:30)  HR: 90 (23 Jan 2021 13:11) (90 - 96)  BP: 119/69 (23 Jan 2021 13:11) (119/69 - 144/79)  BP(mean): --  RR: 18 (23 Jan 2021 13:11) (17 - 18)  SpO2: 99% (23 Jan 2021 13:11) (97% - 100%)    LABS:    CBC Full  -  ( 23 Jan 2021 07:39 )  WBC Count : 15.82 K/uL  RBC Count : 4.42 M/uL  Hemoglobin : 12.6 g/dL  Hematocrit : 38.9 %  Platelet Count - Automated : 284 K/uL  Mean Cell Volume : 88.0 fl  Mean Cell Hemoglobin : 28.5 pg  Mean Cell Hemoglobin Concentration : 32.4 gm/dL  Auto Neutrophil # : x  Auto Lymphocyte # : x  Auto Monocyte # : x  Auto Eosinophil # : x  Auto Basophil # : x  Auto Neutrophil % : x  Auto Lymphocyte % : x  Auto Monocyte % : x  Auto Eosinophil % : x  Auto Basophil % : x    PTT - ( 22 Jan 2021 09:48 )  PTT:55.6 sec  01-23    145  |  117<H>  |  13  ----------------------------<  133<H>  4.3   |  17<L>  |  0.76    Ca    7.4<L>      23 Jan 2021 07:39  Phos  2.0     01-22  Mg     2.1     01-23    TPro  3.9<L>  /  Alb  1.1<L>  /  TBili  0.3  /  DBili  x   /  AST  6<L>  /  ALT  <6<L>  /  AlkPhos  57  01-22    CAPILLARY BLOOD GLUCOSE      POCT Blood Glucose.: 137 mg/dL (23 Jan 2021 12:04)  POCT Blood Glucose.: 104 mg/dL (23 Jan 2021 08:00)  POCT Blood Glucose.: 95 mg/dL (22 Jan 2021 21:45)  POCT Blood Glucose.: 115 mg/dL (22 Jan 2021 16:56)      LIVER FUNCTIONS - ( 22 Jan 2021 05:30 )  Alb: 1.1 g/dL / Pro: 3.9 g/dL / ALK PHOS: 57 U/L / ALT: <6 U/L DA / AST: 6 U/L / GGT: x           Creatinine Trend: 0.76<--, 0.64<--, 0.51<--, 0.59<--, 0.55<--, 0.51<--  I&O's Summary    .Tissue Right heel margin  01-13 @ 19:31   Rare Enterococcus faecalis  --  Enterococcus faecalis      .Surgical Swab Right heel wound culture  01-13 @ 19:22   Moderate Proteus mirabilis  Few Bacteroides vulgatus group "Susceptibilities not performed"  --  Proteus mirabilis      .Urine Clean Catch (Midstream)  01-06 @ 06:46   No growth  --  --      .Surgical Swab Right heel wound  01-05 @ 22:13   Few Morganella morganii  Few Proteus mirabilis  Moderate Corynebacterium species "Susceptibilities not performed"  Moderate Bacteroides vulgatus "Susceptibilities not performed"  --  Morganella morganii  Proteus mirabilis      .Blood Blood-Peripheral  01-05 @ 17:59   No Growth Final  --  --      MEDICATIONS:    MEDICATIONS  (STANDING):  aspirin  chewable 81 milliGRAM(s) Oral daily  cefepime   IVPB 1000 milliGRAM(s) IV Intermittent every 8 hours  chlorhexidine 2% Cloths 1 Application(s) Topical daily  collagenase Ointment 1 Application(s) Topical daily  Dakins Solution - Full Strength 1 Application(s) Topical once  dextrose 40% Gel 15 Gram(s) Oral once  dextrose 50% Injectable 25 Gram(s) IV Push once  dextrose 50% Injectable 12.5 Gram(s) IV Push once  dextrose 50% Injectable 25 Gram(s) IV Push once  enoxaparin Injectable 40 milliGRAM(s) SubCutaneous daily  glucagon  Injectable 1 milliGRAM(s) IntraMuscular once  insulin lispro (ADMELOG) corrective regimen sliding scale   SubCutaneous three times a day before meals  insulin lispro (ADMELOG) corrective regimen sliding scale   SubCutaneous at bedtime  lactated ringers. 1000 milliLiter(s) (75 mL/Hr) IV Continuous <Continuous>  metroNIDAZOLE  IVPB 500 milliGRAM(s) IV Intermittent every 8 hours  simvastatin 40 milliGRAM(s) Oral at bedtime  vancomycin  IVPB 750 milliGRAM(s) IV Intermittent every 12 hours      MEDICATIONS  (PRN):  acetaminophen   Tablet .. 650 milliGRAM(s) Oral every 6 hours PRN Moderate Pain (4 - 6)  haloperidol    Injectable 0.5 milliGRAM(s) IV Push every 8 hours PRN Agitation  sodium chloride 0.9% lock flush 10 milliLiter(s) IV Push every 1 hour PRN Pre/post blood products, medications, blood draw, and to maintain line patency      REVIEW OF SYSTEMS:                           ALL ROS DONE [ X   ]    CONSTITUTIONAL:  LETHARGIC [   ], FEVER [   ], UNRESPONSIVE [   ]  CVS:  CP  [   ], SOB, [   ], PALPITATIONS [   ], DIZZYNESS [   ]  RS: COUGH [   ], SPUTUM [   ]  GI: ABDOMINAL PAIN [   ], NAUSEA [   ], VOMITINGS [   ], DIARRHEA [   ], CONSTIPATION [   ]  :  DYSURIA [   ], NOCTURIA [   ], INCREASED FREQUENCY [   ], DRIBLING [   ],  SKELETAL: PAINFUL JOINTS [   ], SWOLLEN JOINTS [   ], NECK ACHE [   ], LOW BACK ACHE [   ],  SKIN : ULCERS [   ], RASH [   ], ITCHING [   ]  CNS: HEAD ACHE [   ], DOUBLE VISION [   ], BLURRED VISION [   ], AMS / CONFUSION [   ], SEIZURES [   ], WEAKNESS [   ],TINGLING / NUMBNESS [   ]    PHYSICAL EXAMINATION:  GENERAL APPEARANCE: NO DISTRESS  HEENT:  NO PALLOR, NO  JVD,  NO   NODES, NECK SUPPLE  CVS: S1 +, S2 +,   RS: AEEB,  OCCASIONAL  RALES +,   NO RONCHI  ABD: SOFT, NT, NO, BS +  EXT: NO PE  SKIN: WARM, BILATERAL HEEL ULCERS - COVERED, SACRAL ULCER + RIGHT ISCHIAL ULCER COVERED, PICC LINE+  SKELETAL:  ROM ACCEPTABLE  CNS:  AAO X 0-1    RADIOLOGY :    EXAM:  MR FOOT RT                            PROCEDURE DATE:  01/06/2021          INTERPRETATION:  Clinical indications: Right heel ulceration and eschar status post debridement. Concern for osteomyelitis.    Multiplanar multisequence MRI of the right foot was performed localized to the hindfoot.    Correlation is made with prior MRI from September 11, 2019.    FINDINGS:    There is a large wound along the plantar aspect of the calcaneus posteriorly which extends nearly extends to bone. There is surrounding soft tissue edema about this site with evidence of an overlying bandage. The degree of soft tissue deficiency is increased compared to the prior examination. There is extensive osseous edema and corresponding hypointense T1 marrow signalthroughout the calcaneus extending from the posterior plantar margin to the level of the angle of Gissane. This is progressed compared to the prior examination and consistent with osteomyelitis. Marrow signal within the remainder of the foot is otherwise preserved.    There is confluent edema tracking along the abductor hallucis and flexor digitorum brevis muscles and within the plantar subcutaneous fat subjacent to this region. In total this area measures approximately 2.2 cm in transverse dimension, approximately 4 cm in anteroposterior posterior dimension, and approximately 1.6 cm in craniocaudal dimension. Findings may be related to underlying phlegmon or abscess. Evaluation is limited by the lack of intravenous contrast. Distal to this site there is edema throughout the visualized musculature consistent with myositis.    Visualized tendinous structures remain intact. There is no acute ligamentous injury. Visualized joint spaces are preserved without joint effusion.    IMPRESSION:    Osteomyelitis involving the calcaneus which extends from the plantar posterior aspect of the calcaneus to the level of the angle of Gissane. This is progressed compared to the prior examination. This is seen in the setting of diffuse soft tissue deficiency along the posterior plantar aspect of the calcaneus.    Confluent edema adjacent to this region within the abductor hallucis and flexor digitorum brevis muscles and within the subjacent plantar subcutaneous fat. This may be related to abscess orphlegmon.    ASSESSMENT :     Osteomyelitis    Yes    COVID-19    Osteomyelitis    DM (diabetes mellitus)    Paranoid schizophrenia    HLD (hyperlipidemia)    PAD (peripheral artery disease)    HTN (hypertension)            PLAN:  HPI:  74 yo F from [facility], wheelchair bound with medical history significant of HTN, Diabetes, Osteoarthritis, Osteoporosis, Peripheral arterial disease, Diabetic neuropathy, HLD, Schizophrenia comes in with worsening ulceration to b/l heels. She has been having ulcers for past couple months, has been progressive. She was treated with IV antbx at NH but did not get better. It was worsening with necrotic changes and increasing foul smelling discharge. She denies fever, abdominal pain, chest pain, urinary symptoms, fall, trauma. No h/o nausea, vomiting, diarrhea, cough, dyspnea, sick contacts, recent illness.  (05 Jan 2021 14:24)    PLAN:    # BILATERAL LE CHRONIC ULCER NOW PROGRESSIVELY WORSENING W/ SSTI AND RIGHT CALCANEAL OSTEOMYELITIS W/ POSSIBLE ABSCESS; UNDERWENT BEDSIDE DEBRIDEMENT OF RIGHT LEG ULCER AND UNDERWENT RIGHT PARTIAL CALCANECTOMY ON 1/13 AND PICC LINE PLACEMENT 1/20  - CEFEPIME + VANOMYCIN + FLAGYL, REVIEWED TIMUR, BCX - NGTD, WOUND CX - MORGANELLA MORGANI & P. MIRABILIS, ID CONSULT IN PROGRESS, PODIATRY CONSULT IN PROGRESS  - REVIEWED RIGHT LE MRI  -  PSYCHIATRY CONSULT FOR CAPACITY IN PROGRESS - DEEM PATIENT AS HAVING CAPACITY.  PATIENT WAS AGREEABLE FOR SURGICAL DEBRIDEMENT ON 1/09  - 2 PC CONSENT FOR SURGICAL DEBRIDEMENT PLANNED FOR 1/12; ATTEMPTED TO CALL NEXT OF [facility] @ [phone] HOWEVER PHONE NUMBER APPEARS DISCONNECTED  - VASCULAR SX CONSULT IN PROGRESS - PATIENT MAY REQUIRE AKA, BUT REFUSED ON PREVIOUS CONVERSATIONS - WAS AGREEABLE TO LOCAL DEBRIDEMENT/INTERVENTIONS  - PLAN FOR WOUND VAC  - PATIENT WILL NEED 6 WEEKS OF IV ANTIBIOTICS - WILL NEED TO SWITCH TO PICC LINE FROM EXTENDED DWELL - D/W FLOOR TEAM    # MEDICAL CLEARANCE FOR SURGERY - RCRI 1 POINT - 6% RISK OF DEATH, MI OR CARDIAC ARREST; CARDIOLOGY CONSULT IN PROGRESS FOR MEDICAL CLEARANCE  # NORMOCYTIC ANEMIA - SUSPECT S/T AOCD AND POOR NUTRITIONAL STATUS - F/U ANEMIA PANEL; PRBC TRANSFUSION GIVEN TODAY  # SACRAL AND RIGHT ISCHIAL UNSTAGEABLE ULCERS W/ SSTI  S/P DEBRIDEMENT 1/8- ON VANCOMYCIN AND CEFEPIME, SURGERY CONSULT IN PROGRESS -  PSYCHIATRY CONSULT FOR CAPACITY IN PROGRESS    # ACUTE METABOLIC ENCEPHALOPATHY - SUSPECT S/T ACUTE INFECTION & HYPERNATREMIA [resolved] - REVIEWED CT HEAD  - NEUROLOGY CONSULT IN PROGRESS    # HYPERNATREMIA SUSPECT S/T POOR PO INTAKE - RESOLVED W/ IVF  # POOR PO INTAKE - PATIENT ATE SOME LUNCH TODAY; ST EVAL NOTED  # LEFT ARM SWELLING - REVIEWED LEFT ARM U/S W/ OUT THROMBUS, WARM COMPRESSES  # ASHLEY - RESOLVED  # PAD  # HTN  # DM W/ DIABETIC NEUROPATHY  # OA/OP  # SCHIZOPHRENIA - HOLD MEDICATION; PLACED ON IV HALDOL PER PSYCHIATRY RECOMMENDATION  # CASE D/W PATIENT'S ? PARTNER - LISA KELLY - WHO REPORTS HE IS NOT NEXT OF KIN OR HCP - [phone] ON 1/12; HE UNDERSTOOD PATIENT'S PROGNOSIS WAS GUARDED  # GI AND DVT PPX    ELEN YETURU MD COVERING FARHAN CASILLAS MD   Patient is a 75y old  Female who presents with a chief complaint of foot ulcer (22 Jan 2021 16:59)    PATIENT IS SEEN AND EXAMINED IN MEDICAL FLOOR.    ALLERGIES:  No Known Allergies    VITALS:    Vital Signs Last 24 Hrs  T(C): 36.4 (23 Jan 2021 13:11), Max: 36.7 (23 Jan 2021 01:30)  T(F): 97.6 (23 Jan 2021 13:11), Max: 98.1 (23 Jan 2021 01:30)  HR: 90 (23 Jan 2021 13:11) (90 - 96)  BP: 119/69 (23 Jan 2021 13:11) (119/69 - 144/79)  BP(mean): --  RR: 18 (23 Jan 2021 13:11) (17 - 18)  SpO2: 99% (23 Jan 2021 13:11) (97% - 100%)    LABS:    CBC Full  -  ( 23 Jan 2021 07:39 )  WBC Count : 15.82 K/uL  RBC Count : 4.42 M/uL  Hemoglobin : 12.6 g/dL  Hematocrit : 38.9 %  Platelet Count - Automated : 284 K/uL  Mean Cell Volume : 88.0 fl  Mean Cell Hemoglobin : 28.5 pg  Mean Cell Hemoglobin Concentration : 32.4 gm/dL  Auto Neutrophil # : x  Auto Lymphocyte # : x  Auto Monocyte # : x  Auto Eosinophil # : x  Auto Basophil # : x  Auto Neutrophil % : x  Auto Lymphocyte % : x  Auto Monocyte % : x  Auto Eosinophil % : x  Auto Basophil % : x    PTT - ( 22 Jan 2021 09:48 )  PTT:55.6 sec  01-23    145  |  117<H>  |  13  ----------------------------<  133<H>  4.3   |  17<L>  |  0.76    Ca    7.4<L>      23 Jan 2021 07:39  Phos  2.0     01-22  Mg     2.1     01-23    TPro  3.9<L>  /  Alb  1.1<L>  /  TBili  0.3  /  DBili  x   /  AST  6<L>  /  ALT  <6<L>  /  AlkPhos  57  01-22    CAPILLARY BLOOD GLUCOSE      POCT Blood Glucose.: 137 mg/dL (23 Jan 2021 12:04)  POCT Blood Glucose.: 104 mg/dL (23 Jan 2021 08:00)  POCT Blood Glucose.: 95 mg/dL (22 Jan 2021 21:45)  POCT Blood Glucose.: 115 mg/dL (22 Jan 2021 16:56)      LIVER FUNCTIONS - ( 22 Jan 2021 05:30 )  Alb: 1.1 g/dL / Pro: 3.9 g/dL / ALK PHOS: 57 U/L / ALT: <6 U/L DA / AST: 6 U/L / GGT: x           Creatinine Trend: 0.76<--, 0.64<--, 0.51<--, 0.59<--, 0.55<--, 0.51<--  I&O's Summary    .Tissue Right heel margin  01-13 @ 19:31   Rare Enterococcus faecalis  --  Enterococcus faecalis      .Surgical Swab Right heel wound culture  01-13 @ 19:22   Moderate Proteus mirabilis  Few Bacteroides vulgatus group "Susceptibilities not performed"  --  Proteus mirabilis      .Urine Clean Catch (Midstream)  01-06 @ 06:46   No growth  --  --      .Surgical Swab Right heel wound  01-05 @ 22:13   Few Morganella morganii  Few Proteus mirabilis  Moderate Corynebacterium species "Susceptibilities not performed"  Moderate Bacteroides vulgatus "Susceptibilities not performed"  --  Morganella morganii  Proteus mirabilis      .Blood Blood-Peripheral  01-05 @ 17:59   No Growth Final  --  --      MEDICATIONS:    MEDICATIONS  (STANDING):  aspirin  chewable 81 milliGRAM(s) Oral daily  cefepime   IVPB 1000 milliGRAM(s) IV Intermittent every 8 hours  chlorhexidine 2% Cloths 1 Application(s) Topical daily  collagenase Ointment 1 Application(s) Topical daily  Dakins Solution - Full Strength 1 Application(s) Topical once  dextrose 40% Gel 15 Gram(s) Oral once  dextrose 50% Injectable 25 Gram(s) IV Push once  dextrose 50% Injectable 12.5 Gram(s) IV Push once  dextrose 50% Injectable 25 Gram(s) IV Push once  enoxaparin Injectable 40 milliGRAM(s) SubCutaneous daily  glucagon  Injectable 1 milliGRAM(s) IntraMuscular once  insulin lispro (ADMELOG) corrective regimen sliding scale   SubCutaneous three times a day before meals  insulin lispro (ADMELOG) corrective regimen sliding scale   SubCutaneous at bedtime  lactated ringers. 1000 milliLiter(s) (75 mL/Hr) IV Continuous <Continuous>  metroNIDAZOLE  IVPB 500 milliGRAM(s) IV Intermittent every 8 hours  simvastatin 40 milliGRAM(s) Oral at bedtime  vancomycin  IVPB 750 milliGRAM(s) IV Intermittent every 12 hours      MEDICATIONS  (PRN):  acetaminophen   Tablet .. 650 milliGRAM(s) Oral every 6 hours PRN Moderate Pain (4 - 6)  haloperidol    Injectable 0.5 milliGRAM(s) IV Push every 8 hours PRN Agitation  sodium chloride 0.9% lock flush 10 milliLiter(s) IV Push every 1 hour PRN Pre/post blood products, medications, blood draw, and to maintain line patency      REVIEW OF SYSTEMS:                           ALL ROS DONE [ X   ]    CONSTITUTIONAL:  LETHARGIC [   ], FEVER [   ], UNRESPONSIVE [   ]  CVS:  CP  [   ], SOB, [   ], PALPITATIONS [   ], DIZZYNESS [   ]  RS: COUGH [   ], SPUTUM [   ]  GI: ABDOMINAL PAIN [   ], NAUSEA [   ], VOMITINGS [   ], DIARRHEA [   ], CONSTIPATION [   ]  :  DYSURIA [   ], NOCTURIA [   ], INCREASED FREQUENCY [   ], DRIBLING [   ],  SKELETAL: PAINFUL JOINTS [   ], SWOLLEN JOINTS [   ], NECK ACHE [   ], LOW BACK ACHE [   ],  SKIN : ULCERS [   ], RASH [   ], ITCHING [   ]  CNS: HEAD ACHE [   ], DOUBLE VISION [   ], BLURRED VISION [   ], AMS / CONFUSION [   ], SEIZURES [   ], WEAKNESS [   ],TINGLING / NUMBNESS [   ]    PHYSICAL EXAMINATION:  GENERAL APPEARANCE: NO DISTRESS  HEENT:  NO PALLOR, NO  JVD,  NO   NODES, NECK SUPPLE  CVS: S1 +, S2 +,   RS: AEEB,  OCCASIONAL  RALES +,   NO RONCHI  ABD: SOFT, NT, NO, BS +  EXT: NO PE  SKIN: WARM, BILATERAL HEEL ULCERS - COVERED, SACRAL ULCER + RIGHT ISCHIAL ULCER COVERED, PICC LINE+  SKELETAL:  ROM ACCEPTABLE  CNS:  AAO X 0-1    RADIOLOGY :    EXAM:  MR FOOT RT                            PROCEDURE DATE:  01/06/2021          INTERPRETATION:  Clinical indications: Right heel ulceration and eschar status post debridement. Concern for osteomyelitis.    Multiplanar multisequence MRI of the right foot was performed localized to the hindfoot.    Correlation is made with prior MRI from September 11, 2019.    FINDINGS:    There is a large wound along the plantar aspect of the calcaneus posteriorly which extends nearly extends to bone. There is surrounding soft tissue edema about this site with evidence of an overlying bandage. The degree of soft tissue deficiency is increased compared to the prior examination. There is extensive osseous edema and corresponding hypointense T1 marrow signalthroughout the calcaneus extending from the posterior plantar margin to the level of the angle of Gissane. This is progressed compared to the prior examination and consistent with osteomyelitis. Marrow signal within the remainder of the foot is otherwise preserved.    There is confluent edema tracking along the abductor hallucis and flexor digitorum brevis muscles and within the plantar subcutaneous fat subjacent to this region. In total this area measures approximately 2.2 cm in transverse dimension, approximately 4 cm in anteroposterior posterior dimension, and approximately 1.6 cm in craniocaudal dimension. Findings may be related to underlying phlegmon or abscess. Evaluation is limited by the lack of intravenous contrast. Distal to this site there is edema throughout the visualized musculature consistent with myositis.    Visualized tendinous structures remain intact. There is no acute ligamentous injury. Visualized joint spaces are preserved without joint effusion.    IMPRESSION:    Osteomyelitis involving the calcaneus which extends from the plantar posterior aspect of the calcaneus to the level of the angle of Gissane. This is progressed compared to the prior examination. This is seen in the setting of diffuse soft tissue deficiency along the posterior plantar aspect of the calcaneus.    Confluent edema adjacent to this region within the abductor hallucis and flexor digitorum brevis muscles and within the subjacent plantar subcutaneous fat. This may be related to abscess orphlegmon.    ASSESSMENT :     Osteomyelitis    Yes    COVID-19    Osteomyelitis    DM (diabetes mellitus)    Paranoid schizophrenia    HLD (hyperlipidemia)    PAD (peripheral artery disease)    HTN (hypertension)            PLAN:  HPI:  74 yo F from [facility], wheelchair bound with medical history significant of HTN, Diabetes, Osteoarthritis, Osteoporosis, Peripheral arterial disease, Diabetic neuropathy, HLD, Schizophrenia comes in with worsening ulceration to b/l heels. She has been having ulcers for past couple months, has been progressive. She was treated with IV antbx at NH but did not get better. It was worsening with necrotic changes and increasing foul smelling discharge. She denies fever, abdominal pain, chest pain, urinary symptoms, fall, trauma. No h/o nausea, vomiting, diarrhea, cough, dyspnea, sick contacts, recent illness.  (05 Jan 2021 14:24)    PLAN:    # BILATERAL LE CHRONIC ULCER NOW PROGRESSIVELY WORSENING W/ SSTI AND RIGHT CALCANEAL OSTEOMYELITIS W/ POSSIBLE ABSCESS; UNDERWENT BEDSIDE DEBRIDEMENT OF RIGHT LEG ULCER AND UNDERWENT RIGHT PARTIAL CALCANECTOMY ON 1/13 AND PICC LINE PLACEMENT 1/20  - CEFEPIME + VANOMYCIN + FLAGYL, REVIEWED TIMUR, BCX - NGTD, WOUND CX - MORGANELLA MORGANI & P. MIRABILIS, ID CONSULT IN PROGRESS, PODIATRY CONSULT IN PROGRESS  - REVIEWED RIGHT LE MRI  -  PSYCHIATRY CONSULT FOR CAPACITY IN PROGRESS - DEEM PATIENT AS HAVING CAPACITY.  PATIENT WAS AGREEABLE FOR SURGICAL DEBRIDEMENT ON 1/09  - 2 PC CONSENT FOR SURGICAL DEBRIDEMENT PLANNED FOR 1/12; ATTEMPTED TO CALL NEXT OF [facility] @ [phone] HOWEVER PHONE NUMBER APPEARS DISCONNECTED  - VASCULAR SX CONSULT IN PROGRESS - PATIENT MAY REQUIRE AKA, BUT REFUSED ON PREVIOUS CONVERSATIONS - WAS AGREEABLE TO LOCAL DEBRIDEMENT/INTERVENTIONS  - PLAN FOR WOUND VAC  - PATIENT WILL NEED 6 WEEKS OF IV ANTIBIOTICS - WILL NEED TO SWITCH TO PICC LINE FROM EXTENDED DWELL - D/W FLOOR TEAM  # THICKENED ORAL SECRETIONS - F/U CXR, PRN SUCTIONING, PROTONIX, CARAFATE  # MEDICAL CLEARANCE FOR SURGERY - RCRI 1 POINT - 6% RISK OF DEATH, MI OR CARDIAC ARREST; CARDIOLOGY CONSULT IN PROGRESS FOR MEDICAL CLEARANCE  # NORMOCYTIC ANEMIA - SUSPECT S/T AOCD AND POOR NUTRITIONAL STATUS - F/U ANEMIA PANEL; PRBC TRANSFUSION GIVEN TODAY  # SACRAL AND RIGHT ISCHIAL UNSTAGEABLE ULCERS W/ SSTI  S/P DEBRIDEMENT 1/8- ON VANCOMYCIN AND CEFEPIME, SURGERY CONSULT IN PROGRESS -  PSYCHIATRY CONSULT FOR CAPACITY IN PROGRESS    # ACUTE METABOLIC ENCEPHALOPATHY - SUSPECT S/T ACUTE INFECTION & HYPERNATREMIA [resolved] - REVIEWED CT HEAD  - NEUROLOGY CONSULT IN PROGRESS  # DEBILITY / ? MYOPATHY S/T ILLNESS - PT EVALUATION, OUT OF BED TO CHAIR  # HYPERNATREMIA SUSPECT S/T POOR PO INTAKE - RESOLVED W/ IVF  # POOR PO INTAKE - PATIENT ATE SOME LUNCH TODAY; ST EVAL NOTED  # LEFT ARM SWELLING - REVIEWED LEFT ARM U/S W/ OUT THROMBUS, WARM COMPRESSES  # ASHLEY - RESOLVED  # PAD  # HTN  # DM W/ DIABETIC NEUROPATHY  # OA/OP  # SCHIZOPHRENIA - HOLD MEDICATION; PLACED ON IV HALDOL PER PSYCHIATRY RECOMMENDATION  # CASE D/W PATIENT'S ? PARTNER - LISA KELLY - WHO REPORTS HE IS NOT NEXT OF [facility] OR HCP - [phone] ON 1/12; HE UNDERSTOOD PATIENT'S PROGNOSIS WAS GUARDED  # GI AND DVT PPX    ELEN CASILLAS MD COVERING FARHAN CASILLAS MD

## 2021-01-23 NOTE — PROVIDER CONTACT NOTE (CRITICAL VALUE NOTIFICATION) - PERSON GIVING RESULT:
ZORAIDA Be/ [facility] lab
Lab
Anna
Boyd
Marcial Bonner/ maritza
Samson
YANET Mclaughlin
Denis
Mikayla SANTANA
lab

## 2021-01-23 NOTE — PROVIDER CONTACT NOTE (CRITICAL VALUE NOTIFICATION) - TEST AND RESULT REPORTED:
Critical Lab Value of Vanco Trough of 40.5
H/H 6.5/20.1  Potassium: 2.5  Phosphate 0.8
vanco T-28.6, hgb-7.0, hct-22.4
vanco trough 25.2
Pot 6.3
Critical Potassium of 2.9
Vanco Trough 28  Vanco Trough elevated 28.8
Phosphorus 0.7
Vanco trough 43.8
Pot > 10.00

## 2021-01-24 LAB
ACANTHOCYTES BLD QL SMEAR: SLIGHT — SIGNIFICANT CHANGE UP
ACETONE SERPL-MCNC: ABNORMAL
ALBUMIN SERPL ELPH-MCNC: 1.2 G/DL — LOW (ref 3.5–5)
ALBUMIN SERPL ELPH-MCNC: 1.2 G/DL — LOW (ref 3.5–5)
ALP SERPL-CCNC: 73 U/L — SIGNIFICANT CHANGE UP (ref 40–120)
ALP SERPL-CCNC: 75 U/L — SIGNIFICANT CHANGE UP (ref 40–120)
ALT FLD-CCNC: 7 U/L DA — LOW (ref 10–60)
ALT FLD-CCNC: <6 U/L DA — LOW (ref 10–60)
ANION GAP SERPL CALC-SCNC: 10 MMOL/L — SIGNIFICANT CHANGE UP (ref 5–17)
ANION GAP SERPL CALC-SCNC: 11 MMOL/L — SIGNIFICANT CHANGE UP (ref 5–17)
ANION GAP SERPL CALC-SCNC: 15 MMOL/L — SIGNIFICANT CHANGE UP (ref 5–17)
ANISOCYTOSIS BLD QL: SLIGHT — SIGNIFICANT CHANGE UP
APPEARANCE UR: CLEAR — SIGNIFICANT CHANGE UP
APTT BLD: 47.4 SEC — HIGH (ref 27.5–35.5)
AST SERPL-CCNC: 6 U/L — LOW (ref 10–40)
AST SERPL-CCNC: 6 U/L — LOW (ref 10–40)
B-OH-BUTYR SERPL-SCNC: 0.8 MMOL/L — HIGH
BACTERIA # UR AUTO: ABNORMAL /HPF
BASE EXCESS BLDA CALC-SCNC: -7.5 MMOL/L — LOW (ref -2–2)
BASOPHILS # BLD AUTO: 0 K/UL — SIGNIFICANT CHANGE UP (ref 0–0.2)
BASOPHILS NFR BLD AUTO: 0 % — SIGNIFICANT CHANGE UP (ref 0–2)
BILIRUB SERPL-MCNC: 0.3 MG/DL — SIGNIFICANT CHANGE UP (ref 0.2–1.2)
BILIRUB SERPL-MCNC: 0.3 MG/DL — SIGNIFICANT CHANGE UP (ref 0.2–1.2)
BILIRUB UR-MCNC: NEGATIVE — SIGNIFICANT CHANGE UP
BLOOD GAS COMMENTS ARTERIAL: SIGNIFICANT CHANGE UP
BUN SERPL-MCNC: 17 MG/DL — SIGNIFICANT CHANGE UP (ref 7–18)
CALCIUM SERPL-MCNC: 7.4 MG/DL — LOW (ref 8.4–10.5)
CALCIUM SERPL-MCNC: 7.6 MG/DL — LOW (ref 8.4–10.5)
CALCIUM SERPL-MCNC: 7.7 MG/DL — LOW (ref 8.4–10.5)
CHLORIDE SERPL-SCNC: 120 MMOL/L — HIGH (ref 96–108)
CHLORIDE SERPL-SCNC: 122 MMOL/L — HIGH (ref 96–108)
CHLORIDE SERPL-SCNC: 122 MMOL/L — HIGH (ref 96–108)
CHLORIDE UR-SCNC: 108 MMOL/L — SIGNIFICANT CHANGE UP
CK MB BLD-MCNC: 21.8 % — HIGH (ref 0–3.5)
CK MB CFR SERPL CALC: 4.8 NG/ML — HIGH (ref 0–3.6)
CK SERPL-CCNC: 21 U/L — SIGNIFICANT CHANGE UP (ref 21–215)
CK SERPL-CCNC: 22 U/L — SIGNIFICANT CHANGE UP (ref 21–215)
CO2 SERPL-SCNC: 13 MMOL/L — LOW (ref 22–31)
CO2 SERPL-SCNC: 17 MMOL/L — LOW (ref 22–31)
CO2 SERPL-SCNC: 18 MMOL/L — LOW (ref 22–31)
COLOR SPEC: YELLOW — SIGNIFICANT CHANGE UP
CREAT ?TM UR-MCNC: <13 MG/DL — SIGNIFICANT CHANGE UP
CREAT SERPL-MCNC: 0.73 MG/DL — SIGNIFICANT CHANGE UP (ref 0.5–1.3)
CREAT SERPL-MCNC: 0.85 MG/DL — SIGNIFICANT CHANGE UP (ref 0.5–1.3)
CREAT SERPL-MCNC: 0.87 MG/DL — SIGNIFICANT CHANGE UP (ref 0.5–1.3)
D DIMER BLD IA.RAPID-MCNC: 307 NG/ML DDU — HIGH
DIFF PNL FLD: ABNORMAL
EOSINOPHIL # BLD AUTO: 0 K/UL — SIGNIFICANT CHANGE UP (ref 0–0.5)
EOSINOPHIL NFR BLD AUTO: 0 % — SIGNIFICANT CHANGE UP (ref 0–6)
EPI CELLS # UR: SIGNIFICANT CHANGE UP /HPF
GLUCOSE BLDC GLUCOMTR-MCNC: 158 MG/DL — HIGH (ref 70–99)
GLUCOSE BLDC GLUCOMTR-MCNC: 196 MG/DL — HIGH (ref 70–99)
GLUCOSE BLDC GLUCOMTR-MCNC: 197 MG/DL — HIGH (ref 70–99)
GLUCOSE BLDC GLUCOMTR-MCNC: 262 MG/DL — HIGH (ref 70–99)
GLUCOSE SERPL-MCNC: 173 MG/DL — HIGH (ref 70–99)
GLUCOSE SERPL-MCNC: 186 MG/DL — HIGH (ref 70–99)
GLUCOSE SERPL-MCNC: 246 MG/DL — HIGH (ref 70–99)
GLUCOSE UR QL: NEGATIVE — SIGNIFICANT CHANGE UP
HCO3 BLDA-SCNC: 15 MMOL/L — LOW (ref 23–27)
HCT VFR BLD CALC: 31.4 % — LOW (ref 34.5–45)
HCT VFR BLD CALC: 32.9 % — LOW (ref 34.5–45)
HGB BLD-MCNC: 10.6 G/DL — LOW (ref 11.5–15.5)
HGB BLD-MCNC: 10.9 G/DL — LOW (ref 11.5–15.5)
HOROWITZ INDEX BLDA+IHG-RTO: 28 — SIGNIFICANT CHANGE UP
HYALINE CASTS # UR AUTO: ABNORMAL /LPF
INR BLD: 1.73 RATIO — HIGH (ref 0.88–1.16)
KETONES UR-MCNC: ABNORMAL
LACTATE SERPL-SCNC: 1.1 MMOL/L — SIGNIFICANT CHANGE UP (ref 0.7–2)
LACTATE SERPL-SCNC: 1.2 MMOL/L — SIGNIFICANT CHANGE UP (ref 0.7–2)
LACTATE SERPL-SCNC: 1.3 MMOL/L — SIGNIFICANT CHANGE UP (ref 0.7–2)
LEUKOCYTE ESTERASE UR-ACNC: NEGATIVE — SIGNIFICANT CHANGE UP
LYMPHOCYTES # BLD AUTO: 0.67 K/UL — LOW (ref 1–3.3)
LYMPHOCYTES # BLD AUTO: 4 % — LOW (ref 13–44)
MACROCYTES BLD QL: SLIGHT — SIGNIFICANT CHANGE UP
MAGNESIUM SERPL-MCNC: 1.8 MG/DL — SIGNIFICANT CHANGE UP (ref 1.6–2.6)
MAGNESIUM SERPL-MCNC: 1.8 MG/DL — SIGNIFICANT CHANGE UP (ref 1.6–2.6)
MAGNESIUM SERPL-MCNC: 1.9 MG/DL — SIGNIFICANT CHANGE UP (ref 1.6–2.6)
MANUAL SMEAR VERIFICATION: SIGNIFICANT CHANGE UP
MCHC RBC-ENTMCNC: 29.1 PG — SIGNIFICANT CHANGE UP (ref 27–34)
MCHC RBC-ENTMCNC: 29.9 PG — SIGNIFICANT CHANGE UP (ref 27–34)
MCHC RBC-ENTMCNC: 33.1 GM/DL — SIGNIFICANT CHANGE UP (ref 32–36)
MCHC RBC-ENTMCNC: 33.8 GM/DL — SIGNIFICANT CHANGE UP (ref 32–36)
MCV RBC AUTO: 88 FL — SIGNIFICANT CHANGE UP (ref 80–100)
MCV RBC AUTO: 88.5 FL — SIGNIFICANT CHANGE UP (ref 80–100)
MONOCYTES # BLD AUTO: 0.83 K/UL — SIGNIFICANT CHANGE UP (ref 0–0.9)
MONOCYTES NFR BLD AUTO: 5 % — SIGNIFICANT CHANGE UP (ref 2–14)
MYELOCYTES NFR BLD: 1 % — HIGH (ref 0–0)
NEUTROPHILS # BLD AUTO: 13.97 K/UL — HIGH (ref 1.8–7.4)
NEUTROPHILS NFR BLD AUTO: 83 % — HIGH (ref 43–77)
NEUTS BAND # BLD: 1 % — SIGNIFICANT CHANGE UP (ref 0–8)
NITRITE UR-MCNC: NEGATIVE — SIGNIFICANT CHANGE UP
NRBC # BLD: 0 /100 WBCS — SIGNIFICANT CHANGE UP (ref 0–0)
NRBC # BLD: 0 /100 — SIGNIFICANT CHANGE UP (ref 0–0)
NT-PROBNP SERPL-SCNC: HIGH PG/ML (ref 0–450)
OSMOLALITY UR: 316 MOS/KG — SIGNIFICANT CHANGE UP (ref 50–1200)
OVALOCYTES BLD QL SMEAR: SLIGHT — SIGNIFICANT CHANGE UP
PCO2 BLDA: 25 MMHG — LOW (ref 32–46)
PH BLDA: 7.41 — SIGNIFICANT CHANGE UP (ref 7.35–7.45)
PH UR: 5 — SIGNIFICANT CHANGE UP (ref 5–8)
PHOSPHATE SERPL-MCNC: 0.2 MG/DL — CRITICAL LOW (ref 2.5–4.5)
PHOSPHATE SERPL-MCNC: 1.5 MG/DL — LOW (ref 2.5–4.5)
PHOSPHATE SERPL-MCNC: 2.8 MG/DL — SIGNIFICANT CHANGE UP (ref 2.5–4.5)
PLAT MORPH BLD: NORMAL — SIGNIFICANT CHANGE UP
PLATELET # BLD AUTO: 179 K/UL — SIGNIFICANT CHANGE UP (ref 150–400)
PLATELET # BLD AUTO: 211 K/UL — SIGNIFICANT CHANGE UP (ref 150–400)
PO2 BLDA: 103 MMHG — SIGNIFICANT CHANGE UP (ref 74–108)
POIKILOCYTOSIS BLD QL AUTO: SLIGHT — SIGNIFICANT CHANGE UP
POLYCHROMASIA BLD QL SMEAR: SLIGHT — SIGNIFICANT CHANGE UP
POTASSIUM SERPL-MCNC: 2.9 MMOL/L — CRITICAL LOW (ref 3.5–5.3)
POTASSIUM SERPL-MCNC: 2.9 MMOL/L — CRITICAL LOW (ref 3.5–5.3)
POTASSIUM SERPL-MCNC: 3.1 MMOL/L — LOW (ref 3.5–5.3)
POTASSIUM SERPL-SCNC: 2.9 MMOL/L — CRITICAL LOW (ref 3.5–5.3)
POTASSIUM SERPL-SCNC: 2.9 MMOL/L — CRITICAL LOW (ref 3.5–5.3)
POTASSIUM SERPL-SCNC: 3.1 MMOL/L — LOW (ref 3.5–5.3)
POTASSIUM UR-SCNC: 33 MMOL/L — SIGNIFICANT CHANGE UP
PROT ?TM UR-MCNC: 65 MG/DL — HIGH (ref 0–12)
PROT SERPL-MCNC: 4.1 G/DL — LOW (ref 6–8.3)
PROT SERPL-MCNC: 4.2 G/DL — LOW (ref 6–8.3)
PROT UR-MCNC: 30 MG/DL
PROTHROM AB SERPL-ACNC: 20.1 SEC — HIGH (ref 10.6–13.6)
RBC # BLD: 3.55 M/UL — LOW (ref 3.8–5.2)
RBC # BLD: 3.74 M/UL — LOW (ref 3.8–5.2)
RBC # FLD: 16.9 % — HIGH (ref 10.3–14.5)
RBC # FLD: 17.2 % — HIGH (ref 10.3–14.5)
RBC BLD AUTO: ABNORMAL
RBC CASTS # UR COMP ASSIST: ABNORMAL /HPF (ref 0–2)
SAO2 % BLDA: 98 % — HIGH (ref 92–96)
SODIUM SERPL-SCNC: 148 MMOL/L — HIGH (ref 135–145)
SODIUM SERPL-SCNC: 150 MMOL/L — HIGH (ref 135–145)
SODIUM SERPL-SCNC: 150 MMOL/L — HIGH (ref 135–145)
SODIUM UR-SCNC: 71 MMOL/L — SIGNIFICANT CHANGE UP
SP GR SPEC: 1.01 — SIGNIFICANT CHANGE UP (ref 1.01–1.02)
TRANSFERRIN SERPL-MCNC: 65 MG/DL — LOW (ref 200–360)
TROPONIN I SERPL-MCNC: 0.86 NG/ML — HIGH (ref 0–0.04)
TROPONIN I SERPL-MCNC: 0.94 NG/ML — HIGH (ref 0–0.04)
TROPONIN I SERPL-MCNC: 1.04 NG/ML — HIGH (ref 0–0.04)
UROBILINOGEN FLD QL: NEGATIVE — SIGNIFICANT CHANGE UP
VARIANT LYMPHS # BLD: 6 % — SIGNIFICANT CHANGE UP (ref 0–6)
WBC # BLD: 16.63 K/UL — HIGH (ref 3.8–10.5)
WBC # BLD: 17.84 K/UL — HIGH (ref 3.8–10.5)
WBC # FLD AUTO: 16.63 K/UL — HIGH (ref 3.8–10.5)
WBC # FLD AUTO: 17.84 K/UL — HIGH (ref 3.8–10.5)
WBC UR QL: SIGNIFICANT CHANGE UP /HPF (ref 0–5)

## 2021-01-24 PROCEDURE — 71045 X-RAY EXAM CHEST 1 VIEW: CPT | Mod: 26

## 2021-01-24 PROCEDURE — 93010 ELECTROCARDIOGRAM REPORT: CPT

## 2021-01-24 PROCEDURE — 70450 CT HEAD/BRAIN W/O DYE: CPT | Mod: 26

## 2021-01-24 RX ORDER — POTASSIUM PHOSPHATE, MONOBASIC POTASSIUM PHOSPHATE, DIBASIC 236; 224 MG/ML; MG/ML
30 INJECTION, SOLUTION INTRAVENOUS ONCE
Refills: 0 | Status: COMPLETED | OUTPATIENT
Start: 2021-01-24 | End: 2021-01-24

## 2021-01-24 RX ORDER — FUROSEMIDE 40 MG
40 TABLET ORAL ONCE
Refills: 0 | Status: COMPLETED | OUTPATIENT
Start: 2021-01-24 | End: 2021-01-24

## 2021-01-24 RX ORDER — ASPIRIN/CALCIUM CARB/MAGNESIUM 324 MG
81 TABLET ORAL DAILY
Refills: 0 | Status: DISCONTINUED | OUTPATIENT
Start: 2021-01-24 | End: 2021-01-29

## 2021-01-24 RX ORDER — ASPIRIN/CALCIUM CARB/MAGNESIUM 324 MG
81 TABLET ORAL DAILY
Refills: 0 | Status: DISCONTINUED | OUTPATIENT
Start: 2021-01-24 | End: 2021-01-24

## 2021-01-24 RX ORDER — MAGNESIUM SULFATE 500 MG/ML
2 VIAL (ML) INJECTION ONCE
Refills: 0 | Status: COMPLETED | OUTPATIENT
Start: 2021-01-24 | End: 2021-01-25

## 2021-01-24 RX ADMIN — Medication 100 MILLIGRAM(S): at 05:19

## 2021-01-24 RX ADMIN — POTASSIUM PHOSPHATE, MONOBASIC POTASSIUM PHOSPHATE, DIBASIC 83.33 MILLIMOLE(S): 236; 224 INJECTION, SOLUTION INTRAVENOUS at 11:55

## 2021-01-24 RX ADMIN — Medication 1 APPLICATION(S): at 11:55

## 2021-01-24 RX ADMIN — CEFEPIME 100 MILLIGRAM(S): 1 INJECTION, POWDER, FOR SOLUTION INTRAMUSCULAR; INTRAVENOUS at 15:38

## 2021-01-24 RX ADMIN — CHLORHEXIDINE GLUCONATE 1 APPLICATION(S): 213 SOLUTION TOPICAL at 11:54

## 2021-01-24 RX ADMIN — PANTOPRAZOLE SODIUM 40 MILLIGRAM(S): 20 TABLET, DELAYED RELEASE ORAL at 17:19

## 2021-01-24 RX ADMIN — Medication 250 MILLIGRAM(S): at 17:39

## 2021-01-24 RX ADMIN — Medication 3: at 08:14

## 2021-01-24 RX ADMIN — CEFEPIME 100 MILLIGRAM(S): 1 INJECTION, POWDER, FOR SOLUTION INTRAMUSCULAR; INTRAVENOUS at 05:18

## 2021-01-24 RX ADMIN — Medication 250 MILLIGRAM(S): at 05:18

## 2021-01-24 RX ADMIN — Medication 40 MILLIGRAM(S): at 10:10

## 2021-01-24 RX ADMIN — PANTOPRAZOLE SODIUM 40 MILLIGRAM(S): 20 TABLET, DELAYED RELEASE ORAL at 11:54

## 2021-01-24 RX ADMIN — Medication 100 MILLIGRAM(S): at 15:38

## 2021-01-24 NOTE — PROVIDER CONTACT NOTE (CHANGE IN STATUS NOTIFICATION) - SITUATION
Change in mental status noted by NP Nancy, with facial dropping , code stroke called
Pt seems lethargic and status had declined.

## 2021-01-24 NOTE — CHART NOTE - NSCHARTNOTEFT_GEN_A_CORE
Brief HPI: 74 yo F from [facility], wheelchair bound with medical history significant of HTN, Diabetes, Osteoarthritis, Osteoporosis, Peripheral arterial disease, Diabetic neuropathy, HLD, Schizophrenia comes in with worsening ulceration to b/l heels. She has been having ulcers for past couple months, has been progressive. She was treated with IV antbx at NH but did not get better. It was worsening with necrotic changes and increasing foul smelling discharge. BILATERAL LE CHRONIC ULCER NOW PROGRESSIVELY WORSENING W/ SSTI AND RIGHT CALCANEAL OSTEOMYELITIS W/ POSSIBLE ABSCESS; UNDERWENT BEDSIDE DEBRIDEMENT OF RIGHT LEG ULCER AND UNDERWENT RIGHT PARTIAL CALCANECTOMY ON 1/13 AND PICC LINE PLACEMENT 1/20  - CEFEPIME + VANOMYCIN + FLAGYL, REVIEWED TIMUR, BCX - NGTD, WOUND CX - KOBE NORMAN & ALEXANDER PEREZ, ID CONSULT IN PROGRESS, PODIATRY CONSULT IN PROGRESS  - REVIEWED RIGHT LE MRI  -  PSYCHIATRY CONSULT FOR CAPACITY IN PROGRESS - DEEM PATIENT AS HAVING CAPACITY.  PATIENT WAS AGREEABLE FOR SURGICAL DEBRIDEMENT ON 1/09  - 2 PC CONSENT FOR SURGICAL DEBRIDEMENT PLANNED FOR 1/12; ATTEMPTED TO CALL NEXT OF [facility] [place] @ [phone] HOWEVER PHONE NUMBER APPEARS DISCONNECTED  - VASCULAR SX CONSULT IN PROGRESS - PATIENT MAY REQUIRE AKA, BUT REFUSED ON PREVIOUS CONVERSATIONS - WAS AGREEABLE TO LOCAL DEBRIDEMENT/INTERVENTIONS  - PLAN FOR WOUND VAC  - PATIENT WILL NEED 6 WEEKS OF IV ANTIBIOTICS - WILL NEED TO SWITCH TO PICC LINE FROM EXTENDED DWELL - D/W FLOOR TEAM  # THICKENED ORAL SECRETIONS - F/U CXR, PRN SUCTIONING, PROTONIX, CARAFATE  # MEDICAL CLEARANCE FOR SURGERY - RCRI 1 POINT - 6% RISK OF DEATH, MI OR CARDIAC ARREST; CARDIOLOGY CONSULT IN PROGRESS FOR MEDICAL CLEARANCE  # NORMOCYTIC ANEMIA - SUSPECT S/T AOCD AND POOR NUTRITIONAL STATUS - F/U ANEMIA PANEL; PRBC TRANSFUSION GIVEN TODAY  # SACRAL AND RIGHT ISCHIAL UNSTAGEABLE ULCERS W/ SSTI  S/P DEBRIDEMENT 1/8- ON VANCOMYCIN AND CEFEPIME, SURGERY CONSULT IN PROGRESS -  PSYCHIATRY CONSULT FOR CAPACITY IN PROGRESS    # ACUTE METABOLIC ENCEPHALOPATHY - SUSPECT S/T ACUTE INFECTION & HYPERNATREMIA [resolved] - REVIEWED CT HEAD  - NEUROLOGY CONSULT IN PROGRESS  # DEBILITY / ? MYOPATHY S/T ILLNESS - PT EVALUATION, OUT OF BED TO CHAIR  # HYPERNATREMIA SUSPECT S/T POOR PO INTAKE - RESOLVED W/ IVF  # POOR PO INTAKE - PATIENT ATE SOME LUNCH TODAY; ST EVAL NOTED  # LEFT ARM SWELLING - REVIEWED LEFT ARM U/S W/ OUT THROMBUS, WARM COMPRESSES  # ASHLEY - RESOLVED  # PAD  # HTN  # DM W/ DIABETIC NEUROPATHY  # OA/OP  # SCHIZOPHRENIA - HOLD MEDICATION; PLACED ON IV HALDOL PER PSYCHIATRY RECOMMENDATION  # CASE D/W PATIENT'S ? PARTNER - LISA KELLY - WHO REPORTS HE IS NOT NEXT OF KIN OR HCP -  ON 1/12; HE UNDERSTOOD PATIENT'S PROGNOSIS WAS GUARDED  # GI AND DVT PPX        Event:  After discussion with ALEXANDER Danielle regarding plan of care of patient on phone, I went to go assess patient in room aroun 14:00 1/24/21. Patient with abnormal physical exam as below:  -Neuro: Patient Alert and oriented x0; Non verbal; Does not follow commands     Brief HPI:      Objective: Vital signs last  24hrs  Focus PE:  Constitutional: in bed resting comfortably w/o signs of distress  Neuro- alert and oriented x #######, speaking w/ clear articulate speech, CNII-VII grossly intact, extremities w/ equal strength, able to perform finger to nose and heel to shin   Head: NCAT  Eyes- EOMI, PERRLA, clear conjunctiva and sclera, MMM  ENT-no rhinorrhea or septal hematoma, nml pharynx w/o tonsillar hypertrophy, erythema or exudates  Neck- supple, NT, no cervical LAD  CV-RRR, nml S1S2, no mmr, rubs or gallops  Resp- BL-CTA w/o increased WOB  GI- Abd sft, nt, nd, nr or guarding, no pulsatile mass+ BS  Extremities- all extremities w/ FROM, + distal pulses  Derm- no rash/lesion    Labs:     ECG:    Imaging:    A/P:  Pt is a     Plan:  1.             2.              3. Brief HPI: 74 yo F from [facility], wheelchair bound with medical history significant of HTN, Diabetes, Osteoarthritis, Osteoporosis, Peripheral arterial disease, Diabetic neuropathy, HLD, Schizophrenia comes in with worsening ulceration to b/l heels. She has been having ulcers for past couple months, has been progressive. She was treated with IV antbx at NH but did not get better. It was worsening with necrotic changes and increasing foul smelling discharge. Patient was admitted to medicine.    For patient's plan of care, please see attending Dr. ALEXANDER Llanes's Progress Note.    Event:  After discussion with ALEXANDER Danielle regarding plan of care of patient, on phone, I went to go assess patient in room around 14:00 1/24/21. Patient with abnormal physical exam, alert and oriented x0; Non verbal; Does not follow commands; Found to have excessive secretions with right facial droop. Patient on 2L nasal canula with oxygen saturation 97% on my assessment.       Objective: Vital signs last  24hrs  Focus PE:  Constitutional: in bed, eyes closed, with nasal canula   Neuro- alert and oriented x 0, non-verbal, unable to assess cranial nerves or motor  strength, did not follow any commands or respond to   Head: NCAT  Eyes- unable to assess EOM, Pupils equal and reactive to light, clear conjunctiva and sclera  ENT-no rhinorrhea or septal hematoma, nml pharynx w/o tonsillar hypertrophy, erythema or exudates  Neck- supple, NT, no cervical LAD  CV-tachycardic, nml S1S2, no mmr, rubs or gallops  Resp-respirations equal bilatarally; lung sounds unclear and diminished at the bases; unable to assess full sounds as patient unable to sit up and move;    Labs:   01-24    150<H>  |  122<H>  |  17  ----------------------------<  186<H>  2.9<LL>   |  18<L>  |  0.87    Ca    7.6<L>      24 Jan 2021 14:48  Phos  0.2     01-24  Mg     1.8     01-24    TPro  4.1<L>  /  Alb  1.2<L>  /  TBili  0.3  /  DBili  x   /  AST  6<L>  /  ALT  7<L>  /  AlkPhos  75  01-24    ABG - ( 24 Jan 2021 15:44 )  pH, Arterial: 7.41  pH, Blood: x     /  pCO2: 25    /  pO2: 103   / HCO3: 15    / Base Excess: -7.5  /  SaO2: 98        CARDIAC MARKERS ( 24 Jan 2021 14:48 )  1.040 ng/mL / x     / 22 U/L / x     / 4.8 ng/mL    ICU Vital Signs Last 24 Hrs  T(C): 36.1 (24 Jan 2021 14:22), Max: 36.6 (24 Jan 2021 04:59)  T(F): 97 (24 Jan 2021 14:22), Max: 97.9 (24 Jan 2021 04:59)  HR: 105 (24 Jan 2021 14:22) (93 - 105)  BP: 117/77 (24 Jan 2021 14:22) (98/51 - 120/60)  BP(mean): --  ABP: --  ABP(mean): --  RR: 17 (24 Jan 2021 14:22) (17 - 18)  SpO2: 100% (24 Jan 2021 14:22) (85% - 100%)    ECG: Pending results in EMR    Imaging:  < from: CT Brain Stroke Protocol (01.24.21 @ 14:53) >  EXAM:  CT BRAIN STROKE PROTOCOL                        PROCEDURE DATE:  01/24/2021    INTERPRETATION:  NONCONTRAST CT OF THE BRAIN DATED 1/24/2021.  CLINICAL INFORMATION:  Code Stroke rt droop  TECHNIQUE: Axial CT images are obtained from the cranial vertex to the skull base without the administration of IV contrast. Images are reformatted in sagittal and coronal planes.  The study is correlated with a prior examination from 1/14/2021  FINDINGS:  There is no acute intra-axial axial or extra-axial hemorrhage. There is no mass effect or shift of the midline. The basal cisterns are not effaced. There is cerebral and cerebellar volume loss with prominence of the ventricles, sulci, and cerebellar folia. A CSF filled sella turcica is noted. Ventricular size and configuration is unchanged. There are minimal chronic ischemic changes in the frontoparietal white matter. There is no CT evidence of an acute vascular territorial infarct. There are atherosclerotic calcifications of the intracranial carotid arteries and intradural vertebral arteries.  The regional soft tissues and osseous structures are unremarkable apart from chronic deformity of the nasal bones. The visualized paranasal sinuses and tympanic/mastoid cavitiesare well aerated apart from a small mucous retention cyst versus polyp in the right maxillary antrum and minimal left ethmoid mucosal thickening.  IMPRESSION:  No acute intracranial hemorrhage, mass effect, or CT evidence of an acute vascular territorial infarct. No interval change compared to prior exam from 1/14/2021.  Findings were discussed with MARIAELENA Ledezma at 2:57 PM on 1/24/2021.  JAYLA MARTÍNEZ DO; Attending Radiologist  This document has been electronically signed. Jan 242021  3:01PM  < end of copied text >    MEDICATIONS  (STANDING):  cefepime   IVPB 1000 milliGRAM(s) IV Intermittent every 8 hours  chlorhexidine 2% Cloths 1 Application(s) Topical daily  collagenase Ointment 1 Application(s) Topical daily  Dakins Solution - Full Strength 1 Application(s) Topical once  dextrose 40% Gel 15 Gram(s) Oral once  dextrose 50% Injectable 12.5 Gram(s) IV Push once  dextrose 50% Injectable 25 Gram(s) IV Push once  dextrose 50% Injectable 25 Gram(s) IV Push once  glucagon  Injectable 1 milliGRAM(s) IntraMuscular once  insulin lispro (ADMELOG) corrective regimen sliding scale   SubCutaneous three times a day before meals  insulin lispro (ADMELOG) corrective regimen sliding scale   SubCutaneous at bedtime  metroNIDAZOLE  IVPB 500 milliGRAM(s) IV Intermittent every 8 hours  pantoprazole  Injectable 40 milliGRAM(s) IV Push every 12 hours  potassium phosphate IVPB 30 milliMole(s) IV Intermittent once  simvastatin 40 milliGRAM(s) Oral at bedtime  sucralfate 1 Gram(s) Oral four times a day  vancomycin  IVPB 750 milliGRAM(s) IV Intermittent every 12 hours    MEDICATIONS  (PRN):  acetaminophen   Tablet .. 650 milliGRAM(s) Oral every 6 hours PRN Moderate Pain (4 - 6)  haloperidol    Injectable 0.5 milliGRAM(s) IV Push every 8 hours PRN Agitation  sodium chloride 0.9% lock flush 10 milliLiter(s) IV Push every 1 hour PRN Pre/post blood products, medications, blood draw, and to maintain line patency      Plan:    - Stroke Code called at bedside (see stroke code notes); Suctioned patient; Vitals taken  - Stroke protocol orders initiated, including CBC, CMP, Mg, Phos, Cardiac Enzymes, CT Head  - Stroke Team came at bedside for assessment; CT head done, transported patient to CT  - Returned to floor and was called by radiologist that CT head had no acute findings. Aspirin switched from PO to suppository;   - Dr. Norris, neurology who was following patient, updated on patient status with no further recommendations per Neuro  - Labs drawn per stroke protocol STAT and STAT ECG Done. STAT ABG Done;  - Cardiologist, Dr. Zach Griffiths consulted for elevated cardiac enzymes and abnormal ECG. He recommended a STAT echo, which was ordered;   - Per Dr. Llanes, Ordered patient to be transferred  to telemetry monitoring; Contacted bed board and admitting;  - Nephrologist, Dr. Flores, from Dr. Taylor team consulted regarding patient status and labs;   > Dr. Flores recommended to get Urine Na, K, Chloride, Mg, Phos, creatinine and Urinalysis for pH check for RTA Workup; May be Fanconis Syndrome    Continue to:  - Follow up Cardiac Enzymes x3 per institutional protocol,  CMP Mg Phos every 12 hours for now; Dr. Taylor will follow up in the AM with further recommendations  - Replete electrolytes; Repleted K and Phos for now with Potassium Phosphate;  - Hold bicarb until renal work-up done, per Dr. Flores  - Follow up Echo, ordered STAT;   - Follow up chest xray results  - Monitor mental status q2 hours and on every encounter;  - Aspiration precautions; Maintain NPO status; Suction patient for increased secretions PRN  - Hold lasix for now until K repleted  Patient status remains acute. Call attending ALEXANDER Llanes with and further questions.   - Call ICU for immediate consult if patient status deteriorates overnight, otherwise renal, cardiology, neuro will follow up in AM  Patient remains a FULL CODE at this time. Brief HPI: 76 yo F from [facility], wheelchair bound with medical history significant of HTN, Diabetes, Osteoarthritis, Osteoporosis, Peripheral arterial disease, Diabetic neuropathy, HLD, Schizophrenia comes in with worsening ulceration to b/l heels. She has been having ulcers for past couple months, has been progressive. She was treated with IV antbx at NH but did not get better. It was worsening with necrotic changes and increasing foul smelling discharge. Patient was admitted to medicine. Patient with additional ulcerations to her coccyx, ischium, and sacrum for which wound care and surgery were consulted. Podiatry also consulted for her heel ulcerations: patient s/p bedside debridement of her right heel ulceration, refusing any surgical interventions at this time. Infectious disease also following.    1/6: MRI with findings of: Osteomyelitis involving the calcaneus which extends from the plantar posterior aspect of the calcaneus to the level of the angle of Gissane. This is progressed compared to the prior examination.   1/7:  Pt states she understands that she may loose her leg and/or her life without surgical intervention for RLE OM, still refusing debridement.  Psych consulted. Patient needs medical clearance for OR on 1/8.  1/11; Patient I and D not done, anesthesia unable to get consent. Patient not consenting to OR procedure. OR procedure cancelled due to no consent. Patient deemed to have full capacity as per Psychiatry.   1/13: Patient seen and examined at bedside. S/P extended dwell placement by IR. Patient scheduled for surgical debridement of right heel.  1/14 s/p debridement of rt foot, no further surgical intervention at this time.    Patient had PICC line inserted (left subclavian) for abx treatment (she needs 6 weeks of abx after discharge from 01/06/21). Last COVID test negative by pcr 01/21.   Patient was withdrawing from food and interaction. Neurology Dr. Norris was consulted who concluded that patient had "Delayed recovery from multifactorial encephalopathy causing delirium; underlying microangiopathic cerebral disease with superimposed infection and metabolic abnormalities as yet uncorrected; it could take 1 week after correction of metabolic abnormalities for encephalopathy to improve. Recommend continue current supportive medical treatment; Patient with continued electrolyte imbalances the last week, receiving care for metabolic and infectious processes.     Event:  After discussion with ALEXANDER Danielle regarding plan of care of patient, on phone, I went to go assess patient in room around 14:00 1/24/21. Patient with abnormal physical exam, alert and oriented x0; Non verbal; Does not follow commands; Found to have excessive secretions with right facial droop. Patient on 2L nasal canula with oxygen saturation 97% on my assessment.       Objective: Vital signs last  24hrs  Focus PE:  Constitutional: in bed, eyes closed, with nasal canula   Neuro- alert and oriented x 0, non-verbal, unable to assess cranial nerves or motor  strength, did not follow any commands or respond to   Head: NCAT  Eyes- unable to assess EOM, Pupils equal and reactive to light, clear conjunctiva and sclera  ENT-no rhinorrhea or septal hematoma, nml pharynx w/o tonsillar hypertrophy, erythema or exudates  Neck- supple, NT, no cervical LAD  CV-tachycardic, nml S1S2, no mmr, rubs or gallops  Resp-respirations equal bilatarally; lung sounds unclear and diminished at the bases; unable to assess full sounds as patient unable to sit up and move;    Labs:   01-24    150<H>  |  122<H>  |  17  ----------------------------<  186<H>  2.9<LL>   |  18<L>  |  0.87    Ca    7.6<L>      24 Jan 2021 14:48  Phos  0.2     01-24  Mg     1.8     01-24    TPro  4.1<L>  /  Alb  1.2<L>  /  TBili  0.3  /  DBili  x   /  AST  6<L>  /  ALT  7<L>  /  AlkPhos  75  01-24    ABG - ( 24 Jan 2021 15:44 )  pH, Arterial: 7.41  pH, Blood: x     /  pCO2: 25    /  pO2: 103   / HCO3: 15    / Base Excess: -7.5  /  SaO2: 98        CARDIAC MARKERS ( 24 Jan 2021 14:48 )  1.040 ng/mL / x     / 22 U/L / x     / 4.8 ng/mL    ICU Vital Signs Last 24 Hrs  T(C): 36.1 (24 Jan 2021 14:22), Max: 36.6 (24 Jan 2021 04:59)  T(F): 97 (24 Jan 2021 14:22), Max: 97.9 (24 Jan 2021 04:59)  HR: 105 (24 Jan 2021 14:22) (93 - 105)  BP: 117/77 (24 Jan 2021 14:22) (98/51 - 120/60)  BP(mean): --  ABP: --  ABP(mean): --  RR: 17 (24 Jan 2021 14:22) (17 - 18)  SpO2: 100% (24 Jan 2021 14:22) (85% - 100%)    ECG: Pending results in EMR    Imaging:  < from: CT Brain Stroke Protocol (01.24.21 @ 14:53) >  EXAM:  CT BRAIN STROKE PROTOCOL                        PROCEDURE DATE:  01/24/2021    INTERPRETATION:  NONCONTRAST CT OF THE BRAIN DATED 1/24/2021.  CLINICAL INFORMATION:  Code Stroke rt droop  TECHNIQUE: Axial CT images are obtained from the cranial vertex to the skull base without the administration of IV contrast. Images are reformatted in sagittal and coronal planes.  The study is correlated with a prior examination from 1/14/2021  FINDINGS:  There is no acute intra-axial axial or extra-axial hemorrhage. There is no mass effect or shift of the midline. The basal cisterns are not effaced. There is cerebral and cerebellar volume loss with prominence of the ventricles, sulci, and cerebellar folia. A CSF filled sella turcica is noted. Ventricular size and configuration is unchanged. There are minimal chronic ischemic changes in the frontoparietal white matter. There is no CT evidence of an acute vascular territorial infarct. There are atherosclerotic calcifications of the intracranial carotid arteries and intradural vertebral arteries.  The regional soft tissues and osseous structures are unremarkable apart from chronic deformity of the nasal bones. The visualized paranasal sinuses and tympanic/mastoid cavitiesare well aerated apart from a small mucous retention cyst versus polyp in the right maxillary antrum and minimal left ethmoid mucosal thickening.  IMPRESSION:  No acute intracranial hemorrhage, mass effect, or CT evidence of an acute vascular territorial infarct. No interval change compared to prior exam from 1/14/2021.  Findings were discussed with MARIAELENA Ledezma at 2:57 PM on 1/24/2021.  JAYLA MARTÍNEZ DO; Attending Radiologist  This document has been electronically signed. Jan 242021  3:01PM  < end of copied text >    MEDICATIONS  (STANDING):  cefepime   IVPB 1000 milliGRAM(s) IV Intermittent every 8 hours  chlorhexidine 2% Cloths 1 Application(s) Topical daily  collagenase Ointment 1 Application(s) Topical daily  Dakins Solution - Full Strength 1 Application(s) Topical once  dextrose 40% Gel 15 Gram(s) Oral once  dextrose 50% Injectable 12.5 Gram(s) IV Push once  dextrose 50% Injectable 25 Gram(s) IV Push once  dextrose 50% Injectable 25 Gram(s) IV Push once  glucagon  Injectable 1 milliGRAM(s) IntraMuscular once  insulin lispro (ADMELOG) corrective regimen sliding scale   SubCutaneous three times a day before meals  insulin lispro (ADMELOG) corrective regimen sliding scale   SubCutaneous at bedtime  metroNIDAZOLE  IVPB 500 milliGRAM(s) IV Intermittent every 8 hours  pantoprazole  Injectable 40 milliGRAM(s) IV Push every 12 hours  potassium phosphate IVPB 30 milliMole(s) IV Intermittent once  simvastatin 40 milliGRAM(s) Oral at bedtime  sucralfate 1 Gram(s) Oral four times a day  vancomycin  IVPB 750 milliGRAM(s) IV Intermittent every 12 hours    MEDICATIONS  (PRN):  acetaminophen   Tablet .. 650 milliGRAM(s) Oral every 6 hours PRN Moderate Pain (4 - 6)  haloperidol    Injectable 0.5 milliGRAM(s) IV Push every 8 hours PRN Agitation  sodium chloride 0.9% lock flush 10 milliLiter(s) IV Push every 1 hour PRN Pre/post blood products, medications, blood draw, and to maintain line patency      Plan:    - Stroke Code called at bedside (see stroke code notes); Suctioned patient; Vitals taken  - Stroke protocol orders initiated, including CBC, CMP, Mg, Phos, Cardiac Enzymes, CT Head  - Stroke Team came at bedside for assessment; CT head done, transported patient to CT  - Returned to floor and was called by radiologist that CT head had no acute findings. Aspirin switched from PO to suppository;   - Dr. Norris, neurology who was following patient, updated on patient status with no further recommendations per Neuro  - Labs drawn per stroke protocol STAT and STAT ECG Done. STAT ABG Done;  - Cardiologist, Dr. Zach Griffiths consulted for elevated cardiac enzymes and abnormal ECG. He recommended a STAT echo, which was ordered;   - Per Dr. Llanes, Ordered patient to be transferred  to telemetry monitoring; Contacted bed board and admitting;  - Nephrologist, Dr. Flores, from Dr. Taylor team consulted regarding patient status and labs;   > Dr. Flores recommended to get Urine Na, K, Chloride, Mg, Phos, creatinine and Urinalysis for pH check for RTA Workup; May be Fanconis Syndrome    Continue to:  - Follow up Cardiac Enzymes x3 per institutional protocol,  CMP Mg Phos every 12 hours for now; Dr. Taylor will follow up in the AM with further recommendations  - Replete electrolytes; Repleted K and Phos for now with Potassium Phosphate;  - Hold bicarb until renal work-up done, per Dr. Flores  - Follow up Echo, ordered STAT;   - Follow up chest xray results  - Monitor mental status q2 hours and on every encounter;  - Aspiration precautions; Maintain NPO status; Suction patient for increased secretions PRN  - Hold lasix for now until K repleted  Patient status remains acute. Call attending ALEXANDER Llanes with and further questions.   - Call ICU for immediate consult if patient status deteriorates overnight, otherwise renal, cardiology, neuro will follow up in AM  Patient remains a FULL CODE at this time. Brief HPI: 74 yo F from [facility], wheelchair bound with medical history significant of HTN, Diabetes, Osteoarthritis, Osteoporosis, Peripheral arterial disease, Diabetic neuropathy, HLD, Schizophrenia comes in with worsening ulceration to b/l heels. She has been having ulcers for past couple months, has been progressive. She was treated with IV antbx at NH but did not get better. It was worsening with necrotic changes and increasing foul smelling discharge. Patient was admitted to medicine. Patient with additional ulcerations to her coccyx, ischium, and sacrum for which wound care and surgery were consulted. Podiatry also consulted for her heel ulcerations: patient s/p bedside debridement of her right heel ulceration, refusing any surgical interventions at this time. Infectious disease also following.    1/6: MRI with findings of: Osteomyelitis involving the calcaneus which extends from the plantar posterior aspect of the calcaneus to the level of the angle of Gissane. This is progressed compared to the prior examination.   1/7:  Pt states she understands that she may loose her leg and/or her life without surgical intervention for RLE OM, still refusing debridement.  Psych consulted. Patient needs medical clearance for OR on 1/8.  1/11; Patient I and D not done, anesthesia unable to get consent. Patient not consenting to OR procedure. OR procedure cancelled due to no consent. Patient deemed to have full capacity as per Psychiatry.   1/13: Patient seen and examined at bedside. S/P extended dwell placement by IR. Patient scheduled for surgical debridement of right heel.  1/14 s/p debridement of rt foot, no further surgical intervention at this time.    Patient had PICC line inserted (left subclavian) for abx treatment (she needs 6 weeks of abx after discharge from 01/06/21). Last COVID test negative by pcr 01/21.   Patient was withdrawing from food and interaction. Neurology Dr. Norris was consulted who concluded that patient had "Delayed recovery from multifactorial encephalopathy causing delirium; underlying microangiopathic cerebral disease with superimposed infection and metabolic abnormalities as yet uncorrected; it could take 1 week after correction of metabolic abnormalities for encephalopathy to improve. Recommend continue current supportive medical treatment; Patient with continued electrolyte imbalances the last week, receiving care for metabolic and infectious processes.     Event:  After discussion with ALEXANDER Danielle regarding plan of care of patient, on phone, I went to go assess patient in room around 14:00 1/24/21. Patient with abnormal physical exam, alert and oriented x0; Non verbal; Does not follow commands; Found to have excessive secretions with right facial droop. Patient on 2L nasal canula with oxygen saturation 97% on my assessment.       Objective: Vital signs last  24hrs  Focus PE:  Constitutional: in bed, eyes closed, with nasal canula   Neuro- alert and oriented x 0, non-verbal, unable to assess cranial nerves or motor  strength, did not follow any commands or respond to   Head: NCAT  Eyes- unable to assess EOM, Pupils equal and reactive to light, clear conjunctiva and sclera  ENT-no rhinorrhea or septal hematoma, nml pharynx w/o tonsillar hypertrophy, erythema or exudates  Neck- supple, NT, no cervical LAD  CV-tachycardic, nml S1S2, no mmr, rubs or gallops  Resp-respirations equal bilatarally; lung sounds unclear and diminished at the bases; unable to assess full sounds as patient unable to sit up and move;    Labs:   01-24    150<H>  |  122<H>  |  17  ----------------------------<  186<H>  2.9<LL>   |  18<L>  |  0.87    Ca    7.6<L>      24 Jan 2021 14:48  Phos  0.2     01-24  Mg     1.8     01-24    TPro  4.1<L>  /  Alb  1.2<L>  /  TBili  0.3  /  DBili  x   /  AST  6<L>  /  ALT  7<L>  /  AlkPhos  75  01-24    ABG - ( 24 Jan 2021 15:44 )  pH, Arterial: 7.41  pH, Blood: x     /  pCO2: 25    /  pO2: 103   / HCO3: 15    / Base Excess: -7.5  /  SaO2: 98        CARDIAC MARKERS ( 24 Jan 2021 14:48 )  1.040 ng/mL / x     / 22 U/L / x     / 4.8 ng/mL    ICU Vital Signs Last 24 Hrs  T(C): 36.1 (24 Jan 2021 14:22), Max: 36.6 (24 Jan 2021 04:59)  T(F): 97 (24 Jan 2021 14:22), Max: 97.9 (24 Jan 2021 04:59)  HR: 105 (24 Jan 2021 14:22) (93 - 105)  BP: 117/77 (24 Jan 2021 14:22) (98/51 - 120/60)  BP(mean): --  ABP: --  ABP(mean): --  RR: 17 (24 Jan 2021 14:22) (17 - 18)  SpO2: 100% (24 Jan 2021 14:22) (85% - 100%)    ECG: Pending results in EMR    Imaging:  < from: CT Brain Stroke Protocol (01.24.21 @ 14:53) >  EXAM:  CT BRAIN STROKE PROTOCOL                        PROCEDURE DATE:  01/24/2021    INTERPRETATION:  NONCONTRAST CT OF THE BRAIN DATED 1/24/2021.  CLINICAL INFORMATION:  Code Stroke rt droop  TECHNIQUE: Axial CT images are obtained from the cranial vertex to the skull base without the administration of IV contrast. Images are reformatted in sagittal and coronal planes.  The study is correlated with a prior examination from 1/14/2021  FINDINGS:  There is no acute intra-axial axial or extra-axial hemorrhage. There is no mass effect or shift of the midline. The basal cisterns are not effaced. There is cerebral and cerebellar volume loss with prominence of the ventricles, sulci, and cerebellar folia. A CSF filled sella turcica is noted. Ventricular size and configuration is unchanged. There are minimal chronic ischemic changes in the frontoparietal white matter. There is no CT evidence of an acute vascular territorial infarct. There are atherosclerotic calcifications of the intracranial carotid arteries and intradural vertebral arteries.  The regional soft tissues and osseous structures are unremarkable apart from chronic deformity of the nasal bones. The visualized paranasal sinuses and tympanic/mastoid cavitiesare well aerated apart from a small mucous retention cyst versus polyp in the right maxillary antrum and minimal left ethmoid mucosal thickening.  IMPRESSION:  No acute intracranial hemorrhage, mass effect, or CT evidence of an acute vascular territorial infarct. No interval change compared to prior exam from 1/14/2021.  Findings were discussed with MARIAELENA Ledezma at 2:57 PM on 1/24/2021.  JAYLA MARTÍNEZ DO; Attending Radiologist  This document has been electronically signed. Jan 242021  3:01PM  < end of copied text >    MEDICATIONS  (STANDING):  cefepime   IVPB 1000 milliGRAM(s) IV Intermittent every 8 hours  chlorhexidine 2% Cloths 1 Application(s) Topical daily  collagenase Ointment 1 Application(s) Topical daily  Dakins Solution - Full Strength 1 Application(s) Topical once  dextrose 40% Gel 15 Gram(s) Oral once  dextrose 50% Injectable 12.5 Gram(s) IV Push once  dextrose 50% Injectable 25 Gram(s) IV Push once  dextrose 50% Injectable 25 Gram(s) IV Push once  glucagon  Injectable 1 milliGRAM(s) IntraMuscular once  insulin lispro (ADMELOG) corrective regimen sliding scale   SubCutaneous three times a day before meals  insulin lispro (ADMELOG) corrective regimen sliding scale   SubCutaneous at bedtime  metroNIDAZOLE  IVPB 500 milliGRAM(s) IV Intermittent every 8 hours  pantoprazole  Injectable 40 milliGRAM(s) IV Push every 12 hours  potassium phosphate IVPB 30 milliMole(s) IV Intermittent once  simvastatin 40 milliGRAM(s) Oral at bedtime  sucralfate 1 Gram(s) Oral four times a day  vancomycin  IVPB 750 milliGRAM(s) IV Intermittent every 12 hours    MEDICATIONS  (PRN):  acetaminophen   Tablet .. 650 milliGRAM(s) Oral every 6 hours PRN Moderate Pain (4 - 6)  haloperidol    Injectable 0.5 milliGRAM(s) IV Push every 8 hours PRN Agitation  sodium chloride 0.9% lock flush 10 milliLiter(s) IV Push every 1 hour PRN Pre/post blood products, medications, blood draw, and to maintain line patency      Plan:    - Stroke Code called at bedside (see stroke code notes); Suctioned patient; Vitals taken  - Stroke protocol orders initiated, including CBC, CMP, Mg, Phos, Cardiac Enzymes, CT Head  - Stroke Team came at bedside for assessment; CT head done, transported patient to CT  - Returned to floor and was called by radiologist that CT head had no acute findings. Aspirin switched from PO to suppository;   - Dr. Norris, neurology who was following patient, updated on patient status with no further recommendations per Neuro  - Labs drawn per stroke protocol STAT and STAT ECG Done. STAT ABG Done;  - Cardiologist, Dr. Zach Griffiths consulted for elevated cardiac enzymes and abnormal ECG. He recommended a STAT echo, which was ordered;   - Per Dr. Llanes, Ordered patient to be transferred  to telemetry monitoring; Contacted bed board and admitting;  - Nephrologist, Dr. Flores, from Dr. Taylor team consulted regarding patient status and labs;   > Dr. Flores recommended to get Urine Na, K, Chloride, Mg, Phos, creatinine and Urinalysis for pH check for RTA Workup; May be Fanconis Syndrome    Continue to:  - Follow up Cardiac Enzymes x3 per institutional protocol,  CMP Mg Phos every 12 hours for now; Dr. Taylor will follow up in the AM with further recommendations  - Replete electrolytes; Repleted K and Phos for now with Potassium Phosphate;  - Hold bicarb until renal work-up done, per Dr. Flores  - Follow up Echo, ordered STAT;   - Follow up chest xray results  - Monitor mental status q2 hours and on every encounter;  - Aspiration precautions; Maintain NPO status; Suction patient for increased secretions PRN  - Hold lasix for now until K repleted  Patient status remains acute. Call attending ALEXANDER Llanes with and further questions.   - Call ICU for immediate consult if patient status deteriorates overnight, otherwise renal, cardiology, neuro will follow up in AM    GOC:  Molst Form checked in chart: GOC reviewed with Dr. Llanes  Molst was drafted on 5/11/2020 with patient's wished for DNR/DNI Brief HPI: 76 yo F from [facility], wheelchair bound with medical history significant of HTN, Diabetes, Osteoarthritis, Osteoporosis, Peripheral arterial disease, Diabetic neuropathy, HLD, Schizophrenia comes in with worsening ulceration to b/l heels. She has been having ulcers for past couple months, has been progressive. She was treated with IV antbx at NH but did not get better. It was worsening with necrotic changes and increasing foul smelling discharge. Patient was admitted to medicine. Patient with additional ulcerations to her coccyx, ischium, and sacrum for which wound care and surgery were consulted. Podiatry also consulted for her heel ulcerations: patient s/p bedside debridement of her right heel ulceration, refusing any surgical interventions at this time. Infectious disease also following.    1/6: MRI with findings of: Osteomyelitis involving the calcaneus which extends from the plantar posterior aspect of the calcaneus to the level of the angle of Giss[place]. This is progressed compared to the prior examination.   1/7:  Pt states she understands that she may loose her leg and/or her life without surgical intervention for RLE OM, still refusing debridement.  Psych consulted. Patient needs medical clearance for OR on 1/8.  1/11; Patient I and D not done, anesthesia unable to get consent. Patient not consenting to OR procedure. OR procedure cancelled due to no consent. Patient deemed to have full capacity as per Psychiatry.   1/13: Patient seen and examined at bedside. S/P extended dwell placement by IR. Patient scheduled for surgical debridement of right heel.  1/14 s/p debridement of rt foot, no further surgical intervention at this time.    Patient had PICC line inserted (left subclavian) for abx treatment (she needs 6 weeks of abx after discharge from 01/06/21). Last COVID test negative by pcr 01/21.   Patient was withdrawing from food and interaction. Neurology Dr. Norris was consulted who concluded that patient had "Delayed recovery from multifactorial encephalopathy causing delirium; underlying microangiopathic cerebral disease with superimposed infection and metabolic abnormalities as yet uncorrected; it could take 1 week after correction of metabolic abnormalities for encephalopathy to improve. Recommend continue current supportive medical treatment; Patient with continued electrolyte imbalances the last week, receiving care for metabolic and infectious processes.     Event:  After discussion with ALEXANDER Danielle regarding plan of care of patient, on phone, I went to go assess patient in room around 14:00 1/24/21. Patient with abnormal physical exam, alert and oriented x0; Non verbal; Does not follow commands; Found to have excessive secretions with right facial droop. Patient on 2L nasal canula with oxygen saturation 97% on my assessment.       Objective: Vital signs last  24hrs  Focus PE:  Constitutional: in bed, eyes closed, with nasal canula   Neuro- alert and oriented x 0, non-verbal, unable to assess cranial nerves or motor  strength, did not follow any commands or respond to   Head: NCAT  Eyes- unable to assess EOM, Pupils equal and reactive to light, clear conjunctiva and sclera  ENT-no rhinorrhea or septal hematoma, nml pharynx w/o tonsillar hypertrophy, erythema or exudates  Neck- supple, NT, no cervical LAD  CV-tachycardic, nml S1S2, no mmr, rubs or gallops  Resp-respirations equal bilatarally; lung sounds unclear and diminished at the bases; unable to assess full sounds as patient unable to sit up and move;    Labs:   01-24    150<H>  |  122<H>  |  17  ----------------------------<  186<H>  2.9<LL>   |  18<L>  |  0.87    Ca    7.6<L>      24 Jan 2021 14:48  Phos  0.2     01-24  Mg     1.8     01-24    TPro  4.1<L>  /  Alb  1.2<L>  /  TBili  0.3  /  DBili  x   /  AST  6<L>  /  ALT  7<L>  /  AlkPhos  75  01-24    ABG - ( 24 Jan 2021 15:44 )  pH, Arterial: 7.41  pH, Blood: x     /  pCO2: 25    /  pO2: 103   / HCO3: 15    / Base Excess: -7.5  /  SaO2: 98        CARDIAC MARKERS ( 24 Jan 2021 14:48 )  1.040 ng/mL / x     / 22 U/L / x     / 4.8 ng/mL    ICU Vital Signs Last 24 Hrs  T(C): 36.1 (24 Jan 2021 14:22), Max: 36.6 (24 Jan 2021 04:59)  T(F): 97 (24 Jan 2021 14:22), Max: 97.9 (24 Jan 2021 04:59)  HR: 105 (24 Jan 2021 14:22) (93 - 105)  BP: 117/77 (24 Jan 2021 14:22) (98/51 - 120/60)  BP(mean): --  ABP: --  ABP(mean): --  RR: 17 (24 Jan 2021 14:22) (17 - 18)  SpO2: 100% (24 Jan 2021 14:22) (85% - 100%)    ECG: Pending results in EMR    Imaging:  < from: CT Brain Stroke Protocol (01.24.21 @ 14:53) >  EXAM:  CT BRAIN STROKE PROTOCOL                        PROCEDURE DATE:  01/24/2021    INTERPRETATION:  NONCONTRAST CT OF THE BRAIN DATED 1/24/2021.  CLINICAL INFORMATION:  Code Stroke rt droop  TECHNIQUE: Axial CT images are obtained from the cranial vertex to the skull base without the administration of IV contrast. Images are reformatted in sagittal and coronal planes.  The study is correlated with a prior examination from 1/14/2021  FINDINGS:  There is no acute intra-axial axial or extra-axial hemorrhage. There is no mass effect or shift of the midline. The basal cisterns are not effaced. There is cerebral and cerebellar volume loss with prominence of the ventricles, sulci, and cerebellar folia. A CSF filled sella turcica is noted. Ventricular size and configuration is unchanged. There are minimal chronic ischemic changes in the frontoparietal white matter. There is no CT evidence of an acute vascular territorial infarct. There are atherosclerotic calcifications of the intracranial carotid arteries and intradural vertebral arteries.  The regional soft tissues and osseous structures are unremarkable apart from chronic deformity of the nasal bones. The visualized paranasal sinuses and tympanic/mastoid cavitiesare well aerated apart from a small mucous retention cyst versus polyp in the right maxillary antrum and minimal left ethmoid mucosal thickening.  IMPRESSION:  No acute intracranial hemorrhage, mass effect, or CT evidence of an acute vascular territorial infarct. No interval change compared to prior exam from 1/14/2021.  Findings were discussed with MARIAELENA Ledezma at 2:57 PM on 1/24/2021.  JAYLA MARTÍNEZ DO; Attending Radiologist  This document has been electronically signed. Jan 242021  3:01PM  < end of copied text >    MEDICATIONS  (STANDING):  cefepime   IVPB 1000 milliGRAM(s) IV Intermittent every 8 hours  chlorhexidine 2% Cloths 1 Application(s) Topical daily  collagenase Ointment 1 Application(s) Topical daily  Dakins Solution - Full Strength 1 Application(s) Topical once  dextrose 40% Gel 15 Gram(s) Oral once  dextrose 50% Injectable 12.5 Gram(s) IV Push once  dextrose 50% Injectable 25 Gram(s) IV Push once  dextrose 50% Injectable 25 Gram(s) IV Push once  glucagon  Injectable 1 milliGRAM(s) IntraMuscular once  insulin lispro (ADMELOG) corrective regimen sliding scale   SubCutaneous three times a day before meals  insulin lispro (ADMELOG) corrective regimen sliding scale   SubCutaneous at bedtime  metroNIDAZOLE  IVPB 500 milliGRAM(s) IV Intermittent every 8 hours  pantoprazole  Injectable 40 milliGRAM(s) IV Push every 12 hours  potassium phosphate IVPB 30 milliMole(s) IV Intermittent once  simvastatin 40 milliGRAM(s) Oral at bedtime  sucralfate 1 Gram(s) Oral four times a day  vancomycin  IVPB 750 milliGRAM(s) IV Intermittent every 12 hours    MEDICATIONS  (PRN):  acetaminophen   Tablet .. 650 milliGRAM(s) Oral every 6 hours PRN Moderate Pain (4 - 6)  haloperidol    Injectable 0.5 milliGRAM(s) IV Push every 8 hours PRN Agitation  sodium chloride 0.9% lock flush 10 milliLiter(s) IV Push every 1 hour PRN Pre/post blood products, medications, blood draw, and to maintain line patency      Plan:    - Stroke Code called at bedside (see stroke code notes); Suctioned patient; Vitals taken  - Stroke protocol orders initiated, including CBC, CMP, Mg, Phos, Cardiac Enzymes, CT Head  - Stroke Team came at bedside for assessment; CT head done, transported patient to CT  - Returned to floor and was called by radiologist that CT head had no acute findings. Aspirin switched from PO to suppository;   - Dr. Norris, neurology who was following patient, updated on patient status with no further recommendations per Neuro  - Labs drawn per stroke protocol STAT and STAT ECG Done. STAT ABG Done;  - Cardiologist, Dr. Zach Griffiths consulted for elevated cardiac enzymes and abnormal ECG. He recommended a STAT echo, which was ordered;   - Per Dr. Casillas, Ordered patient to be transferred  to telemetry monitoring; Contacted bed board and admitting;  - Nephrologist, Dr. Flores, from Dr. Taylor team consulted regarding patient status and labs;   > Dr. Flores recommended to get Urine Na, K, Chloride, Mg, Phos, creatinine and Urinalysis for pH check for RTA Workup; May be Fanconis Syndrome    Continue to:  - Follow up Cardiac Enzymes x3 per institutional protocol,  CMP Mg Phos every 12 hours for now; Dr. Taylor will follow up in the AM with further recommendations  - Replete electrolytes; Repleted K and Phos for now with Potassium Phosphate;  - Hold bicarb until renal work-up done, per Dr. Flores  - Follow up Echo, ordered STAT;   - Follow up chest xray results  - Monitor mental status q2 hours and on every encounter;  - Aspiration precautions; Maintain NPO status; Suction patient for increased secretions PRN  - Hold lasix for now until K repleted  Patient status remains acute. Call attending ALEXANDER Casillas with and further questions.   - Call ICU for immediate consult if patient status deteriorates overnight, otherwise renal, cardiology, neuro will follow up in AM  - Next Lab draw at 21:00 on 1.24.21;  - Started Emanuel; Strict intake and output monitoring    GO:  --------------------------------------------------  Molst Form checked in chart: GO reviewed with Dr. Casillas  Molst was drafted on 5/11/2020 with patient's wishes for DNR/DNI.   Confirmed at NH with PCP on 12/6/2020 per most recent MOLST review.   ----------------------------------------------------------------------------------------------------------------------------------  Confirmed by Hamilton Tucker on 1/13/2021 that patient's wishes/GOC are DNR/DNI as follows:    """Goals of Care:   GOALS OF CARE:  · Participants  Patient  Advance Directives:  · Does patient have Advance Directive  No  · Does the Patient have a Court Appointed Guardian ([address])  No  · Caregiver:  information could not be obtained    Conversation Discussion:  · Conversation  Diagnosis; Prognosis; MOLST Discussed  · Conversation Details  DNR/DNI,   MOLST in Chart    Treatment Guidelines:  · Decision Maker  Patient  · Treatment Guidelines  DNR Order  · Treatment Guideline Comments  Limited medical interventions  · Future Hospitalization/Transfer  Send to hospital, if necessary, based on MOLST orders  MOLST:  · Completed  11-Jan-2021""""  -------------------------------------------------------------------------------------------------------------------------  ATTEMPTED TO CALL NEXT OF KIN, Dougie Oropeza and Karl Chavis per contacts in chart/EMR, BUT  UNABLE TO REACH TO UPDATE PATIENT STATUS.  PATIENT DETERMINED TO BE A DNR/DNI, CHANGED CODE STATUS ORDERS.    DISCUSSED CASE WITH DR. ALEXANDER CASILLAS who agrees with plan above.

## 2021-01-24 NOTE — PROGRESS NOTE ADULT - SUBJECTIVE AND OBJECTIVE BOX
Patient is a 75y old  Female who presents with a chief complaint of foot ulcer (23 Jan 2021 14:24)    PATIENT IS SEEN AND EXAMINED IN MEDICAL FLOOR.  CAROLINE [    ]    SABIHA [   ]      GT [   ]    ALLERGIES:  No Known Allergies      Daily     Daily     VITALS:    Vital Signs Last 24 Hrs  T(C): 36.2 (24 Jan 2021 20:07), Max: 36.6 (24 Jan 2021 04:59)  T(F): 97.1 (24 Jan 2021 20:07), Max: 97.9 (24 Jan 2021 04:59)  HR: 97 (24 Jan 2021 20:07) (93 - 105)  BP: 108/61 (24 Jan 2021 20:07) (98/51 - 117/77)  BP(mean): --  RR: 18 (24 Jan 2021 20:07) (17 - 18)  SpO2: 100% (24 Jan 2021 20:07) (96% - 100%)    LABS:    CBC Full  -  ( 24 Jan 2021 14:48 )  WBC Count : 16.63 K/uL  RBC Count : 3.74 M/uL  Hemoglobin : 10.9 g/dL  Hematocrit : 32.9 %  Platelet Count - Automated : 179 K/uL  Mean Cell Volume : 88.0 fl  Mean Cell Hemoglobin : 29.1 pg  Mean Cell Hemoglobin Concentration : 33.1 gm/dL  Auto Neutrophil # : 13.97 K/uL  Auto Lymphocyte # : 0.67 K/uL  Auto Monocyte # : 0.83 K/uL  Auto Eosinophil # : 0.00 K/uL  Auto Basophil # : 0.00 K/uL  Auto Neutrophil % : 83.0 %  Auto Lymphocyte % : 4.0 %  Auto Monocyte % : 5.0 %  Auto Eosinophil % : 0.0 %  Auto Basophil % : 0.0 %    PT/INR - ( 24 Jan 2021 14:48 )   PT: 20.1 sec;   INR: 1.73 ratio         PTT - ( 24 Jan 2021 14:48 )  PTT:47.4 sec  01-24    150<H>  |  122<H>  |  17  ----------------------------<  173<H>  2.9<LL>   |  17<L>  |  0.85    Ca    7.7<L>      24 Jan 2021 21:23  Phos  2.8     01-24  Mg     1.8     01-24    TPro  4.2<L>  /  Alb  1.2<L>  /  TBili  0.3  /  DBili  x   /  AST  6<L>  /  ALT  <6<L>  /  AlkPhos  73  01-24    CAPILLARY BLOOD GLUCOSE      POCT Blood Glucose.: 158 mg/dL (24 Jan 2021 16:34)  POCT Blood Glucose.: 197 mg/dL (24 Jan 2021 14:15)  POCT Blood Glucose.: 196 mg/dL (24 Jan 2021 11:47)  POCT Blood Glucose.: 262 mg/dL (24 Jan 2021 07:59)    CARDIAC MARKERS ( 24 Jan 2021 21:23 )  0.865 ng/mL / x     / 21 U/L / x     / x      CARDIAC MARKERS ( 24 Jan 2021 16:19 )  0.944 ng/mL / x     / x     / x     / x      CARDIAC MARKERS ( 24 Jan 2021 14:48 )  1.040 ng/mL / x     / 22 U/L / x     / 4.8 ng/mL      LIVER FUNCTIONS - ( 24 Jan 2021 21:23 )  Alb: 1.2 g/dL / Pro: 4.2 g/dL / ALK PHOS: 73 U/L / ALT: <6 U/L DA / AST: 6 U/L / GGT: x           Creatinine Trend: 0.85<--, 0.87<--, 0.73<--, 0.76<--, 0.64<--, 0.51<--  I&O's Summary      ABG - ( 24 Jan 2021 15:44 )  pH, Arterial: 7.41  pH, Blood: x     /  pCO2: 25    /  pO2: 103   / HCO3: 15    / Base Excess: -7.5  /  SaO2: 98                  .Tissue Right heel margin  01-13 @ 19:31   Rare Enterococcus faecalis  --  Enterococcus faecalis      .Surgical Swab Right heel wound culture  01-13 @ 19:22   Moderate Proteus mirabilis  Few Bacteroides vulgatus group "Susceptibilities not performed"  --  Proteus mirabilis      .Urine Clean Catch (Midstream)  01-06 @ 06:46   No growth  --  --      .Surgical Swab Right heel wound  01-05 @ 22:13   Few Morganella morganii  Few Proteus mirabilis  Moderate Corynebacterium species "Susceptibilities not performed"  Moderate Bacteroides vulgatus "Susceptibilities not performed"  --  Morganella morganii  Proteus mirabilis      .Blood Blood-Peripheral  01-05 @ 17:59   No Growth Final  --  --          MEDICATIONS:    MEDICATIONS  (STANDING):  aspirin enteric coated 81 milliGRAM(s) Oral daily  cefepime   IVPB 1000 milliGRAM(s) IV Intermittent every 8 hours  chlorhexidine 2% Cloths 1 Application(s) Topical daily  collagenase Ointment 1 Application(s) Topical daily  Dakins Solution - Full Strength 1 Application(s) Topical once  dextrose 40% Gel 15 Gram(s) Oral once  dextrose 50% Injectable 12.5 Gram(s) IV Push once  dextrose 50% Injectable 25 Gram(s) IV Push once  dextrose 50% Injectable 25 Gram(s) IV Push once  glucagon  Injectable 1 milliGRAM(s) IntraMuscular once  metroNIDAZOLE  IVPB 500 milliGRAM(s) IV Intermittent every 8 hours  pantoprazole  Injectable 40 milliGRAM(s) IV Push every 12 hours  potassium phosphate IVPB 30 milliMole(s) IV Intermittent once  simvastatin 40 milliGRAM(s) Oral at bedtime  sucralfate 1 Gram(s) Oral four times a day  vancomycin  IVPB 750 milliGRAM(s) IV Intermittent every 12 hours      MEDICATIONS  (PRN):  acetaminophen   Tablet .. 650 milliGRAM(s) Oral every 6 hours PRN Moderate Pain (4 - 6)  haloperidol    Injectable 0.5 milliGRAM(s) IV Push every 8 hours PRN Agitation  sodium chloride 0.9% lock flush 10 milliLiter(s) IV Push every 1 hour PRN Pre/post blood products, medications, blood draw, and to maintain line patency      REVIEW OF SYSTEMS:                           ALL ROS DONE [ X   ]    CONSTITUTIONAL:  LETHARGIC [   ], FEVER [   ], UNRESPONSIVE [   ]  CVS:  CP  [   ], SOB, [   ], PALPITATIONS [   ], DIZZYNESS [   ]  RS: COUGH [   ], SPUTUM [   ]  GI: ABDOMINAL PAIN [   ], NAUSEA [   ], VOMITINGS [   ], DIARRHEA [   ], CONSTIPATION [   ]  :  DYSURIA [   ], NOCTURIA [   ], INCREASED FREQUENCY [   ], DRIBLING [   ],  SKELETAL: PAINFUL JOINTS [   ], SWOLLEN JOINTS [   ], NECK ACHE [   ], LOW BACK ACHE [   ],  SKIN : ULCERS [   ], RASH [   ], ITCHING [   ]  CNS: HEAD ACHE [   ], DOUBLE VISION [   ], BLURRED VISION [   ], AMS / CONFUSION [   ], SEIZURES [   ], WEAKNESS [   ],TINGLING / NUMBNESS [   ]    PHYSICAL EXAMINATION:  GENERAL APPEARANCE: NO DISTRESS  HEENT:  NO PALLOR, NO  JVD,  NO   NODES, NECK SUPPLE  CVS: S1 +, S2 +,   RS: AEEB,  OCCASIONAL  RALES +,   NO RONCHI  ABD: SOFT, NT, NO, BS +  EXT: NO PE  SKIN: WARM,   SKELETAL:  ROM ACCEPTABLE  CNS:  AAO X    ,   DEFICITS    RADIOLOGY :      ASSESSMENT :     Osteomyelitis    Yes    Yes    Yes    COVID-19    Osteomyelitis    DM (diabetes mellitus)    Paranoid schizophrenia    HLD (hyperlipidemia)    PAD (peripheral artery disease)    HTN (hypertension)    No significant past surgical history        PLAN:  HPI:  76 yo F from [facility], wheelchair bound with medical history significant of HTN, Diabetes, Osteoarthritis, Osteoporosis, Peripheral arterial disease, Diabetic neuropathy, HLD, Schizophrenia comes in with worsening ulceration to b/l heels. She has been having ulcers for past couple months, has been progressive. She was treated with IV antbx at NH but did not get better. It was worsening with necrotic changes and increasing foul smelling discharge. She denies fever, abdominal pain, chest pain, urinary symptoms, fall, trauma. No h/o nausea, vomiting, diarrhea, cough, dyspnea, sick contacts, recent illness.  (05 Jan 2021 14:24)    -      Patient is a 75y old  Female who presents with a chief complaint of foot ulcer (23 Jan 2021 14:24)    PATIENT IS SEEN AND EXAMINED IN MEDICAL FLOOR.    ALLERGIES:  No Known Allergies    VITALS:    Vital Signs Last 24 Hrs  T(C): 36.2 (24 Jan 2021 20:07), Max: 36.6 (24 Jan 2021 04:59)  T(F): 97.1 (24 Jan 2021 20:07), Max: 97.9 (24 Jan 2021 04:59)  HR: 97 (24 Jan 2021 20:07) (93 - 105)  BP: 108/61 (24 Jan 2021 20:07) (98/51 - 117/77)  BP(mean): --  RR: 18 (24 Jan 2021 20:07) (17 - 18)  SpO2: 100% (24 Jan 2021 20:07) (96% - 100%)    LABS:    CBC Full  -  ( 24 Jan 2021 14:48 )  WBC Count : 16.63 K/uL  RBC Count : 3.74 M/uL  Hemoglobin : 10.9 g/dL  Hematocrit : 32.9 %  Platelet Count - Automated : 179 K/uL  Mean Cell Volume : 88.0 fl  Mean Cell Hemoglobin : 29.1 pg  Mean Cell Hemoglobin Concentration : 33.1 gm/dL  Auto Neutrophil # : 13.97 K/uL  Auto Lymphocyte # : 0.67 K/uL  Auto Monocyte # : 0.83 K/uL  Auto Eosinophil # : 0.00 K/uL  Auto Basophil # : 0.00 K/uL  Auto Neutrophil % : 83.0 %  Auto Lymphocyte % : 4.0 %  Auto Monocyte % : 5.0 %  Auto Eosinophil % : 0.0 %  Auto Basophil % : 0.0 %    PT/INR - ( 24 Jan 2021 14:48 )   PT: 20.1 sec;   INR: 1.73 ratio         PTT - ( 24 Jan 2021 14:48 )  PTT:47.4 sec  01-24    150<H>  |  122<H>  |  17  ----------------------------<  173<H>  2.9<LL>   |  17<L>  |  0.85    Ca    7.7<L>      24 Jan 2021 21:23  Phos  2.8     01-24  Mg     1.8     01-24    TPro  4.2<L>  /  Alb  1.2<L>  /  TBili  0.3  /  DBili  x   /  AST  6<L>  /  ALT  <6<L>  /  AlkPhos  73  01-24    CAPILLARY BLOOD GLUCOSE      POCT Blood Glucose.: 158 mg/dL (24 Jan 2021 16:34)  POCT Blood Glucose.: 197 mg/dL (24 Jan 2021 14:15)  POCT Blood Glucose.: 196 mg/dL (24 Jan 2021 11:47)  POCT Blood Glucose.: 262 mg/dL (24 Jan 2021 07:59)    CARDIAC MARKERS ( 24 Jan 2021 21:23 )  0.865 ng/mL / x     / 21 U/L / x     / x      CARDIAC MARKERS ( 24 Jan 2021 16:19 )  0.944 ng/mL / x     / x     / x     / x      CARDIAC MARKERS ( 24 Jan 2021 14:48 )  1.040 ng/mL / x     / 22 U/L / x     / 4.8 ng/mL      LIVER FUNCTIONS - ( 24 Jan 2021 21:23 )  Alb: 1.2 g/dL / Pro: 4.2 g/dL / ALK PHOS: 73 U/L / ALT: <6 U/L DA / AST: 6 U/L / GGT: x           Creatinine Trend: 0.85<--, 0.87<--, 0.73<--, 0.76<--, 0.64<--, 0.51<--  I&O's Summary      ABG - ( 24 Jan 2021 15:44 )  pH, Arterial: 7.41  pH, Blood: x     /  pCO2: 25    /  pO2: 103   / HCO3: 15    / Base Excess: -7.5  /  SaO2: 98          .Tissue Right heel margin  01-13 @ 19:31   Rare Enterococcus faecalis  --  Enterococcus faecalis      .Surgical Swab Right heel wound culture  01-13 @ 19:22   Moderate Proteus mirabilis  Few Bacteroides vulgatus group "Susceptibilities not performed"  --  Proteus mirabilis      .Urine Clean Catch (Midstream)  01-06 @ 06:46   No growth  --  --      .Surgical Swab Right heel wound  01-05 @ 22:13   Few Morganella morganii  Few Proteus mirabilis  Moderate Corynebacterium species "Susceptibilities not performed"  Moderate Bacteroides vulgatus "Susceptibilities not performed"  --  Morganella morganii  Proteus mirabilis      .Blood Blood-Peripheral  01-05 @ 17:59   No Growth Final  --  --    MEDICATIONS:    MEDICATIONS  (STANDING):  aspirin enteric coated 81 milliGRAM(s) Oral daily  cefepime   IVPB 1000 milliGRAM(s) IV Intermittent every 8 hours  chlorhexidine 2% Cloths 1 Application(s) Topical daily  collagenase Ointment 1 Application(s) Topical daily  Dakins Solution - Full Strength 1 Application(s) Topical once  dextrose 40% Gel 15 Gram(s) Oral once  dextrose 50% Injectable 12.5 Gram(s) IV Push once  dextrose 50% Injectable 25 Gram(s) IV Push once  dextrose 50% Injectable 25 Gram(s) IV Push once  glucagon  Injectable 1 milliGRAM(s) IntraMuscular once  metroNIDAZOLE  IVPB 500 milliGRAM(s) IV Intermittent every 8 hours  pantoprazole  Injectable 40 milliGRAM(s) IV Push every 12 hours  potassium phosphate IVPB 30 milliMole(s) IV Intermittent once  simvastatin 40 milliGRAM(s) Oral at bedtime  sucralfate 1 Gram(s) Oral four times a day  vancomycin  IVPB 750 milliGRAM(s) IV Intermittent every 12 hours      MEDICATIONS  (PRN):  acetaminophen   Tablet .. 650 milliGRAM(s) Oral every 6 hours PRN Moderate Pain (4 - 6)  haloperidol    Injectable 0.5 milliGRAM(s) IV Push every 8 hours PRN Agitation  sodium chloride 0.9% lock flush 10 milliLiter(s) IV Push every 1 hour PRN Pre/post blood products, medications, blood draw, and to maintain line patency      REVIEW OF SYSTEMS:                           ALL ROS DONE [ X   ]    CONSTITUTIONAL:  LETHARGIC [   ], FEVER [   ], UNRESPONSIVE [   ]  CVS:  CP  [   ], SOB, [   ], PALPITATIONS [   ], DIZZYNESS [   ]  RS: COUGH [   ], SPUTUM [   ]  GI: ABDOMINAL PAIN [   ], NAUSEA [   ], VOMITINGS [   ], DIARRHEA [   ], CONSTIPATION [   ]  :  DYSURIA [   ], NOCTURIA [   ], INCREASED FREQUENCY [   ], DRIBLING [   ],  SKELETAL: PAINFUL JOINTS [   ], SWOLLEN JOINTS [   ], NECK ACHE [   ], LOW BACK ACHE [   ],  SKIN : ULCERS [   ], RASH [   ], ITCHING [   ]  CNS: HEAD ACHE [   ], DOUBLE VISION [   ], BLURRED VISION [   ], AMS / CONFUSION [   ], SEIZURES [   ], WEAKNESS [   ],TINGLING / NUMBNESS [   ]    PHYSICAL EXAMINATION:  GENERAL APPEARANCE: NO DISTRESS  HEENT:  NO PALLOR, NO  JVD,  NO   NODES, NECK SUPPLE  CVS: S1 +, S2 +,   RS: AEEB,  OCCASIONAL  RALES +,   RONCHI +  ABD: SOFT, NT, NO, BS +  EXT: NO PE  SKIN: WARM, BILATERAL HEEL ULCERS - COVERED, SACRAL ULCER + RIGHT ISCHIAL ULCER COVERED, PICC LINE+  SKELETAL:  ROM ACCEPTABLE  CNS:  AAO X 0-1    RADIOLOGY :    EXAM:  MR FOOT RT                            PROCEDURE DATE:  01/06/2021          INTERPRETATION:  Clinical indications: Right heel ulceration and eschar status post debridement. Concern for osteomyelitis.    Multiplanar multisequence MRI of the right foot was performed localized to the hindfoot.    Correlation is made with prior MRI from September 11, 2019.    FINDINGS:    There is a large wound along the plantar aspect of the calcaneus posteriorly which extends nearly extends to bone. There is surrounding soft tissue edema about this site with evidence of an overlying bandage. The degree of soft tissue deficiency is increased compared to the prior examination. There is extensive osseous edema and corresponding hypointense T1 marrow signalthroughout the calcaneus extending from the posterior plantar margin to the level of the angle of Gissane. This is progressed compared to the prior examination and consistent with osteomyelitis. Marrow signal within the remainder of the foot is otherwise preserved.    There is confluent edema tracking along the abductor hallucis and flexor digitorum brevis muscles and within the plantar subcutaneous fat subjacent to this region. In total this area measures approximately 2.2 cm in transverse dimension, approximately 4 cm in anteroposterior posterior dimension, and approximately 1.6 cm in craniocaudal dimension. Findings may be related to underlying phlegmon or abscess. Evaluation is limited by the lack of intravenous contrast. Distal to this site there is edema throughout the visualized musculature consistent with myositis.    Visualized tendinous structures remain intact. There is no acute ligamentous injury. Visualized joint spaces are preserved without joint effusion.    IMPRESSION:    Osteomyelitis involving the calcaneus which extends from the plantar posterior aspect of the calcaneus to the level of the angle of Gissane. This is progressed compared to the prior examination. This is seen in the setting of diffuse soft tissue deficiency along the posterior plantar aspect of the calcaneus.    Confluent edema adjacent to this region within the abductor hallucis and flexor digitorum brevis muscles and within the subjacent plantar subcutaneous fat. This may be related to abscess orphlegmon.    ASSESSMENT :     Osteomyelitis    Yes    COVID-19    Osteomyelitis    DM (diabetes mellitus)    Paranoid schizophrenia    HLD (hyperlipidemia)    PAD (peripheral artery disease)    HTN (hypertension)            PLAN:  HPI:  74 yo F from [facility], wheelchair bound with medical history significant of HTN, Diabetes, Osteoarthritis, Osteoporosis, Peripheral arterial disease, Diabetic neuropathy, HLD, Schizophrenia comes in with worsening ulceration to b/l heels. She has been having ulcers for past couple months, has been progressive. She was treated with IV antbx at NH but did not get better. It was worsening with necrotic changes and increasing foul smelling discharge. She denies fever, abdominal pain, chest pain, urinary symptoms, fall, trauma. No h/o nausea, vomiting, diarrhea, cough, dyspnea, sick contacts, recent illness.  (05 Jan 2021 14:24)    PLAN:    # ACUTE METABOLIC ENCEPHALOPATHY - NOTED THAT YESTERDAY PATIENT SPONTANEOUSLY AROSE TO VOICE AND FOLLOWED COMMANDS [WITH DECREASED STRENGTH IN ARMS AND LEGS], TODAY NOTED ACUTE DECLINE IN MENTAL STATUS - CODE STROKE WAS CALLED - PATIENT WAS EVALUATED BY TEAM AND CT HEAD WAS NEGATIVE  - ENCEPHALOPATHY THUS FAR WAS SUSPECTED S/T ACUTE INFECTION & HYPERNATREMIA [resolved] - REVIEWED CT HEAD  - NEUROLOGY CONSULT IN PROGRESS, NEUROLOGIST WAS MADE AWARE       # GIVEN RHONCHI - CXR WAS ORDERED AND NOTED TO HAVE BILATERAL PLEURAL EFFUSION W/ POSSIBLE INFILTRATE   - IVF DISCONTINUED  - LASIX GIVEN  - CURRENT ABX REGIMEN VANCOMYCIN + CEFEPIME + FLAGYL CONTINUED   - PRN SUCTIONING  - F/U REPEAT BCX; F/U SPUTUM CX    # ELEVATED TROPONIN - ECG REVIEWED AND CARDIOLOGY TEAM UPDATED BY NP - TREND TROPONINS, ON ASPIRIN, TRANSFERRED TO TELEMETRY    # ELEVATED BNP W/ PLEURAL EFFUSIONS - SUSPECT CHF - GIVEN IV LASIX, LANDIS PLACED FOR STRICT IS AND OS, MONITOR ON TELEMETRY, F/U ECHOCARDIOGRAM    # MULTIPLE ELECTROLYTE ABNORMALITIES - HYPERNATREMIA, HYPOKALEMIA, HYPOCALCEMIA, NONANION GAP METABOLIC ACIDOSIS [ SUSPECT LESS LIKELY STARVATION OR DIABETIC KETOACIDOSIS]  - REVIEWED ABG, REVIEWED ACETONE AND B-HYDROXYBUTYRATE, LACTIC ACID  - SUSPECTING RTA - CASE D/W NEPHROLOGIST DR. TODD - RECOMMENDED OBTAINING URINE LYTES AT THIS TIME  - MONITOR BMP, FINGER STICKS [ DOES NOT APPEAR TO BE ? EUGLYCEMIC DKA],    # ? REFEEDING SYNDROME - THOUGH PATIENT HAS NOT BEEN EATING CONSISTENTLY - WILL MONITOR AND REPLETE ELECTROLYTES    # BILATERAL LE CHRONIC ULCER NOW PROGRESSIVELY WORSENING W/ SSTI AND RIGHT CALCANEAL OSTEOMYELITIS W/ POSSIBLE ABSCESS; UNDERWENT BEDSIDE DEBRIDEMENT OF RIGHT LEG ULCER AND UNDERWENT RIGHT PARTIAL CALCANECTOMY ON 1/13 AND PICC LINE PLACEMENT 1/20  - CEFEPIME + VANCOMYCIN + FLAGYL, REVIEWED TIMUR, BCX - NGTD, WOUND CX - KOBE NORMAN & P. HERMINIOIS, ID CONSULT IN PROGRESS, PODIATRY CONSULT IN PROGRESS  - REVIEWED RIGHT LE MRI  -  PSYCHIATRY CONSULT FOR CAPACITY IN PROGRESS - DEEM PATIENT AS HAVING CAPACITY.  PATIENT WAS AGREEABLE FOR SURGICAL DEBRIDEMENT ON 1/09  - 2 PC CONSENT FOR SURGICAL DEBRIDEMENT PLANNED FOR 1/12; ATTEMPTED TO CALL NEXT OF [facility] @ [phone] HOWEVER PHONE NUMBER APPEARS DISCONNECTED  - VASCULAR SX CONSULT IN PROGRESS - PATIENT MAY REQUIRE AKA, BUT REFUSED ON PREVIOUS CONVERSATIONS - WAS AGREEABLE TO LOCAL DEBRIDEMENT/INTERVENTIONS  - PLAN FOR WOUND VAC  - PATIENT WILL NEED 6 WEEKS OF IV ANTIBIOTICS - WILL NEED TO SWITCH TO PICC LINE FROM EXTENDED DWELL - D/W FLOOR TEAM  # MEDICAL CLEARANCE FOR SURGERY - RCRI 1 POINT - 6% RISK OF DEATH, MI OR CARDIAC ARREST; CARDIOLOGY CONSULT IN PROGRESS FOR MEDICAL CLEARANCE  # NORMOCYTIC ANEMIA - SUSPECT S/T AOCD AND POOR NUTRITIONAL STATUS - F/U ANEMIA PANEL; PRBC TRANSFUSION GIVEN TODAY  # SACRAL AND RIGHT ISCHIAL UNSTAGEABLE ULCERS W/ SSTI  S/P DEBRIDEMENT 1/8- ON VANCOMYCIN AND CEFEPIME, SURGERY CONSULT IN PROGRESS -  PSYCHIATRY CONSULT FOR CAPACITY IN PROGRESS      # DEBILITY / ? MYOPATHY S/T ILLNESS - PT EVALUATION, OUT OF BED TO CHAIR  # HYPERNATREMIA SUSPECT S/T POOR PO INTAKE - RESOLVED W/ IVF  # POOR PO INTAKE - PATIENT ATE SOME LUNCH TODAY; ST EVAL NOTED  # LEFT ARM SWELLING - REVIEWED LEFT ARM U/S W/ OUT THROMBUS, WARM COMPRESSES  # ASHLEY - RESOLVED  # PAD  # HTN  # DM W/ DIABETIC NEUROPATHY  # OA/OP  # SCHIZOPHRENIA - HOLD MEDICATION; PLACED ON IV HALDOL PER PSYCHIATRY RECOMMENDATION  # CASE D/W PATIENT'S ? PARTNER - LISA KELLY - WHO REPORTS HE IS NOT NEXT OF KIN OR HCP - [phone] ON 1/12; HE UNDERSTOOD PATIENT'S PROGNOSIS WAS GUARDED  # ATTEMPTED TO CALL MARICHUY KELLY ON 1/24 BUT WAS NOT ABLE TO GET THROUGH - LEFT MESSAGE W/ STAFF OF [facility]IV[facility] ; ATTEMPTED TO CALL [facility] @ [phone] - PHONE APPEARS TO BE DISCONNECTED [1/24]  # MOLST FORM FROM 05/2020 REVIEWED - PATIENT REQUESTED DNR/DNI FOR GOC. CONFIRMED BY PCP IN 12/2020. CONFIRMED BY NP NAWRA ON 01/13 - PATIENT'S GOC IS DNR/DNI.  # GI AND DVT PPX    ELEN CASILLAS MD COVERING FARHAN CASILLAS MD   Patient is a 75y old  Female who presents with a chief complaint of foot ulcer (23 Jan 2021 14:24)    PATIENT IS SEEN AND EXAMINED IN MEDICAL FLOOR.    ALLERGIES:  No Known Allergies    VITALS:    Vital Signs Last 24 Hrs  T(C): 36.2 (24 Jan 2021 20:07), Max: 36.6 (24 Jan 2021 04:59)  T(F): 97.1 (24 Jan 2021 20:07), Max: 97.9 (24 Jan 2021 04:59)  HR: 97 (24 Jan 2021 20:07) (93 - 105)  BP: 108/61 (24 Jan 2021 20:07) (98/51 - 117/77)  BP(mean): --  RR: 18 (24 Jan 2021 20:07) (17 - 18)  SpO2: 100% (24 Jan 2021 20:07) (96% - 100%)    LABS:    CBC Full  -  ( 24 Jan 2021 14:48 )  WBC Count : 16.63 K/uL  RBC Count : 3.74 M/uL  Hemoglobin : 10.9 g/dL  Hematocrit : 32.9 %  Platelet Count - Automated : 179 K/uL  Mean Cell Volume : 88.0 fl  Mean Cell Hemoglobin : 29.1 pg  Mean Cell Hemoglobin Concentration : 33.1 gm/dL  Auto Neutrophil # : 13.97 K/uL  Auto Lymphocyte # : 0.67 K/uL  Auto Monocyte # : 0.83 K/uL  Auto Eosinophil # : 0.00 K/uL  Auto Basophil # : 0.00 K/uL  Auto Neutrophil % : 83.0 %  Auto Lymphocyte % : 4.0 %  Auto Monocyte % : 5.0 %  Auto Eosinophil % : 0.0 %  Auto Basophil % : 0.0 %    PT/INR - ( 24 Jan 2021 14:48 )   PT: 20.1 sec;   INR: 1.73 ratio         PTT - ( 24 Jan 2021 14:48 )  PTT:47.4 sec  01-24    150<H>  |  122<H>  |  17  ----------------------------<  173<H>  2.9<LL>   |  17<L>  |  0.85    Ca    7.7<L>      24 Jan 2021 21:23  Phos  2.8     01-24  Mg     1.8     01-24    TPro  4.2<L>  /  Alb  1.2<L>  /  TBili  0.3  /  DBili  x   /  AST  6<L>  /  ALT  <6<L>  /  AlkPhos  73  01-24    CAPILLARY BLOOD GLUCOSE      POCT Blood Glucose.: 158 mg/dL (24 Jan 2021 16:34)  POCT Blood Glucose.: 197 mg/dL (24 Jan 2021 14:15)  POCT Blood Glucose.: 196 mg/dL (24 Jan 2021 11:47)  POCT Blood Glucose.: 262 mg/dL (24 Jan 2021 07:59)    CARDIAC MARKERS ( 24 Jan 2021 21:23 )  0.865 ng/mL / x     / 21 U/L / x     / x      CARDIAC MARKERS ( 24 Jan 2021 16:19 )  0.944 ng/mL / x     / x     / x     / x      CARDIAC MARKERS ( 24 Jan 2021 14:48 )  1.040 ng/mL / x     / 22 U/L / x     / 4.8 ng/mL      LIVER FUNCTIONS - ( 24 Jan 2021 21:23 )  Alb: 1.2 g/dL / Pro: 4.2 g/dL / ALK PHOS: 73 U/L / ALT: <6 U/L DA / AST: 6 U/L / GGT: x           Creatinine Trend: 0.85<--, 0.87<--, 0.73<--, 0.76<--, 0.64<--, 0.51<--  I&O's Summary      ABG - ( 24 Jan 2021 15:44 )  pH, Arterial: 7.41  pH, Blood: x     /  pCO2: 25    /  pO2: 103   / HCO3: 15    / Base Excess: -7.5  /  SaO2: 98          .Tissue Right heel margin  01-13 @ 19:31   Rare Enterococcus faecalis  --  Enterococcus faecalis      .Surgical Swab Right heel wound culture  01-13 @ 19:22   Moderate Proteus mirabilis  Few Bacteroides vulgatus group "Susceptibilities not performed"  --  Proteus mirabilis      .Urine Clean Catch (Midstream)  01-06 @ 06:46   No growth  --  --      .Surgical Swab Right heel wound  01-05 @ 22:13   Few Morganella morganii  Few Proteus mirabilis  Moderate Corynebacterium species "Susceptibilities not performed"  Moderate Bacteroides vulgatus "Susceptibilities not performed"  --  Morganella morganii  Proteus mirabilis      .Blood Blood-Peripheral  01-05 @ 17:59   No Growth Final  --  --    MEDICATIONS:    MEDICATIONS  (STANDING):  aspirin enteric coated 81 milliGRAM(s) Oral daily  cefepime   IVPB 1000 milliGRAM(s) IV Intermittent every 8 hours  chlorhexidine 2% Cloths 1 Application(s) Topical daily  collagenase Ointment 1 Application(s) Topical daily  Dakins Solution - Full Strength 1 Application(s) Topical once  dextrose 40% Gel 15 Gram(s) Oral once  dextrose 50% Injectable 12.5 Gram(s) IV Push once  dextrose 50% Injectable 25 Gram(s) IV Push once  dextrose 50% Injectable 25 Gram(s) IV Push once  glucagon  Injectable 1 milliGRAM(s) IntraMuscular once  metroNIDAZOLE  IVPB 500 milliGRAM(s) IV Intermittent every 8 hours  pantoprazole  Injectable 40 milliGRAM(s) IV Push every 12 hours  potassium phosphate IVPB 30 milliMole(s) IV Intermittent once  simvastatin 40 milliGRAM(s) Oral at bedtime  sucralfate 1 Gram(s) Oral four times a day  vancomycin  IVPB 750 milliGRAM(s) IV Intermittent every 12 hours      MEDICATIONS  (PRN):  acetaminophen   Tablet .. 650 milliGRAM(s) Oral every 6 hours PRN Moderate Pain (4 - 6)  haloperidol    Injectable 0.5 milliGRAM(s) IV Push every 8 hours PRN Agitation  sodium chloride 0.9% lock flush 10 milliLiter(s) IV Push every 1 hour PRN Pre/post blood products, medications, blood draw, and to maintain line patency      REVIEW OF SYSTEMS:                           ALL ROS DONE [ X   ]    CONSTITUTIONAL:  LETHARGIC [   ], FEVER [   ], UNRESPONSIVE [   ]  CVS:  CP  [   ], SOB, [   ], PALPITATIONS [   ], DIZZYNESS [   ]  RS: COUGH [   ], SPUTUM [   ]  GI: ABDOMINAL PAIN [   ], NAUSEA [   ], VOMITINGS [   ], DIARRHEA [   ], CONSTIPATION [   ]  :  DYSURIA [   ], NOCTURIA [   ], INCREASED FREQUENCY [   ], DRIBLING [   ],  SKELETAL: PAINFUL JOINTS [   ], SWOLLEN JOINTS [   ], NECK ACHE [   ], LOW BACK ACHE [   ],  SKIN : ULCERS [   ], RASH [   ], ITCHING [   ]  CNS: HEAD ACHE [   ], DOUBLE VISION [   ], BLURRED VISION [   ], AMS / CONFUSION [   ], SEIZURES [   ], WEAKNESS [   ],TINGLING / NUMBNESS [   ]    PHYSICAL EXAMINATION:  GENERAL APPEARANCE: NO DISTRESS  HEENT:  NO PALLOR, NO  JVD,  NO   NODES, NECK SUPPLE  CVS: S1 +, S2 +,   RS: AEEB,  OCCASIONAL  RALES +,   RONCHI +  ABD: SOFT, NT, NO, BS +  EXT: NO PE  SKIN: WARM, BILATERAL HEEL ULCERS - COVERED, SACRAL ULCER + RIGHT ISCHIAL ULCER COVERED, PICC LINE+  SKELETAL:  ROM ACCEPTABLE  CNS:  AAO X 0-1 ; MINIMALLY RESPONSIVE TO STERNAL RUB / OROPHARYNGEAL SUCTIONING    RADIOLOGY :    EXAM:  MR FOOT RT                            PROCEDURE DATE:  01/06/2021          INTERPRETATION:  Clinical indications: Right heel ulceration and eschar status post debridement. Concern for osteomyelitis.    Multiplanar multisequence MRI of the right foot was performed localized to the hindfoot.    Correlation is made with prior MRI from September 11, 2019.    FINDINGS:    There is a large wound along the plantar aspect of the calcaneus posteriorly which extends nearly extends to bone. There is surrounding soft tissue edema about this site with evidence of an overlying bandage. The degree of soft tissue deficiency is increased compared to the prior examination. There is extensive osseous edema and corresponding hypointense T1 marrow signalthroughout the calcaneus extending from the posterior plantar margin to the level of the angle of Gissane. This is progressed compared to the prior examination and consistent with osteomyelitis. Marrow signal within the remainder of the foot is otherwise preserved.    There is confluent edema tracking along the abductor hallucis and flexor digitorum brevis muscles and within the plantar subcutaneous fat subjacent to this region. In total this area measures approximately 2.2 cm in transverse dimension, approximately 4 cm in anteroposterior posterior dimension, and approximately 1.6 cm in craniocaudal dimension. Findings may be related to underlying phlegmon or abscess. Evaluation is limited by the lack of intravenous contrast. Distal to this site there is edema throughout the visualized musculature consistent with myositis.    Visualized tendinous structures remain intact. There is no acute ligamentous injury. Visualized joint spaces are preserved without joint effusion.    IMPRESSION:    Osteomyelitis involving the calcaneus which extends from the plantar posterior aspect of the calcaneus to the level of the angle of Gissane. This is progressed compared to the prior examination. This is seen in the setting of diffuse soft tissue deficiency along the posterior plantar aspect of the calcaneus.    Confluent edema adjacent to this region within the abductor hallucis and flexor digitorum brevis muscles and within the subjacent plantar subcutaneous fat. This may be related to abscess orphlegmon.    ASSESSMENT :     Osteomyelitis    Yes    COVID-19    Osteomyelitis    DM (diabetes mellitus)    Paranoid schizophrenia    HLD (hyperlipidemia)    PAD (peripheral artery disease)    HTN (hypertension)            PLAN:  HPI:  74 yo F from [facility], wheelchair bound with medical history significant of HTN, Diabetes, Osteoarthritis, Osteoporosis, Peripheral arterial disease, Diabetic neuropathy, HLD, Schizophrenia comes in with worsening ulceration to b/l heels. She has been having ulcers for past couple months, has been progressive. She was treated with IV antbx at NH but did not get better. It was worsening with necrotic changes and increasing foul smelling discharge. She denies fever, abdominal pain, chest pain, urinary symptoms, fall, trauma. No h/o nausea, vomiting, diarrhea, cough, dyspnea, sick contacts, recent illness.  (05 Jan 2021 14:24)    PLAN:    # ACUTE METABOLIC ENCEPHALOPATHY - NOTED THAT YESTERDAY PATIENT SPONTANEOUSLY AROSE TO VOICE AND FOLLOWED COMMANDS [WITH DECREASED STRENGTH IN ARMS AND LEGS], TODAY NOTED ACUTE DECLINE IN MENTAL STATUS - CODE STROKE WAS CALLED - PATIENT WAS EVALUATED BY TEAM AND CT HEAD WAS NEGATIVE  - ENCEPHALOPATHY THUS FAR WAS SUSPECTED S/T ACUTE INFECTION & HYPERNATREMIA [resolved] - REVIEWED CT HEAD  - NEUROLOGY CONSULT IN PROGRESS, NEUROLOGIST WAS MADE AWARE       # GIVEN RHONCHI - CXR WAS ORDERED AND NOTED TO HAVE BILATERAL PLEURAL EFFUSION W/ POSSIBLE INFILTRATE   - IVF DISCONTINUED  - LASIX GIVEN  - CURRENT ABX REGIMEN VANCOMYCIN + CEFEPIME + FLAGYL CONTINUED   - PRN SUCTIONING  - F/U REPEAT BCX; F/U SPUTUM CX    # ELEVATED TROPONIN - ECG REVIEWED AND CARDIOLOGY TEAM UPDATED BY NP - TREND TROPONINS, ON ASPIRIN, TRANSFERRED TO TELEMETRY    # ELEVATED BNP W/ PLEURAL EFFUSIONS - SUSPECT CHF - GIVEN IV LASIX, LANDIS PLACED FOR STRICT IS AND OS, MONITOR ON TELEMETRY, F/U ECHOCARDIOGRAM    # MULTIPLE ELECTROLYTE ABNORMALITIES - HYPERNATREMIA, HYPOKALEMIA, HYPOCALCEMIA, NONANION GAP METABOLIC ACIDOSIS [ SUSPECT LESS LIKELY STARVATION OR DIABETIC KETOACIDOSIS]  - REVIEWED ABG, REVIEWED ACETONE AND B-HYDROXYBUTYRATE, LACTIC ACID  - SUSPECTING RTA - CASE D/W NEPHROLOGIST DR. TODD - RECOMMENDED OBTAINING URINE LYTES AT THIS TIME  - MONITOR BMP, FINGER STICKS [ DOES NOT APPEAR TO BE ? EUGLYCEMIC DKA],    # ? REFEEDING SYNDROME - THOUGH PATIENT HAS NOT BEEN EATING CONSISTENTLY - WILL MONITOR AND REPLETE ELECTROLYTES    # BILATERAL LE CHRONIC ULCER NOW PROGRESSIVELY WORSENING W/ SSTI AND RIGHT CALCANEAL OSTEOMYELITIS W/ POSSIBLE ABSCESS; UNDERWENT BEDSIDE DEBRIDEMENT OF RIGHT LEG ULCER AND UNDERWENT RIGHT PARTIAL CALCANECTOMY ON 1/13 AND PICC LINE PLACEMENT 1/20  - CEFEPIME + VANCOMYCIN + FLAGYL, REVIEWED TIMUR, BCX - NGTD, WOUND CX - MORGANMAYRA MORGANI & P. MIRABILIS, ID CONSULT IN PROGRESS, PODIATRY CONSULT IN PROGRESS  - REVIEWED RIGHT LE MRI  -  PSYCHIATRY CONSULT FOR CAPACITY IN PROGRESS - DEEM PATIENT AS HAVING CAPACITY.  PATIENT WAS AGREEABLE FOR SURGICAL DEBRIDEMENT ON 1/09  - 2 PC CONSENT FOR SURGICAL DEBRIDEMENT PLANNED FOR 1/12; ATTEMPTED TO CALL NEXT OF [facility] @ [phone] HOWEVER PHONE NUMBER APPEARS DISCONNECTED  - VASCULAR SX CONSULT IN PROGRESS - PATIENT MAY REQUIRE AKA, BUT REFUSED ON PREVIOUS CONVERSATIONS - WAS AGREEABLE TO LOCAL DEBRIDEMENT/INTERVENTIONS  - PLAN FOR WOUND VAC  - PATIENT WILL NEED 6 WEEKS OF IV ANTIBIOTICS - WILL NEED TO SWITCH TO PICC LINE FROM EXTENDED DWELL - D/W FLOOR TEAM  # MEDICAL CLEARANCE FOR SURGERY - RCRI 1 POINT - 6% RISK OF DEATH, MI OR CARDIAC ARREST; CARDIOLOGY CONSULT IN PROGRESS FOR MEDICAL CLEARANCE  # NORMOCYTIC ANEMIA - SUSPECT S/T AOCD AND POOR NUTRITIONAL STATUS - F/U ANEMIA PANEL; PRBC TRANSFUSION GIVEN TODAY  # SACRAL AND RIGHT ISCHIAL UNSTAGEABLE ULCERS W/ SSTI  S/P DEBRIDEMENT 1/8- ON VANCOMYCIN AND CEFEPIME, SURGERY CONSULT IN PROGRESS -  PSYCHIATRY CONSULT FOR CAPACITY IN PROGRESS      # DEBILITY / ? MYOPATHY S/T ILLNESS - PT EVALUATION, OUT OF BED TO CHAIR  # HYPERNATREMIA SUSPECT S/T POOR PO INTAKE - RESOLVED W/ IVF  # POOR PO INTAKE - PATIENT ATE SOME LUNCH TODAY; ST EVAL NOTED  # LEFT ARM SWELLING - REVIEWED LEFT ARM U/S W/ OUT THROMBUS, WARM COMPRESSES  # ASHLEY - RESOLVED  # PAD  # HTN  # DM W/ DIABETIC NEUROPATHY  # OA/OP  # SCHIZOPHRENIA - HOLD MEDICATION; PLACED ON IV HALDOL PER PSYCHIATRY RECOMMENDATION  # CASE D/W PATIENT'S ? PARTNER - LISA KELLY - WHO REPORTS HE IS NOT NEXT OF KIN OR HCP - [phone] ON 1/12; HE UNDERSTOOD PATIENT'S PROGNOSIS WAS GUARDED  # ATTEMPTED TO CALL [facility] ON 1/24 BUT WAS NOT ABLE TO GET THROUGH - LEFT MESSAGE W/ STAFF OF ASSISTED LIVING ; ATTEMPTED TO CALL [facility] [place] @ [phone] - PHONE APPEARS TO BE DISCONNECTED [1/24]  # MOLST FORM FROM 05/2020 REVIEWED - PATIENT REQUESTED DNR/DNI FOR GOC. CONFIRMED BY PCP IN 12/2020. CONFIRMED BY NP NAWRA ON 01/13 - PATIENT'S GOC IS DNR/DNI.  # GI AND DVT PPX    ELEN CASILLAS MD COVERING FARHAN CASILLAS MD   Patient is a 75y old  Female who presents with a chief complaint of foot ulcer (23 Jan 2021 14:24)    PATIENT IS SEEN AND EXAMINED IN MEDICAL FLOOR.    ALLERGIES:  No Known Allergies    VITALS:    Vital Signs Last 24 Hrs  T(C): 36.2 (24 Jan 2021 20:07), Max: 36.6 (24 Jan 2021 04:59)  T(F): 97.1 (24 Jan 2021 20:07), Max: 97.9 (24 Jan 2021 04:59)  HR: 97 (24 Jan 2021 20:07) (93 - 105)  BP: 108/61 (24 Jan 2021 20:07) (98/51 - 117/77)  BP(mean): --  RR: 18 (24 Jan 2021 20:07) (17 - 18)  SpO2: 100% (24 Jan 2021 20:07) (96% - 100%)    LABS:    CBC Full  -  ( 24 Jan 2021 14:48 )  WBC Count : 16.63 K/uL  RBC Count : 3.74 M/uL  Hemoglobin : 10.9 g/dL  Hematocrit : 32.9 %  Platelet Count - Automated : 179 K/uL  Mean Cell Volume : 88.0 fl  Mean Cell Hemoglobin : 29.1 pg  Mean Cell Hemoglobin Concentration : 33.1 gm/dL  Auto Neutrophil # : 13.97 K/uL  Auto Lymphocyte # : 0.67 K/uL  Auto Monocyte # : 0.83 K/uL  Auto Eosinophil # : 0.00 K/uL  Auto Basophil # : 0.00 K/uL  Auto Neutrophil % : 83.0 %  Auto Lymphocyte % : 4.0 %  Auto Monocyte % : 5.0 %  Auto Eosinophil % : 0.0 %  Auto Basophil % : 0.0 %    PT/INR - ( 24 Jan 2021 14:48 )   PT: 20.1 sec;   INR: 1.73 ratio         PTT - ( 24 Jan 2021 14:48 )  PTT:47.4 sec  01-24    150<H>  |  122<H>  |  17  ----------------------------<  173<H>  2.9<LL>   |  17<L>  |  0.85    Ca    7.7<L>      24 Jan 2021 21:23  Phos  2.8     01-24  Mg     1.8     01-24    TPro  4.2<L>  /  Alb  1.2<L>  /  TBili  0.3  /  DBili  x   /  AST  6<L>  /  ALT  <6<L>  /  AlkPhos  73  01-24    CAPILLARY BLOOD GLUCOSE      POCT Blood Glucose.: 158 mg/dL (24 Jan 2021 16:34)  POCT Blood Glucose.: 197 mg/dL (24 Jan 2021 14:15)  POCT Blood Glucose.: 196 mg/dL (24 Jan 2021 11:47)  POCT Blood Glucose.: 262 mg/dL (24 Jan 2021 07:59)    CARDIAC MARKERS ( 24 Jan 2021 21:23 )  0.865 ng/mL / x     / 21 U/L / x     / x      CARDIAC MARKERS ( 24 Jan 2021 16:19 )  0.944 ng/mL / x     / x     / x     / x      CARDIAC MARKERS ( 24 Jan 2021 14:48 )  1.040 ng/mL / x     / 22 U/L / x     / 4.8 ng/mL      LIVER FUNCTIONS - ( 24 Jan 2021 21:23 )  Alb: 1.2 g/dL / Pro: 4.2 g/dL / ALK PHOS: 73 U/L / ALT: <6 U/L DA / AST: 6 U/L / GGT: x           Creatinine Trend: 0.85<--, 0.87<--, 0.73<--, 0.76<--, 0.64<--, 0.51<--  I&O's Summary      ABG - ( 24 Jan 2021 15:44 )  pH, Arterial: 7.41  pH, Blood: x     /  pCO2: 25    /  pO2: 103   / HCO3: 15    / Base Excess: -7.5  /  SaO2: 98          .Tissue Right heel margin  01-13 @ 19:31   Rare Enterococcus faecalis  --  Enterococcus faecalis      .Surgical Swab Right heel wound culture  01-13 @ 19:22   Moderate Proteus mirabilis  Few Bacteroides vulgatus group "Susceptibilities not performed"  --  Proteus mirabilis      .Urine Clean Catch (Midstream)  01-06 @ 06:46   No growth  --  --      .Surgical Swab Right heel wound  01-05 @ 22:13   Few Morganella morganii  Few Proteus mirabilis  Moderate Corynebacterium species "Susceptibilities not performed"  Moderate Bacteroides vulgatus "Susceptibilities not performed"  --  Morganella morganii  Proteus mirabilis      .Blood Blood-Peripheral  01-05 @ 17:59   No Growth Final  --  --    MEDICATIONS:    MEDICATIONS  (STANDING):  aspirin enteric coated 81 milliGRAM(s) Oral daily  cefepime   IVPB 1000 milliGRAM(s) IV Intermittent every 8 hours  chlorhexidine 2% Cloths 1 Application(s) Topical daily  collagenase Ointment 1 Application(s) Topical daily  Dakins Solution - Full Strength 1 Application(s) Topical once  dextrose 40% Gel 15 Gram(s) Oral once  dextrose 50% Injectable 12.5 Gram(s) IV Push once  dextrose 50% Injectable 25 Gram(s) IV Push once  dextrose 50% Injectable 25 Gram(s) IV Push once  glucagon  Injectable 1 milliGRAM(s) IntraMuscular once  metroNIDAZOLE  IVPB 500 milliGRAM(s) IV Intermittent every 8 hours  pantoprazole  Injectable 40 milliGRAM(s) IV Push every 12 hours  potassium phosphate IVPB 30 milliMole(s) IV Intermittent once  simvastatin 40 milliGRAM(s) Oral at bedtime  sucralfate 1 Gram(s) Oral four times a day  vancomycin  IVPB 750 milliGRAM(s) IV Intermittent every 12 hours      MEDICATIONS  (PRN):  acetaminophen   Tablet .. 650 milliGRAM(s) Oral every 6 hours PRN Moderate Pain (4 - 6)  haloperidol    Injectable 0.5 milliGRAM(s) IV Push every 8 hours PRN Agitation  sodium chloride 0.9% lock flush 10 milliLiter(s) IV Push every 1 hour PRN Pre/post blood products, medications, blood draw, and to maintain line patency      REVIEW OF SYSTEMS:                           ALL ROS DONE [ X   ]    CONSTITUTIONAL:  LETHARGIC [   ], FEVER [   ], UNRESPONSIVE [   ]  CVS:  CP  [   ], SOB, [   ], PALPITATIONS [   ], DIZZYNESS [   ]  RS: COUGH [   ], SPUTUM [   ]  GI: ABDOMINAL PAIN [   ], NAUSEA [   ], VOMITINGS [   ], DIARRHEA [   ], CONSTIPATION [   ]  :  DYSURIA [   ], NOCTURIA [   ], INCREASED FREQUENCY [   ], DRIBLING [   ],  SKELETAL: PAINFUL JOINTS [   ], SWOLLEN JOINTS [   ], NECK ACHE [   ], LOW BACK ACHE [   ],  SKIN : ULCERS [   ], RASH [   ], ITCHING [   ]  CNS: HEAD ACHE [   ], DOUBLE VISION [   ], BLURRED VISION [   ], AMS / CONFUSION [   ], SEIZURES [   ], WEAKNESS [   ],TINGLING / NUMBNESS [   ]    PHYSICAL EXAMINATION:  GENERAL APPEARANCE: NO DISTRESS  HEENT:  NO PALLOR, NO  JVD,  NO   NODES, NECK SUPPLE  CVS: S1 +, S2 +,   RS: AEEB,  OCCASIONAL  RALES +,   RONCHI +  ABD: SOFT, NT, NO, BS +  EXT: NO PE  SKIN: WARM, BILATERAL HEEL ULCERS - COVERED, SACRAL ULCER + RIGHT ISCHIAL ULCER COVERED, PICC LINE+  SKELETAL:  ROM ACCEPTABLE  CNS:  AAO X 0-1 ; MINIMALLY RESPONSIVE TO STERNAL RUB / OROPHARYNGEAL SUCTIONING    RADIOLOGY :    EXAM:  MR FOOT RT                            PROCEDURE DATE:  01/06/2021          INTERPRETATION:  Clinical indications: Right heel ulceration and eschar status post debridement. Concern for osteomyelitis.    Multiplanar multisequence MRI of the right foot was performed localized to the hindfoot.    Correlation is made with prior MRI from September 11, 2019.    FINDINGS:    There is a large wound along the plantar aspect of the calcaneus posteriorly which extends nearly extends to bone. There is surrounding soft tissue edema about this site with evidence of an overlying bandage. The degree of soft tissue deficiency is increased compared to the prior examination. There is extensive osseous edema and corresponding hypointense T1 marrow signalthroughout the calcaneus extending from the posterior plantar margin to the level of the angle of Gissane. This is progressed compared to the prior examination and consistent with osteomyelitis. Marrow signal within the remainder of the foot is otherwise preserved.    There is confluent edema tracking along the abductor hallucis and flexor digitorum brevis muscles and within the plantar subcutaneous fat subjacent to this region. In total this area measures approximately 2.2 cm in transverse dimension, approximately 4 cm in anteroposterior posterior dimension, and approximately 1.6 cm in craniocaudal dimension. Findings may be related to underlying phlegmon or abscess. Evaluation is limited by the lack of intravenous contrast. Distal to this site there is edema throughout the visualized musculature consistent with myositis.    Visualized tendinous structures remain intact. There is no acute ligamentous injury. Visualized joint spaces are preserved without joint effusion.    IMPRESSION:    Osteomyelitis involving the calcaneus which extends from the plantar posterior aspect of the calcaneus to the level of the angle of Gissane. This is progressed compared to the prior examination. This is seen in the setting of diffuse soft tissue deficiency along the posterior plantar aspect of the calcaneus.    Confluent edema adjacent to this region within the abductor hallucis and flexor digitorum brevis muscles and within the subjacent plantar subcutaneous fat. This may be related to abscess orphlegmon.    ASSESSMENT :     Osteomyelitis    Yes    COVID-19    Osteomyelitis    DM (diabetes mellitus)    Paranoid schizophrenia    HLD (hyperlipidemia)    PAD (peripheral artery disease)    HTN (hypertension)            PLAN:  HPI:  74 yo F from [facility], wheelchair bound with medical history significant of HTN, Diabetes, Osteoarthritis, Osteoporosis, Peripheral arterial disease, Diabetic neuropathy, HLD, Schizophrenia comes in with worsening ulceration to b/l heels. She has been having ulcers for past couple months, has been progressive. She was treated with IV antbx at NH but did not get better. It was worsening with necrotic changes and increasing foul smelling discharge. She denies fever, abdominal pain, chest pain, urinary symptoms, fall, trauma. No h/o nausea, vomiting, diarrhea, cough, dyspnea, sick contacts, recent illness.  (05 Jan 2021 14:24)    PLAN:    # ACUTE METABOLIC ENCEPHALOPATHY - NOTED THAT YESTERDAY PATIENT SPONTANEOUSLY AROSE TO VOICE AND FOLLOWED COMMANDS [WITH DECREASED STRENGTH IN ARMS AND LEGS], TODAY NOTED ACUTE DECLINE IN MENTAL STATUS AND STAFF REPORTED RIGHT SIDED FACIAL DROOP - CODE STROKE WAS CALLED - PATIENT WAS EVALUATED BY TEAM AND CT HEAD WAS NEGATIVE  - ENCEPHALOPATHY THUS FAR WAS SUSPECTED S/T ACUTE INFECTION & HYPERNATREMIA [resolved] - REVIEWED CT HEAD  - NEUROLOGY CONSULT IN PROGRESS, NEUROLOGIST WAS MADE AWARE       # GIVEN RHONCHI - CXR WAS ORDERED AND NOTED TO HAVE BILATERAL PLEURAL EFFUSION W/ POSSIBLE INFILTRATE   - IVF DISCONTINUED  - LASIX GIVEN  - CURRENT ABX REGIMEN VANCOMYCIN + CEFEPIME + FLAGYL CONTINUED   - PRN SUCTIONING  - F/U REPEAT BCX; F/U SPUTUM CX    # ELEVATED TROPONIN - ECG REVIEWED AND CARDIOLOGY TEAM UPDATED BY NP - TREND TROPONINS, ON ASPIRIN, TRANSFERRED TO TELEMETRY    # ELEVATED BNP W/ PLEURAL EFFUSIONS - SUSPECT CHF - GIVEN IV LASIX, LANDIS PLACED FOR STRICT IS AND OS, MONITOR ON TELEMETRY, F/U ECHOCARDIOGRAM    # MULTIPLE ELECTROLYTE ABNORMALITIES - HYPERNATREMIA, HYPOKALEMIA, HYPOCALCEMIA, NONANION GAP METABOLIC ACIDOSIS [ SUSPECT LESS LIKELY STARVATION OR DIABETIC KETOACIDOSIS]  - REVIEWED ABG, REVIEWED ACETONE AND B-HYDROXYBUTYRATE, LACTIC ACID  - SUSPECTING RTA - CASE D/W NEPHROLOGIST DR. TODD - RECOMMENDED OBTAINING URINE LYTES AT THIS TIME  - MONITOR BMP, FINGER STICKS [ DOES NOT APPEAR TO BE ? EUGLYCEMIC DKA],    # ? REFEEDING SYNDROME - THOUGH PATIENT HAS NOT BEEN EATING CONSISTENTLY - WILL MONITOR AND REPLETE ELECTROLYTES    # BILATERAL LE CHRONIC ULCER NOW PROGRESSIVELY WORSENING W/ SSTI AND RIGHT CALCANEAL OSTEOMYELITIS W/ POSSIBLE ABSCESS; UNDERWENT BEDSIDE DEBRIDEMENT OF RIGHT LEG ULCER AND UNDERWENT RIGHT PARTIAL CALCANECTOMY ON 1/13 AND PICC LINE PLACEMENT 1/20  - CEFEPIME + VANCOMYCIN + FLAGYL, REVIEWED TIMUR, BCX - NGTD, WOUND CX - KOBE NORMAN & P. MIRABILIS, ID CONSULT IN PROGRESS, PODIATRY CONSULT IN PROGRESS  - REVIEWED RIGHT LE MRI  -  PSYCHIATRY CONSULT FOR CAPACITY IN PROGRESS - DEEM PATIENT AS HAVING CAPACITY.  PATIENT WAS AGREEABLE FOR SURGICAL DEBRIDEMENT ON 1/09  - 2 PC CONSENT FOR SURGICAL DEBRIDEMENT PLANNED FOR 1/12; ATTEMPTED TO CALL NEXT OF [facility] @ [phone] HOWEVER PHONE NUMBER APPEARS DISCONNECTED  - VASCULAR SX CONSULT IN PROGRESS - PATIENT MAY REQUIRE AKA, BUT REFUSED ON PREVIOUS CONVERSATIONS - WAS AGREEABLE TO LOCAL DEBRIDEMENT/INTERVENTIONS  - PLAN FOR WOUND VAC  - PATIENT WILL NEED 6 WEEKS OF IV ANTIBIOTICS - WILL NEED TO SWITCH TO PICC LINE FROM EXTENDED DWELL - D/W FLOOR TEAM  # MEDICAL CLEARANCE FOR SURGERY - RCRI 1 POINT - 6% RISK OF DEATH, MI OR CARDIAC ARREST; CARDIOLOGY CONSULT IN PROGRESS FOR MEDICAL CLEARANCE  # NORMOCYTIC ANEMIA - SUSPECT S/T AOCD AND POOR NUTRITIONAL STATUS - F/U ANEMIA PANEL; PRBC TRANSFUSION GIVEN TODAY  # SACRAL AND RIGHT ISCHIAL UNSTAGEABLE ULCERS W/ SSTI  S/P DEBRIDEMENT 1/8- ON VANCOMYCIN AND CEFEPIME, SURGERY CONSULT IN PROGRESS -  PSYCHIATRY CONSULT FOR CAPACITY IN PROGRESS      # DEBILITY / ? MYOPATHY S/T ILLNESS - PT EVALUATION, OUT OF BED TO CHAIR  # HYPERNATREMIA SUSPECT S/T POOR PO INTAKE - RESOLVED W/ IVF  # POOR PO INTAKE - PATIENT ATE SOME LUNCH TODAY; ST HANHAL NOTED  # LEFT ARM SWELLING - REVIEWED LEFT ARM U/S W/ OUT THROMBUS, WARM COMPRESSES  # ASHLEY - RESOLVED  # PAD  # HTN  # DM W/ DIABETIC NEUROPATHY  # OA/OP  # SCHIZOPHRENIA - HOLD MEDICATION; PLACED ON IV HALDOL PER PSYCHIATRY RECOMMENDATION  # CASE D/W PATIENT'S ? PARTNER - LISA KELLY - WHO REPORTS HE IS NOT NEXT OF KIN OR HCP - [phone] ON 1/12; HE UNDERSTOOD PATIENT'S PROGNOSIS WAS GUARDED  # ATTEMPTED TO CALL [facility] KE[facility] ON 1/24 BUT WAS NOT ABLE TO GET THROUGH - LEFT MESSAGE W/ STAFF OF ASSISTED LIVING ; ATTEMPTED TO CALL [facility] @ [phone] - PHONE APPEARS TO BE DISCONNECTED [1/24]  # MOLST FORM FROM 05/2020 REVIEWED - PATIENT REQUESTED DNR/DNI FOR GOC. CONFIRMED BY PCP IN 12/2020. CONFIRMED BY NP NAWRA ON 01/13 - PATIENT'S GOC IS DNR/DNI. PALLIATIVE CARE CONSULT PLACED. NOTED PROGNOSIS IS GUARDED  # GI AND DVT PPX    ELEN CASILLAS MD COVERING FARHAN CASILLAS MD

## 2021-01-24 NOTE — CHART NOTE - NSCHARTNOTEFT_GEN_A_CORE
Code stroke was called as patient has acute encephalopathy. NP also notice patient has facial droop on right side. Code team arrived and patient was vitally stable.  ICU Vital Signs Last 24 Hrs  T(C): 36.1 (24 Jan 2021 14:22), Max: 36.6 (24 Jan 2021 04:59)  T(F): 97 (24 Jan 2021 14:22), Max: 97.9 (24 Jan 2021 04:59)  HR: 105 (24 Jan 2021 14:22) (93 - 105)  BP: 117/77 (24 Jan 2021 14:22) (98/51 - 120/60)  BP(mean): --  ABP: --  ABP(mean): --  RR: 17 (24 Jan 2021 14:22) (17 - 18)  SpO2: 100% (24 Jan 2021 14:22) (85% - 100%)  BSL:189 mg/dl.  Patient pupils are reactive bilaterally. Patient noticed to have some facial droop on right side. Ordered Stat CT head to rule out any bleeding.  Recommend starting aspirin ans statin.   Recommend getting speech and swallow evaluation.  Neurological checks.  PT consult.

## 2021-01-25 DIAGNOSIS — E87.8 OTHER DISORDERS OF ELECTROLYTE AND FLUID BALANCE, NOT ELSEWHERE CLASSIFIED: ICD-10-CM

## 2021-01-25 DIAGNOSIS — R41.82 ALTERED MENTAL STATUS, UNSPECIFIED: ICD-10-CM

## 2021-01-25 LAB
ALBUMIN SERPL ELPH-MCNC: 1.2 G/DL — LOW (ref 3.5–5)
ALP SERPL-CCNC: 82 U/L — SIGNIFICANT CHANGE UP (ref 40–120)
ALT FLD-CCNC: 7 U/L DA — LOW (ref 10–60)
ANION GAP SERPL CALC-SCNC: 11 MMOL/L — SIGNIFICANT CHANGE UP (ref 5–17)
ANION GAP SERPL CALC-SCNC: 12 MMOL/L — SIGNIFICANT CHANGE UP (ref 5–17)
AST SERPL-CCNC: 7 U/L — LOW (ref 10–40)
BASOPHILS # BLD AUTO: 0.04 K/UL — SIGNIFICANT CHANGE UP (ref 0–0.2)
BASOPHILS NFR BLD AUTO: 0.3 % — SIGNIFICANT CHANGE UP (ref 0–2)
BILIRUB SERPL-MCNC: 0.3 MG/DL — SIGNIFICANT CHANGE UP (ref 0.2–1.2)
BUN SERPL-MCNC: 17 MG/DL — SIGNIFICANT CHANGE UP (ref 7–18)
BUN SERPL-MCNC: 17 MG/DL — SIGNIFICANT CHANGE UP (ref 7–18)
CALCIUM SERPL-MCNC: 7.2 MG/DL — LOW (ref 8.4–10.5)
CALCIUM SERPL-MCNC: 7.7 MG/DL — LOW (ref 8.4–10.5)
CHLORIDE SERPL-SCNC: 121 MMOL/L — HIGH (ref 96–108)
CHLORIDE SERPL-SCNC: 124 MMOL/L — HIGH (ref 96–108)
CK SERPL-CCNC: 37 U/L — SIGNIFICANT CHANGE UP (ref 21–215)
CO2 SERPL-SCNC: 15 MMOL/L — LOW (ref 22–31)
CO2 SERPL-SCNC: 18 MMOL/L — LOW (ref 22–31)
CREAT SERPL-MCNC: 0.78 MG/DL — SIGNIFICANT CHANGE UP (ref 0.5–1.3)
CREAT SERPL-MCNC: 0.8 MG/DL — SIGNIFICANT CHANGE UP (ref 0.5–1.3)
EOSINOPHIL # BLD AUTO: 0.02 K/UL — SIGNIFICANT CHANGE UP (ref 0–0.5)
EOSINOPHIL NFR BLD AUTO: 0.1 % — SIGNIFICANT CHANGE UP (ref 0–6)
GLUCOSE BLDC GLUCOMTR-MCNC: 116 MG/DL — HIGH (ref 70–99)
GLUCOSE BLDC GLUCOMTR-MCNC: 136 MG/DL — HIGH (ref 70–99)
GLUCOSE SERPL-MCNC: 139 MG/DL — HIGH (ref 70–99)
GLUCOSE SERPL-MCNC: 166 MG/DL — HIGH (ref 70–99)
HCT VFR BLD CALC: 31.9 % — LOW (ref 34.5–45)
HGB BLD-MCNC: 10.6 G/DL — LOW (ref 11.5–15.5)
IMM GRANULOCYTES NFR BLD AUTO: 1.7 % — HIGH (ref 0–1.5)
LYMPHOCYTES # BLD AUTO: 1.13 K/UL — SIGNIFICANT CHANGE UP (ref 1–3.3)
LYMPHOCYTES # BLD AUTO: 7.1 % — LOW (ref 13–44)
MAGNESIUM SERPL-MCNC: 1.7 MG/DL — SIGNIFICANT CHANGE UP (ref 1.6–2.6)
MAGNESIUM SERPL-MCNC: 1.9 MG/DL — SIGNIFICANT CHANGE UP (ref 1.6–2.6)
MAGNESIUM UR-MCNC: 3.4 MG/DL — SIGNIFICANT CHANGE UP
MCHC RBC-ENTMCNC: 29.8 PG — SIGNIFICANT CHANGE UP (ref 27–34)
MCHC RBC-ENTMCNC: 33.2 GM/DL — SIGNIFICANT CHANGE UP (ref 32–36)
MCV RBC AUTO: 89.6 FL — SIGNIFICANT CHANGE UP (ref 80–100)
MONOCYTES # BLD AUTO: 1.17 K/UL — HIGH (ref 0–0.9)
MONOCYTES NFR BLD AUTO: 7.4 % — SIGNIFICANT CHANGE UP (ref 2–14)
NEUTROPHILS # BLD AUTO: 13.22 K/UL — HIGH (ref 1.8–7.4)
NEUTROPHILS NFR BLD AUTO: 83.4 % — HIGH (ref 43–77)
NRBC # BLD: 0 /100 WBCS — SIGNIFICANT CHANGE UP (ref 0–0)
PH UR: 5.5 — SIGNIFICANT CHANGE UP (ref 5–8)
PHOSPHATE 24H UR-MCNC: 32 MG/DL — SIGNIFICANT CHANGE UP
PHOSPHATE SERPL-MCNC: 3.8 MG/DL — SIGNIFICANT CHANGE UP (ref 2.5–4.5)
PHOSPHATE SERPL-MCNC: 5.6 MG/DL — HIGH (ref 2.5–4.5)
PLATELET # BLD AUTO: 153 K/UL — SIGNIFICANT CHANGE UP (ref 150–400)
POTASSIUM SERPL-MCNC: 3.2 MMOL/L — LOW (ref 3.5–5.3)
POTASSIUM SERPL-MCNC: 4 MMOL/L — SIGNIFICANT CHANGE UP (ref 3.5–5.3)
POTASSIUM SERPL-SCNC: 3.2 MMOL/L — LOW (ref 3.5–5.3)
POTASSIUM SERPL-SCNC: 4 MMOL/L — SIGNIFICANT CHANGE UP (ref 3.5–5.3)
PROT SERPL-MCNC: 4.2 G/DL — LOW (ref 6–8.3)
RBC # BLD: 3.56 M/UL — LOW (ref 3.8–5.2)
RBC # FLD: 17.4 % — HIGH (ref 10.3–14.5)
SODIUM SERPL-SCNC: 150 MMOL/L — HIGH (ref 135–145)
SODIUM SERPL-SCNC: 151 MMOL/L — HIGH (ref 135–145)
TROPONIN I SERPL-MCNC: 0.44 NG/ML — HIGH (ref 0–0.04)
URATE UR-MCNC: 9.7 MG/DL — SIGNIFICANT CHANGE UP
UUN UR-MCNC: 178 MG/DL — SIGNIFICANT CHANGE UP
WBC # BLD: 15.85 K/UL — HIGH (ref 3.8–10.5)
WBC # FLD AUTO: 15.85 K/UL — HIGH (ref 3.8–10.5)

## 2021-01-25 PROCEDURE — 99232 SBSQ HOSP IP/OBS MODERATE 35: CPT

## 2021-01-25 PROCEDURE — 99233 SBSQ HOSP IP/OBS HIGH 50: CPT

## 2021-01-25 RX ORDER — AMPICILLIN SODIUM AND SULBACTAM SODIUM 250; 125 MG/ML; MG/ML
3 INJECTION, POWDER, FOR SUSPENSION INTRAMUSCULAR; INTRAVENOUS EVERY 6 HOURS
Refills: 0 | Status: DISCONTINUED | OUTPATIENT
Start: 2021-01-25 | End: 2021-01-26

## 2021-01-25 RX ORDER — AMPICILLIN SODIUM AND SULBACTAM SODIUM 250; 125 MG/ML; MG/ML
3 INJECTION, POWDER, FOR SUSPENSION INTRAMUSCULAR; INTRAVENOUS ONCE
Refills: 0 | Status: COMPLETED | OUTPATIENT
Start: 2021-01-25 | End: 2021-01-25

## 2021-01-25 RX ORDER — MAGNESIUM SULFATE 500 MG/ML
2 VIAL (ML) INJECTION ONCE
Refills: 0 | Status: COMPLETED | OUTPATIENT
Start: 2021-01-25 | End: 2021-01-25

## 2021-01-25 RX ORDER — METHYLPHENIDATE HCL 5 MG
5 TABLET ORAL DAILY
Refills: 0 | Status: DISCONTINUED | OUTPATIENT
Start: 2021-01-25 | End: 2021-01-28

## 2021-01-25 RX ORDER — AMPICILLIN SODIUM AND SULBACTAM SODIUM 250; 125 MG/ML; MG/ML
INJECTION, POWDER, FOR SUSPENSION INTRAMUSCULAR; INTRAVENOUS
Refills: 0 | Status: DISCONTINUED | OUTPATIENT
Start: 2021-01-25 | End: 2021-01-26

## 2021-01-25 RX ADMIN — PANTOPRAZOLE SODIUM 40 MILLIGRAM(S): 20 TABLET, DELAYED RELEASE ORAL at 17:28

## 2021-01-25 RX ADMIN — Medication 100 MILLIGRAM(S): at 06:49

## 2021-01-25 RX ADMIN — Medication 50 GRAM(S): at 09:58

## 2021-01-25 RX ADMIN — POTASSIUM PHOSPHATE, MONOBASIC POTASSIUM PHOSPHATE, DIBASIC 83.33 MILLIMOLE(S): 236; 224 INJECTION, SOLUTION INTRAVENOUS at 02:02

## 2021-01-25 RX ADMIN — CHLORHEXIDINE GLUCONATE 1 APPLICATION(S): 213 SOLUTION TOPICAL at 11:18

## 2021-01-25 RX ADMIN — Medication 100 MILLIGRAM(S): at 00:06

## 2021-01-25 RX ADMIN — AMPICILLIN SODIUM AND SULBACTAM SODIUM 200 GRAM(S): 250; 125 INJECTION, POWDER, FOR SUSPENSION INTRAMUSCULAR; INTRAVENOUS at 13:28

## 2021-01-25 RX ADMIN — AMPICILLIN SODIUM AND SULBACTAM SODIUM 200 GRAM(S): 250; 125 INJECTION, POWDER, FOR SUSPENSION INTRAMUSCULAR; INTRAVENOUS at 21:27

## 2021-01-25 RX ADMIN — CEFEPIME 100 MILLIGRAM(S): 1 INJECTION, POWDER, FOR SOLUTION INTRAMUSCULAR; INTRAVENOUS at 01:22

## 2021-01-25 RX ADMIN — Medication 1 APPLICATION(S): at 11:18

## 2021-01-25 RX ADMIN — PANTOPRAZOLE SODIUM 40 MILLIGRAM(S): 20 TABLET, DELAYED RELEASE ORAL at 05:58

## 2021-01-25 NOTE — PROGRESS NOTE ADULT - PROBLEM SELECTOR PLAN 1
MRI noted above  S/p Vancomycin, Cefepime and Flagyl   IR placement of  PICC line 1/20  Wound culture grew few Enterococcus Faecalis and Proteus mirabilis  S/p OR right heel debridement 1/13 obtained 2PC consent  C/w wound vac applied by podiatry to right heel   ID Dr. Wellington on board, recommends 4 weeks of antibiotics starting 1/25/21  Podiatry on board   Vascular on board Code stroke 1/24 due to change in mental status and facial droop right side   CTH no acute changes   C/w ASA, statin   F/u echo   Neuro Dr Norris on board Code stroke 1/24 due to change in mental status and facial droop right side   CTH no acute changes   C/w ASA, statin   Increased rhonchus on exam, will get CXR  F/u echo   Neuro Dr Norris on board Code stroke 1/24 due to change in mental status and facial droop right side   CTH no acute changes   C/w ASA, statin   Increased rhonchus on exam  F/u echo   Neuro Dr Edwards on board

## 2021-01-25 NOTE — PROGRESS NOTE ADULT - PROBLEM SELECTOR PLAN 7
Lipid panel controlled  Pt takes Simvastatin at home  Continue with home regimen. Controlled as evidenced by A1c 6.8  Continue with sliding scale insulin coverage  Continue with glucose monitoring   Target -180

## 2021-01-25 NOTE — PROGRESS NOTE ADULT - PROBLEM SELECTOR PLAN 6
Controlled  Pt takes Amlodipine 5 mg & Metoprolol 100 mg at home  Resume home medications when medically appropriate. Pt takes Perphenazine at home  Home medication held as recommended by Psychiatry  Neuro Dr. Norris on board   Psychiatry Dr. Lackey, recommends Methylphenidate 5 mg daily and c/w haldol PRN Pt takes Perphenazine at home  Home medication held as recommended by Psychiatry  Neuro Dr. Edwards on board   Psychiatry Dr. Lackey, recommends Methylphenidate 5 mg daily and c/w haldol PRN

## 2021-01-25 NOTE — PROGRESS NOTE ADULT - PROBLEM SELECTOR PLAN 3
Continue with cleaning all wounds with normal saline and apply skin prep to the surrounding skin  Surgery on board X ray and MRI noted   S/p Vancomycin, Cefepime and Flagyl   IR placement of  PICC line 1/20  Wound culture grew few Enterococcus Faecalis and Proteus mirabilis  S/p OR right heel debridement 1/13 obtained 2PC consent  C/w wound vac applied by podiatry to right heel   ID Dr. Wellington on board, recommends 4 weeks of antibiotics starting 1/25/21  Podiatry on board   Vascular on board X ray and MRI noted   S/p Vancomycin, Cefepime and Flagyl   IR placement of  PICC line 1/20  Wound culture grew few Enterococcus Faecalis and Proteus mirabilis  S/p OR right heel debridement 1/13 obtained 2PC consent  C/w wound vac applied by podiatry to right heel   ID Dr. Wellington on board, recommends 4 weeks of antibiotics starting 1/25/21  F/u right foot X ray   Podiatry on board   Vascular on board X ray and MRI noted   S/p Vancomycin, Cefepime and Flagyl   IR placement of  PICC line 1/20  Wound culture grew few Enterococcus Faecalis and Proteus mirabilis  S/p OR right heel debridement 1/13 obtained 2PC consent  C/w wound vac applied by podiatry to right heel   ID Dr. Wellington on board, recommends 4 weeks of antibiotics starting 1/25/21  Started on Unasyn 3 grams q6 hrs 1/25  F/u right foot X ray   Podiatry on board   Vascular on board

## 2021-01-25 NOTE — PROGRESS NOTE BEHAVIORAL HEALTH - NSBHCONSULTRECOMMENDOTHER_PSY_A_CORE FT
1. Recommend starting methylphenidate PO 5 mg qd standing to improve alertness  2. C/w Haldol IV 0.5 mg q12h PRN delirium (not used to date)  3. Recommending continuing to hold pt home psychiatric medication (perphenazine 16 mg BID), due to absence of active psychotic sx and potential to exacerbate somnolence   4. No indication for other standing or PRN psychotropic medications at this time  5. Medical management as directed by primary team and podiatry  6. Psychiatry will continue to follow for delirium management  7. Case d/w Dr. Rodriguez of primary team    Norma Lackey MD  Director, Consultation-Liaison Psychiatry Service  [id number]

## 2021-01-25 NOTE — PROGRESS NOTE ADULT - ASSESSMENT
Right foot /heel Osteomyelitis - s/p partial Cacanectomy    Plan - Has gotten almost 3 weeks of abxs already and has a PICC line in place.  Can plan to DC to subacute facility on Unasyn3 gms iv q6 hrs for 3 weeks more( pt switched to Unasyn already)  DC planning once stable.

## 2021-01-25 NOTE — PROGRESS NOTE ADULT - PROBLEM SELECTOR PLAN 2
Patient w/ bilateral heel ulcers  Continue with heel lift boots, offloading of bony prominences  Continue with NWB to right heel  C/w wound vac applied by podiatry. Patient with hypernatremia   Refeeding syndrome?  Nephro Dr Taylor consulted Patient with hypernatremia and metabolic acidosis non anion gap, hyperchloremic   Free water deficit 1.9 L  Nephro Dr Taylor consulted Patient with hypernatremia, hypokalemia, hypocalcemia and metabolic acidosis non anion gap  Free water deficit 1.9 L  Nephro Dr Taylor consulted

## 2021-01-25 NOTE — CONSULT NOTE ADULT - ASSESSMENT
Patient is a 76yo Female with HTN, Diabetes, Osteoarthritis, Osteoporosis, Peripheral arterial disease, Diabetic neuropathy, HLD, Schizophrenia a/w Rt foot osteomyelitis and acute metabolic encephalopathy. CT head neg. Nephrology consulted for metabolic acidosis    1. Metabolic Acidosis- normal anion gap; hyperchloremic metabolic acidosis. No diarrhea; lactate wnl. ?Proximal (Type 2) RTA vs large volume infusion with NS. Pt with normal renal function with no hypophosphatemia today and no glucosuria- less likely Type 2 RTA. Check ABG to monitor ph. Urine ph elevated however urine anion gap is neg; not consistent with RTA.    2. Hypernatremia- Free water deficit 1.9L. Consider NGT placement with free water 300 ml q 4hrs. Change Unasyn to D5 base.  Monitor serum Na  3. Rt foot osteomyelitis- s/p debridement. Pt on Unasyn. Plan as per primary team/ ID  4. Acute metabolic encephalopathy- CT head. Plan as per primary team

## 2021-01-25 NOTE — PROGRESS NOTE ADULT - ASSESSMENT
Toxic metabolic encephalopathy with waxing/waning mental status and sundowning in the setting of nearly three weeks of multiple acute metabolic problems.      Very doubtful she had a stroke or TIA yesterday.      If the only indication for ASA is yesterday's episode resulting in a Code Stroke then ASA should be D/C'ed.   Toxic metabolic encephalopathy with waxing/waning mental status and sundowning in the setting of nearly three weeks of multiple acute metabolic problems, with the additional underlying problem of schizophrenia.      Very doubtful she had a stroke or TIA yesterday.      If the only indication for ASA is yesterday's episode resulting in a Code Stroke then ASA should be D/C'ed.

## 2021-01-25 NOTE — PROGRESS NOTE ADULT - SUBJECTIVE AND OBJECTIVE BOX
Patient denies chest pain or shortness of breath, is confused. events noted Review of systems otherwise (-)  	  acetaminophen   Tablet .. 650 milliGRAM(s) Oral every 6 hours PRN  ampicillin/sulbactam  IVPB      ampicillin/sulbactam  IVPB 3 Gram(s) IV Intermittent once  ampicillin/sulbactam  IVPB 3 Gram(s) IV Intermittent every 6 hours  aspirin enteric coated 81 milliGRAM(s) Oral daily  chlorhexidine 2% Cloths 1 Application(s) Topical daily  collagenase Ointment 1 Application(s) Topical daily  Dakins Solution - Full Strength 1 Application(s) Topical once  dextrose 40% Gel 15 Gram(s) Oral once  dextrose 50% Injectable 25 Gram(s) IV Push once  dextrose 50% Injectable 12.5 Gram(s) IV Push once  dextrose 50% Injectable 25 Gram(s) IV Push once  glucagon  Injectable 1 milliGRAM(s) IntraMuscular once  haloperidol    Injectable 0.5 milliGRAM(s) IV Push every 8 hours PRN  methylphenidate 5 milliGRAM(s) Oral daily  pantoprazole  Injectable 40 milliGRAM(s) IV Push every 12 hours  simvastatin 40 milliGRAM(s) Oral at bedtime  sodium chloride 0.9% lock flush 10 milliLiter(s) IV Push every 1 hour PRN  sucralfate 1 Gram(s) Oral four times a day                            10.6   15.85 )-----------( 153      ( 25 Jan 2021 07:04 )             31.9       Hemoglobin: 10.6 g/dL (01-25 @ 07:04)  Hemoglobin: 10.9 g/dL (01-24 @ 14:48)  Hemoglobin: 10.6 g/dL (01-24 @ 07:41)  Hemoglobin: 12.6 g/dL (01-23 @ 07:39)  Hemoglobin: 7.0 g/dL (01-22 @ 09:48)      01-25    151<H>  |  124<H>  |  17  ----------------------------<  139<H>  4.0   |  15<L>  |  0.80    Ca    7.7<L>      25 Jan 2021 07:04  Phos  5.6     01-25  Mg     1.9     01-25    TPro  4.2<L>  /  Alb  1.2<L>  /  TBili  0.3  /  DBili  x   /  AST  7<L>  /  ALT  7<L>  /  AlkPhos  82  01-25    Creatinine Trend: 0.80<--, 0.78<--, 0.85<--, 0.87<--, 0.73<--, 0.76<--    COAGS:     CARDIAC MARKERS ( 25 Jan 2021 07:04 )  0.439 ng/mL / x     / 37 U/L / x     / x      CARDIAC MARKERS ( 24 Jan 2021 21:23 )  0.865 ng/mL / x     / 21 U/L / x     / x      CARDIAC MARKERS ( 24 Jan 2021 16:19 )  0.944 ng/mL / x     / x     / x     / x      CARDIAC MARKERS ( 24 Jan 2021 14:48 )  1.040 ng/mL / x     / 22 U/L / x     / 4.8 ng/mL        T(C): 36.6 (01-25-21 @ 11:37), Max: 36.6 (01-25-21 @ 11:37)  HR: 102 (01-25-21 @ 11:37) (91 - 106)  BP: 107/66 (01-25-21 @ 11:37) (95/58 - 117/77)  RR: 18 (01-25-21 @ 11:37) (17 - 18)  SpO2: 100% (01-25-21 @ 11:37) (98% - 100%)  Wt(kg): --    I&O's Summary    24 Jan 2021 07:01  -  25 Jan 2021 07:00  --------------------------------------------------------  IN: 0 mL / OUT: 400 mL / NET: -400 mL          HEENT:  (-)icterus (-)pallor  CV: N S1 S2 1/6 ANGELIC (+)2 Pulses B/l  Resp:  Clear to ausculatation B/L, normal effort  GI: (+) BS Soft, NT, ND  Lymph:  (-)Edema, (-)obvious lymphadenopathy  Skin: Warm to touch, Normal turgor  Psych: Appropriate mood and affect    tele sinus    ASSESSMENT/PLAN: 	75y  Female  wheelchair bound with medical history significant of HTN, Diabetes, Osteoarthritis, Osteoporosis, Peripheral arterial disease, Diabetic neuropathy, HLD, Schizophrenia historically preserved LV and R function, comes in with worsening ulceration to b/l heels s/p debridement.    - s/p Partial calcanectomy of right foot   - s/p code stroke  - no pertinent events on tele thus far  - Neuro f/u    Jm Cortes MD, [facility]  [place] [phone]      Jm Cortes MD, [facility]  [place] [phone]

## 2021-01-25 NOTE — PROGRESS NOTE ADULT - PROBLEM SELECTOR PLAN 9
Pt admitted from [facility], will likely return to facility upon discharge.   Pending clearance for possible COVID exposure by other patient   Last COVID 01/18 negative. Lipid panel controlled  Pt takes Simvastatin at home  Continue with home regimen.

## 2021-01-25 NOTE — PROGRESS NOTE ADULT - SUBJECTIVE AND OBJECTIVE BOX
PGY-1 Progress Note discussed with attending    PAGER #: [[phone]] TILL 5:00 PM  PLEASE CONTACT ON CALL TEAM:  - On Call Team (Please refer to Deanna) FROM 5:00 PM - 8:30PM  - Nightfloat Team FROM 8:30 -7:30 AM    CHIEF COMPLAINT & BRIEF HOSPITAL COURSE:      INTERVAL HPI/OVERNIGHT EVENTS: patient alert, oriented to self only, in no apparent distress, follows commands      MEDICATIONS  (STANDING):  ampicillin/sulbactam  IVPB      ampicillin/sulbactam  IVPB 3 Gram(s) IV Intermittent once  ampicillin/sulbactam  IVPB 3 Gram(s) IV Intermittent every 6 hours  aspirin enteric coated 81 milliGRAM(s) Oral daily  chlorhexidine 2% Cloths 1 Application(s) Topical daily  collagenase Ointment 1 Application(s) Topical daily  Dakins Solution - Full Strength 1 Application(s) Topical once  dextrose 40% Gel 15 Gram(s) Oral once  dextrose 50% Injectable 12.5 Gram(s) IV Push once  dextrose 50% Injectable 25 Gram(s) IV Push once  dextrose 50% Injectable 25 Gram(s) IV Push once  glucagon  Injectable 1 milliGRAM(s) IntraMuscular once  pantoprazole  Injectable 40 milliGRAM(s) IV Push every 12 hours  simvastatin 40 milliGRAM(s) Oral at bedtime  sucralfate 1 Gram(s) Oral four times a day    MEDICATIONS  (PRN):  acetaminophen   Tablet .. 650 milliGRAM(s) Oral every 6 hours PRN Moderate Pain (4 - 6)  haloperidol    Injectable 0.5 milliGRAM(s) IV Push every 8 hours PRN Agitation  sodium chloride 0.9% lock flush 10 milliLiter(s) IV Push every 1 hour PRN Pre/post blood products, medications, blood draw, and to maintain line patency      Vital Signs Last 24 Hrs  T(C): 36.6 (25 Jan 2021 11:37), Max: 36.6 (25 Jan 2021 11:37)  T(F): 97.8 (25 Jan 2021 11:37), Max: 97.8 (25 Jan 2021 11:37)  HR: 102 (25 Jan 2021 11:37) (91 - 106)  BP: 107/66 (25 Jan 2021 11:37) (95/58 - 117/77)  BP(mean): --  RR: 18 (25 Jan 2021 11:37) (17 - 18)  SpO2: 100% (25 Jan 2021 11:37) (98% - 100%)    PHYSICAL EXAMINATION:  GENERAL: NAD  HEAD:  Atraumatic, Normocephalic  EYES:  conjunctiva and sclera clear  NECK: Supple, No JVD, Normal thyroid  CHEST/LUNG: Clear to auscultation. Clear to percussion bilaterally; No rales, rhonchi, wheezing, or rubs  HEART: Regular rate and rhythm; No murmurs, rubs, or gallops  ABDOMEN: Soft, Nontender, Nondistended; Bowel sounds present, no pain or masses on palpation  NERVOUS SYSTEM:  Alert & Oriented X1  : Emanuel in place   EXTREMITIES:  2+ Peripheral Pulses, No clubbing, or cyanosis, upper and lower extremities edema, decreased upper and lower extremities edema   SKIN: warm dry                          10.6   15.85 )-----------( 153      ( 25 Jan 2021 07:04 )             31.9     01-25    151<H>  |  124<H>  |  17  ----------------------------<  139<H>  4.0   |  15<L>  |  0.80    Ca    7.7<L>      25 Jan 2021 07:04  Phos  5.6     01-25  Mg     1.9     01-25    TPro  4.2<L>  /  Alb  1.2<L>  /  TBili  0.3  /  DBili  x   /  AST  7<L>  /  ALT  7<L>  /  AlkPhos  82  01-25    LIVER FUNCTIONS - ( 25 Jan 2021 07:04 )  Alb: 1.2 g/dL / Pro: 4.2 g/dL / ALK PHOS: 82 U/L / ALT: 7 U/L DA / AST: 7 U/L / GGT: x           CARDIAC MARKERS ( 25 Jan 2021 07:04 )  0.439 ng/mL / x     / 37 U/L / x     / x      CARDIAC MARKERS ( 24 Jan 2021 21:23 )  0.865 ng/mL / x     / 21 U/L / x     / x      CARDIAC MARKERS ( 24 Jan 2021 16:19 )  0.944 ng/mL / x     / x     / x     / x      CARDIAC MARKERS ( 24 Jan 2021 14:48 )  1.040 ng/mL / x     / 22 U/L / x     / 4.8 ng/mL      PT/INR - ( 24 Jan 2021 14:48 )   PT: 20.1 sec;   INR: 1.73 ratio         PTT - ( 24 Jan 2021 14:48 )  PTT:47.4 sec    I&O's Summary    24 Jan 2021 07:01  -  25 Jan 2021 07:00  --------------------------------------------------------  IN: 0 mL / OUT: 400 mL / NET: -400 mL                         PGY-1 Progress Note discussed with attending    PAGER #: [[phone]] TILL 5:00 PM  PLEASE CONTACT ON CALL TEAM:  - On Call Team (Please refer to Deanna) FROM 5:00 PM - 8:30PM  - Nightfloat Team FROM 8:30 -7:30 AM    CHIEF COMPLAINT & BRIEF HOSPITAL COURSE:      INTERVAL HPI/OVERNIGHT EVENTS: patient alert, oriented to self only, in no apparent distress, follows commands      MEDICATIONS  (STANDING):  ampicillin/sulbactam  IVPB      ampicillin/sulbactam  IVPB 3 Gram(s) IV Intermittent once  ampicillin/sulbactam  IVPB 3 Gram(s) IV Intermittent every 6 hours  aspirin enteric coated 81 milliGRAM(s) Oral daily  chlorhexidine 2% Cloths 1 Application(s) Topical daily  collagenase Ointment 1 Application(s) Topical daily  Dakins Solution - Full Strength 1 Application(s) Topical once  dextrose 40% Gel 15 Gram(s) Oral once  dextrose 50% Injectable 12.5 Gram(s) IV Push once  dextrose 50% Injectable 25 Gram(s) IV Push once  dextrose 50% Injectable 25 Gram(s) IV Push once  glucagon  Injectable 1 milliGRAM(s) IntraMuscular once  pantoprazole  Injectable 40 milliGRAM(s) IV Push every 12 hours  simvastatin 40 milliGRAM(s) Oral at bedtime  sucralfate 1 Gram(s) Oral four times a day    MEDICATIONS  (PRN):  acetaminophen   Tablet .. 650 milliGRAM(s) Oral every 6 hours PRN Moderate Pain (4 - 6)  haloperidol    Injectable 0.5 milliGRAM(s) IV Push every 8 hours PRN Agitation  sodium chloride 0.9% lock flush 10 milliLiter(s) IV Push every 1 hour PRN Pre/post blood products, medications, blood draw, and to maintain line patency      Vital Signs Last 24 Hrs  T(C): 36.6 (25 Jan 2021 11:37), Max: 36.6 (25 Jan 2021 11:37)  T(F): 97.8 (25 Jan 2021 11:37), Max: 97.8 (25 Jan 2021 11:37)  HR: 102 (25 Jan 2021 11:37) (91 - 106)  BP: 107/66 (25 Jan 2021 11:37) (95/58 - 117/77)  BP(mean): --  RR: 18 (25 Jan 2021 11:37) (17 - 18)  SpO2: 100% (25 Jan 2021 11:37) (98% - 100%)    PHYSICAL EXAMINATION:  GENERAL: NAD  HEAD:  Atraumatic, Normocephalic  EYES:  conjunctiva and sclera clear  NECK: Supple, No JVD, Normal thyroid  CHEST/LUNG: Clear to auscultation. Clear to percussion bilaterally; No rales, rhonchi, wheezing, or rubs  HEART: Regular rate and rhythm; No murmurs, rubs, or gallops  ABDOMEN: Soft, Nontender, Nondistended; Bowel sounds present, no pain or masses on palpation  NERVOUS SYSTEM:  Alert & Oriented X1  : Emanuel in place   EXTREMITIES:  2+ Peripheral Pulses, No clubbing, or cyanosis, upper and lower extremities edema, decreased upper and lower extremities edema   SKIN: warm dry un stageable sacral ulcer, bilateral heel ulcers, right heel with VAC system and clean dressing                           10.6   15.85 )-----------( 153      ( 25 Jan 2021 07:04 )             31.9     01-25    151<H>  |  124<H>  |  17  ----------------------------<  139<H>  4.0   |  15<L>  |  0.80    Ca    7.7<L>      25 Jan 2021 07:04  Phos  5.6     01-25  Mg     1.9     01-25    TPro  4.2<L>  /  Alb  1.2<L>  /  TBili  0.3  /  DBili  x   /  AST  7<L>  /  ALT  7<L>  /  AlkPhos  82  01-25    LIVER FUNCTIONS - ( 25 Jan 2021 07:04 )  Alb: 1.2 g/dL / Pro: 4.2 g/dL / ALK PHOS: 82 U/L / ALT: 7 U/L DA / AST: 7 U/L / GGT: x           CARDIAC MARKERS ( 25 Jan 2021 07:04 )  0.439 ng/mL / x     / 37 U/L / x     / x      CARDIAC MARKERS ( 24 Jan 2021 21:23 )  0.865 ng/mL / x     / 21 U/L / x     / x      CARDIAC MARKERS ( 24 Jan 2021 16:19 )  0.944 ng/mL / x     / x     / x     / x      CARDIAC MARKERS ( 24 Jan 2021 14:48 )  1.040 ng/mL / x     / 22 U/L / x     / 4.8 ng/mL      PT/INR - ( 24 Jan 2021 14:48 )   PT: 20.1 sec;   INR: 1.73 ratio         PTT - ( 24 Jan 2021 14:48 )  PTT:47.4 sec    I&O's Summary    24 Jan 2021 07:01  -  25 Jan 2021 07:00  --------------------------------------------------------  IN: 0 mL / OUT: 400 mL / NET: -400 mL                         PGY-1 Progress Note discussed with attending    PAGER #: [[phone]] TILL 5:00 PM  PLEASE CONTACT ON CALL TEAM:  - On Call Team (Please refer to Deanna) FROM 5:00 PM - 8:30PM  - Nightfloat Team FROM 8:30 -7:30 AM    CHIEF COMPLAINT & BRIEF HOSPITAL COURSE:  74 yo F from [facility], wheelchair bound with medical history significant of HTN, Diabetes, Osteoarthritis, Osteoporosis, Peripheral arterial disease, Diabetic neuropathy, HLD, Schizophrenia came to ED c/o worsening ulceration to b/l heels and sacral wound. Patient admitted for bilateral heel ulcers concern for OM. Patient was treated with IV antibiotics with Vancomycin, Cefepime and Flagyl. Podiatry on board, patient s/p sacral debridement on 1/8, s/p debridement of right heel (partial calcanectomy) on 1/13, wound vac applied by podiatry to right heel, wound culture grew few Enterococcus Faecalis and Proteus mirabilis,  PICC line placed on 1/20 for IV antibiotics.   Patient with schizophrenia, on haldol PRN and started on Methylphenidate. For hypertension and diabetes closely monitored. Patient also found to have hypernatremia, most likely 2/2 to poor oral intake.   Palliative consulted      INTERVAL HPI/OVERNIGHT EVENTS: patient alert, oriented to self only, in no apparent distress, follows commands      MEDICATIONS  (STANDING):  ampicillin/sulbactam  IVPB      ampicillin/sulbactam  IVPB 3 Gram(s) IV Intermittent once  ampicillin/sulbactam  IVPB 3 Gram(s) IV Intermittent every 6 hours  aspirin enteric coated 81 milliGRAM(s) Oral daily  chlorhexidine 2% Cloths 1 Application(s) Topical daily  collagenase Ointment 1 Application(s) Topical daily  Dakins Solution - Full Strength 1 Application(s) Topical once  dextrose 40% Gel 15 Gram(s) Oral once  dextrose 50% Injectable 12.5 Gram(s) IV Push once  dextrose 50% Injectable 25 Gram(s) IV Push once  dextrose 50% Injectable 25 Gram(s) IV Push once  glucagon  Injectable 1 milliGRAM(s) IntraMuscular once  pantoprazole  Injectable 40 milliGRAM(s) IV Push every 12 hours  simvastatin 40 milliGRAM(s) Oral at bedtime  sucralfate 1 Gram(s) Oral four times a day    MEDICATIONS  (PRN):  acetaminophen   Tablet .. 650 milliGRAM(s) Oral every 6 hours PRN Moderate Pain (4 - 6)  haloperidol    Injectable 0.5 milliGRAM(s) IV Push every 8 hours PRN Agitation  sodium chloride 0.9% lock flush 10 milliLiter(s) IV Push every 1 hour PRN Pre/post blood products, medications, blood draw, and to maintain line patency      Vital Signs Last 24 Hrs  T(C): 36.6 (25 Jan 2021 11:37), Max: 36.6 (25 Jan 2021 11:37)  T(F): 97.8 (25 Jan 2021 11:37), Max: 97.8 (25 Jan 2021 11:37)  HR: 102 (25 Jan 2021 11:37) (91 - 106)  BP: 107/66 (25 Jan 2021 11:37) (95/58 - 117/77)  BP(mean): --  RR: 18 (25 Jan 2021 11:37) (17 - 18)  SpO2: 100% (25 Jan 2021 11:37) (98% - 100%)    PHYSICAL EXAMINATION:  GENERAL: NAD  HEAD:  Atraumatic, Normocephalic  EYES:  conjunctiva and sclera clear  NECK: Supple, No JVD, Normal thyroid  CHEST/LUNG: Clear to auscultation. Clear to percussion bilaterally; No rales, rhonchi, wheezing, or rubs  HEART: Regular rate and rhythm; No murmurs, rubs, or gallops  ABDOMEN: Soft, Nontender, Nondistended; Bowel sounds present, no pain or masses on palpation  NERVOUS SYSTEM:  Alert & Oriented X1  : Emanuel in place   EXTREMITIES:  2+ Peripheral Pulses, No clubbing, or cyanosis, upper and lower extremities edema, decreased upper and lower extremities edema   SKIN: warm dry un stageable sacral ulcer, bilateral heel ulcers, right heel with VAC system and clean dressing                           10.6   15.85 )-----------( 153      ( 25 Jan 2021 07:04 )             31.9     01-25    151<H>  |  124<H>  |  17  ----------------------------<  139<H>  4.0   |  15<L>  |  0.80    Ca    7.7<L>      25 Jan 2021 07:04  Phos  5.6     01-25  Mg     1.9     01-25    TPro  4.2<L>  /  Alb  1.2<L>  /  TBili  0.3  /  DBili  x   /  AST  7<L>  /  ALT  7<L>  /  AlkPhos  82  01-25    LIVER FUNCTIONS - ( 25 Jan 2021 07:04 )  Alb: 1.2 g/dL / Pro: 4.2 g/dL / ALK PHOS: 82 U/L / ALT: 7 U/L DA / AST: 7 U/L / GGT: x           CARDIAC MARKERS ( 25 Jan 2021 07:04 )  0.439 ng/mL / x     / 37 U/L / x     / x      CARDIAC MARKERS ( 24 Jan 2021 21:23 )  0.865 ng/mL / x     / 21 U/L / x     / x      CARDIAC MARKERS ( 24 Jan 2021 16:19 )  0.944 ng/mL / x     / x     / x     / x      CARDIAC MARKERS ( 24 Jan 2021 14:48 )  1.040 ng/mL / x     / 22 U/L / x     / 4.8 ng/mL      PT/INR - ( 24 Jan 2021 14:48 )   PT: 20.1 sec;   INR: 1.73 ratio         PTT - ( 24 Jan 2021 14:48 )  PTT:47.4 sec    I&O's Summary    24 Jan 2021 07:01  -  25 Jan 2021 07:00  --------------------------------------------------------  IN: 0 mL / OUT: 400 mL / NET: -400 mL                         PGY-1 Progress Note discussed with attending    PAGER #: [[phone]] TILL 5:00 PM  PLEASE CONTACT ON CALL TEAM:  - On Call Team (Please refer to Deanna) FROM 5:00 PM - 8:30PM  - Nightfloat Team FROM 8:30 -7:30 AM    CHIEF COMPLAINT & BRIEF HOSPITAL COURSE:  76 yo F from [facility], wheelchair bound with medical history significant of HTN, Diabetes, Osteoarthritis, Osteoporosis, Peripheral arterial disease, Diabetic neuropathy, HLD, Schizophrenia came to ED c/o worsening ulceration to b/l heels and sacral wound. Patient admitted for bilateral heel ulcers concern for OM. Patient was treated with IV antibiotics with Vancomycin, Cefepime and Flagyl. Podiatry on board, patient s/p sacral debridement on 1/8, s/p debridement of right heel (partial calcanectomy) on 1/13, wound vac applied by podiatry to right heel, wound culture grew few Enterococcus Faecalis and Proteus mirabilis,  PICC line placed on 1/20 for IV antibiotics.   Patient with schizophrenia, on haldol PRN and started on Methylphenidate. For hypertension and diabetes closely monitored. Patient also found to have hypernatremia, most likely 2/2 to poor oral intake.   Palliative consulted      INTERVAL HPI/OVERNIGHT EVENTS: patient alert, oriented to self only, in no apparent distress, follows commands      MEDICATIONS  (STANDING):  ampicillin/sulbactam  IVPB      ampicillin/sulbactam  IVPB 3 Gram(s) IV Intermittent once  ampicillin/sulbactam  IVPB 3 Gram(s) IV Intermittent every 6 hours  aspirin enteric coated 81 milliGRAM(s) Oral daily  chlorhexidine 2% Cloths 1 Application(s) Topical daily  collagenase Ointment 1 Application(s) Topical daily  Dakins Solution - Full Strength 1 Application(s) Topical once  dextrose 40% Gel 15 Gram(s) Oral once  dextrose 50% Injectable 12.5 Gram(s) IV Push once  dextrose 50% Injectable 25 Gram(s) IV Push once  dextrose 50% Injectable 25 Gram(s) IV Push once  glucagon  Injectable 1 milliGRAM(s) IntraMuscular once  pantoprazole  Injectable 40 milliGRAM(s) IV Push every 12 hours  simvastatin 40 milliGRAM(s) Oral at bedtime  sucralfate 1 Gram(s) Oral four times a day    MEDICATIONS  (PRN):  acetaminophen   Tablet .. 650 milliGRAM(s) Oral every 6 hours PRN Moderate Pain (4 - 6)  haloperidol    Injectable 0.5 milliGRAM(s) IV Push every 8 hours PRN Agitation  sodium chloride 0.9% lock flush 10 milliLiter(s) IV Push every 1 hour PRN Pre/post blood products, medications, blood draw, and to maintain line patency      Vital Signs Last 24 Hrs  T(C): 36.6 (25 Jan 2021 11:37), Max: 36.6 (25 Jan 2021 11:37)  T(F): 97.8 (25 Jan 2021 11:37), Max: 97.8 (25 Jan 2021 11:37)  HR: 102 (25 Jan 2021 11:37) (91 - 106)  BP: 107/66 (25 Jan 2021 11:37) (95/58 - 117/77)  BP(mean): --  RR: 18 (25 Jan 2021 11:37) (17 - 18)  SpO2: 100% (25 Jan 2021 11:37) (98% - 100%)    PHYSICAL EXAMINATION:  GENERAL: NAD  HEAD:  Atraumatic, Normocephalic  EYES:  conjunctiva and sclera clear  NECK: Supple, No JVD, Normal thyroid  CHEST/LUNG: Clear to auscultation. Clear to percussion bilaterally; No rales, rhonchi, wheezing, or rubs  HEART: Regular rate and rhythm; No murmurs, rubs, or gallops  ABDOMEN: Soft, Nontender, Nondistended; Bowel sounds present, no pain or masses on palpation  NERVOUS SYSTEM:  Alert & Oriented X1  : Emanuel in place   EXTREMITIES:  2+ Peripheral Pulses, No clubbing, or cyanosis, upper and lower extremities edema, decreased upper and lower extremities edema   SKIN: warm dry un stageable sacral ulcer, bilateral heel ulcers, right heel with VAC system and clean dressing                           10.6   15.85 )-----------( 153      ( 25 Jan 2021 07:04 )             31.9     01-25    151<H>  |  124<H>  |  17  ----------------------------<  139<H>  4.0   |  15<L>  |  0.80    Ca    7.7<L>      25 Jan 2021 07:04  Phos  5.6     01-25  Mg     1.9     01-25    TPro  4.2<L>  /  Alb  1.2<L>  /  TBili  0.3  /  DBili  x   /  AST  7<L>  /  ALT  7<L>  /  AlkPhos  82  01-25    LIVER FUNCTIONS - ( 25 Jan 2021 07:04 )  Alb: 1.2 g/dL / Pro: 4.2 g/dL / ALK PHOS: 82 U/L / ALT: 7 U/L DA / AST: 7 U/L / GGT: x           CARDIAC MARKERS ( 25 Jan 2021 07:04 )  0.439 ng/mL / x     / 37 U/L / x     / x      CARDIAC MARKERS ( 24 Jan 2021 21:23 )  0.865 ng/mL / x     / 21 U/L / x     / x      CARDIAC MARKERS ( 24 Jan 2021 16:19 )  0.944 ng/mL / x     / x     / x     / x      CARDIAC MARKERS ( 24 Jan 2021 14:48 )  1.040 ng/mL / x     / 22 U/L / x     / 4.8 ng/mL      PT/INR - ( 24 Jan 2021 14:48 )   PT: 20.1 sec;   INR: 1.73 ratio         PTT - ( 24 Jan 2021 14:48 )  PTT:47.4 sec    I&O's Summary    24 Jan 2021 07:01  -  25 Jan 2021 07:00  --------------------------------------------------------  IN: 0 mL / OUT: 400 mL / NET: -400 mL                         PGY-1 Progress Note discussed with attending    PAGER #: [[phone]] TILL 5:00 PM  PLEASE CONTACT ON CALL TEAM:  - On Call Team (Please refer to Deanna) FROM 5:00 PM - 8:30PM  - Nightfloat Team FROM 8:30 -7:30 AM    CHIEF COMPLAINT & BRIEF HOSPITAL COURSE:  74 yo F from [facility], wheelchair bound with medical history significant of HTN, Diabetes, Osteoarthritis, Osteoporosis, Peripheral arterial disease, Diabetic neuropathy, HLD, Schizophrenia came to ED c/o worsening ulceration to b/l heels and sacral wound. Patient admitted for bilateral heel ulcers concern for OM. Patient was treated with IV antibiotics with Vancomycin, Cefepime and Flagyl. Podiatry on board, patient s/p sacral debridement on 1/8, s/p debridement of right heel (partial calcanectomy) on 1/13, wound vac applied by podiatry to right heel, wound culture grew few Enterococcus Faecalis and Proteus mirabilis,  PICC line placed on 1/20 for IV antibiotics.   Patient with schizophrenia, on haldol PRN and started on Methylphenidate. For hypertension and diabetes closely monitored. Patient also found to have hypernatremia, most likely 2/2 to poor oral intake.   Palliative consulted      INTERVAL HPI/OVERNIGHT EVENTS: patient alert, oriented to self only, in no apparent distress, follows commands, on 2L NC sat %      MEDICATIONS  (STANDING):  ampicillin/sulbactam  IVPB      ampicillin/sulbactam  IVPB 3 Gram(s) IV Intermittent once  ampicillin/sulbactam  IVPB 3 Gram(s) IV Intermittent every 6 hours  aspirin enteric coated 81 milliGRAM(s) Oral daily  chlorhexidine 2% Cloths 1 Application(s) Topical daily  collagenase Ointment 1 Application(s) Topical daily  Dakins Solution - Full Strength 1 Application(s) Topical once  dextrose 40% Gel 15 Gram(s) Oral once  dextrose 50% Injectable 12.5 Gram(s) IV Push once  dextrose 50% Injectable 25 Gram(s) IV Push once  dextrose 50% Injectable 25 Gram(s) IV Push once  glucagon  Injectable 1 milliGRAM(s) IntraMuscular once  pantoprazole  Injectable 40 milliGRAM(s) IV Push every 12 hours  simvastatin 40 milliGRAM(s) Oral at bedtime  sucralfate 1 Gram(s) Oral four times a day    MEDICATIONS  (PRN):  acetaminophen   Tablet .. 650 milliGRAM(s) Oral every 6 hours PRN Moderate Pain (4 - 6)  haloperidol    Injectable 0.5 milliGRAM(s) IV Push every 8 hours PRN Agitation  sodium chloride 0.9% lock flush 10 milliLiter(s) IV Push every 1 hour PRN Pre/post blood products, medications, blood draw, and to maintain line patency      Vital Signs Last 24 Hrs  T(C): 36.6 (25 Jan 2021 11:37), Max: 36.6 (25 Jan 2021 11:37)  T(F): 97.8 (25 Jan 2021 11:37), Max: 97.8 (25 Jan 2021 11:37)  HR: 102 (25 Jan 2021 11:37) (91 - 106)  BP: 107/66 (25 Jan 2021 11:37) (95/58 - 117/77)  BP(mean): --  RR: 18 (25 Jan 2021 11:37) (17 - 18)  SpO2: 100% (25 Jan 2021 11:37) (98% - 100%)    PHYSICAL EXAMINATION:  GENERAL: NAD  HEAD:  Atraumatic, Normocephalic,  on 2L NC sat %  EYES:  conjunctiva and sclera clear  NECK: Supple, No JVD, Normal thyroid  CHEST/LUNG: bilateral rhonchus, increased secretions   HEART: Regular rate and rhythm; No murmurs, rubs, or gallops  ABDOMEN: Soft, Nontender, Nondistended; Bowel sounds present, no pain or masses on palpation  NERVOUS SYSTEM:  Alert & Oriented X1  : Emanuel in place   EXTREMITIES:  2+ Peripheral Pulses, No clubbing, or cyanosis, upper and lower extremities edema, decreased upper and lower extremities edema   SKIN: warm dry un stageable sacral ulcer, bilateral heel ulcers, right heel with VAC system and clean dressing                           10.6   15.85 )-----------( 153      ( 25 Jan 2021 07:04 )             31.9     01-25    151<H>  |  124<H>  |  17  ----------------------------<  139<H>  4.0   |  15<L>  |  0.80    Ca    7.7<L>      25 Jan 2021 07:04  Phos  5.6     01-25  Mg     1.9     01-25    TPro  4.2<L>  /  Alb  1.2<L>  /  TBili  0.3  /  DBili  x   /  AST  7<L>  /  ALT  7<L>  /  AlkPhos  82  01-25    LIVER FUNCTIONS - ( 25 Jan 2021 07:04 )  Alb: 1.2 g/dL / Pro: 4.2 g/dL / ALK PHOS: 82 U/L / ALT: 7 U/L DA / AST: 7 U/L / GGT: x           CARDIAC MARKERS ( 25 Jan 2021 07:04 )  0.439 ng/mL / x     / 37 U/L / x     / x      CARDIAC MARKERS ( 24 Jan 2021 21:23 )  0.865 ng/mL / x     / 21 U/L / x     / x      CARDIAC MARKERS ( 24 Jan 2021 16:19 )  0.944 ng/mL / x     / x     / x     / x      CARDIAC MARKERS ( 24 Jan 2021 14:48 )  1.040 ng/mL / x     / 22 U/L / x     / 4.8 ng/mL      PT/INR - ( 24 Jan 2021 14:48 )   PT: 20.1 sec;   INR: 1.73 ratio         PTT - ( 24 Jan 2021 14:48 )  PTT:47.4 sec    I&O's Summary    24 Jan 2021 07:01  -  25 Jan 2021 07:00  --------------------------------------------------------  IN: 0 mL / OUT: 400 mL / NET: -400 mL                         PGY-1 Progress Note discussed with attending    PAGER #: [[phone]] TILL 5:00 PM  PLEASE CONTACT ON CALL TEAM:  - On Call Team (Please refer to Deanna) FROM 5:00 PM - 8:30PM  - Nightfloat Team FROM 8:30 -7:30 AM    CHIEF COMPLAINT & BRIEF HOSPITAL COURSE:  76 [facility], [facility] with medical history significant of HTN, Diabetes, Osteoarthritis, Osteoporosis, Peripheral arterial disease, Diabetic neuropathy, HLD, Schizophrenia came to ED c/o worsening ulceration to b/l heels and sacral wound. Patient admitted for bilateral heel ulcers concern for OM. Patient was treated with IV antibiotics with Vancomycin, Cefepime and Flagyl. Podiatry on board, patient s/p sacral debridement on 1/8, s/p debridement of right heel (partial calcanectomy) on 1/13, wound vac applied by podiatry to right heel, wound culture grew few Enterococcus Faecalis and Proteus mirabilis,  PICC line placed on 1/20 for IV antibiotics.   Patient with schizophrenia, on haldol PRN and started on Methylphenidate. For hypertension and diabetes closely monitored. Patient also found to have hypernatremia, most likely 2/2 to poor oral intake.   For anemia, patient received 2 units of PRBC on 1/22, no drop in Hg, no signs of bleeding   Palliative consulted      INTERVAL HPI/OVERNIGHT EVENTS: patient alert, oriented to self only, in no apparent distress, follows commands, on 2L NC sat %      MEDICATIONS  (STANDING):  ampicillin/sulbactam  IVPB      ampicillin/sulbactam  IVPB 3 Gram(s) IV Intermittent once  ampicillin/sulbactam  IVPB 3 Gram(s) IV Intermittent every 6 hours  aspirin enteric coated 81 milliGRAM(s) Oral daily  chlorhexidine 2% Cloths 1 Application(s) Topical daily  collagenase Ointment 1 Application(s) Topical daily  Dakins Solution - Full Strength 1 Application(s) Topical once  dextrose 40% Gel 15 Gram(s) Oral once  dextrose 50% Injectable 12.5 Gram(s) IV Push once  dextrose 50% Injectable 25 Gram(s) IV Push once  dextrose 50% Injectable 25 Gram(s) IV Push once  glucagon  Injectable 1 milliGRAM(s) IntraMuscular once  pantoprazole  Injectable 40 milliGRAM(s) IV Push every 12 hours  simvastatin 40 milliGRAM(s) Oral at bedtime  sucralfate 1 Gram(s) Oral four times a day    MEDICATIONS  (PRN):  acetaminophen   Tablet .. 650 milliGRAM(s) Oral every 6 hours PRN Moderate Pain (4 - 6)  haloperidol    Injectable 0.5 milliGRAM(s) IV Push every 8 hours PRN Agitation  sodium chloride 0.9% lock flush 10 milliLiter(s) IV Push every 1 hour PRN Pre/post blood products, medications, blood draw, and to maintain line patency      Vital Signs Last 24 Hrs  T(C): 36.6 (25 Jan 2021 11:37), Max: 36.6 (25 Jan 2021 11:37)  T(F): 97.8 (25 Jan 2021 11:37), Max: 97.8 (25 Jan 2021 11:37)  HR: 102 (25 Jan 2021 11:37) (91 - 106)  BP: 107/66 (25 Jan 2021 11:37) (95/58 - 117/77)  BP(mean): --  RR: 18 (25 Jan 2021 11:37) (17 - 18)  SpO2: 100% (25 Jan 2021 11:37) (98% - 100%)    PHYSICAL EXAMINATION:  GENERAL: NAD  HEAD:  Atraumatic, Normocephalic,  on 2L NC sat %  EYES:  conjunctiva and sclera clear  NECK: Supple, No JVD, Normal thyroid  CHEST/LUNG: bilateral rhonchus, increased secretions   HEART: Regular rate and rhythm; No murmurs, rubs, or gallops  ABDOMEN: Soft, Nontender, Nondistended; Bowel sounds present, no pain or masses on palpation  NERVOUS SYSTEM:  Alert & Oriented X1  : Emanuel in place   EXTREMITIES:  2+ Peripheral Pulses, No clubbing, or cyanosis, upper and lower extremities edema, decreased upper and lower extremities edema   SKIN: warm dry un stageable sacral ulcer, bilateral heel ulcers, right heel with VAC system and clean dressing                           10.6   15.85 )-----------( 153      ( 25 Jan 2021 07:04 )             31.9     01-25    151<H>  |  124<H>  |  17  ----------------------------<  139<H>  4.0   |  15<L>  |  0.80    Ca    7.7<L>      25 Jan 2021 07:04  Phos  5.6     01-25  Mg     1.9     01-25    TPro  4.2<L>  /  Alb  1.2<L>  /  TBili  0.3  /  DBili  x   /  AST  7<L>  /  ALT  7<L>  /  AlkPhos  82  01-25    LIVER FUNCTIONS - ( 25 Jan 2021 07:04 )  Alb: 1.2 g/dL / Pro: 4.2 g/dL / ALK PHOS: 82 U/L / ALT: 7 U/L DA / AST: 7 U/L / GGT: x           CARDIAC MARKERS ( 25 Jan 2021 07:04 )  0.439 ng/mL / x     / 37 U/L / x     / x      CARDIAC MARKERS ( 24 Jan 2021 21:23 )  0.865 ng/mL / x     / 21 U/L / x     / x      CARDIAC MARKERS ( 24 Jan 2021 16:19 )  0.944 ng/mL / x     / x     / x     / x      CARDIAC MARKERS ( 24 Jan 2021 14:48 )  1.040 ng/mL / x     / 22 U/L / x     / 4.8 ng/mL      PT/INR - ( 24 Jan 2021 14:48 )   PT: 20.1 sec;   INR: 1.73 ratio         PTT - ( 24 Jan 2021 14:48 )  PTT:47.4 sec    I&O's Summary    24 Jan 2021 07:01  -  25 Jan 2021 07:00  --------------------------------------------------------  IN: 0 mL / OUT: 400 mL / NET: -400 mL

## 2021-01-25 NOTE — CONSULT NOTE ADULT - SUBJECTIVE AND OBJECTIVE BOX
[facility] NEPHROLOGY- CONSULTATION NOTE    Patient is a 74yo Female with HTN, Diabetes, Osteoarthritis, Osteoporosis, Peripheral arterial disease, Diabetic neuropathy, HLD, Schizophrenia a/w Rt foot osteomyelitis and acute metabolic encephalopathy. CT head neg. Nephrology consulted for metabolic acidosis    Unable to obtain history from patient.     PAST MEDICAL & SURGICAL HISTORY:  COVID-19    Osteomyelitis    DM (diabetes mellitus)    Paranoid schizophrenia    HLD (hyperlipidemia)    PAD (peripheral artery disease)    HTN (hypertension)    No significant past surgical history      No Known Allergies    Home Medications Reviewed  Hospital Medications:   MEDICATIONS  (STANDING):  ampicillin/sulbactam  IVPB      ampicillin/sulbactam  IVPB 3 Gram(s) IV Intermittent every 6 hours  aspirin enteric coated 81 milliGRAM(s) Oral daily  chlorhexidine 2% Cloths 1 Application(s) Topical daily  collagenase Ointment 1 Application(s) Topical daily  Dakins Solution - Full Strength 1 Application(s) Topical once  dextrose 40% Gel 15 Gram(s) Oral once  dextrose 50% Injectable 25 Gram(s) IV Push once  dextrose 50% Injectable 12.5 Gram(s) IV Push once  dextrose 50% Injectable 25 Gram(s) IV Push once  glucagon  Injectable 1 milliGRAM(s) IntraMuscular once  methylphenidate 5 milliGRAM(s) Oral daily  pantoprazole  Injectable 40 milliGRAM(s) IV Push every 12 hours  simvastatin 40 milliGRAM(s) Oral at bedtime  sucralfate 1 Gram(s) Oral four times a day      REVIEW OF SYSTEMS: Unable to obtain    VITALS:  T(F): 97.5 (21 @ 15:53), Max: 97.8 (21 @ 11:37)  HR: 101 (21 @ 15:53)  BP: 94/52 (21 @ 15:53)  RR: 18 (21 @ 15:53)  SpO2: 100% (21 @ 15:53)  Wt(kg): --     @ 07:01  -   @ 07:00  --------------------------------------------------------  IN: 0 mL / OUT: 400 mL / NET: -400 mL     @ 07:01  -   @ 16:28  --------------------------------------------------------  IN: 150 mL / OUT: 100 mL / NET: 50 mL        PHYSICAL EXAM:  Gen: NAD, non verbal  HEENT: anicteric  Cards: RRR, +S1/S2, no M/G/R  Resp: Course BS  GI: soft, NT/ND, NABS  Extremities: +anasarca UE >LE, +arm PICC   Derm: +rt foot wound vac  Neuro: unable to assess- not following commands    LABS:      151<H>  |  124<H>  |  17  ----------------------------<  139<H>  4.0   |  15<L>  |  0.80    Ca    7.7<L>      2021 07:04  Phos  5.6       Mg     1.9         TPro  4.2<L>  /  Alb  1.2<L>  /  TBili  0.3  /  DBili      /  AST  7<L>  /  ALT  7<L>  /  AlkPhos  82      Creatinine Trend: 0.80 <--, 0.78 <--, 0.85 <--, 0.87 <--, 0.73 <--, 0.76 <--, 0.64 <--, 0.51 <--, 0.59 <--, 0.55 <--                        10.6   15.85 )-----------( 153      ( 2021 07:04 )             31.9     Urine Studies:  Urinalysis Basic - ( 2021 18:49 )    Color: Yellow / Appearance: Clear / S.015 / pH:   Gluc:  / Ketone: Trace  / Bili: Negative / Urobili: Negative   Blood:  / Protein: 30 mg/dL / Nitrite: Negative   Leuk Esterase: Negative / RBC: 5-10 /HPF / WBC 3-5 /HPF   Sq Epi:  / Non Sq Epi: Few /HPF / Bacteria: Few /HPF      Sodium, Random Urine: 71 mmol/L ( @ 18:49)  Osmolality, Random Urine: 316 mos/kg ( @ 18:49)  Potassium, Random Urine: 33 mmol/L ( @ 18:49)  Chloride, Random Urine: 108 mmol/L ( @ 18:49)  Creatinine, Random Urine: <13 mg/dL ( @ 18:49)    RADIOLOGY & ADDITIONAL STUDIES:

## 2021-01-25 NOTE — PROGRESS NOTE ADULT - ATTENDING COMMENTS
PATIENT SEEN AND EXAMINED. CASE D/W ER MD AND RESIDENT TEAM. ABOVE ROS/VS/PE REVIEWED AND VERIFIED.    PLAN:    # ACUTE METABOLIC ENCEPHALOPATHY - NOTED THAT 1/23 PATIENT SPONTANEOUSLY AROSE TO VOICE AND FOLLOWED COMMANDS [WITH DECREASED STRENGTH IN ARMS AND LEGS], 1/24 NOTED ACUTE DECLINE IN MENTAL STATUS AND STAFF REPORTED RIGHT SIDED FACIAL DROOP - CODE STROKE WAS CALLED - PATIENT WAS EVALUATED BY TEAM AND CT HEAD WAS NEGATIVE  - ON 1/25 SPONTANEOUSLY AROSE TO VOICE AND FOLLOWED SIMPLE COMMANDS INCONSISTENTLY I.E "SQUEEZE MY HAND"  - ENCEPHALOPATHY THUS FAR WAS SUSPECTED S/T ACUTE INFECTION & HYPERNATREMIA [resolved] - REVIEWED CT HEAD  - NEUROLOGY CONSULT IN PROGRESS, NEUROLOGIST WAS MADE AWARE   - PSYCHIATRY RECOMMENDED METHYLPHENIDATE      # GIVEN RHONCHI - CXR WAS ORDERED AND NOTED TO HAVE BILATERAL PLEURAL EFFUSION W/ POSSIBLE INFILTRATE   - IVF DISCONTINUED  - LASIX GIVEN  - CURRENT ABX REGIMEN VANCOMYCIN + CEFEPIME + FLAGYL CONTINUED   - PRN SUCTIONING  - F/U REPEAT BCX; F/U SPUTUM CX    # ELEVATED TROPONIN - ECG REVIEWED AND CARDIOLOGY TEAM UPDATED BY NP - TREND TROPONINS, ON ASPIRIN, TRANSFERRED TO TELEMETRY    # ELEVATED BNP W/ PLEURAL EFFUSIONS - SUSPECT CHF - GIVEN IV LASIX, LANDIS PLACED FOR STRICT IS AND OS, MONITOR ON TELEMETRY, F/U ECHOCARDIOGRAM    # MULTIPLE ELECTROLYTE ABNORMALITIES - HYPERNATREMIA, HYPERCHLOREMIA, HYPOKALEMIA, HYPOCALCEMIA, NONANION GAP METABOLIC ACIDOSIS [ SUSPECT LESS LIKELY STARVATION OR DIABETIC KETOACIDOSIS]  - REVIEWED ABG, REVIEWED ACETONE AND B-HYDROXYBUTYRATE, LACTIC ACID  - SUSPECTING RTA VS. HYPERCHLOREMIA S/T NORMAL SALINE INFUSION - CASE D/W NEPHROLOGIST DR. TODD - RECOMMENDED OBTAINING URINE LYTES AT THIS TIME  - MONITOR BMP, FINGER STICKS [ DOES NOT APPEAR TO BE ? EUGLYCEMIC DKA],    # ? REFEEDING SYNDROME - THOUGH PATIENT HAS NOT BEEN EATING CONSISTENTLY - WILL MONITOR AND REPLETE ELECTROLYTES    # ELEVATED BNP - ? F/U ECHOCARDIOGRAM - MAY NEED IV LASIX     # BILATERAL LE CHRONIC ULCER NOW PROGRESSIVELY WORSENING W/ SSTI AND RIGHT CALCANEAL OSTEOMYELITIS W/ POSSIBLE ABSCESS; UNDERWENT BEDSIDE DEBRIDEMENT OF RIGHT LEG ULCER AND UNDERWENT RIGHT PARTIAL CALCANECTOMY ON 1/13 AND PICC LINE PLACEMENT 1/20  - CEFEPIME + VANCOMYCIN + FLAGYL, REVIEWED TIMUR, BCX - NGTD, WOUND CX - MORGANELLA MORGANI & P. MIRABILIS, ID CONSULT IN PROGRESS, PODIATRY CONSULT IN PROGRESS  - REVIEWED RIGHT LE MRI  -  PSYCHIATRY CONSULT FOR CAPACITY IN PROGRESS - DEEM PATIENT AS HAVING CAPACITY.  PATIENT WAS AGREEABLE FOR SURGICAL DEBRIDEMENT ON 1/09  - 2 PC CONSENT FOR SURGICAL DEBRIDEMENT PLANNED FOR 1/12; ATTEMPTED TO CALL NEXT OF [facility] @ [phone] HOWEVER PHONE NUMBER APPEARS DISCONNECTED  - VASCULAR SX CONSULT IN PROGRESS - PATIENT MAY REQUIRE AKA, BUT REFUSED ON PREVIOUS CONVERSATIONS - WAS AGREEABLE TO LOCAL DEBRIDEMENT/INTERVENTIONS  - PLAN FOR WOUND VAC  - PATIENT WILL NEED 6 WEEKS OF IV ANTIBIOTICS - WILL NEED TO SWITCH TO PICC LINE FROM EXTENDED DWELL - D/W FLOOR TEAM  # MEDICAL CLEARANCE FOR SURGERY - RCRI 1 POINT - 6% RISK OF DEATH, MI OR CARDIAC ARREST; CARDIOLOGY CONSULT IN PROGRESS FOR MEDICAL CLEARANCE  # NORMOCYTIC ANEMIA - SUSPECT S/T AOCD AND POOR NUTRITIONAL STATUS - F/U ANEMIA PANEL; PRBC TRANSFUSION GIVEN TODAY  # SACRAL AND RIGHT ISCHIAL UNSTAGEABLE ULCERS W/ SSTI  S/P DEBRIDEMENT 1/8- ON VANCOMYCIN AND CEFEPIME, SURGERY CONSULT IN PROGRESS -  PSYCHIATRY CONSULT FOR CAPACITY IN PROGRESS      # DEBILITY / ? MYOPATHY S/T ILLNESS - PT EVALUATION, OUT OF BED TO CHAIR  # HYPERNATREMIA SUSPECT S/T POOR PO INTAKE - RESOLVED W/ IVF  # POOR PO INTAKE - PATIENT ATE SOME LUNCH TODAY; ST EVAL NOTED  # LEFT ARM SWELLING - REVIEWED LEFT ARM U/S W/ OUT THROMBUS, WARM COMPRESSES  # ASHLEY - RESOLVED  # PAD  # HTN  # DM W/ DIABETIC NEUROPATHY  # OA/OP  # SCHIZOPHRENIA - HOLD MEDICATION; PLACED ON IV HALDOL PER PSYCHIATRY RECOMMENDATION  # CASE D/W PATIENT'S ? PARTNER - LISA KELLY - WHO REPORTS HE IS NOT NEXT OF KIN OR HCP - [phone] ON 1/12; HE UNDERSTOOD PATIENT'S PROGNOSIS WAS GUARDED  # ATTEMPTED TO CALL MARICHUY KELLY ON 1/24 BUT WAS NOT ABLE TO GET THROUGH - LEFT MESSAGE W/ STAFF OF ASSISTED LIVING ; ATTEMPTED TO CALL [facility] @ [phone] - PHONE APPEARS TO BE DISCONNECTED [1/24]  # MOLST FORM FROM 05/2020 REVIEWED - PATIENT REQUESTED DNR/DNI FOR GOC. CONFIRMED BY PCP IN 12/2020. CONFIRMED BY NP NAWRA ON 01/13 - PATIENT'S GOC IS DNR/DNI. PALLIATIVE CARE CONSULT PLACED. NOTED PROGNOSIS IS GUARDED  # GI AND DVT PPX

## 2021-01-25 NOTE — PROGRESS NOTE ADULT - PROBLEM SELECTOR PLAN 5
Controlled as evidenced by A1c 6.8  Continue with sliding scale insulin coverage  Continue with glucose monitoring   Target -180 Continue with cleaning all wounds with normal saline and apply skin prep to the surrounding skin  Surgery on board

## 2021-01-25 NOTE — PROGRESS NOTE ADULT - SUBJECTIVE AND OBJECTIVE BOX
Neurology follow-up requested because of episode yesterday when a Code Stroke was called for AMS and for what appeared to be a R facial droop.  She has been followed by Neurology, Behavioral Health, and Infectious Disease services, inter adolfo, since admission on 1/5/21.  I quote the following excellent summary from the Behavioral Medicine note this morning:     "75F, single and living in nursing home ([facility]), with PHx of schizophrenia and MHx of HTN, Diabetes, Osteoarthritis, Osteoporosis, Peripheral arterial disease, Diabetic neuropathy, and HLD, BIBEMS on 1/5 from NH for worsening BL heel ulcerations which have been chronic for the past few months, as well as chronic sacral decubitus ulcer. MRI showed progressive osteomyelitis of R foot and surgical debridement was recommended for both sacral and calcaneal ulcers, but pt initially stated she only wanted sacral debridement and refused it for her feet, despite being informed that the heel infection presented a more immediate risk to life and limb. Initial psych consult was requested for assessment of capacity to refuse heel debridement; on exam, pt expressed verbal agreement with debridement and was found to have capacity to consent to the procedure, but written consent could not be obtained due to subsequent prolonged somnolence, so procedure was performed on 1/13 with C physician consent. Pt received PICC on 1/20 under similar circumstances (initially expressed verbal consent, but subsequently became somnolent and written consent could not be obtained; 2PC consent was used). Psychiatry is now reconsulted for assessment of prolonged postoperative somnolence and hyporesponsiveness. Given pt's somnolence and aphonia today, we again agree with neurology impression that pt's presentation is c/w prolonged hypoactive delirium 2/2 metabolic abnormalities and infection."    I note from reviewing various consult and progress notes that Pt has had waxing and waning mental status.      Partial quote from yesterday's Stroke Code note:  "Code stroke was called as patient has acute encephalopathy. NP also notice patient has facial droop on right side. Code team arrived and patient was vitally stable.  ICU Vital Signs Last 24 Hrs  T(C): 36.1 (24 Jan 2021 14:22), Max: 36.6 (24 Jan 2021 04:59)  T(F): 97 (24 Jan 2021 14:22), Max: 97.9 (24 Jan 2021 04:59)  HR: 105 (24 Jan 2021 14:22) (93 - 105)  BP: 117/77 (24 Jan 2021 14:22) (98/51 - 120/60)  BP(mean): --  ABP: --  ABP(mean): --  RR: 17 (24 Jan 2021 14:22) (17 - 18)  SpO2: 100% (24 Jan 2021 14:22) (85% - 100%)  BSL:189 mg/dl.  Patient pupils are reactive bilaterally. Patient noticed to have some facial droop on right side. Ordered Stat CT head to rule out any bleeding.  Recommend starting aspirin ans statin.   Recommend getting speech and swallow evaluation.  Neurological checks."    There were no new findings on non-con head CT.      EXAMINATION    Asleep supine in bed, eyelids closed.  Not awakened by a normal tone of voice or gentle jostling.  Awakens to being addressed in a loud stentorian tone.  Bilateral dense cataracts; PERRL; mildly divergent gaze.  Bilateral visual threat responses elicited.  Bilateral normoactive corneal reflexes.  Mouth agape during the entire examination; expressionless facies; no evident facial asymmetry.  After passive elevation both UEs drift downward at the same rate.  Apparently winces to mildly irritating stimulation of any limb (due to an essentially frozen facial expression observe change is problematic).

## 2021-01-25 NOTE — PROGRESS NOTE ADULT - PROBLEM SELECTOR PLAN 4
Pt takes Perphenazine at home  Home medication held as recommended by Psychiatry  Neuro Dr. Norris on board   Psychiatry Dr. Lackey, recommends Methylphenidate 5 mg daily Patient w/ bilateral heel ulcers  Continue with heel lift boots, offloading of bony prominences  Continue with NWB to right heel  C/w wound vac applied by podiatry.

## 2021-01-25 NOTE — PROGRESS NOTE ADULT - PROBLEM SELECTOR PLAN 8
Continue with SQ Lovenox. Controlled  Pt takes Amlodipine 5 mg & Metoprolol 100 mg at home  Resume home medications when medically appropriate.

## 2021-01-25 NOTE — PROGRESS NOTE ADULT - ASSESSMENT
A:  s/p R foot partial calcanectomy & wound debridement 1/13/21  Osteomyelitis of calcaneus R foot   DMII    P:   Patient evaluated and chart reviewed  Post-op R foot xrays results as noted above  Intra-op cx result reviewed as above  Intra-op path result reviewed as above  LLE dressed with santyl, allevyn pad  RLE wound vac applied using santyl, white foam and black foam, set at 125mmHg continuous flow   Pt to continue using CAIR boots to offload b/l LE   Instructions for discharge: upon discharge, on Right heel wound apply santyl, white foam overlying exposed calcaneus on the right heel, and black foam overlying to cover the wound. Place wound vac over, and set at 125 mmHg. On the left heel, apply santyl to wound, cover with 4x4 gauze, ABD pad, Keyur overlying, and light ACE compression. Apply CAIR boots bilaterally.  Podiatry will follow while in house.   Discussed with attending

## 2021-01-25 NOTE — PROGRESS NOTE BEHAVIORAL HEALTH - DETAILS
Sacral decubitus ulcer, BL heel ulcers with eschar and surrounding edema. Both hands are cool to the touch Poor appetite Osteomyelitis of R foot. Swelling of BL hands

## 2021-01-25 NOTE — PROGRESS NOTE ADULT - SUBJECTIVE AND OBJECTIVE BOX
75y Female    Meds:  ampicillin/sulbactam  IVPB      ampicillin/sulbactam  IVPB 3 Gram(s) IV Intermittent every 6 hours    Allergies    No Known Allergies    Intolerances        VITALS:  Vital Signs Last 24 Hrs  T(C): 36.4 (25 Jan 2021 15:53), Max: 36.6 (25 Jan 2021 11:37)  T(F): 97.5 (25 Jan 2021 15:53), Max: 97.8 (25 Jan 2021 11:37)  HR: 101 (25 Jan 2021 15:53) (91 - 106)  BP: 94/52 (25 Jan 2021 15:53) (94/52 - 108/61)  BP(mean): --  RR: 18 (25 Jan 2021 15:53) (18 - 18)  SpO2: 100% (25 Jan 2021 15:53) (98% - 100%)    LABS/DIAGNOSTIC TESTS:                          10.6   15.85 )-----------( 153      ( 25 Jan 2021 07:04 )             31.9     Lactate, Blood: 1.3 mmol/L (01-24 @ 21:23)      01-25    151<H>  |  124<H>  |  17  ----------------------------<  139<H>  4.0   |  15<L>  |  0.80    Ca    7.7<L>      25 Jan 2021 07:04  Phos  5.6     01-25  Mg     1.9     01-25    TPro  4.2<L>  /  Alb  1.2<L>  /  TBili  0.3  /  DBili  x   /  AST  7<L>  /  ALT  7<L>  /  AlkPhos  82  01-25      LIVER FUNCTIONS - ( 25 Jan 2021 07:04 )  Alb: 1.2 g/dL / Pro: 4.2 g/dL / ALK PHOS: 82 U/L / ALT: 7 U/L DA / AST: 7 U/L / GGT: x             CULTURES: .Tissue Right heel margin  01-13 @ 19:31   Rare Enterococcus faecalis  --  Enterococcus faecalis      .Surgical Swab Right heel wound culture  01-13 @ 19:22   Moderate Proteus mirabilis  Few Bacteroides vulgatus group "Susceptibilities not performed"  --  Proteus mirabilis      .Urine Clean Catch (Midstream)  01-06 @ 06:46   No growth  --  --      .Surgical Swab Right heel wound  01-05 @ 22:13   Few Morganella morganii  Few Proteus mirabilis  Moderate Corynebacterium species "Susceptibilities not performed"  Moderate Bacteroides vulgatus "Susceptibilities not performed"  --  Morganella morganii  Proteus mirabilis      .Blood Blood-Peripheral  01-05 @ 17:59   No Growth Final  --  --            RADIOLOGY:< from: Xray Chest 1 View-PORTABLE IMMEDIATE (Xray Chest 1 View-PORTABLE IMMEDIATE .) (01.24.21 @ 19:12) >    EXAM:  XR CHEST PORTABLE IMMED 1V                            PROCEDURE DATE:  01/24/2021          INTERPRETATION:  AP chest on January 24, 2021 at 7:15 PM. Patient has congestion.    Heart magnified by technique.    There is a persistent mild rightbase effusion possibly somewhat increased from January 23.    There is also a mild left base effusion also showing some increase.    Left PICC line remains.    IMPRESSION: Basilar effusions somewhat increased from prior.            ADITI SHELL M.D., ATTENDING RADIOLOGIST  This document has been electronically signed. Jan 25 2021  9:43AM    < end of copied text >        ROS:  [  ] UNABLE TO ELICIT 75y Female who appears to have had a CVA, she is not responding to questions and is keeping her eyes closed even to noxious stimuli. She had a partial Calcanectomy and it showed acute Osteo on pathology. She has no fevers or diarrhea, no leukocytosis.    Meds:  ampicillin/sulbactam  IVPB      ampicillin/sulbactam  IVPB 3 Gram(s) IV Intermittent every 6 hours    Allergies    No Known Allergies    Intolerances        VITALS:  Vital Signs Last 24 Hrs  T(C): 36.4 (25 Jan 2021 15:53), Max: 36.6 (25 Jan 2021 11:37)  T(F): 97.5 (25 Jan 2021 15:53), Max: 97.8 (25 Jan 2021 11:37)  HR: 101 (25 Jan 2021 15:53) (91 - 106)  BP: 94/52 (25 Jan 2021 15:53) (94/52 - 108/61)  BP(mean): --  RR: 18 (25 Jan 2021 15:53) (18 - 18)  SpO2: 100% (25 Jan 2021 15:53) (98% - 100%)    LABS/DIAGNOSTIC TESTS:                          10.6   15.85 )-----------( 153      ( 25 Jan 2021 07:04 )             31.9     Lactate, Blood: 1.3 mmol/L (01-24 @ 21:23)      01-25    151<H>  |  124<H>  |  17  ----------------------------<  139<H>  4.0   |  15<L>  |  0.80    Ca    7.7<L>      25 Jan 2021 07:04  Phos  5.6     01-25  Mg     1.9     01-25    TPro  4.2<L>  /  Alb  1.2<L>  /  TBili  0.3  /  DBili  x   /  AST  7<L>  /  ALT  7<L>  /  AlkPhos  82  01-25      LIVER FUNCTIONS - ( 25 Jan 2021 07:04 )  Alb: 1.2 g/dL / Pro: 4.2 g/dL / ALK PHOS: 82 U/L / ALT: 7 U/L DA / AST: 7 U/L / GGT: x             CULTURES: .Tissue Right heel margin  01-13 @ 19:31   Rare Enterococcus faecalis  --  Enterococcus faecalis      .Surgical Swab Right heel wound culture  01-13 @ 19:22   Moderate Proteus mirabilis  Few Bacteroides vulgatus group "Susceptibilities not performed"  --  Proteus mirabilis      .Urine Clean Catch (Midstream)  01-06 @ 06:46   No growth  --  --      .Surgical Swab Right heel wound  01-05 @ 22:13   Few Morganella morganii  Few Proteus mirabilis  Moderate Corynebacterium species "Susceptibilities not performed"  Moderate Bacteroides vulgatus "Susceptibilities not performed"  --  Morganella morganii  Proteus mirabilis      .Blood Blood-Peripheral  01-05 @ 17:59   No Growth Final  --  --            RADIOLOGY:< from: Xray Chest 1 View-PORTABLE IMMEDIATE (Xray Chest 1 View-PORTABLE IMMEDIATE .) (01.24.21 @ 19:12) >    EXAM:  XR CHEST PORTABLE IMMED 1V                            PROCEDURE DATE:  01/24/2021          INTERPRETATION:  AP chest on January 24, 2021 at 7:15 PM. Patient has congestion.    Heart magnified by technique.    There is a persistent mild rightbase effusion possibly somewhat increased from January 23.    There is also a mild left base effusion also showing some increase.    Left PICC line remains.    IMPRESSION: Basilar effusions somewhat increased from prior.            ADITI SHELL M.D., ATTENDING RADIOLOGIST  This document has been electronically signed. Jan 25 2021  9:43AM    < end of copied text >        ROS:  [ x ] UNABLE TO ELICIT

## 2021-01-25 NOTE — PROGRESS NOTE BEHAVIORAL HEALTH - SUMMARY
75F, single and living in nursing home ([facility]), with PHx of schizophrenia and MHx of HTN, Diabetes, Osteoarthritis, Osteoporosis, Peripheral arterial disease, Diabetic neuropathy, and HLD, BIBEMS on 1/5 from NH for worsening BL heel ulcerations which have been chronic for the past few months, as well as chronic sacral decubitus ulcer. MRI showed progressive osteomyelitis of R foot and surgical debridement was recommended for both sacral and calcaneal ulcers, but pt initially stated she only wanted sacral debridement and refused it for her feet, despite being informed that the heel infection presented a more immediate risk to life and limb. Initial psych consult was requested for assessment of capacity to refuse heel debridement; on exam, pt expressed verbal agreement with debridement and was found to have capacity to consent to the procedure, but written consent could not be obtained due to subsequent prolonged somnolence, so procedure was performed on 1/13 with [facility] physician consent. Pt received PICC on 1/20 under similar circumstances (initially expressed verbal consent, but subsequently became somnolent and written consent could not be obtained; [facility] consent was used). Psychiatry is now reconsulted for assessment of prolonged postoperative somnolence and hyporesponsiveness. Given pt's somnolence and aphonia today, we again agree with neurology impression that pt's presentation is c/w prolonged hypoactive delirium 2/2 metabolic abnormalities and infection. We recommend starting methylphenidate PO 5 mg qd standing in effort to improve pt alertness; we would also c/w Haldol IV 0.5 mg q12h PRN delirium, but pt has not needed this to date. Pt does not appear to present an acute risk of harm to self or others at the time of assessment, and does not appear to be in need of admission to  psych at the time of assessment.

## 2021-01-25 NOTE — PROGRESS NOTE ADULT - SUBJECTIVE AND OBJECTIVE BOX
Patient is a 75y old  Female who presents with a chief complaint of foot ulcer (05 Jan 2021 14:24)      HPI:  74 yo F from [facility], wheelchair bound with medical history significant of HTN, Diabetes, Osteoarthritis, Osteoporosis, Peripheral arterial disease, Diabetic neuropathy, HLD, Schizophrenia comes in with worsening ulceration to b/l heels. She has been having ulcers for past couple months, has been progressive. She was treated with IV antbx at NH but did not get better. It was worsening with necrotic changes and increasing foul smelling discharge. She denies fever, abdominal pain, chest pain, urinary symptoms, fall, trauma. No h/o nausea, vomiting, diarrhea, cough, dyspnea, sick contacts, recent illness.  (05 Jan 2021 14:24)      Podiatry HPI: 74 y/o female with full history as noted above, podiatry consulted for b/l heel wounds. Pt is from [facility], wheelchair bound with medical history significant of HTN, Diabetes, Osteoarthritis, Osteoporosis, PAD, Diabetic neuropathy, HLD, Schizophrenia comes in with worsening ulceration to b/l heels. Patient seen resting in bed comfortably, AAOx3. Patient states she has had the wounds for a long time, maybe more than 6 months. She relates receiving local wound care previously in the NH using santyl dressings. She endorses occasional pain to wound sites. Patient was  under podiatric care in previous admissions for b/l heel wounds with osteomyelitis. She states she is not interesting in surgery for her feet, she wishes to continue with local wound care. She denies nausea, vomiting, chills, fever, SOB.     Podiatry Progress: Pt s/p Right foot partial calcanectomy and wound debridement on 1/13/21. Pt lethargic, altered mental status, nonresponsive to verbal stimuli, nonverbal.    Medications acetaminophen   Tablet .. 650 milliGRAM(s) Oral every 6 hours PRN  aspirin enteric coated 81 milliGRAM(s) Oral daily  cefepime   IVPB 1000 milliGRAM(s) IV Intermittent every 8 hours  chlorhexidine 2% Cloths 1 Application(s) Topical daily  collagenase Ointment 1 Application(s) Topical daily  Dakins Solution - Full Strength 1 Application(s) Topical once  dextrose 40% Gel 15 Gram(s) Oral once  dextrose 50% Injectable 25 Gram(s) IV Push once  dextrose 50% Injectable 12.5 Gram(s) IV Push once  dextrose 50% Injectable 25 Gram(s) IV Push once  glucagon  Injectable 1 milliGRAM(s) IntraMuscular once  haloperidol    Injectable 0.5 milliGRAM(s) IV Push every 8 hours PRN  metroNIDAZOLE  IVPB 500 milliGRAM(s) IV Intermittent every 8 hours  pantoprazole  Injectable 40 milliGRAM(s) IV Push every 12 hours  simvastatin 40 milliGRAM(s) Oral at bedtime  sodium chloride 0.9% lock flush 10 milliLiter(s) IV Push every 1 hour PRN  sucralfate 1 Gram(s) Oral four times a day  vancomycin  IVPB 750 milliGRAM(s) IV Intermittent every 12 hours    FHNo pertinent family history in first degree relatives    ,   PMHCOVID-19    Osteomyelitis    DM (diabetes mellitus)    Paranoid schizophrenia    HLD (hyperlipidemia)    PAD (peripheral artery disease)    HTN (hypertension)       PSHNo significant past surgical history        Labs                          10.6   15.85 )-----------( 153      ( 25 Jan 2021 07:04 )             31.9      01-25    151<H>  |  124<H>  |  17  ----------------------------<  139<H>  4.0   |  15<L>  |  0.80    Ca    7.7<L>      25 Jan 2021 07:04  Phos  5.6     01-25  Mg     1.9     01-25    TPro  4.2<L>  /  Alb  1.2<L>  /  TBili  0.3  /  DBili  x   /  AST  7<L>  /  ALT  7<L>  /  AlkPhos  82  01-25     Vital Signs Last 24 Hrs  T(C): 36.4 (25 Jan 2021 07:57), Max: 36.4 (25 Jan 2021 07:57)  T(F): 97.5 (25 Jan 2021 07:57), Max: 97.5 (25 Jan 2021 07:57)  HR: 106 (25 Jan 2021 07:57) (91 - 106)  BP: 95/58 (25 Jan 2021 07:57) (95/58 - 117/77)  BP(mean): --  RR: 18 (25 Jan 2021 07:57) (17 - 18)  SpO2: 100% (25 Jan 2021 07:57) (98% - 100%)  Sedimentation Rate, Erythrocyte: 99 mm/Hr (01-05-21 @ 11:45)         C-Reactive Protein, Serum: 5.63 mg/dL (01-06-21 @ 10:27)   WBC Count: 15.85 K/uL <H> (01-25-21 @ 07:04)  WBC Count: 16.63 K/uL <H> (01-24-21 @ 14:48)      ROS:  REVIEW OF SYSTEMS: negative except as noted on HPI      PHYSICAL EXAM 1/23/21: B/L FOOT DRESSING MAINTAINED CLEAN, DRY, INTACT; PERFUSION BRISK TO B/L DIGITS; R FOOT WOUND VAC SET AT 125MMHG CONTINUOUS FLOW      PHYSICAL EXAM 1/22/21:   LE Focused:    Vasc:  DP/PT nonpalpable, CFT brisk to digits, TG warm to warm b/l,   Derm:   Wound 1:  L heel closed necrotic eschar measuring ~4x3.5x0.1 cm, no discharge noted, no malodor or PTB noted, stable in appearance    Wound 2: s/p R foot partial calcanectomy and wound debridement measuring ~9x6.5x1 cm with calcaneal bone exposed and increased surrounding fibrotic tissue; No periwound erythema. No drainage, no malodor  Neuro: protective sensation absent at level of digits b/l  MSK: no tenderness on palpation of periwound areas b/l       IMAGING:  f< from: Xray Ankle Complete 3 Views, Right (01.13.21 @ 18:15) >    EXAM:  ANKLE RIGHT (MINIMUM 3 V)                            PROCEDURE DATE:  01/13/2021          INTERPRETATION:  RIGHT ankle    CLINICAL INFORMATION: Postoperative radiographs.    TECHNIQUE: AP,lateral /views.    FINDINGS:    Large posterior calcaneal ulceration NOTED with the osteolysis /surgical debridement of the posterior calcaneus remaining osseous structures of the ankle are intact..    IMPRESSION:    Posterior calcaneal large ulceration within the posterior calcaneal postsurgical debridement versus/osteomyelitis.            ADITI SILVA MD; Attending Radiologist  This document has been electronically signed. Jan 13 2021  7:02PM    < end of copied text >    --------------------------  < from: MR Foot No Cont, Right (01.06.21 @ 11:08) >    EXAM:  MR FOOT RT                            PROCEDURE DATE:  01/06/2021          INTERPRETATION:  Clinical indications: Right heel ulceration and eschar status post debridement. Concern for osteomyelitis.    Multiplanar multisequence MRI of the right foot was performed localized to the hindfoot.    Correlation is made with prior MRI from September 11, 2019.    FINDINGS:    There is a large wound along the plantar aspect of the calcaneus posteriorly which extends nearly extends to bone. There is surrounding soft tissue edema about this site with evidence of an overlying bandage. The degree of soft tissue deficiency is increased compared to the prior examination. There is extensive osseous edema and corresponding hypointense T1 marrow signalthroughout the calcaneus extending from the posterior plantar margin to the level of the angle of Gissane. This is progressed compared to the prior examination and consistent with osteomyelitis. Marrow signal within the remainder of the foot is otherwise preserved.    There is confluent edema tracking along the abductor hallucis and flexor digitorum brevis muscles and within the plantar subcutaneous fat subjacent to this region. In total this area measures approximately 2.2 cm in transverse dimension, approximately 4 cm in anteroposterior posterior dimension, and approximately 1.6 cm in craniocaudal dimension. Findings may be related to underlying phlegmon or abscess. Evaluation is limited by the lack of intravenous contrast. Distal to this site there is edema throughout the visualized musculature consistent with myositis.    Visualized tendinous structures remain intact. There is no acute ligamentous injury. Visualized joint spaces are preserved without joint effusion.    IMPRESSION:    Osteomyelitis involving the calcaneus which extends from the plantar posterior aspect of the calcaneus to the level of the angle of Gissane. This is progressed compared to the prior examination. This is seen in the setting of diffuse soft tissue deficiency along the posterior plantar aspect of the calcaneus.    Confluent edema adjacent to this region within the abductor hallucis and flexor digitorum brevis muscles and within the subjacent plantar subcutaneous fat. This may be related to abscess orphlegmon.            OSWALDO ALFRED MD; Attending Radiologist  This document has been electronically signed. Jan 6 2021 11:49AM    < end of copied text >    -------------------------------------------------------------------------------------------------------------  < from: [facility] Physiol Extremity Lower 3+ Level, BI (01.05.21 @ 17:55) >    EXAM:  US PHYSIOL LWR EXT 3+ LEV BI                            PROCEDURE DATE:  01/05/2021          INTERPRETATION:  Clinical information: History of diabetes, nonsmoker, hypertension, hyperlipidemia, presents with bilateral heel ulcers.    Comparison: Lower extremity arterial Doppler study dated 5/13/2020.    Technique: Lower extremity arterial Doppler study. Note that pressure measurements were not obtained at the thigh level.    Findings: Ankle brachial index measures 1.33 bilaterally, compared with prior values of 1.41 on the right and 1.36 on the left. No segmental arterial pressure gradient is present.    PVR waveforms are normal in amplitude and pulsatility from the thigh through the ankle level. They're diminished in amplitude at the metatarsal and digital levels in symmetric fashion, similar to the prior exam.    Impression: No Doppler evidence of hemodynamically significant arterial inflow abnormality in either lower extremity.    Evidence of small vessel disease in the feet.    No significant interval change since study of 5/13/2020.            SOHAN PA MD; Attending Radiologist  This document has been electronically signed. Jan 6 2021  9:13AM    < end of copied text >      --------------------------------------------------------------------------------------------------------------  < from: Xray Foot AP + Lateral + Oblique, Right (01.05.21 @ 18:00) >    EXAM:  FOOT RIGHT (MINIMUM 3 VIEWS)                            PROCEDURE DATE:  01/05/2021          INTERPRETATION:  Right foot    HISTORY: Status post bedside debridement.     Two views of the right foot show thinning of soft tissues near the calcaneus likely indicating site of debridement. The joint spaces are maintained. There is questionable exposure of the plantar surface of the calcaneus.    IMPRESSION: Calcaneal soft tissues and questionable exposure as noted. Clinical correlation is suggested.        Thank you for this referral.            REID CRAMER MD; Attending Interventional Radiologist  This document has been electronically signed. Jan 6 2021 11:10AM    < end of copied text >    -------------------------------------------------------------------------------------------  a< from: Xray Ankle Complete 3 Views, Bilateral (01.05.21 @ 14:37) >    EXAM:  ANKLE BILATERAL (MINIMUM 3 V)                            PROCEDURE DATE:  01/05/2021          INTERPRETATION:  CLINICAL INDICATION:  Osteomyelitis; evaluate for soft tissue emphysema.    COMPARISON:  Left ankle radiography 5/11/2020.    TECHNIQUE:  AP, oblique and crosstable lateral views left ankle. Oblique and crosstable lateral views right ankle. Technologist noted that the best possible films were obtained.    FINDINGS:    Right ankle: Generalized osteopenia. Subcutaneous emphysema is identified along the plantar aspect of the right hindfoot inferior to the calcaneus, with an overlying skin defect noted along the posterior aspect of the calcaneus. Osteolysis involving the inferior/posterior aspect of the right calcaneus cannot beexcluded. MRI evaluation can be performed for further assessment. Extensive vascular calcification is noted. No ankle joint effusion is noted.    Left ankle: Generalized osteopenia. There is no evidence for acute fracture or dislocation. The ankle mortise appears grossly intact. No ankle joint effusion is noted. Extensive vascular calcifications identified. No significant soft tissue swelling.      IMPRESSION:  No evidence for acute fracture. Subcutaneous emphysema is identified along the plantaraspect of the right hindfoot inferior to the calcaneus, with an overlying skin defect noted along the posterior aspect of the calcaneus. Osteolysis involving the inferior/posterior aspect of the right calcaneus cannot be excluded. MRI evaluation can be performed for further assessment.                MARA LARKIN MD; Attending Radiologist  This document has been electronically signed. Jan 5 2021  3:57PM    < end of copied text >        CULTURES:   cCulture - Surgical Swab (01.05.21 @ 22:13)   Specimen Source: .Surgical Swab Right heel wound   Culture Results:   Few Morganella morganii   Few Proteus mirabilis       Historical Values  Culture - Surgical Swab (01.05.21 @ 22:13)   Specimen Source: .Surgical Swab Right heel wound   Culture Results:   Few Morganella morganii   Few Proteus mirabilis   culture:Culture - Surgical Swab (01.13.21 @ 19:22)   - Ampicillin/Sulbactam: S <=4/2 Enterobacter, Citrobacter, and Serratia may develop resistance during prolonged therapy (3-4 days)   - Ampicillin: S <=8 These ampicillin results predict results for amoxicillin   - Amikacin: S <=16   - Amoxicillin/Clavulanic Acid: S <=8/4   - Aztreonam: S <=4   - Cefazolin: I 4 Enterobacter, Citrobacter, and Serratia may develop resistance during prolonged therapy (3-4 days)   - Cefepime: S <=2   - Cefoxitin: S <=8   - Ceftriaxone: S <=1 Enterobacter, Citrobacter, and Serratia may develop resistance during prolonged therapy   - Ciprofloxacin: R 1   - Ertapenem: S <=0.5   - Gentamicin: S <=2   - Levofloxacin: S <=0.5   - Meropenem: S <=1   - Trimethoprim/Sulfamethoxazole: S <=0.5/9.5   - Tobramycin: S <=2   - Piperacillin/Tazobactam: S <=8   Specimen Source: .Surgical Swab Right heel wound culture   Culture Results:   Moderate Proteus mirabilis   Few Bacteroides vulgatus group "Susceptibilities not performed"   Organism Identification: Proteus mirabilis   Organism: Proteus mirabilis   Method Type: RICARDO       Historical Values  Culture - Surgical Swab (01.13.21 @ 19:22)   - Ampicillin/Sulbactam: S <=4/2 Enterobacter, Citrobacter, and Serratia may develop resistance during prolonged therapy (3-4 days)   - Ampicillin: S <=8 These ampicillin results predict results for amoxicillin   - Amikacin: S <=16   - Amoxicillin/Clavulanic Acid: S <=8/4   - Aztreonam: S <=4   - Cefazolin: I 4 Enterobacter, Citrobacter, and Serratia may develop resistance during prolonged therapy (3-4 days)   - Cefepime: S <=2   - Cefoxitin: S <=8   - Ceftriaxone: S <=1 Enterobacter, Citrobacter, and Serratia may develop resistance during prolonged therapy   - Ciprofloxacin: R 1   - Ertapenem: S <=0.5   - Gentamicin: S <=2   - Levofloxacin: S <=0.5   - Meropenem: S <=1   - Trimethoprim/Sulfamethoxazole: S <=0.5/9.5   - Tobramycin: S <=2   - Piperacillin/Tazobactam: S <=8   Specimen Source: .Surgical Swab Right heel wound culture   Culture Results:   Moderate Proteus mirabilis   Few Bacteroides vulgatus group "Susceptibilities not performed"   Organism Identification: Proteus mirabilis   Organism: Proteus mirabilis   Method Type: RICARDO   ---------------------------------  Pathology result:Surgical Pathology Report (01.13.21 @ 14:03)   Surgical Pathology Report:   ACCESSION No: [id number]   [facility] [place] 2   Surgical Final Report   Final Diagnosis   1. Right calcaneum bone; partial calcanectomy, incision and   drainage:   Cancellous bone with acute osteomyelitis and reparative changes   2. Right heel, margin; biopsy:   Osteocartilaginous tissue with granulation tissue and   regenerating bony   trabeculae; no inflammatory exudate is identified   Verified by: Sarah Culver M.D.

## 2021-01-26 DIAGNOSIS — E43 UNSPECIFIED SEVERE PROTEIN-CALORIE MALNUTRITION: ICD-10-CM

## 2021-01-26 DIAGNOSIS — R53.2 FUNCTIONAL QUADRIPLEGIA: ICD-10-CM

## 2021-01-26 DIAGNOSIS — Z51.5 ENCOUNTER FOR PALLIATIVE CARE: ICD-10-CM

## 2021-01-26 LAB
ALBUMIN SERPL ELPH-MCNC: 1.2 G/DL — LOW (ref 3.5–5)
ALP SERPL-CCNC: 111 U/L — SIGNIFICANT CHANGE UP (ref 40–120)
ALT FLD-CCNC: <6 U/L DA — LOW (ref 10–60)
ANION GAP SERPL CALC-SCNC: 10 MMOL/L — SIGNIFICANT CHANGE UP (ref 5–17)
ANION GAP SERPL CALC-SCNC: 12 MMOL/L — SIGNIFICANT CHANGE UP (ref 5–17)
AST SERPL-CCNC: 10 U/L — SIGNIFICANT CHANGE UP (ref 10–40)
BILIRUB SERPL-MCNC: 0.3 MG/DL — SIGNIFICANT CHANGE UP (ref 0.2–1.2)
BUN SERPL-MCNC: 16 MG/DL — SIGNIFICANT CHANGE UP (ref 7–18)
BUN SERPL-MCNC: 17 MG/DL — SIGNIFICANT CHANGE UP (ref 7–18)
CALCIUM SERPL-MCNC: 7.1 MG/DL — LOW (ref 8.4–10.5)
CALCIUM SERPL-MCNC: 7.6 MG/DL — LOW (ref 8.4–10.5)
CHLORIDE SERPL-SCNC: 123 MMOL/L — HIGH (ref 96–108)
CHLORIDE SERPL-SCNC: 124 MMOL/L — HIGH (ref 96–108)
CO2 SERPL-SCNC: 17 MMOL/L — LOW (ref 22–31)
CO2 SERPL-SCNC: 18 MMOL/L — LOW (ref 22–31)
CREAT SERPL-MCNC: 0.77 MG/DL — SIGNIFICANT CHANGE UP (ref 0.5–1.3)
CREAT SERPL-MCNC: 0.83 MG/DL — SIGNIFICANT CHANGE UP (ref 0.5–1.3)
GLUCOSE BLDC GLUCOMTR-MCNC: 143 MG/DL — HIGH (ref 70–99)
GLUCOSE BLDC GLUCOMTR-MCNC: 61 MG/DL — LOW (ref 70–99)
GLUCOSE BLDC GLUCOMTR-MCNC: 78 MG/DL — SIGNIFICANT CHANGE UP (ref 70–99)
GLUCOSE BLDC GLUCOMTR-MCNC: 78 MG/DL — SIGNIFICANT CHANGE UP (ref 70–99)
GLUCOSE BLDC GLUCOMTR-MCNC: 80 MG/DL — SIGNIFICANT CHANGE UP (ref 70–99)
GLUCOSE SERPL-MCNC: 104 MG/DL — HIGH (ref 70–99)
GLUCOSE SERPL-MCNC: 95 MG/DL — SIGNIFICANT CHANGE UP (ref 70–99)
HCT VFR BLD CALC: 31.1 % — LOW (ref 34.5–45)
HGB BLD-MCNC: 10 G/DL — LOW (ref 11.5–15.5)
MAGNESIUM SERPL-MCNC: 2.1 MG/DL — SIGNIFICANT CHANGE UP (ref 1.6–2.6)
MAGNESIUM SERPL-MCNC: 2.3 MG/DL — SIGNIFICANT CHANGE UP (ref 1.6–2.6)
MCHC RBC-ENTMCNC: 29.3 PG — SIGNIFICANT CHANGE UP (ref 27–34)
MCHC RBC-ENTMCNC: 32.2 GM/DL — SIGNIFICANT CHANGE UP (ref 32–36)
MCV RBC AUTO: 91.2 FL — SIGNIFICANT CHANGE UP (ref 80–100)
NRBC # BLD: 0 /100 WBCS — SIGNIFICANT CHANGE UP (ref 0–0)
PHOSPHATE SERPL-MCNC: 4.1 MG/DL — SIGNIFICANT CHANGE UP (ref 2.5–4.5)
PHOSPHATE SERPL-MCNC: 4.5 MG/DL — SIGNIFICANT CHANGE UP (ref 2.5–4.5)
PLATELET # BLD AUTO: 132 K/UL — LOW (ref 150–400)
POTASSIUM SERPL-MCNC: 3.3 MMOL/L — LOW (ref 3.5–5.3)
POTASSIUM SERPL-MCNC: 3.3 MMOL/L — LOW (ref 3.5–5.3)
POTASSIUM SERPL-SCNC: 3.3 MMOL/L — LOW (ref 3.5–5.3)
POTASSIUM SERPL-SCNC: 3.3 MMOL/L — LOW (ref 3.5–5.3)
PROT SERPL-MCNC: 4.2 G/DL — LOW (ref 6–8.3)
RBC # BLD: 3.41 M/UL — LOW (ref 3.8–5.2)
RBC # FLD: 17.7 % — HIGH (ref 10.3–14.5)
SODIUM SERPL-SCNC: 152 MMOL/L — HIGH (ref 135–145)
SODIUM SERPL-SCNC: 152 MMOL/L — HIGH (ref 135–145)
WBC # BLD: 14.65 K/UL — HIGH (ref 3.8–10.5)
WBC # FLD AUTO: 14.65 K/UL — HIGH (ref 3.8–10.5)

## 2021-01-26 PROCEDURE — 99232 SBSQ HOSP IP/OBS MODERATE 35: CPT

## 2021-01-26 PROCEDURE — 99223 1ST HOSP IP/OBS HIGH 75: CPT

## 2021-01-26 RX ORDER — AMPICILLIN SODIUM AND SULBACTAM SODIUM 250; 125 MG/ML; MG/ML
3 INJECTION, POWDER, FOR SUSPENSION INTRAMUSCULAR; INTRAVENOUS EVERY 6 HOURS
Refills: 0 | Status: DISCONTINUED | OUTPATIENT
Start: 2021-01-26 | End: 2021-01-29

## 2021-01-26 RX ORDER — POTASSIUM CHLORIDE 20 MEQ
10 PACKET (EA) ORAL
Refills: 0 | Status: COMPLETED | OUTPATIENT
Start: 2021-01-26 | End: 2021-01-26

## 2021-01-26 RX ORDER — DEXTROSE 50 % IN WATER 50 %
50 SYRINGE (ML) INTRAVENOUS ONCE
Refills: 0 | Status: COMPLETED | OUTPATIENT
Start: 2021-01-26 | End: 2021-01-26

## 2021-01-26 RX ORDER — AMPICILLIN SODIUM AND SULBACTAM SODIUM 250; 125 MG/ML; MG/ML
3 INJECTION, POWDER, FOR SUSPENSION INTRAMUSCULAR; INTRAVENOUS
Qty: 336 | Refills: 0
Start: 2021-01-26 | End: 2021-02-22

## 2021-01-26 RX ADMIN — PANTOPRAZOLE SODIUM 40 MILLIGRAM(S): 20 TABLET, DELAYED RELEASE ORAL at 07:20

## 2021-01-26 RX ADMIN — Medication 100 MILLIEQUIVALENT(S): at 14:58

## 2021-01-26 RX ADMIN — AMPICILLIN SODIUM AND SULBACTAM SODIUM 200 GRAM(S): 250; 125 INJECTION, POWDER, FOR SUSPENSION INTRAMUSCULAR; INTRAVENOUS at 02:34

## 2021-01-26 RX ADMIN — CHLORHEXIDINE GLUCONATE 1 APPLICATION(S): 213 SOLUTION TOPICAL at 12:15

## 2021-01-26 RX ADMIN — AMPICILLIN SODIUM AND SULBACTAM SODIUM 200 GRAM(S): 250; 125 INJECTION, POWDER, FOR SUSPENSION INTRAMUSCULAR; INTRAVENOUS at 12:15

## 2021-01-26 RX ADMIN — Medication 100 MILLIEQUIVALENT(S): at 12:28

## 2021-01-26 RX ADMIN — AMPICILLIN SODIUM AND SULBACTAM SODIUM 200 GRAM(S): 250; 125 INJECTION, POWDER, FOR SUSPENSION INTRAMUSCULAR; INTRAVENOUS at 17:12

## 2021-01-26 RX ADMIN — Medication 50 MILLILITER(S): at 21:43

## 2021-01-26 RX ADMIN — Medication 1 APPLICATION(S): at 12:15

## 2021-01-26 RX ADMIN — PANTOPRAZOLE SODIUM 40 MILLIGRAM(S): 20 TABLET, DELAYED RELEASE ORAL at 17:12

## 2021-01-26 NOTE — CONSULT NOTE ADULT - PROBLEM SELECTOR RECOMMENDATION 3
Prior to admission pt was WC bound.  Currently bedbound. Dependent.  With multiple pressure wounds.  Wound care as per podiatry.  Frequent positioning.

## 2021-01-26 NOTE — PROGRESS NOTE ADULT - ATTENDING COMMENTS
[facility] NEPHROLOGY  Juve Parks M.D.  Favio Flores D.O.  Karmen Taylor M.D.  Elle Barajas, MSN, ANP-C  [phone]    [address] [place] [address]

## 2021-01-26 NOTE — PROGRESS NOTE ADULT - ATTENDING COMMENTS
PATIENT SEEN AND EXAMINED. CASE D/W ER MD AND RESIDENT TEAM. ABOVE ROS/VS/PE REVIEWED AND VERIFIED.    PLAN:    # ACUTE METABOLIC ENCEPHALOPATHY - NOTED THAT 1/23 PATIENT SPONTANEOUSLY AROSE TO VOICE AND FOLLOWED COMMANDS [WITH DECREASED STRENGTH IN ARMS AND LEGS], 1/24 NOTED ACUTE DECLINE IN MENTAL STATUS AND STAFF REPORTED RIGHT SIDED FACIAL DROOP - CODE STROKE WAS CALLED - PATIENT WAS EVALUATED BY TEAM AND CT HEAD WAS NEGATIVE  - ON 1/25 SPONTANEOUSLY AROSE TO VOICE AND FOLLOWED SIMPLE COMMANDS INCONSISTENTLY I.E "SQUEEZE MY HAND"  - ENCEPHALOPATHY THUS FAR WAS SUSPECTED S/T ACUTE INFECTION & HYPERNATREMIA [resolved] - REVIEWED CT HEAD  - NEUROLOGY CONSULT IN PROGRESS, NEUROLOGIST WAS MADE AWARE   - PSYCHIATRY RECOMMENDED METHYLPHENIDATE      # GIVEN RHONCHI - CXR WAS ORDERED AND NOTED TO HAVE BILATERAL PLEURAL EFFUSION W/ POSSIBLE INFILTRATE   - IVF DISCONTINUED  - LASIX GIVEN  - CURRENT ABX REGIMEN VANCOMYCIN + CEFEPIME + FLAGYL CONTINUED   - PRN SUCTIONING  - F/U REPEAT BCX; F/U SPUTUM CX    # ELEVATED TROPONIN - ECG REVIEWED AND CARDIOLOGY TEAM UPDATED BY NP - TREND TROPONINS, ON ASPIRIN, TRANSFERRED TO TELEMETRY    # ELEVATED BNP W/ PLEURAL EFFUSIONS - SUSPECT CHF - GIVEN IV LASIX, LANDIS PLACED FOR STRICT IS AND OS, MONITOR ON TELEMETRY, F/U ECHOCARDIOGRAM    # MULTIPLE ELECTROLYTE ABNORMALITIES - HYPERNATREMIA, HYPERCHLOREMIA, HYPOKALEMIA, HYPOCALCEMIA, NONANION GAP METABOLIC ACIDOSIS [ SUSPECT LESS LIKELY STARVATION OR DIABETIC KETOACIDOSIS]  - REVIEWED ABG, REVIEWED ACETONE AND B-HYDROXYBUTYRATE, LACTIC ACID  - SUSPECTING RTA VS. HYPERCHLOREMIA S/T NORMAL SALINE INFUSION - CASE D/W NEPHROLOGIST DR. TODD - RECOMMENDED OBTAINING URINE LYTES AT THIS TIME  - MONITOR BMP, FINGER STICKS [ DOES NOT APPEAR TO BE ? EUGLYCEMIC DKA],    # ? REFEEDING SYNDROME - THOUGH PATIENT HAS NOT BEEN EATING CONSISTENTLY - WILL MONITOR AND REPLETE ELECTROLYTES    # ELEVATED BNP - ? F/U ECHOCARDIOGRAM - MAY NEED IV LASIX     # BILATERAL LE CHRONIC ULCER NOW PROGRESSIVELY WORSENING W/ SSTI AND RIGHT CALCANEAL OSTEOMYELITIS W/ POSSIBLE ABSCESS; UNDERWENT BEDSIDE DEBRIDEMENT OF RIGHT LEG ULCER AND UNDERWENT RIGHT PARTIAL CALCANECTOMY ON 1/13 AND PICC LINE PLACEMENT 1/20  - CEFEPIME + VANCOMYCIN + FLAGYL, REVIEWED TIMUR, BCX - NGTD, WOUND CX - MORGANELLA MORGANI & P. MIRABILIS, ID CONSULT IN PROGRESS, PODIATRY CONSULT IN PROGRESS  - REVIEWED RIGHT LE MRI  -  PSYCHIATRY CONSULT FOR CAPACITY IN PROGRESS - DEEM PATIENT AS HAVING CAPACITY.  PATIENT WAS AGREEABLE FOR SURGICAL DEBRIDEMENT ON 1/09  - 2 PC CONSENT FOR SURGICAL DEBRIDEMENT PLANNED FOR 1/12; ATTEMPTED TO CALL NEXT OF [facility] @ [phone] HOWEVER PHONE NUMBER APPEARS DISCONNECTED  - VASCULAR SX CONSULT IN PROGRESS - PATIENT MAY REQUIRE AKA, BUT REFUSED ON PREVIOUS CONVERSATIONS - WAS AGREEABLE TO LOCAL DEBRIDEMENT/INTERVENTIONS  - PLAN FOR WOUND VAC  - PATIENT WILL NEED 6 WEEKS OF IV ANTIBIOTICS - WILL NEED TO SWITCH TO PICC LINE FROM EXTENDED DWELL - D/W FLOOR TEAM  # MEDICAL CLEARANCE FOR SURGERY - RCRI 1 POINT - 6% RISK OF DEATH, MI OR CARDIAC ARREST; CARDIOLOGY CONSULT IN PROGRESS FOR MEDICAL CLEARANCE  # NORMOCYTIC ANEMIA - SUSPECT S/T AOCD AND POOR NUTRITIONAL STATUS - F/U ANEMIA PANEL; PRBC TRANSFUSION GIVEN TODAY  # SACRAL AND RIGHT ISCHIAL UNSTAGEABLE ULCERS W/ SSTI  S/P DEBRIDEMENT 1/8- ON VANCOMYCIN AND CEFEPIME, SURGERY CONSULT IN PROGRESS -  PSYCHIATRY CONSULT FOR CAPACITY IN PROGRESS      # DEBILITY / ? MYOPATHY S/T ILLNESS - PT EVALUATION, OUT OF BED TO CHAIR  # HYPERNATREMIA SUSPECT S/T POOR PO INTAKE - RESOLVED W/ IVF  # POOR PO INTAKE - PATIENT ATE SOME LUNCH TODAY; ST EVAL NOTED  # LEFT ARM SWELLING - REVIEWED LEFT ARM U/S W/ OUT THROMBUS, WARM COMPRESSES  # ASHLEY - RESOLVED  # PAD  # HTN  # DM W/ DIABETIC NEUROPATHY  # OA/OP  # SCHIZOPHRENIA - HOLD MEDICATION; PLACED ON IV HALDOL PER PSYCHIATRY RECOMMENDATION  # CASE D/W PATIENT'S ? PARTNER - LISA KELLY - WHO REPORTS HE IS NOT NEXT OF KIN OR HCP - [phone] ON 1/12; HE UNDERSTOOD PATIENT'S PROGNOSIS WAS GUARDED  # ATTEMPTED TO CALL [facility] ON 1/24 BUT WAS NOT ABLE TO GET THROUGH - LEFT MESSAGE W/ STAFF OF ASSISTED LIVING ; ATTEMPTED TO CALL [facility] @ [phone] - PHONE APPEARS TO BE DISCONNECTED [1/24]  # MOLST FORM FROM 05/2020 REVIEWED - PATIENT REQUESTED DNR/DNI FOR GOC. CONFIRMED BY PCP IN 12/2020. CONFIRMED BY NP NAWRA ON 01/13 - PATIENT'S GOC IS DNR/DNI. PALLIATIVE CARE CONSULT PLACED. NOTED PROGNOSIS IS GUARDED  # GI AND DVT PPX. PATIENT SEEN AND EXAMINED. CASE D/W ER MD AND RESIDENT TEAM. ABOVE ROS/VS/PE REVIEWED AND VERIFIED.    PLAN:    # ACUTE METABOLIC ENCEPHALOPATHY - NOTED THAT 1/23 PATIENT SPONTANEOUSLY AROSE TO VOICE AND FOLLOWED COMMANDS [WITH DECREASED STRENGTH IN ARMS AND LEGS], 1/24 NOTED ACUTE DECLINE IN MENTAL STATUS AND STAFF REPORTED RIGHT SIDED FACIAL DROOP - CODE STROKE WAS CALLED - PATIENT WAS EVALUATED BY TEAM AND CT HEAD WAS NEGATIVE  - ON 1/25 SPONTANEOUSLY AROSE TO VOICE AND FOLLOWED SIMPLE COMMANDS INCONSISTENTLY I.E "SQUEEZE MY HAND"  - ENCEPHALOPATHY THUS FAR WAS SUSPECTED S/T ACUTE INFECTION & HYPERNATREMIA [resolved] - REVIEWED CT HEAD  - NEUROLOGY CONSULT IN PROGRESS, NEUROLOGIST WAS MADE AWARE   - PSYCHIATRY RECOMMENDED METHYLPHENIDATE      # GIVEN RHONCHI - CXR WAS ORDERED AND NOTED TO HAVE BILATERAL PLEURAL EFFUSION W/ POSSIBLE INFILTRATE   - IVF DISCONTINUED  - LASIX GIVEN  - CURRENT ABX REGIMEN VANCOMYCIN + CEFEPIME + FLAGYL CONTINUED   - PRN SUCTIONING  - F/U REPEAT BCX; F/U SPUTUM CX    # ELEVATED TROPONIN - ECG REVIEWED AND CARDIOLOGY TEAM UPDATED BY NP - TREND TROPONINS, ON ASPIRIN, TRANSFERRED TO TELEMETRY    # ELEVATED BNP W/ PLEURAL EFFUSIONS - SUSPECT CHF - GIVEN IV LASIX, LANDIS PLACED FOR STRICT IS AND OS, MONITOR ON TELEMETRY, F/U ECHOCARDIOGRAM    # MULTIPLE ELECTROLYTE ABNORMALITIES - HYPERNATREMIA, HYPERCHLOREMIA, HYPOKALEMIA, HYPOCALCEMIA, NONANION GAP METABOLIC ACIDOSIS [ SUSPECT LESS LIKELY STARVATION OR DIABETIC KETOACIDOSIS]  - REVIEWED ABG, REVIEWED ACETONE AND B-HYDROXYBUTYRATE, LACTIC ACID  - SUSPECTING RTA VS. HYPERCHLOREMIA S/T NORMAL SALINE INFUSION - CASE D/W NEPHROLOGIST DR. TODD - RECOMMENDED OBTAINING URINE LYTES AT THIS TIME  - MONITOR BMP, FINGER STICKS [ DOES NOT APPEAR TO BE ? EUGLYCEMIC DKA],    # ? REFEEDING SYNDROME - THOUGH PATIENT HAS NOT BEEN EATING CONSISTENTLY - WILL MONITOR AND REPLETE ELECTROLYTES    # ELEVATED BNP - ? F/U ECHOCARDIOGRAM - MAY NEED IV LASIX     # BILATERAL LE CHRONIC ULCER NOW PROGRESSIVELY WORSENING W/ SSTI AND RIGHT CALCANEAL OSTEOMYELITIS W/ POSSIBLE ABSCESS; UNDERWENT BEDSIDE DEBRIDEMENT OF RIGHT LEG ULCER AND UNDERWENT RIGHT PARTIAL CALCANECTOMY ON 1/13 AND PICC LINE PLACEMENT 1/20  - CEFEPIME + VANCOMYCIN + FLAGYL, REVIEWED TIMUR, BCX - NGTD, WOUND CX - MORGANELLA MORGANI & P. MIRABILIS, ID CONSULT IN PROGRESS, PODIATRY CONSULT IN PROGRESS  - REVIEWED RIGHT LE MRI  -  PSYCHIATRY CONSULT FOR CAPACITY IN PROGRESS - DEEM PATIENT AS HAVING CAPACITY.  PATIENT WAS AGREEABLE FOR SURGICAL DEBRIDEMENT ON 1/09  - 2 PC CONSENT FOR SURGICAL DEBRIDEMENT PLANNED FOR 1/12; ATTEMPTED TO CALL NEXT OF [facility] @ [phone] HOWEVER PHONE NUMBER APPEARS DISCONNECTED  - VASCULAR SX CONSULT IN PROGRESS - PATIENT MAY REQUIRE AKA, BUT REFUSED ON PREVIOUS CONVERSATIONS - WAS AGREEABLE TO LOCAL DEBRIDEMENT/INTERVENTIONS  - PLAN FOR WOUND VAC  - PATIENT WILL NEED 6 WEEKS OF IV ANTIBIOTICS - WILL NEED TO SWITCH TO PICC LINE FROM EXTENDED DWELL - D/W FLOOR TEAM  # MEDICAL CLEARANCE FOR SURGERY - RCRI 1 POINT - 6% RISK OF DEATH, MI OR CARDIAC ARREST; CARDIOLOGY CONSULT IN PROGRESS FOR MEDICAL CLEARANCE  # NORMOCYTIC ANEMIA - SUSPECT S/T AOCD AND POOR NUTRITIONAL STATUS - F/U ANEMIA PANEL; PRBC TRANSFUSION GIVEN TODAY  # SACRAL AND RIGHT ISCHIAL UNSTAGEABLE ULCERS W/ SSTI  S/P DEBRIDEMENT 1/8- ON VANCOMYCIN AND CEFEPIME, SURGERY CONSULT IN PROGRESS -  PSYCHIATRY CONSULT FOR CAPACITY IN PROGRESS      # DEBILITY / ? MYOPATHY S/T ILLNESS - PT EVALUATION, OUT OF BED TO CHAIR  # HYPERNATREMIA SUSPECT S/T POOR PO INTAKE - RESOLVED W/ IVF  # POOR PO INTAKE - PATIENT ATE SOME LUNCH TODAY; ST EVAL NOTED  # LEFT ARM SWELLING - REVIEWED LEFT ARM U/S W/ OUT THROMBUS, WARM COMPRESSES  # ASHLEY - RESOLVED  # PAD  # HTN  # DM W/ DIABETIC NEUROPATHY  # OA/OP  # SCHIZOPHRENIA - HOLD MEDICATION; PLACED ON IV HALDOL PER PSYCHIATRY RECOMMENDATION  # CASE D/W PATIENT'S ? PARTNER - LISA KELLY - WHO REPORTS HE IS NOT NEXT OF KIN OR HCP - [phone] ON 1/12 - HE UNDERSTOOD PATIENT'S PROGNOSIS WAS GUARDED. ATTEMPTED TO CALL [facility] ON 1/24 BUT WAS NOT ABLE TO GET THROUGH - LEFT MESSAGE W/ STAFF OF ASSISTED LIVING ; ATTEMPTED TO CALL [facility] @ [phone] - PHONE APPEARS TO BE DISCONNECTED [1/24].  # MOLST FORM FROM 05/2020 REVIEWED - PATIENT REQUESTED DNR/DNI FOR GOC. CONFIRMED BY PCP IN 12/2020. CONFIRMED BY NP NAWRA ON 01/13 - PATIENT'S GOC IS DNR/DNI. PALLIATIVE CARE CONSULT PLACED. NOTED PROGNOSIS IS GUARDED    # GI AND DVT PPX. PATIENT SEEN AND EXAMINED. CASE D/W ER MD AND RESIDENT TEAM. ABOVE ROS/VS/PE REVIEWED AND VERIFIED.    PLAN:  # PALLIATIVE CARE TEAM HELD GOC CONVERSATION WITH PATIENT'S PARTNER - LISA KELLY. HE UNDERSTANDS THAT PATIENT'S PROGNOSIS IS POOR. HE DOES NOT WISH FOR A PEG TUBE FOR PATIENT. AT THIS TIME HE WISHES FOR PATIENT TO BE ON HOSPICE AT [facility].   # ACUTE METABOLIC ENCEPHALOPATHY - 1/24 CODE STROKE WAS CALLED - PATIENT WAS EVALUATED BY TEAM AND CT HEAD WAS NEGATIVE  - ON 1/25 SPONTANEOUSLY AROSE TO VOICE AND FOLLOWED SIMPLE COMMANDS INCONSISTENTLY I.E "SQUEEZE MY HAND"  - ENCEPHALOPATHY THUS FAR WAS SUSPECTED S/T ACUTE INFECTION & HYPERNATREMIA - REVIEWED CT HEAD  - NEUROLOGY CONSULT IN PROGRESS, NEUROLOGIST WAS MADE AWARE   - PSYCHIATRY RECOMMENDED METHYLPHENIDATE    # GIVEN RHONCHI - CXR WAS ORDERED AND NOTED TO HAVE BILATERAL PLEURAL EFFUSION W/ POSSIBLE INFILTRATE   - IVF DISCONTINUED  - CURRENT ABX REGIMEN VANCOMYCIN + CEFEPIME + FLAGYL SWITCHED TO UNASYN  - PRN SUCTIONING  # ELEVATED TROPONIN - ECG REVIEWED AND CARDIOLOGY TEAM UPDATED BY NP - TREND TROPONINS, ON ASPIRIN,   # ELEVATED BNP W/ PLEURAL EFFUSIONS - SUSPECT CHF - GIVEN IV LASIX, LANDIS PLACED FOR STRICT IS AND OS, MONITOR ON TELEMETRY    - ECHOCARDIOGRAM - MILD-MOD MR, TRACE AR, EF - 30-35% [NOTED HYPOKINETIC WALL], G1DD, MOD PULM HTN, MILD TR. TRACE NJ, TRIVIAL PERICARDIAL EFFUSION    # MULTIPLE ELECTROLYTE ABNORMALITIES - HYPERNATREMIA, HYPERCHLOREMIA, HYPOKALEMIA, HYPOCALCEMIA, NONANION GAP METABOLIC ACIDOSIS [ SUSPECT LESS LIKELY STARVATION OR DIABETIC KETOACIDOSIS]  - REVIEWED ABG, REVIEWED ACETONE AND B-HYDROXYBUTYRATE, LACTIC ACID  - SUSPECTING RTA VS. HYPERCHLOREMIA S/T NORMAL SALINE INFUSION - CASE D/W NEPHROLOGIST DR. TODD - RECOMMENDED OBTAINING URINE LYTES AT THIS TIME  - MONITOR BMP, FINGER STICKS [ DOES NOT APPEAR TO BE ? EUGLYCEMIC DKA],    # ? REFEEDING SYNDROME - THOUGH PATIENT HAS NOT BEEN EATING CONSISTENTLY - WILL MONITOR AND REPLETE ELECTROLYTES    # BILATERAL LE CHRONIC ULCER NOW PROGRESSIVELY WORSENING W/ SSTI AND RIGHT CALCANEAL OSTEOMYELITIS W/ POSSIBLE ABSCESS; UNDERWENT BEDSIDE DEBRIDEMENT OF RIGHT LEG ULCER AND UNDERWENT RIGHT PARTIAL CALCANECTOMY ON 1/13 AND PICC LINE PLACEMENT 1/20  - CEFEPIME + VANCOMYCIN + FLAGYL, REVIEWED TIMUR, BCX - NGTD, WOUND CX - MORGANELLA MORGANI & P. MIRABILIS, ID CONSULT IN PROGRESS, PODIATRY CONSULT IN PROGRESS  - REVIEWED RIGHT LE MRI  -  PSYCHIATRY CONSULT FOR CAPACITY IN PROGRESS - DEEM PATIENT AS HAVING CAPACITY.  PATIENT WAS AGREEABLE FOR SURGICAL DEBRIDEMENT ON 1/09  - 2 PC CONSENT FOR SURGICAL DEBRIDEMENT PLANNED FOR 1/12; ATTEMPTED TO CALL NEXT OF [facility] @ [phone] HOWEVER PHONE NUMBER APPEARS DISCONNECTED  - VASCULAR SX CONSULT IN PROGRESS - PATIENT MAY REQUIRE AKA, BUT REFUSED ON PREVIOUS CONVERSATIONS - WAS AGREEABLE TO LOCAL DEBRIDEMENT/INTERVENTIONS  - PLAN FOR WOUND VAC  - PATIENT WILL NEED 6 WEEKS OF IV ANTIBIOTICS - WILL NEED TO SWITCH TO PICC LINE FROM EXTENDED DWELL - D/W FLOOR TEAM  # MEDICAL CLEARANCE FOR SURGERY - RCRI 1 POINT - 6% RISK OF DEATH, MI OR CARDIAC ARREST; CARDIOLOGY CONSULT IN PROGRESS FOR MEDICAL CLEARANCE  # NORMOCYTIC ANEMIA - SUSPECT S/T AOCD AND POOR NUTRITIONAL STATUS - F/U ANEMIA PANEL; PRBC TRANSFUSION GIVEN TODAY  # SACRAL AND RIGHT ISCHIAL UNSTAGEABLE ULCERS W/ SSTI  S/P DEBRIDEMENT 1/8- ON VANCOMYCIN AND CEFEPIME, SURGERY CONSULT IN PROGRESS -  PSYCHIATRY CONSULT FOR CAPACITY IN PROGRESS      # DEBILITY / ? MYOPATHY S/T ILLNESS - PT EVALUATION, OUT OF BED TO CHAIR  # HYPERNATREMIA SUSPECT S/T POOR PO INTAKE - RESOLVED W/ IVF  # POOR PO INTAKE - PATIENT ATE SOME LUNCH TODAY; ST EVAL NOTED  # LEFT ARM SWELLING - REVIEWED LEFT ARM U/S W/ OUT THROMBUS, WARM COMPRESSES  # ASHLEY - RESOLVED  # PAD  # HTN  # DM W/ DIABETIC NEUROPATHY  # OA/OP  # SCHIZOPHRENIA - HOLD MEDICATION; PLACED ON IV HALDOL PER PSYCHIATRY RECOMMENDATION  # CASE D/W PATIENT'S ? PARTNER - LISA KELLY - WHO REPORTS HE IS NOT NEXT OF KIN OR HCP - [phone] ON 1/12 - HE UNDERSTOOD PATIENT'S PROGNOSIS WAS GUARDED. ATTEMPTED TO CALL [facility] ON 1/24 BUT WAS NOT ABLE TO GET THROUGH - LEFT MESSAGE W/ STAFF OF ASSISTED LIVING ; ATTEMPTED TO CALL [facility] @ [phone] - PHONE APPEARS TO BE DISCONNECTED [1/24].  # MOLST FORM FROM 05/2020 REVIEWED - PATIENT REQUESTED DNR/DNI FOR GOC. CONFIRMED BY PCP IN 12/2020. CONFIRMED BY NP NAWRA ON 01/13 - PATIENT'S GOC IS DNR/DNI. PALLIATIVE CARE CONSULT PLACED. NOTED PROGNOSIS IS GUARDED  # GI AND DVT PPX    ELEN CASILLAS MD COVERING FARHAN CASILLAS MD

## 2021-01-26 NOTE — PROGRESS NOTE ADULT - PROBLEM SELECTOR PLAN 3
X ray and MRI noted   S/p Vancomycin, Cefepime and Flagyl   IR placement of  PICC line 1/20  Wound culture grew few Enterococcus Faecalis and Proteus mirabilis  S/p OR right heel debridement 1/13 obtained 2PC consent  C/w wound vac applied by podiatry to right heel   ID Dr. Wellington on board, recommends 4 weeks of antibiotics starting 1/25/21  Started on Unasyn 3 grams q6 hrs 1/25  F/u right foot X ray   Podiatry on board   Vascular on board X ray and MRI noted   S/p Vancomycin, Cefepime and Flagyl   IR placement of  PICC line 1/20  Wound culture grew few Enterococcus Faecalis and Proteus mirabilis  S/p OR right heel debridement 1/13 obtained 2PC consent  C/w wound vac applied by podiatry to right heel   ID Dr. Wellington on board, recommends 4 weeks of antibiotics starting 1/25/21  C/w Unasyn 3 grams q6 hrs 1/25  F/u right foot X ray   Podiatry on board   Vascular on board

## 2021-01-26 NOTE — CONSULT NOTE ADULT - SUBJECTIVE AND OBJECTIVE BOX
HPI:  74 yo F from [facility], wheelchair bound with medical history significant of HTN, Diabetes, Osteoarthritis, Osteoporosis, Peripheral arterial disease, Diabetic neuropathy, HLD, Schizophrenia comes in with worsening ulceration to b/l heels. She has been having ulcers for past couple months, has been progressive. She was treated with IV antbx at NH but did not get better. It was worsening with necrotic changes and increasing foul smelling discharge. She denies fever, abdominal pain, chest pain, urinary symptoms, fall, trauma. No h/o nausea, vomiting, diarrhea, cough, dyspnea, sick contacts, recent illness.  (2021 14:24)      Interval hx: Pt seen and examined at the bedside, unresponsive to stimuli.  DNR/DNI on file.  Palliative care to assess hospice eligibility.    PAST MEDICAL & SURGICAL HISTORY:   COVID-19    Osteomyelitis    DM (diabetes mellitus)    Paranoid schizophrenia    HLD (hyperlipidemia)    PAD (peripheral artery disease)    HTN (hypertension)    No significant past surgical history        SOCIAL HISTORY:    Admitted from:  [facility]    [employer]:   [employer]                                   Surrogate/HCP/Guardian:  Karl Chavis          Phone#: [phone]    FAMILY HISTORY:  No pertinent family history in first degree relatives      Baseline ADLs (prior to admission):  required assistance, [place] bound    Allergies    No Known Allergies    Intolerances      Present Symptoms:   Unable to obtain due to poor mentation    MEDICATIONS  (STANDING):  ampicillin/sulbactam  IVPB 3 Gram(s) IV Intermittent every 6 hours  aspirin enteric coated 81 milliGRAM(s) Oral daily  chlorhexidine 2% Cloths 1 Application(s) Topical daily  collagenase Ointment 1 Application(s) Topical daily  Dakins Solution - Full Strength 1 Application(s) Topical once  dextrose 40% Gel 15 Gram(s) Oral once  dextrose 50% Injectable 25 Gram(s) IV Push once  dextrose 50% Injectable 12.5 Gram(s) IV Push once  dextrose 50% Injectable 25 Gram(s) IV Push once  glucagon  Injectable 1 milliGRAM(s) IntraMuscular once  methylphenidate 5 milliGRAM(s) Oral daily  pantoprazole  Injectable 40 milliGRAM(s) IV Push every 12 hours  potassium chloride  10 mEq/100 mL IVPB 10 milliEquivalent(s) IV Intermittent every 1 hour  simvastatin 40 milliGRAM(s) Oral at bedtime  sucralfate 1 Gram(s) Oral four times a day    MEDICATIONS  (PRN):  acetaminophen   Tablet .. 650 milliGRAM(s) Oral every 6 hours PRN Moderate Pain (4 - 6)  haloperidol    Injectable 0.5 milliGRAM(s) IV Push every 8 hours PRN Agitation  sodium chloride 0.9% lock flush 10 milliLiter(s) IV Push every 1 hour PRN Pre/post blood products, medications, blood draw, and to maintain line patency      PHYSICAL EXAM:    Vital Signs Last 24 Hrs  T(C): 36.4 (2021 11:27), Max: 36.7 (2021 23:24)  T(F): 97.5 (2021 11:27), Max: 98.1 (2021 23:24)  HR: 104 (2021 11:27) (98 - 104)  BP: 102/54 (2021 11:27) (86/51 - 103/48)  BP(mean): --  RR: 18 (2021 11:27) (18 - 18)  SpO2: 100% (2021 11:27) (96% - 100%)    General: Elderly woman, unresponsive to stimuli, NAD  Karnofsky Performance Score/Palliative Performance Status Version2: 30 %    HEENT: bitemporal wasting, clear oropharynx  Lungs: unlabored  CV: S1S2  GI: soft, non distended   :   lopez  Musculoskeletal: bedbound  Skin: ulcerations to right heel and left foot  Neuro: unresponsive  Oral intake ability: unable  Diet: [NPO]    LABS:                        10.0   14.65 )-----------( 132      ( 2021 07:37 )             31.1         152<H>  |  123<H>  |  17  ----------------------------<  95  3.3<L>   |  17<L>  |  0.83    Ca    7.6<L>      2021 07:37  Phos  4.1       Mg     2.3         TPro  4.2<L>  /  Alb  1.2<L>  /  TBili  0.3  /  DBili  x   /  AST  10  /  ALT  <6<L>  /  AlkPhos  111      Urinalysis Basic - ( 2021 18:49 )    Color: Yellow / Appearance: Clear / S.015 / pH: x  Gluc: x / Ketone: Trace  / Bili: Negative / Urobili: Negative   Blood: x / Protein: 30 mg/dL / Nitrite: Negative   Leuk Esterase: Negative / RBC: 5-10 /HPF / WBC 3-5 /HPF   Sq Epi: x / Non Sq Epi: Few /HPF / Bacteria: Few /HPF    < from: CT Head No Cont (21 @ 10:01) >  EXAM:  CT BRAIN                            PROCEDURE DATE:  2021          INTERPRETATION:  .    CLINICAL INFORMATION: ACUTE ENCEPHALOPATHY    TECHNIQUE: Multiple axial CT images of the head were obtained without contrast. Sagittal and coronalreconstructed images were acquired from the source data.    COMPARISON: No prior CT studies of the brain are available for comparison at this institution.    FINDINGS: There is no acute intracranial hemorrhage, mass effect, shift of the midline structures, herniation, extra-axial fluid collection, or hydrocephalus.    There is diffuse cerebral volume loss with prominence of the sulci, fissures, and cisternal spaces which is normal for the patient's age. There is mild deep and periventricular white matter hypoattenuation statistically compatible with microvascular changes given calcific atherosclerotic disease of the intracranial arteries. There is a partially empty sella.    Polyps versus retention cysts are seen within the bilateral maxillary sinuses. Additional mild scattered mucosal thickening is seen throughout the ethmoid complex. The calvarium is intact. Chronic bilateral nasal bone fracture deformities are seen. The imaged orbits are unremarkable.    IMPRESSION: No acute intracranial hemorrhage, mass effect, or shift of the midline structures.    If clinical symptoms are new and persistent, consider further evaluation with brain MRI examination, if there are no MRI contraindications.    < end of copied text >      RADIOLOGY & ADDITIONAL STUDIES: Reviewed    ADVANCE DIRECTIVES: DNR/DNI/no feeding tube/comfort measures only   HPI:  74 yo F from [facility], wheelchair bound with medical history significant of HTN, Diabetes, Osteoarthritis, Osteoporosis, Peripheral arterial disease, Diabetic neuropathy, HLD, Schizophrenia comes in with worsening ulceration to b/l heels. She has been having ulcers for past couple months, has been progressive. She was treated with IV antbx at NH but did not get better. It was worsening with necrotic changes and increasing foul smelling discharge. She denies fever, abdominal pain, chest pain, urinary symptoms, fall, trauma. No h/o nausea, vomiting, diarrhea, cough, dyspnea, sick contacts, recent illness.  (2021 14:24)      Interval hx: Pt seen and examined at the bedside, unresponsive to stimuli.  DNR/DNI on file.  Palliative care to assess hospice eligibility.    PAST MEDICAL & SURGICAL HISTORY:   COVID-19    Osteomyelitis    DM (diabetes mellitus)    Paranoid schizophrenia    HLD (hyperlipidemia)    PAD (peripheral artery disease)    HTN (hypertension)    No significant past surgical history        SOCIAL HISTORY:    Admitted from:  [facility]    [employer]:   [employer]                                   Surrogate/HCP/Guardian:  Karl Chavis          Phone#: [phone]    FAMILY HISTORY:  No pertinent family history in first degree relatives      Baseline ADLs (prior to admission):  required assistance, [place] bound    Allergies    No Known Allergies    Intolerances      Present Symptoms:   Unable to obtain due to poor mentation    MEDICATIONS  (STANDING):  ampicillin/sulbactam  IVPB 3 Gram(s) IV Intermittent every 6 hours  aspirin enteric coated 81 milliGRAM(s) Oral daily  chlorhexidine 2% Cloths 1 Application(s) Topical daily  collagenase Ointment 1 Application(s) Topical daily  Dakins Solution - Full Strength 1 Application(s) Topical once  dextrose 40% Gel 15 Gram(s) Oral once  dextrose 50% Injectable 25 Gram(s) IV Push once  dextrose 50% Injectable 12.5 Gram(s) IV Push once  dextrose 50% Injectable 25 Gram(s) IV Push once  glucagon  Injectable 1 milliGRAM(s) IntraMuscular once  methylphenidate 5 milliGRAM(s) Oral daily  pantoprazole  Injectable 40 milliGRAM(s) IV Push every 12 hours  potassium chloride  10 mEq/100 mL IVPB 10 milliEquivalent(s) IV Intermittent every 1 hour  simvastatin 40 milliGRAM(s) Oral at bedtime  sucralfate 1 Gram(s) Oral four times a day    MEDICATIONS  (PRN):  acetaminophen   Tablet .. 650 milliGRAM(s) Oral every 6 hours PRN Moderate Pain (4 - 6)  haloperidol    Injectable 0.5 milliGRAM(s) IV Push every 8 hours PRN Agitation  sodium chloride 0.9% lock flush 10 milliLiter(s) IV Push every 1 hour PRN Pre/post blood products, medications, blood draw, and to maintain line patency      PHYSICAL EXAM:    Vital Signs Last 24 Hrs  T(C): 36.4 (2021 11:27), Max: 36.7 (2021 23:24)  T(F): 97.5 (2021 11:27), Max: 98.1 (2021 23:24)  HR: 104 (2021 11:27) (98 - 104)  BP: 102/54 (2021 11:27) (86/51 - 103/48)  BP(mean): --  RR: 18 (2021 11:27) (18 - 18)  SpO2: 100% (2021 11:27) (96% - 100%)    General: Elderly woman, unresponsive to stimuli, NAD  Karnofsky Performance Score/Palliative Performance Status Version2: 30 %    HEENT: bitemporal wasting, clear oropharynx  Lungs: unlabored  CV: S1S2  GI: soft, non distended   :   lopez  Musculoskeletal: bedbound, + swelling to LUE  Skin: ulcerations to right foot + wound vac  Neuro: unresponsive  Oral intake ability: unable  Diet: [NPO]    LABS:                        10.0   14.65 )-----------( 132      ( 2021 07:37 )             31.1         152<H>  |  123<H>  |  17  ----------------------------<  95  3.3<L>   |  17<L>  |  0.83    Ca    7.6<L>      2021 07:37  Phos  4.1       Mg     2.3         TPro  4.2<L>  /  Alb  1.2<L>  /  TBili  0.3  /  DBili  x   /  AST  10  /  ALT  <6<L>  /  AlkPhos  111      Urinalysis Basic - ( 2021 18:49 )    Color: Yellow / Appearance: Clear / S.015 / pH: x  Gluc: x / Ketone: Trace  / Bili: Negative / Urobili: Negative   Blood: x / Protein: 30 mg/dL / Nitrite: Negative   Leuk Esterase: Negative / RBC: 5-10 /HPF / WBC 3-5 /HPF   Sq Epi: x / Non Sq Epi: Few /HPF / Bacteria: Few /HPF    < from: CT Head No Cont (21 @ 10:01) >  EXAM:  CT BRAIN                            PROCEDURE DATE:  2021          INTERPRETATION:  .    CLINICAL INFORMATION: ACUTE ENCEPHALOPATHY    TECHNIQUE: Multiple axial CT images of the head were obtained without contrast. Sagittal and coronalreconstructed images were acquired from the source data.    COMPARISON: No prior CT studies of the brain are available for comparison at this institution.    FINDINGS: There is no acute intracranial hemorrhage, mass effect, shift of the midline structures, herniation, extra-axial fluid collection, or hydrocephalus.    There is diffuse cerebral volume loss with prominence of the sulci, fissures, and cisternal spaces which is normal for the patient's age. There is mild deep and periventricular white matter hypoattenuation statistically compatible with microvascular changes given calcific atherosclerotic disease of the intracranial arteries. There is a partially empty sella.    Polyps versus retention cysts are seen within the bilateral maxillary sinuses. Additional mild scattered mucosal thickening is seen throughout the ethmoid complex. The calvarium is intact. Chronic bilateral nasal bone fracture deformities are seen. The imaged orbits are unremarkable.    IMPRESSION: No acute intracranial hemorrhage, mass effect, or shift of the midline structures.    If clinical symptoms are new and persistent, consider further evaluation with brain MRI examination, if there are no MRI contraindications.    < end of copied text >      RADIOLOGY & ADDITIONAL STUDIES: Reviewed    ADVANCE DIRECTIVES: DNR/DNI/no feeding tube/comfort measures only

## 2021-01-26 NOTE — CHART NOTE - NSCHARTNOTEFT_GEN_A_CORE
Assessment:      Nutrition reassessment for follow-up. Chart reviewed, high suspicious for COVID19, in airborne/contact isolation room, confused/lethargy; s/p Code Stroke 2021; NPO x 2 to 3d; Psychicatric follow-up noted, pending Palliative consult for C    Factors impacting intake: [ ] none [ ] nausea  [ ] vomiting [ ] diarrhea [ ] constipation  [ X ]chewing problems [ X ] swallowing issues  [ X ] other: change in mental status;  acute on chronic comorbidities with persistent poor oral intake    Diet Prescription: Diet, NPO:      Special Instructions for Nursing:  CODE STROKE; NPO until Dysphagia screen or Speech Bedside Swallow Evaluation completed and passed (21 @ 14:24)    Intake: NPO x 2 to 3d     Daily Weight in k.3 (2021 04:24)  Weight in k.3 (2021 04:55)    % Weight Change: wt changes may partly due to scale/fluid variance    Pertinent Medications: MEDICATIONS  (STANDING):  ampicillin/sulbactam  IVPB 3 Gram(s) IV Intermittent every 6 hours  aspirin enteric coated 81 milliGRAM(s) Oral daily  chlorhexidine 2% Cloths 1 Application(s) Topical daily  collagenase Ointment 1 Application(s) Topical daily  Dakins Solution - Full Strength 1 Application(s) Topical once  dextrose 40% Gel 15 Gram(s) Oral once  dextrose 50% Injectable 25 Gram(s) IV Push once  dextrose 50% Injectable 12.5 Gram(s) IV Push once  dextrose 50% Injectable 25 Gram(s) IV Push once  glucagon  Injectable 1 milliGRAM(s) IntraMuscular once  methylphenidate 5 milliGRAM(s) Oral daily  pantoprazole  Injectable 40 milliGRAM(s) IV Push every 12 hours  simvastatin 40 milliGRAM(s) Oral at bedtime  sucralfate 1 Gram(s) Oral four times a day    MEDICATIONS  (PRN):  acetaminophen   Tablet .. 650 milliGRAM(s) Oral every 6 hours PRN Moderate Pain (4 - 6)  haloperidol    Injectable 0.5 milliGRAM(s) IV Push every 8 hours PRN Agitation  sodium chloride 0.9% lock flush 10 milliLiter(s) IV Push every 1 hour PRN Pre/post blood products, medications, blood draw, and to maintain line patency    Pertinent Labs:  Na152 mmol/L<H> Glu 104 mg/dL<H> K+ 3.3 mmol/L<L> Cr  0.77 mg/dL BUN 16 mg/dL  Phos 4.1 mg/dL  Alb 1.2 g/dL<L>  Chol 121 mg/dL LDL --    HDL 36 mg/dL<L> Trig 112 mg/dL     CAPILLARY BLOOD GLUCOSE    POCT Blood Glucose.: 80 mg/dL (2021 05:14)  POCT Blood Glucose.: 136 mg/dL (2021 11:29)    Skin: skin wound, Pressure Injury: unstagable     Estimated Needs:   [ X ] no change since previous assessment  [ ] recalculated:     Previous Nutrition Diagnosis:   [ X ]Inadequate Oral Intake   [ X ] Malnutrition     Nutrition Diagnosis is [ X ] ongoing  [ ] Improving   [ ] resolved [ ] not applicable     New Nutrition Diagnosis: [ X ] not applicable     Interventions:   Recommend  [ ] Change Diet To:  [ ] Nutrition Supplement  [ X ] Nutrition Support: Advance diet or consider alternate nutrition support if NPO prolonged further as medically feasible   [ X ] Other: Discussed with MD/RN                           May consider alternate nutrition support if persistent NPO as medically feasible/consistent per Goal of Care     Monitoring and Evaluation:   [ ] PO intake [ x ] Tolerance to diet prescription [ x ] weights [ x ] labs[ x ] follow up per protocol  [ ] other: Assessment:      Nutrition reassessment for follow-up. Chart reviewed, high suspicious for COVID19, in airborne/contact isolation room, confused/lethargy; s/p Code Stroke 2021; NPO x 2 to 3d; Psychicatric follow-up noted, pending Palliative consult for C    Factors impacting intake: [ ] none [ ] nausea  [ ] vomiting [ ] diarrhea [ ] constipation  [ X ]chewing problems [ X ] swallowing issues  [ X ] other: change in mental status;  acute on chronic comorbidities with persistent poor oral intake    Diet Prescription: Diet, NPO:      Special Instructions for Nursing:  CODE STROKE; NPO until Dysphagia screen or Speech Bedside Swallow Evaluation completed and passed (21 @ 14:24)    Intake: NPO x 2 to 3d     Daily Weight in k.3 (2021 04:24)  Weight in k.3 (2021 04:55)    % Weight Change: wt changes may partly due to scale/fluid variance    Pertinent Medications: MEDICATIONS  (STANDING):  ampicillin/sulbactam  IVPB 3 Gram(s) IV Intermittent every 6 hours  aspirin enteric coated 81 milliGRAM(s) Oral daily  chlorhexidine 2% Cloths 1 Application(s) Topical daily  collagenase Ointment 1 Application(s) Topical daily  Dakins Solution - Full Strength 1 Application(s) Topical once  dextrose 40% Gel 15 Gram(s) Oral once  dextrose 50% Injectable 25 Gram(s) IV Push once  dextrose 50% Injectable 12.5 Gram(s) IV Push once  dextrose 50% Injectable 25 Gram(s) IV Push once  glucagon  Injectable 1 milliGRAM(s) IntraMuscular once  methylphenidate 5 milliGRAM(s) Oral daily  pantoprazole  Injectable 40 milliGRAM(s) IV Push every 12 hours  simvastatin 40 milliGRAM(s) Oral at bedtime  sucralfate 1 Gram(s) Oral four times a day    MEDICATIONS  (PRN):  acetaminophen   Tablet .. 650 milliGRAM(s) Oral every 6 hours PRN Moderate Pain (4 - 6)  haloperidol    Injectable 0.5 milliGRAM(s) IV Push every 8 hours PRN Agitation  sodium chloride 0.9% lock flush 10 milliLiter(s) IV Push every 1 hour PRN Pre/post blood products, medications, blood draw, and to maintain line patency    Pertinent Labs:  Na152 mmol/L<H> Glu 104 mg/dL<H> K+ 3.3 mmol/L<L> Cr  0.77 mg/dL BUN 16 mg/dL  Phos 4.1 mg/dL  Alb 1.2 g/dL<L>  Chol 121 mg/dL LDL --    HDL 36 mg/dL<L> Trig 112 mg/dL     CAPILLARY BLOOD GLUCOSE    POCT Blood Glucose.: 80 mg/dL (2021 05:14)  POCT Blood Glucose.: 136 mg/dL (2021 11:29)    Skin: skin wound, Pressure Injury: unstagable     Estimated Needs:   [ X ] no change since previous assessment  [ ] recalculated:     Previous Nutrition Diagnosis:   [ X ]Inadequate Oral Intake   [ X ] Malnutrition     Nutrition Diagnosis is [ X ] ongoing  [ ] Improving   [ ] resolved [ ] not applicable     New Nutrition Diagnosis: [ X ] not applicable     Interventions:   Recommend  [ ] Change Diet To:  [ X ] Nutrition Supplement: MVI/minerals, Vit C supplements as medically feasible, Monitor needs for Zn supplement   [ X ] Nutrition Support: Advance diet or consider alternate nutrition support if NPO prolonged further as medically feasible   [ X ] Other: Discussed with MD/RN                           May consider alternate nutrition support if persistent NPO as medically feasible/consistent per Goal of Care     Monitoring and Evaluation:   [ ] PO intake [ x ] Tolerance to diet prescription [ x ] weights [ x ] labs[ x ] follow up per protocol  [ ] other:

## 2021-01-26 NOTE — CONSULT NOTE ADULT - CONSULT REQUESTED BY NAME
Jolie Nath MD
Dr. Llanes
Dr. Winslow
MARIAELENA Campbell
Medicine team
Dr. Llanes
Dr. Llanes
Mario Alberto

## 2021-01-26 NOTE — PROGRESS NOTE BEHAVIORAL HEALTH - NSBHCONSULTRECOMMENDOTHER_PSY_A_CORE FT
1. Recommend c/w methylphenidate PO 5 mg qd standing to improve alertness  --Recommend increased efforts to encourage pt to swallow the medication, including opening capsules in food and encouraging pt to eat  2. C/w Haldol IV 0.5 mg q12h PRN delirium (not used to date)  3. Recommending continuing to hold pt home psychiatric medication (perphenazine 16 mg BID), due to absence of active psychotic sx and potential to exacerbate somnolence   4. No indication for other standing or PRN psychotropic medications at this time  5. Medical management as directed by primary team and podiatry  6. Psychiatry will continue to follow for delirium management  7. Case d/w Dr. Rodriguez of primary team    Norma Lackey MD  Director, Consultation-Liaison Psychiatry Service  [id number]

## 2021-01-26 NOTE — CONSULT NOTE ADULT - PROBLEM SELECTOR RECOMMENDATION 2
Bitemporal wasting.  Albumin 1.2.  High risk for further skin failure.  Currently NPO 2/2 mentation.    Comfort feeds as tolerated.  Nutrition following.  No feeding tube

## 2021-01-26 NOTE — CONSULT NOTE ADULT - PROBLEM SELECTOR RECOMMENDATION 9
Likely 2/2 acute infection.  Osteomyelitis of calcaneus R foot. s/p R foot partial calcanectomy & wound debridement 1/13/21.  Neuro following     Pt is DNR/DNI/no feeding tube/comfort measures only.

## 2021-01-26 NOTE — PROGRESS NOTE ADULT - SUBJECTIVE AND OBJECTIVE BOX
PGY-1 Progress Note discussed with attending    PAGER #: [[phone]] TILL 5:00 PM  PLEASE CONTACT ON CALL TEAM:  - On Call Team (Please refer to Deanna) FROM 5:00 PM - 8:30PM  - Nightfloat Team FROM 8:30 -7:30 AM    CHIEF COMPLAINT & BRIEF HOSPITAL COURSE:  74 yo F from [facility], wheelchair bound with medical history significant of HTN, Diabetes, Osteoarthritis, Osteoporosis, Peripheral arterial disease, Diabetic neuropathy, HLD, Schizophrenia came to ED c/o worsening ulceration to b/l heels and sacral wound. Patient admitted for bilateral heel ulcers concern for OM. Patient was treated with IV antibiotics with Vancomycin, Cefepime and Flagyl. Podiatry on board, patient s/p sacral debridement on 1/8, s/p debridement of right heel (partial calcanectomy) on 1/13, wound vac applied by podiatry to right heel, wound culture grew few Enterococcus Faecalis and Proteus mirabilis,  PICC line placed on 1/20 for IV antibiotics.   Patient with schizophrenia, on haldol PRN and started on Methylphenidate. For hypertension and diabetes closely monitored. Patient also found to have hypernatremia, most likely 2/2 to poor oral intake.   For anemia, patient received 2 units of PRBC on 1/22, no drop in Hg, no signs of bleeding   Palliative consulted      INTERVAL HPI/OVERNIGHT EVENTS: patient is more lethargic, arousable, not eating or drinking, on 2L NC sat %, tachycardic     MEDICATIONS  (STANDING):  ampicillin/sulbactam  IVPB 3 Gram(s) IV Intermittent every 6 hours  aspirin enteric coated 81 milliGRAM(s) Oral daily  chlorhexidine 2% Cloths 1 Application(s) Topical daily  collagenase Ointment 1 Application(s) Topical daily  Dakins Solution - Full Strength 1 Application(s) Topical once  dextrose 40% Gel 15 Gram(s) Oral once  dextrose 50% Injectable 25 Gram(s) IV Push once  dextrose 50% Injectable 12.5 Gram(s) IV Push once  dextrose 50% Injectable 25 Gram(s) IV Push once  glucagon  Injectable 1 milliGRAM(s) IntraMuscular once  methylphenidate 5 milliGRAM(s) Oral daily  pantoprazole  Injectable 40 milliGRAM(s) IV Push every 12 hours  potassium chloride  10 mEq/100 mL IVPB 10 milliEquivalent(s) IV Intermittent every 1 hour  simvastatin 40 milliGRAM(s) Oral at bedtime  sucralfate 1 Gram(s) Oral four times a day    MEDICATIONS  (PRN):  acetaminophen   Tablet .. 650 milliGRAM(s) Oral every 6 hours PRN Moderate Pain (4 - 6)  haloperidol    Injectable 0.5 milliGRAM(s) IV Push every 8 hours PRN Agitation  sodium chloride 0.9% lock flush 10 milliLiter(s) IV Push every 1 hour PRN Pre/post blood products, medications, blood draw, and to maintain line patency      Vital Signs Last 24 Hrs  T(C): 36.4 (26 Jan 2021 11:27), Max: 36.7 (25 Jan 2021 23:24)  T(F): 97.5 (26 Jan 2021 11:27), Max: 98.1 (25 Jan 2021 23:24)  HR: 104 (26 Jan 2021 11:27) (98 - 104)  BP: 102/54 (26 Jan 2021 11:27) (86/51 - 103/48)  BP(mean): --  RR: 18 (26 Jan 2021 11:27) (18 - 18)  SpO2: 100% (26 Jan 2021 11:27) (96% - 100%)    PHYSICAL EXAMINATION:  GENERAL: NAD, poor general condition  HEAD:  Atraumatic, Normocephalic,  on 2L NC sat %  EYES:  conjunctiva and sclera clear  NECK: Supple, No JVD, Normal thyroid  CHEST/LUNG: bilateral rhonchus, increased secretions   HEART: Regular rate and rhythm; No murmurs, rubs, or gallops  ABDOMEN: Soft, Nontender, Nondistended; Bowel sounds present, no pain or masses on palpation  NERVOUS SYSTEM:  Alert & Oriented X1  : Emanuel in place   EXTREMITIES:  2+ Peripheral Pulses, No clubbing, or cyanosis, upper and lower extremities edema, decreased upper and lower extremities strength   SKIN: warm dry un stageable sacral ulcer, bilateral heel ulcers, right heel with VAC system and clean dressing                           10.0   14.65 )-----------( 132      ( 26 Jan 2021 07:37 )             31.1     01-26    152<H>  |  123<H>  |  17  ----------------------------<  95  3.3<L>   |  17<L>  |  0.83    Ca    7.6<L>      26 Jan 2021 07:37  Phos  4.1     01-26  Mg     2.3     01-26    TPro  4.2<L>  /  Alb  1.2<L>  /  TBili  0.3  /  DBili  x   /  AST  10  /  ALT  <6<L>  /  AlkPhos  111  01-26    LIVER FUNCTIONS - ( 26 Jan 2021 07:37 )  Alb: 1.2 g/dL / Pro: 4.2 g/dL / ALK PHOS: 111 U/L / ALT: <6 U/L DA / AST: 10 U/L / GGT: x           CARDIAC MARKERS ( 25 Jan 2021 07:04 )  0.439 ng/mL / x     / 37 U/L / x     / x      CARDIAC MARKERS ( 24 Jan 2021 21:23 )  0.865 ng/mL / x     / 21 U/L / x     / x      CARDIAC MARKERS ( 24 Jan 2021 16:19 )  0.944 ng/mL / x     / x     / x     / x      CARDIAC MARKERS ( 24 Jan 2021 14:48 )  1.040 ng/mL / x     / 22 U/L / x     / 4.8 ng/mL      PT/INR - ( 24 Jan 2021 14:48 )   PT: 20.1 sec;   INR: 1.73 ratio         PTT - ( 24 Jan 2021 14:48 )  PTT:47.4 sec    I&O's Summary    25 Jan 2021 07:01  -  26 Jan 2021 07:00  --------------------------------------------------------  IN: 150 mL / OUT: 400 mL / NET: -250 mL                   PGY-1 Progress Note discussed with attending    PAGER #: [[phone]] TILL 5:00 PM  PLEASE CONTACT ON CALL TEAM:  - On Call Team (Please refer to Deanna) FROM 5:00 PM - 8:30PM  - Nightfloat Team FROM 8:30 -7:30 AM    CHIEF COMPLAINT & BRIEF HOSPITAL COURSE:  76 yo F from [facility], wheelchair bound with medical history significant of HTN, Diabetes, Osteoarthritis, Osteoporosis, Peripheral arterial disease, Diabetic neuropathy, HLD, Schizophrenia came to ED c/o worsening ulceration to b/l heels and sacral wound. Patient admitted for bilateral heel ulcers concern for OM. Patient was treated with IV antibiotics with Vancomycin, Cefepime and Flagyl. Podiatry on board, patient s/p sacral debridement on 1/8, s/p debridement of right heel (partial calcanectomy) on 1/13, wound vac applied by podiatry to right heel, wound culture grew few Enterococcus Faecalis and Proteus mirabilis,  PICC line placed on 1/20 for IV antibiotics.   Patient with schizophrenia, on haldol PRN and started on Methylphenidate. For hypertension and diabetes closely monitored. Patient also found to have hypernatremia, hyperchloremic, hypocalcemia most likely 2/2 to poor oral intake.   For anemia, patient received 2 units of PRBC on 1/22, no drop in Hg, no signs of bleeding   Palliative consulted      INTERVAL HPI/OVERNIGHT EVENTS: patient is more lethargic, not eating or drinking, on 2L NC sat %, tachycardic     MEDICATIONS  (STANDING):  ampicillin/sulbactam  IVPB 3 Gram(s) IV Intermittent every 6 hours  aspirin enteric coated 81 milliGRAM(s) Oral daily  chlorhexidine 2% Cloths 1 Application(s) Topical daily  collagenase Ointment 1 Application(s) Topical daily  Dakins Solution - Full Strength 1 Application(s) Topical once  dextrose 40% Gel 15 Gram(s) Oral once  dextrose 50% Injectable 25 Gram(s) IV Push once  dextrose 50% Injectable 12.5 Gram(s) IV Push once  dextrose 50% Injectable 25 Gram(s) IV Push once  glucagon  Injectable 1 milliGRAM(s) IntraMuscular once  methylphenidate 5 milliGRAM(s) Oral daily  pantoprazole  Injectable 40 milliGRAM(s) IV Push every 12 hours  potassium chloride  10 mEq/100 mL IVPB 10 milliEquivalent(s) IV Intermittent every 1 hour  simvastatin 40 milliGRAM(s) Oral at bedtime  sucralfate 1 Gram(s) Oral four times a day    MEDICATIONS  (PRN):  acetaminophen   Tablet .. 650 milliGRAM(s) Oral every 6 hours PRN Moderate Pain (4 - 6)  haloperidol    Injectable 0.5 milliGRAM(s) IV Push every 8 hours PRN Agitation  sodium chloride 0.9% lock flush 10 milliLiter(s) IV Push every 1 hour PRN Pre/post blood products, medications, blood draw, and to maintain line patency      Vital Signs Last 24 Hrs  T(C): 36.4 (26 Jan 2021 11:27), Max: 36.7 (25 Jan 2021 23:24)  T(F): 97.5 (26 Jan 2021 11:27), Max: 98.1 (25 Jan 2021 23:24)  HR: 104 (26 Jan 2021 11:27) (98 - 104)  BP: 102/54 (26 Jan 2021 11:27) (86/51 - 103/48)  BP(mean): --  RR: 18 (26 Jan 2021 11:27) (18 - 18)  SpO2: 100% (26 Jan 2021 11:27) (96% - 100%)    PHYSICAL EXAMINATION:  GENERAL: NAD, poor general condition  HEAD:  Atraumatic, Normocephalic,  on 2L NC sat %  EYES:  conjunctiva and sclera clear  NECK: Supple, No JVD, Normal thyroid  CHEST/LUNG: bilateral rhonchus, increased secretions   HEART: Regular rate and rhythm; No murmurs, rubs, or gallops  ABDOMEN: Soft, Nontender, Nondistended; Bowel sounds present, no pain or masses on palpation  NERVOUS SYSTEM:  Alert & Oriented X1  : Emanuel in place   EXTREMITIES:  2+ Peripheral Pulses, No clubbing, or cyanosis, upper and lower extremities edema, decreased upper and lower extremities strength   SKIN: warm dry un stageable sacral ulcer, bilateral heel ulcers, right heel with VAC system and clean dressing                           10.0   14.65 )-----------( 132      ( 26 Jan 2021 07:37 )             31.1     01-26    152<H>  |  123<H>  |  17  ----------------------------<  95  3.3<L>   |  17<L>  |  0.83    Ca    7.6<L>      26 Jan 2021 07:37  Phos  4.1     01-26  Mg     2.3     01-26    TPro  4.2<L>  /  Alb  1.2<L>  /  TBili  0.3  /  DBili  x   /  AST  10  /  ALT  <6<L>  /  AlkPhos  111  01-26    LIVER FUNCTIONS - ( 26 Jan 2021 07:37 )  Alb: 1.2 g/dL / Pro: 4.2 g/dL / ALK PHOS: 111 U/L / ALT: <6 U/L DA / AST: 10 U/L / GGT: x           CARDIAC MARKERS ( 25 Jan 2021 07:04 )  0.439 ng/mL / x     / 37 U/L / x     / x      CARDIAC MARKERS ( 24 Jan 2021 21:23 )  0.865 ng/mL / x     / 21 U/L / x     / x      CARDIAC MARKERS ( 24 Jan 2021 16:19 )  0.944 ng/mL / x     / x     / x     / x      CARDIAC MARKERS ( 24 Jan 2021 14:48 )  1.040 ng/mL / x     / 22 U/L / x     / 4.8 ng/mL      PT/INR - ( 24 Jan 2021 14:48 )   PT: 20.1 sec;   INR: 1.73 ratio         PTT - ( 24 Jan 2021 14:48 )  PTT:47.4 sec    I&O's Summary    25 Jan 2021 07:01  -  26 Jan 2021 07:00  --------------------------------------------------------  IN: 150 mL / OUT: 400 mL / NET: -250 mL                   PGY-1 Progress Note discussed with attending    PAGER #: [[phone]] TILL 5:00 PM  PLEASE CONTACT ON CALL TEAM:  - On Call Team (Please refer to Deanna) FROM 5:00 PM - 8:30PM  - Nightfloat Team FROM 8:30 -7:30 AM    CHIEF COMPLAINT & BRIEF HOSPITAL COURSE:  76 yo F from [facility], wheelchair bound with medical history significant of HTN, Diabetes, Osteoarthritis, Osteoporosis, Peripheral arterial disease, Diabetic neuropathy, HLD, Schizophrenia came to ED c/o worsening ulceration to b/l heels and sacral wound. Patient admitted for bilateral heel ulcers concern for OM. Patient was treated with IV antibiotics with Vancomycin, Cefepime and Flagyl. Podiatry on board, patient s/p sacral debridement on 1/8, s/p debridement of right heel (partial calcanectomy) on 1/13, wound vac applied by podiatry to right heel, wound culture grew few Enterococcus Faecalis and Proteus mirabilis,  PICC line placed on 1/20 for IV antibiotics.   Patient with schizophrenia, on haldol PRN and started on Methylphenidate. For hypertension and diabetes closely monitored. Patient also found to have hypernatremia, hyperchloremic, hypocalcemia most likely 2/2 to poor oral intake.   For anemia, patient received 2 units of PRBC on 1/22, no drop in Hg, no signs of bleeding   Code stroke on 1/24/21 CTH negative, Echo 1/25/21 showed GIDD, EF 30-35%, severe LV systolic dysfunction, on ASA and statin   Palliative consulted            INTERVAL HPI/OVERNIGHT EVENTS: patient is more lethargic, not eating or drinking, on 2L NC sat %, tachycardic     MEDICATIONS  (STANDING):  ampicillin/sulbactam  IVPB 3 Gram(s) IV Intermittent every 6 hours  aspirin enteric coated 81 milliGRAM(s) Oral daily  chlorhexidine 2% Cloths 1 Application(s) Topical daily  collagenase Ointment 1 Application(s) Topical daily  Dakins Solution - Full Strength 1 Application(s) Topical once  dextrose 40% Gel 15 Gram(s) Oral once  dextrose 50% Injectable 25 Gram(s) IV Push once  dextrose 50% Injectable 12.5 Gram(s) IV Push once  dextrose 50% Injectable 25 Gram(s) IV Push once  glucagon  Injectable 1 milliGRAM(s) IntraMuscular once  methylphenidate 5 milliGRAM(s) Oral daily  pantoprazole  Injectable 40 milliGRAM(s) IV Push every 12 hours  potassium chloride  10 mEq/100 mL IVPB 10 milliEquivalent(s) IV Intermittent every 1 hour  simvastatin 40 milliGRAM(s) Oral at bedtime  sucralfate 1 Gram(s) Oral four times a day    MEDICATIONS  (PRN):  acetaminophen   Tablet .. 650 milliGRAM(s) Oral every 6 hours PRN Moderate Pain (4 - 6)  haloperidol    Injectable 0.5 milliGRAM(s) IV Push every 8 hours PRN Agitation  sodium chloride 0.9% lock flush 10 milliLiter(s) IV Push every 1 hour PRN Pre/post blood products, medications, blood draw, and to maintain line patency      Vital Signs Last 24 Hrs  T(C): 36.4 (26 Jan 2021 11:27), Max: 36.7 (25 Jan 2021 23:24)  T(F): 97.5 (26 Jan 2021 11:27), Max: 98.1 (25 Jan 2021 23:24)  HR: 104 (26 Jan 2021 11:27) (98 - 104)  BP: 102/54 (26 Jan 2021 11:27) (86/51 - 103/48)  BP(mean): --  RR: 18 (26 Jan 2021 11:27) (18 - 18)  SpO2: 100% (26 Jan 2021 11:27) (96% - 100%)    PHYSICAL EXAMINATION:  GENERAL: NAD, poor general condition  HEAD:  Atraumatic, Normocephalic,  on 2L NC sat %  EYES:  conjunctiva and sclera clear  NECK: Supple, No JVD, Normal thyroid  CHEST/LUNG: bilateral rhonchus, increased secretions   HEART: Regular rate and rhythm; No murmurs, rubs, or gallops  ABDOMEN: Soft, Nontender, Nondistended; Bowel sounds present, no pain or masses on palpation  NERVOUS SYSTEM:  Alert & Oriented X1  : Emanuel in place   EXTREMITIES:  2+ Peripheral Pulses, No clubbing, or cyanosis, upper and lower extremities edema, decreased upper and lower extremities strength   SKIN: warm dry un stageable sacral ulcer, bilateral heel ulcers, right heel with VAC system and clean dressing                           10.0   14.65 )-----------( 132      ( 26 Jan 2021 07:37 )             31.1     01-26    152<H>  |  123<H>  |  17  ----------------------------<  95  3.3<L>   |  17<L>  |  0.83    Ca    7.6<L>      26 Jan 2021 07:37  Phos  4.1     01-26  Mg     2.3     01-26    TPro  4.2<L>  /  Alb  1.2<L>  /  TBili  0.3  /  DBili  x   /  AST  10  /  ALT  <6<L>  /  AlkPhos  111  01-26    LIVER FUNCTIONS - ( 26 Jan 2021 07:37 )  Alb: 1.2 g/dL / Pro: 4.2 g/dL / ALK PHOS: 111 U/L / ALT: <6 U/L DA / AST: 10 U/L / GGT: x           CARDIAC MARKERS ( 25 Jan 2021 07:04 )  0.439 ng/mL / x     / 37 U/L / x     / x      CARDIAC MARKERS ( 24 Jan 2021 21:23 )  0.865 ng/mL / x     / 21 U/L / x     / x      CARDIAC MARKERS ( 24 Jan 2021 16:19 )  0.944 ng/mL / x     / x     / x     / x      CARDIAC MARKERS ( 24 Jan 2021 14:48 )  1.040 ng/mL / x     / 22 U/L / x     / 4.8 ng/mL      PT/INR - ( 24 Jan 2021 14:48 )   PT: 20.1 sec;   INR: 1.73 ratio         PTT - ( 24 Jan 2021 14:48 )  PTT:47.4 sec    I&O's Summary    25 Jan 2021 07:01  -  26 Jan 2021 07:00  --------------------------------------------------------  IN: 150 mL / OUT: 400 mL / NET: -250 mL

## 2021-01-26 NOTE — CONSULT NOTE ADULT - CONVERSATION DETAILS
Spoke with the SW at [facility] Sherry Hurst who confirms the pt has not had any communications with family in over 20 years. Pt's significant other Mr. Karl Chavis will assume role of surrogate. DNR/DNI on file.   Discussed the patient's current clinical condition and poor prognosis with Mr. Chavis. Explained she is no longer able to eat due to her mentation.  Explained the risks vs benefits of artificial nutrition/PEG.  He acknowledged understanding.   He stated he does not want a feeding tube for her.  Discussed hospice philosophy and locations of care.  He stated he does not want her to suffer.  He is agreeable to focus on comfort.  No further labs.  He would like the pt to be discharged back to [facility] with hospice. SW referral made.  All questions answered.  Support provided.

## 2021-01-26 NOTE — PROGRESS NOTE ADULT - PROBLEM SELECTOR PLAN 6
Pt takes Perphenazine at home  Home medication held as recommended by Psychiatry  Neuro Dr. Norris on board   Psychiatry Dr. Lackey, recommends Methylphenidate 5 mg daily and c/w haldol PRN Pt takes Perphenazine at home  Home medication held as recommended by Psychiatry  Neuro Dr. Edwards on board   Psychiatry Dr. Lackey, recommends Methylphenidate 5 mg daily and c/w haldol PRN

## 2021-01-26 NOTE — PROGRESS NOTE ADULT - SUBJECTIVE AND OBJECTIVE BOX
Patient denies chest pain or shortness of breath, is confused. events noted Review of systems otherwise (-)  	  acetaminophen   Tablet .. 650 milliGRAM(s) Oral every 6 hours PRN  ampicillin/sulbactam  IVPB 3 Gram(s) IV Intermittent every 6 hours  aspirin enteric coated 81 milliGRAM(s) Oral daily  chlorhexidine 2% Cloths 1 Application(s) Topical daily  collagenase Ointment 1 Application(s) Topical daily  Dakins Solution - Full Strength 1 Application(s) Topical once  dextrose 40% Gel 15 Gram(s) Oral once  dextrose 50% Injectable 25 Gram(s) IV Push once  dextrose 50% Injectable 12.5 Gram(s) IV Push once  dextrose 50% Injectable 25 Gram(s) IV Push once  glucagon  Injectable 1 milliGRAM(s) IntraMuscular once  haloperidol    Injectable 0.5 milliGRAM(s) IV Push every 8 hours PRN  methylphenidate 5 milliGRAM(s) Oral daily  pantoprazole  Injectable 40 milliGRAM(s) IV Push every 12 hours  simvastatin 40 milliGRAM(s) Oral at bedtime  sodium chloride 0.9% lock flush 10 milliLiter(s) IV Push every 1 hour PRN  sucralfate 1 Gram(s) Oral four times a day                            10.0   14.65 )-----------( 132      ( 26 Jan 2021 07:37 )             31.1       Hemoglobin: 10.0 g/dL (01-26 @ 07:37)  Hemoglobin: 10.6 g/dL (01-25 @ 07:04)  Hemoglobin: 10.9 g/dL (01-24 @ 14:48)  Hemoglobin: 10.6 g/dL (01-24 @ 07:41)  Hemoglobin: 12.6 g/dL (01-23 @ 07:39)      01-26    152<H>  |  123<H>  |  17  ----------------------------<  95  3.3<L>   |  17<L>  |  0.83    Ca    7.6<L>      26 Jan 2021 07:37  Phos  4.1     01-26  Mg     2.3     01-26    TPro  4.2<L>  /  Alb  1.2<L>  /  TBili  0.3  /  DBili  x   /  AST  10  /  ALT  <6<L>  /  AlkPhos  111  01-26    Creatinine Trend: 0.83<--, 0.77<--, 0.80<--, 0.78<--, 0.85<--, 0.87<--    COAGS:     CARDIAC MARKERS ( 25 Jan 2021 07:04 )  0.439 ng/mL / x     / 37 U/L / x     / x      CARDIAC MARKERS ( 24 Jan 2021 21:23 )  0.865 ng/mL / x     / 21 U/L / x     / x      CARDIAC MARKERS ( 24 Jan 2021 16:19 )  0.944 ng/mL / x     / x     / x     / x      CARDIAC MARKERS ( 24 Jan 2021 14:48 )  1.040 ng/mL / x     / 22 U/L / x     / 4.8 ng/mL        T(C): 36.4 (01-26-21 @ 11:27), Max: 36.7 (01-25-21 @ 23:24)  HR: 104 (01-26-21 @ 11:27) (98 - 104)  BP: 102/54 (01-26-21 @ 11:27) (86/51 - 103/48)  RR: 18 (01-26-21 @ 11:27) (18 - 18)  SpO2: 100% (01-26-21 @ 11:27) (96% - 100%)  Wt(kg): --    I&O's Summary    25 Jan 2021 07:01  -  26 Jan 2021 07:00  --------------------------------------------------------  IN: 150 mL / OUT: 400 mL / NET: -250 mL      T(C): 36.6 (01-25-21 @ 11:37), Max: 36.6 (01-25-21 @ 11:37)  HR: 102 (01-25-21 @ 11:37) (91 - 106)  BP: 107/66 (01-25-21 @ 11:37) (95/58 - 117/77)  RR: 18 (01-25-21 @ 11:37) (17 - 18)  SpO2: 100% (01-25-21 @ 11:37) (98% - 100%)  Wt(kg): --    I&O's Summary    24 Jan 2021 07:01  -  25 Jan 2021 07:00  --------------------------------------------------------  IN: 0 mL / OUT: 400 mL / NET: -400 mL          HEENT:  (-)icterus (-)pallor  CV: N S1 S2 1/6 ANGELIC (+)2 Pulses B/l  Resp:  Clear to ausculatation B/L, normal effort  GI: (+) BS Soft, NT, ND  Lymph:  (-)Edema, (-)obvious lymphadenopathy  Skin: Warm to touch, Normal turgor  Psych: Appropriate mood and affect    tele sinus    ASSESSMENT/PLAN: 	75y  Female  wheelchair bound with medical history significant of HTN, Diabetes, Osteoarthritis, Osteoporosis, Peripheral arterial disease, Diabetic neuropathy, HLD, Schizophrenia historically preserved LV and R function, comes in with worsening ulceration to b/l heels s/p debridement.    - s/p Partial calcanectomy of right foot   - s/p code stroke  - no pertinent events on tele thus far  - Neuro f/u noted  - Patient with severe LV dysfx now on echo, cont ASA and statin, not a candidate for ischemic eval at present    Jm Cortes MD, [facility]  BEEPER [phone]

## 2021-01-26 NOTE — PROGRESS NOTE ADULT - ASSESSMENT
Patient is a 76yo Female with HTN, Diabetes, Osteoarthritis, Osteoporosis, Peripheral arterial disease, Diabetic neuropathy, HLD, Schizophrenia a/w Rt foot osteomyelitis and acute metabolic encephalopathy. CT head neg. Nephrology consulted for metabolic acidosis    1. Metabolic Acidosis- normal anion gap; hyperchloremic metabolic acidosis. No diarrhea; lactate wnl. ?Proximal (Type 2) RTA vs large volume infusion with NS. Pt with normal renal function with no hypophosphatemia today and no glucosuria. Urine ph elevated however urine anion gap is neg; not consistent with RTA.   Check ABG to monitor ph  2. Hypernatremia- Free water deficit 2.1L. Consider NGT placement with free water 300 ml q 4hrs. Unasyn changed to D5 base.  Monitor serum Na ?Long term plans ?PEG vs comfort feeds  3. Rt foot osteomyelitis- s/p debridement. Pt on Unasyn. Plan as per primary team/ ID  4. Acute metabolic encephalopathy- CT head neg. Plan as per primary team Patient is a 76yo Female with HTN, Diabetes, Osteoarthritis, Osteoporosis, Peripheral arterial disease, Diabetic neuropathy, HLD, Schizophrenia a/w Rt foot osteomyelitis and acute metabolic encephalopathy. CT head neg. Nephrology consulted for metabolic acidosis    1. Metabolic Acidosis- normal anion gap; hyperchloremic metabolic acidosis. No diarrhea; lactate wnl. ?Proximal (Type 2) RTA vs large volume infusion with NS. Pt with normal renal function with no hypophosphatemia today and no glucosuria. Urine ph elevated however urine anion gap is neg; not consistent with RTA.   Check ABG to monitor ph  2. Hypernatremia- Free water deficit 2.1L. Consider NGT placement with free water 300 ml q 4hrs. Unasyn changed to D5 base.  Monitor serum Na ?Long term plans ?PEG vs comfort feeds  3. Rt foot osteomyelitis- s/p debridement. Pt on Unasyn. Plan as per primary team/ ID  4. Acute metabolic encephalopathy- CT head neg. Plan as per primary team  Hypokalemia- will give KCl 10meq IV x2 doses

## 2021-01-26 NOTE — PROGRESS NOTE ADULT - PROBLEM SELECTOR PLAN 4
Patient w/ bilateral heel ulcers  Continue with heel lift boots, offloading of bony prominences  Continue with NWB to right heel  C/w wound vac applied by podiatry.

## 2021-01-26 NOTE — PROGRESS NOTE BEHAVIORAL HEALTH - NSBHCHARTREVIEWINVESTIGATE_PSY_A_CORE FT
< from: Transthoracic Echocardiogram (01.25.21 @ 07:49) >    CONCLUSIONS:  1. Mitral annular calcification. Mild to moderate mitral  regurgitation.  2. Normal trileaflet aortic valve. Trace aortic  regurgitation.  3. Aortic Root: 2.8 cm.  4. Normal left atrium.  LA volume index = 23 cc/m2.  5. Severe segmental left ventricular systolic  dysfunction.The septum,apex,distal anterior,distal  inferior and infero-lateral walls are  hpyokinetic,EF=30-35%.  6. Grade I diastolic dysfunction (Impaired relaxation).  7. Normal right atrium.  8. Normal right ventricular size and function (RV tissue  Doppler .10m/s).  9. RV systolic pressure is moderately increased at  48 mm  Hg.  10. There is mild tricuspid regurgitation.  11. There is trace pulmonic regurgitation.  12. Trivial pericardial effusion is seen.  13. Left pleural effusion.    < end of copied text >

## 2021-01-26 NOTE — PROGRESS NOTE ADULT - SUBJECTIVE AND OBJECTIVE BOX
[facility] NEPHROLOGY- PROGRESS NOTE    Patient is a 74yo Female with HTN, Diabetes, Osteoarthritis, Osteoporosis, Peripheral arterial disease, Diabetic neuropathy, HLD, Schizophrenia a/w Rt foot osteomyelitis and acute metabolic encephalopathy. CT head neg. Nephrology consulted for metabolic acidosis    Hospital Medications: Medications reviewed.  REVIEW OF SYSTEMS: Unable to obtain; non verbal      VITALS:  T(F): 97.5 (21 @ 11:27), Max: 98.1 (21 @ 23:24)  HR: 104 (21 @ 11:27)  BP: 102/54 (21 @ 11:27)  RR: 18 (21 @ 11:27)  SpO2: 100% (21 @ 11:27)  Wt(kg): --  Height (cm): 157.5 ( @ 23:15)  Weight (kg): 54.4 ( @ 10:19)  BMI (kg/m2): 21.9 ( @ 23:15)  BSA (m2): 1.54 ( @ 23:15)     @ 07:  -   @ 07:00  --------------------------------------------------------  IN: 150 mL / OUT: 400 mL / NET: -250 mL    PHYSICAL EXAM:  Gen: NAD, non verbal  HEENT: anicteric/ opens eyes  Cards: RRR, +S1/S2, no M/G/R  Resp: Course BS  GI: soft, NT/ND, NABS  : +lopez  Extremities: +anasarca UE >LE, +left arm PICC   Derm: +rt foot wound vac      LABS:    152 <--, 152 <--, 151 <--, 150 <--, 150 <--, 150 <--, 148 <--  152<H>  |  123<H>  |  17  ----------------------------<  95  3.3<L>   |  17<L>  |  0.83    Ca    7.6<L>      2021 07:37  Phos  4.1       Mg     2.3         TPro  4.2<L>  /  Alb  1.2<L>  /  TBili  0.3  /  DBili      /  AST  10  /  ALT  <6<L>  /  AlkPhos  111      Creatinine Trend: 0.83 <--, 0.77 <--, 0.80 <--, 0.78 <--, 0.85 <--, 0.87 <--, 0.73 <--, 0.76 <--, 0.64 <--, 0.51 <--, 0.59 <--                        10.0   14.65 )-----------( 132      ( 2021 07:37 )             31.1     Urine Studies:  Urinalysis Basic - ( 2021 18:49 )    Color: Yellow / Appearance: Clear / S.015 / pH:   Gluc:  / Ketone: Trace  / Bili: Negative / Urobili: Negative   Blood:  / Protein: 30 mg/dL / Nitrite: Negative   Leuk Esterase: Negative / RBC: 5-10 /HPF / WBC 3-5 /HPF   Sq Epi:  / Non Sq Epi: Few /HPF / Bacteria: Few /HPF      Sodium, Random Urine: 71 mmol/L ( @ 18:49)  Osmolality, Random Urine: 316 mos/kg ( @ 18:49)  Potassium, Random Urine: 33 mmol/L ( @ 18:49)  Chloride, Random Urine: 108 mmol/L ( @ 18:49)  Creatinine, Random Urine: <13 mg/dL ( @ 18:49)    RADIOLOGY & ADDITIONAL STUDIES:

## 2021-01-26 NOTE — CHART NOTE - NSCHARTNOTESELECT_GEN_ALL_CORE
Event Note
Nutrition Services
Stroke Code/Event Note
CODE STROKE/Event Note
Event Note
Nutrition Services
Nutrition Services

## 2021-01-26 NOTE — PROGRESS NOTE BEHAVIORAL HEALTH - NSBHCHARTREVIEWLAB_PSY_A_CORE FT
8.7    17.10 )-----------( 404      ( 13 Jan 2021 07:43 )             27.0   01-13    151<H>  |  121<H>  |  24<H>  ----------------------------<  136<H>  3.6   |  17<L>  |  0.68    Ca    8.4      13 Jan 2021 07:43  Phos  2.1     01-13  Mg     1.9     01-13
7.0    11.58 )-----------( 323      ( 22 Jan 2021 09:48 )             22.4   01-22    148<H>  |  120<H>  |  10  ----------------------------<  103<H>  2.9<LL>   |  19<L>  |  0.64    Ca    7.1<L>      22 Jan 2021 09:48  Phos  2.0     01-22    TPro  3.9<L>  /  Alb  1.1<L>  /  TBili  0.3  /  DBili  x   /  AST  6<L>  /  ALT  <6<L>  /  AlkPhos  57  01-22
10.0   14.65 )-----------( 132      ( 26 Jan 2021 07:37 )             31.1   01-26    152<H>  |  123<H>  |  17  ----------------------------<  95  3.3<L>   |  17<L>  |  0.83    Ca    7.6<L>      26 Jan 2021 07:37  Phos  4.1     01-26  Mg     2.3     01-26    TPro  4.2<L>  /  Alb  1.2<L>  /  TBili  0.3  /  DBili  x   /  AST  10  /  ALT  <6<L>  /  AlkPhos  111  01-26

## 2021-01-26 NOTE — PROGRESS NOTE ADULT - PROBLEM SELECTOR PLAN 10
Continue with SQ Lovenox  PT eval   Palliative consulted Continue with SQ Lovenox  Palliative consulted

## 2021-01-26 NOTE — CONSULT NOTE ADULT - CONSULT REQUESTED DATE/TIME
06-Jan-2021 13:15
12-Jan-2021 15:09
06-Jan-2021 14:51
07-Jan-2021 13:25
21-Jan-2021 14:32
25-Jan-2021 16:29
05-Jan-2021 16:29
26-Jan-2021 12:14

## 2021-01-26 NOTE — PROGRESS NOTE ADULT - PROBLEM SELECTOR PLAN 5
Continue with cleaning all wounds with normal saline and apply skin prep to the surrounding skin  Surgery on board

## 2021-01-26 NOTE — PROGRESS NOTE BEHAVIORAL HEALTH - DETAILS
Poor appetite Osteomyelitis of R foot. Swelling of BL hands Sacral decubitus ulcer, BL heel ulcers with eschar and surrounding edema. Both hands are cool to the touch

## 2021-01-26 NOTE — PROGRESS NOTE ADULT - PROBLEM SELECTOR PLAN 1
Code stroke 1/24 due to change in mental status and facial droop right side   CTH no acute changes   C/w ASA, statin   Increased rhonchus on exam, will get CXR  F/u echo   Neuro Dr Norris on board Code stroke 1/24 due to change in mental status and facial droop right side   CTH no acute changes   C/w ASA, statin   Increased rhonchus on exam, will get CXR  Echo 1/25/21 showed GIDD, EF 30-35%, severe LV systolic dysfunction   Neuro Dr Norris on board Code stroke 1/24 due to change in mental status and facial droop right side   CTH no acute changes   C/w ASA, statin   Increased rhonchus on exam, will get CXR  Echo 1/25/21 showed GIDD, EF 30-35%, severe LV systolic dysfunction   Neuro Dr Edwards on board

## 2021-01-26 NOTE — CONSULT NOTE ADULT - PROVIDER SPECIALTY LIST ADULT
Cardiology
Podiatry
Infectious Disease
Surgery
Vascular Surgery
Nephrology
Neurology
Palliative Care

## 2021-01-26 NOTE — PROGRESS NOTE ADULT - PROBLEM SELECTOR PLAN 2
Patient with hypernatremia, hypokalemia, hypocalcemia and metabolic acidosis non anion gap  Free water deficit 1.9 L  Nephro Dr Taylor consulted Patient with hypernatremia, hypokalemia, hypocalcemia and metabolic acidosis non anion gap  Free water deficit 1.9 L  Nephro Dr Taylor on board

## 2021-01-26 NOTE — PROGRESS NOTE ADULT - PROBLEM SELECTOR PLAN 7
Controlled as evidenced by A1c 6.8  Continue with sliding scale insulin coverage  Continue with glucose monitoring   Target -180

## 2021-01-26 NOTE — PROGRESS NOTE BEHAVIORAL HEALTH - SUMMARY
75F, single and living in nursing home ([facility]), with PHx of schizophrenia and MHx of HTN, Diabetes, Osteoarthritis, Osteoporosis, Peripheral arterial disease, Diabetic neuropathy, and HLD, BIBEMS on 1/5 from NH for worsening BL heel ulcerations which have been chronic for the past few months, as well as chronic sacral decubitus ulcer. MRI showed progressive osteomyelitis of R foot and surgical debridement was recommended for both sacral and calcaneal ulcers, but pt initially stated she only wanted sacral debridement and refused it for her feet, despite being informed that the heel infection presented a more immediate risk to life and limb. Initial psych consult was requested for assessment of capacity to refuse heel debridement; on exam, pt expressed verbal agreement with debridement and was found to have capacity to consent to the procedure, but written consent could not be obtained due to subsequent prolonged somnolence, so procedure was performed on 1/13 with [facility] physician consent. Pt received PICC on 1/20 under similar circumstances (initially expressed verbal consent, but subsequently became somnolent and written consent could not be obtained; [facility] consent was used). Psychiatry is now reconsulted for assessment of prolonged postoperative somnolence and hyporesponsiveness. Given pt's somnolence and aphonia today, we again agree with neurology impression that pt's presentation is c/w prolonged hypoactive delirium 2/2 metabolic abnormalities and infection. We recommend continuing to offer methylphenidate PO 5 mg qd standing in effort to improve pt alertness; pt has yet to receive the medication due to refusal of PO intake, but no parenteral equivalent is available. We would also c/w Haldol IV 0.5 mg q12h PRN delirium, but pt has not received this to date. Pt does not appear to present an acute risk of harm to self or others at the time of assessment, and does not appear to be in need of admission to  psych at the time of assessment.

## 2021-01-27 LAB
GLUCOSE BLDC GLUCOMTR-MCNC: 104 MG/DL — HIGH (ref 70–99)
GLUCOSE BLDC GLUCOMTR-MCNC: 119 MG/DL — HIGH (ref 70–99)
GLUCOSE BLDC GLUCOMTR-MCNC: 96 MG/DL — SIGNIFICANT CHANGE UP (ref 70–99)
SARS-COV-2 RNA SPEC QL NAA+PROBE: SIGNIFICANT CHANGE UP

## 2021-01-27 PROCEDURE — 99232 SBSQ HOSP IP/OBS MODERATE 35: CPT

## 2021-01-27 RX ADMIN — AMPICILLIN SODIUM AND SULBACTAM SODIUM 200 GRAM(S): 250; 125 INJECTION, POWDER, FOR SUSPENSION INTRAMUSCULAR; INTRAVENOUS at 11:45

## 2021-01-27 RX ADMIN — AMPICILLIN SODIUM AND SULBACTAM SODIUM 200 GRAM(S): 250; 125 INJECTION, POWDER, FOR SUSPENSION INTRAMUSCULAR; INTRAVENOUS at 06:14

## 2021-01-27 RX ADMIN — PANTOPRAZOLE SODIUM 40 MILLIGRAM(S): 20 TABLET, DELAYED RELEASE ORAL at 06:14

## 2021-01-27 RX ADMIN — AMPICILLIN SODIUM AND SULBACTAM SODIUM 200 GRAM(S): 250; 125 INJECTION, POWDER, FOR SUSPENSION INTRAMUSCULAR; INTRAVENOUS at 00:24

## 2021-01-27 RX ADMIN — AMPICILLIN SODIUM AND SULBACTAM SODIUM 200 GRAM(S): 250; 125 INJECTION, POWDER, FOR SUSPENSION INTRAMUSCULAR; INTRAVENOUS at 18:25

## 2021-01-27 RX ADMIN — CHLORHEXIDINE GLUCONATE 1 APPLICATION(S): 213 SOLUTION TOPICAL at 11:45

## 2021-01-27 RX ADMIN — PANTOPRAZOLE SODIUM 40 MILLIGRAM(S): 20 TABLET, DELAYED RELEASE ORAL at 18:24

## 2021-01-27 RX ADMIN — Medication 1 APPLICATION(S): at 11:45

## 2021-01-27 NOTE — PROGRESS NOTE ADULT - ASSESSMENT
A:  s/p R foot partial calcanectomy & wound debridement 1/13/21  Osteomyelitis of calcaneus R foot   DMII    P:   Patient evaluated and chart reviewed  Post-op R foot xrays results as noted above  Intra-op cx result reviewed as above  Intra-op path result reviewed as above  LLE dressed with santyl, allevyn pad  Pt on Palliative care, comfort measures only    wound vac discontinued  Dressed Right foot with dakins, DSD  Dressed Left foot with allevyn pad   Pt to continue using CAIR boots to offload b/l LE   wound care instructions: Cleanse b/l heels with sterile saline, apply dakins soaked gauze to R heel wound and dress with ABD pad, eleno, ACE to; apply allevyn pad to Left heel; Apply CAIR boots bilaterally. Dressing changes to be done on M, W, F.   Podiatry will follow while in house.   Discussed with attending Dr. Watkins   A:  s/p R foot partial calcanectomy & wound debridement 1/13/21  Osteomyelitis of calcaneus R foot   DMII    P:   Patient evaluated and chart reviewed  Post-op R foot xrays results as noted above  Intra-op cx result reviewed as above  Intra-op path result reviewed as above  LLE dressed with santyl, allevyn pad  Pt on Palliative care, comfort measures only    Wound vac discontinued  Dressed Right foot with dakins, DSD  Dressed Left foot with allevyn pad   Pt to continue using CAIR boots to offload b/l LE   wound care instructions: Cleanse b/l heels with sterile saline, apply dakins soaked gauze to R heel wound and dress with ABD pad, eleno, ACE to; apply allevyn pad to Left heel; Apply CAIR boots bilaterally. Dressing changes to be done on M, W, F.   Consult podiatry PRN  Discussed with attending Dr. Watkins

## 2021-01-27 NOTE — PROGRESS NOTE BEHAVIORAL HEALTH - DETAILS
Noisy breathing with congested sound Osteomyelitis of R foot. Swelling of BL hands Refusing PO intake Sacral decubitus ulcer, BL heel ulcers with eschar and surrounding edema. Both hands are cool to the touch

## 2021-01-27 NOTE — PROGRESS NOTE ADULT - ATTENDING COMMENTS
PATIENT SEEN AND EXAMINED. CASE D/W ER MD AND RESIDENT TEAM. ABOVE ROS/VS/PE REVIEWED AND VERIFIED.    PLAN:  # PALLIATIVE CARE TEAM HELD GOC CONVERSATION WITH PATIENT'S PARTNER - LISA KELLY. HE UNDERSTANDS THAT PATIENT'S PROGNOSIS IS POOR. HE DOES NOT WISH FOR A PEG TUBE FOR PATIENT. AT THIS TIME HE WISHES FOR PATIENT TO BE ON HOSPICE AT [facility].   # ACUTE METABOLIC ENCEPHALOPATHY - 1/24 CODE STROKE WAS CALLED - PATIENT WAS EVALUATED BY TEAM AND CT HEAD WAS NEGATIVE  - ON 1/25 SPONTANEOUSLY AROSE TO VOICE AND FOLLOWED SIMPLE COMMANDS INCONSISTENTLY I.E "SQUEEZE MY HAND"  - ENCEPHALOPATHY THUS FAR WAS SUSPECTED S/T ACUTE INFECTION & HYPERNATREMIA - REVIEWED CT HEAD  - NEUROLOGY CONSULT IN PROGRESS, NEUROLOGIST WAS MADE AWARE   - PSYCHIATRY RECOMMENDED METHYLPHENIDATE    # GIVEN RHONCHI - CXR WAS ORDERED AND NOTED TO HAVE BILATERAL PLEURAL EFFUSION W/ POSSIBLE INFILTRATE   - IVF DISCONTINUED  - CURRENT ABX REGIMEN VANCOMYCIN + CEFEPIME + FLAGYL SWITCHED TO UNASYN  - PRN SUCTIONING  # ELEVATED TROPONIN - ECG REVIEWED AND CARDIOLOGY TEAM UPDATED BY NP - TREND TROPONINS, ON ASPIRIN,   # ELEVATED BNP W/ PLEURAL EFFUSIONS - SUSPECT CHF - GIVEN IV LASIX, LANDIS PLACED FOR STRICT IS AND OS, MONITOR ON TELEMETRY    - ECHOCARDIOGRAM - MILD-MOD MR, TRACE AR, EF - 30-35% [NOTED HYPOKINETIC WALL], G1DD, MOD PULM HTN, MILD TR. TRACE WI, TRIVIAL PERICARDIAL EFFUSION    # MULTIPLE ELECTROLYTE ABNORMALITIES - HYPERNATREMIA, HYPERCHLOREMIA, HYPOKALEMIA, HYPOCALCEMIA, NONANION GAP METABOLIC ACIDOSIS [ SUSPECT LESS LIKELY STARVATION OR DIABETIC KETOACIDOSIS]  - REVIEWED ABG, REVIEWED ACETONE AND B-HYDROXYBUTYRATE, LACTIC ACID  - SUSPECTING RTA VS. HYPERCHLOREMIA S/T NORMAL SALINE INFUSION - CASE D/W NEPHROLOGIST DR. TODD - RECOMMENDED OBTAINING URINE LYTES AT THIS TIME  - MONITOR BMP, FINGER STICKS [ DOES NOT APPEAR TO BE ? EUGLYCEMIC DKA],    # ? REFEEDING SYNDROME - THOUGH PATIENT HAS NOT BEEN EATING CONSISTENTLY - WILL MONITOR AND REPLETE ELECTROLYTES    # BILATERAL LE CHRONIC ULCER NOW PROGRESSIVELY WORSENING W/ SSTI AND RIGHT CALCANEAL OSTEOMYELITIS W/ POSSIBLE ABSCESS; UNDERWENT BEDSIDE DEBRIDEMENT OF RIGHT LEG ULCER AND UNDERWENT RIGHT PARTIAL CALCANECTOMY ON 1/13 AND PICC LINE PLACEMENT 1/20  - CEFEPIME + VANCOMYCIN + FLAGYL, REVIEWED TIMUR, BCX - NGTD, WOUND CX - MORGANELLA MORGANI & P. MIRABILIS, ID CONSULT IN PROGRESS, PODIATRY CONSULT IN PROGRESS  - REVIEWED RIGHT LE MRI  -  PSYCHIATRY CONSULT FOR CAPACITY IN PROGRESS - DEEM PATIENT AS HAVING CAPACITY.  PATIENT WAS AGREEABLE FOR SURGICAL DEBRIDEMENT ON 1/09  - 2 PC CONSENT FOR SURGICAL DEBRIDEMENT PLANNED FOR 1/12; ATTEMPTED TO CALL NEXT OF [facility] @ [phone] HOWEVER PHONE NUMBER APPEARS DISCONNECTED  - VASCULAR SX CONSULT IN PROGRESS - PATIENT MAY REQUIRE AKA, BUT REFUSED ON PREVIOUS CONVERSATIONS - WAS AGREEABLE TO LOCAL DEBRIDEMENT/INTERVENTIONS  - PLAN FOR WOUND VAC  - PATIENT WILL NEED 6 WEEKS OF IV ANTIBIOTICS - WILL NEED TO SWITCH TO PICC LINE FROM EXTENDED DWELL - D/W FLOOR TEAM  # MEDICAL CLEARANCE FOR SURGERY - RCRI 1 POINT - 6% RISK OF DEATH, MI OR CARDIAC ARREST; CARDIOLOGY CONSULT IN PROGRESS FOR MEDICAL CLEARANCE  # NORMOCYTIC ANEMIA - SUSPECT S/T AOCD AND POOR NUTRITIONAL STATUS - F/U ANEMIA PANEL; PRBC TRANSFUSION GIVEN TODAY  # SACRAL AND RIGHT ISCHIAL UNSTAGEABLE ULCERS W/ SSTI  S/P DEBRIDEMENT 1/8- ON VANCOMYCIN AND CEFEPIME, SURGERY CONSULT IN PROGRESS -  PSYCHIATRY CONSULT FOR CAPACITY IN PROGRESS      # DEBILITY / ? MYOPATHY S/T ILLNESS - PT EVALUATION, OUT OF BED TO CHAIR  # HYPERNATREMIA SUSPECT S/T POOR PO INTAKE - RESOLVED W/ IVF  # POOR PO INTAKE - PATIENT ATE SOME LUNCH TODAY; ST EVAL NOTED  # LEFT ARM SWELLING - REVIEWED LEFT ARM U/S W/ OUT THROMBUS, WARM COMPRESSES  # ASHLEY - RESOLVED  # PAD  # HTN  # DM W/ DIABETIC NEUROPATHY  # OA/OP  # SCHIZOPHRENIA - HOLD MEDICATION; PLACED ON IV HALDOL PER PSYCHIATRY RECOMMENDATION  # CASE D/W PATIENT'S ? PARTNER - LISA KELLY - WHO REPORTS HE IS NOT NEXT OF KIN OR HCP - [phone] ON 1/12 - HE UNDERSTOOD PATIENT'S PROGNOSIS WAS GUARDED. ATTEMPTED TO CALL MARICHUY KELLY ON 1/24 BUT WAS NOT ABLE TO GET THROUGH - LEFT MESSAGE W/ STAFF OF ASSISTED LIVING ; ATTEMPTED TO CALL [facility] @ [phone] - PHONE APPEARS TO BE DISCONNECTED [1/24].  # MOLST FORM FROM 05/2020 REVIEWED - PATIENT REQUESTED DNR/DNI FOR GOC. CONFIRMED BY PCP IN 12/2020. CONFIRMED BY NP NAWRA ON 01/13 - PATIENT'S GOC IS DNR/DNI. PALLIATIVE CARE CONSULT PLACED. NOTED PROGNOSIS IS GUARDED  # GI AND DVT PPX    ELEN CASILLAS MD COVERING FARHAN CASILLAS MD

## 2021-01-27 NOTE — PROGRESS NOTE ADULT - ASSESSMENT
Patient is a 76yo Female with HTN, Diabetes, Osteoarthritis, Osteoporosis, Peripheral arterial disease, Diabetic neuropathy, HLD, Schizophrenia a/w Rt foot osteomyelitis and acute metabolic encephalopathy. CT head neg. Nephrology consulted for metabolic acidosis    1. Metabolic Acidosis- normal anion gap; hyperchloremic metabolic acidosis. No diarrhea; lactate wnl. ?Proximal (Type 2) RTA vs large volume infusion with NS. Pt with normal renal function with no hypophosphatemia today and no glucosuria. Urine ph elevated however urine anion gap is neg; not consistent with RTA.   Check ABG to monitor ph  2. Hypernatremia- Free water deficit 2.1L. Consider NGT placement with free water 300 ml q 4hrs. Unasyn changed to D5 base.  Monitor serum Na   3. Rt foot osteomyelitis- s/p debridement. Pt on Unasyn. Plan as per primary team/ ID  4. Acute metabolic encephalopathy- CT head neg. Plan as per primary team      Patient now hospice; comfort measures. Will sign off.

## 2021-01-27 NOTE — PROGRESS NOTE BEHAVIORAL HEALTH - SUMMARY
75F, single and living in nursing home ([facility]), with PHx of schizophrenia and MHx of HTN, Diabetes, Osteoarthritis, Osteoporosis, Peripheral arterial disease, Diabetic neuropathy, and HLD, BIBEMS on 1/5 from NH for worsening BL heel ulcerations which have been chronic for the past few months, as well as chronic sacral decubitus ulcer. MRI showed progressive osteomyelitis of R foot and surgical debridement was recommended for both sacral and calcaneal ulcers, but pt initially stated she only wanted sacral debridement and refused it for her feet, despite being informed that the heel infection presented a more immediate risk to life and limb. Initial psych consult was requested for assessment of capacity to refuse heel debridement; on exam, pt expressed verbal agreement with debridement and was found to have capacity to consent to the procedure, but written consent could not be obtained due to subsequent prolonged somnolence, so procedure was performed on 1/13 with [facility] physician consent. Pt received PICC on 1/20 under similar circumstances (initially expressed verbal consent, but subsequently became somnolent and written consent could not be obtained; [facility] consent was used). Psychiatry is now reconsulted for assessment of prolonged postoperative somnolence and hyporesponsiveness. Given pt's somnolence and aphonia today, we again agree with neurology impression that pt's presentation is c/w prolonged hypoactive delirium 2/2 metabolic abnormalities and infection. We recommend continuing to offer methylphenidate PO 5 mg qd standing in effort to improve pt alertness; pt has yet to receive the medication due to refusal of PO intake, but no parenteral equivalent is available. We would also c/w Haldol IV 0.5 mg q12h PRN delirium, but pt has not received this to date. Pt is expected to transition to hospice/comfort care in the near future. Pt does not appear to present an acute risk of harm to self or others at the time of assessment, and does not appear to be in need of admission to  psych at the time of assessment.

## 2021-01-27 NOTE — PROGRESS NOTE BEHAVIORAL HEALTH - NSBHCONSULTRECOMMENDOTHER_PSY_A_CORE FT
1. Recommend DC'ing methylphenidate PO 5 mg qd standing, as pt has never received the medication (she is refusing all PO intake) and no parenteral equivalent is available  2. C/w Haldol IV 0.5 mg q12h PRN delirium (not used to date)  3. Recommending continuing to hold pt home psychiatric medication (perphenazine 16 mg BID), due to absence of active psychotic sx and potential to exacerbate somnolence   4. No indication for other standing or PRN psychotropic medications at this time  5. Medical management as directed by primary team and podiatry  6. Psychiatry will continue to follow until hospice transition  7. Case d/w Dr. Rodriguez of primary team    Norma Lackey MD  Director, Consultation-Liaison Psychiatry Service  [phone]

## 2021-01-27 NOTE — PROGRESS NOTE ADULT - SUBJECTIVE AND OBJECTIVE BOX
[facility] NEPHROLOGY- PROGRESS NOTE    Patient is a 76yo Female with HTN, Diabetes, Osteoarthritis, Osteoporosis, Peripheral arterial disease, Diabetic neuropathy, HLD, Schizophrenia a/w Rt foot osteomyelitis and acute metabolic encephalopathy. CT head neg. Nephrology consulted for metabolic acidosis    Hospital Medications: Medications reviewed.  REVIEW OF SYSTEMS: Unable to obtain; non verbal    VITALS:  T(F): 96.8 (01-27-21 @ 11:20), Max: 98.2 (01-26-21 @ 20:35)  HR: 100 (01-27-21 @ 11:20)  BP: 108/55 (01-27-21 @ 11:20)  RR: 19 (01-27-21 @ 11:20)  SpO2: 100% (01-27-21 @ 11:20)  Wt(kg): --    01-26 @ 07:01  -  01-27 @ 07:00  --------------------------------------------------------  IN: 0 mL / OUT: 400 mL / NET: -400 mL        PHYSICAL EXAM:  Gen: NAD, non verbal  HEENT: anicteric/ opens eyes  Cards: RRR, +S1/S2, no M/G/R  Resp: Course BS  GI: soft, NT/ND, NABS  : +lopez  Extremities: +anasarca UE >LE, +left arm PICC   Derm: +rt foot wound vac      LABS: No  labs 1/27

## 2021-01-27 NOTE — PROGRESS NOTE ADULT - PROBLEM SELECTOR PLAN 1
Code stroke 1/24 due to change in mental status and facial droop right side   CTH no acute changes   C/w ASA, statin   Increased rhonchus on exam  Echo 1/25/21 showed GIDD, EF 30-35%, severe LV systolic dysfunction   Neuro Dr Edwards on board

## 2021-01-27 NOTE — PROGRESS NOTE ADULT - SUBJECTIVE AND OBJECTIVE BOX
Patient is a 75y old  Female who presents with a chief complaint of foot ulcer (05 Jan 2021 14:24)      HPI:  76 yo F from [facility], wheelchair bound with medical history significant of HTN, Diabetes, Osteoarthritis, Osteoporosis, Peripheral arterial disease, Diabetic neuropathy, HLD, Schizophrenia comes in with worsening ulceration to b/l heels. She has been having ulcers for past couple months, has been progressive. She was treated with IV antbx at NH but did not get better. It was worsening with necrotic changes and increasing foul smelling discharge. She denies fever, abdominal pain, chest pain, urinary symptoms, fall, trauma. No h/o nausea, vomiting, diarrhea, cough, dyspnea, sick contacts, recent illness.  (05 Jan 2021 14:24)      Podiatry HPI: 76 y/o female with full history as noted above, podiatry consulted for b/l heel wounds. Pt is from [facility], wheelchair bound with medical history significant of HTN, Diabetes, Osteoarthritis, Osteoporosis, PAD, Diabetic neuropathy, HLD, Schizophrenia comes in with worsening ulceration to b/l heels. Patient seen resting in bed comfortably, AAOx3. Patient states she has had the wounds for a long time, maybe more than 6 months. She relates receiving local wound care previously in the NH using santyl dressings. She endorses occasional pain to wound sites. Patient was  under podiatric care in previous admissions for b/l heel wounds with osteomyelitis. She states she is not interesting in surgery for her feet, she wishes to continue with local wound care. She denies nausea, vomiting, chills, fever, SOB.     Podiatry Progress: Pt s/p Right foot partial calcanectomy and wound debridement on 1/13/21. Pt lethargic, nonverbal, altered mental status.     Medications acetaminophen   Tablet .. 650 milliGRAM(s) Oral every 6 hours PRN  ampicillin/sulbactam  IVPB 3 Gram(s) IV Intermittent every 6 hours  aspirin enteric coated 81 milliGRAM(s) Oral daily  chlorhexidine 2% Cloths 1 Application(s) Topical daily  collagenase Ointment 1 Application(s) Topical daily  Dakins Solution - Full Strength 1 Application(s) Topical once  dextrose 40% Gel 15 Gram(s) Oral once  dextrose 50% Injectable 25 Gram(s) IV Push once  dextrose 50% Injectable 12.5 Gram(s) IV Push once  dextrose 50% Injectable 25 Gram(s) IV Push once  glucagon  Injectable 1 milliGRAM(s) IntraMuscular once  haloperidol    Injectable 0.5 milliGRAM(s) IV Push every 8 hours PRN  methylphenidate 5 milliGRAM(s) Oral daily  pantoprazole  Injectable 40 milliGRAM(s) IV Push every 12 hours  simvastatin 40 milliGRAM(s) Oral at bedtime  sodium chloride 0.9% lock flush 10 milliLiter(s) IV Push every 1 hour PRN  sucralfate 1 Gram(s) Oral four times a day    FHNo pertinent family history in first degree relatives    ,   PMHCOVID-19    Osteomyelitis    DM (diabetes mellitus)    Paranoid schizophrenia    HLD (hyperlipidemia)    PAD (peripheral artery disease)    HTN (hypertension)       PSHNo significant past surgical history        Labs                          10.0   14.65 )-----------( 132      ( 26 Jan 2021 07:37 )             31.1      01-26    152<H>  |  123<H>  |  17  ----------------------------<  95  3.3<L>   |  17<L>  |  0.83    Ca    7.6<L>      26 Jan 2021 07:37  Phos  4.1     01-26  Mg     2.3     01-26    TPro  4.2<L>  /  Alb  1.2<L>  /  TBili  0.3  /  DBili  x   /  AST  10  /  ALT  <6<L>  /  AlkPhos  111  01-26     Vital Signs Last 24 Hrs  T(C): 36 (27 Jan 2021 11:20), Max: 36.8 (26 Jan 2021 20:35)  T(F): 96.8 (27 Jan 2021 11:20), Max: 98.2 (26 Jan 2021 20:35)  HR: 100 (27 Jan 2021 11:20) (99 - 109)  BP: 108/55 (27 Jan 2021 11:20) (98/52 - 108/64)  BP(mean): --  RR: 19 (27 Jan 2021 11:20) (17 - 20)  SpO2: 100% (27 Jan 2021 11:20) (99% - 100%)  Sedimentation Rate, Erythrocyte: 99 mm/Hr (01-05-21 @ 11:45)         C-Reactive Protein, Serum: 5.63 mg/dL (01-06-21 @ 10:27)       ROS:  REVIEW OF SYSTEMS: negative except as noted on HPI         PHYSICAL EXAM:  LE Focused:    Vasc:  DP/PT nonpalpable, CFT brisk to digits, TG warm to warm b/l,   Derm:   Wound 1:  L heel closed necrotic eschar measuring ~4x3.5x0.1 cm, no discharge noted, no malodor or PTB noted, stable in appearance    Wound 2: s/p R foot partial calcanectomy and wound debridement measuring ~9x6.5x1 cm with calcaneal bone exposed and increased surrounding fibrotic tissue; No periwound erythema. No drainage, no malodor  Neuro: protective sensation absent at level of digits b/l  MSK: no tenderness on palpation of periwound areas b/l       IMAGING:  f< from: Xray Ankle Complete 3 Views, Right (01.13.21 @ 18:15) >    EXAM:  ANKLE RIGHT (MINIMUM 3 V)                            PROCEDURE DATE:  01/13/2021          INTERPRETATION:  RIGHT ankle    CLINICAL INFORMATION: Postoperative radiographs.    TECHNIQUE: AP,lateral /views.    FINDINGS:    Large posterior calcaneal ulceration NOTED with the osteolysis /surgical debridement of the posterior calcaneus remaining osseous structures of the ankle are intact..    IMPRESSION:    Posterior calcaneal large ulceration within the posterior calcaneal postsurgical debridement versus/osteomyelitis.            ADITI SILVA MD; Attending Radiologist  This document has been electronically signed. Jan 13 2021  7:02PM    < end of copied text >    --------------------------  < from: MR Foot No Cont, Right (01.06.21 @ 11:08) >    EXAM:  MR FOOT RT                            PROCEDURE DATE:  01/06/2021          INTERPRETATION:  Clinical indications: Right heel ulceration and eschar status post debridement. Concern for osteomyelitis.    Multiplanar multisequence MRI of the right foot was performed localized to the hindfoot.    Correlation is made with prior MRI from September 11, 2019.    FINDINGS:    There is a large wound along the plantar aspect of the calcaneus posteriorly which extends nearly extends to bone. There is surrounding soft tissue edema about this site with evidence of an overlying bandage. The degree of soft tissue deficiency is increased compared to the prior examination. There is extensive osseous edema and corresponding hypointense T1 marrow signalthroughout the calcaneus extending from the posterior plantar margin to the level of the angle of Gissane. This is progressed compared to the prior examination and consistent with osteomyelitis. Marrow signal within the remainder of the foot is otherwise preserved.    There is confluent edema tracking along the abductor hallucis and flexor digitorum brevis muscles and within the plantar subcutaneous fat subjacent to this region. In total this area measures approximately 2.2 cm in transverse dimension, approximately 4 cm in anteroposterior posterior dimension, and approximately 1.6 cm in craniocaudal dimension. Findings may be related to underlying phlegmon or abscess. Evaluation is limited by the lack of intravenous contrast. Distal to this site there is edema throughout the visualized musculature consistent with myositis.    Visualized tendinous structures remain intact. There is no acute ligamentous injury. Visualized joint spaces are preserved without joint effusion.    IMPRESSION:    Osteomyelitis involving the calcaneus which extends from the plantar posterior aspect of the calcaneus to the level of the angle of Gissane. This is progressed compared to the prior examination. This is seen in the setting of diffuse soft tissue deficiency along the posterior plantar aspect of the calcaneus.    Confluent edema adjacent to this region within the abductor hallucis and flexor digitorum brevis muscles and within the subjacent plantar subcutaneous fat. This may be related to abscess orphlegmon.            OSWALDO ALFRED MD; Attending Radiologist  This document has been electronically signed. Jan 6 2021 11:49AM    < end of copied text >    -------------------------------------------------------------------------------------------------------------  < from: [facility] Physiol Extremity Lower 3+ Level, BI (01.05.21 @ 17:55) >    EXAM:  US PHYSIOL LWR EXT 3+ LEV BI                            PROCEDURE DATE:  01/05/2021          INTERPRETATION:  Clinical information: History of diabetes, nonsmoker, hypertension, hyperlipidemia, presents with bilateral heel ulcers.    Comparison: Lower extremity arterial Doppler study dated 5/13/2020.    Technique: Lower extremity arterial Doppler study. Note that pressure measurements were not obtained at the thigh level.    Findings: Ankle brachial index measures 1.33 bilaterally, compared with prior values of 1.41 on the right and 1.36 on the left. No segmental arterial pressure gradient is present.    PVR waveforms are normal in amplitude and pulsatility from the thigh through the ankle level. They're diminished in amplitude at the metatarsal and digital levels in symmetric fashion, similar to the prior exam.    Impression: No Doppler evidence of hemodynamically significant arterial inflow abnormality in either lower extremity.    Evidence of small vessel disease in the feet.    No significant interval change since study of 5/13/2020.            SOHAN PA MD; Attending Radiologist  This document has been electronically signed. Jan 6 2021  9:13AM    < end of copied text >      --------------------------------------------------------------------------------------------------------------  < from: Xray Foot AP + Lateral + Oblique, Right (01.05.21 @ 18:00) >    EXAM:  FOOT RIGHT (MINIMUM 3 VIEWS)                            PROCEDURE DATE:  01/05/2021          INTERPRETATION:  Right foot    HISTORY: Status post bedside debridement.     Two views of the right foot show thinning of soft tissues near the calcaneus likely indicating site of debridement. The joint spaces are maintained. There is questionable exposure of the plantar surface of the calcaneus.    IMPRESSION: Calcaneal soft tissues and questionable exposure as noted. Clinical correlation is suggested.        Thank you for this referral.            REID CRAMER MD; Attending Interventional Radiologist  This document has been electronically signed. Jan 6 2021 11:10AM    < end of copied text >    -------------------------------------------------------------------------------------------  a< from: Xray Ankle Complete 3 Views, Bilateral (01.05.21 @ 14:37) >    EXAM:  ANKLE BILATERAL (MINIMUM 3 V)                            PROCEDURE DATE:  01/05/2021          INTERPRETATION:  CLINICAL INDICATION:  Osteomyelitis; evaluate for soft tissue emphysema.    COMPARISON:  Left ankle radiography 5/11/2020.    TECHNIQUE:  AP, oblique and crosstable lateral views left ankle. Oblique and crosstable lateral views right ankle. Technologist noted that the best possible films were obtained.    FINDINGS:    Right ankle: Generalized osteopenia. Subcutaneous emphysema is identified along the plantar aspect of the right hindfoot inferior to the calcaneus, with an overlying skin defect noted along the posterior aspect of the calcaneus. Osteolysis involving the inferior/posterior aspect of the right calcaneus cannot beexcluded. MRI evaluation can be performed for further assessment. Extensive vascular calcification is noted. No ankle joint effusion is noted.    Left ankle: Generalized osteopenia. There is no evidence for acute fracture or dislocation. The ankle mortise appears grossly intact. No ankle joint effusion is noted. Extensive vascular calcifications identified. No significant soft tissue swelling.      IMPRESSION:  No evidence for acute fracture. Subcutaneous emphysema is identified along the plantaraspect of the right hindfoot inferior to the calcaneus, with an overlying skin defect noted along the posterior aspect of the calcaneus. Osteolysis involving the inferior/posterior aspect of the right calcaneus cannot be excluded. MRI evaluation can be performed for further assessment.                MARA LARKIN MD; Attending Radiologist  This document has been electronically signed. Jan 5 2021  3:57PM    < end of copied text >        CULTURES:   cCulture - Surgical Swab (01.05.21 @ 22:13)   Specimen Source: .Surgical Swab Right heel wound   Culture Results:   Few Morganella morganii   Few Proteus mirabilis       Historical Values  Culture - Surgical Swab (01.05.21 @ 22:13)   Specimen Source: .Surgical Swab Right heel wound   Culture Results:   Few Morganella morganii   Few Proteus mirabilis   culture:Culture - Surgical Swab (01.13.21 @ 19:22)   - Ampicillin/Sulbactam: S <=4/2 Enterobacter, Citrobacter, and Serratia may develop resistance during prolonged therapy (3-4 days)   - Ampicillin: S <=8 These ampicillin results predict results for amoxicillin   - Amikacin: S <=16   - Amoxicillin/Clavulanic Acid: S <=8/4   - Aztreonam: S <=4   - Cefazolin: I 4 Enterobacter, Citrobacter, and Serratia may develop resistance during prolonged therapy (3-4 days)   - Cefepime: S <=2   - Cefoxitin: S <=8   - Ceftriaxone: S <=1 Enterobacter, Citrobacter, and Serratia may develop resistance during prolonged therapy   - Ciprofloxacin: R 1   - Ertapenem: S <=0.5   - Gentamicin: S <=2   - Levofloxacin: S <=0.5   - Meropenem: S <=1   - Trimethoprim/Sulfamethoxazole: S <=0.5/9.5   - Tobramycin: S <=2   - Piperacillin/Tazobactam: S <=8   Specimen Source: .Surgical Swab Right heel wound culture   Culture Results:   Moderate Proteus mirabilis   Few Bacteroides vulgatus group "Susceptibilities not performed"   Organism Identification: Proteus mirabilis   Organism: Proteus mirabilis   Method Type: RICARDO       Historical Values  Culture - Surgical Swab (01.13.21 @ 19:22)   - Ampicillin/Sulbactam: S <=4/2 Enterobacter, Citrobacter, and Serratia may develop resistance during prolonged therapy (3-4 days)   - Ampicillin: S <=8 These ampicillin results predict results for amoxicillin   - Amikacin: S <=16   - Amoxicillin/Clavulanic Acid: S <=8/4   - Aztreonam: S <=4   - Cefazolin: I 4 Enterobacter, Citrobacter, and Serratia may develop resistance during prolonged therapy (3-4 days)   - Cefepime: S <=2   - Cefoxitin: S <=8   - Ceftriaxone: S <=1 Enterobacter, Citrobacter, and Serratia may develop resistance during prolonged therapy   - Ciprofloxacin: R 1   - Ertapenem: S <=0.5   - Gentamicin: S <=2   - Levofloxacin: S <=0.5   - Meropenem: S <=1   - Trimethoprim/Sulfamethoxazole: S <=0.5/9.5   - Tobramycin: S <=2   - Piperacillin/Tazobactam: S <=8   Specimen Source: .Surgical Swab Right heel wound culture   Culture Results:   Moderate Proteus mirabilis   Few Bacteroides vulgatus group "Susceptibilities not performed"   Organism Identification: Proteus mirabilis   Organism: Proteus mirabilis   Method Type: RICARDO   ---------------------------------  Pathology result:Surgical Pathology Report (01.13.21 @ 14:03)   Surgical Pathology Report:   ACCESSION No: [id number]   [facility] 2   Surgical Final Report   Final Diagnosis   1. Right calcaneum bone; partial calcanectomy, incision and   drainage:   Cancellous bone with acute osteomyelitis and reparative changes   2. Right heel, margin; biopsy:   Osteocartilaginous tissue with granulation tissue and   regenerating bony   trabeculae; no inflammatory exudate is identified   Verified by: Sarah Culver M.D.

## 2021-01-27 NOTE — PROGRESS NOTE ADULT - PROBLEM SELECTOR PLAN 3
X ray and MRI noted   S/p Vancomycin, Cefepime and Flagyl   IR placement of  PICC line 1/20  Wound culture grew few Enterococcus Faecalis and Proteus mirabilis  S/p OR right heel debridement 1/13 obtained 2PC consent  C/w wound vac applied by podiatry to right heel   ID Dr. Wellington on board, recommends 4 weeks of antibiotics starting 1/25/21  C/w Unasyn 3 grams q6 hrs 1/25  F/u right foot X ray   Podiatry on board   Vascular on board

## 2021-01-27 NOTE — PROGRESS NOTE ADULT - SUBJECTIVE AND OBJECTIVE BOX
Patient denies chest pain or shortness of breath, is confused. events noted Review of systems otherwise (-)  	  acetaminophen   Tablet .. 650 milliGRAM(s) Oral every 6 hours PRN  ampicillin/sulbactam  IVPB 3 Gram(s) IV Intermittent every 6 hours  aspirin enteric coated 81 milliGRAM(s) Oral daily  chlorhexidine 2% Cloths 1 Application(s) Topical daily  collagenase Ointment 1 Application(s) Topical daily  Dakins Solution - Full Strength 1 Application(s) Topical once  dextrose 40% Gel 15 Gram(s) Oral once  dextrose 50% Injectable 25 Gram(s) IV Push once  dextrose 50% Injectable 12.5 Gram(s) IV Push once  dextrose 50% Injectable 25 Gram(s) IV Push once  glucagon  Injectable 1 milliGRAM(s) IntraMuscular once  haloperidol    Injectable 0.5 milliGRAM(s) IV Push every 8 hours PRN  methylphenidate 5 milliGRAM(s) Oral daily  pantoprazole  Injectable 40 milliGRAM(s) IV Push every 12 hours  simvastatin 40 milliGRAM(s) Oral at bedtime  sodium chloride 0.9% lock flush 10 milliLiter(s) IV Push every 1 hour PRN  sucralfate 1 Gram(s) Oral four times a day                            10.0   14.65 )-----------( 132      ( 26 Jan 2021 07:37 )             31.1       Hemoglobin: 10.0 g/dL (01-26 @ 07:37)  Hemoglobin: 10.6 g/dL (01-25 @ 07:04)  Hemoglobin: 10.9 g/dL (01-24 @ 14:48)  Hemoglobin: 10.6 g/dL (01-24 @ 07:41)  Hemoglobin: 12.6 g/dL (01-23 @ 07:39)      01-26    152<H>  |  123<H>  |  17  ----------------------------<  95  3.3<L>   |  17<L>  |  0.83    Ca    7.6<L>      26 Jan 2021 07:37  Phos  4.1     01-26  Mg     2.3     01-26    TPro  4.2<L>  /  Alb  1.2<L>  /  TBili  0.3  /  DBili  x   /  AST  10  /  ALT  <6<L>  /  AlkPhos  111  01-26    Creatinine Trend: 0.83<--, 0.77<--, 0.80<--, 0.78<--, 0.85<--, 0.87<--    COAGS:     CARDIAC MARKERS ( 25 Jan 2021 07:04 )  0.439 ng/mL / x     / 37 U/L / x     / x      CARDIAC MARKERS ( 24 Jan 2021 21:23 )  0.865 ng/mL / x     / 21 U/L / x     / x      CARDIAC MARKERS ( 24 Jan 2021 16:19 )  0.944 ng/mL / x     / x     / x     / x      CARDIAC MARKERS ( 24 Jan 2021 14:48 )  1.040 ng/mL / x     / 22 U/L / x     / 4.8 ng/mL        T(C): 36 (01-27-21 @ 11:20), Max: 36.8 (01-26-21 @ 20:35)  HR: 100 (01-27-21 @ 11:20) (99 - 109)  BP: 108/55 (01-27-21 @ 11:20) (98/52 - 108/64)  RR: 19 (01-27-21 @ 11:20) (17 - 20)  SpO2: 100% (01-27-21 @ 11:20) (99% - 100%)  Wt(kg): --    I&O's Summary    26 Jan 2021 07:01  -  27 Jan 2021 07:00  --------------------------------------------------------  IN: 0 mL / OUT: 400 mL / NET: -400 mL            HEENT:  (-)icterus (-)pallor  CV: N S1 S2 1/6 ANGELIC (+)2 Pulses B/l  Resp:  Clear to ausculatation B/L, normal effort  GI: (+) BS Soft, NT, ND  Lymph:  (-)Edema, (-)obvious lymphadenopathy  Skin: Warm to touch, Normal turgor  Psych: Appropriate mood and affect        ASSESSMENT/PLAN: 	75y  Female  wheelchair bound with medical history significant of HTN, Diabetes, Osteoarthritis, Osteoporosis, Peripheral arterial disease, Diabetic neuropathy, HLD, Schizophrenia historically preserved LV and R function, comes in with worsening ulceration to b/l heels s/p debridement.    - s/p Partial calcanectomy of right foot   - s/p code stroke  - Neuro f/u noted  - Patient with severe LV dysfx now on echo, cont ASA and statin, not a candidate for ischemic eval at present  - haldol per medical team  - palliative f/u    Jm Cortes MD, Located within [facility]  BEEPER [phone]

## 2021-01-27 NOTE — PROGRESS NOTE ADULT - SUBJECTIVE AND OBJECTIVE BOX
PGY-1 Progress Note discussed with attending    PAGER #: [phone]] TILL 5:00 PM  PLEASE CONTACT ON CALL TEAM:  - On Call Team (Please refer to Deanna) FROM 5:00 PM - 8:30PM  - Nightfloat Team FROM 8:30 -7:30 AM    CHIEF COMPLAINT & BRIEF HOSPITAL COURSE:  76 yo F from [facility], wheelchair bound with medical history significant of HTN, Diabetes, Osteoarthritis, Osteoporosis, Peripheral arterial disease, Diabetic neuropathy, HLD, Schizophrenia came to ED c/o worsening ulceration to b/l heels and sacral wound. Patient admitted for bilateral heel ulcers concern for OM. Patient was treated with IV antibiotics with Vancomycin, Cefepime and Flagyl. Podiatry on board, patient s/p sacral debridement on 1/8, s/p debridement of right heel (partial calcanectomy) on 1/13, wound vac applied by podiatry to right heel, wound culture grew few Enterococcus Faecalis and Proteus mirabilis,  PICC line placed on 1/20 for IV antibiotics.   Patient with schizophrenia, on haldol PRN and started on Methylphenidate. For hypertension and diabetes closely monitored. Patient also found to have hypernatremia, hypocalcemia, hyperchloremia most likely 2/2 to poor oral intake.   On 1/24 code stroke, CTH negative, neuro on board, no concern for stroke or TIA. Also patient with elevated troponin, CXR bilateral effusions, elevated Pro BNP, s/p IV Lasix, Echo 1/25/21 showed GIDD, EF 30-35%, severe LV systolic dysfunction, on ASA and statin   For anemia, patient received 2 units of PRBC on 1/22, no drop in Hg, no signs of bleeding, also ASHLEY that resolved, Emanuel in place   Patient was seen by palliative, is hospice comfort measures only    INTERVAL HPI/OVERNIGHT EVENTS: patient alert, does not follow commands, anasarca, on nasal cannula     MEDICATIONS  (STANDING):  ampicillin/sulbactam  IVPB 3 Gram(s) IV Intermittent every 6 hours  aspirin enteric coated 81 milliGRAM(s) Oral daily  chlorhexidine 2% Cloths 1 Application(s) Topical daily  collagenase Ointment 1 Application(s) Topical daily  Dakins Solution - Full Strength 1 Application(s) Topical once  dextrose 40% Gel 15 Gram(s) Oral once  dextrose 50% Injectable 25 Gram(s) IV Push once  dextrose 50% Injectable 12.5 Gram(s) IV Push once  dextrose 50% Injectable 25 Gram(s) IV Push once  glucagon  Injectable 1 milliGRAM(s) IntraMuscular once  methylphenidate 5 milliGRAM(s) Oral daily  pantoprazole  Injectable 40 milliGRAM(s) IV Push every 12 hours  simvastatin 40 milliGRAM(s) Oral at bedtime  sucralfate 1 Gram(s) Oral four times a day    MEDICATIONS  (PRN):  acetaminophen   Tablet .. 650 milliGRAM(s) Oral every 6 hours PRN Moderate Pain (4 - 6)  haloperidol    Injectable 0.5 milliGRAM(s) IV Push every 8 hours PRN Agitation  sodium chloride 0.9% lock flush 10 milliLiter(s) IV Push every 1 hour PRN Pre/post blood products, medications, blood draw, and to maintain line patency      Vital Signs Last 24 Hrs  T(C): 36 (27 Jan 2021 11:20), Max: 36.8 (26 Jan 2021 20:35)  T(F): 96.8 (27 Jan 2021 11:20), Max: 98.2 (26 Jan 2021 20:35)  HR: 100 (27 Jan 2021 11:20) (99 - 109)  BP: 108/55 (27 Jan 2021 11:20) (98/52 - 108/64)  BP(mean): --  RR: 19 (27 Jan 2021 11:20) (17 - 20)  SpO2: 100% (27 Jan 2021 11:20) (99% - 100%)    PHYSICAL EXAMINATION:  GENERAL: NAD, poor general condition  HEAD:  Atraumatic, Normocephalic,  on 2L NC sat %  EYES:  conjunctiva and sclera clear  NECK: Supple, No JVD, Normal thyroid  CHEST/LUNG: bilateral rhonchus, increased secretions   HEART: Regular rate and rhythm; No murmurs, rubs, or gallops  ABDOMEN: Soft, Nontender, Nondistended; Bowel sounds present, no pain or masses on palpation  NERVOUS SYSTEM:  Alert   : Emanuel in place   EXTREMITIES:  2+ Peripheral Pulses, No clubbing, or cyanosis, upper and lower extremities edema, decreased upper and lower extremities strength   SKIN: warm dry un stageable sacral ulcer, bilateral heel ulcers, right heel with VAC system and clean dressing                           10.0   14.65 )-----------( 132      ( 26 Jan 2021 07:37 )             31.1     01-26    152<H>  |  123<H>  |  17  ----------------------------<  95  3.3<L>   |  17<L>  |  0.83    Ca    7.6<L>      26 Jan 2021 07:37  Phos  4.1     01-26  Mg     2.3     01-26    TPro  4.2<L>  /  Alb  1.2<L>  /  TBili  0.3  /  DBili  x   /  AST  10  /  ALT  <6<L>  /  AlkPhos  111  01-26    LIVER FUNCTIONS - ( 26 Jan 2021 07:37 )  Alb: 1.2 g/dL / Pro: 4.2 g/dL / ALK PHOS: 111 U/L / ALT: <6 U/L DA / AST: 10 U/L / GGT: x                   I&O's Summary    26 Jan 2021 07:01  -  27 Jan 2021 07:00  --------------------------------------------------------  IN: 0 mL / OUT: 400 mL / NET: -400 mL

## 2021-01-27 NOTE — PROGRESS NOTE ADULT - PROBLEM SELECTOR PLAN 2
Patient with hypernatremia, hypokalemia, hypocalcemia and metabolic acidosis non anion gap  Free water deficit 1.9 L  Nephro Dr Taylor on board

## 2021-01-27 NOTE — PROGRESS NOTE ADULT - ATTENDING COMMENTS
[facility] NEPHROLOGY  Juve Parks M.D.  Favio Flores D.O.  Karmen Taylor M.D.  Elle Barajas, MSN, ANP-C  [phone]    [address] [place] [address]

## 2021-01-28 LAB
GLUCOSE BLDC GLUCOMTR-MCNC: 85 MG/DL — SIGNIFICANT CHANGE UP (ref 70–99)
GLUCOSE BLDC GLUCOMTR-MCNC: 87 MG/DL — SIGNIFICANT CHANGE UP (ref 70–99)
SARS-COV-2 IGG SERPL IA-ACNC: 6.1 RATIO — HIGH
SARS-COV-2 IGG SERPL QL IA: POSITIVE
SARS-COV-2 IGG SERPL QL IA: POSITIVE
SARS-COV-2 IGM SERPL IA-ACNC: 1.24 RATIO — HIGH
SARS-COV-2 RNA SPEC QL NAA+PROBE: SIGNIFICANT CHANGE UP

## 2021-01-28 RX ADMIN — PANTOPRAZOLE SODIUM 40 MILLIGRAM(S): 20 TABLET, DELAYED RELEASE ORAL at 06:14

## 2021-01-28 RX ADMIN — AMPICILLIN SODIUM AND SULBACTAM SODIUM 200 GRAM(S): 250; 125 INJECTION, POWDER, FOR SUSPENSION INTRAMUSCULAR; INTRAVENOUS at 17:36

## 2021-01-28 RX ADMIN — AMPICILLIN SODIUM AND SULBACTAM SODIUM 200 GRAM(S): 250; 125 INJECTION, POWDER, FOR SUSPENSION INTRAMUSCULAR; INTRAVENOUS at 00:25

## 2021-01-28 RX ADMIN — AMPICILLIN SODIUM AND SULBACTAM SODIUM 200 GRAM(S): 250; 125 INJECTION, POWDER, FOR SUSPENSION INTRAMUSCULAR; INTRAVENOUS at 11:21

## 2021-01-28 RX ADMIN — AMPICILLIN SODIUM AND SULBACTAM SODIUM 200 GRAM(S): 250; 125 INJECTION, POWDER, FOR SUSPENSION INTRAMUSCULAR; INTRAVENOUS at 06:14

## 2021-01-28 RX ADMIN — AMPICILLIN SODIUM AND SULBACTAM SODIUM 200 GRAM(S): 250; 125 INJECTION, POWDER, FOR SUSPENSION INTRAMUSCULAR; INTRAVENOUS at 23:49

## 2021-01-28 RX ADMIN — CHLORHEXIDINE GLUCONATE 1 APPLICATION(S): 213 SOLUTION TOPICAL at 11:22

## 2021-01-28 RX ADMIN — Medication 1 APPLICATION(S): at 11:21

## 2021-01-28 RX ADMIN — PANTOPRAZOLE SODIUM 40 MILLIGRAM(S): 20 TABLET, DELAYED RELEASE ORAL at 17:36

## 2021-01-28 NOTE — PROGRESS NOTE ADULT - NSHPATTENDINGPLANDISCUSS_GEN_ALL_CORE
PATIENT AND FLOOR STAFF
FLOOR STAFF
PATIENT AND FLOOR STAFF
Dr. Hallman
FLOOR STAFF
PATIENT AND FLOOR STAFF

## 2021-01-28 NOTE — PROGRESS NOTE ADULT - PROBLEM SELECTOR PLAN 7
Controlled as evidenced by A1c 6.8  Continue with sliding scale insulin coverage  Continue with glucose monitoring   Target -180  Hospice- comfort measures only

## 2021-01-28 NOTE — PROGRESS NOTE ADULT - PROBLEM SELECTOR PLAN 2
Patient with hypernatremia, hypokalemia, hypocalcemia and metabolic acidosis non anion gap  Free water deficit 1.9 L  Nephro Dr Taylor on board  Hospice- comfort measures only

## 2021-01-28 NOTE — PROGRESS NOTE ADULT - ATTENDING COMMENTS
PATIENT SEEN AND EXAMINED. CASE D/W ER MD AND RESIDENT TEAM. ABOVE ROS/VS/PE REVIEWED AND VERIFIED.    PLAN:  # PALLIATIVE CARE TEAM HELD GOC CONVERSATION WITH PATIENT'S PARTNER - LISA KELLY. HE UNDERSTANDS THAT PATIENT'S PROGNOSIS IS POOR. HE DOES NOT WISH FOR A PEG TUBE FOR PATIENT. AT THIS TIME HE WISHES FOR PATIENT TO BE ON HOSPICE AT [facility]. PLAN FOR D/C IN A.M. TO [facility]PICE  # ACUTE METABOLIC ENCEPHALOPATHY - 1/24 CODE STROKE WAS CALLED - PATIENT WAS EVALUATED BY TEAM AND CT HEAD WAS NEGATIVE  - ON 1/25 SPONTANEOUSLY AROSE TO VOICE AND FOLLOWED SIMPLE COMMANDS INCONSISTENTLY I.E "SQUEEZE MY HAND"  - ENCEPHALOPATHY THUS FAR WAS SUSPECTED S/T ACUTE INFECTION & HYPERNATREMIA - REVIEWED CT HEAD  - NEUROLOGY CONSULT IN PROGRESS, NEUROLOGIST WAS MADE AWARE   - PSYCHIATRY RECOMMENDED METHYLPHENIDATE    # GIVEN RHONCHI - CXR WAS ORDERED AND NOTED TO HAVE BILATERAL PLEURAL EFFUSION W/ POSSIBLE INFILTRATE   - IVF DISCONTINUED  - CURRENT ABX REGIMEN VANCOMYCIN + CEFEPIME + FLAGYL SWITCHED TO UNASYN  - PRN SUCTIONING  # ELEVATED TROPONIN - ECG REVIEWED AND CARDIOLOGY TEAM UPDATED BY NP - TREND TROPONINS, ON ASPIRIN,   # ELEVATED BNP W/ PLEURAL EFFUSIONS - SUSPECT CHF - GIVEN IV LASIX, LANDIS PLACED FOR STRICT IS AND OS, MONITOR ON TELEMETRY    - ECHOCARDIOGRAM - MILD-MOD MR, TRACE AR, EF - 30-35% [NOTED HYPOKINETIC WALL], G1DD, MOD PULM HTN, MILD TR. TRACE GA, TRIVIAL PERICARDIAL EFFUSION    # MULTIPLE ELECTROLYTE ABNORMALITIES - HYPERNATREMIA, HYPERCHLOREMIA, HYPOKALEMIA, HYPOCALCEMIA, NONANION GAP METABOLIC ACIDOSIS [ SUSPECT LESS LIKELY STARVATION OR DIABETIC KETOACIDOSIS]  - REVIEWED ABG, REVIEWED ACETONE AND B-HYDROXYBUTYRATE, LACTIC ACID  - SUSPECTING RTA VS. HYPERCHLOREMIA S/T NORMAL SALINE INFUSION - CASE D/W NEPHROLOGIST DR. TODD - RECOMMENDED OBTAINING URINE LYTES AT THIS TIME  - MONITOR BMP, FINGER STICKS [ DOES NOT APPEAR TO BE ? EUGLYCEMIC DKA],    # ? REFEEDING SYNDROME - THOUGH PATIENT HAS NOT BEEN EATING CONSISTENTLY - WILL MONITOR AND REPLETE ELECTROLYTES    # BILATERAL LE CHRONIC ULCER NOW PROGRESSIVELY WORSENING W/ SSTI AND RIGHT CALCANEAL OSTEOMYELITIS W/ POSSIBLE ABSCESS; UNDERWENT BEDSIDE DEBRIDEMENT OF RIGHT LEG ULCER AND UNDERWENT RIGHT PARTIAL CALCANECTOMY ON 1/13 AND PICC LINE PLACEMENT 1/20  - CEFEPIME + VANCOMYCIN + FLAGYL, REVIEWED TIMUR, BCX - NGTD, WOUND CX - MORGANELLA MORGANI & P. MIRABILIS, ID CONSULT IN PROGRESS, PODIATRY CONSULT IN PROGRESS  - REVIEWED RIGHT LE MRI  -  PSYCHIATRY CONSULT FOR CAPACITY IN PROGRESS - DEEM PATIENT AS HAVING CAPACITY.  PATIENT WAS AGREEABLE FOR SURGICAL DEBRIDEMENT ON 1/09  - 2 PC CONSENT FOR SURGICAL DEBRIDEMENT PLANNED FOR 1/12; ATTEMPTED TO CALL NEXT OF [facility]IN [facility] @ [phone] HOWEVER PHONE NUMBER APPEARS DISCONNECTED  - VASCULAR SX CONSULT IN PROGRESS - PATIENT MAY REQUIRE AKA, BUT REFUSED ON PREVIOUS CONVERSATIONS - WAS AGREEABLE TO LOCAL DEBRIDEMENT/INTERVENTIONS  - PLAN FOR WOUND VAC  - PATIENT WILL NEED 6 WEEKS OF IV ANTIBIOTICS - WILL NEED TO SWITCH TO PICC LINE FROM EXTENDED DWELL - D/W FLOOR TEAM  # MEDICAL CLEARANCE FOR SURGERY - RCRI 1 POINT - 6% RISK OF DEATH, MI OR CARDIAC ARREST; CARDIOLOGY CONSULT IN PROGRESS FOR MEDICAL CLEARANCE  # NORMOCYTIC ANEMIA - SUSPECT S/T AOCD AND POOR NUTRITIONAL STATUS - F/U ANEMIA PANEL; PRBC TRANSFUSION GIVEN TODAY  # SACRAL AND RIGHT ISCHIAL UNSTAGEABLE ULCERS W/ SSTI  S/P DEBRIDEMENT 1/8- ON VANCOMYCIN AND CEFEPIME, SURGERY CONSULT IN PROGRESS -  PSYCHIATRY CONSULT FOR CAPACITY IN PROGRESS      # DEBILITY / ? MYOPATHY S/T ILLNESS - PT EVALUATION, OUT OF BED TO CHAIR  # HYPERNATREMIA SUSPECT S/T POOR PO INTAKE - RESOLVED W/ IVF  # POOR PO INTAKE - PATIENT ATE SOME LUNCH TODAY; ST EVAL NOTED  # LEFT ARM SWELLING - REVIEWED LEFT ARM U/S W/ OUT THROMBUS, WARM COMPRESSES  # ASHLEY - RESOLVED  # PAD  # HTN  # DM W/ DIABETIC NEUROPATHY  # OA/OP  # SCHIZOPHRENIA - HOLD MEDICATION; PLACED ON IV HALDOL PER PSYCHIATRY RECOMMENDATION  # CASE D/W PATIENT'S ? PARTNER - LISA KELLY - WHO REPORTS HE IS NOT NEXT OF KIN OR HCP - [phone] ON 1/12 - HE UNDERSTOOD PATIENT'S PROGNOSIS WAS GUARDED. ATTEMPTED TO CALL MARICHUY KELLY ON 1/24 BUT WAS NOT ABLE TO GET THROUGH - LEFT MESSAGE W/ STAFF OF ASSISTED LIVING ; ATTEMPTED TO CALL [facility] @ [phone] - PHONE APPEARS TO BE DISCONNECTED [1/24].  # MOLST FORM FROM 05/2020 REVIEWED - PATIENT REQUESTED DNR/DNI FOR GOC. CONFIRMED BY PCP IN 12/2020. CONFIRMED BY NP STEPHANIEWRA ON 01/13 - PATIENT'S GOC IS DNR/DNI. PALLIATIVE CARE CONSULT PLACED. NOTED PROGNOSIS IS GUARDED  # GI AND DVT PPX    ELEN CASILLAS MD COVERING FARHAN CASILLAS MD.

## 2021-01-28 NOTE — PROGRESS NOTE ADULT - SUBJECTIVE AND OBJECTIVE BOX
PGY-1 Progress Note discussed with attending    PAGER #: [[phone]] TILL 5:00 PM  PLEASE CONTACT ON CALL TEAM:  - On Call Team (Please refer to Deanna) FROM 5:00 PM - 8:30PM  - Nightfloat Team FROM 8:30 -7:30 AM    CHIEF COMPLAINT & BRIEF HOSPITAL COURSE:      INTERVAL HPI/OVERNIGHT EVENTS:       MEDICATIONS  (STANDING):  ampicillin/sulbactam  IVPB 3 Gram(s) IV Intermittent every 6 hours  aspirin enteric coated 81 milliGRAM(s) Oral daily  chlorhexidine 2% Cloths 1 Application(s) Topical daily  collagenase Ointment 1 Application(s) Topical daily  Dakins Solution - Full Strength 1 Application(s) Topical once  dextrose 40% Gel 15 Gram(s) Oral once  dextrose 50% Injectable 25 Gram(s) IV Push once  dextrose 50% Injectable 12.5 Gram(s) IV Push once  dextrose 50% Injectable 25 Gram(s) IV Push once  glucagon  Injectable 1 milliGRAM(s) IntraMuscular once  methylphenidate 5 milliGRAM(s) Oral daily  pantoprazole  Injectable 40 milliGRAM(s) IV Push every 12 hours  simvastatin 40 milliGRAM(s) Oral at bedtime  sucralfate 1 Gram(s) Oral four times a day    MEDICATIONS  (PRN):  acetaminophen   Tablet .. 650 milliGRAM(s) Oral every 6 hours PRN Moderate Pain (4 - 6)  haloperidol    Injectable 0.5 milliGRAM(s) IV Push every 8 hours PRN Agitation  sodium chloride 0.9% lock flush 10 milliLiter(s) IV Push every 1 hour PRN Pre/post blood products, medications, blood draw, and to maintain line patency      Vital Signs Last 24 Hrs  T(C): 36.5 (28 Jan 2021 11:27), Max: 36.5 (27 Jan 2021 23:00)  T(F): 97.7 (28 Jan 2021 11:27), Max: 97.7 (27 Jan 2021 23:00)  HR: 100 (28 Jan 2021 11:27) (93 - 102)  BP: 106/63 (28 Jan 2021 11:27) (103/51 - 107/62)  BP(mean): --  RR: 16 (28 Jan 2021 11:27) (16 - 19)  SpO2: 100% (28 Jan 2021 11:27) (99% - 100%)    PHYSICAL EXAMINATION:  GENERAL: NAD, well built  HEAD:  Atraumatic, Normocephalic  EYES:  conjunctiva and sclera clear  NECK: Supple, No JVD, Normal thyroid  CHEST/LUNG: Clear to auscultation. Clear to percussion bilaterally; No rales, rhonchi, wheezing, or rubs  HEART: Regular rate and rhythm; No murmurs, rubs, or gallops  ABDOMEN: Soft, Nontender, Nondistended; Bowel sounds present, no pain or masses on palpation  NERVOUS SYSTEM:  Alert & Oriented X3  : voiding well  EXTREMITIES:  2+ Peripheral Pulses, No clubbing, cyanosis, or edema  SKIN: warm dry        I&O's Summary    28 Jan 2021 07:01  -  28 Jan 2021 15:02  --------------------------------------------------------  IN: 0 mL / OUT: 300 mL / NET: -300 mL                         PGY-1 Progress Note discussed with attending    PAGER #: [[phone]] TILL 5:00 PM  PLEASE CONTACT ON CALL TEAM:  - On Call Team (Please refer to Deanna) FROM 5:00 PM - 8:30PM  - Nightfloat Team FROM 8:30 -7:30 AM    CHIEF COMPLAINT & BRIEF HOSPITAL COURSE:  74 yo F from [facility], wheelchair bound with medical history significant of HTN, Diabetes, Osteoarthritis, Osteoporosis, Peripheral arterial disease, Diabetic neuropathy, HLD, Schizophrenia came to ED c/o worsening ulceration to b/l heels and sacral wound. Patient admitted for bilateral heel ulcers concern for OM. Patient was treated with IV antibiotics with Vancomycin, Cefepime and Flagyl. Podiatry on board, patient s/p sacral debridement on 1/8, s/p debridement of right heel (partial calcanectomy) on 1/13, wound vac applied by podiatry to right heel, wound culture grew few Enterococcus Faecalis and Proteus mirabilis, PICC line placed on 1/20 for IV antibiotics.   Patient with schizophrenia, on haldol PRN and started on Methylphenidate as per psych recommendation. Patient with hypoactive delirium secondary to metabolic disturbances and infection. For hypertension and diabetes closely monitored. Patient also found to have hypernatremia, hypocalcemia, hyperchloremia most likely 2/2 to poor oral intake, seen by nephrologist.   On 1/24 code stroke, CTH negative, neuro on board, no concern for stroke or TIA. Also patient with elevated troponin, CXR bilateral effusions, elevated Pro BNP, CHFrEF s/p IV Lasix, Echo 1/25/21 showed GIDD, EF 30-35%, severe LV systolic dysfunction, on ASA and statin, not taking it due to poor mental status.    For anemia, patient received 2 units of PRBC on 1/22, no drop in Hg, no signs of bleeding, also ASHLEY that resolved, Emanuel in place.  Patient with poor prognosis, DNR DNI, hospice comfort measures.      INTERVAL HPI/OVERNIGHT EVENTS: patient alert but does not respond to environmental stimulus, NAD,       MEDICATIONS  (STANDING):  ampicillin/sulbactam  IVPB 3 Gram(s) IV Intermittent every 6 hours  aspirin enteric coated 81 milliGRAM(s) Oral daily  chlorhexidine 2% Cloths 1 Application(s) Topical daily  collagenase Ointment 1 Application(s) Topical daily  Dakins Solution - Full Strength 1 Application(s) Topical once  dextrose 40% Gel 15 Gram(s) Oral once  dextrose 50% Injectable 25 Gram(s) IV Push once  dextrose 50% Injectable 12.5 Gram(s) IV Push once  dextrose 50% Injectable 25 Gram(s) IV Push once  glucagon  Injectable 1 milliGRAM(s) IntraMuscular once  methylphenidate 5 milliGRAM(s) Oral daily  pantoprazole  Injectable 40 milliGRAM(s) IV Push every 12 hours  simvastatin 40 milliGRAM(s) Oral at bedtime  sucralfate 1 Gram(s) Oral four times a day    MEDICATIONS  (PRN):  acetaminophen   Tablet .. 650 milliGRAM(s) Oral every 6 hours PRN Moderate Pain (4 - 6)  haloperidol    Injectable 0.5 milliGRAM(s) IV Push every 8 hours PRN Agitation  sodium chloride 0.9% lock flush 10 milliLiter(s) IV Push every 1 hour PRN Pre/post blood products, medications, blood draw, and to maintain line patency      Vital Signs Last 24 Hrs  T(C): 36.5 (28 Jan 2021 11:27), Max: 36.5 (27 Jan 2021 23:00)  T(F): 97.7 (28 Jan 2021 11:27), Max: 97.7 (27 Jan 2021 23:00)  HR: 100 (28 Jan 2021 11:27) (93 - 102)  BP: 106/63 (28 Jan 2021 11:27) (103/51 - 107/62)  BP(mean): --  RR: 16 (28 Jan 2021 11:27) (16 - 19)  SpO2: 100% (28 Jan 2021 11:27) (99% - 100%)    PHYSICAL EXAMINATION:  GENERAL: NAD, poor general condition  HEAD:  Atraumatic, Normocephalic,  on 2L NC sat %  EYES:  conjunctiva and sclera clear  NECK: Supple, No JVD, Normal thyroid  CHEST/LUNG: bilateral rhonchus, increased secretions   HEART: Regular rate and rhythm; No murmurs, rubs, or gallops  ABDOMEN: Soft, Nontender, Nondistended; Bowel sounds present, no pain or masses on palpation  NERVOUS SYSTEM: Alert   : Emanuel in place   EXTREMITIES:  2+ Peripheral Pulses, No clubbing, or cyanosis, upper and lower extremities edema, decreased upper and lower extremities strength   SKIN: warm dry un stageable sacral ulcer, bilateral heel ulcers, right heel with VAC system and clean dressing         I&O's Summary    28 Jan 2021 07:01  -  28 Jan 2021 15:02  --------------------------------------------------------  IN: 0 mL / OUT: 300 mL / NET: -300 mL

## 2021-01-28 NOTE — PROGRESS NOTE ADULT - PROBLEM SELECTOR PLAN 5
Continue with cleaning all wounds with normal saline and apply skin prep to the surrounding skin  Surgery on board  Hospice- comfort measures only

## 2021-01-28 NOTE — PROGRESS NOTE ADULT - PROBLEM SELECTOR PROBLEM 10
Goals of care, counseling/discussion
Goals of care, counseling/discussion
Discharge planning issues
Prophylactic measure
Prophylactic measure
Goals of care, counseling/discussion
Prophylactic measure
Prophylactic measure

## 2021-01-28 NOTE — PROGRESS NOTE ADULT - PROBLEM SELECTOR PLAN 3
X ray and MRI noted   S/p Vancomycin, Cefepime and Flagyl   IR placement of  PICC line 1/20  Wound culture grew few Enterococcus Faecalis and Proteus mirabilis  S/p OR right heel debridement 1/13 obtained 2PC consent  S/p wound vac applied by podiatry to right heel   ID Dr. Wellington on board, recommends 4 weeks of antibiotics starting 1/25/21  S/p Unasyn 3 grams q6 hrs 1/25  Podiatry on board   Hospice- comfort measures only  Vascular on board  Hospice- comfort measures only

## 2021-01-28 NOTE — PROGRESS NOTE ADULT - PROBLEM SELECTOR PLAN 1
Code stroke 1/24 due to change in mental status and facial droop right side   CTH no acute changes   C/w ASA, statin   Increased rhonchus on exam  Echo 1/25/21 showed GIDD, EF 30-35%, severe LV systolic dysfunction   Neuro Dr Edwards on board  Hospice- comfort measures only

## 2021-01-28 NOTE — PROGRESS NOTE ADULT - PROBLEM SELECTOR PLAN 6
Pt takes Perphenazine at home  Home medication held as recommended by Psychiatry  Neuro Dr. Edwards on board   Psychiatry Dr. Lackey  Hospice- comfort measures only

## 2021-01-28 NOTE — PROGRESS NOTE ADULT - PROBLEM SELECTOR PLAN 4
Patient w/ bilateral heel ulcers  Continue with heel lift boots, offloading of bony prominences  Continue with NWB to right heel  D/c wound vac applied by podiatry  Hospice- comfort measures only

## 2021-01-28 NOTE — PROGRESS NOTE ADULT - SUBJECTIVE AND OBJECTIVE BOX
ROS limited due to confusion, mental state. events noted Review of systems otherwise (-)  	  acetaminophen   Tablet .. 650 milliGRAM(s) Oral every 6 hours PRN  ampicillin/sulbactam  IVPB 3 Gram(s) IV Intermittent every 6 hours  aspirin enteric coated 81 milliGRAM(s) Oral daily  chlorhexidine 2% Cloths 1 Application(s) Topical daily  collagenase Ointment 1 Application(s) Topical daily  Dakins Solution - Full Strength 1 Application(s) Topical once  dextrose 40% Gel 15 Gram(s) Oral once  dextrose 50% Injectable 25 Gram(s) IV Push once  dextrose 50% Injectable 12.5 Gram(s) IV Push once  dextrose 50% Injectable 25 Gram(s) IV Push once  glucagon  Injectable 1 milliGRAM(s) IntraMuscular once  haloperidol    Injectable 0.5 milliGRAM(s) IV Push every 8 hours PRN  methylphenidate 5 milliGRAM(s) Oral daily  pantoprazole  Injectable 40 milliGRAM(s) IV Push every 12 hours  simvastatin 40 milliGRAM(s) Oral at bedtime  sodium chloride 0.9% lock flush 10 milliLiter(s) IV Push every 1 hour PRN  sucralfate 1 Gram(s) Oral four times a day      Hemoglobin: 10.0 g/dL (01-26 @ 07:37)  Hemoglobin: 10.6 g/dL (01-25 @ 07:04)  Hemoglobin: 10.9 g/dL (01-24 @ 14:48)  Hemoglobin: 10.6 g/dL (01-24 @ 07:41)      Creatinine Trend: 0.83<--, 0.77<--, 0.80<--, 0.78<--, 0.85<--, 0.87<--    COAGS:       T(C): 36.5 (01-28-21 @ 11:27), Max: 36.5 (01-27-21 @ 23:00)  HR: 100 (01-28-21 @ 11:27) (93 - 102)  BP: 106/63 (01-28-21 @ 11:27) (103/51 - 107/62)  RR: 16 (01-28-21 @ 11:27) (16 - 19)  SpO2: 100% (01-28-21 @ 11:27) (99% - 100%)  Wt(kg): --    I&O's Summary      HEENT:  (-)icterus (-)pallor  CV: N S1 S2 1/6 ANGELIC (+)2 Pulses B/l  Resp:  Clear to ausculatation B/L, normal effort  GI: (+) BS Soft, NT, ND  Lymph:  (-)Edema, (-)obvious lymphadenopathy  Skin: Warm to touch, Normal turgor  Psych: unable to adequately assess mood and affect        ASSESSMENT/PLAN: 	75y  Female  wheelchair bound with medical history significant of HTN, Diabetes, Osteoarthritis, Osteoporosis, Peripheral arterial disease, Diabetic neuropathy, HLD, Schizophrenia historically preserved LV and R function, comes in with worsening ulceration to b/l heels s/p debridement.    - s/p Partial calcanectomy of right foot   - s/p code stroke  - Neuro f/u noted  - Patient with severe LV dysfx now on echo, cont ASA and statin, not a candidate for ischemic eval at present  - haldol per medical team  - palliative f/u    Jm Cortes MD, [facility]  BEEPER [phone]

## 2021-01-28 NOTE — PROGRESS NOTE ADULT - PROBLEM SELECTOR PLAN 8
Controlled  Pt takes Amlodipine 5 mg & Metoprolol 100 mg at home  Resume home medications when medically appropriate.  Hospice- comfort measures only

## 2021-01-28 NOTE — PROGRESS NOTE ADULT - NUTRITIONAL ASSESSMENT
This patient has been assessed with a concern for Malnutrition and has been determined to have a diagnosis/diagnoses of Severe protein-calorie malnutrition and Underweight (BMI < 19).    This patient is being managed with:   Diet Dysphagia 1 Pureed-Thin Liquids-  Supplement Feeding Modality:  Oral  Glucerna Shake Cans or Servings Per Day:  1       Frequency:  Two Times a day  Entered: Jan 19 2021  1:50PM    Diet Dysphagia 1 Pureed-Thin Liquids-  Supplement Feeding Modality:  Oral  Ensure Muscle Health Cans or Servings Per Day:  2       Frequency:  Two Times a day  Entered: Jan 18 2021 10:44PM    The following pending diet order is being considered for treatment of Severe protein-calorie malnutrition and Underweight (BMI < 19):null
This patient has been assessed with a concern for Malnutrition and has been determined to have a diagnosis/diagnoses of Severe protein-calorie malnutrition and Underweight (BMI < 19).    This patient is being managed with:   Diet NPO-     Special Instructions for Nursing:  CODE STROKE; NPO until Dysphagia screen or Speech Bedside Swallow Evaluation completed and passed  Entered: Jan 24 2021  2:22PM    The following pending diet order is being considered for treatment of Severe protein-calorie malnutrition and Underweight (BMI < 19):  Diet Dysphagia 1 Pureed-Thin Liquids-  Supplement Feeding Modality:  Oral  Glucerna Shake Cans or Servings Per Day:  1       Frequency:  Two Times a day  Entered: Jan 19 2021  1:50PM  
This patient has been assessed with a concern for Malnutrition and has been determined to have a diagnosis/diagnoses of Severe protein-calorie malnutrition and Underweight (BMI < 19).    This patient is being managed with:   Diet Dysphagia 1 Pureed-Thin Liquids-  Supplement Feeding Modality:  Oral  Glucerna Shake Cans or Servings Per Day:  1       Frequency:  Two Times a day  Entered: Jan 19 2021  1:50PM    Diet Dysphagia 1 Pureed-Thin Liquids-  Supplement Feeding Modality:  Oral  Ensure Muscle Health Cans or Servings Per Day:  2       Frequency:  Two Times a day  Entered: Jan 18 2021 10:44PM    The following pending diet order is being considered for treatment of Severe protein-calorie malnutrition and Underweight (BMI < 19):null
This patient has been assessed with a concern for Malnutrition and has been determined to have a diagnosis/diagnoses of Severe protein-calorie malnutrition and Underweight (BMI < 19).    This patient is being managed with:   Diet Dysphagia 1 Pureed-Thin Liquids-  Supplement Feeding Modality:  Oral  Glucerna Shake Cans or Servings Per Day:  1       Frequency:  Two Times a day  Entered: Jan 19 2021  1:50PM    Diet Dysphagia 1 Pureed-Thin Liquids-  Supplement Feeding Modality:  Oral  Ensure Muscle Health Cans or Servings Per Day:  2       Frequency:  Two Times a day  Entered: Jan 18 2021 10:44PM    The following pending diet order is being considered for treatment of Severe protein-calorie malnutrition and Underweight (BMI < 19):null
This patient has been assessed with a concern for Malnutrition and has been determined to have a diagnosis/diagnoses of Severe protein-calorie malnutrition and Underweight (BMI < 19).    This patient is being managed with:   Diet NPO-     Special Instructions for Nursing:  CODE STROKE; NPO until Dysphagia screen or Speech Bedside Swallow Evaluation completed and passed  Entered: Jan 24 2021  2:22PM    The following pending diet order is being considered for treatment of Severe protein-calorie malnutrition and Underweight (BMI < 19):  Diet Dysphagia 1 Pureed-Thin Liquids-  Supplement Feeding Modality:  Oral  Glucerna Shake Cans or Servings Per Day:  1       Frequency:  Two Times a day  Entered: Jan 19 2021  1:50PM  
This patient has been assessed with a concern for Malnutrition and has been determined to have a diagnosis/diagnoses of Severe protein-calorie malnutrition and Underweight (BMI < 19).    This patient is being managed with:   Diet Dysphagia 1 Pureed-Thin Liquids-  Supplement Feeding Modality:  Oral  Glucerna Shake Cans or Servings Per Day:  1       Frequency:  Two Times a day  Entered: Jan 19 2021  1:50PM    Diet Dysphagia 1 Pureed-Thin Liquids-  Supplement Feeding Modality:  Oral  Ensure Muscle Health Cans or Servings Per Day:  2       Frequency:  Two Times a day  Entered: Jan 18 2021 10:44PM    The following pending diet order is being considered for treatment of Severe protein-calorie malnutrition and Underweight (BMI < 19):null
This patient has been assessed with a concern for Malnutrition and has been determined to have a diagnosis/diagnoses of Severe protein-calorie malnutrition and Underweight (BMI < 19).    This patient is being managed with:   Diet NPO-     Special Instructions for Nursing:  CODE STROKE; NPO until Dysphagia screen or Speech Bedside Swallow Evaluation completed and passed  Entered: Jan 24 2021  2:22PM    The following pending diet order is being considered for treatment of Severe protein-calorie malnutrition and Underweight (BMI < 19):  Diet Dysphagia 1 Pureed-Thin Liquids-  Supplement Feeding Modality:  Oral  Glucerna Shake Cans or Servings Per Day:  1       Frequency:  Two Times a day  Entered: Jan 19 2021  1:50PM  
This patient has been assessed with a concern for Malnutrition and has been determined to have a diagnosis/diagnoses of Severe protein-calorie malnutrition and Underweight (BMI < 19).    This patient is being managed with:   Diet Dysphagia 1 Pureed-Thin Liquids-  Supplement Feeding Modality:  Oral  Glucerna Shake Cans or Servings Per Day:  1       Frequency:  Two Times a day  Entered: Jan 19 2021  1:50PM    Diet Dysphagia 1 Pureed-Thin Liquids-  Supplement Feeding Modality:  Oral  Ensure Muscle Health Cans or Servings Per Day:  2       Frequency:  Two Times a day  Entered: Jan 18 2021 10:44PM    The following pending diet order is being considered for treatment of Severe protein-calorie malnutrition and Underweight (BMI < 19):null
This patient has been assessed with a concern for Malnutrition and has been determined to have a diagnosis/diagnoses of Severe protein-calorie malnutrition and Underweight (BMI < 19).    This patient is being managed with:   Diet NPO-     Special Instructions for Nursing:  CODE STROKE; NPO until Dysphagia screen or Speech Bedside Swallow Evaluation completed and passed  Entered: Jan 24 2021  2:22PM    The following pending diet order is being considered for treatment of Severe protein-calorie malnutrition and Underweight (BMI < 19):  Diet Dysphagia 1 Pureed-Thin Liquids-  Supplement Feeding Modality:  Oral  Glucerna Shake Cans or Servings Per Day:  1       Frequency:  Two Times a day  Entered: Jan 19 2021  1:50PM  
This patient has been assessed with a concern for Malnutrition and has been determined to have a diagnosis/diagnoses of Severe protein-calorie malnutrition and Underweight (BMI < 19).    This patient is being managed with:   Diet NPO-     Special Instructions for Nursing:  CODE STROKE; NPO until Dysphagia screen or Speech Bedside Swallow Evaluation completed and passed  Entered: Jan 24 2021  2:22PM    The following pending diet order is being considered for treatment of Severe protein-calorie malnutrition and Underweight (BMI < 19):  Diet Dysphagia 1 Pureed-Thin Liquids-  Supplement Feeding Modality:  Oral  Glucerna Shake Cans or Servings Per Day:  1       Frequency:  Two Times a day  Entered: Jan 19 2021  1:50PM

## 2021-01-29 ENCOUNTER — TRANSCRIPTION ENCOUNTER (OUTPATIENT)
Age: 76
End: 2021-01-29

## 2021-01-29 VITALS
HEART RATE: 102 BPM | DIASTOLIC BLOOD PRESSURE: 51 MMHG | TEMPERATURE: 97 F | OXYGEN SATURATION: 100 % | RESPIRATION RATE: 18 BRPM | SYSTOLIC BLOOD PRESSURE: 117 MMHG

## 2021-01-29 LAB
GLUCOSE BLDC GLUCOMTR-MCNC: 83 MG/DL — SIGNIFICANT CHANGE UP (ref 70–99)
GLUCOSE BLDC GLUCOMTR-MCNC: 87 MG/DL — SIGNIFICANT CHANGE UP (ref 70–99)
GLUCOSE BLDC GLUCOMTR-MCNC: 97 MG/DL — SIGNIFICANT CHANGE UP (ref 70–99)

## 2021-01-29 PROCEDURE — C1889: CPT

## 2021-01-29 PROCEDURE — 82746 ASSAY OF FOLIC ACID SERUM: CPT

## 2021-01-29 PROCEDURE — 93005 ELECTROCARDIOGRAM TRACING: CPT

## 2021-01-29 PROCEDURE — 36415 COLL VENOUS BLD VENIPUNCTURE: CPT

## 2021-01-29 PROCEDURE — 83605 ASSAY OF LACTIC ACID: CPT

## 2021-01-29 PROCEDURE — 87641 MR-STAPH DNA AMP PROBE: CPT

## 2021-01-29 PROCEDURE — 73718 MRI LOWER EXTREMITY W/O DYE: CPT

## 2021-01-29 PROCEDURE — 88304 TISSUE EXAM BY PATHOLOGIST: CPT

## 2021-01-29 PROCEDURE — 74018 RADEX ABDOMEN 1 VIEW: CPT

## 2021-01-29 PROCEDURE — 80061 LIPID PANEL: CPT

## 2021-01-29 PROCEDURE — 86923 COMPATIBILITY TEST ELECTRIC: CPT

## 2021-01-29 PROCEDURE — 93923 UPR/LXTR ART STDY 3+ LVLS: CPT

## 2021-01-29 PROCEDURE — 80202 ASSAY OF VANCOMYCIN: CPT

## 2021-01-29 PROCEDURE — 86140 C-REACTIVE PROTEIN: CPT

## 2021-01-29 PROCEDURE — 83986 ASSAY PH BODY FLUID NOS: CPT

## 2021-01-29 PROCEDURE — 84133 ASSAY OF URINE POTASSIUM: CPT

## 2021-01-29 PROCEDURE — 84466 ASSAY OF TRANSFERRIN: CPT

## 2021-01-29 PROCEDURE — 76937 US GUIDE VASCULAR ACCESS: CPT

## 2021-01-29 PROCEDURE — 92610 EVALUATE SWALLOWING FUNCTION: CPT

## 2021-01-29 PROCEDURE — 70450 CT HEAD/BRAIN W/O DYE: CPT

## 2021-01-29 PROCEDURE — 82436 ASSAY OF URINE CHLORIDE: CPT

## 2021-01-29 PROCEDURE — 85652 RBC SED RATE AUTOMATED: CPT

## 2021-01-29 PROCEDURE — 99232 SBSQ HOSP IP/OBS MODERATE 35: CPT

## 2021-01-29 PROCEDURE — 83935 ASSAY OF URINE OSMOLALITY: CPT

## 2021-01-29 PROCEDURE — 73610 X-RAY EXAM OF ANKLE: CPT

## 2021-01-29 PROCEDURE — 82607 VITAMIN B-12: CPT

## 2021-01-29 PROCEDURE — 84443 ASSAY THYROID STIM HORMONE: CPT

## 2021-01-29 PROCEDURE — 85730 THROMBOPLASTIN TIME PARTIAL: CPT

## 2021-01-29 PROCEDURE — 83540 ASSAY OF IRON: CPT

## 2021-01-29 PROCEDURE — 86850 RBC ANTIBODY SCREEN: CPT

## 2021-01-29 PROCEDURE — 84540 ASSAY OF URINE/UREA-N: CPT

## 2021-01-29 PROCEDURE — 36430 TRANSFUSION BLD/BLD COMPNT: CPT

## 2021-01-29 PROCEDURE — 82272 OCCULT BLD FECES 1-3 TESTS: CPT

## 2021-01-29 PROCEDURE — 73630 X-RAY EXAM OF FOOT: CPT

## 2021-01-29 PROCEDURE — 83036 HEMOGLOBIN GLYCOSYLATED A1C: CPT

## 2021-01-29 PROCEDURE — 84484 ASSAY OF TROPONIN QUANT: CPT

## 2021-01-29 PROCEDURE — 82553 CREATINE MB FRACTION: CPT

## 2021-01-29 PROCEDURE — 82728 ASSAY OF FERRITIN: CPT

## 2021-01-29 PROCEDURE — 83735 ASSAY OF MAGNESIUM: CPT

## 2021-01-29 PROCEDURE — 83880 ASSAY OF NATRIURETIC PEPTIDE: CPT

## 2021-01-29 PROCEDURE — 80048 BASIC METABOLIC PNL TOTAL CA: CPT

## 2021-01-29 PROCEDURE — 36573 INSJ PICC RS&I 5 YR+: CPT

## 2021-01-29 PROCEDURE — 87040 BLOOD CULTURE FOR BACTERIA: CPT

## 2021-01-29 PROCEDURE — 87086 URINE CULTURE/COLONY COUNT: CPT

## 2021-01-29 PROCEDURE — C1751: CPT

## 2021-01-29 PROCEDURE — 83550 IRON BINDING TEST: CPT

## 2021-01-29 PROCEDURE — 85379 FIBRIN DEGRADATION QUANT: CPT

## 2021-01-29 PROCEDURE — 85027 COMPLETE CBC AUTOMATED: CPT

## 2021-01-29 PROCEDURE — 96374 THER/PROPH/DIAG INJ IV PUSH: CPT

## 2021-01-29 PROCEDURE — 80053 COMPREHEN METABOLIC PANEL: CPT

## 2021-01-29 PROCEDURE — 82009 KETONE BODYS QUAL: CPT

## 2021-01-29 PROCEDURE — 93306 TTE W/DOPPLER COMPLETE: CPT

## 2021-01-29 PROCEDURE — 84300 ASSAY OF URINE SODIUM: CPT

## 2021-01-29 PROCEDURE — 86769 SARS-COV-2 COVID-19 ANTIBODY: CPT

## 2021-01-29 PROCEDURE — 93971 EXTREMITY STUDY: CPT

## 2021-01-29 PROCEDURE — 71045 X-RAY EXAM CHEST 1 VIEW: CPT

## 2021-01-29 PROCEDURE — 82570 ASSAY OF URINE CREATININE: CPT

## 2021-01-29 PROCEDURE — 81001 URINALYSIS AUTO W/SCOPE: CPT

## 2021-01-29 PROCEDURE — 87635 SARS-COV-2 COVID-19 AMP PRB: CPT

## 2021-01-29 PROCEDURE — 84100 ASSAY OF PHOSPHORUS: CPT

## 2021-01-29 PROCEDURE — 99285 EMERGENCY DEPT VISIT HI MDM: CPT | Mod: 25

## 2021-01-29 PROCEDURE — 82962 GLUCOSE BLOOD TEST: CPT

## 2021-01-29 PROCEDURE — 82803 BLOOD GASES ANY COMBINATION: CPT

## 2021-01-29 PROCEDURE — 87070 CULTURE OTHR SPECIMN AEROBIC: CPT

## 2021-01-29 PROCEDURE — U0005: CPT

## 2021-01-29 PROCEDURE — 88311 DECALCIFY TISSUE: CPT

## 2021-01-29 PROCEDURE — 85610 PROTHROMBIN TIME: CPT

## 2021-01-29 PROCEDURE — 87075 CULTR BACTERIA EXCEPT BLOOD: CPT

## 2021-01-29 PROCEDURE — 87186 SC STD MICRODIL/AGAR DIL: CPT

## 2021-01-29 PROCEDURE — 82010 KETONE BODYS QUAN: CPT

## 2021-01-29 PROCEDURE — 86901 BLOOD TYPING SEROLOGIC RH(D): CPT

## 2021-01-29 PROCEDURE — 86900 BLOOD TYPING SEROLOGIC ABO: CPT

## 2021-01-29 PROCEDURE — P9040: CPT

## 2021-01-29 PROCEDURE — 84560 ASSAY OF URINE/URIC ACID: CPT

## 2021-01-29 PROCEDURE — 85025 COMPLETE CBC W/AUTO DIFF WBC: CPT

## 2021-01-29 PROCEDURE — 84105 ASSAY OF URINE PHOSPHORUS: CPT

## 2021-01-29 PROCEDURE — 84156 ASSAY OF PROTEIN URINE: CPT

## 2021-01-29 PROCEDURE — 82550 ASSAY OF CK (CPK): CPT

## 2021-01-29 PROCEDURE — 87640 STAPH A DNA AMP PROBE: CPT

## 2021-01-29 RX ORDER — METFORMIN HYDROCHLORIDE 850 MG/1
1 TABLET ORAL
Qty: 0 | Refills: 0 | DISCHARGE

## 2021-01-29 RX ORDER — AMLODIPINE BESYLATE 2.5 MG/1
1 TABLET ORAL
Qty: 0 | Refills: 0 | DISCHARGE

## 2021-01-29 RX ORDER — PERPHENAZINE 8 MG/1
1 TABLET, FILM COATED ORAL
Qty: 0 | Refills: 0 | DISCHARGE

## 2021-01-29 RX ORDER — SODIUM HYPOCHLORITE 0.125 %
1 SOLUTION, NON-ORAL MISCELLANEOUS
Qty: 0 | Refills: 0 | DISCHARGE
Start: 2021-01-29

## 2021-01-29 RX ORDER — METOPROLOL TARTRATE 50 MG
1 TABLET ORAL
Qty: 0 | Refills: 0 | DISCHARGE

## 2021-01-29 RX ORDER — COLLAGENASE CLOSTRIDIUM HIST. 250 UNIT/G
1 OINTMENT (GRAM) TOPICAL
Qty: 0 | Refills: 0 | DISCHARGE
Start: 2021-01-29

## 2021-01-29 RX ORDER — ASPIRIN/CALCIUM CARB/MAGNESIUM 324 MG
1 TABLET ORAL
Qty: 0 | Refills: 0 | DISCHARGE

## 2021-01-29 RX ADMIN — Medication 1 APPLICATION(S): at 11:47

## 2021-01-29 RX ADMIN — AMPICILLIN SODIUM AND SULBACTAM SODIUM 200 GRAM(S): 250; 125 INJECTION, POWDER, FOR SUSPENSION INTRAMUSCULAR; INTRAVENOUS at 06:15

## 2021-01-29 RX ADMIN — PANTOPRAZOLE SODIUM 40 MILLIGRAM(S): 20 TABLET, DELAYED RELEASE ORAL at 06:15

## 2021-01-29 RX ADMIN — CHLORHEXIDINE GLUCONATE 1 APPLICATION(S): 213 SOLUTION TOPICAL at 11:47

## 2021-01-29 NOTE — PROGRESS NOTE BEHAVIORAL HEALTH - NSBHFUPREASONCONS_PSY_A_CORE
delerium/med management
delerium/med management
capacity
capacity
delerium/med management

## 2021-01-29 NOTE — PROGRESS NOTE BEHAVIORAL HEALTH - NSBHFUPVIOL_PSY_A_CORE
unable to assess
none known
unable to assess

## 2021-01-29 NOTE — PROGRESS NOTE BEHAVIORAL HEALTH - NSBHCONSULTFOLLOWAFTERCARE_PSY_A_CORE FT
See above
See above  Pt is planned for hospice
See above
See above  Pt is planned for hospice
See above

## 2021-01-29 NOTE — PROGRESS NOTE BEHAVIORAL HEALTH - NSBHROSSYSTEMS_PSY_ALL_CORE
Musculoskeletal.../Skin...
Gastrointestinal.../Musculoskeletal.../Skin...
Respiratory.../Gastrointestinal.../Musculoskeletal.../Skin...
Musculoskeletal.../Skin...
Gastrointestinal.../Musculoskeletal.../Skin...
Gastrointestinal.../Musculoskeletal.../Skin...
Respiratory.../Gastrointestinal.../Musculoskeletal.../Skin...

## 2021-01-29 NOTE — PROGRESS NOTE BEHAVIORAL HEALTH - NSBHCHARTREVIEWVS_PSY_A_CORE FT
Vital Signs Last 24 Hrs  T(C): 36.3 (29 Jan 2021 11:16), Max: 36.3 (28 Jan 2021 20:44)  T(F): 97.3 (29 Jan 2021 11:16), Max: 97.4 (28 Jan 2021 20:44)  HR: 102 (29 Jan 2021 11:16) (96 - 109)  BP: 117/51 (29 Jan 2021 11:16) (114/68 - 121/62)  BP(mean): --  RR: 18 (29 Jan 2021 11:16) (17 - 18)  SpO2: 100% (29 Jan 2021 11:16) (100% - 100%)
Vital Signs Last 24 Hrs  T(C): 36.1 (27 Jan 2021 16:15), Max: 36.8 (26 Jan 2021 20:35)  T(F): 97 (27 Jan 2021 16:15), Max: 98.2 (26 Jan 2021 20:35)  HR: 93 (27 Jan 2021 16:15) (93 - 104)  BP: 103/59 (27 Jan 2021 16:15) (98/52 - 108/64)  BP(mean): --  RR: 18 (27 Jan 2021 16:15) (17 - 20)  SpO2: 100% (27 Jan 2021 16:15) (99% - 100%)
Vital Signs Last 24 Hrs  T(C): 36.3 (22 Jan 2021 05:20), Max: 36.3 (21 Jan 2021 14:15)  T(F): 97.4 (22 Jan 2021 05:20), Max: 97.4 (21 Jan 2021 14:15)  HR: 90 (22 Jan 2021 05:20) (89 - 93)  BP: 119/61 (22 Jan 2021 05:20) (119/61 - 125/62)  BP(mean): --  RR: 18 (22 Jan 2021 05:20) (17 - 18)  SpO2: 97% (22 Jan 2021 05:20) (97% - 100%)
Vital Signs Last 24 Hrs  T(C): 36.9 (13 Jan 2021 16:20), Max: 36.9 (13 Jan 2021 16:20)  T(F): 98.4 (13 Jan 2021 16:20), Max: 98.4 (13 Jan 2021 16:20)  HR: 94 (13 Jan 2021 16:20) (84 - 106)  BP: 141/51 (13 Jan 2021 16:20) (117/56 - 146/75)  BP(mean): 84 (13 Jan 2021 15:35) (66 - 92)  RR: 17 (13 Jan 2021 16:20) (14 - 18)  SpO2: 100% (13 Jan 2021 16:20) (100% - 100%)
Vital Signs Last 24 Hrs  T(C): 36.4 (26 Jan 2021 11:27), Max: 36.7 (25 Jan 2021 23:24)  T(F): 97.5 (26 Jan 2021 11:27), Max: 98.1 (25 Jan 2021 23:24)  HR: 104 (26 Jan 2021 11:27) (98 - 104)  BP: 102/54 (26 Jan 2021 11:27) (86/51 - 103/48)  BP(mean): --  RR: 18 (26 Jan 2021 11:27) (18 - 18)  SpO2: 100% (26 Jan 2021 11:27) (96% - 100%)

## 2021-01-29 NOTE — PROGRESS NOTE BEHAVIORAL HEALTH - NSBHCONSULTFOLLOW_PSY_A_CORE
Signing off - call with questions…
yes
yes
Signing off - call with questions…
Signing off - call with questions…
yes
yes

## 2021-01-29 NOTE — PROGRESS NOTE BEHAVIORAL HEALTH - NSBHCONSULTRECOMMENDOTHER_PSY_A_CORE FT
1. Agree with DC'ing all psych medications in preparation for hospice transition  2. No indication for other standing or PRN psychotropic medications at this time  3. Medical management as directed by primary team and podiatry  4. Psychiatry is signing off in recognition of hospice transition  5. Case d/w Dr. Rodriguez of primary team    Norma Lackey MD  Director, Consultation-Liaison Psychiatry Service  [id number]

## 2021-01-29 NOTE — PROGRESS NOTE BEHAVIORAL HEALTH - DETAILS
Sacral decubitus ulcer, BL heel ulcers with eschar and surrounding edema. Both hands are cool to the touch Refusing PO intake Osteomyelitis of R foot. Swelling of BL hands Noisy breathing with congested sound

## 2021-01-29 NOTE — PROGRESS NOTE BEHAVIORAL HEALTH - SECONDARY DX3
Encounter for general psychiatric examination, requested by authority

## 2021-01-29 NOTE — PROGRESS NOTE BEHAVIORAL HEALTH - NSBHPTASSESSDT_PSY_A_CORE
27-Jan-2021 10:30
22-Jan-2021 13:27
12-Jan-2021 11:30
25-Jan-2021 10:05
12-Jan-2021 11:30
26-Jan-2021 10:45
27-Jan-2021 10:30

## 2021-01-29 NOTE — PROGRESS NOTE ADULT - REASON FOR ADMISSION
foot ulcer

## 2021-01-29 NOTE — PROGRESS NOTE BEHAVIORAL HEALTH - ORIENTATION OTHER
Unable to assess (sleeping)
Unable to assess due to somnolence
Unable to assess (sleeping)
Unable to assess (sleeping)
Unable to assess due to aphonia
Unable to assess due to somnolence

## 2021-01-29 NOTE — PROGRESS NOTE BEHAVIORAL HEALTH - SUMMARY
75F, single and living in nursing home ([facility]), with PHx of schizophrenia and MHx of HTN, Diabetes, Osteoarthritis, Osteoporosis, Peripheral arterial disease, Diabetic neuropathy, and HLD, BIBEMS on 1/5 from NH for worsening BL heel ulcerations which have been chronic for the past few months, as well as chronic sacral decubitus ulcer. MRI showed progressive osteomyelitis of R foot and surgical debridement was recommended for both sacral and calcaneal ulcers, but pt initially stated she only wanted sacral debridement and refused it for her feet, despite being informed that the heel infection presented a more immediate risk to life and limb. Initial psych consult was requested for assessment of capacity to refuse heel debridement; on exam, pt expressed verbal agreement with debridement and was found to have capacity to consent to the procedure, but written consent could not be obtained due to subsequent prolonged somnolence, so procedure was performed on 1/13 with [facility] physician consent. Pt received PICC on 1/20 under similar circumstances (initially expressed verbal consent, but subsequently became somnolent and written consent could not be obtained; [facility] consent was used). Psychiatry is now reconsulted for assessment of prolonged postoperative somnolence and hyporesponsiveness. Given pt's somnolence and aphonia today, we again agree with neurology impression that pt's presentation is c/w prolonged hypoactive delirium 2/2 metabolic abnormalities and infection. We agree with DC of psych medications in light of planned transition to hospice/comfort care today. Pt does not appear to present an acute risk of harm to self or others at the time of assessment, and does not appear to be in need of admission to  psych at the time of assessment.

## 2021-01-29 NOTE — DISCHARGE NOTE NURSING/CASE MANAGEMENT/SOCIAL WORK - PATIENT PORTAL LINK FT
You can access the FollowMyHealth Patient Portal offered by [facility] by registering at the following website: [email or web address]. By joining [email or web address]’s FollowMyHealth portal, you will also be able to view your health information using other applications (apps) compatible with our system.

## 2021-01-29 NOTE — PROGRESS NOTE ADULT - PROVIDER SPECIALTY LIST ADULT
Cardiology
Internal Medicine
Nephrology
Podiatry
Podiatry
Cardiology
Infectious Disease
Internal Medicine
Intervent Radiology
Podiatry
Cardiology
Internal Medicine
Neurology
Podiatry
Surgery
Surgery
Vascular Surgery
Vascular Surgery
Internal Medicine
Nephrology
Podiatry
Surgery
Internal Medicine

## 2021-01-29 NOTE — PROGRESS NOTE BEHAVIORAL HEALTH - NSBHFUPINTERVALHXFT_PSY_A_CORE
Per chart and staff reports, pt went to OR on 1/13 for debridement of R heel ulcer, but has been very somnolent and minimally responsive since the procedure, with little to no PO intake. Pt had PICC placed on 1/20 with 2PC MD consent, as she was unable to provide written consent due to somnolence. Pt has had multiple lab abnormalities (high Na, low K and low Hb) which are in process of being corrected. Writer was asked to f/u today for assessment of suspected delirium. Pt seen in her room for eval, found lying in bed asleep, but waking easily to MD's voice. Pt opened her eyes and smiled, describing mood as "I'm good." MD reviewed pt's care with her and stated that her providers have been concerned because she has not been eating. Pt said, "I will now."
Per chart reports, all of pt's psych medications have been DC'd in preparation for hospice transition, which is planned for later today. Pt seen in her room for eval, found lying in bed asleep, unarousable to voice and light touch despite multiple attempts. Pt's breathing is noisy and congested-sounding.
MD was contacted yesterday afternoon by podiatry attending regarding pt's scheduled heel debridements. Per podiatry, while writer had assessed pt as having capacity to consent to the procedure and pt actively expressed interest in having it carried out, written consent had not been obtained immediately; when anesthesia approached pt on 1/11 to obtain, she was very somnolent and unarousable and consent could not be obtained. MD recommended podiatry contact pt's brother for surrogate consent, but he could not be reached. Surgery was therefore canceled. Earlier today, pt was seen by associate medical director Dr. San, who recommended that consent be obtained though 2PC process, given that pt had agreed to the surgery and the timing is urgent if threat to pt's life and limb is to be averted. Pt was seen in her room today for f/u, found lying in bed sleeping deeply and unarousable despite multiple attempts.
Pt was started on Haldol 0.5 mg IV q12h PRN on the evening of 1/22, but has not received any doses to date. Per chart and staff reports, pt has been moved to a new room with COVID precautions due to COVID exposure on 5S. Pt seen in her room for eval, found lying in bed asleep; she opens eyes to MD's voice, but does not speak and immediately goes back to sleep.
Per chart reports, palliative care made contact yesterday with pt's significant other who has agreed to act as her HCP. HCP has made pt DNR/DNI and hospice transition is planned for near future. Pt seen in her room for eval, found lying in bed asleep, unarousable to voice and light touch despite multiple attempts. Pt's breathing is noisy and congested-sounding.
Pt was seen last evening by neurology, which expressed opinion that it is "very doubtful" that pt had a stroke on 1/24. Ritalin 5 mg PO was ordered yesterday as recommended by psychiatry, but pt did not receive the medication due to refusal to swallow (she has been refusing both food and PO meds). Pt seen in her room for eval, found lying in bed asleep, unarousable to voice and light touch despite multiple attempts.
Pt was seen in her room today for f/u, again found lying in bed sleeping deeply and unarousable despite multiple attempts. Per primary team, pt is to go to OR today for debridement of R heel ulcer.

## 2021-01-29 NOTE — PROGRESS NOTE BEHAVIORAL HEALTH - PRIMARY DX
Acute metabolic encephalopathy
Acute metabolic encephalopathy
Paranoid schizophrenia
Acute metabolic encephalopathy
Acute metabolic encephalopathy
Paranoid schizophrenia
Acute metabolic encephalopathy

## 2021-01-29 NOTE — PROGRESS NOTE BEHAVIORAL HEALTH - NSBHFUPSUICINTERVAL_PSY_A_CORE
unable to assess
none known
unable to assess

## 2021-01-29 NOTE — PROGRESS NOTE BEHAVIORAL HEALTH - SECONDARY DX1
Delirium due to multiple etiologies
Delirium due to multiple etiologies
Encounter for general psychiatric examination, requested by authority
Delirium due to multiple etiologies
Encounter for general psychiatric examination, requested by authority
Delirium due to multiple etiologies
Delirium due to multiple etiologies

## 2021-01-29 NOTE — PROGRESS NOTE BEHAVIORAL HEALTH - NSBHFUPINTERVALCCFT_PSY_A_CORE
None expressed (pt aphonic)
"I'm good"
None expressed (pt aphonic)
None expressed (pt somnolent)
None expressed (pt aphonic)
None expressed (pt aphonic)
None expressed (pt somnolent)

## 2021-01-29 NOTE — PROGRESS NOTE BEHAVIORAL HEALTH - NS ED BHA MSE GENERAL APPEARANCE
Well developed/Other

## 2021-01-29 NOTE — PROGRESS NOTE BEHAVIORAL HEALTH - AXIS III
Osteomyelitis of R foot

## 2021-01-29 NOTE — PROGRESS NOTE ADULT - SUBJECTIVE AND OBJECTIVE BOX
ROS limited due to confusion, mental state. events noted Review of systems otherwise (-)  	  aspirin enteric coated 81 milliGRAM(s) Oral daily  chlorhexidine 2% Cloths 1 Application(s) Topical daily  collagenase Ointment 1 Application(s) Topical daily  Dakins Solution - Full Strength 1 Application(s) Topical once  dextrose 40% Gel 15 Gram(s) Oral once  dextrose 50% Injectable 25 Gram(s) IV Push once  dextrose 50% Injectable 12.5 Gram(s) IV Push once  dextrose 50% Injectable 25 Gram(s) IV Push once  glucagon  Injectable 1 milliGRAM(s) IntraMuscular once  pantoprazole  Injectable 40 milliGRAM(s) IV Push every 12 hours  sodium chloride 0.9% lock flush 10 milliLiter(s) IV Push every 1 hour PRN          Hemoglobin: 10.0 g/dL (01-26 @ 07:37)  Hemoglobin: 10.6 g/dL (01-25 @ 07:04)  Hemoglobin: 10.9 g/dL (01-24 @ 14:48)            Creatinine Trend: 0.83<--, 0.77<--, 0.80<--, 0.78<--, 0.85<--, 0.87<--    COAGS:           T(C): 36.3 (01-29-21 @ 11:16), Max: 36.5 (01-28-21 @ 17:05)  HR: 102 (01-29-21 @ 11:16) (96 - 109)  BP: 117/51 (01-29-21 @ 11:16) (106/74 - 121/62)  RR: 18 (01-29-21 @ 11:16) (17 - 18)  SpO2: 100% (01-29-21 @ 11:16) (100% - 100%)  Wt(kg): --    I&O's Summary    28 Jan 2021 07:01  -  29 Jan 2021 07:00  --------------------------------------------------------  IN: 0 mL / OUT: 400 mL / NET: -400 mL        HEENT:  (-)icterus (-)pallor  CV: N S1 S2 1/6 ANGELIC (+)2 Pulses B/l  Resp:  Clear to ausculatation B/L, normal effort  GI: (+) BS Soft, NT, ND  Lymph:  (-)Edema, (-)obvious lymphadenopathy  Skin: Warm to touch, Normal turgor  Psych: unable to adequately assess mood and affect        ASSESSMENT/PLAN: 	75y  Female  wheelchair bound with medical history significant of HTN, Diabetes, Osteoarthritis, Osteoporosis, Peripheral arterial disease, Diabetic neuropathy, HLD, Schizophrenia historically preserved LV and R function, comes in with worsening ulceration to b/l heels s/p debridement.    - s/p Partial calcanectomy of right foot   - s/p code stroke  - Neuro f/u noted  - Patient with severe LV dysfx now on echo, cont ASA and statin, not a candidate for ischemic eval at present  - haldol per medical team  - palliative f/u noted patient for comfort measures only  - I will sign off please call back if needed    Jm Cortes MD, [facility]  BEEPER [phone]     Debility

## 2021-06-06 NOTE — DIETITIAN NUTRITION RISK NOTIFICATION - PHYSICAL ASSESSMENT SHOULDERS
Case Management Initial Discharge Plan  Emi Daniels,             Met with:patient to discuss discharge plans. Information verified: address, contacts, phone number, , insurance Yes    Emergency Contact/Next of Kin name & number:   Louise Anguiano        Child    [phone]         PCP: No primary care provider on file. Date of last visit:list given    Insurance Provider: [employer]    Discharge Planning    Living Arrangements:  Alone   Support Systems:  None    Home has  stories   stairs to climb to get into front door, stairs to climb to reach second floor  Location of bedroom/bathroom in home     Patient able to perform ADL's:Independent needs assist    Current Services (outpatient & in home) none  DME equipment: none  DME provider: none    Receiving oral anticoagulation therapy? No    If indicated:   Physician managing anticoagulation treatment: n/a  Where does patient obtain lab work for ATC treatment? n/a      Potential Assistance Needed:  [address], Extended Care Placement    Patient agreeable to home care: homeless  Freedom of choice provided:  n/a    Prior SNF/Rehab Placement and Facility:   Agreeable to SNF/Rehab: Yes  [place] of choice provided: yes     Evaluation: yes    Expected Discharge date:  21    Patient expects to be discharged to:  snf  Follow Up Appointment: Best Day/ Time: Tuesday PM    Transportation provider: alex  Transportation arrangements needed for discharge: Yes    Readmission Risk              Risk of Unplanned Readmission:  0             Does patient have a readmission risk score greater than 14?: n/a  If yes, follow-up appointment must be made within 7 days of discharge. Goals of Care: pain control      Discharge Plan: Pt is homeless and unable to go to shelters due to medical needs.  Awaiting pt/ot eval for potential placement at [facility] - Does not want Hospice at this point          Electronically signed by Gem Wilkinson RN on 6/6/21 at 8:05 AM EDT moderate

## 2022-08-25 NOTE — DISCHARGE NOTE PROVIDER - NSDCQMAMI_CARD_ALL_CORE
Monitor.
No
Implemented All Fall Risk Interventions:  [place] to call system. Call bell, personal items and telephone within reach. Instruct patient to call for assistance. Room bathroom lighting operational. Non-slip footwear when patient is off stretcher. Physically safe environment: no spills, clutter or unnecessary equipment. Stretcher in lowest position, wheels locked, appropriate side rails in place. Provide visual cue, wrist band, yellow gown, etc. Monitor gait and stability. Monitor for mental status changes and reorient to person, place, and time. Review medications for side effects contributing to fall risk. Reinforce activity limits and safety measures with patient and family.

## 2022-09-22 NOTE — DISCHARGE NOTE PROVIDER - NSDCQMPCI_CARD_ALL_CORE
No Currently comfortable on 4L  Will start lasix IV BID.  Had an echo in July with no abnormalities  monitor respiratory status    Will continue nocturnal BiPAP

## 2022-11-21 NOTE — ED PROVIDER NOTE - MOUTH
DRY [facility]UC[facility] Cartilage Graft Text: The defect edges were debeveled with a #15 scalpel blade.  Given the location of the defect, shape of the defect, the fact the defect involved a full thickness cartilage defect a cartilage graft was deemed most appropriate.  An appropriate donor site was identified, cleansed, and anesthetized. The cartilage graft was then harvested and transferred to the recipient site, oriented appropriately and then sutured into place.  The secondary defect was then repaired using a primary closure.

## 2023-01-19 NOTE — CONSULT NOTE ADULT - SUBJECTIVE AND OBJECTIVE BOX
Patient is a 75y old  Female who presents with a chief complaint of foot ulcer (2021 13:48)      HPI  Called see and eval 75y.o. Female wheelchair bound female from [facility] PMHx HTN, type II diabetes mellitus, OA, osteoporosis, PAD, diabetic neuropathy, HLD, paranoid schizophrenia and recurrent osteomyelitis who presented to the emergency department on  with c/o worsening infection of her bilateral heel ulcerations in spite local wound care. Consulted for unstageable ulceration to her sacrum and ischium. Podiatry following for heel ulcerations, patient s/p bedside debridement of rt heel ulcer.     PAST MEDICAL & SURGICAL HISTORY:  COVID-19    Osteomyelitis    DM (diabetes mellitus)    Paranoid schizophrenia    HLD (hyperlipidemia)    PAD (peripheral artery disease)    HTN (hypertension)    No significant past surgical history        MEDICATIONS  (STANDING):  aspirin enteric coated 81 milliGRAM(s) Oral daily  cefepime   IVPB      cefepime   IVPB 1000 milliGRAM(s) IV Intermittent every 8 hours  collagenase Ointment 1 Application(s) Topical daily  Dakins Solution - Full Strength 1 Application(s) Topical once  enoxaparin Injectable 40 milliGRAM(s) SubCutaneous daily  insulin lispro (ADMELOG) corrective regimen sliding scale   SubCutaneous three times a day before meals  perphenazine 16 milliGRAM(s) Oral two times a day  potassium phosphate / sodium phosphate Tablet (K-PHOS No. 2) 1 Tablet(s) Oral four times a day with meals  simvastatin 40 milliGRAM(s) Oral at bedtime  sodium chloride 0.9%. 1000 milliLiter(s) (75 mL/Hr) IV Continuous <Continuous>  vancomycin  IVPB 750 milliGRAM(s) IV Intermittent every 12 hours    MEDICATIONS  (PRN):  acetaminophen   Tablet .. 650 milliGRAM(s) Oral every 4 hours PRN Mild Pain (1 - 3), Moderate Pain (4 - 6)      Allergies    No Known Allergies    Intolerances        Vital Signs Last 24 Hrs  T(C): 36.5 (2021 12:41), Max: 36.6 (2021 19:00)  T(F): 97.7 (2021 12:41), Max: 97.8 (2021 19:00)  HR: 72 (2021 12:41) (66 - 76)  BP: 114/44 (2021 12:41) (92/43 - 118/51)  BP(mean): 70 (2021 19:00) (59 - 70)  RR: 16 (2021 12:41) (16 - 18)  SpO2: 100% (2021 12:41) (97% - 100%)    Physical:  Gen: NAD  Respirations: Unlabored  LE: b/l lower extremities in podiatry dressing   Sacrum: Unstageable ulcer with eschar to sacrum with slough, surrounding erythema, without discharge , right ischium deep tissue injury unstageable with eschar.         I&O's Detail      LABS:                        8.9    16.11 )-----------( 401      ( 2021 06:29 )             27.2              01-    137  |  103  |  13  ----------------------------<  210<H>  3.9   |  23  |  0.54    Ca    8.5      2021 06:29  Phos  2.0     -  Mg     1.6     -06    TPro  5.8<L>  /  Alb  1.9<L>  /  TBili  0.4  /  DBili  x   /  AST  7<L>  /  ALT  11  /  AlkPhos  68  01-06            PT/INR - ( 2021 16:24 )   PT: 14.6 sec;   INR: 1.24 ratio         PTT - ( 2021 11:45 )  PTT:55.8 sec  Urinalysis Basic - ( 2021 02:33 )    Color: Yellow / Appearance: Clear / S.015 / pH: x  Gluc: x / Ketone: Small  / Bili: Negative / Urobili: Negative   Blood: x / Protein: 30 mg/dL / Nitrite: Negative   Leuk Esterase: Small / RBC: 10-25 /HPF / WBC 3-5 /HPF   Sq Epi: x / Non Sq Epi: Few /HPF / Bacteria: Few /HPF        RADIOLOGY & ADDITIONAL STUDIES:     Metronidazole Counseling:  I discussed with the patient the risks of metronidazole including but not limited to seizures, nausea/vomiting, a metallic taste in the mouth, nausea/vomiting and severe allergy.

## 2023-07-04 NOTE — BEHAVIORAL HEALTH ASSESSMENT NOTE - NSBHHPIREASON_PSY_A_CORE
Problem: MOBILITY - ADULT  Goal: Maintain or return to baseline ADL function  Description: INTERVENTIONS:  -  Assess patient's ability to carry out ADLs; assess patient's baseline for ADL function and identify physical deficits which impact ability to perform ADLs (bathing, care of mouth/teeth, toileting, grooming, dressing, etc.)  - Assess/evaluate cause of self-care deficits   - Assess range of motion  - Assess patient's mobility; develop plan if impaired  - Assess patient's need for assistive devices and provide as appropriate  - Encourage maximum independence but intervene and supervise when necessary  - Involve family in performance of ADLs  - Assess for home care needs following discharge   - Consider OT consult to assist with ADL evaluation and planning for discharge  - Provide patient education as appropriate  Outcome: Progressing  Goal: Maintains/Returns to pre admission functional level  Description: INTERVENTIONS:  - Perform BMAT or MOVE assessment daily.   - Set and communicate daily mobility goal to care team and patient/family/caregiver. - Collaborate with rehabilitation services on mobility goals if consulted  - Perform Range of Motion  times a day. - Reposition patient every  hours.   - Dangle patient  times a day  - Stand patient  times a day  - Ambulate patient  times a day  - Out of bed to chair  times a day   - Out of bed for meals  times a day  - Out of bed for toileting  - Record patient progress and toleration of activity level   Outcome: Progressing     Problem: PAIN - ADULT  Goal: Verbalizes/displays adequate comfort level or baseline comfort level  Description: Interventions:  - Encourage patient to monitor pain and request assistance  - Assess pain using appropriate pain scale  - Administer analgesics based on type and severity of pain and evaluate response  - Implement non-pharmacological measures as appropriate and evaluate response  - Consider cultural and social influences on pain and pain management  - Notify physician/advanced practitioner if interventions unsuccessful or patient reports new pain  Outcome: Progressing     Problem: INFECTION - ADULT  Goal: Absence or prevention of progression during hospitalization  Description: INTERVENTIONS:  - Assess and monitor for signs and symptoms of infection  - Monitor lab/diagnostic results  - Monitor all insertion sites, i.e. indwelling lines, tubes, and drains  - Monitor endotracheal if appropriate and nasal secretions for changes in amount and color  - [place] appropriate cooling/warming therapies per order  - Administer medications as ordered  - Instruct and encourage patient and family to use good hand hygiene technique  - Identify and instruct in appropriate isolation precautions for identified infection/condition  Outcome: Progressing  Goal: Absence of fever/infection during neutropenic period  Description: INTERVENTIONS:  - Monitor WBC    Outcome: Progressing     Problem: SAFETY ADULT  Goal: Maintain or return to baseline ADL function  Description: INTERVENTIONS:  -  Assess patient's ability to carry out ADLs; assess patient's baseline for ADL function and identify physical deficits which impact ability to perform ADLs (bathing, care of mouth/teeth, toileting, grooming, dressing, etc.)  - Assess/evaluate cause of self-care deficits   - Assess range of motion  - Assess patient's mobility; develop plan if impaired  - Assess patient's need for assistive devices and provide as appropriate  - Encourage maximum independence but intervene and supervise when necessary  - Involve family in performance of ADLs  - Assess for home care needs following discharge   - Consider OT consult to assist with ADL evaluation and planning for discharge  - Provide patient education as appropriate  Outcome: Progressing  Goal: Maintains/Returns to pre admission functional level  Description: INTERVENTIONS:  - Perform BMAT or MOVE assessment daily.   - Set and communicate daily mobility goal to care team and patient/family/caregiver. - Collaborate with rehabilitation services on mobility goals if consulted  - Perform Range of Motion  times a day. - Reposition patient every  hours.   - Dangle patient  times a day  - Stand patient  times a day  - Ambulate patient  times a day  - Out of bed to chair  times a day   - Out of bed for meals  times a day  - Out of bed for toileting  - Record patient progress and toleration of activity level   Outcome: Progressing  Goal: Patient will remain free of falls  Description: INTERVENTIONS:  - Educate patient/family on patient safety including physical limitations  - Instruct patient to call for assistance with activity   - Consult OT/PT to assist with strengthening/mobility   - Keep Call bell within reach  - Keep bed low and locked with side rails adjusted as appropriate  - Keep care items and personal belongings within reach  - Initiate and maintain comfort rounds  - Make Fall Risk Sign visible to staff  - Offer Toileting every  Hours, in advance of need  - Initiate/Maintain alarm  - Obtain necessary fall risk management equipment  - Apply yellow socks and bracelet for high fall risk patients  - Consider moving patient to room near nurses station  Outcome: Progressing     Problem: NEUROSENSORY - ADULT  Goal: Achieves stable or improved neurological status  Description: INTERVENTIONS  - Monitor and report changes in neurological status  - Monitor vital signs such as temperature, blood pressure, glucose, and any other labs ordered   - Initiate measures to prevent increased intracranial pressure  - Monitor for seizure activity and implement precautions if appropriate      Outcome: Progressing  Goal: Remains free of injury related to seizures activity  Description: INTERVENTIONS  - Maintain airway, patient safety  and administer oxygen as ordered  - Monitor patient for seizure activity, document and report duration and description of seizure to physician/advanced practitioner  - If seizure occurs,  ensure patient safety during seizure  - Reorient patient post seizure  - Seizure pads on all 4 side rails  - Instruct patient/family to notify RN of any seizure activity including if an aura is experienced  - Instruct patient/family to call for assistance with activity based on nursing assessment  - Administer anti-seizure medications if ordered    Outcome: Progressing  Goal: Achieves maximal functionality and self care  Description: INTERVENTIONS  - Monitor swallowing and airway patency with patient fatigue and changes in neurological status  - Encourage and assist patient to increase activity and self care.    - Encourage visually impaired, hearing impaired and aphasic patients to use assistive/communication devices  Outcome: Progressing Consult

## 2024-03-01 NOTE — H&P ADULT - PROBLEM SELECTOR PLAN 7
Haja Jalloh
IMPROVE VTE Individual Risk Assessment    RISK                                                                Points  [  ] Previous VTE                                                  3  [  ] Thrombophilia                                               2  [  ] Lower limb paralysis                                      2        (unable to hold up >15 seconds)    [  ] Current Cancer                                              2         (within 6 months)  [x ] Immobilization > 24 hrs                                1  [  ] ICU/CCU stay > 24 hours                              1  [x  ] Age > 60                                                      1  IMPROVE VTE Score __2_DVT ppx Lovenox 40 sub q______  )

## 2024-05-24 NOTE — PROGRESS NOTE ADULT - RESPIRATORY AND THORAX
Attempted to contact pt via phone for first day  - no answer - left vm for pt to return call.   
details…
details…

## 2024-08-22 NOTE — PROGRESS NOTE ADULT - ASSESSMENT
We will call with blood test results  Fluids 64 oz/day, more if exercising.  oz/day if exercising  Salty snacks  Longer time with position changes  Hold pressure longer for nosebleeds. Notify me if still bleeding after 10-15 min of pressure  Aleve as needed for cramps/bleeding  
1.	Acute osteo of bilateral calcaneous 2/2 diabetic heel ulcers  2.	Uncontrolled DM  ·	cont zosyn 3.375gm IV q8h  ·	podiatry wishes to do heel debridement, patient was found to have capacity to currently make medical decisions but it is unclear if patient will agree to this procedure
1.	Acute osteo of bilateral calcaneous 2/2 diabetic heel ulcers  2.	Uncontrolled DM  ·	dc planning today since debridement was refused  ·	cont zosyn 3.375gm IV q8h D5, needs 37 days more  ·	reconsult prn
Patient is admitted for b/l heel Osteomyelitis
Patient is admitted for b/l heel Osteomyelitis
1.	Acute osteo of bilateral calcaneous 2/2 diabetic heel ulcers  2.	Uncontrolled DM  ·	cont zosyn 3.375gm IV q8h  ·	OR debridement tomorrow
1.	Acute osteo of bilateral calcaneous 2/2 diabetic heel ulcers  2.	Uncontrolled DM  ·	cont zosyn 3.375gm IV q8h  ·	podiatry wishes to do heel debridement, patient was found to have capacity to currently make medical decisions. At this time pt is not inclined to agree to this procedure

## 2024-11-21 NOTE — H&P ADULT - PROBLEM SELECTOR PROBLEM 3
DM (diabetes mellitus) What Is The Reason For Today's Visit?: History of Melanoma Year Excised?: 2019

## 2024-12-17 NOTE — SWALLOW BEDSIDE ASSESSMENT ADULT - ASR SWALLOW DENTITION
Patient arrived to room. PIV placed. Admit assessment completed. Plan of care discussed with patient.   
edentulous, does not have dentures
edentulous, does not have dentures

## 2024-12-20 NOTE — PATIENT PROFILE ADULT - FALL HARM RISK CONCLUSION
Problem: Chronic Conditions and Co-morbidities  Goal: Patient's chronic conditions and co-morbidity symptoms are monitored and maintained or improved  12/20/2024 0343 by Naye Veronica RN  Outcome: Progressing  12/19/2024 1912 by Keyanna Costa RN  Outcome: Progressing     Problem: Pain  Goal: Verbalizes/displays adequate comfort level or baseline comfort level  12/20/2024 0343 by Naye Veronica RN  Outcome: Progressing  12/19/2024 1912 by Keaynna Costa RN  Outcome: Progressing     Problem: Skin/Tissue Integrity  Goal: Absence of new skin breakdown  Description: 1.  Monitor for areas of redness and/or skin breakdown  2.  Assess vascular access sites hourly  3.  Every 4-6 hours minimum:  Change oxygen saturation probe site  4.  Every 4-6 hours:  If on nasal continuous positive airway pressure, respiratory therapy assess nares and determine need for appliance change or resting period.  12/20/2024 0343 by Naye Veronica RN  Outcome: Progressing  12/19/2024 1912 by Keyanna Costa RN  Outcome: Progressing     Problem: ABCDS Injury Assessment  Goal: Absence of physical injury  12/20/2024 0343 by Naye Veronica RN  Outcome: Progressing  12/19/2024 1912 by Keyanna Costa RN  Outcome: Progressing     Problem: Safety - Adult  Goal: Free from fall injury  12/20/2024 0343 by Naye Veronica RN  Outcome: Progressing  12/19/2024 1912 by Keyanna Costa RN  Outcome: Progressing      Fall with Harm Risk